# Patient Record
Sex: MALE | Race: WHITE
[De-identification: names, ages, dates, MRNs, and addresses within clinical notes are randomized per-mention and may not be internally consistent; named-entity substitution may affect disease eponyms.]

---

## 2018-04-07 ENCOUNTER — HOSPITAL ENCOUNTER (EMERGENCY)
Dept: HOSPITAL 46 - ED | Age: 68
Discharge: HOME | End: 2018-04-07
Payer: COMMERCIAL

## 2018-04-07 VITALS — WEIGHT: 200 LBS | BODY MASS INDEX: 24.87 KG/M2 | HEIGHT: 75 IN

## 2018-04-07 DIAGNOSIS — F41.8: Primary | ICD-10-CM

## 2018-04-07 DIAGNOSIS — E78.00: ICD-10-CM

## 2018-04-07 DIAGNOSIS — Z79.899: ICD-10-CM

## 2018-04-07 DIAGNOSIS — R45.89: ICD-10-CM

## 2018-04-07 NOTE — XMS
Encounter Summary
  Created on: 2018
 
 Memo Deni Siu
 External Reference #: [id number]
 : 52
 Sex: Male
 
 Demographics
 
 
+-----------------------+----------------------+
| Address               | [address]          |
|                       | [place]  [address] |
+-----------------------+----------------------+
| Home Phone            | [phone]      |
+-----------------------+----------------------+
| Preferred Language    | Unknown              |
+-----------------------+----------------------+
| Marital Status        |               |
+-----------------------+----------------------+
| Scientologist Affiliation | Unknown              |
+-----------------------+----------------------+
| Race                  | Unknown              |
+-----------------------+----------------------+
| Ethnic Group          | Unknown              |
+-----------------------+----------------------+
 
 
 Author
 
 
+--------------+--------------------------------------------+
| Author       | [facility] [place]  |
|              | and [place]                                |
+--------------+--------------------------------------------+
| Organization | [facility] [place]  |
|              | and [place]                                |
+--------------+--------------------------------------------+
| Address      | Unknown                                    |
+--------------+--------------------------------------------+
| Phone        | Unavailable                                |
+--------------+--------------------------------------------+
 
 
 
 Support
 
 
+---------------+--------------+---------+-----------------+
| Name          | Relationship | Address | Phone           |
+---------------+--------------+---------+-----------------+
| RIVER PARKINSON | [place]E         | Unknown | [phone] |
+---------------+--------------+---------+-----------------+
 
 
 
 Care Team Providers
 
 
 
+-----------------------+------+-----------------+
| Care Team Member Name | Role | Phone           |
+-----------------------+------+-----------------+
| Ck Michelle  | PCP  | [phone] |
+-----------------------+------+-----------------+
 
 
 
 Encounter Details
 
 
+--------+----------+----------------------+----------------------+-------------+
| Date   | Type     | Department           | Care Team            | Description |
+--------+----------+----------------------+----------------------+-------------+
| / | Imaging  |   [facility] |   Provider,          |             |
| 2018   | Exam     |  [facility]    | [address] |             |
|        |          | IMAGING              |  Robbie [address]        |             |
|        |          | [phone]         | [place] [address]     |             |
+--------+----------+----------------------+----------------------+-------------+
 
 
 
 Social History
 
 
+--------------+-------+-----------+--------+------+
| Tobacco Use  | Types | Packs/Day | Years  | Date |
|              |       |           | Used   |      |
+--------------+-------+-----------+--------+------+
| Never Smoker |       |           |        |      |
+--------------+-------+-----------+--------+------+
 
 
 
+---------------------+---+---+---+
| Smokeless Tobacco:  |   |   |   |
| Never Used          |   |   |   |
+---------------------+---+---+---+
 
 
 
+-------------+-----------+---------+--------------------------------------------+
| Alcohol Use | Drinks/We | oz/Week | Comments                                   |
|             | ek        |         |                                            |
+-------------+-----------+---------+--------------------------------------------+
| No          |   0       | 0.0     | No longer drinking 1-2 drinks 2-4 times a  |
|             | Standard  |         | month                                      |
|             | drinks or |         |                                            |
|             |           |         |                                            |
|             | equivalen |         |                                            |
|             | t         |         |                                            |
+-------------+-----------+---------+--------------------------------------------+
 
 
 
+------------------+---------------+
| Sex Assigned at  | Date Recorded |
| Birth            |               |
 
+------------------+---------------+
| Not on file      |               |
+------------------+---------------+
 as of this encounter
 
 Plan of Treatment
 Not on fileas of this encounter
 
 Results
 XR Lumbar Spine 2 or 3 Vw (2018 1045)
 
+----------+-----------------------+
| Specimen | Performing Laboratory |
+----------+-----------------------+
|          |   [facility]         |
+----------+-----------------------+
 
 
 
+--------------------------------------------------------------------+
| Narrative                                                          |
+--------------------------------------------------------------------+
|   External films for comparison only - no result from [place].  |
+--------------------------------------------------------------------+
 in this encounter
 
 Visit Diagnoses
 Not on filein this encounter

## 2018-04-07 NOTE — XMS
Encounter Summary
  Created on: 2018
 
 Memo Deni Siu
 External Reference #: [id number]
 : 52
 Sex: Male
 
 Demographics
 
 
+-----------------------+----------------------+
| Address               | [address]          |
|                       | [place]  [address] |
+-----------------------+----------------------+
| Home Phone            | [phone]      |
+-----------------------+----------------------+
| Preferred Language    | Unknown              |
+-----------------------+----------------------+
| Marital Status        |               |
+-----------------------+----------------------+
| Adventist Affiliation | Unknown              |
+-----------------------+----------------------+
| Race                  | Unknown              |
+-----------------------+----------------------+
| Ethnic Group          | Unknown              |
+-----------------------+----------------------+
 
 
 Author
 
 
+--------------+--------------------------------------------+
| Author       | [facility] [place]  |
|              | and [place]                                |
+--------------+--------------------------------------------+
| Organization | [facility] [place]  |
|              | and [place]                                |
+--------------+--------------------------------------------+
| Address      | Unknown                                    |
+--------------+--------------------------------------------+
| Phone        | Unavailable                                |
+--------------+--------------------------------------------+
 
 
 
 Support
 
 
+---------------+--------------+---------+-----------------+
| Name          | Relationship | Address | Phone           |
+---------------+--------------+---------+-----------------+
| RIVER PARKINSON | [place]E         | Unknown | [phone] |
+---------------+--------------+---------+-----------------+
 
 
 
 Care Team Providers
 
 
 
+-----------------------+------+-----------------+
| Care Team Member Name | Role | Phone           |
+-----------------------+------+-----------------+
| Ck Michelle  | PCP  | [phone] |
+-----------------------+------+-----------------+
 
 
 
 Encounter Details
 
 
+--------+----------+----------------------+----------------------+-------------+
| Date   | Type     | Department           | Care Team            | Description |
+--------+----------+----------------------+----------------------+-------------+
| / | Imaging  |   [facility] |   Provider,          |             |
| 2018   | Exam     |  [facility] EXTER[facility]    | [address] |             |
|        |          | IMAGING              |  [address]        |             |
|        |          | [phone]         | [place] [address]     |             |
+--------+----------+----------------------+----------------------+-------------+
 
 
 
 Social History
 
 
+--------------+-------+-----------+--------+------+
| Tobacco Use  | Types | Packs/Day | Years  | Date |
|              |       |           | Used   |      |
+--------------+-------+-----------+--------+------+
| Never Smoker |       |           |        |      |
+--------------+-------+-----------+--------+------+
 
 
 
+---------------------+---+---+---+
| Smokeless Tobacco:  |   |   |   |
| Never Used          |   |   |   |
+---------------------+---+---+---+
 
 
 
+-------------+-----------+---------+--------------------------------------------+
| Alcohol Use | Drinks/We | oz/Week | Comments                                   |
|             | ek        |         |                                            |
+-------------+-----------+---------+--------------------------------------------+
| No          |   0       | 0.0     | No longer drinking 1-2 drinks 2-4 times a  |
|             | Standard  |         | month                                      |
|             | drinks or |         |                                            |
|             |           |         |                                            |
|             | equivalen |         |                                            |
|             | t         |         |                                            |
+-------------+-----------+---------+--------------------------------------------+
 
 
 
+------------------+---------------+
| Sex Assigned at  | Date Recorded |
| Birth            |               |
 
+------------------+---------------+
| Not on file      |               |
+------------------+---------------+
 as of this encounter
 
 Plan of Treatment
 Not on fileas of this encounter
 
 Results
 XR Lumbar Spine 2 or 3 Vw (2018 1045)
 
+----------+-----------------------+
| Specimen | Performing Laboratory |
+----------+-----------------------+
|          |   [facility]         |
+----------+-----------------------+
 
 
 
+--------------------------------------------------------------------+
| Narrative                                                          |
+--------------------------------------------------------------------+
|   External films for comparison only - no result from [place].  |
+--------------------------------------------------------------------+
 in this encounter
 
 Visit Diagnoses
 Not on filein this encounter

## 2018-04-07 NOTE — XMS
Encounter Summary
  Created on: 2018
 
 Memo Deni Siu
 External Reference #: [id number]
 : 52
 Sex: Male
 
 Demographics
 
 
+-----------------------+----------------------+
| Address               | [address]          |
|                       | [place]  [address] |
+-----------------------+----------------------+
| Home Phone            | [phone]      |
+-----------------------+----------------------+
| Preferred Language    | Unknown              |
+-----------------------+----------------------+
| Marital Status        |               |
+-----------------------+----------------------+
| Lutheran Affiliation | Unknown              |
+-----------------------+----------------------+
| Race                  | Unknown              |
+-----------------------+----------------------+
| Ethnic Group          | Unknown              |
+-----------------------+----------------------+
 
 
 Author
 
 
+--------------+--------------------------------------------+
| Author       | [facility] [place]  |
|              | and [place]                                |
+--------------+--------------------------------------------+
| Organization | [facility] [place]  |
|              | and [place]                                |
+--------------+--------------------------------------------+
| Address      | Unknown                                    |
+--------------+--------------------------------------------+
| Phone        | Unavailable                                |
+--------------+--------------------------------------------+
 
 
 
 Support
 
 
+---------------+--------------+---------+-----------------+
| Name          | Relationship | Address | Phone           |
+---------------+--------------+---------+-----------------+
| RIVER PARKINSON | [place]E         | Unknown | [phone] |
+---------------+--------------+---------+-----------------+
 
 
 
 Care Team Providers
 
 
 
+-----------------------+------+-----------------+
| Care Team Member Name | Role | Phone           |
+-----------------------+------+-----------------+
| Ck Michelle  | PCP  | [phone] |
+-----------------------+------+-----------------+
 
 
 
 Reason for Visit
 
 
+--------------+-----------+
| Reason       | Comments  |
+--------------+-----------+
| Imaging Only | 6M PO XR  |
+--------------+-----------+
 
 
 
 Encounter Details
 
 
+--------+-----------+----------------------+----------------------+----------------------+
| Date   | Type      | Department           | Care Team            | Description          |
+--------+-----------+----------------------+----------------------+----------------------+
| / | Telephone |   [facility] [place]          |   Dreyer, Jason A,   | Imaging Only (6M PO  |
| 2018   |           | NEUROSURGERY  [address]  | DO  [address]  | XR )                 |
|        |           | [address]     | [address]  [place], |                      |
|        |           | [place], [place]      |  [place] [address]            |                      |
|        |           | [address]           | [phone]         |                      |
|        |           | [phone]         | [phone] (Fax)   |                      |
+--------+-----------+----------------------+----------------------+----------------------+
 
 
 
 Social History
 
 
+--------------+-------+-----------+--------+------+
| Tobacco Use  | Types | Packs/Day | Years  | Date |
|              |       |           | Used   |      |
+--------------+-------+-----------+--------+------+
| Never Smoker |       |           |        |      |
+--------------+-------+-----------+--------+------+
 
 
 
+---------------------+---+---+---+
| Smokeless Tobacco:  |   |   |   |
| Never Used          |   |   |   |
+---------------------+---+---+---+
 
 
 
+-------------+-----------+---------+--------------------------------------------+
| Alcohol Use | Drinks/We | oz/Week | Comments                                   |
|             | ek        |         |                                            |
+-------------+-----------+---------+--------------------------------------------+
 
| No          |   0       | 0.0     | No longer drinking 1-2 drinks 2-4 times a  |
|             | Standard  |         | month                                      |
|             | drinks or |         |                                            |
|             |           |         |                                            |
|             | equivalen |         |                                            |
|             | t         |         |                                            |
+-------------+-----------+---------+--------------------------------------------+
 
 
 
+------------------+---------------+
| Sex Assigned at  | Date Recorded |
| Birth            |               |
+------------------+---------------+
| Not on file      |               |
+------------------+---------------+
 as of this encounter
 
 Plan of Treatment
 Not on fileas of this encounter
 
 Visit Diagnoses
 Not on filein this encounter

## 2018-04-07 NOTE — XMS
Encounter Summary
  Created on: 2018
 
 Memo Deni Siu
 External Reference #: [id number]
 : 52
 Sex: Male
 
 Demographics
 
 
+-----------------------+----------------------+
| Address               | [address]          |
|                       | [place]  [address] |
+-----------------------+----------------------+
| Home Phone            | [phone]      |
+-----------------------+----------------------+
| Preferred Language    | Unknown              |
+-----------------------+----------------------+
| Marital Status        |               |
+-----------------------+----------------------+
| Quaker Affiliation | Unknown              |
+-----------------------+----------------------+
| Race                  | Unknown              |
+-----------------------+----------------------+
| Ethnic Group          | Unknown              |
+-----------------------+----------------------+
 
 
 Author
 
 
+--------------+--------------------------------------------+
| Author       | [facility] [place]  |
|              | and [place]                                |
+--------------+--------------------------------------------+
| Organization | [facility] [place]  |
|              | and [place]                                |
+--------------+--------------------------------------------+
| Address      | Unknown                                    |
+--------------+--------------------------------------------+
| Phone        | Unavailable                                |
+--------------+--------------------------------------------+
 
 
 
 Support
 
 
+---------------+--------------+---------+-----------------+
| Name          | Relationship | Address | Phone           |
+---------------+--------------+---------+-----------------+
| RIVER PARKINSON | [place]         | Unknown | [phone] |
+---------------+--------------+---------+-----------------+
 
 
 
 Care Team Providers
 
 
 
+-----------------------+------+-----------------+
| Care Team Member Name | Role | Phone           |
+-----------------------+------+-----------------+
| Ck Michelle  | PCP  | [phone] |
+-----------------------+------+-----------------+
 
 
 
 Reason for Visit
 
 
+--------------+-----------+
| Reason       | Comments  |
+--------------+-----------+
| Imaging Only | 6M PO XR  |
+--------------+-----------+
 
 
 
 Encounter Details
 
 
+--------+-----------+----------------------+----------------------+----------------------+
| Date   | Type      | Department           | Care Team            | Description          |
+--------+-----------+----------------------+----------------------+----------------------+
| / | Telephone |   [facility] [place]          |   Dreyer, Jason A,   | Imaging Only (6M PO  |
| 2018   |           | NEUROSURGERY  [address]  | DO  [address]  | XR )                 |
|        |           | [address]     | [address]  [place], |                      |
|        |           | [place], [place]      |  [place] [address]            |                      |
|        |           | [address]           | [phone]         |                      |
|        |           | [phone]         | [phone] (Fax)   |                      |
+--------+-----------+----------------------+----------------------+----------------------+
 
 
 
 Social History
 
 
+--------------+-------+-----------+--------+------+
| Tobacco Use  | Types | Packs/Day | Years  | Date |
|              |       |           | Used   |      |
+--------------+-------+-----------+--------+------+
| Never Smoker |       |           |        |      |
+--------------+-------+-----------+--------+------+
 
 
 
+---------------------+---+---+---+
| Smokeless Tobacco:  |   |   |   |
| Never Used          |   |   |   |
+---------------------+---+---+---+
 
 
 
+-------------+-----------+---------+--------------------------------------------+
| Alcohol Use | Drinks/We | oz/Week | Comments                                   |
|             | ek        |         |                                            |
+-------------+-----------+---------+--------------------------------------------+
 
| No          |   0       | 0.0     | No longer drinking 1-2 drinks 2-4 times a  |
|             | Standard  |         | month                                      |
|             | drinks or |         |                                            |
|             |           |         |                                            |
|             | equivalen |         |                                            |
|             | t         |         |                                            |
+-------------+-----------+---------+--------------------------------------------+
 
 
 
+------------------+---------------+
| Sex Assigned at  | Date Recorded |
| Birth            |               |
+------------------+---------------+
| Not on file      |               |
+------------------+---------------+
 as of this encounter
 
 Plan of Treatment
 Not on fileas of this encounter
 
 Visit Diagnoses
 Not on filein this encounter

## 2018-04-07 NOTE — XMS
Clinical Summary
  Created on: 2018
 
 Berna Hampton
 External Reference #: [id number]
 : 52
 Sex: Male
 
 Demographics
 
 
+-----------------------+----------------------+
| Address               | [address]          |
|                       | [place]  [address] |
+-----------------------+----------------------+
| Home Phone            | [phone]      |
+-----------------------+----------------------+
| Preferred Language    | Unknown              |
+-----------------------+----------------------+
| Marital Status        |               |
+-----------------------+----------------------+
| Moravian Affiliation | Unknown              |
+-----------------------+----------------------+
| Race                  | Unknown              |
+-----------------------+----------------------+
| Ethnic Group          | Unknown              |
+-----------------------+----------------------+
 
 
 Author
 
 
+--------------+--------------------------------------------+
| Author       | [facility]  |
|              | and [place]                                |
+--------------+--------------------------------------------+
| Organization | [facility]  |
|              | and [place]                                |
+--------------+--------------------------------------------+
| Address      | Unknown                                    |
+--------------+--------------------------------------------+
| Phone        | Unavailable                                |
+--------------+--------------------------------------------+
 
 
 
 Support
 
 
+---------------+--------------+---------+-----------------+
| Name          | Relationship | Address | Phone           |
+---------------+--------------+---------+-----------------+
| [place] | CARLENE         | Unknown | [phone] |
+---------------+--------------+---------+-----------------+
 
 
 
 Care Team Providers
 
 
 
+-----------------------+------+-----------------+
| Care Team Member Name | Role | Phone           |
+-----------------------+------+-----------------+
| Ck Michelle  | PP   | [phone] |
+-----------------------+------+-----------------+
 
 
 
 Allergies
 
 
+----------------+----------------------+----------+----------+---------------------+
| Active Allergy | Reactions            | Severity | Noted    | Comments            |
|                |                      |          | Date     |                     |
+----------------+----------------------+----------+----------+---------------------+
| Meperidine     | Nausea And Vomiting  | Medium   | 05/15/20 |                     |
|                |                      |          | 17       |                     |
+----------------+----------------------+----------+----------+---------------------+
| Oxycodone      | Other (See Comments) | Medium   | 20 |   Hallucinations &  |
|                |                      |          | 17       | sleepiness          |
+----------------+----------------------+----------+----------+---------------------+
 
 
 
 Current Medications
 
 
+----------------------+----------------------+--------+---------+------+------+-------+
| Prescription         | Sig.                 | Disp.  | Refills | Star | End  | Statu |
|                      |                      |        |         | t    | Date | s     |
|                      |                      |        |         | Date |      |       |
+----------------------+----------------------+--------+---------+------+------+-------+
|                      | Take 1 tablet by     |        |         |      |      | Activ |
| fexofenadine-pseudoe | mouth Twice  daily   |        |         |      |      | e     |
| PHEDrine (ALLEGRA-D  | as needed.           |        |         |      |      |       |
| ALLERGY &            |                      |        |         |      |      |       |
| CONGESTION)    |                      |        |         |      |      |       |
| MG per tablet        |                      |        |         |      |      |       |
+----------------------+----------------------+--------+---------+------+------+-------+
|   atorvaSTATin       | Take 40 mg by mouth  |        |         | 02/0 |      | Activ |
| (LIPITOR) 40 mg      | Daily.               |        |         | 4/20 |      | e     |
| tablet               |                      |        |         | 15   |      |       |
+----------------------+----------------------+--------+---------+------+------+-------+
|   FLUoxetine         | Take 20 mg by mouth  |        |         |      |      | Activ |
| (PROZAC) 20 mg       | Daily.               |        |         |      |      | e     |
| capsule              |                      |        |         |      |      |       |
+----------------------+----------------------+--------+---------+------+------+-------+
|   fluticasone        | 1 spray by Nasal     |        |         |      |      | Activ |
| (FLONASE) 50         | route Daily.         |        |         |      |      | e     |
| mcg/nasal spray      |                      |        |         |      |      |       |
+----------------------+----------------------+--------+---------+------+------+-------+
|   metoprolol         | Take 25 mg by mouth  |        |         | 02/0 |      | Activ |
| succinate            | Daily.               |        |         | 6/20 |      | e     |
| (TOPROL-XL) 25 mg 24 |                      |        |         | 17   |      |       |
|  hr tablet           |                      |        |         |      |      |       |
+----------------------+----------------------+--------+---------+------+------+-------+
|   cholecalciferol    | Take 1,000 Units by  |        |         |      |      | Activ |
| (VITAMIN D-3) 1,000  | mouth Daily.         |        |         |      |      | e     |
 
| units tablet         |                      |        |         |      |      |       |
+----------------------+----------------------+--------+---------+------+------+-------+
|   B Complex Vitamins | Take 1 tablet by     |        |         |      |      | Activ |
|  (VITAMIN B COMPLEX) | mouth Daily.         |        |         |      |      | e     |
|  tablet              |                      |        |         |      |      |       |
+----------------------+----------------------+--------+---------+------+------+-------+
|   ASCORBIC ACID PO   | Take  by mouth.      |        |         |      |      | Activ |
|                      |                      |        |         |      |      | e     |
+----------------------+----------------------+--------+---------+------+------+-------+
|   diazePAM (VALIUM)  | Take 1 tablet by     |   90   | 1       | 11/1 |      | Activ |
| 5 mg tablet          | mouth every 6 hours  | tablet |         | 5/20 |      | e     |
|                      | as needed for Muscle |        |         | 17   |      |       |
|                      |  spasms.             |        |         |      |      |       |
+----------------------+----------------------+--------+---------+------+------+-------+
|                      | Take 0.5-1 tablets   |   120  | 0       | 11/1 |      | Activ |
| HYDROcodone-acetamin | by mouth every 6     | tablet |         | 5/20 |      | e     |
| ophen (NORCO)  | hours as needed for  |        |         | 17   |      |       |
|  mg per tablet       | Pain.                |        |         |      |      |       |
+----------------------+----------------------+--------+---------+------+------+-------+
 
 
 
 Active Problems
 
 
+------------------------------------------------------------------+------------+
| Problem                                                          | Noted Date |
+------------------------------------------------------------------+------------+
| Anticipated difficulty with intubation                           | 2017 |
+------------------------------------------------------------------+------------+
| Osteoarthritis of spine with radiculopathy, lumbar region - Jul  | 2017 |
|                                                              |            |
+------------------------------------------------------------------+------------+
| Beta Blockers - Daily Use                                        | 2017 |
+------------------------------------------------------------------+------------+
| BPH (benign prostatic hyperplasia)                               | 2017 |
+------------------------------------------------------------------+------------+
| LAURA (obstructive sleep apnea) - H/O CPAP Use                     | 2017 |
+------------------------------------------------------------------+------------+
 
 
 
+-------------------------+
|   Overview:   uses CPAP |
+-------------------------+
 
 
 
+--------------------------------------------+------------+
| H/O TKA Total knee arthroplasty, BILATERAL | 2017 |
+--------------------------------------------+------------+
| H/O LEFT Ankle fusion                      | 2017 |
+--------------------------------------------+------------+
| H/O Lumbar discectomy L1-2, L2-3 - 2000    | 2017 |
+--------------------------------------------+------------+
| Lumbago with sciatica, right side          |            |
+--------------------------------------------+------------+
| Low back pain                              |            |
+--------------------------------------------+------------+
| Atherosclerosis                            |            |
 
+--------------------------------------------+------------+
 
 
 
 Encounters
 
 
+--------+------------+-----------+---------------------+----------------------+
| Date   | Type       | Specialty | Care Team           | Description          |
+--------+------------+-----------+---------------------+----------------------+
| / | Ancillary  |           |   Provider,         |                      |
|    | Orders     |           | MD [facility]      |                      |
+--------+------------+-----------+---------------------+----------------------+
| / | Imaging    |           |   Provider,         |                      |
|    | Exam       |           | MD [facility]      |                      |
+--------+------------+-----------+---------------------+----------------------+
| / | Telephone  |           |   Dreyer, Jason A,  | Imaging Only (6M PO  |
| 2018   |            |           | DO                  | XR )                 |
+--------+------------+-----------+---------------------+----------------------+
 from Last 3 Months
 
 Family History
 
 
+---------------------+-----------+------+-------------------+
| Medical History     | Relation  | Name | Comments          |
+---------------------+-----------+------+-------------------+
| No Known Problems   | Child     |      |                   |
+---------------------+-----------+------+-------------------+
| No Known Problems   | Child     |      |                   |
+---------------------+-----------+------+-------------------+
| Cancer              | Father    |      | Multiple Myeloma  |
+---------------------+-----------+------+-------------------+
| Coronary artery     | Father    |      |                   |
| disease             |           |      |                   |
+---------------------+-----------+------+-------------------+
| Multiple sclerosis  | Father    |      |                   |
+---------------------+-----------+------+-------------------+
| Cancer              | Maternal  |      |                   |
|                     | Grandfath |      |                   |
|                     | er        |      |                   |
+---------------------+-----------+------+-------------------+
| Cancer              | Maternal  |      |                   |
|                     | Grandmoth |      |                   |
|                     | er        |      |                   |
+---------------------+-----------+------+-------------------+
| Cancer              | Mother    |      | Pancreatic        |
+---------------------+-----------+------+-------------------+
| Lymphoma            | Mother    |      |                   |
+---------------------+-----------+------+-------------------+
| Rheum arthritis     | Mother    |      |                   |
+---------------------+-----------+------+-------------------+
| Heart disease       | Paternal  |      |                   |
|                     | Grandfath |      |                   |
|                     | er        |      |                   |
+---------------------+-----------+------+-------------------+
| No Known Problems   | Paternal  |      |                   |
|                     | Grandmoth |      |                   |
|                     | er        |      |                   |
+---------------------+-----------+------+-------------------+
 
| Lupus               | Sister    |      |                   |
+---------------------+-----------+------+-------------------+
| Other (see comment) | Sister    |      | Meniere's disease |
+---------------------+-----------+------+-------------------+
 
 
 
+----------------------+------+----------+-------------------+
| Relation             | Name | Status   | Comments          |
+----------------------+------+----------+-------------------+
| Child                |      | Other    | STATUS NOT LISTED |
+----------------------+------+----------+-------------------+
| Child                |      | Other    | STATUS NOT LISTED |
+----------------------+------+----------+-------------------+
| Child                |      |          |                   |
+----------------------+------+----------+-------------------+
| Child                |      |          |                   |
+----------------------+------+----------+-------------------+
| Father               |      |  |                   |
|                      |      |   (Age   |                   |
|                      |      | 73)      |                   |
+----------------------+------+----------+-------------------+
| Maternal Grandfather |      |  |                   |
|                      |      |   (Age   |                   |
|                      |      | 68)      |                   |
+----------------------+------+----------+-------------------+
| Maternal Grandmother |      |  |                   |
|                      |      |   (Age   |                   |
|                      |      | 73)      |                   |
+----------------------+------+----------+-------------------+
| Mother               |      |  | CANCER            |
|                      |      |   (Age   |                   |
|                      |      | 82)      |                   |
+----------------------+------+----------+-------------------+
| Paternal Grandfather |      |  | HEART             |
+----------------------+------+----------+-------------------+
| Paternal Grandmother |      |  |                   |
|                      |      |   (Age   |                   |
|                      |      | 76)      |                   |
+----------------------+------+----------+-------------------+
| Sister               |      | Alive    |                   |
+----------------------+------+----------+-------------------+
| Sister               |      |          |                   |
+----------------------+------+----------+-------------------+
| Sister               |      |          |                   |
+----------------------+------+----------+-------------------+
 
 
 
 Social History
 
 
+--------------+-------+-----------+--------+------+
| Tobacco Use  | Types | Packs/Day | Years  | Date |
|              |       |           | Used   |      |
+--------------+-------+-----------+--------+------+
| Never Smoker |       |           |        |      |
+--------------+-------+-----------+--------+------+
 
 
 
 
+---------------------+---+---+---+
| Smokeless Tobacco:  |   |   |   |
| Never Used          |   |   |   |
+---------------------+---+---+---+
 
 
 
+-------------+-----------+---------+--------------------------------------------+
| Alcohol Use | Drinks/We | oz/Week | Comments                                   |
|             | ek        |         |                                            |
+-------------+-----------+---------+--------------------------------------------+
| No          |   0       | 0.0     | No longer drinking 1-2 drinks 2-4 times a  |
|             | Standard  |         | month                                      |
|             | drinks or |         |                                            |
|             |           |         |                                            |
|             | equivalen |         |                                            |
|             | t         |         |                                            |
+-------------+-----------+---------+--------------------------------------------+
 
 
 
+------------------+---------------+
| Sex Assigned at  | Date Recorded |
| Birth            |               |
+------------------+---------------+
| Not on file      |               |
+------------------+---------------+
 
 
 
 Last Filed Vital Signs
 
 
+-------------------+---------------------+---------------------+
| Vital Sign        | Reading             | Time Taken          |
+-------------------+---------------------+---------------------+
| Blood Pressure    | 126/71              | 11/15/2017 0834 PST |
+-------------------+---------------------+---------------------+
| Pulse             | 73                  | 11/15/2017 0834 PST |
+-------------------+---------------------+---------------------+
| Temperature       | 37.5   C (99.5   F) | 2017 PDT |
+-------------------+---------------------+---------------------+
| Respiratory Rate  | 16                  | 2017 PDT |
+-------------------+---------------------+---------------------+
| Oxygen Saturation | 93%                 | 2017 PDT |
+-------------------+---------------------+---------------------+
| Inhaled Oxygen    | -                   | -                   |
| Concentration     |                     |                     |
+-------------------+---------------------+---------------------+
| Weight            | 101.2 kg (223 lb)   | 11/15/2017 0834 PST |
+-------------------+---------------------+---------------------+
| Height            | 190.5 cm (6' 3")    | 11/15/2017 0834 PST |
+-------------------+---------------------+---------------------+
| Body Mass Index   | 27.87               | 11/15/2017 0834 PST |
+-------------------+---------------------+---------------------+
 
 
 
 Plan of Treatment
 
 
 
+----------------------+-----------+-----------+----------+
| Health Maintenance   | Due Date  | Last Done | Comments |
+----------------------+-----------+-----------+----------+
| Hepatitis C          |  |           |          |
| Screening            | 1         |           |          |
+----------------------+-----------+-----------+----------+
| Vaccine:             |  |           |          |
| Dtap/Tdap/Td (1 -    | 0         |           |          |
| Tdap)                |           |           |          |
+----------------------+-----------+-----------+----------+
| COLON CANCER         |  |           |          |
| SCREENING            | 1         |           |          |
| (COLONOSCOPY EVERY   |           |           |          |
| 10 YEARS 50-75)      |           |           |          |
+----------------------+-----------+-----------+----------+
| Vaccine: Zoster (#1) |  |           |          |
|                      | 1         |           |          |
+----------------------+-----------+-----------+----------+
| Vaccine:             |  |           |          |
| Pneumococcal 65+     | 6         |           |          |
| Low/Medium Risk (1   |           |           |          |
| of 2 - PCV13)        |           |           |          |
+----------------------+-----------+-----------+----------+
| Vaccine: Influenza   |  |           |          |
| (Season Ended)       | 8         |           |          |
+----------------------+-----------+-----------+----------+
 
 
 
 Implants
 
 
+-------------------------------+--------+--------+-------------+--------+--------+--------+
| Implanted                     | Type   | Area   | Manufacture | Device | Expira | Model  |
|                               |        |        | r           |        | tion   | /      |
|                               |        |        |             | Identi | Date   | Serial |
|                               |        |        |             | fier   |        |  / Lot |
+-------------------------------+--------+--------+-------------+--------+--------+--------+
| Bone Chips 15cc -             | Bone   | Poster | RTI         |        | / | [id number] |
| [id number]: Qty: 1  |        | ior:   | BIOLOGICS   |        |    |        |
| on 2017 by Dreyer,      |        | Spine  | INC - [employer]  |        |        | /[id number] |
| Chacho AGUIRRE DO                   |        | Lumbar |             |        |        | 6-016  |
|                               |        |        |             |        |        | /60-31 |
|                               |        |        |             |        |        | 28     |
+-------------------------------+--------+--------+-------------+--------+--------+--------+
| Imp Spn Spcr 65h39pi -        | Generi | Poster | [employer] - |        | / | [id number] |
| [id number]: Qty: 1 on | c      | ior:   |  MEDT       |        | 4   | 1 /    |
|  2017 by Dreyer, Jason  |        | Spine  |             |        |        | / |
| DO TIMOTHY                         |        | Lumbar |             |        |        | 673    |
+-------------------------------+--------+--------+-------------+--------+--------+--------+
| Imp Spn Claudio Ti Sext Ti 5.5x45 | Generi | Poster | SO[employer]AMOR     |        |        | [id number] |
|  - [id number]: Qty: 2 | c      | ior:   | DANEK - DIV |        |        | 5045 / |
|  on 2017 by Dreyer,     |        | Spine  |  [employer]  |        |        |  /     |
| Chacho AGUIRRE DO                   |        | Lumbar | - SFDK      |        |        |        |
+-------------------------------+--------+--------+-------------+--------+--------+--------+
| Graft Infuse Bone Kit Xxs -   | Graft  | Poster | SOFAMOR     |        | / | [id number] |
| [id number]: Qty: 1 on |        | ior:   | DANEK - DIV |        | 2018   | 0 /    |
|  2017 by Dreyer, Jason  |        | Spine  |  [employer]  |        |        | / |
 
| DO TIMOTHY                         |        | Lumbar | - SFDK      |        |        | [id number]  |
+-------------------------------+--------+--------+-------------+--------+--------+--------+
| Set Scrw Ns G5 Brk Off Ti     | Screw  | Poster | SOFAMOR     |        |        | [id number] |
| 4.75 - [id number]:    |        | ior:   | DANEK - DIV |        |        | 0 / /  |
| Qty: 4 on 2017 by       |        | Spine  |  [employer]  |        |        |        |
| Dreyer, Jason A, DO           |        | Lumbar | - SFDK      |        |        |        |
+-------------------------------+--------+--------+-------------+--------+--------+--------+
| Screw Miriam Solera 6.5x55mm -  | Screw  | Poster | SOFAMOR     |        |        | [id number] |
| [id number]: Qty: 2 on |        | ior:   | DANEK - DIV |        |        | [id number]  |
|  2017 by Dreyer, Jason  |        | Spine  |  [employer]  |        |        | / /    |
| DO TIMOTHY                         |        | Lumbar | - SFDK      |        |        |        |
+-------------------------------+--------+--------+-------------+--------+--------+--------+
| Screw Miriam Solera 6.5x60mm -  | Screw  | Poster | SOFAMOR     |        |        | [id number] |
| [id number]: Qty: 2 on |        | ior:   | ABENA CARDENAS |        |        | [id number]  |
|  2017 by Dreyer, Jason  |        | Spine  |  [employer]  |        |        | / /    |
| DO [facility]                         |        | Lumbar | - [facility]      |        |        |        |
+-------------------------------+--------+--------+-------------+--------+--------+--------+
 
 
 
 Results
 XR Lumbar Spine 2 or 3 Vw (2018 1045)
 
+----------+-----------------------+
| Specimen | Performing Laboratory |
+----------+-----------------------+
|          |   [facility]         |
+----------+-----------------------+
 
 
 
+--------------------------------------------------------------------+
| Narrative                                                          |
+--------------------------------------------------------------------+
|   External films for comparison only - no result from [facility].  |
+--------------------------------------------------------------------+
 from Last 3 Months
 
 Insurance
 
 
+----------+--------+-------------+--------+-------------+--------------------+
| Payer    | Benefi | Subscriber  | Type   | Phone       | Address            |
|          | t Plan | ID          |        |             |                    |
|          |  /     |             |        |             |                    |
|          | Group  |             |        |             |                    |
+----------+--------+-------------+--------+-------------+--------------------+
| MEDICARE | MEDICA | xxxxxxxxxx  | [employer] | [phone] |                    |
|          | RE     |             | re     | [phone]        |                    |
|          | PART A |             |        |             |                    |
+----------+--------+-------------+--------+-------------+--------------------+
| MODA     | MODA   | xxxxxxxxx   | PPO    | [phone] |    [address] [address]     |
|          | [address]   |             |        | [phone]        | [place] [address] |
|          | [place] |             |        |             |                    |
|          | [place]     |             |        |             |                    |
+----------+--------+-------------+--------+-------------+--------------------+
 
 
 
+-------------------+--------+-------------+--------+-------------+---------------------+
 
| Guarantor Name    | Accoun | Relation to | Date   | Phone       | Billing Address     |
|                   | t Type |  Patient    | of     |             |                     |
|                   |        |             | Birth  |             |                     |
+-------------------+--------+-------------+--------+-------------+---------------------+
| [employer] | Person | Self        | / |   Work:     |   [address]       |
|                   | [address] |             | [address]   | [phone] | [place] [address] |
|                   | heath    |             |        | [address]  Home: |                     |
|                   |        |             |        |             |                     |
|                   |        |             |        | [phone] |                     |
|                   |        |             |        | [address]        |                     |
+-------------------+--------+-------------+--------+-------------+---------------------+

## 2018-04-07 NOTE — XMS
Encounter Summary
  Created on: 2018
 
 Memo Deni Siu
 External Reference #: [id number]
 : 52
 Sex: Male
 
 Demographics
 
 
+-----------------------+----------------------+
| Address               | [address]          |
|                       | [place]  [address] |
+-----------------------+----------------------+
| Home Phone            | [phone]      |
+-----------------------+----------------------+
| Preferred Language    | Unknown              |
+-----------------------+----------------------+
| Marital Status        |               |
+-----------------------+----------------------+
| Tenriism Affiliation | Unknown              |
+-----------------------+----------------------+
| Race                  | Unknown              |
+-----------------------+----------------------+
| Ethnic Group          | Unknown              |
+-----------------------+----------------------+
 
 
 Author
 
 
+--------------+--------------------------------------------+
| Author       | [facility]  |
|              | and [place]                                |
+--------------+--------------------------------------------+
| Organization | [facility]  |
|              | and [place]                                |
+--------------+--------------------------------------------+
| Address      | Unknown                                    |
+--------------+--------------------------------------------+
| Phone        | Unavailable                                |
+--------------+--------------------------------------------+
 
 
 
 Support
 
 
+---------------+--------------+---------+-----------------+
| Name          | Relationship | Address | Phone           |
+---------------+--------------+---------+-----------------+
| [place] | CARLENE         | Unknown | [phone] |
+---------------+--------------+---------+-----------------+
 
 
 
 Care Team Providers
 
 
 
+-----------------------+------+-----------------+
| Care Team Member Name | Role | Phone           |
+-----------------------+------+-----------------+
| Ck Michelle  | PCP  | [phone] |
+-----------------------+------+-----------------+
 
 
 
 Encounter Details
 
 
+--------+------------+----------------------+----------------------+-------------+
| Date   | Type       | Department           | Care Team            | Description |
+--------+------------+----------------------+----------------------+-------------+
| / | Ancillary  |   [facility] |   Provider,          |             |
| 2018   | Orders     |  [facility]    | [address] |             |
|        |            | IMAGING              |  [address]        |             |
|        |            | [phone]         | [place] [address]     |             |
+--------+------------+----------------------+----------------------+-------------+
 
 
 
 Social History
 
 
+--------------+-------+-----------+--------+------+
| Tobacco Use  | Types | Packs/Day | Years  | Date |
|              |       |           | Used   |      |
+--------------+-------+-----------+--------+------+
| Never Smoker |       |           |        |      |
+--------------+-------+-----------+--------+------+
 
 
 
+---------------------+---+---+---+
| Smokeless Tobacco:  |   |   |   |
| Never Used          |   |   |   |
+---------------------+---+---+---+
 
 
 
+-------------+-----------+---------+--------------------------------------------+
| Alcohol Use | Drinks/We | oz/Week | Comments                                   |
|             | ek        |         |                                            |
+-------------+-----------+---------+--------------------------------------------+
| No          |   0       | 0.0     | No longer drinking 1-2 drinks 2-4 times a  |
|             | Standard  |         | month                                      |
|             | drinks or |         |                                            |
|             |           |         |                                            |
|             | equivalen |         |                                            |
|             | t         |         |                                            |
+-------------+-----------+---------+--------------------------------------------+
 
 
 
+------------------+---------------+
| Sex Assigned at  | Date Recorded |
| Birth            |               |
 
+------------------+---------------+
| Not on file      |               |
+------------------+---------------+
 as of this encounter
 
 Plan of Treatment
 Not on fileas of this encounter
 
 Results
 XR Lumbar Spine 2 or 3 Vw (2018 1045)
 
+----------+-----------------------+
| Specimen | Performing Laboratory |
+----------+-----------------------+
|          |   [facility]         |
+----------+-----------------------+
 
 
 
+--------------------------------------------------------------------+
| Narrative                                                          |
+--------------------------------------------------------------------+
|   External films for comparison only - no result from [facility].  |
+--------------------------------------------------------------------+
 in this encounter
 
 Visit Diagnoses
 Not on filein this encounter

## 2018-04-07 NOTE — XMS
Clinical Summary
  Created on: 2018
 
 Berna Hampton
 External Reference #: [id number]
 : 52
 Sex: Male
 
 Demographics
 
 
+-----------------------+----------------------+
| Address               | [address]          |
|                       | [place]  [address] |
+-----------------------+----------------------+
| Home Phone            | [phone]      |
+-----------------------+----------------------+
| Preferred Language    | Unknown              |
+-----------------------+----------------------+
| Marital Status        |               |
+-----------------------+----------------------+
| Confucianist Affiliation | Unknown              |
+-----------------------+----------------------+
| Race                  | Unknown              |
+-----------------------+----------------------+
| Ethnic Group          | Unknown              |
+-----------------------+----------------------+
 
 
 Author
 
 
+--------------+--------------------------------------------+
| Author       | [facility]  |
|              | [facility] [place]                                |
+--------------+--------------------------------------------+
| Organization | [facility]  |
|              | [facility] [place]                                |
+--------------+--------------------------------------------+
| Address      | Unknown                                    |
+--------------+--------------------------------------------+
| Phone        | Unavailable                                |
+--------------+--------------------------------------------+
 
 
 
 Support
 
 
+---------------+--------------+---------+-----------------+
| Name          | Relationship | Address | Phone           |
+---------------+--------------+---------+-----------------+
| [place] | CARLENE         | Unknown | [phone] |
+---------------+--------------+---------+-----------------+
 
 
 
 Care Team Providers
 
 
 
+-----------------------+------+-----------------+
| Care Team Member Name | Role | Phone           |
+-----------------------+------+-----------------+
| Ck Michelle  | PP   | [phone] |
+-----------------------+------+-----------------+
 
 
 
 Allergies
 
 
+----------------+----------------------+----------+----------+---------------------+
| Active Allergy | Reactions            | Severity | Noted    | Comments            |
|                |                      |          | Date     |                     |
+----------------+----------------------+----------+----------+---------------------+
| Meperidine     | Nausea And Vomiting  | Medium   | 05/15/20 |                     |
|                |                      |          | 17       |                     |
+----------------+----------------------+----------+----------+---------------------+
| Oxycodone      | Other (See Comments) | Medium   | 20 |   Hallucinations &  |
|                |                      |          | 17       | sleepiness          |
+----------------+----------------------+----------+----------+---------------------+
 
 
 
 Current Medications
 
 
+----------------------+----------------------+--------+---------+------+------+-------+
| Prescription         | Sig.                 | Disp.  | Refills | Star | End  | Statu |
|                      |                      |        |         | t    | Date | s     |
|                      |                      |        |         | Date |      |       |
+----------------------+----------------------+--------+---------+------+------+-------+
|                      | Take 1 tablet by     |        |         |      |      | Activ |
| fexofenadine-pseudoe | mouth Twice  daily   |        |         |      |      | e     |
| PHEDrine (ALLEGRA-D  | as needed.           |        |         |      |      |       |
| ALLERGY &            |                      |        |         |      |      |       |
| CONGESTION)    |                      |        |         |      |      |       |
| MG per tablet        |                      |        |         |      |      |       |
+----------------------+----------------------+--------+---------+------+------+-------+
|   atorvaSTATin       | Take 40 mg by mouth  |        |         | 02/0 |      | Activ |
| (LIPITOR) 40 mg      | Daily.               |        |         | 4/20 |      | e     |
| tablet               |                      |        |         | 15   |      |       |
+----------------------+----------------------+--------+---------+------+------+-------+
|   FLUoxetine         | Take 20 mg by mouth  |        |         |      |      | Activ |
| (PROZAC) 20 mg       | Daily.               |        |         |      |      | e     |
| capsule              |                      |        |         |      |      |       |
+----------------------+----------------------+--------+---------+------+------+-------+
|   fluticasone        | 1 spray by Nasal     |        |         |      |      | Activ |
| (FLONASE) 50         | route Daily.         |        |         |      |      | e     |
| mcg/nasal spray      |                      |        |         |      |      |       |
+----------------------+----------------------+--------+---------+------+------+-------+
|   metoprolol         | Take 25 mg by mouth  |        |         | 02/0 |      | Activ |
| succinate            | Daily.               |        |         | 6/20 |      | e     |
| (TOPROL-XL) 25 mg 24 |                      |        |         | 17   |      |       |
|  hr tablet           |                      |        |         |      |      |       |
+----------------------+----------------------+--------+---------+------+------+-------+
|   cholecalciferol    | Take 1,000 Units by  |        |         |      |      | Activ |
| (VITAMIN D-3) 1,000  | mouth Daily.         |        |         |      |      | e     |
 
| units tablet         |                      |        |         |      |      |       |
+----------------------+----------------------+--------+---------+------+------+-------+
|   B Complex Vitamins | Take 1 tablet by     |        |         |      |      | Activ |
|  (VITAMIN B COMPLEX) | mouth Daily.         |        |         |      |      | e     |
|  tablet              |                      |        |         |      |      |       |
+----------------------+----------------------+--------+---------+------+------+-------+
|   ASCORBIC ACID PO   | Take  by mouth.      |        |         |      |      | Activ |
|                      |                      |        |         |      |      | e     |
+----------------------+----------------------+--------+---------+------+------+-------+
|   diazePAM (VALIUM)  | Take 1 tablet by     |   90   | 1       | 11/1 |      | Activ |
| 5 mg tablet          | mouth every 6 hours  | tablet |         | 5/20 |      | e     |
|                      | as needed for Muscle |        |         | 17   |      |       |
|                      |  spasms.             |        |         |      |      |       |
+----------------------+----------------------+--------+---------+------+------+-------+
|                      | Take 0.5-1 tablets   |   120  | 0       | 11/1 |      | Activ |
| HYDROcodone-acetamin | by mouth every 6     | tablet |         | 5/20 |      | e     |
| ophen (NORCO)  | hours as needed for  |        |         | 17   |      |       |
|  mg per tablet       | Pain.                |        |         |      |      |       |
+----------------------+----------------------+--------+---------+------+------+-------+
 
 
 
 Active Problems
 
 
+------------------------------------------------------------------+------------+
| Problem                                                          | Noted Date |
+------------------------------------------------------------------+------------+
| Anticipated difficulty with intubation                           | 2017 |
+------------------------------------------------------------------+------------+
| Osteoarthritis of spine with radiculopathy, lumbar region - Jul  | 2017 |
|                                                              |            |
+------------------------------------------------------------------+------------+
| Beta Blockers - Daily Use                                        | 2017 |
+------------------------------------------------------------------+------------+
| BPH (benign prostatic hyperplasia)                               | 2017 |
+------------------------------------------------------------------+------------+
| LAURA (obstructive sleep apnea) - H/O CPAP Use                     | 2017 |
+------------------------------------------------------------------+------------+
 
 
 
+-------------------------+
|   Overview:   uses CPAP |
+-------------------------+
 
 
 
+--------------------------------------------+------------+
| H/O TKA Total knee arthroplasty, BILATERAL | 2017 |
+--------------------------------------------+------------+
| H/O LEFT Ankle fusion                      | 2017 |
+--------------------------------------------+------------+
| H/O Lumbar discectomy L1-2, L2-3 - 2000    | 2017 |
+--------------------------------------------+------------+
| Lumbago with sciatica, right side          |            |
+--------------------------------------------+------------+
| Low back pain                              |            |
+--------------------------------------------+------------+
| Atherosclerosis                            |            |
 
+--------------------------------------------+------------+
 
 
 
 Encounters
 
 
+--------+------------+-----------+---------------------+----------------------+
| Date   | Type       | Specialty | Care Team           | Description          |
+--------+------------+-----------+---------------------+----------------------+
| / | Ancillary  |           |   Provider,         |                      |
|    | Orders     |           | MD [facility]      |                      |
+--------+------------+-----------+---------------------+----------------------+
| / | Imaging    |           |   Provider,         |                      |
|    | Exam       |           | MD [facility]      |                      |
+--------+------------+-----------+---------------------+----------------------+
| / | Telephone  |           |   Dreyer, Jason A,  | Imaging Only (6M PO  |
| 2018   |            |           | DO                  | XR )                 |
+--------+------------+-----------+---------------------+----------------------+
 from Last 3 Months
 
 Family History
 
 
+---------------------+-----------+------+-------------------+
| Medical History     | Relation  | Name | Comments          |
+---------------------+-----------+------+-------------------+
| No Known Problems   | Child     |      |                   |
+---------------------+-----------+------+-------------------+
| No Known Problems   | Child     |      |                   |
+---------------------+-----------+------+-------------------+
| Cancer              | Father    |      | Multiple Myeloma  |
+---------------------+-----------+------+-------------------+
| Coronary artery     | Father    |      |                   |
| disease             |           |      |                   |
+---------------------+-----------+------+-------------------+
| Multiple sclerosis  | Father    |      |                   |
+---------------------+-----------+------+-------------------+
| Cancer              | Maternal  |      |                   |
|                     | Grandfath |      |                   |
|                     | er        |      |                   |
+---------------------+-----------+------+-------------------+
| Cancer              | Maternal  |      |                   |
|                     | Grandmoth |      |                   |
|                     | er        |      |                   |
+---------------------+-----------+------+-------------------+
| Cancer              | Mother    |      | Pancreatic        |
+---------------------+-----------+------+-------------------+
| Lymphoma            | Mother    |      |                   |
+---------------------+-----------+------+-------------------+
| Rheum arthritis     | Mother    |      |                   |
+---------------------+-----------+------+-------------------+
| Heart disease       | Paternal  |      |                   |
|                     | Grandfath |      |                   |
|                     | er        |      |                   |
+---------------------+-----------+------+-------------------+
| No Known Problems   | Paternal  |      |                   |
|                     | Grandmoth |      |                   |
|                     | er        |      |                   |
+---------------------+-----------+------+-------------------+
 
| Lupus               | Sister    |      |                   |
+---------------------+-----------+------+-------------------+
| Other (see comment) | Sister    |      | Meniere's disease |
+---------------------+-----------+------+-------------------+
 
 
 
+----------------------+------+----------+-------------------+
| Relation             | Name | Status   | Comments          |
+----------------------+------+----------+-------------------+
| Child                |      | Other    | STATUS NOT LISTED |
+----------------------+------+----------+-------------------+
| Child                |      | Other    | STATUS NOT LISTED |
+----------------------+------+----------+-------------------+
| Child                |      |          |                   |
+----------------------+------+----------+-------------------+
| Child                |      |          |                   |
+----------------------+------+----------+-------------------+
| Father               |      |  |                   |
|                      |      |   (Age   |                   |
|                      |      | 73)      |                   |
+----------------------+------+----------+-------------------+
| Maternal Grandfather |      |  |                   |
|                      |      |   (Age   |                   |
|                      |      | 68)      |                   |
+----------------------+------+----------+-------------------+
| Maternal Grandmother |      |  |                   |
|                      |      |   (Age   |                   |
|                      |      | 73)      |                   |
+----------------------+------+----------+-------------------+
| Mother               |      |  | CANCER            |
|                      |      |   (Age   |                   |
|                      |      | 82)      |                   |
+----------------------+------+----------+-------------------+
| Paternal Grandfather |      |  | HEART             |
+----------------------+------+----------+-------------------+
| Paternal Grandmother |      |  |                   |
|                      |      |   (Age   |                   |
|                      |      | 76)      |                   |
+----------------------+------+----------+-------------------+
| Sister               |      | Alive    |                   |
+----------------------+------+----------+-------------------+
| Sister               |      |          |                   |
+----------------------+------+----------+-------------------+
| Sister               |      |          |                   |
+----------------------+------+----------+-------------------+
 
 
 
 Social History
 
 
+--------------+-------+-----------+--------+------+
| Tobacco Use  | Types | Packs/Day | Years  | Date |
|              |       |           | Used   |      |
+--------------+-------+-----------+--------+------+
| Never Smoker |       |           |        |      |
+--------------+-------+-----------+--------+------+
 
 
 
 
+---------------------+---+---+---+
| Smokeless Tobacco:  |   |   |   |
| Never Used          |   |   |   |
+---------------------+---+---+---+
 
 
 
+-------------+-----------+---------+--------------------------------------------+
| Alcohol Use | Drinks/We | oz/Week | Comments                                   |
|             | ek        |         |                                            |
+-------------+-----------+---------+--------------------------------------------+
| No          |   0       | 0.0     | No longer drinking 1-2 drinks 2-4 times a  |
|             | Standard  |         | month                                      |
|             | drinks or |         |                                            |
|             |           |         |                                            |
|             | equivalen |         |                                            |
|             | t         |         |                                            |
+-------------+-----------+---------+--------------------------------------------+
 
 
 
+------------------+---------------+
| Sex Assigned at  | Date Recorded |
| Birth            |               |
+------------------+---------------+
| Not on file      |               |
+------------------+---------------+
 
 
 
 Last Filed Vital Signs
 
 
+-------------------+---------------------+---------------------+
| Vital Sign        | Reading             | Time Taken          |
+-------------------+---------------------+---------------------+
| Blood Pressure    | 126/71              | 11/15/2017 0834 PST |
+-------------------+---------------------+---------------------+
| Pulse             | 73                  | 11/15/2017 0834 PST |
+-------------------+---------------------+---------------------+
| Temperature       | 37.5   C (99.5   F) | 2017 PDT |
+-------------------+---------------------+---------------------+
| Respiratory Rate  | 16                  | 2017 PDT |
+-------------------+---------------------+---------------------+
| Oxygen Saturation | 93%                 | 2017 PDT |
+-------------------+---------------------+---------------------+
| Inhaled Oxygen    | -                   | -                   |
| Concentration     |                     |                     |
+-------------------+---------------------+---------------------+
| Weight            | 101.2 kg (223 lb)   | 11/15/2017 0834 PST |
+-------------------+---------------------+---------------------+
| Height            | 190.5 cm (6' 3")    | 11/15/2017 0834 PST |
+-------------------+---------------------+---------------------+
| Body Mass Index   | 27.87               | 11/15/2017 0834 PST |
+-------------------+---------------------+---------------------+
 
 
 
 Plan of Treatment
 
 
 
+----------------------+-----------+-----------+----------+
| Health Maintenance   | Due Date  | Last Done | Comments |
+----------------------+-----------+-----------+----------+
| Hepatitis C          |  |           |          |
| Screening            | 1         |           |          |
+----------------------+-----------+-----------+----------+
| Vaccine:             |  |           |          |
| Dtap/Tdap/Td (1 -    | 0         |           |          |
| Tdap)                |           |           |          |
+----------------------+-----------+-----------+----------+
| COLON CANCER         |  |           |          |
| SCREENING            | 1         |           |          |
| (COLONOSCOPY EVERY   |           |           |          |
| 10 YEARS 50-75)      |           |           |          |
+----------------------+-----------+-----------+----------+
| Vaccine: Zoster (#1) |  |           |          |
|                      | 1         |           |          |
+----------------------+-----------+-----------+----------+
| Vaccine:             |  |           |          |
| Pneumococcal 65+     | 6         |           |          |
| Low/Medium Risk (1   |           |           |          |
| of 2 - PCV13)        |           |           |          |
+----------------------+-----------+-----------+----------+
| Vaccine: Influenza   |  |           |          |
| (Season Ended)       | 8         |           |          |
+----------------------+-----------+-----------+----------+
 
 
 
 Implants
 
 
+-------------------------------+--------+--------+-------------+--------+--------+--------+
| Implanted                     | Type   | Area   | Manufacture | Device | Expira | Model  |
|                               |        |        | r           |        | tion   | /      |
|                               |        |        |             | Identi | Date   | Serial |
|                               |        |        |             | fier   |        |  / Lot |
+-------------------------------+--------+--------+-------------+--------+--------+--------+
| Bone Chips 15cc -             | Bone   | Poster | RTI         |        | / | [id number] |
| [id number]: Qty: 1  |        | ior:   | BIOLOGICS   |        |    |        |
| on 2017 by Dreyer,      |        | Spine  | INC - [employer]  |        |        | /[id number] |
| Chacho AGUIRRE DO                   |        | Lumbar |             |        |        | 6-016  |
|                               |        |        |             |        |        | /60-31 |
|                               |        |        |             |        |        | 28     |
+-------------------------------+--------+--------+-------------+--------+--------+--------+
| Imp Spn Spcr 77t20vl -        | Generi | Poster | [employer] - |        | / | [id number] |
| [id number]: Qty: 1 on | c      | ior:   |  MEDT       |        | 4   | 1 /    |
|  2017 by Dreyer, Jason  |        | Spine  |             |        |        | / |
| DO TIMOTHY                         |        | Lumbar |             |        |        | 673    |
+-------------------------------+--------+--------+-------------+--------+--------+--------+
| Imp Spn Claudio Ti Sext Ti 5.5x45 | Generi | Poster | [employer]     |        |        | [id number] |
|  - [id number]: Qty: 2 | c      | ior:   | DANEK - DIV |        |        | 5045 / |
|  on 2017 by Dreyer,     |        | Spine  |  [employer]  |        |        |  /     |
| Chacho AGUIRRE DO                   |        | Lumbar | - SFDK      |        |        |        |
+-------------------------------+--------+--------+-------------+--------+--------+--------+
| Graft Infuse Bone Kit Xxs -   | Graft  | Poster | [employer]     |        | / | [id number] |
| [id number]: Qty: 1 on |        | ior:   | DANEK - DIV |        | 2018   | 0 /    |
|  2017 by Dreyer, Jason  |        | Spine  |  [employer]  |        |        | / |
 
| DO TIMOTHY                         |        | Lumbar | - SFDK      |        |        | [id number]  |
+-------------------------------+--------+--------+-------------+--------+--------+--------+
| Set Scrw Ns G5 Brk Off Ti     | Screw  | Poster | [employer]AMOR     |        |        | [id number] |
| 4.75 - [id number]:    |        | ior:   | DANEK - DIV |        |        | 0 / /  |
| Qty: 4 on 2017 by       |        | Spine  |  [employer]  |        |        |        |
| Dreyer, Jason A, DO           |        | Lumbar | - SFDK      |        |        |        |
+-------------------------------+--------+--------+-------------+--------+--------+--------+
| Screw Miriam Solera 6.5x55mm -  | Screw  | Poster | SO[employer]OR     |        |        | [id number] |
| [id number]: Qty: 2 on |        | ior:   | DANEK - DIV |        |        | [id number]  |
|  2017 by Dreyer, Jason  |        | Spine  |  [employer]  |        |        | / /    |
| DO TIMOTHY                         |        | Lumbar | - SFDK      |        |        |        |
+-------------------------------+--------+--------+-------------+--------+--------+--------+
| Screw Miriam Solera 6.5x60mm -  | Screw  | Poster | SOFAMOR     |        |        | [id number] |
| [id number]: Qty: 2 on |        | ior:   | ABENA CARDENAS |        |        | [id number]  |
|  2017 by Dreyer, Jason  |        | Spine  |  [employer]  |        |        | / /    |
| DO [facility]                         |        | Lumbar | - [facility]      |        |        |        |
+-------------------------------+--------+--------+-------------+--------+--------+--------+
 
 
 
 Results
 XR Lumbar Spine 2 or 3 Vw (2018 1045)
 
+----------+-----------------------+
| Specimen | Performing Laboratory |
+----------+-----------------------+
|          |   [facility]         |
+----------+-----------------------+
 
 
 
+--------------------------------------------------------------------+
| Narrative                                                          |
+--------------------------------------------------------------------+
|   External films for comparison only - no result from [facility].  |
+--------------------------------------------------------------------+
 from Last 3 Months
 
 Insurance
 
 
+----------+--------+-------------+--------+-------------+--------------------+
| Payer    | Benefi | Subscriber  | Type   | Phone       | Address            |
|          | t Plan | ID          |        |             |                    |
|          |  /     |             |        |             |                    |
|          | Group  |             |        |             |                    |
+----------+--------+-------------+--------+-------------+--------------------+
| MEDICARE | MEDICA | xxxxxxxxxx  | [employer] | [phone] |                    |
|          | RE     |             | re     | [phone]        |                    |
|          | PART A |             |        |             |                    |
+----------+--------+-------------+--------+-------------+--------------------+
| MODA     | MODA   | xxxxxxxxx   | PPO    | [phone] |    [address] [address]     |
|          | [address]   |             |        | [phone]        | [place] [address] |
|          | [place] |             |        |             |                    |
|          | [place]     |             |        |             |                    |
+----------+--------+-------------+--------+-------------+--------------------+
 
 
 
+-------------------+--------+-------------+--------+-------------+---------------------+
 
| Guarantor Name    | Accoun | Relation to | Date   | Phone       | Billing Address     |
|                   | t Type |  Patient    | of     |             |                     |
|                   |        |             | Birth  |             |                     |
+-------------------+--------+-------------+--------+-------------+---------------------+
| BERNA HAMPTON | Person | Self        | / |   Work:     |   [address]       |
|                   | [address] |             | [address]   | [phone] | [place] [address] |
|                   | heath    |             |        | [address]  Home: |                     |
|                   |        |             |        |             |                     |
|                   |        |             |        | [phone] |                     |
|                   |        |             |        | [address]262        |                     |
+-------------------+--------+-------------+--------+-------------+---------------------+

## 2018-04-07 NOTE — XMS
Encounter Summary
  Created on: 2018
 
 Memo Deni Siu
 External Reference #: [id number]
 : 52
 Sex: Male
 
 Demographics
 
 
+-----------------------+----------------------+
| Address               | [address]          |
|                       | [place]  [address] |
+-----------------------+----------------------+
| Home Phone            | [phone]      |
+-----------------------+----------------------+
| Preferred Language    | Unknown              |
+-----------------------+----------------------+
| Marital Status        |               |
+-----------------------+----------------------+
| Sikhism Affiliation | Unknown              |
+-----------------------+----------------------+
| Race                  | Unknown              |
+-----------------------+----------------------+
| Ethnic Group          | Unknown              |
+-----------------------+----------------------+
 
 
 Author
 
 
+--------------+--------------------------------------------+
| Author       | [facility]  |
|              | and [place]                                |
+--------------+--------------------------------------------+
| Organization | [facility]  |
|              | and [place]                                |
+--------------+--------------------------------------------+
| Address      | Unknown                                    |
+--------------+--------------------------------------------+
| Phone        | Unavailable                                |
+--------------+--------------------------------------------+
 
 
 
 Support
 
 
+---------------+--------------+---------+-----------------+
| Name          | Relationship | Address | Phone           |
+---------------+--------------+---------+-----------------+
| RIVER PARKINSON | [place]         | Unknown | [phone] |
+---------------+--------------+---------+-----------------+
 
 
 
 Care Team Providers
 
 
 
+-----------------------+------+-----------------+
| Care Team Member Name | Role | Phone           |
+-----------------------+------+-----------------+
| Ck Michelle  | PCP  | [phone] |
+-----------------------+------+-----------------+
 
 
 
 Encounter Details
 
 
+--------+------------+----------------------+----------------------+-------------+
| Date   | Type       | Department           | Care Team            | Description |
+--------+------------+----------------------+----------------------+-------------+
| / | Ancillary  |   [facility] |   Provider,          |             |
| 2018   | Orders     |  [facility]    | [address] |             |
|        |            | IMAGING              |  [address]        |             |
|        |            | [phone]         | [place] [address]     |             |
+--------+------------+----------------------+----------------------+-------------+
 
 
 
 Social History
 
 
+--------------+-------+-----------+--------+------+
| Tobacco Use  | Types | Packs/Day | Years  | Date |
|              |       |           | Used   |      |
+--------------+-------+-----------+--------+------+
| Never Smoker |       |           |        |      |
+--------------+-------+-----------+--------+------+
 
 
 
+---------------------+---+---+---+
| Smokeless Tobacco:  |   |   |   |
| Never Used          |   |   |   |
+---------------------+---+---+---+
 
 
 
+-------------+-----------+---------+--------------------------------------------+
| Alcohol Use | Drinks/We | oz/Week | Comments                                   |
|             | ek        |         |                                            |
+-------------+-----------+---------+--------------------------------------------+
| No          |   0       | 0.0     | No longer drinking 1-2 drinks 2-4 times a  |
|             | Standard  |         | month                                      |
|             | drinks or |         |                                            |
|             |           |         |                                            |
|             | equivalen |         |                                            |
|             | t         |         |                                            |
+-------------+-----------+---------+--------------------------------------------+
 
 
 
+------------------+---------------+
| Sex Assigned at  | Date Recorded |
| Birth            |               |
 
+------------------+---------------+
| Not on file      |               |
+------------------+---------------+
 as of this encounter
 
 Plan of Treatment
 Not on fileas of this encounter
 
 Results
 XR Lumbar Spine 2 or 3 Vw (2018 1045)
 
+----------+-----------------------+
| Specimen | Performing Laboratory |
+----------+-----------------------+
|          |   [facility]         |
+----------+-----------------------+
 
 
 
+--------------------------------------------------------------------+
| Narrative                                                          |
+--------------------------------------------------------------------+
|   External films for comparison only - no result from [facility].  |
+--------------------------------------------------------------------+
 in this encounter
 
 Visit Diagnoses
 Not on filein this encounter

## 2018-04-08 NOTE — EKG
[facility]
                                    [address]
                                  [place]  [address]
_________________________________________________________________________________________
                                                                 Signed   
 
 
Normal sinus rhythm
Right bundle branch block
Abnormal ECG
No previous ECGs available
Confirmed by NIGHAT HOANG MD ([id number]) on 4/8/2018 6:24:22 AM
 
 
 
 
 
 
 
 
 
 
 
 
 
 
 
 
 
 
 
 
 
 
 
 
 
 
 
 
 
 
 
 
 
 
 
 
 
 
 
 
    Electronically Signed By: NIGHAT HOANG MD  04/08/18 0624
_________________________________________________________________________________________
PATIENT NAME:     BERNA PARKINSON                     
MEDICAL RECORD #: [id number]                     Electrocardiogram             
          ACCT #: [id number]  
DATE OF BIRTH:   [birth year]                                       
PHYSICIAN:   NIGHAT HOANG MD                   REPORT #: [id number]
REPORT IS CONFIDENTIAL AND NOT TO BE RELEASED WITHOUT AUTHORIZATION

## 2018-08-11 ENCOUNTER — HOSPITAL ENCOUNTER (EMERGENCY)
Dept: HOSPITAL 46 - ED | Age: 68
Discharge: ANOTHER HOSPITAL | End: 2018-08-11
Payer: COMMERCIAL

## 2018-08-11 VITALS — HEIGHT: 75 IN | BODY MASS INDEX: 24.87 KG/M2 | WEIGHT: 200 LBS

## 2018-08-11 DIAGNOSIS — D50.0: ICD-10-CM

## 2018-08-11 DIAGNOSIS — Z88.8: ICD-10-CM

## 2018-08-11 DIAGNOSIS — Z79.899: ICD-10-CM

## 2018-08-11 DIAGNOSIS — K92.2: Primary | ICD-10-CM

## 2018-08-11 PROCEDURE — P9016 RBC LEUKOCYTES REDUCED: HCPCS

## 2018-08-12 NOTE — EKG
[facility]
                                    [address]
                                  [place]  [address]
_________________________________________________________________________________________
                                                                 Signed   
 
 
Sinus tachycardia with occasional premature ventricular complexes
Right bundle branch block
Abnormal ECG
When compared with ECG of 07-APR-2018 11:21,
premature ventricular complexes are now present
T wave inversion now evident in Anterior leads
Confirmed by NIGHAT HOANG MD ([id number]) on 8/12/2018 2:26:21 PM
 
 
 
 
 
 
 
 
 
 
 
 
 
 
 
 
 
 
 
 
 
 
 
 
 
 
 
 
 
 
 
 
 
 
 
 
 
 
    Electronically Signed By: NIGHAT HOANG MD  08/12/18 1426
_________________________________________________________________________________________
PATIENT NAME:     BERNA PARKINSON                     
MEDICAL RECORD #: [id number]                     Electrocardiogram             
          ACCT #: [id number]  
DATE OF BIRTH:   [birth year]                                       
PHYSICIAN:   NIGHAT HOANG MD                   REPORT #: [id number]
REPORT IS CONFIDENTIAL AND NOT TO BE RELEASED WITHOUT AUTHORIZATION

## 2020-06-08 NOTE — XMS
Encounter Summary
  Created on: 2020
 
 Memo Deni Siu
 External Reference #: [id number]
 : 52
 Sex: Male
 
 Demographics
 
 
+-----------------------+---------------------------+
| Address               | [address]           |
|                       | [place]  [address] |
+-----------------------+---------------------------+
| Home Phone            | [phone]           |
+-----------------------+---------------------------+
| Preferred Language    | Unknown                   |
+-----------------------+---------------------------+
| Marital Status        |                    |
+-----------------------+---------------------------+
| Mandaeism Affiliation | Unknown                   |
+-----------------------+---------------------------+
| Race                  | Unknown                   |
+-----------------------+---------------------------+
| Ethnic Group          | Unknown                   |
+-----------------------+---------------------------+
 
 
 Author
 
 
+--------------+--------------------------------------------+
| Author       | [facility]  |
|              | [facility] [place]                                |
+--------------+--------------------------------------------+
| Organization | [facility]  |
|              | [facility] [place]                                |
+--------------+--------------------------------------------+
| Address      | Unknown                                    |
+--------------+--------------------------------------------+
| Phone        | Unavailable                                |
+--------------+--------------------------------------------+
 
 
 
 Support
 
 
+---------------+--------------+---------+-----------------+
| Name          | Relationship | Address | Phone           |
+---------------+--------------+---------+-----------------+
| Sofiya A Brown | ECON         | Unknown | [phone] |
+---------------+--------------+---------+-----------------+
 
 
 
 Care Team Providers
 
 
 
+-------------------------+------+-----------------+
| Care Team Member Name   | Role | Phone           |
+-------------------------+------+-----------------+
| Ck Michelle MD | PCP  | [phone] |
+-------------------------+------+-----------------+
 
 
 
 Reason for Visit
 
 
+---------+----------+
| Reason  | Comments |
+---------+----------+
| Post Op | 3M PO    |
+---------+----------+
 
 
 
 Encounter Details
 
 
+--------+---------+----------------------+----------------------+---------------------+
| Date   | Type    | Department           | Care Team            | Description         |
+--------+---------+----------------------+----------------------+---------------------+
| 11/15/ | Office  |   [facility]          |   Dreyer, Jason A,   | Lumbar spondylosis  |
| 2017   | Visit   | NEUROSURGERY  [address]  | DO  [address]    | (Primary Dx); S/P   |
|        |         | [address]     | [address]  [place], [place] | lumbar fusion       |
|        |         | [place], [place]      |  [address]  [phone] |                     |
|        |         | [address]           |   [phone] (Fax) |                     |
|        |         | [phone]         |                      |                     |
+--------+---------+----------------------+----------------------+---------------------+
 
 
 
 Social History
 
 
+--------------+-------+-----------+--------+------+
| Tobacco Use  | Types | Packs/Day | Years  | Date |
|              |       |           | Used   |      |
+--------------+-------+-----------+--------+------+
| Never Smoker |       |           |        |      |
+--------------+-------+-----------+--------+------+
 
 
 
+---------------------+---+---+---+
| Smokeless Tobacco:  |   |   |   |
| Never Used          |   |   |   |
+---------------------+---+---+---+
 
 
 
+-------------+----------------------+---------+----------------------+
| Alcohol Use | Drinks/Week          | oz/Week | Comments             |
+-------------+----------------------+---------+----------------------+
| No          |   0 Standard drinks  | 0.0     | No longer drinking   |
 
|             | or equivalent        |         | 1-2 drinks 2-4 times |
|             |                      |         |  a month             |
+-------------+----------------------+---------+----------------------+
 
 
 
+------------------+---------------+
| Sex Assigned at  | Date Recorded |
| Birth            |               |
+------------------+---------------+
| Not on file      |               |
+------------------+---------------+
 
 
 
+----------------+-------------+-------------+
| Job Start Date | Occupation  | Industry    |
+----------------+-------------+-------------+
| Not on file    | Not on file | Not on file |
+----------------+-------------+-------------+
 
 
 
+----------------+--------------+------------+
| Travel History | Travel Start | Travel End |
+----------------+--------------+------------+
 
 
 
+-------------------------------------+
| No recent travel history available. |
+-------------------------------------+
 documented as of this encounter
 
 Last Filed Vital Signs
 
 
+-------------------+-------------------+----------------------+----------+
| Vital Sign        | Reading           | Time Taken           | Comments |
+-------------------+-------------------+----------------------+----------+
| Blood Pressure    | 126/71            | 11/15/2017  8:34 AM  |          |
|                   |                   | PST                  |          |
+-------------------+-------------------+----------------------+----------+
| Pulse             | 73                | 11/15/2017  8:34 AM  |          |
|                   |                   | PST                  |          |
+-------------------+-------------------+----------------------+----------+
| Temperature       | -                 | -                    |          |
+-------------------+-------------------+----------------------+----------+
| Respiratory Rate  | -                 | -                    |          |
+-------------------+-------------------+----------------------+----------+
| Oxygen Saturation | -                 | -                    |          |
+-------------------+-------------------+----------------------+----------+
| Inhaled Oxygen    | -                 | -                    |          |
| Concentration     |                   |                      |          |
+-------------------+-------------------+----------------------+----------+
| Weight            | 101.2 kg (223 lb) | 11/15/2017  8:34 AM  |          |
|                   |                   | PST                  |          |
+-------------------+-------------------+----------------------+----------+
| Height            | 190.5 cm (6' 3")  | 11/15/2017  8:34 AM  |          |
|                   |                   | PST                  |          |
 
+-------------------+-------------------+----------------------+----------+
| Body Mass Index   | 27.87             | 11/15/2017  8:34 AM  |          |
|                   |                   | PST                  |          |
+-------------------+-------------------+----------------------+----------+
 documented in this encounter
 
 Patient Instructions
 Patient Instructions Dreyer, Jason A, DO - 11/15/2017  8:30 AM PSTPlease undergo new x-rays
 of the lumbar spine in 3 and 9 months.
 
 Please follow-up with me as needed.
 Electronically signed by Jason A Dreyer, DO at 11/15/2017  9:22 AM PST
 documented in this encounter
 
 Progress Notes
 Dreyer, Jason A, DO - 11/15/2017  8:30 AM PSTFormatting of this note might be different fro
m the original.
 Jason A. Dreyer, DO
 [address], [address]
 [place] [address]
 [phone]
 FAX: [phone]
 
 NEUROSURGERY FOLLOW-UP
 
 CHIEF COMPLAINT: 
 Chief Complaint 
 Patient presents with 
   Post Op 
   3M PO 
 
 HISTORY OF PRESENT ILLNESS:  The patient is a 67 y.o. male that had a TLIF L4-5 by me for b
ack and leg pain around 3 months ago .  He returns and overall is doing well.  The patient c
omplains of nothing related to the surgery. He does have aright 11th rib injury from 2015
at continues to trouble him.  The patient is still taking narcotics for pain management, but
 this is for the rib injury.  The patient has been walking as directed and has tried to miguel
in active.  Overall, the patient is pleased with his improvement.  
 
 PAST MEDICAL HISTORY:
 Past Medical History: 
 Diagnosis Date 
   Allergic rhinitis  
   Anxiety disorder  
   Atherosclerosis  
   BPH (benign prostatic hyperplasia)  
   HLD (hyperlipidemia)  
   Internal derangement of knee  
   Low back pain  
   Lumbago with sciatica, right side  
   Osteoarthritis, hand  
   Osteoarthrosis, unspecified whether generalized or localized, unspecified site  
   Sleep apnea  
  uses CPAP 
   Unspecified disorder of nose and nasal sinuses  
 
 PAST SURGICAL HISTORY:
 Past Surgical History: 
 Procedure Laterality Date 
   ANKLE FUSION Left 2011 
   ANKLE SURGERY Left  
 
  SPUR 
   COLONOSCOPY  2013 
   KNEE ARTHROSCOPY Left 2010 
   KNEE SURGERY Right  
  [place] 
   LUMBAR DISCECTOMY   
  L1-3 
   LUMBAR LAMINECTOMY N/A 2017 
  Procedure: L4-5 Transforaminal Lumbar Interbody Fusion;  Surgeon: Jason A Dreyer, DO;  Loc
ation: [facility] MAIN OR 
   SINUS SURGERY  2010 
   SINUS SURGERY  2015 
   TONSILLECTOMY AND ADENOIDECTOMY   
   TOTAL KNEE ARTHROPLASTY Bilateral  
  [place]  
 
 CURRENT MEDICATIONS: 
 Current Outpatient Prescriptions 
 Medication Sig Dispense Refill 
   ASCORBIC ACID PO Take  by mouth.   
   atorvaSTATin (LIPITOR) 40 mg tablet Take 40 mg by mouth Daily.   
   B Complex Vitamins (VITAMIN B COMPLEX) tablet Take 1 tablet by mouth Daily.   
   cholecalciferol (VITAMIN D-3) 1,000 units tablet Take 1,000 Units by mouth Daily.   
   diazePAM (VALIUM) 5 mg tablet Take 1 tablet by mouth every 6 hours as needed for Muscle
 spasms. 90 tablet 1 
   fexofenadine-pseudoePHEDrine (ALLEGRA-D ALLERGY & CONGESTION)  MG per tablet Take
 1 tablet by mouth Twice  daily as needed.   
   FLUoxetine (PROZAC) 20 mg capsule Take 20 mg by mouth Daily.   
   fluticasone (FLONASE) 50 mcg/nasal spray 1 spray by Nasal route Daily.   
   HYDROcodone-acetaminophen (NORCO)  mg per tablet Take 0.5-1 tablets by mouth ever
y 6 hours as needed for Pain. 120 tablet 0 
   metoprolol succinate (TOPROL-XL) 25 mg 24 hr tablet Take 25 mg by mouth Daily.   
 
 No current facility-administered medications for this visit.  
 
 ALLERGIES: 
 Allergies 
 Allergen Reactions 
   Meperidine Nausea And Vomiting 
   Oxycodone Other (See Comments) 
   Hallucinations & sleepiness 
 
 SOCIAL HISTORY:
 The patient  reports that he has never smoked. He has never used smokeless tobacco. He repo
rts that he does not drink alcohol or use drugs.
 
 FAMILY HISTORY:
 Family History 
 Problem Relation Age of Onset 
   Multiple sclerosis Father  
   Coronary artery disease Father  
   Cancer Father  
   Multiple Myeloma 
   Lymphoma Mother  
   Rheum arthritis Mother  
   Cancer Mother  
   Pancreatic 
   Heart disease Paternal Grandfather  
   No Known Problems Paternal Grandmother  
   Cancer Maternal Grandfather  
 
   Cancer Maternal Grandmother  
   Lupus Sister  
   Other (see comment) Sister  
   Meniere's disease 
   No Known Problems Child  
   No Known Problems Child  
 
 REVIEW OF SYSTEMS
 GENERALLY:   No fever, no night sweats, no anemia, + fatigue, no recent profound weight giovanny
nges.  
 EYES:  No eye problems, no use of corrective lenses, no eye injury, no double vision, no bl
indness.
 EARS, NOSE, AND THROAT:  No changes in taste or smell, no hearing difficulty, no ringing in
 the ears, no ear drainage, no dizziness, no voice changes, no difficulty swallowing, no sig
nificant snoring, + sleep apnea, no sinus problems, no major dental work.
 NEUROLOGICALLY:  Please see the review of systems discussed above in the history of present
 illness.  In addition, the patient has back injury.
 PSYCHIATRIC:  No depression, no sleep disorders, no anxiety, no bipolar disorder, no psycho
tic episodes.
 CARDIOVASCULAR:  No heart attacks, no heart murmur, no heart fluttering, no chest pain, no 
ankle swelling.  
 LUNG DISEASE:  No shortness of breath, no cough, no tuberculosis, no bloody cough,  no asth
ma, no emphysema/COPD.
 GASTROINTESTINAL:  No bowel disease, no nausea or vomiting, no rectal bleeding, no constipa
tion, no stool incontinence, no liver disease, no gallbladder disease, no abdominal pain, no
 ulcers.
 KIDNEY DISEASE:  No urinary frequency, no painful or difficult urination, no incontinence.
 ENDOCRINE:  No diabetes, no thyroid disease, no osteopenia or osteoporosis, no breast drain
age.  
 SKIN:  No breast lumps, no skin changes, no rashes, no itches.
 HEMATOLOGIC/LYMPHATIC:  No enlarged lymph nodes, no easy or unusual bleeding, no personal h
istory of cancer. 
 RHEUMATOLOGIC:  No joint arthritis, no rheumatoid arthritis.
 
 INTERIM PHYSICAL EXAMINATION:
 Blood pressure 126/71, pulse 73, height 1.905 m (6' 3"), weight 101.2 kg (223 lb). Body mas
s index is 27.87 kg/m.
 
 GENERAL: Deni Hampton is in no acute distress with unlabored respirations.   
 SPINE: The patient  s incisions are healing well without drainage, significant erythema, o
r discharge 
 EXTREMITIES: No lower extremity edema.   
 NEUROLOGICAL EXAMINATION: 
 MENTAL STATUS: The patient is awake, alert, and oriented.  
 He follows simple and complex commands 
 MOTOR EXAM: Motor strength is 5/5.  This is improved from the preoperative exam. 
 SENSORY EXAM: The sensory examination improved from the preoperative exam.   
 REFLEXES: Reflexes are unchanged from his preoperative history and physical. 
 
 RADIOGRAPHIC REVIEW:
 The patient  s postoperative x-rays show stable instrumentation and alignment and were rev
iewed with the patient today.  There have been no interval changes since the immediate posto
perative films.  Complete fusion has yet occurred, but this is normal and would not be expec
michelle at this time.  
 
 ASSESSMENT:
 S/P TLIF L4-5:
 Encounter Diagnoses 
 Name Primary? 
   Lumbar spondylosis Yes 
 
   S/P lumbar fusion  
 
 Past Medical History: 
 Diagnosis Date 
   Allergic rhinitis  
   Anxiety disorder  
   Atherosclerosis  
   BPH (benign prostatic hyperplasia)  
   HLD (hyperlipidemia)  
   Internal derangement of knee  
   Low back pain  
   Lumbago with sciatica, right side  
   Osteoarthritis, hand  
   Osteoarthrosis, unspecified whether generalized or localized, unspecified site  
   Sleep apnea  
  uses CPAP 
   Unspecified disorder of nose and nasal sinuses  
 
 PLAN:
 Overall, the patient is doing well.  I was pleased to see at least some further improvement
 and expect more improvement with time.  This was discussed with the patient today.  I have 
increased the patient  s activities further, and I would like the patient to continue to ad
foley with activities as tolerated and as directed.  
 
 He will undergo repeat x-ray of the back in 3 and 9 months and follow-up with me as needed.
 
 ELECTRONICALLY SIGNED BY:  Jason A. Dreyer, DO,  11/15/2017  9:23Electronically signed by J
ason A Dreyer, DO at 11/15/2017  9:25 AM PSTdocumented in this encounter
 
 Plan of Treatment
 
 
+--------+---------+-----------+----------------------+-------------+
| Date   | Type    | Specialty | Care Team            | Description |
+--------+---------+-----------+----------------------+-------------+
| / | Office  | Neurology |   Ravindra Munoz MD  [address] |             |
| 2020   | Visit   |           |  [facility] [address]      |             |
|        |         |           | [address]  [place],  |             |
|        |         |           | [place] [address]             |             |
|        |         |           | [phone]         |             |
|        |         |           | [phone] (Fax)   |             |
+--------+---------+-----------+----------------------+-------------+
 
 
 
+----------------------+---------+--------+----------------------+----------------------+
| Name                 | Type    | Priori | Associated Diagnoses | Order Schedule       |
|                      |         | ty     |                      |                      |
+----------------------+---------+--------+----------------------+----------------------+
| XR Lumbar Spine 2 or | Imaging | Routin |   Lumbar spondylosis | Expected: 08/15/2018 |
|  3 Vw                |         | e      |   S/P lumbar fusion  |  (Approximate),      |
|                      |         |        |                      | Expires: 11/15/2018  |
+----------------------+---------+--------+----------------------+----------------------+
| XR Lumbar Spine 2 or | Imaging | Routin |   Lumbar spondylosis | Expected: 02/15/2018 |
|  3 Vw                |         | e      |   S/P lumbar fusion  |  (Approximate),      |
|                      |         |        |                      | Expires: 11/15/2018  |
+----------------------+---------+--------+----------------------+----------------------+
 documented as of this encounter
 
 Procedures
 
 
 
+-------------------+--------+-------------+----------------------+----------------------+
| Procedure Name    | Priori | Date/Time   | Associated Diagnosis | Comments             |
|                   | ty     |             |                      |                      |
+-------------------+--------+-------------+----------------------+----------------------+
| IMAGING REPORT -  |        | 2018  |                      |   Results for this   |
| EXTERNAL SCAN     |        | 12:00 AM    |                      | procedure are in the |
|                   |        | PDT         |                      |  results section.    |
+-------------------+--------+-------------+----------------------+----------------------+
 documented in this encounter
 
 Results
 IMAGING REPORT - EXTERNAL SCAN (2018 12:00 AM PDT)
 
+------------------------------------------------------------------------+--------------+
| Narrative                                                              | Performed At |
+------------------------------------------------------------------------+--------------+
|   This result has an attachment that is not available.  Ordered by an  |              |
| unspecified provider.                                                  |              |
+------------------------------------------------------------------------+--------------+
 documented in this encounter
 
 Visit Diagnoses
 
 
+----------------------------------------------------------------------------+
| Diagnosis                                                                  |
+----------------------------------------------------------------------------+
|   Lumbar spondylosis - Primary  Lumbosacral spondylosis without myelopathy |
+----------------------------------------------------------------------------+
|   S/P lumbar fusion  Arthrodesis status                                    |
+----------------------------------------------------------------------------+
 documented in this encounter

## 2020-06-08 NOTE — XMS
Encounter Summary
  Created on: 2020
 
 Memo Deni Siu
 External Reference #: [id number]
 : 52
 Sex: Male
 
 Demographics
 
 
+-----------------------+---------------------------+
| Address               | [address]           |
|                       | [place]  [address] |
+-----------------------+---------------------------+
| Home Phone            | [phone]           |
+-----------------------+---------------------------+
| Preferred Language    | Unknown                   |
+-----------------------+---------------------------+
| Marital Status        |                    |
+-----------------------+---------------------------+
| Adventist Affiliation | Unknown                   |
+-----------------------+---------------------------+
| Race                  | Unknown                   |
+-----------------------+---------------------------+
| Ethnic Group          | Unknown                   |
+-----------------------+---------------------------+
 
 
 Author
 
 
+--------------+--------------------------------------------+
| Author       | [facility]  |
|              | [facility] [place]                                |
+--------------+--------------------------------------------+
| Organization | [facility]  |
|              | [facility] [place]                                |
+--------------+--------------------------------------------+
| Address      | Unknown                                    |
+--------------+--------------------------------------------+
| Phone        | Unavailable                                |
+--------------+--------------------------------------------+
 
 
 
 Support
 
 
+---------------+--------------+---------+-----------------+
| Name          | Relationship | Address | Phone           |
+---------------+--------------+---------+-----------------+
| Sofiya A Brown | ECON         | Unknown | [phone] |
+---------------+--------------+---------+-----------------+
 
 
 
 Care Team Providers
 
 
 
+-------------------------+------+-----------------+
| Care Team Member Name   | Role | Phone           |
+-------------------------+------+-----------------+
| Ck Michelle MD | PCP  | [phone] |
+-------------------------+------+-----------------+
 
 
 
 Reason for Visit
 
 
+---------------------+----------+
| Reason              | Comments |
+---------------------+----------+
| Surgery Appointment |          |
+---------------------+----------+
 
 
 
 Encounter Details
 
 
+--------+-----------+----------------------+----------------------+---------------------+
| Date   | Type      | Department           | Care Team            | Description         |
+--------+-----------+----------------------+----------------------+---------------------+
| / | Telephone |   [facility]          |   Dreyer, Jason A,   | Surgery Appointment |
| 2017   |           | NEUROSURGERY  [address]  | DO  [address]    |                     |
|        |           | [address]     | [address]  [place], [place] |                     |
|        |           | [place], [place]      |  [id number] [phone] |                     |
|        |           | [address]           |   [phone] (Fax) |                     |
|        |           | [phone]         |                      |                     |
+--------+-----------+----------------------+----------------------+---------------------+
 
 
 
 Social History
 
 
+--------------+-------+-----------+--------+------+
| Tobacco Use  | Types | Packs/Day | Years  | Date |
|              |       |           | Used   |      |
+--------------+-------+-----------+--------+------+
| Never Smoker |       |           |        |      |
+--------------+-------+-----------+--------+------+
 
 
 
+---------------------+---+---+---+
| Smokeless Tobacco:  |   |   |   |
| Never Used          |   |   |   |
+---------------------+---+---+---+
 
 
 
+-------------+----------------------+---------+----------------------+
| Alcohol Use | Drinks/Week          | oz/Week | Comments             |
+-------------+----------------------+---------+----------------------+
| Yes         |   0 Standard drinks  | 0.0     | 1-2 drinks 2-4 times |
 
|             | or equivalent        |         |  per month           |
+-------------+----------------------+---------+----------------------+
 
 
 
+------------------+---------------+
| Sex Assigned at  | Date Recorded |
| Birth            |               |
+------------------+---------------+
| Not on file      |               |
+------------------+---------------+
 
 
 
+----------------+-------------+-------------+
| Job Start Date | Occupation  | Industry    |
+----------------+-------------+-------------+
| Not on file    | Not on file | Not on file |
+----------------+-------------+-------------+
 
 
 
+----------------+--------------+------------+
| Travel History | Travel Start | Travel End |
+----------------+--------------+------------+
 
 
 
+-------------------------------------+
| No recent travel history available. |
+-------------------------------------+
 documented as of this encounter
 
 Plan of Treatment
 
 
+--------+---------+-----------+----------------------+-------------+
| Date   | Type    | Specialty | Care Team            | Description |
+--------+---------+-----------+----------------------+-------------+
| / | Office  | Neurology |   Ravindra Munoz MD  [address] |             |
| 2020   | Visit   |           |  [address]      |             |
|        |         |           | [place]  |             |
|        |         |           | [place] [address]             |             |
|        |         |           | [phone]         |             |
|        |         |           | [phone] (Fax)   |             |
+--------+---------+-----------+----------------------+-------------+
 documented as of this encounter
 
 Visit Diagnoses
 Not on filedocumented in this encounter

## 2020-06-08 NOTE — XMS
Encounter Summary
  Created on: 2020
 
 Memo Deni Siu
 External Reference #: [id number]
 : 52
 Sex: Male
 
 Demographics
 
 
+-----------------------+---------------------------+
| Address               | [address]           |
|                       | [place]  [address] |
+-----------------------+---------------------------+
| Home Phone            | [phone]           |
+-----------------------+---------------------------+
| Preferred Language    | Unknown                   |
+-----------------------+---------------------------+
| Marital Status        |                    |
+-----------------------+---------------------------+
| Hoahaoism Affiliation | Unknown                   |
+-----------------------+---------------------------+
| Race                  | Unknown                   |
+-----------------------+---------------------------+
| Ethnic Group          | Unknown                   |
+-----------------------+---------------------------+
 
 
 Author
 
 
+--------------+--------------------------------------------+
| Author       | [facility]  |
|              | [facility] [place]                                |
+--------------+--------------------------------------------+
| Organization | [facility]  |
|              | [facility] [place]                                |
+--------------+--------------------------------------------+
| Address      | Unknown                                    |
+--------------+--------------------------------------------+
| Phone        | Unavailable                                |
+--------------+--------------------------------------------+
 
 
 
 Support
 
 
+---------------+--------------+---------+-----------------+
| Name          | Relationship | Address | Phone           |
+---------------+--------------+---------+-----------------+
| Sofiya A Brown | ECON         | Unknown | [phone] |
+---------------+--------------+---------+-----------------+
 
 
 
 Care Team Providers
 
 
 
+-------------------------+------+-----------------+
| Care Team Member Name   | Role | Phone           |
+-------------------------+------+-----------------+
| Ck Michelle MD | PCP  | [phone] |
+-------------------------+------+-----------------+
 
 
 
 Encounter Details
 
 
+--------+-------------+----------------------+----------------------+----------------------
+
| Date   | Type        | Department           | Care Team            | Description          
|
+--------+-------------+----------------------+----------------------+----------------------
+
| 06/15/ | Orders Only |   [facility]          |   Dreyer, Jason A,   | Lumbago with         
|
| 2017   |             | NEUROSURGERY  [address]  | DO  [address]    | sciatica, right      
|
|        |             | [address]     | [address]  [place], [place] | side; Low back pain, 
|
|        |             | [place], [place]      |  [address]  [phone] |  unspecified back    
|
|        |             | [address]           |   [phone] (Fax) | pain laterality,     
|
|        |             | [phone]         |                      | unspecified          
|
|        |             |                      |                      | chronicity, with     
|
|        |             |                      |                      | sciatica presence    
|
|        |             |                      |                      | unspecified;         
|
|        |             |                      |                      | Atherosclerosis      
|
+--------+-------------+----------------------+----------------------+----------------------
+
 
 
 
 Social History
 
 
+--------------+-------+-----------+--------+------+
| Tobacco Use  | Types | Packs/Day | Years  | Date |
|              |       |           | Used   |      |
+--------------+-------+-----------+--------+------+
| Never Smoker |       |           |        |      |
+--------------+-------+-----------+--------+------+
 
 
 
+---------------------+---+---+---+
| Smokeless Tobacco:  |   |   |   |
| Never Used          |   |   |   |
+---------------------+---+---+---+
 
 
 
 
+-------------+----------------------+---------+----------------------+
| Alcohol Use | Drinks/Week          | oz/Week | Comments             |
+-------------+----------------------+---------+----------------------+
| Yes         |   0 Standard drinks  | 0.0     | 1-2 drinks 2-4 times |
|             | or equivalent        |         |  per month           |
+-------------+----------------------+---------+----------------------+
 
 
 
+------------------+---------------+
| Sex Assigned at  | Date Recorded |
| Birth            |               |
+------------------+---------------+
| Not on file      |               |
+------------------+---------------+
 
 
 
+----------------+-------------+-------------+
| Job Start Date | Occupation  | Industry    |
+----------------+-------------+-------------+
| Not on file    | Not on file | Not on file |
+----------------+-------------+-------------+
 
 
 
+----------------+--------------+------------+
| Travel History | Travel Start | Travel End |
+----------------+--------------+------------+
 
 
 
+-------------------------------------+
| No recent travel history available. |
+-------------------------------------+
 documented as of this encounter
 
 Plan of Treatment
 
 
+--------+---------+-----------+----------------------+-------------+
| Date   | Type    | Specialty | Care Team            | Description |
+--------+---------+-----------+----------------------+-------------+
| / | Office  | Neurology |   Ravindra Munoz MD  [address] |             |
| 2020   | Visit   |           |  [address]      |             |
|        |         |           | [place]  |             |
|        |         |           | [place] [address]             |             |
|        |         |           | [phone]         |             |
|        |         |           | [phone] (Fax)   |             |
+--------+---------+-----------+----------------------+-------------+
 documented as of this encounter
 
 Results
 CBC with Differential (2017  1:34 PM PDT)
 
+-------------+----------+-----------------+-------------+--------------+
| Component   | Value    | Ref Range       | Performed   | Pathologist  |
 
|             |          |                 | At          | Signature    |
+-------------+----------+-----------------+-------------+--------------+
| WBC         | 6.5      | 4.0 - 11.0 K/uL | [facility]  |              |
|             |          |                 | [facility]    |              |
|             |          |                 | [facility]     |              |
|             |          |                 | [facility] -    |              |
|             |          |                 | LABORATORY  |              |
+-------------+----------+-----------------+-------------+--------------+
| RBC         | 4.75     | 4.30 - 5.70     | [facility]  |              |
|             |          | M/uL            | [facility]    |              |
|             |          |                 | [facility]     |              |
|             |          |                 | CENTER -    |              |
|             |          |                 | LABORATORY  |              |
+-------------+----------+-----------------+-------------+--------------+
| Hemoglobin  | 14.3     | 13.5 - 18.0     | [facility]  |              |
|             |          | g/dL            | [facility]    |              |
|             |          |                 | [facility]     |              |
|             |          |                 | CENTER -    |              |
|             |          |                 | LABORATORY  |              |
+-------------+----------+-----------------+-------------+--------------+
| Hematocrit  | 42.6     | 40.0 - 51.0 %   | [facility]  |              |
|             |          |                 | [facility]    |              |
|             |          |                 | [facility]     |              |
|             |          |                 | CENTER -    |              |
|             |          |                 | LABORATORY  |              |
+-------------+----------+-----------------+-------------+--------------+
| MCV         | 89.7     | 83.0 - 101.0 fL | [facility]  |              |
|             |          |                 | [facility]    |              |
|             |          |                 | [facility]     |              |
|             |          |                 | CENTER -    |              |
|             |          |                 | LABORATORY  |              |
+-------------+----------+-----------------+-------------+--------------+
| MCH         | 30.1     | 28.0 - 35.0 pg  | [facility]  |              |
|             |          |                 | [facility]    |              |
|             |          |                 | [facility]     |              |
|             |          |                 | CENTER -    |              |
|             |          |                 | LABORATORY  |              |
+-------------+----------+-----------------+-------------+--------------+
| MCHC        | 33.5     | 32.0 - 36.0     | [facility]  |              |
|             |          | g/dL            | [facility]    |              |
|             |          |                 | [facility]     |              |
|             |          |                 | CENTER -    |              |
|             |          |                 | LABORATORY  |              |
+-------------+----------+-----------------+-------------+--------------+
| RDW-CV      | 13.5     | <15.0 %         | [facility]  |              |
|             |          |                 | [facility]    |              |
|             |          |                 | [facility]     |              |
|             |          |                 | CENTER -    |              |
|             |          |                 | LABORATORY  |              |
+-------------+----------+-----------------+-------------+--------------+
| Platelet    | 244      | 140 - 440 K/uL  | [facility]  |              |
| Count       |          |                 | [facility]    |              |
|             |          |                 | [facility]     |              |
|             |          |                 | CENTER -    |              |
|             |          |                 | LABORATORY  |              |
+-------------+----------+-----------------+-------------+--------------+
| MPV         | 7.2      | fL              | [facility]  |              |
|             |          |                 | [facility]    |              |
|             |          |                 | [facility]     |              |
|             |          |                 | CENTER -    |              |
 
|             |          |                 | LABORATORY  |              |
+-------------+----------+-----------------+-------------+--------------+
| %           | 68.3     | 45.0 - 82.0 %   | [facility]  |              |
| Neutrophils |          |                 | [facility]    |              |
|             |          |                 | [facility]     |              |
|             |          |                 | CENTER -    |              |
|             |          |                 | LABORATORY  |              |
+-------------+----------+-----------------+-------------+--------------+
| %           | 19.3 (L) | 20.0 - 45.0 %   | [facility]  |              |
| Lymphocytes |          |                 | [facility]    |              |
|             |          |                 | [facility]     |              |
|             |          |                 | CENTER -    |              |
|             |          |                 | LABORATORY  |              |
+-------------+----------+-----------------+-------------+--------------+
| % Monocytes | 7.5      | 4.0 - 12.0 %    | [facility]  |              |
|             |          |                 | [facility]    |              |
|             |          |                 | [facility]     |              |
|             |          |                 | CENTER -    |              |
|             |          |                 | LABORATORY  |              |
+-------------+----------+-----------------+-------------+--------------+
| %           | 4.1      | 0.0 - 5.0 %     | [facility]  |              |
| Eosinophils |          |                 | [facility]    |              |
|             |          |                 | [facility]     |              |
|             |          |                 | CENTER -    |              |
|             |          |                 | LABORATORY  |              |
+-------------+----------+-----------------+-------------+--------------+
| % Basophils | 0.8      | 0.0 - 1.0 %     | [facility]  |              |
|             |          |                 | [facility]    |              |
|             |          |                 | [facility]     |              |
|             |          |                 | CENTER -    |              |
|             |          |                 | LABORATORY  |              |
+-------------+----------+-----------------+-------------+--------------+
| Absolute    | 4.40     | 1.80 - 8.50     | [facility]  |              |
| Neutrophils |          | K/uL            | [facility]    |              |
|             |          |                 | [facility]     |              |
|             |          |                 | CENTER -    |              |
|             |          |                 | LABORATORY  |              |
+-------------+----------+-----------------+-------------+--------------+
| Absolute    | 1.20     | 0.60 - 3.20     | [facility]  |              |
| Lymphocytes |          | K/uL            | [facility]    |              |
|             |          |                 | [facility]     |              |
|             |          |                 | CENTER -    |              |
|             |          |                 | LABORATORY  |              |
+-------------+----------+-----------------+-------------+--------------+
| Absolute    | 0.50     | 0.00 - 1.00     | [facility]  |              |
| Monocytes   |          | K/uL            | [facility]    |              |
|             |          |                 | [facility]     |              |
|             |          |                 | CENTER -    |              |
|             |          |                 | LABORATORY  |              |
+-------------+----------+-----------------+-------------+--------------+
| Absolute    | 0.30     | 0.00 - 0.40     | [facility]  |              |
| Eosinophils |          | K/uL            | [facility]    |              |
|             |          |                 | [facility]     |              |
|             |          |                 | CENTER -    |              |
|             |          |                 | LABORATORY  |              |
+-------------+----------+-----------------+-------------+--------------+
| Absolute    | 0.00     | 0.00 - 0.10     | [facility]  |              |
| Basophils   |          | K/uL            | [facility]    |              |
|             |          |                 | [facility]ICAL     |              |
|             |          |                 | CENTER -    |              |
 
|             |          |                 | LABORATORY  |              |
+-------------+----------+-----------------+-------------+--------------+
 
 
 
+----------+
| Specimen |
+----------+
| Blood    |
+----------+
 
 
 
+----------------------+--------------------+--------------------+----------------+
| Performing           | Address            | City/State/Zipcode | Phone Number   |
| Organization         |                    |                    |                |
+----------------------+--------------------+--------------------+----------------+
|   [facility]     |   [address] |   [place]  |   [phone] |
| [facility]  |                    | [address]              |                |
| - LABORATORY         |                    |                    |                |
+----------------------+--------------------+--------------------+----------------+
 ECG 12 lead (2017  1:32 PM PDT)
 
+-------------+--------------------------+-----------+------------+--------------+
| Component   | Value                    | Ref Range | Performed  | Pathologist  |
|             |                          |           | At         | Signature    |
+-------------+--------------------------+-----------+------------+--------------+
| VENTRICULAR | 55                       | BPM       | [facility]  |              |
|  RATE EKG   |                          |           |            |              |
+-------------+--------------------------+-----------+------------+--------------+
| ATRIAL RATE | 55                       | BPM       | [facility]  |              |
+-------------+--------------------------+-----------+------------+--------------+
| P-R         | 188                      | ms        | [facility]  |              |
| INTERVAL    |                          |           |            |              |
+-------------+--------------------------+-----------+------------+--------------+
| QRS         | 118                      | ms        | [facility]  |              |
| DURATION    |                          |           |            |              |
+-------------+--------------------------+-----------+------------+--------------+
| Q-T         | 422                      | ms        | [facility]  |              |
| INTERVAL    |                          |           |            |              |
+-------------+--------------------------+-----------+------------+--------------+
| Q-T         | 403                      | ms        | [facility]  |              |
| INTERVAL    |                          |           |            |              |
| (CORRECTED) |                          |           |            |              |
+-------------+--------------------------+-----------+------------+--------------+
| P WAVE AXIS | 43                       | degrees   | [facility]  |              |
+-------------+--------------------------+-----------+------------+--------------+
| QRS AXIS    | 33                       | degrees   | [facility]  |              |
+-------------+--------------------------+-----------+------------+--------------+
| T AXIS      | 23                       | degrees   | [facility]  |              |
+-------------+--------------------------+-----------+------------+--------------+
| INTERPRETAT | Sinus                    |           | [facility]  |              |
| ION TEXT    | bradycardiaIncomplete    |           |            |              |
|             | right bundle branch      |           |            |              |
|             | blockBorderline ECGNo    |           |            |              |
|             | previous ECGs            |           |            |              |
|             | availableConfirmed by    |           |            |              |
|             | YAHIR [address] MD ([id number])  |           |            |              |
|             | on 2017 6:03:41 AM  |           |            |              |
+-------------+--------------------------+-----------+------------+--------------+
 
 
 
 
+----------+
| Specimen |
+----------+
|          |
+----------+
 
 
 
+----------------------------------------------------------+--------------+
| Narrative                                                | Performed At |
+----------------------------------------------------------+--------------+
|   This result has an attachment that is not available.   |              |
+----------------------------------------------------------+--------------+
 
 
 
+--------------+---------+--------------------+--------------+
| Performing   | Address | City/State/Zipcode | Phone Number |
| Organization |         |                    |              |
+--------------+---------+--------------------+--------------+
|   [facility]  |         |                    |              |
+--------------+---------+--------------------+--------------+
 documented in this encounter
 
 Visit Diagnoses
 
 
+------------------------------------------------------------------------------------------+
| Diagnosis                                                                                |
+------------------------------------------------------------------------------------------+
|   Lumbago with sciatica, right side                                                      |
+------------------------------------------------------------------------------------------+
|   Low back pain, unspecified back pain laterality, unspecified chronicity, with sciatica |
|  presence unspecified                                                                    |
+------------------------------------------------------------------------------------------+
|   Atherosclerosis  Generalized and unspecified atherosclerosis                           |
+------------------------------------------------------------------------------------------+
 documented in this encounter

## 2020-06-08 NOTE — XMS
Encounter Summary
  Created on: 2020
 
 Memo Deni Siu
 External Reference #: [id number]
 : 52
 Sex: Male
 
 Demographics
 
 
+-----------------------+---------------------------+
| Address               | [address]           |
|                       | [place]  [address] |
+-----------------------+---------------------------+
| Home Phone            | [phone]           |
+-----------------------+---------------------------+
| Preferred Language    | Unknown                   |
+-----------------------+---------------------------+
| Marital Status        |                    |
+-----------------------+---------------------------+
| Synagogue Affiliation | Unknown                   |
+-----------------------+---------------------------+
| Race                  | Unknown                   |
+-----------------------+---------------------------+
| Ethnic Group          | Unknown                   |
+-----------------------+---------------------------+
 
 
 Author
 
 
+--------------+--------------------------------------------+
| Author       | [facility]  |
|              | [facility] [place]                                |
+--------------+--------------------------------------------+
| Organization | [facility]  |
|              | [facility] [place]                                |
+--------------+--------------------------------------------+
| Address      | Unknown                                    |
+--------------+--------------------------------------------+
| Phone        | Unavailable                                |
+--------------+--------------------------------------------+
 
 
 
 Support
 
 
+---------------+--------------+---------+-----------------+
| Name          | Relationship | Address | Phone           |
+---------------+--------------+---------+-----------------+
| Sofiya A Brown | ECON         | Unknown | [phone] |
+---------------+--------------+---------+-----------------+
 
 
 
 Care Team Providers
 
 
 
+-------------------------+------+-----------------+
| Care Team Member Name   | Role | Phone           |
+-------------------------+------+-----------------+
| Ck Michelle MD | PCP  | [phone] |
+-------------------------+------+-----------------+
 
 
 
 Reason for Visit
 
 
+---------+----------+
| Reason  | Comments |
+---------+----------+
| Post Op | PO CALL  |
+---------+----------+
 
 
 
 Encounter Details
 
 
+--------+-----------+----------------------+----------------------+--------------------+
| Date   | Type      | Department           | Care Team            | Description        |
+--------+-----------+----------------------+----------------------+--------------------+
| / | Telephone |   [facility]          |   Dreyer, Jason A,   | Post Op (PO CALL ) |
| 2017   |           | NEUROSURGERY  [address]  | DO  [address]    |                    |
|        |           | [address]     | [address]  [place], [place] |                    |
|        |           | [place]      |  [address]  [phone] |                    |
|        |           | [address]           |   [phone] (Fax) |                    |
|        |           | [phone]         |                      |                    |
+--------+-----------+----------------------+----------------------+--------------------+
 
 
 
 Social History
 
 
+--------------+-------+-----------+--------+------+
| Tobacco Use  | Types | Packs/Day | Years  | Date |
|              |       |           | Used   |      |
+--------------+-------+-----------+--------+------+
| Never Smoker |       |           |        |      |
+--------------+-------+-----------+--------+------+
 
 
 
+---------------------+---+---+---+
| Smokeless Tobacco:  |   |   |   |
| Never Used          |   |   |   |
+---------------------+---+---+---+
 
 
 
+-------------+----------------------+---------+----------------------+
| Alcohol Use | Drinks/Week          | oz/Week | Comments             |
+-------------+----------------------+---------+----------------------+
| Yes         |   0 Standard drinks  | 0.0     | 1-2 drinks 2-4 times |
 
|             | or equivalent        |         |  per month           |
+-------------+----------------------+---------+----------------------+
 
 
 
+------------------+---------------+
| Sex Assigned at  | Date Recorded |
| Birth            |               |
+------------------+---------------+
| Not on file      |               |
+------------------+---------------+
 
 
 
+----------------+-------------+-------------+
| Job Start Date | Occupation  | Industry    |
+----------------+-------------+-------------+
| Not on file    | Not on file | Not on file |
+----------------+-------------+-------------+
 
 
 
+----------------+--------------+------------+
| Travel History | Travel Start | Travel End |
+----------------+--------------+------------+
 
 
 
+-------------------------------------+
| No recent travel history available. |
+-------------------------------------+
 documented as of this encounter
 
 Plan of Treatment
 
 
+--------+---------+-----------+----------------------+-------------+
| Date   | Type    | Specialty | Care Team            | Description |
+--------+---------+-----------+----------------------+-------------+
| / | Office  | Neurology |   Ravindra Munoz MD  [address] |             |
| 2020   | Visit   |           |  [facility] [employer]      |             |
|        |         |           | [place]  |             |
|        |         |           | [place] [address]             |             |
|        |         |           | [phone]         |             |
|        |         |           | [phone] (Fax)   |             |
+--------+---------+-----------+----------------------+-------------+
 documented as of this encounter
 
 Visit Diagnoses
 Not on filedocumented in this encounter

## 2020-06-08 NOTE — XMS
Encounter Summary
  Created on: 2020
 
 Memo Deni Siu
 External Reference #: [id number]
 : 52
 Sex: Male
 
 Demographics
 
 
+-----------------------+---------------------------+
| Address               | [address]           |
|                       | [place]  [address] |
+-----------------------+---------------------------+
| Home Phone            | [phone]           |
+-----------------------+---------------------------+
| Preferred Language    | Unknown                   |
+-----------------------+---------------------------+
| Marital Status        |                    |
+-----------------------+---------------------------+
| Episcopalian Affiliation | Unknown                   |
+-----------------------+---------------------------+
| Race                  | Unknown                   |
+-----------------------+---------------------------+
| Ethnic Group          | Unknown                   |
+-----------------------+---------------------------+
 
 
 Author
 
 
+--------------+--------------------------------------------+
| Author       | [facility]  |
|              | and [place]                                |
+--------------+--------------------------------------------+
| Organization | [facility]  |
|              | and [place]                                |
+--------------+--------------------------------------------+
| Address      | Unknown                                    |
+--------------+--------------------------------------------+
| Phone        | Unavailable                                |
+--------------+--------------------------------------------+
 
 
 
 Support
 
 
+---------------+--------------+---------+-----------------+
| Name          | Relationship | Address | Phone           |
+---------------+--------------+---------+-----------------+
| Sofiya A Brown | ECON         | Unknown | [phone] |
+---------------+--------------+---------+-----------------+
 
 
 
 Care Team Providers
 
 
 
+-------------------------+------+-----------------+
| Care Team Member Name   | Role | Phone           |
+-------------------------+------+-----------------+
| Ck Michelle MD | PCP  | [phone] |
+-------------------------+------+-----------------+
 
 
 
 Reason for Visit
 Auth/Cert
 
+--------+--------+-----------+--------------+--------------+--------------+
| Status | Reason | Specialty | Diagnoses /  | Referred By  | Referred To  |
|        |        |           | Procedures   | Contact      | Contact      |
+--------+--------+-----------+--------------+--------------+--------------+
|        |        |           |   Diagnoses  |              |              |
|        |        |           |              |              |              |
|        |        |           | Osteoarthrit |              |              |
|        |        |           | is of spine  |              |              |
|        |        |           | with         |              |              |
|        |        |           | radiculopath |              |              |
|        |        |           | y, lumbar    |              |              |
|        |        |           | region       |              |              |
|        |        |           | (M47.26),    |              |              |
|        |        |           | Spinal       |              |              |
|        |        |           | stenosis,    |              |              |
|        |        |           | lumbar       |              |              |
|        |        |           | region,      |              |              |
|        |        |           | without      |              |              |
|        |        |           | neurogenic   |              |              |
|        |        |           | claudication |              |              |
|        |        |           |  (M48.06),   |              |              |
|        |        |           | Lumbar       |              |              |
|        |        |           | radiculopath |              |              |
|        |        |           | y (M54.16),  |              |              |
|        |        |           | Chronic      |              |              |
|        |        |           | left-sided   |              |              |
|        |        |           | low back     |              |              |
|        |        |           | pain with    |              |              |
|        |        |           | left-sided   |              |              |
|        |        |           | sciatica     |              |              |
|        |        |           | (M54.42,     |              |              |
|        |        |           | G89.29),     |              |              |
|        |        |           | Status post  |              |              |
|        |        |           | laminectomy  |              |              |
|        |        |           | (Z98.890)    |              |              |
|        |        |           | Procedures   |              |              |
|        |        |           | IN ARTHDSIS  |              |              |
|        |        |           | POST/POSTERO |              |              |
|        |        |           | LATRL/POSTIN |              |              |
|        |        |           | TERBODY      |              |              |
|        |        |           | LUMBAR       |              |              |
|        |        |           | POSTERIOR    |              |              |
|        |        |           | NON-SEGMENTA |              |              |
|        |        |           | L            |              |              |
|        |        |           | INSTRUMENTAT |              |              |
|        |        |           | ION  IN INSJ |              |              |
|        |        |           |  BIOMCHN DEV |              |              |
 
|        |        |           |              |              |              |
|        |        |           | INTERVERTEBR |              |              |
|        |        |           | AL DSC SPC   |              |              |
|        |        |           | W/ARTHRD     |              |              |
|        |        |           | LAMINEC/FACE |              |              |
|        |        |           | TECT/FORAMIN |              |              |
|        |        |           | ,LUMBAR 1    |              |              |
|        |        |           | SEG  IN      |              |              |
|        |        |           | LAMINEC/FACE |              |              |
|        |        |           | TECT/FORAMIN |              |              |
|        |        |           | ,EACH ADDNL  |              |              |
|        |        |           |  L4-5        |              |              |
|        |        |           | Transforamin |              |              |
|        |        |           | al Lumbar    |              |              |
|        |        |           | Interbody    |              |              |
|        |        |           | Fusion       |              |              |
+--------+--------+-----------+--------------+--------------+--------------+
 
 
 
 
 Encounter Details
 
 
+--------+-------------+----------------------+----------------------+-------------+
| Date   | Type        | Department           | Care Team            | Description |
+--------+-------------+----------------------+----------------------+-------------+
| / | Anesthesia  |   [facility] |   Shaista Hyman  |             |
|    | Event       |  MED [facility]TRA OP | MD MIGUEL ÁNGEL  [address]  |             |
|        |             |   [address]       | [address]  [place]  |             |
|        |             | [place]      | [address]           |             |
|        |             | [address]           | [phone]         |             |
|        |             | [phone]         | [phone] (Fax)   |             |
|        |             |                      | Mike Vela MD    |             |
|        |             |                      | [address]      |             |
|        |             |                      | [place]      |             |
|        |             |                      | [id number]    |             |
|        |             |                      |  [phone] (Fax)  |             |
+--------+-------------+----------------------+----------------------+-------------+
 
 
 
 Anesthesia Record
 
 
+----------------------+----------------------+-------------------+----------------------+
| Procedure Name       | Responsible          | Anesthesia Start  | Anesthesia Stop Time |
|                      | Anesthesiologist     | Time              |                      |
+----------------------+----------------------+-------------------+----------------------+
| L4-5 Transforaminal  | Shaista Hyman,  | 17 1151     | 17 1405        |
| Lumbar Interbody     | MD                   |                   |                      |
| Fusion (N/A Spine    |                      |                   |                      |
| Lumbar)              |                      |                   |                      |
+----------------------+----------------------+-------------------+----------------------+
 
 
 
+----+---+-------------+-------------------------------------------------------------------+
| Da | T | Event       | Comment                                                           |
| te | i |             |                                                                   |
 
|    | m |             |                                                                   |
|    | e |             |                                                                   |
+----+---+-------------+-------------------------------------------------------------------+
| 07 | 1 | An Checkout | Pre-use anesthesia machine/equipment checkout.                    |
| /3 | 1 |             |                                                                   |
| 1/ | 4 |             |                                                                   |
| 20 | 0 |             |                                                                   |
| 17 |   |             |                                                                   |
+----+---+-------------+-------------------------------------------------------------------+
|    | 1 |             |                                                                   |
|    | 1 |             |                                                                   |
|    | 4 |             |                                                                   |
|    | 6 |             |                                                                   |
+----+---+-------------+-------------------------------------------------------------------+
|    | 1 | An Start    |                                                                   |
|    | 1 | Data        |                                                                   |
|    | 4 |             |                                                                   |
|    | 7 |             |                                                                   |
+----+---+-------------+-------------------------------------------------------------------+
|    | 1 | An Start    | Room ready, anesthesia equipment checked, essential drugs &       |
|    | 1 |             | equipment available. Patient Identity checked, anesthesia plan    |
|    | 5 |             | explained and consent obtained. Patient transported to OR,        |
|    | 1 |             | Monitors applied. Reassessment prior to anesthesia                |
|    |   |             | induction/procedure.                                              |
+----+---+-------------+-------------------------------------------------------------------+
|    | 1 | an mike now |                                                                   |
|    | 1 |             |                                                                   |
|    | 5 |             |                                                                   |
|    | 4 |             |                                                                   |
+----+---+-------------+-------------------------------------------------------------------+
|    | 1 | Antibiotic  |                                                                   |
|    | 1 | Given       |                                                                   |
|    | 5 |             |                                                                   |
|    | 5 |             |                                                                   |
+----+---+-------------+-------------------------------------------------------------------+
|    | 1 | Preoxygenat | Oxygen administered, patient sedated, ventilating spontaneously.  |
|    | 1 | ed          |                                                                   |
|    | 5 |             |                                                                   |
|    | 9 |             |                                                                   |
+----+---+-------------+-------------------------------------------------------------------+
|    | 1 | An          |                                                                   |
|    | 2 | Induction   |                                                                   |
|    | 0 |             |                                                                   |
|    | 1 |             |                                                                   |
+----+---+-------------+-------------------------------------------------------------------+
|    | 1 | An          | Smooth IV induction, mask airway established. Direct Laryngoscopy |
|    | 2 | Intubation  |  , ETT placed. BSEB/ETCO2 (auscultation and capnography) to       |
|    | 0 |             | confirm placement.  Depth noted.  Ventilator on.                  |
|    | 2 |             |                                                                   |
+----+---+-------------+-------------------------------------------------------------------+
|    | 1 | an mike now | Prone - eyes checked.                                             |
|    | 2 |             |                                                                   |
|    | 0 |             |                                                                   |
|    | 6 |             |                                                                   |
+----+---+-------------+-------------------------------------------------------------------+
|    | 1 | Pre-Procedu |                                                                   |
|    | 2 | ral Timeout |                                                                   |
|    | 1 |  Completed  |                                                                   |
|    | 3 |             |                                                                   |
+----+---+-------------+-------------------------------------------------------------------+
 
|    | 1 | First       |                                                                   |
|    | 2 | Inc/Proc St |                                                                   |
|    | 1 |             |                                                                   |
|    | 6 |             |                                                                   |
+----+---+-------------+-------------------------------------------------------------------+
|    | 1 | an mike now |                                                                   |
|    | 2 |             |                                                                   |
|    | 1 |             |                                                                   |
|    | 7 |             |                                                                   |
+----+---+-------------+-------------------------------------------------------------------+
|    | 1 | Oropharynx  |                                                                   |
|    | 3 | Suctioned   |                                                                   |
|    | 5 |             |                                                                   |
|    | 6 |             |                                                                   |
+----+---+-------------+-------------------------------------------------------------------+
|    | 1 | Extubated   |                                                                   |
|    | 3 | Deep        |                                                                   |
|    | 5 |             |                                                                   |
|    | 7 |             |                                                                   |
+----+---+-------------+-------------------------------------------------------------------+
|    | 1 | an mike now |                                                                   |
|    | 3 |             |                                                                   |
|    | 5 |             |                                                                   |
|    | 9 |             |                                                                   |
+----+---+-------------+-------------------------------------------------------------------+
|    | 1 | An Stop     | Patient handed off to recovery nurse.  Electronically signed by:  |
|    | 4 |             | Shaista Hyman MD at 2017 14:19                        |
|    | 0 |             |                                                                   |
|    | 5 |             |                                                                   |
+----+---+-------------+-------------------------------------------------------------------+
 
 
 
+------+
| Meds |
+------+
 
 
 
+-----------------------------------+----------+
| Name                              | Total    |
+-----------------------------------+----------+
| midazolam                         | 2 mg     |
+-----------------------------------+----------+
| propofol                          | 180 mg   |
+-----------------------------------+----------+
| ketamine                          | 50 mg    |
+-----------------------------------+----------+
| fentaNYL injection (2 mL)         | 100 mcg  |
+-----------------------------------+----------+
| HYDROmorphone                     | 2 mg     |
+-----------------------------------+----------+
| cisatracurium                     | 8 mg     |
+-----------------------------------+----------+
| ondansetron                       | 4 mg     |
+-----------------------------------+----------+
| dexamethasone                     | 4 mg     |
+-----------------------------------+----------+
| magnesium sulfate injection 500   | 1 g      |
| mg/mL (vial)                      |          |
 
+-----------------------------------+----------+
| tranexamic acid (CYKLOKAPRON) 1 g | 1 g      |
|  in 50 mL NS IVPB (simple)        |          |
+-----------------------------------+----------+
| ceFAZolin in saline (ANCEF) IVPB  | 2 g      |
| 2 g                               |          |
+-----------------------------------+----------+
| sodium chloride 0.9% (NS)         | 1,000 mL |
| infusion                          |          |
+-----------------------------------+----------+
| LR (Infusion)                     | 600 mL   |
+-----------------------------------+----------+
 
 
 
+-----------------------+
| Name                  |
+-----------------------+
| N2O Flow Rate (L/Min) |
+-----------------------+
| O2 Flow Rate (L/Min)  |
+-----------------------+
| Insp O2               |
+-----------------------+
| Exp SEV               |
+-----------------------+
| Air Flow Rate (L/Min) |
+-----------------------+
 
 
 
+-----------------------------------+
| No blood administrations on file. |
+-----------------------------------+
 
 
 
+--------+-----------------------------------+----------------------+----------------------+
| Type   | Details                           | Placement            | Removal              |
+--------+-----------------------------------+----------------------+----------------------+
| Lumbar | 17;  (incorrect charted     | 17 0000 by     | 17 1423 by     |
|  Drain | drain); other (see comments)      | Pau Saleem RN   | Pau Saleem RN   |
+--------+-----------------------------------+----------------------+----------------------+
| Drain/ | 17;  (present upon arrival  | 17 0000 by     | 17 0930 by     |
| Device | to PAC); Left; lower; back;      | Pau Saleem RN   | Sara Hwang RN   |
|  Site  | removed by Emiliano SEVERINO; tip     |                      |                      |
|        | intact; healing within            |                      |                      |
|        | expectations; 17; 0930      |                      |                      |
+--------+-----------------------------------+----------------------+----------------------+
| Periph | 17; 1034; Left; Wrist;      | 17 1034 by     | 17 1010 by     |
| eral   | over-the-needle catheter system;  | Soraya Gamez RN    | Sara Hwang RN   |
| IV     | 18 gauge, 1 1/2 in length; Blood  |                      |                      |
|        | Bank; 3; Right forearm x 3;       |                      |                      |
|        | distraction, topical anesthetic   |                      |                      |
|        | spray applied, tolerated well,    |                      |                      |
|        | appears comfortable; no longer    |                      |                      |
|        | indicated, catheter/device        |                      |                      |
|        | intact; short term use; 17; |                      |                      |
|        |  1010                             |                      |                      |
+--------+-----------------------------------+----------------------+----------------------+
 
| Airway | Placement Date: 17;         | 17 1202 by     | 17 1359 by     |
|        | Placement Time: 1202 (created via | Shaistajensen Hyman,  | Shaistajensen Hyman,  |
|        |  procedure documentation); Mask   | MD                   | MD                   |
|        | Ventilation: EZ; Airway Grade:    |                      |                      |
|        | 2a; Laryngoscope Blade Size: 3;   |                      |                      |
|        | Attempts: 1; Airway Type:         |                      |                      |
|        | endotracheal; Size: 6.5; Airway   |                      |                      |
|        | Tube Secured At: 22; Trauma:      |                      |                      |
|        | none; Other Equipment: stylette;  |                      |                      |
|        | Placement Check: exhaled CO2      |                      |                      |
|        | detection device, video           |                      |                      |
|        | laryngoscope, bilateral chest     |                      |                      |
|        | rise, breath sounds equal         |                      |                      |
|        | bilaterally; Removal Date:        |                      |                      |
|        | 17; Removal Time: 1359;     |                      |                      |
|        | Additional Comments: Neutral Head |                      |                      |
|        |  Position, no neck flexion or     |                      |                      |
|        | extension attempted.   Smooth IV  |                      |                      |
|        | induction, mask airway            |                      |                      |
|        | established. Direct Laryngoscopy  |                      |                      |
|        | with Velez Laryngoscope, ETT    |                      |                      |
|        | placed under video guidance.      |                      |                      |
|        | BSEB/ETCO2 (auscultation and      |                      |                      |
|        | capnography) to confirm           |                      |                      |
|        | placement.  Depth noted.          |                      |                      |
|        | Ventilator on.                    |                      |                      |
+--------+-----------------------------------+----------------------+----------------------+
| Read   | 17; 1223; back; 19    | 17 1223 by     | 19 1342 by     |
| only - | (Completed/Removed by Utility);   | Rashid Calderon RN      | User Epic            |
|        | 1342 (Completed/Removed by        |                      |                      |
| [employer] | Utility)                          |                      |                      |
| on     |                                   |                      |                      |
+--------+-----------------------------------+----------------------+----------------------+
 documented in this encounter
 
 Social History
 
 
+--------------+-------+-----------+--------+------+
| Tobacco Use  | Types | Packs/Day | Years  | Date |
|              |       |           | Used   |      |
+--------------+-------+-----------+--------+------+
| Never Smoker |       |           |        |      |
+--------------+-------+-----------+--------+------+
 
 
 
+---------------------+---+---+---+
| Smokeless Tobacco:  |   |   |   |
| Never Used          |   |   |   |
+---------------------+---+---+---+
 
 
 
+-------------+----------------------+---------+----------------------+
| Alcohol Use | Drinks/Week          | oz/Week | Comments             |
+-------------+----------------------+---------+----------------------+
| Yes         |   0 Standard drinks  | 0.0     | 1-2 drinks 2-4 times |
|             | or equivalent        |         |  per month           |
+-------------+----------------------+---------+----------------------+
 
 
 
 
+------------------+---------------+
| Sex Assigned at  | Date Recorded |
| Birth            |               |
+------------------+---------------+
| Not on file      |               |
+------------------+---------------+
 
 
 
+----------------+-------------+-------------+
| Job Start Date | Occupation  | Industry    |
+----------------+-------------+-------------+
| Not on file    | Not on file | Not on file |
+----------------+-------------+-------------+
 
 
 
+----------------+--------------+------------+
| Travel History | Travel Start | Travel End |
+----------------+--------------+------------+
 
 
 
+-------------------------------------+
| No recent travel history available. |
+-------------------------------------+
 documented as of this encounter
 
 Plan of Treatment
 
 
+--------+---------+-----------+----------------------+-------------+
| Date   | Type    | Specialty | Care Team            | Description |
+--------+---------+-----------+----------------------+-------------+
| / | Office  | Neurology |   Ravindra Munoz MD  [address] |             |
| 2020   | Visit   |           |  [address]      |             |
|        |         |           | [address]  [address],  |             |
|        |         |           | [place] [address]             |             |
|        |         |           | [phone]         |             |
|        |         |           | [phone] (Fax)   |             |
+--------+---------+-----------+----------------------+-------------+
 documented as of this encounter
 
 Procedures
 
 
+-----------------+--------+-------------+----------------------+----------------------+
| Procedure Name  | Priori | Date/Time   | Associated Diagnosis | Comments             |
|                 | ty     |             |                      |                      |
+-----------------+--------+-------------+----------------------+----------------------+
| ANE AIRWAY NOTE | Routin | 2017  |                      |   Results for this   |
|                 | e      | 12:14 PM    |                      | procedure are in the |
|                 |        | PDT         |                      |  results section.    |
+-----------------+--------+-------------+----------------------+----------------------+
 documented in this encounter
 
 Results
 
 Anesthesia Airway Note (2017 12:14 PM PDT)
 
+------------------------------------------------------------------------+--------------+
| Narrative                                                              | Performed At |
+------------------------------------------------------------------------+--------------+
|   Shaista Hyman MD       2017 12:14  Anesthesia Airway      |              |
| Placement     2017 12:02  Preprocedure check: patient identified, |              |
|  oxygen, airway assessed, patient   reassessment prior to induction,   |              |
| airway equipment checked and suction  Mask ventilation:   easy         |              |
| Successful technique: Velez  Laryngoscope blade size:   3   Airway   |              |
| grade: 2a (Partial view of glottis)  Other equipment: stylette         |              |
| Attempts: 1  Airway type: endotracheal  Size: 6.5  Cuffed: cuffed      |              |
| Route, reference point: right side of mouth  Tube depth: 22 cm  Tube   |              |
| secured with: adhesive tape  Trauma: none  Tube placement              |              |
| verification: carbon dioxide detection, equal bilateral   breath       |              |
| sounds, bilateral chest rise and video laryngoscope  Performing        |              |
| provider: SHAISTA HYMAN     Comments: Neutral Head Position, no  |              |
| neck flexion or extension attempted.     Smooth IV induction, mask     |              |
| airway established.  Direct Laryngoscopy with Velez Laryngoscope,    |              |
| ETT placed under video   guidance.   BSEB/ETCO2 (auscultation and      |              |
| capnography) to confirm placement.    Depth noted.   Ventilator on.    |              |
|      Electronically Signed by: Shaista Hyman MD                 |              |
|                             ESig date/time: 2017 12:14            |              |
|                                                                        |              |
+------------------------------------------------------------------------+--------------+
 
 
 
+-------------------------------------------------------------------------------------------
------------------------------------------------------------------------------------+
| Procedure Note                                                                            
                                                                                    |
+-------------------------------------------------------------------------------------------
------------------------------------------------------------------------------------+
|   Shaista Hyman MD - 2017 12:14 PM PDT  Anesthesia Airway                    
                                                                                    |
| Placement2017 12:02Preprocedure check: patient identified, oxygen, airway assessed,  
                                                                                    |
|  patient reassessment prior to induction, airway equipment checked and suctionMask        
                                                                                    |
| ventilation:  easySuccessful technique: McGrathLaryngoscope blade size:  3 Airway grade:  
                                                                                    |
|  2a (Partial view of glottis)Other equipment: styletteAttempts: 1Airway type:             
                                                                                    |
| endotrachealSize: 6.5Cuffed: cuffedRoute, reference point: right side of mouthTube        
                                                                                    |
| depth: 22 cmTube secured with: adhesive tapeTrauma: noneTube placement verification:      
                                                                                    |
| carbon dioxide detection, equal bilateral breath sounds, bilateral chest rise and video   
                                                                                    |
| laryngoscopePerforming provider: SHAISTA HYMAN PComments: Neutral Head Position, no   
                                                                                    |
| neck flexion or extension attempted.  Smooth IV induction, mask airway                    
                                                                                    |
| established.Direct Laryngoscopy with Velez Laryngoscope, ETT placed under video         
                                                                                    |
| guidance.  BSEB/ETCO2 (auscultation and capnography) to confirm placement.  Depth noted.  
                                                                                    |
|   Ventilator on.Electronically Signed by: Shaista Hyman MD                         
                                                                                    |
 
|      ESig date/time: 2017 12:14                                                      
                                                                                    |
|Tube depth: 22 cm                                                                          
                                                                                    |
|Tube secured with: adhesive tape                                                           
                                                                                    |
|Trauma: none                                                                               
                                                                                    |
|Tube placement verification: carbon dioxide detection, equal bilateral breath sounds, bilat
eral chest rise and video laryngoscope                                              |
|Performing provider: SHAISTA HYMAN                                                   
                                                                                    |
|Comments: Neutral Head Position, no neck flexion or extension attempted.                   
                                                                                    |
|Smooth IV induction, mask airway established.                                              
                                                                                    |
|Direct Laryngoscopy with Velez Laryngoscope, ETT placed under video guidance.  BSEB/ETCO2
 (auscultation and capnography) to confirm placement.  Depth noted.  Ventilator on. |
|                                                                                           
|Electronically Signed by: Shaista P. Skaarup, MD Davisg date/time
: 2017 12:14                                                                   |
|                                                                                           
                                                                                    |
+-------------------------------------------------------------------------------------------
------------------------------------------------------------------------------------+
 documented in this encounter
 
 Visit Diagnoses
 Not on filedocumented in this encounter
 
 Administered Medications
 
 
+-----------------------------------+--------+----------+------+------+------+
| Medication Order                  | MAR    | Action   | Dose | Rate | Site |
|                                   | Action | Date     |      |      |      |
+-----------------------------------+--------+----------+------+------+------+
|   ceFAZolin in saline (ANCEF)     | Given  | 20 | 2 g  |      |      |
| IVPB 2 g  2 g, Intravenous,       |        | 17 11:55 |      |      |      |
| Administer over 30 Minutes, Prior |        |  AM PDT  |      |      |      |
|  to Incision, Starting Mon        |        |          |      |      |      |
| 17 at 0935, For 1 dose,      |        |          |      |      |      |
| Administer within 1 hour of       |        |          |      |      |      |
| surgical incision. Keep in        |        |          |      |      |      |
| refrigerator., Pre-op,            |        |          |      |      |      |
| Indications: Surgical Prophylaxis |        |          |      |      |      |
+-----------------------------------+--------+----------+------+------+------+
 
 
 
+---+---+
|   |   |
+---+---+
 
 
 
 
+-----------------------------------+-------+----------+------+---+---+
|   cisatracurium (NIMBEX)          | Given | 20 | 8 mg |   |   |
| injection  Intravenous, PRN,      |       | 17 12:00 |      |   |   |
| Ventilator Dyssynchrony, Starting |       |  PM PDT  |      |   |   |
|  17 at 1200, Anesthesia  |       |          |      |   |   |
| Intra-op                          |       |          |      |   |   |
+-----------------------------------+-------+----------+------+---+---+
 
 
 
+---+---+
|   |   |
+---+---+
 
 
 
+--------------------------------+-------+----------+------+---+---+
|   dexamethasone (DECADRON) 10  | Given | 20 | 4 mg |   |   |
| mg/mL injection  Intravenous,  |       | 17 11:55 |      |   |   |
| PRN, Starting 17 at   |       |  AM PDT  |      |   |   |
| 1155, Anesthesia Intra-op      |       |          |      |   |   |
+--------------------------------+-------+----------+------+---+---+
 
 
 
+---+---+
|   |   |
+---+---+
 
 
 
+-----------------------------------+-------+----------+--------+---+---+
|   fentaNYL (PF) injection         | Given | 20 | 50 mcg |   |   |
| Intravenous, PRN, Pain, Starting  |       | 17 12:01 |        |   |   |
| Mon 17 at 1155, Anesthesia   |       |  PM PDT  |        |   |   |
| Intra-op                          |       |          |        |   |   |
+-----------------------------------+-------+----------+--------+---+---+
 
 
 
+-------+----------+--------+---+---+
| Given | 20 | 50 mcg |   |   |
|       | 17 11:55 |        |   |   |
|       |  AM PDT  |        |   |   |
+-------+----------+--------+---+---+
 
 
 
+---+---+
|   |   |
+---+---+
 
 
 
+----------------------------------+-------+----------+------+---+---+
|   HYDROmorphone (DILAUDID) 2     | Given | 20 | 1 mg |   |   |
| mg/mL injection  Intravenous,    |       | 17 12:20 |      |   |   |
| PRN, Pain, Starting Mon 17  |       |  PM PDT  |      |   |   |
| at 1202, Anesthesia Intra-op     |       |          |      |   |   |
 
+----------------------------------+-------+----------+------+---+---+
 
 
 
+-------+----------+------+---+---+
| Given | 20 | 1 mg |   |   |
|       | 17 12:02 |      |   |   |
|       |  PM PDT  |      |   |   |
+-------+----------+------+---+---+
 
 
 
+---+---+
|   |   |
+---+---+
 
 
 
+---------------------------------+-------+----------+-------+---+---+
|   ketamine 50 mg/mL injection   | Given | 20 | 25 mg |   |   |
| Intravenous, PRN, Starting Mon  |       | 17 12:20 |       |   |   |
| 17 at 1202, Anesthesia     |       |  PM PDT  |       |   |   |
| Intra-op                        |       |          |       |   |   |
+---------------------------------+-------+----------+-------+---+---+
 
 
 
+-------+----------+-------+---+---+
| Given | 20 | 25 mg |   |   |
|       | 17 12:02 |       |   |   |
|       |  PM PDT  |       |   |   |
+-------+----------+-------+---+---+
 
 
 
+---+---+
|   |   |
+---+---+
 
 
 
+-----------------------------------+---------+----------+---+---+---+
|   lactated ringers (LR) infusion  | New Bag | 20 |   |   |   |
|  Intravenous, CONTINUOUS PRN,     |         | 17 12:45 |   |   |   |
| Starting Mon 17 at 1245,     |         |  PM PDT  |   |   |   |
| Anesthesia Intra-op               |         |          |   |   |   |
+-----------------------------------+---------+----------+---+---+---+
 
 
 
+---+---+
|   |   |
+---+---+
 
 
 
+--------------------------------+-------+----------+-----+---+---+
|   magnesium sulfate 500 mg/mL  | Given | 20 | 1 g |   |   |
| injection  PRN, Starting Mon   |       | 17 12:20 |     |   |   |
| 17 at 1220, Anesthesia    |       |  PM PDT  |     |   |   |
 
| Intra-op                       |       |          |     |   |   |
+--------------------------------+-------+----------+-----+---+---+
 
 
 
+---+---+
|   |   |
+---+---+
 
 
 
+-----------------------------------+-------+----------+------+---+---+
|   midazolam (VERSED) 1 mg/mL      | Given | 20 | 2 mg |   |   |
| injection  Intravenous, PRN,      |       | 17 11:55 |      |   |   |
| Anxiety, Starting 17 at  |       |  AM PDT  |      |   |   |
| 1155, Anesthesia Intra-op         |       |          |      |   |   |
+-----------------------------------+-------+----------+------+---+---+
 
 
 
+---+---+
|   |   |
+---+---+
 
 
 
+-----------------------------------+-------+----------+------+---+---+
|   ondansetron (ZOFRAN) injection  | Given | 20 | 4 mg |   |   |
|  Intravenous, PRN, Nausea,        |       | 17 11:55 |      |   |   |
| Vomiting, Starting 17 at |       |  AM PDT  |      |   |   |
|  1155, Anesthesia Intra-op        |       |          |      |   |   |
+-----------------------------------+-------+----------+------+---+---+
 
 
 
+---+---+
|   |   |
+---+---+
 
 
 
+-----------------------------------+-------+----------+--------+---+---+
|   propofol (DIPRIVAN) injection   | Given | 20 | 180 mg |   |   |
| Intravenous, PRN, Starting Mon    |       | 17 12:01 |        |   |   |
| 17 at 1201, Anesthesia       |       |  PM PDT  |        |   |   |
| Intra-op                          |       |          |        |   |   |
+-----------------------------------+-------+----------+--------+---+---+
 
 
 
+---+---+
|   |   |
+---+---+
 
 
 
+----------------------------------+-------+----------+-----+---+---+
|   tranexamic acid in 50 mL NS    | Given | 20 | 1 g |   |   |
| (CYKLOKAPRON) IVPB (simple)      |       | 17 12:20 |     |   |   |
| Intravenous, Administer over 15  |       |  PM PDT  |     |   |   |
 
| Minutes, PRN, Starting Mon       |       |          |     |   |   |
| 17 at 1220, Anesthesia      |       |          |     |   |   |
| Intra-op                         |       |          |     |   |   |
+----------------------------------+-------+----------+-----+---+---+
 
 
 
+---+---+
|   |   |
+---+---+
 documented in this encounter

## 2020-06-08 NOTE — XMS
Encounter Summary
  Created on: 2020
 
 Memo Deni Siu
 External Reference #: [id number]
 : 52
 Sex: Male
 
 Demographics
 
 
+-----------------------+---------------------------+
| Address               | [address]           |
|                       | [place]  [address] |
+-----------------------+---------------------------+
| Home Phone            | [phone]           |
+-----------------------+---------------------------+
| Preferred Language    | Unknown                   |
+-----------------------+---------------------------+
| Marital Status        |                    |
+-----------------------+---------------------------+
| Bahai Affiliation | Unknown                   |
+-----------------------+---------------------------+
| Race                  | Unknown                   |
+-----------------------+---------------------------+
| Ethnic Group          | Unknown                   |
+-----------------------+---------------------------+
 
 
 Author
 
 
+--------------+--------------------------------------------+
| Author       | [facility]  |
|              | and [place]                                |
+--------------+--------------------------------------------+
| Organization | [facility]  |
|              | and [place]                                |
+--------------+--------------------------------------------+
| Address      | Unknown                                    |
+--------------+--------------------------------------------+
| Phone        | Unavailable                                |
+--------------+--------------------------------------------+
 
 
 
 Support
 
 
+---------------+--------------+---------+-----------------+
| Name          | Relationship | Address | Phone           |
+---------------+--------------+---------+-----------------+
| Sofiya A Brown | ECON         | Unknown | [phone] |
+---------------+--------------+---------+-----------------+
 
 
 
 Care Team Providers
 
 
 
+-----------------------+------+-----------------+
| Care Team Member Name | Role | Phone           |
+-----------------------+------+-----------------+
| Emely Hinton    | PCP  | [phone] |
+-----------------------+------+-----------------+
 
 
 
 Encounter Details
 
 
+--------+----------+----------------------+----------------------+-------------+
| Date   | Type     | Department           | Care Team            | Description |
+--------+----------+----------------------+----------------------+-------------+
| / | Imaging  |   [facility] |   Provider,          |             |
| 2020   | Exam     |  [facility]    | [address] |             |
|        |          | IMAGING  [address]       |  [address]        |             |
|        |          | [address]  [place]     | [place] [address]     |             |
|        |          | [place] [address] |                      |             |
|        |          |   [phone]       |                      |             |
+--------+----------+----------------------+----------------------+-------------+
 
 
 
 Social History
 
 
+--------------+-------+-----------+--------+------+
| Tobacco Use  | Types | Packs/Day | Years  | Date |
|              |       |           | Used   |      |
+--------------+-------+-----------+--------+------+
| Never Smoker |       |           |        |      |
+--------------+-------+-----------+--------+------+
 
 
 
+---------------------+---+---+---+
| Smokeless Tobacco:  |   |   |   |
| Never Used          |   |   |   |
+---------------------+---+---+---+
 
 
 
+-------------+----------------------+---------+----------------------+
| Alcohol Use | Drinks/Week          | oz/Week | Comments             |
+-------------+----------------------+---------+----------------------+
| No          |   0 Standard drinks  | 0.0     | No longer drinking   |
|             | or equivalent        |         | 1-2 drinks 2-4 times |
|             |                      |         |  a month             |
+-------------+----------------------+---------+----------------------+
 
 
 
+------------------+---------------+
| Sex Assigned at  | Date Recorded |
| Birth            |               |
+------------------+---------------+
| Not on file      |               |
 
+------------------+---------------+
 
 
 
+----------------+-------------+-------------+
| Job Start Date | Occupation  | Industry    |
+----------------+-------------+-------------+
| Not on file    | Not on file | Not on file |
+----------------+-------------+-------------+
 
 
 
+----------------+--------------+------------+
| Travel History | Travel Start | Travel End |
+----------------+--------------+------------+
 
 
 
+-------------------------------------+
| No recent travel history available. |
+-------------------------------------+
 documented as of this encounter
 
 Plan of Treatment
 
 
+--------+---------+-----------+----------------------+-------------+
| Date   | Type    | Specialty | Care Team            | Description |
+--------+---------+-----------+----------------------+-------------+
| / | Office  | Neurology |   Ravindra Munoz MD  [address] |             |
| 2020   | Visit   |           |  [facility]      |             |
|        |         |           | [place]  |             |
|        |         |           | [place] [address]             |             |
|        |         |           | [phone]         |             |
|        |         |           | [phone] (Fax)   |             |
+--------+---------+-----------+----------------------+-------------+
 documented as of this encounter
 
 Procedures
 
 
+----------------------+--------+-------------+----------------------+----------------------
+
| Procedure Name       | Priori | Date/Time   | Associated Diagnosis | Comments             
|
|                      | ty     |             |                      |                      
|
+----------------------+--------+-------------+----------------------+----------------------
+
| XR LUMBAR SPINE 2 OR | Routin | 2018  |                      |   Results for this   
|
|  3 VW                | e      | 12:00 AM    |                      | procedure are in the 
|
|                      |        | PDT         |                      |  results section.    
|
+----------------------+--------+-------------+----------------------+----------------------
+
 documented in this encounter
 
 Results
 
 XR Lumbar Spine 2 or 3 Vw (2018 12:00 AM PDT)
 
+----------+
| Specimen |
+----------+
|          |
+----------+
 
 
 
+-----------------------------------------+---------------+
| Narrative                               | Performed At  |
+-----------------------------------------+---------------+
|   External films for comparison only    |   [facility] |
|                                         |               |
|  No results will be in the chart.       |               |
+-----------------------------------------+---------------+
 
 
 
+---------------+---------+--------------------+--------------+
| Performing    | Address | City/State/Zipcode | Phone Number |
| Organization  |         |                    |              |
+---------------+---------+--------------------+--------------+
|   [facility] |         |                    |              |
+---------------+---------+--------------------+--------------+
 documented in this encounter
 
 Visit Diagnoses
 Not on filedocumented in this encounter

## 2020-06-08 NOTE — XMS
Encounter Summary
  Created on: 2020
 
 Memo Deni Siu
 External Reference #: [id number]
 : 52
 Sex: Male
 
 Demographics
 
 
+-----------------------+---------------------------+
| Address               | [address]           |
|                       | [place]  [address] |
+-----------------------+---------------------------+
| Home Phone            | [phone]           |
+-----------------------+---------------------------+
| Preferred Language    | Unknown                   |
+-----------------------+---------------------------+
| Marital Status        |                    |
+-----------------------+---------------------------+
| Mu-ism Affiliation | Unknown                   |
+-----------------------+---------------------------+
| Race                  | Unknown                   |
+-----------------------+---------------------------+
| Ethnic Group          | Unknown                   |
+-----------------------+---------------------------+
 
 
 Author
 
 
+--------------+--------------------------------------------+
| Author       | [facility]  |
|              | and [place]                                |
+--------------+--------------------------------------------+
| Organization | [facility]  |
|              | and [place]                                |
+--------------+--------------------------------------------+
| Address      | Unknown                                    |
+--------------+--------------------------------------------+
| Phone        | Unavailable                                |
+--------------+--------------------------------------------+
 
 
 
 Support
 
 
+---------------+--------------+---------+-----------------+
| Name          | Relationship | Address | Phone           |
+---------------+--------------+---------+-----------------+
| Sofiya A Brown | ECON         | Unknown | [phone] |
+---------------+--------------+---------+-----------------+
 
 
 
 Care Team Providers
 
 
 
+-------------------------+------+-----------------+
| Care Team Member Name   | Role | Phone           |
+-------------------------+------+-----------------+
| Ck Mihcelle MD | PCP  | [phone] |
+-------------------------+------+-----------------+
 
 
 
 Encounter Details
 
 
+--------+----------+----------------------+----------------------+-------------+
| Date   | Type     | Department           | Care Team            | Description |
+--------+----------+----------------------+----------------------+-------------+
| / | Imaging  |   [facility] |   Provider,          |             |
| 2017   | Exam     |  [facility] EXTER[facility]    | [address] |             |
|        |          | [address]AGING  [address]       |  [address]        |             |
|        |          | [address]  [place]     | [place] [address]     |             |
|        |          | [place] [address] |                      |             |
|        |          |   [phone]       |                      |             |
+--------+----------+----------------------+----------------------+-------------+
 
 
 
 Social History
 
 
+--------------+-------+-----------+--------+------+
| Tobacco Use  | Types | Packs/Day | Years  | Date |
|              |       |           | Used   |      |
+--------------+-------+-----------+--------+------+
| Never Smoker |       |           |        |      |
+--------------+-------+-----------+--------+------+
 
 
 
+---------------------+---+---+---+
| Smokeless Tobacco:  |   |   |   |
| Never Used          |   |   |   |
+---------------------+---+---+---+
 
 
 
+-------------+----------------------+---------+----------------------+
| Alcohol Use | Drinks/Week          | oz/Week | Comments             |
+-------------+----------------------+---------+----------------------+
| Yes         |   0 Standard drinks  | 0.0     | 1-2 drinks 2-4 times |
|             | or equivalent        |         |  per month           |
+-------------+----------------------+---------+----------------------+
 
 
 
+------------------+---------------+
| Sex Assigned at  | Date Recorded |
| Birth            |               |
+------------------+---------------+
| Not on file      |               |
+------------------+---------------+
 
 
 
 
+----------------+-------------+-------------+
| Job Start Date | Occupation  | Industry    |
+----------------+-------------+-------------+
| Not on file    | Not on file | Not on file |
+----------------+-------------+-------------+
 
 
 
+----------------+--------------+------------+
| Travel History | Travel Start | Travel End |
+----------------+--------------+------------+
 
 
 
+-------------------------------------+
| No recent travel history available. |
+-------------------------------------+
 documented as of this encounter
 
 Plan of Treatment
 
 
+--------+---------+-----------+----------------------+-------------+
| Date   | Type    | Specialty | Care Team            | Description |
+--------+---------+-----------+----------------------+-------------+
| / | Office  | Neurology |   Ravindra Munoz MD  [address] |             |
| 2020   | Visit   |           |  [facility]      |             |
|        |         |           | [place] [address]  [place]  |             |
|        |         |           | [place] [address]             |             |
|        |         |           | [phone]         |             |
|        |         |           | [phone] (Fax)   |             |
+--------+---------+-----------+----------------------+-------------+
 documented as of this encounter
 
 Procedures
 
 
+----------------------+--------+-------------+----------------------+----------------------
+
| Procedure Name       | Priori | Date/Time   | Associated Diagnosis | Comments             
|
|                      | ty     |             |                      |                      
|
+----------------------+--------+-------------+----------------------+----------------------
+
| XR LUMBAR SPINE 2 OR | Routin | 2017  |                      |   Results for this   
|
|  3 VW                | e      | 10:20 AM    |                      | procedure are in the 
|
|                      |        | PDT         |                      |  results section.    
|
+----------------------+--------+-------------+----------------------+----------------------
+
 documented in this encounter
 
 Results
 XR Lumbar Spine 2 or 3 Vw (2017 10:20 AM PDT)
 
 
+----------+
| Specimen |
+----------+
|          |
+----------+
 
 
 
+--------------------------------------------------------------------+---------------+
| Narrative                                                          | Performed At  |
+--------------------------------------------------------------------+---------------+
|   External films for comparison only - no result from [facility].  |   [facility] |
+--------------------------------------------------------------------+---------------+
 
 
 
+---------------+---------+--------------------+--------------+
| Performing    | Address | City/State/Zipcode | Phone Number |
| Organization  |         |                    |              |
+---------------+---------+--------------------+--------------+
|   [facility] |         |                    |              |
+---------------+---------+--------------------+--------------+
 documented in this encounter
 
 Visit Diagnoses
 Not on filedocumented in this encounter

## 2020-06-08 NOTE — XMS
Encounter Summary
  Created on: 2020
 
 Memo Deni Siu
 External Reference #: [id number]
 : 52
 Sex: Male
 
 Demographics
 
 
+-----------------------+---------------------------+
| Address               | [address]           |
|                       | [place]  [address] |
+-----------------------+---------------------------+
| Home Phone            | [phone]           |
+-----------------------+---------------------------+
| Preferred Language    | Unknown                   |
+-----------------------+---------------------------+
| Marital Status        |                    |
+-----------------------+---------------------------+
| Zoroastrian Affiliation | Unknown                   |
+-----------------------+---------------------------+
| Race                  | Unknown                   |
+-----------------------+---------------------------+
| Ethnic Group          | Unknown                   |
+-----------------------+---------------------------+
 
 
 Author
 
 
+--------------+--------------------------------------------+
| Author       | [facility]  |
|              | [facility] [place]                                |
+--------------+--------------------------------------------+
| Organization | [facility]  |
|              | [facility] [place]                                |
+--------------+--------------------------------------------+
| Address      | Unknown                                    |
+--------------+--------------------------------------------+
| Phone        | Unavailable                                |
+--------------+--------------------------------------------+
 
 
 
 Support
 
 
+---------------+--------------+---------+-----------------+
| Name          | Relationship | Address | Phone           |
+---------------+--------------+---------+-----------------+
| Sofiya A Brown | ECON         | Unknown | [phone] |
+---------------+--------------+---------+-----------------+
 
 
 
 Care Team Providers
 
 
 
+-------------------------+------+-----------------+
| Care Team Member Name   | Role | Phone           |
+-------------------------+------+-----------------+
| Ck Michelle MD | PCP  | [phone] |
+-------------------------+------+-----------------+
 
 
 
 Encounter Details
 
 
+--------+-----------+----------------------+----------------------+-------------------+
| Date   | Type      | Department           | Care Team            | Description       |
+--------+-----------+----------------------+----------------------+-------------------+
| 11/15/ | Hospital  |   [facility] |   Madhav Mccloud, | S/P lumbar fusion |
| 2017   | Encounter |  [facility]  [address] |  PA-C  [address]  |                   |
|        |           |  [address]  [place]       | [address]  [place]     |                   |
|        |           | [place] [address] | [place] [address]      |                   |
|        |           |   [phone]       | [phone]         |                   |
|        |           |                      | [phone] (Fax)   |                   |
+--------+-----------+----------------------+----------------------+-------------------+
 
 
 
 Social History
 
 
+--------------+-------+-----------+--------+------+
| Tobacco Use  | Types | Packs/Day | Years  | Date |
|              |       |           | Used   |      |
+--------------+-------+-----------+--------+------+
| Never Smoker |       |           |        |      |
+--------------+-------+-----------+--------+------+
 
 
 
+---------------------+---+---+---+
| Smokeless Tobacco:  |   |   |   |
| Never Used          |   |   |   |
+---------------------+---+---+---+
 
 
 
+-------------+----------------------+---------+----------------------+
| Alcohol Use | Drinks/Week          | oz/Week | Comments             |
+-------------+----------------------+---------+----------------------+
| No          |   0 Standard drinks  | 0.0     | No longer drinking   |
|             | or equivalent        |         | 1-2 drinks 2-4 times |
|             |                      |         |  a month             |
+-------------+----------------------+---------+----------------------+
 
 
 
+------------------+---------------+
| Sex Assigned at  | Date Recorded |
| Birth            |               |
+------------------+---------------+
| Not on file      |               |
 
+------------------+---------------+
 
 
 
+----------------+-------------+-------------+
| Job Start Date | Occupation  | Industry    |
+----------------+-------------+-------------+
| Not on file    | Not on file | Not on file |
+----------------+-------------+-------------+
 
 
 
+----------------+--------------+------------+
| Travel History | Travel Start | Travel End |
+----------------+--------------+------------+
 
 
 
+-------------------------------------+
| No recent travel history available. |
+-------------------------------------+
 documented as of this encounter
 
 Medications at Time of Discharge
 
 
+----------------------+----------------------+-----------+---------+----------+-----------+
| Medication           | Sig                  | Dispensed | Refills | Start    | End Date  |
|                      |                      |           |         | Date     |           |
+----------------------+----------------------+-----------+---------+----------+-----------+
|   atorvaSTATin       | Take 40 mg by mouth  |           | 0       | 20 |           |
| (LIPITOR) 40 mg      | Daily.               |           |         | 15       |           |
| tablet               |                      |           |         |          |           |
+----------------------+----------------------+-----------+---------+----------+-----------+
|   B Complex Vitamins | Take 1 tablet by     |           | 0       |          |           |
|  (VITAMIN B COMPLEX) | mouth Daily.         |           |         |          |           |
|  tablet              |                      |           |         |          |           |
+----------------------+----------------------+-----------+---------+----------+-----------+
|   fluticasone        | 1 spray by Nasal     |           | 0       |          |           |
| (FLONASE) 50         | route Daily.         |           |         |          |           |
| mcg/nasal spray      |                      |           |         |          |           |
+----------------------+----------------------+-----------+---------+----------+-----------+
|   metoprolol         | Take 25 mg by mouth  |           | 0       | 20 |           |
| succinate            | Daily.               |           |         | 17       |           |
| (TOPROL-XL) 25 mg 24 |                      |           |         |          |           |
|  hr tablet           |                      |           |         |          |           |
+----------------------+----------------------+-----------+---------+----------+-----------+
|   ASCORBIC ACID PO   | Take  by mouth.      |           | 0       |          |  |
|                      |                      |           |         |          | 0         |
+----------------------+----------------------+-----------+---------+----------+-----------+
|   cholecalciferol    | Take 1,000 Units by  |           | 0       |          |  |
| (VITAMIN D-3) 1,000  | mouth Daily.         |           |         |          | 0         |
| units tablet         |                      |           |         |          |           |
+----------------------+----------------------+-----------+---------+----------+-----------+
|   diazePAM (VALIUM)  | Take 1 tablet by     |   90      | 1       | 11/15/20 |  |
| 5 mg tablet          | mouth every 6 hours  | tablet    |         | 17       | 0         |
|                      | as needed for Muscle |           |         |          |           |
|                      |  spasms.             |           |         |          |           |
+----------------------+----------------------+-----------+---------+----------+-----------+
|                      | Take 1 tablet by     |           | 0       |          |  |
 
| fexofenadine-pseudoe | mouth Twice  daily   |           |         |          | 0         |
| PHEDrine (ALLEGRA-D  | as needed.           |           |         |          |           |
| ALLERGY &            |                      |           |         |          |           |
| CONGESTION)    |                      |           |         |          |           |
| MG per tablet        |                      |           |         |          |           |
+----------------------+----------------------+-----------+---------+----------+-----------+
|   FLUoxetine         | Take 20 mg by mouth  |           | 0       |          |  |
| (PROZAC) 20 mg       | Daily.               |           |         |          | 0         |
| capsule              |                      |           |         |          |           |
+----------------------+----------------------+-----------+---------+----------+-----------+
|                      | Take 0.5-1 tablets   |   120     | 0       | 11/15/20 |  |
| HYDROcodone-acetamin | by mouth every 6     | tablet    |         | 17       | 0         |
| ophen (NORCO)  | hours as needed for  |           |         |          |           |
|  mg per tablet       | Pain.                |           |         |          |           |
+----------------------+----------------------+-----------+---------+----------+-----------+
 documented as of this encounter
 
 Plan of Treatment
 
 
+--------+---------+-----------+----------------------+-------------+
| Date   | Type    | Specialty | Care Team            | Description |
+--------+---------+-----------+----------------------+-------------+
| / | Office  | Neurology |   Ravindra Munoz MD  [address] |             |
| 2020   | Visit   |           |  [address]      |             |
|        |         |           | [address]  [address],  |             |
|        |         |           | [place] [address]             |             |
|        |         |           | [phone]         |             |
|        |         |           | [phone] (Fax)   |             |
+--------+---------+-----------+----------------------+-------------+
 documented as of this encounter
 
 Procedures
 
 
+----------------------+--------+-------------+----------------------+----------------------
+
| Procedure Name       | Priori | Date/Time   | Associated Diagnosis | Comments             
|
|                      | ty     |             |                      |                      
|
+----------------------+--------+-------------+----------------------+----------------------
+
| XR LUMBAR SPINE 2 OR | Routin | 11/15/2017  |   S/P lumbar fusion  |   Results for this   
|
|  3 VW                | e      |  8:02 AM    |                      | procedure are in the 
|
|                      |        | PST         |                      |  results section.    
|
+----------------------+--------+-------------+----------------------+----------------------
+
 documented in this encounter
 
 Results
 XR Lumbar Spine 2 or 3 Vw (11/15/2017  8:02 AM PST)
 
+----------+
| Specimen |
+----------+
|          |
 
+----------+
 
 
 
+------------------------------------------------------------------------+---------------+
| Narrative                                                              | Performed At  |
+------------------------------------------------------------------------+---------------+
|   EXAM:XR LUMBAR SPINE 2 OR 3 VW     CLINICAL HISTORY: Postop          |   [facility] |
| COMPARISON: Lumbar spine radiograph dating to 2017            |               |
| FINDINGS: Frontal and lateral views.     Posterior and interbody       |               |
| fusion changes at L4-L5.   Intact hardware.   No change in  position   |               |
| of the interbody graft markers.   No change in spinal alignment.   No  |               |
|  interval compression deformities.     IMPRESSION -     No             |               |
| radiographic evidence for an interval complication.     Dictated and   |               |
| Signed by: Ranjan Mcdonald MD    Electronically signed: 11/15/2017    |               |
| 9:14 AM                                                                |               |
+------------------------------------------------------------------------+---------------+
 
 
 
+----------------------------------------------------------------------------------+
| Procedure Note                                                                   |
+----------------------------------------------------------------------------------+
|   [facility] Results In - 11/15/2017  9:17 AM PST  EXAM:XR LUMBAR SPINE 2 OR 3 VW  |
|                                                                                  |
| CLINICAL HISTORY: Postop                                                         |
|                                                                                  |
| COMPARISON: Lumbar spine radiograph dating to 2017                      |
|                                                                                  |
| FINDINGS: Frontal and lateral views.                                             |
|                                                                                  |
| Posterior and interbody fusion changes at L4-L5.  Intact hardware.  No change in |
| position of the interbody graft markers.  No change in spinal alignment.  No     |
| interval compression deformities.                                                |
|                                                                                  |
| IMPRESSION -                                                                     |
|                                                                                  |
| No radiographic evidence for an interval complication.                           |
|                                                                                  |
| Dictated and Signed by: Ranjan Mcdonald MD                                      |
|  Electronically signed: 11/15/2017 9:14 AM                                       |
+----------------------------------------------------------------------------------+
 
 
 
+---------------+---------+--------------------+--------------+
| Performing    | Address | City/State/Zipcode | Phone Number |
| Organization  |         |                    |              |
+---------------+---------+--------------------+--------------+
|   [facility] |         |                    |              |
+---------------+---------+--------------------+--------------+
 documented in this encounter
 
 Visit Diagnoses
 
 
+-----------------------------------------+
| Diagnosis                               |
+-----------------------------------------+
|   S/P lumbar fusion  Arthrodesis status |
 
+-----------------------------------------+
 documented in this encounter

## 2020-06-08 NOTE — XMS
Encounter Summary
  Created on: 2020
 
 Memo Deni Siu
 External Reference #: [id number]
 : 52
 Sex: Male
 
 Demographics
 
 
+-----------------------+---------------------------+
| Address               | [address]           |
|                       | [place]  [address] |
+-----------------------+---------------------------+
| Home Phone            | [phone]           |
+-----------------------+---------------------------+
| Preferred Language    | Unknown                   |
+-----------------------+---------------------------+
| Marital Status        |                    |
+-----------------------+---------------------------+
| [employer] Affiliation | Unknown                   |
+-----------------------+---------------------------+
| Race                  | Unknown                   |
+-----------------------+---------------------------+
| Ethnic Group          | Unknown                   |
+-----------------------+---------------------------+
 
 
 Author
 
 
+--------------+--------------------------------------------+
| Author       | [facility]  |
|              | and [place]                                |
+--------------+--------------------------------------------+
| Organization | [facility]  |
|              | and [place]                                |
+--------------+--------------------------------------------+
| Address      | Unknown                                    |
+--------------+--------------------------------------------+
| Phone        | Unavailable                                |
+--------------+--------------------------------------------+
 
 
 
 Support
 
 
+---------------+--------------+---------+-----------------+
| Name          | Relationship | Address | Phone           |
+---------------+--------------+---------+-----------------+
| Sofiya A Brown | ECON         | Unknown | [phone] |
+---------------+--------------+---------+-----------------+
 
 
 
 Care Team Providers
 
 
 
+-------------------------+------+-----------------+
| Care Team Member Name   | Role | Phone           |
+-------------------------+------+-----------------+
| Ck Michelle MD | PCP  | [phone] |
+-------------------------+------+-----------------+
 
 
 
 Reason for Visit
 Auth/Cert
 
+--------+--------+-----------+--------------+--------------+--------------+
| Status | Reason | Specialty | Diagnoses /  | Referred By  | Referred To  |
|        |        |           | Procedures   | Contact      | Contact      |
+--------+--------+-----------+--------------+--------------+--------------+
|        |        |           |   Diagnoses  |              |              |
|        |        |           |              |              |              |
|        |        |           | Osteoarthrit |              |              |
|        |        |           | is of spine  |              |              |
|        |        |           | with         |              |              |
|        |        |           | radiculopath |              |              |
|        |        |           | y, lumbar    |              |              |
|        |        |           | region       |              |              |
|        |        |           | (M47.26),    |              |              |
|        |        |           | Spinal       |              |              |
|        |        |           | stenosis,    |              |              |
|        |        |           | lumbar       |              |              |
|        |        |           | region,      |              |              |
|        |        |           | without      |              |              |
|        |        |           | neurogenic   |              |              |
|        |        |           | claudication |              |              |
|        |        |           |  (M48.06),   |              |              |
|        |        |           | Lumbar       |              |              |
|        |        |           | radiculopath |              |              |
|        |        |           | y (M54.16),  |              |              |
|        |        |           | Chronic      |              |              |
|        |        |           | left-sided   |              |              |
|        |        |           | low back     |              |              |
|        |        |           | pain with    |              |              |
|        |        |           | left-sided   |              |              |
|        |        |           | sciatica     |              |              |
|        |        |           | (M54.42,     |              |              |
|        |        |           | G89.29),     |              |              |
|        |        |           | Status post  |              |              |
|        |        |           | laminectomy  |              |              |
|        |        |           | (Z98.890)    |              |              |
|        |        |           | Procedures   |              |              |
|        |        |           | DC ARTHDSIS  |              |              |
|        |        |           | POST/POSTERO |              |              |
|        |        |           | LATRL/POSTIN |              |              |
|        |        |           | TERBODY      |              |              |
|        |        |           | LUMBAR       |              |              |
|        |        |           | POSTERIOR    |              |              |
|        |        |           | NON-SEGMENTA |              |              |
|        |        |           | L            |              |              |
|        |        |           | INSTRUMENTAT |              |              |
|        |        |           | ION  DC INSJ |              |              |
|        |        |           |  BIOMCHN DEV |              |              |
 
|        |        |           |              |              |              |
|        |        |           | INTERVERTEBR |              |              |
|        |        |           | AL DSC SPC   |              |              |
|        |        |           | W/ARTHRD     |              |              |
|        |        |           | LAMINEC/FACE |              |              |
|        |        |           | TECT/FORAMIN |              |              |
|        |        |           | ,LUMBAR 1    |              |              |
|        |        |           | SEG  DC      |              |              |
|        |        |           | LAMINEC/FACE |              |              |
|        |        |           | TECT/FORAMIN |              |              |
|        |        |           | ,EACH ADDNL  |              |              |
|        |        |           |  L4-5        |              |              |
|        |        |           | Transforamin |              |              |
|        |        |           | al Lumbar    |              |              |
|        |        |           | Interbody    |              |              |
|        |        |           | Fusion       |              |              |
+--------+--------+-----------+--------------+--------------+--------------+
 
 
 
 
 Encounter Details
 
 
+--------+-----------+----------------------+----------------------+-------------------+
| Date   | Type      | Department           | Care Team            | Description       |
+--------+-----------+----------------------+----------------------+-------------------+
| / | Hospital  |   [facility] |   Dreyer, Jason A,   | Gait abnormality  |
| 2017 - | Encounter |  [facility]    | DO  [address]    | (Primary Dx)      |
|        |           | [address]  | [place] [address]  [place] |                   |
| / |           | [place] [address] |  [id number] [phone] |                   |
|    |           |   [phone]       |   [phone] (Fax) |                   |
+--------+-----------+----------------------+----------------------+-------------------+
 
 
 
 Social History
 
 
+--------------+-------+-----------+--------+------+
| Tobacco Use  | Types | Packs/Day | Years  | Date |
|              |       |           | Used   |      |
+--------------+-------+-----------+--------+------+
| Never Smoker |       |           |        |      |
+--------------+-------+-----------+--------+------+
 
 
 
+---------------------+---+---+---+
| Smokeless Tobacco:  |   |   |   |
| Never Used          |   |   |   |
+---------------------+---+---+---+
 
 
 
+-------------+----------------------+---------+----------------------+
| Alcohol Use | Drinks/Week          | oz/Week | Comments             |
+-------------+----------------------+---------+----------------------+
| Yes         |   0 Standard drinks  | 0.0     | 1-2 drinks 2-4 times |
|             | or equivalent        |         |  per month           |
 
+-------------+----------------------+---------+----------------------+
 
 
 
+------------------+---------------+
| Sex Assigned at  | Date Recorded |
| Birth            |               |
+------------------+---------------+
| Not on file      |               |
+------------------+---------------+
 
 
 
+----------------+-------------+-------------+
| Job Start Date | Occupation  | Industry    |
+----------------+-------------+-------------+
| Not on file    | Not on file | Not on file |
+----------------+-------------+-------------+
 
 
 
+----------------+--------------+------------+
| Travel History | Travel Start | Travel End |
+----------------+--------------+------------+
 
 
 
+-------------------------------------+
| No recent travel history available. |
+-------------------------------------+
 documented as of this encounter
 
 Last Filed Vital Signs
 
 
+-------------------+---------------------+----------------------+----------+
| Vital Sign        | Reading             | Time Taken           | Comments |
+-------------------+---------------------+----------------------+----------+
| Blood Pressure    | 125/68              | 2017  7:40 AM  |          |
|                   |                     | PDT                  |          |
+-------------------+---------------------+----------------------+----------+
| Pulse             | 65                  | 2017  7:45 AM  |          |
|                   |                     | PDT                  |          |
+-------------------+---------------------+----------------------+----------+
| Temperature       | 37.5   C (99.5   F) | 2017  7:40 AM  |          |
|                   |                     | PDT                  |          |
+-------------------+---------------------+----------------------+----------+
| Respiratory Rate  | 16                  | 2017  7:40 AM  |          |
|                   |                     | PDT                  |          |
+-------------------+---------------------+----------------------+----------+
| Oxygen Saturation | 93%                 | 2017  7:45 AM  |          |
|                   |                     | PDT                  |          |
+-------------------+---------------------+----------------------+----------+
| Inhaled Oxygen    | -                   | -                    |          |
| Concentration     |                     |                      |          |
+-------------------+---------------------+----------------------+----------+
| Weight            | 102.5 kg (226 lb)   | 2017  9:36 AM  |          |
|                   |                     | PDT                  |          |
+-------------------+---------------------+----------------------+----------+
| Height            | 190.5 cm (6' 3")    | 2017  9:36 AM  |          |
 
|                   |                     | PDT                  |          |
+-------------------+---------------------+----------------------+----------+
| Body Mass Index   | 28.25               | 2017  9:36 AM  |          |
|                   |                     | PDT                  |          |
+-------------------+---------------------+----------------------+----------+
 documented in this encounter
 
 Discharge Summaries
 Myke Jean PA-C - 2017  7:59 AM PDTFormatting of this note might be diffe
rent from the original.
 DISCHARGE SUMMARY
 
 Pt. Name/Age/:  Deni Hampton    67 y.o.   1950       
 Medical Record Number:  [id number]
 Date of Admission:  2017       
 Date of Discharge:  8/3/2017
 
 Admitting Physician:  Jason A Dreyer, DO         PCP:  Ck Michelle
 Discharging Physician: Myke Jean PA-C 
      
 
 Primary Discharge Dx: 
 1. Spondylosis L4-5. 
 2. Stenosis L4-5.
 3. Lumbar radiculopathy. 
 4. Lumbago. 
 5. History of laminectomy L4-5.
 6. Left foot drop.
 Secondary Discharge Dx:  
 Patient Active Problem List 
 Diagnosis 
   Lumbago with sciatica, right side 
   Low back pain 
   Atherosclerosis 
   Osteoarthritis of spine with radiculopathy, lumbar region - 2017 
   Beta Blockers - Daily Use 
   BPH (benign prostatic hyperplasia) 
   LAURA (obstructive sleep apnea) - H/O CPAP Use 
   H/O TKA Total knee arthroplasty, BILATERAL 
   H/O LEFT Ankle fusion 
   H/O Lumbar discectomy L1-2, L2-3 -  
   Anticipated difficulty with intubation 
  
 
 Reason for Admission (Brief):  Mr. Hampton is a 67-year-old man, who presents with signs and 
symptoms and radiographic evidence of back pain, leg pain, and leg weakness. He previously u
nderwent laminectomy by Dr. Paredes in . MRI and x-rays of the lumbar spine revealed spo
ndylosis and stenosis at L4-5. Given the progression of his symptoms, the patient decided to
 proceed with transforaminal lumbar interbody fusion, L4-5.  
  
 Hospital Course, including Complications: 
 On the day of admission the patient was admitted to [facility] and underwent a  L4-5 f
usion .  Patient was transferred to PACU and then to the neurosurgical floor.  In brief, the
 hospital stay was uncomplicated, the patient mobilized well with physical therapy and occup
ational therapy.  There were no cardiac issues, pulmonary issues, evidence of DVT or infecti
on. Appropriate discharge plans were made in line with his progress and mobility and he was 
ultimately discharged to home.
 
 Medications Reconciled upon Discharge are: 
  
 
 Discharge Medications 
  
 New Medications  
   Details 
 diazePAM 5 mg tablet
  Take 1 tablet by mouth every 6 hours as needed for Muscle spasms.
 aka:  VALIUM
  
 HYDROcodone-acetaminophen  mg per tablet
 Replaces:  HYDROcodone-acetaminophen 5-325 mg per tablet
  Take 1-2 tablets by mouth every 4 hours as needed for Pain.
 aka:  NORCO
  
 lactulose 10 g/15 mL solution
  Take 30 mLs by mouth Daily as needed for Constipation.
  
  
 Unchanged Medications  
   Details 
 ALLEGRA-D ALLERGY & CONGESTION  MG per tablet
 Generic drug:  fexofenadine-pseudoePHEDrine
  Take 1 tablet by mouth Twice  daily as needed.
  
 ASCORBIC ACID PO
  Take  by mouth.
  
 atorvaSTATin 40 mg tablet
  Take 40 mg by mouth Daily.
 aka:  LIPITOR
  
 cholecalciferol 1,000 units tablet
  Take 1,000 Units by mouth Daily.
 aka:  VITAMIN D-3
  
 FLUoxetine 20 mg capsule
  Take 20 mg by mouth Daily.
 aka:  PROzac
  
 fluticasone 50 mcg/nasal spray
  1 spray by Nasal route Daily.
 aka:  FLONASE
  
 metoprolol succinate 25 mg 24 hr tablet
  Take 25 mg by mouth Daily.
 aka:  TOPROL-XL
  
 vitamin B complex tablet
  Take 1 tablet by mouth Daily.
  
  
 Discontinued Medications  
 aspirin 81 mg EC tablet
  
 HYDROcodone-acetaminophen 5-325 mg per tablet
 aka:  NORCO
 Replaced by:  HYDROcodone-acetaminophen  mg per tablet
  
 naproxen 250 mg tablet
 aka:  NAPROSYN
  
 
  
 
  
 
 Condition on Discharge: Stable
 
 Disposition:  Patient was discharged to home
 
 Follow-Up Plans:   
     Follow-up with:  Dr. Dreyer's office in 4 weeks
     Follow-up with primary care physician as needed.
     Diet: Resume regular diet
     Activity:  Continue to follow guidelines and precautions as previously discussed.
     Brace: B brace
   
 
 Electronically signed by:    Myke Jean, 8/3/2017 7:59 
 [facility]
 Electronically signed by yMke Jean PA-C at 2017  8:55 AM PDTdocumented in
 this encounter
 
 Discharge Instructions
 Instructions Myke Jean PA-C - 2017Discharge Instructions for Lumbar Fusi
on
 You had a lumbar fusion. During this procedure, your doctor locked together (fused) some of
 the bones in your spine. This limits the movement of these bones to help relieve your pain.
Here  s what you need to know about home care following a spinal fusion.
 Activity
  Arrange your household to keep the items you need within reach.
  Remove electrical cords, throw rugs, and anything else that may cause you to fall.
  Use a walkeror handrails until your balance, flexibility, and strength improve. And re
member to ask for help from others when you need it.
  Free up your hands so that you can use them to keep balance. Use a woody pack, apron, or
 pockets to carry things. Be sure not to carry too much at once.
  Don  t bend or twist at the waist, or raise your hands over your head for the first two
 weeks after your surgery.
  Don  t lift anything heavier than 5 pounds for the first four weeks after surgery.
  Don  t sit for more than30 to 45 minutes at a time. Take frequent short walks. They a
re the key to your recovery. As your back feels better please gradually increase the distanc
e you walk as discussed with your provider. 
  Don  t drive until your doctor says it  s OK. And never drive while you are taking opi
oid pain medication.
  Nap if you are tired, but don  t stay in bed all day.
  Use chairs with arms. The arms make it easier for you to stand up and sit down.
  If you have not yet received instructions about physical therapy, ask your doctor about 
them.
 Incision care
  Check your incision daily for redness, tenderness, or drainage.
  Don  t soak your wound in water (no hot tubs, bathtubs, swimming pools) until your doct
or says it  s OK.
  As long as you keep your incision dry you can shower as desired. After 5 days you may le
t shower water run over the incision but do not submerse the incision under water until afte
r you see your provider. Gently pat the incision dry. Don  t rub it, or apply creams or lot
ions. And if you feel unsteady while standing to shower, use a shower stool or chair.
 Other home care
  Use nonslip bath mats, grab bars, an elevated toilet seat, and a shower chair in your ba
throom.
  Take your medication exactly as directed.
  Don  t take nonsteroidal anti-inflammatory medications (NSAIDs), such as ibuprofen. The
y may delay or prevent proper fusion of the spine.
 
  If you smoke, stop! This will be one of the most important things you can do to help you
 recover from surgery.
  Wear your back brace, if one was prescribed, as directed by your doctor.
 Follow-up
  Most patients will be seen approximately 4 weeks after surgery. Be sure to get your 1 mo
nth post op x-rays prior to your 1 month post op appointment before your appointment. 
 
  [id number] [employer]. [address] [place] [address]. All righ
ts reserved. This information is not intended as a substitute for professional medical care.
 Always follow your healthcare professional's instructions.
 
 
 documented in this encounter
 
 Medications at Time of Discharge
 
 
+----------------------+----------------------+-----------+---------+----------+-----------+
| Medication           | Sig                  | Dispensed | Refills | Start    | End Date  |
|                      |                      |           |         | Date     |           |
+----------------------+----------------------+-----------+---------+----------+-----------+
|   atorvaSTATin       | Take 40 mg by mouth  |           | 0       | 20 |           |
| (LIPITOR) 40 mg      | Daily.               |           |         | 15       |           |
| tablet               |                      |           |         |          |           |
+----------------------+----------------------+-----------+---------+----------+-----------+
|   B Complex Vitamins | Take 1 tablet by     |           | 0       |          |           |
|  (VITAMIN B COMPLEX) | mouth Daily.         |           |         |          |           |
|  tablet              |                      |           |         |          |           |
+----------------------+----------------------+-----------+---------+----------+-----------+
|   fluticasone        | 1 spray by Nasal     |           | 0       |          |           |
| (FLONASE) 50         | route Daily.         |           |         |          |           |
| mcg/nasal spray      |                      |           |         |          |           |
+----------------------+----------------------+-----------+---------+----------+-----------+
|   metoprolol         | Take 25 mg by mouth  |           | 0       | 20 |           |
| succinate            | Daily.               |           |         | 17       |           |
| (TOPROL-XL) 25 mg 24 |                      |           |         |          |           |
|  hr tablet           |                      |           |         |          |           |
+----------------------+----------------------+-----------+---------+----------+-----------+
|   ASCORBIC ACID PO   | Take  by mouth.      |           | 0       |          |  |
|                      |                      |           |         |          | 0         |
+----------------------+----------------------+-----------+---------+----------+-----------+
|   cholecalciferol    | Take 1,000 Units by  |           | 0       |          |  |
| (VITAMIN D-3) 1,000  | mouth Daily.         |           |         |          | 0         |
| units tablet         |                      |           |         |          |           |
+----------------------+----------------------+-----------+---------+----------+-----------+
|   diazePAM (VALIUM)  | Take 1 tablet by     |   90      | 0       | 20 | 11/15/201 |
| 5 mg tablet          | mouth every 6 hours  | tablet    |         | 17       | 7         |
|                      | as needed for Muscle |           |         |          |           |
|                      |  spasms.             |           |         |          |           |
+----------------------+----------------------+-----------+---------+----------+-----------+
|                      | Take 1 tablet by     |           | 0       |          |  |
| fexofenadine-pseudoe | mouth Twice  daily   |           |         |          | 0         |
| PHEDrine (ALLEGRA-D  | as needed.           |           |         |          |           |
| ALLERGY &            |                      |           |         |          |           |
| CONGESTION)    |                      |           |         |          |           |
| MG per tablet        |                      |           |         |          |           |
+----------------------+----------------------+-----------+---------+----------+-----------+
|   FLUoxetine         | Take 20 mg by mouth  |           | 0       |          |  |
| (PROZAC) 20 mg       | Daily.               |           |         |          | 0         |
| capsule              |                      |           |         |          |           |
 
+----------------------+----------------------+-----------+---------+----------+-----------+
|                      | Take 1-2 tablets by  |   120     | 0       | 20 | 11/15/201 |
| HYDROcodone-acetamin | mouth every 4 hours  | tablet    |         | 17       | 7         |
| ophen (NORCO)  | as needed for Pain.  |           |         |          |           |
|  mg per tablet       |                      |           |         |          |           |
+----------------------+----------------------+-----------+---------+----------+-----------+
|   lactulose 10 g/15  | Take 30 mLs by mouth |   240 mL  | 2       | 20 | 11/15/201 |
| mL solution          |  Daily as needed for |           |         | 17       | 7         |
|                      |  Constipation.       |           |         |          |           |
+----------------------+----------------------+-----------+---------+----------+-----------+
 documented as of this encounter
 
 Progress Notes
 Sara Hwang RN - 2017 11:52 AM PDTDischarge home with walker accompanied by kaz ruiz with discharge instructions and RX's denies any questions at this time.Electronically sig
ryan by: Sara Hwang RN 8/3/2017 11:53
 Electronically signed by Sara Hwang RN at 2017 11:53 AM Sara Oropeza RN -
 2017 10:50 AM PDTAVS and RX's given and explained to Ruben. Acknowledged understanding 
all questions answered. PIV is dc'd and MAXIME is out.Electronically signed by: Sara Hwang RN 8/3/2017 10:51
 Electronically signed by Sara Hwang RN at 2017 10:51 AM Myke Chow PA-C - 2017  7:33 AM PDTFormatting of this note might be different from the original
.
 [facility]
 
 NEUROSURGERY PROGRESS NOTE 
 
 PATIENT NAME: Deni Hampton
 AGE: 67 y.o.
 DATE OF SERVICE: 8/3/2017 7:33
 
 S: The patient is improving this AM. He has no new issues since yesterday. Complains of steffanie
n in the low back under control with medicine. Walking well. Voiding, passing flatus. Feels 
comfortable going home today. 
 
 O:
 
 CURRENT MEDICATIONS: 
 Current Facility-Administered Medications 
 Medication Dose Route Frequency Provider Last Rate Last Dose 
   acetaminophen (TYLENOL) tablet 650 mg  650 mg Oral Q4H PRN Myke Jean PA-C  
   
   atorvaSTATin (LIPITOR) tablet 40 mg  40 mg Oral Daily Myke Jean PA-C   40 m
g at 17 
   bisacodyl (DULCOLAX) suppository 10 mg  10 mg Rectal Daily PRN MAYCOL Hernandez     
   calcium carbonate (TUMS) chewable tablet 1,000 mg  1,000 mg Oral Q2H PRN Myke Jean PA-C     
   diazePAM (VALIUM) injection 2.5-5 mg  2.5-5 mg Intravenous Q6H PRN Myke Jean PA-C     
   diazePAM (VALIUM) tablet 5 mg  5 mg Oral Q6H PRN Myke Jean PA-C     
   diphenhydrAMINE (BENADRYL) injection 12.5 mg  12.5 mg Intravenous Q4H PRN Myke Jean PA-C     
  Or 
   diphenhydrAMINE (BENADRYL) tablet 25 mg  25 mg Oral Q4H PRN Myke Jean PA-C 
    
  Or 
   diphenhydrAMINE (BENADRYL) 12.5 mg/5 mL liquid 25 mg  25 mg Oral Q4H PRN Myke Jean PA-C     
   docusate sodium (COLACE) capsule 100 mg  100 mg Oral BID Myke Jean PA-C   1
 
00 mg at 172 
   enalaprilat (VASOTEC) injection 1.25 mg  1.25 mg Intravenous Q6H PRN Myke andrew PA-C     
   FLUoxetine (PROzac) capsule 20 mg  20 mg Oral Daily Myke Jean PA-C   20 mg 
at 17 0911 
   fluticasone (FLONASE) 50 mcg/nasal spray 1 spray  1 spray Nasal Daily Myke cunha PA-C   1 spray at 17 0911 
   HYDROcodone-acetaminophen (NORCO)  mg per tablet 1-2 tablet  1-2 tablet Oral Q4H 
PRN Myke Jean PA-C   1 tablet at 17 2132 
   labetalol (TRANDATE) 5 mg/mL injection 10 mg  10 mg Intravenous Q10 Min PRN Myke Molina PA-C     
   lactulose liquid 30 mL  30 mL Oral Daily PRN Myke Jean PA-C     
   magnesium hydroxide (MILK OF MAGNESIA) 400 mg/5 mL suspension 30 mL  30 mL Oral BID PRN
 Myke Jean PA-C   30 mL at 17 0700 
   menthol (HALLS COUGH DROP) lozenge 1 lozenge  1 lozenge Buccal Q2H PRN Myke kingsley PA-C     
   methocarbamol (ROBAXIN) tablet 750 mg  750 mg Oral Q8H PRN MAYCOL Hernandez-NAHED  
   
   metoclopramide (REGLAN) tablet 10 mg  10 mg Oral Q4H PRN Myke Jean PA-C    
 
   metoprolol succinate (TOPROL-XL) ER tablet 25 mg  25 mg Oral Daily Myke Jean PA-C   25 mg at 17 0911 
   morphine injection 1-2 mg  1-2 mg Intravenous Q1H PRN Myke Jean PA-C     
   ondansetron (ZOFRAN ODT) disintegrating tablet 4 mg  4 mg Oral Q6H PRN Myke Valdez
pferJUNI     
   ondansetron (ZOFRAN) injection 4 mg  4 mg Intravenous Q6H PRN RAJ Hernandez     
   phenol (CHLORASEPTIC) spray 1-2 spray  1-2 spray Mouth/Throat Q3H PRN Myke cunha PA-C     
   polyethylene glycol (MIRALAX) powder 17 g  17 g Oral Daily PRN MAYCOL Hernandez     
   prochlorperazine (COMPAZINE) tablet 5 mg  5 mg Oral Q6H PRN Myke Jean PA-C 
    
   senna (SENOKOT) tablet 8.6 mg  8.6 mg Oral BID Myke Jean PA-C   8.6 mg at 0
17 2132 
   sodium chloride 0.9% (NS) infusion   Intravenous Continuous Myke Jean PA-C 
50 mL/hr at 17 0522   
 
 ALLERGIES: 
 Allergies 
 Allergen Reactions 
   Meperidine Nausea And Vomiting 
   Oxycodone Other (See Comments) 
   Hallucinations & sleepiness 
 
 PHYSICAL EXAMINATION:
 Temp:  [37.2 C (99 F)-37.3 C (99.1 F)] 37.2 C (99 F)
 Pulse:  [66-73] 66
 Resp:  [16] 16
 BP: (115-130)/(64-68) 130/68
 
 Intake/Output Summary (Last 24 hours) at 17 0733
 Last data filed at 17 0335
  Gross per 24 hour 
 Intake             1810 ml 
 Output             1400 ml 
 Net              410 ml 
 
 GENERAL: Deni Hampton is in no acute distress with unlabored respirations.   
 HEENT:
 
 HEAD/FACE:
 EYES:
  
 Normocephalic and atraumatic.  There are no areas of recent trauma.  
 Normal sclerae without icterus.   
 CHEST: Clear.  
 HEART: Regular. 
 EXTREMITIES: No edema or swelling.  SCD's 
 BACK: The back incisions are dress and a drain is in place with expected output. 
 NEUROLOGICAL EXAM:
 
 MENTAL STATUS: 
 The patient is awake, alert, and oriented.  
 He follows simple and complex commands.
 He speech is fluent, his comprehends speech well, and his repeats well.  
 He has no apparent deficits with short or long term memory. 
 MOTOR EXAM: Motor strength is improved 
 SENSORY EXAM: Sensory exam is improved 
 
 24 HOUR LABS:
 All Component Based Labs  
    17
 1005   
  ABO O  
  Antibody Screen Negative  
  Rh Type Positive  
  
 
 ASSESSMENT:
 NEUROSURGICAL DIAGNOSES:
 S/p lumbar fusion
 
 HOSPITAL/GENERAL DIAGNOSES:
 Past Medical History: 
 Diagnosis Date 
   Allergic rhinitis  
   Anxiety disorder  
   Atherosclerosis  
   BPH (benign prostatic hyperplasia)  
   HLD (hyperlipidemia)  
   Internal derangement of knee  
   Low back pain  
   Lumbago with sciatica, right side  
   Osteoarthritis, hand  
   Osteoarthrosis, unspecified whether generalized or localized, unspecified site  
   Sleep apnea  
  uses CPAP 
   Unspecified disorder of nose and nasal sinuses  
 
 PLAN:
 
 S/p lumbar fusion, Hospital day 3
 - Neurologically stable and pain control is appropriate. 
 - I removed MAXIME drain today and tied stitch in place. 
 - Medically stable
 - Mobilize, PT/OT
 - SCD's
 - Working on BM/bowel function.  Encouraged activity and medications to assist
 - Disp: Home today
 
 
 ELECTRONICALLY SIGNED BY:  Myke Jean PA-C,  8/3/2017  7:33Electronically signed by
 Myke Jean PA-C at 2017  7:54 AM Myke Chow PA-C - 20  7:41 AM PDTFormatting of this note might be different from the original.
 [facility]
 
 NEUROSURGERY PROGRESS NOTE 
 
 PATIENT NAME: Deni Hampton
 AGE: 67 y.o.
 DATE OF SERVICE: 2017 7:45
 
 S: The patient is doing well this AM. He walked with therapy yesterday without issue. Pain 
under control with medicine. Voiding without issue. No flatus, No BM. 
 
 O:
 
 CURRENT MEDICATIONS: 
 Current Facility-Administered Medications 
 Medication Dose Route Frequency Provider Last Rate Last Dose 
   acetaminophen (TYLENOL) tablet 650 mg  650 mg Oral Q4H PRN Myke Jean PA-C  
   
   atorvaSTATin (LIPITOR) tablet 40 mg  40 mg Oral Daily Myke Jean PA-C   40 m
g at 17 
   bisacodyl (DULCOLAX) suppository 10 mg  10 mg Rectal Daily PRN MAYCOL Hernandez     
   calcium carbonate (TUMS) chewable tablet 1,000 mg  1,000 mg Oral Q2H PRN Myke Jean PA-C     
   diazePAM (VALIUM) injection 2.5-5 mg  2.5-5 mg Intravenous Q6H PRN Myke Jean PA-C     
   diazePAM (VALIUM) tablet 5 mg  5 mg Oral Q6H PRN Myke Jean PA-C     
   diphenhydrAMINE (BENADRYL) injection 12.5 mg  12.5 mg Intravenous Q4H PRN Myke Jean PA-C     
  Or 
   diphenhydrAMINE (BENADRYL) tablet 25 mg  25 mg Oral Q4H PRN Myke Jean PA-C 
    
  Or 
   diphenhydrAMINE (BENADRYL) 12.5 mg/5 mL liquid 25 mg  25 mg Oral Q4H PRN Myke Jean PA-C     
   docusate sodium (COLACE) capsule 100 mg  100 mg Oral BID Myke Jean PA-C   1
00 mg at 17 
   enalaprilat (VASOTEC) injection 1.25 mg  1.25 mg Intravenous Q6H PRN Myke andrew PA-C     
   FLUoxetine (PROzac) capsule 20 mg  20 mg Oral Daily Myke Jean PA-C   20 mg 
at 17 0858 
   fluticasone (FLONASE) 50 mcg/nasal spray 1 spray  1 spray Nasal Daily Myke cunha PA-C   1 spray at 17 0859 
   HYDROcodone-acetaminophen (NORCO)  mg per tablet 1-2 tablet  1-2 tablet Oral Q4H 
PRN Myke Jean PA-C   1 tablet at 17 
   labetalol (TRANDATE) 5 mg/mL injection 10 mg  10 mg Intravenous Q10 Min PRN Myke Molina PA-C     
   lactulose liquid 30 mL  30 mL Oral Daily PRN Myke Jean PA-C     
   magnesium hydroxide (MILK OF MAGNESIA) 400 mg/5 mL suspension 30 mL  30 mL Oral BID PRN
 Myke Jean PA-C     
   menthol (HALLS COUGH DROP) lozenge 1 lozenge  1 lozenge Buccal Q2H PRN Myke kingsley PA-C     
   methocarbamol (ROBAXIN) tablet 750 mg  750 mg Oral Q8H PRN Myke Jean PA-C  
   
   metoclopramide (REGLAN) tablet 10 mg  10 mg Oral Q4H PRN Myke Jean PA-C    
 
   metoprolol succinate (TOPROL-XL) ER tablet 25 mg  25 mg Oral Daily Myke Jean PA-C   25 mg at 17 0858 
   morphine injection 1-2 mg  1-2 mg Intravenous Q1H PRN Myke Jean PA-C     
   ondansetron (ZOFRAN ODT) disintegrating tablet 4 mg  4 mg Oral Q6H PRN Myke kingsley PA-C     
   ondansetron (ZOFRAN) injection 4 mg  4 mg Intravenous Q6H PRN RAJ Hernandez     
   phenol (CHLORASEPTIC) spray 1-2 spray  1-2 spray Mouth/Throat Q3H PRN Myke cunha PA-C     
   polyethylene glycol (MIRALAX) powder 17 g  17 g Oral Daily PRN MAYCOL Hernandez     
   prochlorperazine (COMPAZINE) tablet 5 mg  5 mg Oral Q6H PRN Myke Jean PA-C 
    
   senna (SENOKOT) tablet 8.6 mg  8.6 mg Oral BID Myke Jean PA-C   8.6 mg at 0
17 
   sodium chloride 0.9% (NS) infusion   Intravenous Continuous Myke Jean PA-C 
50 mL/hr at 17 0522   
 
 ALLERGIES: 
 Allergies 
 Allergen Reactions 
   Meperidine Nausea And Vomiting 
   Oxycodone Other (See Comments) 
   Hallucinations & sleepiness 
 
 PHYSICAL EXAMINATION:
 Temp:  [36.4 C (97.5 F)-37.8 C (100 F)] 37.7 C (99.8 F)
 Pulse:  [62-75] 69
 Resp:  [16-18] 16
 BP: ()/(53-59) 103/55
 
 Intake/Output Summary (Last 24 hours) at 17 0745
 Last data filed at 17 0700
  Gross per 24 hour 
 Intake             2442 ml 
 Output             3640 ml 
 Net            -1198 ml 
 
 GENERAL: Deni Hampton is in no acute distress with unlabored respirations.   
 HEENT:
 HEAD/FACE:
 EYES:
  
 Normocephalic and atraumatic.  There are no areas of recent trauma.  
 Normal sclerae without icterus.   
 CHEST: Clear.  
 HEART: Regular. 
 EXTREMITIES: No edema or swelling.  SCD's 
 BACK: The back incisions are dress and a drain is in place with expected output. 
 NEUROLOGICAL EXAM:
 
 MENTAL STATUS: 
 The patient is awake, alert, and oriented.  
 He follows simple and complex commands.
 He speech is fluent, his comprehends speech well, and his repeats well.  
 He has no apparent deficits with short or long term memory. 
 MOTOR EXAM: Motor strength is improved 
 SENSORY EXAM: Sensory exam is improved 
 
 24 HOUR LABS:
 All Component Based Labs  
 
    17
 1005   
  ABO O  
  Antibody Screen Negative  
  Rh Type Positive  
  
 
 ASSESSMENT:
 NEUROSURGICAL DIAGNOSES:
 S/p lumbar fusion
 
 HOSPITAL/GENERAL DIAGNOSES:
 Past Medical History: 
 Diagnosis Date 
   Allergic rhinitis  
   Anxiety disorder  
   Atherosclerosis  
   BPH (benign prostatic hyperplasia)  
   HLD (hyperlipidemia)  
   Internal derangement of knee  
   Low back pain  
   Lumbago with sciatica, right side  
   Osteoarthritis, hand  
   Osteoarthrosis, unspecified whether generalized or localized, unspecified site  
   Sleep apnea  
  uses CPAP 
   Unspecified disorder of nose and nasal sinuses  
 
 PLAN:
 
 S/p lumbar fusion, Hospital day 2
 - Neurologically stable and pain control is appropriate. 
 -Take suction off MAXIME drain this AM. Plan to remove tomorrow
 - Medically stable
 - Mobilize, PT/OT
 - SCD's
 - Working on BM/bowel function.  Encouraged activity and medications to assist
 - Disp: Likely home tomorrow. 
 
 ELECTRONICALLY SIGNED BY:  Myke Jean PA-C,  2017  7:45Electronically signed by
 Myke Jean PA-C at 2017  7:45 AM Myke Chow PA-C - 20  7:28 AM PDTFormatting of this note might be different from the original.
 [facility]
 
 NEUROSURGERY PROGRESS NOTE 
 
 PATIENT NAME: Deni Hampton
 AGE: 67 y.o.
 DATE OF SERVICE: 2017 7:28
 
 S: The patient is doing well this AM. He complains of manageable pain in his low back. His 
preoperative burning pain seems to be improved. He has not walked much yet.  Voiding without
 issue. No flatus, no BM. 
 
 O:
 
 CURRENT MEDICATIONS: 
 Current Facility-Administered Medications 
 Medication Dose Route Frequency Provider Last Rate Last Dose 
   acetaminophen (TYLENOL) tablet 650 mg  650 mg Oral Q4H PRN Myke Jean PA-C  
 
   
   atorvaSTATin (LIPITOR) tablet 40 mg  40 mg Oral Daily Myke Jean PA-C     
   bisacodyl (DULCOLAX) suppository 10 mg  10 mg Rectal Daily PRN MAYCOL Hernandez     
   calcium carbonate (TUMS) chewable tablet 1,000 mg  1,000 mg Oral Q2H PRN Myke Jean PA-C     
   diazePAM (VALIUM) injection 2.5-5 mg  2.5-5 mg Intravenous Q6H PRN Myke Jean PA-C     
   diazePAM (VALIUM) tablet 5 mg  5 mg Oral Q6H PREREN Jean PA-C     
   diphenhydrAMINE (BENADRYL) injection 12.5 mg  12.5 mg Intravenous Q4H PRN Myke Jean PA-C     
  Or 
   diphenhydrAMINE (BENADRYL) tablet 25 mg  25 mg Oral Q4H PRN Myke Jean PA-C 
    
  Or 
   diphenhydrAMINE (BENADRYL) 12.5 mg/5 mL liquid 25 mg  25 mg Oral Q4H PRN Myke Jean PA-C     
   docusate sodium (COLACE) capsule 100 mg  100 mg Oral BID Myke Jean PA-C   1
00 mg at 17 
   enalaprilat (VASOTEC) injection 1.25 mg  1.25 mg Intravenous Q6H PRN Myke Vinny Kupf
er, PA-C     
   FLUoxetine (PROzac) capsule 20 mg  20 mg Oral Daily Myke Jean, PA-NAHED     
   fluticasone (FLONASE) 50 mcg/nasal spray 1 spray  1 spray Nasal Daily Myke cunha, PA-NAHED     
   HYDROcodone-acetaminophen (NORCO)  mg per tablet 1-2 tablet  1-2 tablet Oral Q4H 
PRN Myke Jean, PA-C   1 tablet at 17 0519 
   labetalol (TRANDATE) 5 mg/mL injection 10 mg  10 mg Intravenous Q10 Min PRN Myke Molina, JUNI     
   lactulose liquid 30 mL  30 mL Oral Daily PRN Myke Jean, JUNI     
   magnesium hydroxide (MILK OF MAGNESIA) 400 mg/5 mL suspension 30 mL  30 mL Oral BID PRN
 Myke Jean, JUNI     
   menthol (HALLS COUGH DROP) lozenge 1 lozenge  1 lozenge Buccal Q2H PRN Myke kingsley, JUNI     
   methocarbamol (ROBAXIN) tablet 750 mg  750 mg Oral Q8H PRN Myke Jean, PA-NAHED  
   
   metoclopramide (REGLAN) tablet 10 mg  10 mg Oral Q4H PRN Myke Jean, JUNI    
 
   metoprolol succinate (TOPROL-XL) ER tablet 25 mg  25 mg Oral Daily Myke Jean
, JUNI     
   morphine injection 1-2 mg  1-2 mg Intravenous Q1H PRN Myke Jean, JUNI     
   ondansetron (ZOFRAN ODT) disintegrating tablet 4 mg  4 mg Oral Q6H PRN Myke kingsley, PA-NAHED     
   ondansetron (ZOFRAN) injection 4 mg  4 mg Intravenous Q6H PRN Myke Jean, RAJ DOCKERY     
   phenol (CHLORASEPTIC) spray 1-2 spray  1-2 spray Mouth/Throat Q3H PRN Myke cunha, JUNI     
   polyethylene glycol (MIRALAX) powder 17 g  17 g Oral Daily PRN Myke Jean, PA
-NAHED     
   prochlorperazine (COMPAZINE) tablet 5 mg  5 mg Oral Q6H PRN Myke Jean, PA-C 
    
   senna (SENOKOT) tablet 8.6 mg  8.6 mg Oral BID Myke Jean PA-C   8.6 mg at 0
17 
   sodium chloride 0.9% (NS) infusion   Intravenous Continuous Myke Jean PA-C 
50 mL/hr at 17 0522   
 
 ALLERGIES: 
 Allergies 
 Allergen Reactions 
   Meperidine Nausea And Vomiting 
   Oxycodone Other (See Comments) 
 
   Hallucinations & sleepiness 
 
 PHYSICAL EXAMINATION:
 Temp:  [36 C (96.8 F)-36.5 C (97.7 F)] 36.1 C (96.9 F)
 Pulse:  [60-83] 68
 Resp:  [10-18] 16
 BP: ()/(53-84) 111/54
 
 Intake/Output Summary (Last 24 hours) at 17 0728
 Last data filed at 17 0600
  Gross per 24 hour 
 Intake             3553 ml 
 Output             1508 ml 
 Net             2045 ml 
 
 GENERAL: Deni Hampton is in no acute distress with unlabored respirations.   
 HEENT:
 HEAD/FACE:
 EYES:
  
 Normocephalic and atraumatic.  There are no areas of recent trauma.  
 Normal sclerae without icterus.   
 CHEST: Clear.  
 HEART: Regular. 
 EXTREMITIES: No edema or swelling.  SCD's 
 BACK: The back incisions are dress and a drain is in place with expected output. 
 NEUROLOGICAL EXAM:
 
 MENTAL STATUS: 
 The patient is awake, alert, and oriented.  
 He follows simple and complex commands.
 He speech is fluent, his comprehends speech well, and his repeats well.  
 He has no apparent deficits with short or long term memory. 
 MOTOR EXAM: Motor strength is improved 
 SENSORY EXAM: Sensory exam is improved 
 
 24 HOUR LABS:
 All Component Based Labs  
    17
 1005   
  ABO O  
  Antibody Screen Negative  
  Rh Type Positive  
  
 
 ASSESSMENT:
 NEUROSURGICAL DIAGNOSES:
 S/p lumbar fusion
 
 HOSPITAL/GENERAL DIAGNOSES:
 Past Medical History: 
 Diagnosis Date 
   Allergic rhinitis  
   Anxiety disorder  
   Atherosclerosis  
   BPH (benign prostatic hyperplasia)  
   HLD (hyperlipidemia)  
   Internal derangement of knee  
   Low back pain  
   Lumbago with sciatica, right side  
 
   Osteoarthritis, hand  
   Osteoarthrosis, unspecified whether generalized or localized, unspecified site  
   Sleep apnea  
  uses CPAP 
   Unspecified disorder of nose and nasal sinuses  
 
 PLAN:
 
 S/p lumbar fusion, Hospital day 1
 - Neurologically stable and pain control is appropriate.
 - Medically stable
 - Mobilize, PT/OT
 - SCD's
 - Working on BM/bowel function.  Encouraged activity and medications to assist
 - Drain output is as expected.  Continue drain
 - Disp: Likely home in 1-2 days
 
 ELECTRONICALLY SIGNED BY:  Myke Jean PA-C,  2017  7:28
 Electronically signed by Myke Jean PA-C at 2017  7:33 AM PDTdocumented in
 this encounter
 
 Plan of Treatment
 
 
+--------+---------+-----------+----------------------+-------------+
| Date   | Type    | Specialty | Care Team            | Description |
+--------+---------+-----------+----------------------+-------------+
| / | Office  | Neurology |   Ravnidra Munoz MD  [address] |             |
|    | Visit   |           |  [address]      |             |
|        |         |           | [place]  |             |
|        |         |           | [place] [address]             |             |
|        |         |           | [phone]         |             |
|        |         |           | [phone] (Fax)   |             |
+--------+---------+-----------+----------------------+-------------+
 
 
 
+-------------+------+--------+----------------------+----------------------+
| Name        | Type | Priori | Associated Diagnoses | Order Schedule       |
|             |      | ty     |                      |                      |
+-------------+------+--------+----------------------+----------------------+
| DME: Walker | DME  | Routin |   Gait abnormality   | DME 1 Time for 1     |
|             |      | e      |                      | Occurrences starting |
|             |      |        |                      |  2017 until    |
|             |      |        |                      | 2017           |
+-------------+------+--------+----------------------+----------------------+
 documented as of this encounter
 
 Procedures
 
 
+----------------------+--------+-------------+----------------------+----------------------
+
| Procedure Name       | Priori | Date/Time   | Associated Diagnosis | Comments             
|
|                      | ty     |             |                      |                      
|
+----------------------+--------+-------------+----------------------+----------------------
+
| XR LUMBAR SPINE 2 OR | STAT   | 2017  |                      |   Results for this   
 
|
|  3 VW                |        |  3:45 PM    |                      | procedure are in the 
|
|                      |        | PDT         |                      |  results section.    
|
+----------------------+--------+-------------+----------------------+----------------------
+
| FL TORI STATS NO     | Routin | 2017  |                      |   Results for this   
|
| CHARGE               | e      |  1:46 PM    |                      | procedure are in the 
|
|                      |        | PDT         |                      |  results section.    
|
+----------------------+--------+-------------+----------------------+----------------------
+
| LAMINECTOMY          |        | 2017  |   Osteoarthritis of  |                      
|
| PLIF/TLIF            |        | 11:54 AM    | spine with           |                      
|
| INSTRUMENTATION      |        | PDT         | radiculopathy,       |                      
|
|                      |        |             | lumbar region        |                      
|
|                      |        |             | (M47.26), Spinal     |                      
|
|                      |        |             | stenosis, lumbar     |                      
|
|                      |        |             | region, without      |                      
|
|                      |        |             | neurogenic           |                      
|
|                      |        |             | claudication         |                      
|
|                      |        |             | (M48.06), Lumbar     |                      
|
|                      |        |             | radiculopathy        |                      
|
|                      |        |             | (M54.16), Chronic    |                      
|
|                      |        |             | left-sided low back  |                      
|
|                      |        |             | pain with left-sided |                      
|
|                      |        |             |  sciatica (M54.42,   |                      
|
|                      |        |             | G89.29),   Status    |                      
|
|                      |        |             | post laminectomy     |                      
|
|                      |        |             | (Z98.890)            |                      
|
+----------------------+--------+-------------+----------------------+----------------------
+
 
 
 
+---+--------+
|   |   Case |
|   |  Notes |
|   |        |
 
|   | Origin |
|   | al     |
|   | Reques |
|   | t      |
|   | Inform |
|   | ation  |
|   | Sent   |
|   | Over   |
|   | / |
|   | :I |
|   | nstrum |
|   | ents/S |
|   | pecial |
|   |        |
|   | Equipm |
|   | ent:   |
|   | Drill, |
|   |        |
|   | Micros |
|   | cope,  |
|   | Metrx, |
|   |        |
|   | O-Arm, |
|   |        |
|   | Stealt |
|   | h      |
|   | Biolog |
|   | ics:   |
|   | Infuse |
|   | , Bone |
|   |  Chips |
|   |        |
|   | Table: |
|   |        |
|   | Jackso |
|   | n      |
|   | Frame  |
|   | REP:   |
|   | Marlo  |
|   | Juan |
|   |        |
|   | Implan |
|   | ts:    |
|   | TLIF   |
|   | Cage,  |
|   | Screws |
|   |  Est   |
|   | time:  |
|   | 120min |
+---+--------+
|   |        |
|   | Specia |
|   | l      |
|   | Needs  |
|   |  Marlo |
|   |        |
|   | Juan |
|   |  -     |
|   | TLIF   |
|   | Cage,  |
 
|   | Screws |
|   | Table: |
|   |        |
|   | Jackso |
|   | n      |
|   | Frame  |
+---+--------+
 
 
 
+-----------------+--------+-------------+---+----------------------+
| TYPE AND SCREEN | Routin | 2017  |   |   Results for this   |
|                 | e      | 10:05 AM    |   | procedure are in the |
|                 |        | PDT         |   |  results section.    |
+-----------------+--------+-------------+---+----------------------+
 documented in this encounter
 
 Results
 XR Lumbar Spine 2 or 3 Vw (2017  3:45 PM PDT)
 
+----------+
| Specimen |
+----------+
|          |
+----------+
 
 
 
+------------------------------------------------------------------------+---------------+
| Narrative                                                              | Performed At  |
+------------------------------------------------------------------------+---------------+
|   XR LUMBAR SPINE 2 OR 3 VW 2017 3:45 PM     HISTORY: post op     |   [facility] |
| lumbar surgery.     COMPARISON: 4/3/2017     FINDINGS:  Interval PLIF  |               |
| and interbody fusion of L4-L5, without evidence of immediate           |               |
| postoperative complication.  Expected soft tissue postsurgical changes |               |
|  including a left paraspinal surgical  drainage catheter.  Vertebral   |               |
| body heights and alignment is maintained. Mild early spondylosis at    |               |
| L3-L4.      IMPRESSION -  Interval PLIF and interbody fusion of L4-L5, |               |
|  without evidence of immediate  postoperative complication.            |               |
| Dictated and Signed by: Joseph Liao MD    Electronically signed:   |               |
| 2017 5:37 PM                                                      |               |
+------------------------------------------------------------------------+---------------+
 
 
 
+------------------------------------------------------------------------------------------+
| Procedure Note                                                                           |
+------------------------------------------------------------------------------------------+
|   [facility], Rad Results In - 2017  5:40 PM PDT  XR LUMBAR SPINE 2 OR 3 VW 2017     |
| 3:45 PMHISTORY: post op lumbar surgery.COMPARISON: 4/3/2017FINDINGS:Interval PLIF and    |
| interbody fusion of L4-L5, without evidence of immediatepostoperative                    |
| complication.Expected soft tissue postsurgical changes including a left paraspinal       |
| surgicaldrainage catheter.Vertebral body heights and alignment is maintained. Mild early |
|  spondylosis atL3-L4. IMPRESSION -Interval PLIF and interbody fusion of L4-L5, without   |
| evidence of immediatepostoperative complication.Dictated and Signed by: Joseph Liao,   |
| MD  Electronically signed: 2017 5:37 PM                                             |
|postoperative complication.                                                               |
|Expected soft tissue postsurgical changes including a left paraspinal surgical            |
|drainage catheter.                                                                        |
|Vertebral body heights and alignment is maintained. Mild early spondylosis at             |
 
|L3-L4.                                                                                    |
|                                                                                          |
|IMPRESSION -                                                                              |
|Interval PLIF and interbody fusion of L4-L5, without evidence of immediate                |
|postoperative complication.                                                               |
|                                                                                          |
|Dictated and Signed by: Joseph Liao MD                                                 |
| Electronically signed: 2017 5:37 PM                                                 |
+------------------------------------------------------------------------------------------+
 
 
 
+---------------+---------+--------------------+--------------+
| Performing    | Address | City/State/Zipcode | Phone Number |
| Organization  |         |                    |              |
+---------------+---------+--------------------+--------------+
|   [facility] |         |                    |              |
+---------------+---------+--------------------+--------------+
 FL C-Arm Stats No Charge (2017  1:46 PM PDT)
 
+----------+
| Specimen |
+----------+
|          |
+----------+
 
 
 
+-----------------------------------------------------------+---------------+
| Narrative                                                 | Performed At  |
+-----------------------------------------------------------+---------------+
|   No Radiologist interpretation, please see Chart Review. |   [facility] |
+-----------------------------------------------------------+---------------+
 
 
 
+---------------+---------+--------------------+--------------+
| Performing    | Address | City/State/Zipcode | Phone Number |
| Organization  |         |                    |              |
+---------------+---------+--------------------+--------------+
|   [facility] |         |                    |              |
+---------------+---------+--------------------+--------------+
 Type and Screen (2017 10:05 AM PDT)
 
+-----------+----------+-----------+-------------+--------------+
| Component | Value    | Ref Range | Performed   | Pathologist  |
|           |          |           | At          | Signature    |
+-----------+----------+-----------+-------------+--------------+
| ABO       | O        |           | [facility]  |              |
|           |          |           | [facility]    |              |
|           |          |           | [facility]     |              |
|           |          |           | [facility] -    |              |
|           |          |           | BLOOD BANK  |              |
+-----------+----------+-----------+-------------+--------------+
| Rh Type   | Positive |           | [facility]  |              |
|           |          |           | [facility]    |              |
|           |          |           | [facility]     |              |
|           |          |           | [facility] -    |              |
|           |          |           | BLOOD BANK  |              |
+-----------+----------+-----------+-------------+--------------+
 
| Antibody  | Negative |           | [facility]  |              |
| Screen    |          |           | [facility]    |              |
|           |          |           | [facility]     |              |
|           |          |           | CENTER -    |              |
|           |          |           | BLOOD BANK  |              |
+-----------+----------+-----------+-------------+--------------+
 
 
 
+----------+
| Specimen |
+----------+
| Blood    |
+----------+
 
 
 
+----------------------+--------------------+--------------------+--------------+
| Performing           | Address            | City/State/Zipcode | Phone Number |
| Organization         |                    |                    |              |
+----------------------+--------------------+--------------------+--------------+
|   [facility]     |   [address] |   [place], [place]  |              |
| [facility]  |                    | [address]              |              |
| - BLOOD BANK         |                    |                    |              |
+----------------------+--------------------+--------------------+--------------+
 documented in this encounter
 
 Visit Diagnoses
 
 
+----------------------------------------------------------------------------------+
| Diagnosis                                                                        |
+----------------------------------------------------------------------------------+
|   Osteoarthritis of spine with radiculopathy, lumbar region - 2017 - Primary |
+----------------------------------------------------------------------------------+
|   Gait abnormality  Abnormality of gait                                          |
+----------------------------------------------------------------------------------+
 documented in this encounter
 
 Administered Medications
 
 
+-----------------------------------+--------+----------+--------+------+------+
| Medication Order                  | MAR    | Action   | Dose   | Rate | Site |
|                                   | Action | Date     |        |      |      |
+-----------------------------------+--------+----------+--------+------+------+
|   acetaminophen (TYLENOL) tablet  | Given  | 20 | 975 mg |      |      |
| 975 mg  975 mg, Oral, ONCE, Mon   |        | 17 10:16 |        |      |      |
| 17 at 1000, For 1 dose,      |        |  AM PDT  |        |      |      |
| Pre-op                            |        |          |        |      |      |
+-----------------------------------+--------+----------+--------+------+------+
 
 
 
+---+---+
|   |   |
+---+---+
 
 
 
 
+-----------------------------------+-------+----------+-------+---+---+
|   atorvaSTATin (LIPITOR) tablet   | Given | 20 | 40 mg |   |   |
| 40 mg  40 mg, Oral, DAILY, First  |       | 17  9:32 |       |   |   |
| dose on 17 at 1630,      |       |  PM PDT  |       |   |   |
| Post-op/Phase II                  |       |          |       |   |   |
+-----------------------------------+-------+----------+-------+---+---+
 
 
 
+-------+----------+-------+---+---+
| Given | 20 | 40 mg |   |   |
|       | 17  8:32 |       |   |   |
|       |  PM PDT  |       |   |   |
+-------+----------+-------+---+---+
 
 
 
+---+---+
|   |   |
+---+---+
 
 
 
+----------------------------------+-------+----------+-------+---+---+
|   atorvaSTATin (LIPITOR) tablet  | Given | 20 | 40 mg |   |   |
| 40 mg  40 mg, Oral, ONCE, Mon    |       | 17  8:08 |       |   |   |
| 17 at 2100, For 1 dose      |       |  PM PDT  |       |   |   |
+----------------------------------+-------+----------+-------+---+---+
 
 
 
+---+---+
|   |   |
+---+---+
 
 
 
+-----------------------------------+---------+----------+-----+-------+---+
|   ceFAZolin in saline (ANCEF)     | New Bag | 20 | 2 g | 100   |   |
| IVPB 2 g  2 g, Intravenous,       |         | 17  1:29 |     | mL/hr |   |
| Administer over 30 Minutes, EVERY |         |  AM PDT  |     |       |   |
|  8 HOURS INTERVAL, First dose on  |         |          |     |       |   |
| Mon 17 at 1800, For 2 doses, |         |          |     |       |   |
|  Start 8 hours after previous     |         |          |     |       |   |
| dose.  Last dose to be given      |         |          |     |       |   |
| within 24 hours of surgery end    |         |          |     |       |   |
| time. Keep in refrigerator.,      |         |          |     |       |   |
| Post-op/Phase II, Indications:    |         |          |     |       |   |
| Surgical Prophylaxis              |         |          |     |       |   |
+-----------------------------------+---------+----------+-----+-------+---+
 
 
 
+---------+----------+-----+-------+---+
| New Bag | 20 | 2 g | 100   |   |
|         | 17  6:29 |     | mL/hr |   |
|         |  PM PDT  |     |       |   |
+---------+----------+-----+-------+---+
 
 
 
 
+-----------------------------------+---+
|                                   |   |
+-----------------------------------+---+
|   diphenhydrAMINE (BENADRYL) 12.5 |   |
|  mg/5 mL liquid 25 mg  25 mg,     |   |
| Oral, EVERY 4 HOURS PRN, Itching, |   |
|  Starting 17 at 1603,    |   |
| Oral route is preferred.,         |   |
| Post-op/Phase II                  |   |
+-----------------------------------+---+
|                                   |   |
+-----------------------------------+---+
|   diphenhydrAMINE (BENADRYL)      |   |
| injection 12.5 mg  12.5 mg,       |   |
| Intravenous, EVERY 4 HOURS PRN,   |   |
| Itching, Starting 17 at  |   |
| 1603, Oral route is preferred.,   |   |
| Post-op/Phase II                  |   |
+-----------------------------------+---+
|                                   |   |
+-----------------------------------+---+
|   diphenhydrAMINE (BENADRYL)      |   |
| tablet 25 mg  25 mg, Oral, EVERY  |   |
| 4 HOURS PRN, Itching, Starting    |   |
| 17 at 1603, Oral route   |   |
| is preferred., Post-op/Phase II   |   |
+-----------------------------------+---+
|                                   |   |
+-----------------------------------+---+
 
 
 
+-----------------------------------+-------+----------+--------+---+---+
|   docusate sodium (COLACE)        | Given | 20 | 100 mg |   |   |
| capsule 100 mg  100 mg, Oral, 2   |       | 17  8:41 |        |   |   |
| TIMES DAILY, First dose on Mon    |       |  AM PDT  |        |   |   |
| 17 at 2100, First line agent |       |          |        |   |   |
|  for constipation, Post-op/Phase  |       |          |        |   |   |
| II                                |       |          |        |   |   |
+-----------------------------------+-------+----------+--------+---+---+
 
 
 
+-------+----------+--------+---+---+
| Given | 20 | 100 mg |   |   |
|       | 17  9:32 |        |   |   |
|       |  PM PDT  |        |   |   |
+-------+----------+--------+---+---+
| Given | 20 | 100 mg |   |   |
|       | 17  9:11 |        |   |   |
|       |  AM PDT  |        |   |   |
+-------+----------+--------+---+---+
 
 
 
+---+---+
|   |   |
+---+---+
 
 
 
 
+-----------------------------------+-------+----------+--------+---+---+
|   fentaNYL (PF) injection 25-50   | Given | 20 | 50 mcg |   |   |
| mcg  25-50 mcg, Intravenous,      |       | 17  2:29 |        |   |   |
| EVERY 5 MIN PRN, Pain, Starting   |       |  PM PDT  |        |   |   |
| Mon 17 at 1405, Maximum      |       |          |        |   |   |
| total dose 250 mcg. PACU IV       |       |          |        |   |   |
| Narcotic Priority: Only use       |       |          |        |   |   |
| fentanyl for immediate post-op    |       |          |        |   |   |
| pain (one dose) or breakthrough   |       |          |        |   |   |
| pain when any other IV narcotics  |       |          |        |   |   |
| ordered have been ineffective (if |       |          |        |   |   |
|  ordered).   If both morphine and |       |          |        |   |   |
|  hydromorphone are ordered, use   |       |          |        |   |   |
| morphine first, and use           |       |          |        |   |   |
| hydromorphone if morphine         |       |          |        |   |   |
| ineffective., Recovery/Phase I    |       |          |        |   |   |
+-----------------------------------+-------+----------+--------+---+---+
 
 
 
+---+---+
|   |   |
+---+---+
 
 
 
+-----------------------------------+-------+----------+-------+---+---+
|   FLUoxetine (PROzac) capsule 20  | Given | 20 | 20 mg |   |   |
| mg  20 mg, Oral, DAILY, First     |       | 17  8:41 |       |   |   |
| dose on 17 at 0900,       |       |  AM PDT  |       |   |   |
| Post-op/Phase II                  |       |          |       |   |   |
+-----------------------------------+-------+----------+-------+---+---+
 
 
 
+-------+----------+-------+---+---+
| Given | 20 | 20 mg |   |   |
|       | 17  9:11 |       |   |   |
|       |  AM PDT  |       |   |   |
+-------+----------+-------+---+---+
| Given | 20 | 20 mg |   |   |
|       | 17  8:58 |       |   |   |
|       |  AM PDT  |       |   |   |
+-------+----------+-------+---+---+
 
 
 
+---+---+
|   |   |
+---+---+
 
 
 
+-----------------------------------+-------+----------+---------+---+----------+
|   fluticasone (FLONASE) 50        | Given | 20 | 1 spray |   | [facility] |
| mcg/nasal spray 1 spray  1 spray, |       | 17  8:42 |         |   | h        |
|  Nasal, DAILY, First dose on Mon  |       |  AM PDT  |         |   |          |
| 17 at 1630, Kirby mena.,   |       |          |         |   |          |
 
| Post-op/Phase II                  |       |          |         |   |          |
+-----------------------------------+-------+----------+---------+---+----------+
 
 
 
+-------+----------+---------+---+----------+
| Given | 20 | 1 spray |   | [facility] |
|       | 17  9:11 |         |   | h        |
|       |  AM PDT  |         |   |          |
+-------+----------+---------+---+----------+
| Given | 20 | 1 spray |   | [facility] |
|       | 17  8:59 |         |   | h        |
|       |  AM PDT  |         |   |          |
+-------+----------+---------+---+----------+
 
 
 
+---+---+
|   |   |
+---+---+
 
 
 
+-----------------------------------+-------+----------+--------+---+---+
|   gabapentin (NEURONTIN) capsule  | Given | 20 | 300 mg |   |   |
| 300 mg  300 mg, Oral, ONCE, Mon   |       | 17 10:16 |        |   |   |
| 17 at 1000, For 1 dose,      |       |  AM PDT  |        |   |   |
| Pre-op                            |       |          |        |   |   |
+-----------------------------------+-------+----------+--------+---+---+
 
 
 
+---+---+
|   |   |
+---+---+
 
 
 
+-----------------------------------+-------+----------+----------+---+---+
|   HYDROcodone-acetaminophen       | Given | 20 | 1 tablet |   |   |
| (NORCO)  mg per tablet 1-2  |       | 17 10:37 |          |   |   |
| tablet  1-2 tablet, Oral, EVERY 4 |       |  AM PDT  |          |   |   |
|  HOURS PRN, Pain, Starting Mon    |       |          |          |   |   |
| 17 at 1603, Post-op/Phase II |       |          |          |   |   |
+-----------------------------------+-------+----------+----------+---+---+
 
 
 
+-------+----------+----------+---+---+
| Given | 20 | 1 tablet |   |   |
|       | 17  9:32 |          |   |   |
|       |  PM PDT  |          |   |   |
+-------+----------+----------+---+---+
| Given | 20 | 1 tablet |   |   |
|       | 17  9:11 |          |   |   |
|       |  AM PDT  |          |   |   |
+-------+----------+----------+---+---+
 
 
 
 
+---+---+
|   |   |
+---+---+
 
 
 
+-----------------------------------+-------+----------+---------+---+---+
|   HYDROmorphone (DILAUDID)        | Given | 07/31/20 | 0.25 mg |   |   |
| injection 0.2-0.5 mg  0.2-0.5 mg, |       | 17  3:11 |         |   |   |
|  Intravenous, EVERY 5 MIN PRN,    |       |  PM PDT  |         |   |   |
| Pain, Starting Mon 17 at     |       |          |         |   |   |
| 1405, Maximum total dose 4 mg.    |       |          |         |   |   |
| PACU IV Narcotic Priority: Only   |       |          |         |   |   |
| use fentanyl for immediate        |       |          |         |   |   |
| post-op pain (one dose) or        |       |          |         |   |   |
| breakthrough pain when any other  |       |          |         |   |   |
| IV narcotics ordered have been    |       |          |         |   |   |
| ineffective (if ordered).   If    |       |          |         |   |   |
| both morphine and hydromorphone   |       |          |         |   |   |
| are ordered, use morphine first,  |       |          |         |   |   |
| and use hydromorphone if morphine |       |          |         |   |   |
|  ineffective., Recovery/Phase I   |       |          |         |   |   |
+-----------------------------------+-------+----------+---------+---+---+
 
 
 
+-------+----------+---------+---+---+
| Given | 20 | 0.25 mg |   |   |
|       | 17  2:59 |         |   |   |
|       |  PM PDT  |         |   |   |
+-------+----------+---------+---+---+
| Given | 20 | 0.25 mg |   |   |
|       | 17  2:36 |         |   |   |
|       |  PM PDT  |         |   |   |
+-------+----------+---------+---+---+
 
 
 
+---+---+
|   |   |
+---+---+
 
 
 
+-----------------------------------+-------+----------+--------+---+---+
|   magnesium hydroxide (MILK OF    | Given | 20 | 30 mLs |   |   |
| MAGNESIA) 400 mg/5 mL suspension  |       | 17  7:00 |        |   |   |
| 30 mL  30 mL, Oral, 2 TIMES DAILY |       |  AM PDT  |        |   |   |
|  PRN, Constipation, Starting Mon  |       |          |        |   |   |
| 17 at 1603, If docusate,     |       |          |        |   |   |
| senna, and polyethylene glycol    |       |          |        |   |   |
| ineffective x 24 hours or not     |       |          |        |   |   |
| ordered, Post-op/Phase II         |       |          |        |   |   |
+-----------------------------------+-------+----------+--------+---+---+
 
 
 
+-------+----------+--------+---+---+
| Given | 20 | 30 mLs |   |   |
|       | 17  9:39 |        |   |   |
 
|       |  PM PDT  |        |   |   |
+-------+----------+--------+---+---+
 
 
 
+---+---+
|   |   |
+---+---+
 
 
 
+-----------------------------------+---------+----------+--------+--------+---+
|   methocarbamol (ROBAXIN) 750 mg  | New Bag | 20 | 750 mg | 143.3  |   |
| in sodium chloride 0.9% 100 mL    |         | 17  5:19 |        | mL/hr  |   |
| IVPB  750 mg, Intravenous,        |         |  AM PDT  |        |        |   |
| Administer over 45 Minutes, EVERY |         |          |        |        |   |
|  8 HOURS (3 times per day), First |         |          |        |        |   |
|  dose on 17 at 1430, For |         |          |        |        |   |
|  3 doses, Post-op/Phase II        |         |          |        |        |   |
+-----------------------------------+---------+----------+--------+--------+---+
 
 
 
+---------+----------+--------+--------+---+
| New Bag | 20 | 750 mg | 143.3  |   |
|         | 17  9:34 |        | mL/hr  |   |
|         |  PM PDT  |        |        |   |
+---------+----------+--------+--------+---+
| New Bag | 20 | 750 mg | 143.3  |   |
|         | 17  2:53 |        | mL/hr  |   |
|         |  PM PDT  |        |        |   |
+---------+----------+--------+--------+---+
 
 
 
+---+---+
|   |   |
+---+---+
 
 
 
+-----------------------------------+-------+----------+-------+---+---+
|   metoprolol succinate            | Given | 20 | 25 mg |   |   |
| (TOPROL-XL) ER tablet 25 mg  25   |       | 17  8:41 |       |   |   |
| mg, Oral, DAILY, First dose on    |       |  AM PDT  |       |   |   |
| Tue 17 at 0900, Tablet may be |       |          |       |   |   |
|  cut where scored but do not      |       |          |       |   |   |
| crush., Post-op/Phase II          |       |          |       |   |   |
+-----------------------------------+-------+----------+-------+---+---+
 
 
 
+-------+----------+-------+---+---+
| Given | 20 | 25 mg |   |   |
|       | 17  9:11 |       |   |   |
|       |  AM PDT  |       |   |   |
+-------+----------+-------+---+---+
| Given | 20 | 25 mg |   |   |
|       | 17  8:58 |       |   |   |
|       |  AM PDT  |       |   |   |
 
+-------+----------+-------+---+---+
 
 
 
+---+---+
|   |   |
+---+---+
 
 
 
+-----------------------------------+---------+----------+---------+---+----------+
|   scopolamine (TRANSDERM-SCOP) 1  | Patch   | 07/31/20 | 1 patch |   | [facility] |
| mg/3 days 1 patch  1 patch,       | Applied | 17 10:20 |         |   | nd Left  |
| Transdermal, ONCE PRN, PRN for    |         |  AM PDT  |         |   |          |
| adult patients with history of    |         |          |         |   |          |
| PONV.  Hold for patients with     |         |          |         |   |          |
| glaucoma, dementia, altered       |         |          |         |   |          |
| mental status, or history of      |         |          |         |   |          |
| allergy to Scopolamine.  Apply to |         |          |         |   |          |
|  mastoid process behind ear.,     |         |          |         |   |          |
| Starting 17 at 0935, For |         |          |         |   |          |
|  1 dose, PRN for adult patients   |         |          |         |   |          |
| with history of PONV.  Hold for   |         |          |         |   |          |
| patients with glaucoma, dementia, |         |          |         |   |          |
|  altered mental status, or        |         |          |         |   |          |
| history of allergy to             |         |          |         |   |          |
| Scopolamine.  Apply to mastoid    |         |          |         |   |          |
| process behind ear., Pre-op       |         |          |         |   |          |
+-----------------------------------+---------+----------+---------+---+----------+
 
 
 
+---+---+
|   |   |
+---+---+
 
 
 
+-----------------------------------+-------+----------+--------+---+---+
|   senna (SENOKOT) tablet 8.6 mg   | Given | 20 | 8.6 mg |   |   |
| 8.6 mg, Oral, 2 TIMES DAILY,      |       | 17  8:41 |        |   |   |
| First dose on 17 at      |       |  AM PDT  |        |   |   |
| 2100, If docusate ineffective or  |       |          |        |   |   |
| not ordered, Post-op/Phase II     |       |          |        |   |   |
+-----------------------------------+-------+----------+--------+---+---+
 
 
 
+-------+----------+--------+---+---+
| Given | 20 | 8.6 mg |   |   |
|       | 17  9:32 |        |   |   |
|       |  PM PDT  |        |   |   |
+-------+----------+--------+---+---+
| Given | 20 | 8.6 mg |   |   |
|       | 17  9:11 |        |   |   |
|       |  AM PDT  |        |   |   |
+-------+----------+--------+---+---+
 
 
 
 
+---+---+
|   |   |
+---+---+
 
 
 
+-----------------------------------+---------+----------+--------+-------+----------+
|   sodium chloride 0.9% (NS)       | New Bag | 20 | 1,000  | 100   | Left Arm |
| infusion  at 100 mL/hr,           |         | 17 10:20 | mLs    | mL/hr |          |
| Intravenous, CONTINUOUS, Starting |         |  AM PDT  |        |       |          |
|  Mon 17 at 1000, Pre-op      |         |          |        |       |          |
+-----------------------------------+---------+----------+--------+-------+----------+
 
 
 
+---+---+
|   |   |
+---+---+
 
 
 
+-----------------------------------+---------+----------+---+----------+---+
|   sodium chloride 0.9% (NS)       | New Bag | 20 |   | 50 mL/hr |   |
| infusion  at 100 mL/hr,           |         | 17  5:22 |   |          |   |
| Intravenous, CONTINUOUS, Starting |         |  AM PDT  |   |          |   |
|  Mon 17 at 1630,             |         |          |   |          |   |
| Post-op/Phase II                  |         |          |   |          |   |
+-----------------------------------+---------+----------+---+----------+---+
 
 
 
+---------+----------+---+-------+---+
| New Bag | 20 |   | 100   |   |
|         | 17  5:04 |   | mL/hr |   |
|         |  PM PDT  |   |       |   |
+---------+----------+---+-------+---+
 
 
 
+---+---+
|   |   |
+---+---+
 documented in this encounter

## 2020-06-08 NOTE — XMS
Encounter Summary
  Created on: 2020
 
 Memo Deni Siu
 External Reference #: [id number]
 : 52
 Sex: Male
 
 Demographics
 
 
+-----------------------+---------------------------+
| Address               | [address]           |
|                       | [place]  [address] |
+-----------------------+---------------------------+
| Home Phone            | [phone]           |
+-----------------------+---------------------------+
| Preferred Language    | Unknown                   |
+-----------------------+---------------------------+
| Marital Status        |                    |
+-----------------------+---------------------------+
| Zoroastrian Affiliation | Unknown                   |
+-----------------------+---------------------------+
| Race                  | Unknown                   |
+-----------------------+---------------------------+
| Ethnic Group          | Unknown                   |
+-----------------------+---------------------------+
 
 
 Author
 
 
+--------------+--------------------------------------------+
| Author       | [facility]  |
|              | and [place]                                |
+--------------+--------------------------------------------+
| Organization | [facility]  |
|              | and [place]                                |
+--------------+--------------------------------------------+
| Address      | Unknown                                    |
+--------------+--------------------------------------------+
| Phone        | Unavailable                                |
+--------------+--------------------------------------------+
 
 
 
 Support
 
 
+---------------+--------------+---------+-----------------+
| Name          | Relationship | Address | Phone           |
+---------------+--------------+---------+-----------------+
| Sfoiya A Brown | ECON         | Unknown | [phone] |
+---------------+--------------+---------+-----------------+
 
 
 
 Care Team Providers
 
 
 
+-----------------------+------+-----------------+
| Care Team Member Name | Role | Phone           |
+-----------------------+------+-----------------+
| Emely Hinton    | PCP  | [phone] |
+-----------------------+------+-----------------+
 
 
 
 Reason for Visit
 
 
+-----------+-------------+
| Reason    | Comments    |
+-----------+-------------+
| Follow-up | Memory loss |
+-----------+-------------+
 Evaluate & Treat (Routine)
 
+--------+--------+-----------+--------------+--------------+---------------+
| Status | Reason | Specialty | Diagnoses /  | Referred By  | Referred To   |
|        |        |           | Procedures   | Contact      | Contact       |
+--------+--------+-----------+--------------+--------------+---------------+
| Closed |        | Neurology |   Diagnoses  |   [place],    |   Ravindra Munoz,   |
|        |        |           |  [employer]estic    | MAYCOL Gates  | MD  [address]      |
|        |        |           | disorder due |  [address]     | [address]      |
|        |        |           |  to known    | [address]  | [address]  [address]  |
|        |        |           | physiologica |  [place],  | [address]  [place], |
|        |        |           | l condition  | [place]           |  [place] [address]     |
|        |        |           | ([facility]ly [facility])        | [address]   | Phone:        |
|        |        |           |              | Phone:       | [phone]  |
|        |        |           |              | [phone] |  Fax:         |
|        |        |           |              |   Fax:       | [phone]  |
|        |        |           |              | [phone] |               |
+--------+--------+-----------+--------------+--------------+---------------+
 
 
 
 
 Encounter Details
 
 
+--------+---------+---------------------+----------------------+----------------------+
| Date   | Type    | Department          | Care Team            | Description          |
+--------+---------+---------------------+----------------------+----------------------+
| / | Office  |   [facility]     |   Ravindra Munoz MD  [address] | Memory loss (Primary |
|    | Visit   | NEUROLOGY  [address]     |  [address]      |  Dx); Dementia       |
|        |         | [address]   | [address]  [place],  | without behavioral   |
|        |         | [place], [place]        | [place] [address]             | disturbance,         |
|        |         | [address]          | [phone]         | unspecified dementia |
|        |         | [phone]        | [phone] (Fax)   |  type (HCC)          |
+--------+---------+---------------------+----------------------+----------------------+
 
 
 
 Social History
 
 
+--------------+-------+-----------+--------+------+
 
| Tobacco Use  | Types | Packs/Day | Years  | Date |
|              |       |           | Used   |      |
+--------------+-------+-----------+--------+------+
| Never Smoker |       |           |        |      |
+--------------+-------+-----------+--------+------+
 
 
 
+---------------------+---+---+---+
| Smokeless Tobacco:  |   |   |   |
| Never Used          |   |   |   |
+---------------------+---+---+---+
 
 
 
+-------------+----------------------+---------+--------------+
| Alcohol Use | Drinks/Week          | oz/Week | Comments     |
+-------------+----------------------+---------+--------------+
| Yes         |   0 Standard drinks  | 0.0     | Alcoholic    |
|             | or equivalent        |         | Drinks/day:  |
|             |                      |         | Occasionally |
+-------------+----------------------+---------+--------------+
 
 
 
+------------------+---------------+
| Sex Assigned at  | Date Recorded |
| Birth            |               |
+------------------+---------------+
| Not on file      |               |
+------------------+---------------+
 
 
 
+----------------+-------------+-------------+
| Job Start Date | Occupation  | Industry    |
+----------------+-------------+-------------+
| Not on file    | Not on file | Not on file |
+----------------+-------------+-------------+
 
 
 
+----------------+--------------+------------+
| Travel History | Travel Start | Travel End |
+----------------+--------------+------------+
 
 
 
+-------------------------------------+
| No recent travel history available. |
+-------------------------------------+
 documented as of this encounter
 
 Last Filed Vital Signs
 
 
+-------------------+---------------------+----------------------+----------+
| Vital Sign        | Reading             | Time Taken           | Comments |
+-------------------+---------------------+----------------------+----------+
| Blood Pressure    | 153/74              | 2020  9:48 AM  |          |
 
|                   |                     | PDT                  |          |
+-------------------+---------------------+----------------------+----------+
| Pulse             | 63                  | 2020  9:48 AM  |          |
|                   |                     | PDT                  |          |
+-------------------+---------------------+----------------------+----------+
| Temperature       | 36.3   C (97.3   F) | 2020  9:48 AM  |          |
|                   |                     | PDT                  |          |
+-------------------+---------------------+----------------------+----------+
| Respiratory Rate  | -                   | -                    |          |
+-------------------+---------------------+----------------------+----------+
| Oxygen Saturation | 96%                 | 2020  9:48 AM  |          |
|                   |                     | PDT                  |          |
+-------------------+---------------------+----------------------+----------+
| Inhaled Oxygen    | -                   | -                    |          |
| Concentration     |                     |                      |          |
+-------------------+---------------------+----------------------+----------+
| Weight            | 101.2 kg (223 lb)   | 2020  9:48 AM  |          |
|                   |                     | PDT                  |          |
+-------------------+---------------------+----------------------+----------+
| Height            | 190.5 cm (6' 3")    | 2020  9:48 AM  |          |
|                   |                     | PDT                  |          |
+-------------------+---------------------+----------------------+----------+
| Body Mass Index   | 27.87               | 2020  9:48 AM  |          |
|                   |                     | PDT                  |          |
+-------------------+---------------------+----------------------+----------+
 documented in this encounter
 
 Patient Instructions
 Patient Instructions Ravindra Munoz MD - 2020 11:45 AM PDTStart donepezil 5 mg daily
 
 Follow up in 4 months. Electronically signed by Ravindra Munoz MD at 2020 10:37 AM PDT
 documented in this encounter
 
 Progress Notes
 Ravindra Munoz MD - 2020 11:45 AM PDTFormatting of this note might be different from the o
riginal.
 Referring Physician: MAYCOL Harmon 
 PCP: MAYCOL Harmon 
 Date of Encounter: 2020 
 
 SUBJECTIVE
 Deni Hampton is a pleasant 70 y.o. male with history of hypertension, hyperlipidemia, 
anxiety and depression, LAURA on CPAP, history of GI bleed due to diverticulitis who presents 
for follow up of memory loss.  
 Interval History
 Today patient returns to review results. 
 Today 2020 WACE 73/100, MMSE 25/30, VLOM ratio 3.3.
 Lab results, interpath, 20: B12 629. TSH 1.81 normal. CMP fine, except glucose 104, CB
C lymp little low
 Brain MRI [facility]: normal appearnce of the brain. Sinusites despite a Fess proced
ure. 
 Today we also reviewed his history of severe GI bleeds to the point that he passed out with
 a hemoglobin of only 5.  He wonders if that could be affecting his brain function.
 He is interested in medication that may help to improve memory.
 Recap History
 The patient reports short-term memory loss for at least 2 years and has been progressive.  
Symptoms were first noted by his children.  He has difficulty remembering names.  He forget 
things he plans to do.  He forgets the conversation that occurred earlier the same day.  He 
had difficulty finding driving directions in [place] which he has lived most of his life. 
 Last year during a camping trip, he forgot to turn off there burner on the trailer.  Wife s
 
loc feels that he is safe to drive.  Wife feels that he is okay taking his own medications.
  He denies difficulty with basic ADLs.
 
 He denies new medication change over the last 2 years.
 
 Associated symptoms:
 Hallucinations: no
 Bladder incontinence:  no
 Balance: mild, history of left ankle and bilateral knee surgery
 Abnormal movement:  no
 Mood and personality changes:  Occasional agitation
 
 Previous work up
 Has recent labs but we do not have results
 
 Highest education AA + one year
 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  
 Family history negative for dementia or Parkinson disease 
 
 
 ALLERGIES
 Allergies 
 Allergen Reactions 
   Meperidine Nausea And Vomiting 
   Morphine Hallucination 
   Oxycodone Other (See Comments) 
   Hallucinations & sleepiness 
  
 
 MEDICATIONS
 
 Current Outpatient Medications: 
    aspirin 81 mg chewable tablet, Take 81 mg by mouth daily., Disp: , Rfl: 
    atorvaSTATin (LIPITOR) 40 mg tablet, Take 40 mg by mouth Daily., Disp: , Rfl: 
    B Complex Vitamins (VITAMIN B COMPLEX) tablet, Take 1 tablet by mouth Daily., Disp: , 
Rfl: 
    FLUoxetine (PROZAC) 10 mg capsule, Take 10 mg by mouth daily., Disp: , Rfl: 
    fluticasone (FLONASE) 50 mcg/nasal spray, 1 spray by Nasal route Daily., Disp: , Rfl: 
    metoprolol succinate (TOPROL-XL) 25 mg 24 hr tablet, Take 25 mg by mouth Daily., Disp:
 , Rfl: 
    Probiotic Product (PROBIOTIC DAILY PO), Take  by mouth., Disp: , Rfl:  
 
 Family history, social history and past medical history were reviewed and updated as emily stephens.
 
 REVIEW OF SYSTEMS
 In addition to HPI, a comprehensive ROS also revealed:
 All other systems were reviewed and negative 
 
 OBJECTIVE
 General Exam
 Vitals: 
  20 0948 
 BP: 153/74 
 Pulse: 63 
 Temp: 36.3 C (97.3 F) 
 PainSc:   0 - No pain 
  
 WDWN, NAD
 Heart is regular
 
 Neuro Exam
 MS
 Awake, alert. Cooperative and appropriate during the encounter. Speech is clear, fluent and
 coherent. 
 CN
 EOMI. 
 
 DATA
 Results for orders placed or performed in visit on 19 
 Vitamin B-12 
 Result Value Ref Range 
  VITAMIN B-12 909 254 - 1,320 pg/mL 
 Folate 
 Result Value Ref Range 
  Folate >24.0 ng/mL 
  
 
 ASSESSMENT & PLAN
 
 70 y.o. male who presents with progressive worsening cognitive impairment for 2 years at [facility].  Today we we discussed that he has a history of severe GI bleed may contribute to cogni
tive impairment.  However progressive worsening over time would not be explained by the sing
 event. Review of the history did not show significant medical conditions, use of medicati
ons, depression or sleep issues to explain the memory loss.   Would consider dementia due to
 neurodegeneration.   Patient is interested in trying donepezil for symptom measures.
 
 Plan:
 -Start donepezil 5 mg daily.  Potential side effects were reviewed with the patient in [facility].
 - Discussed coping strategy and safety issues. Family to supervise medication and driving c
losely.  He should stop driving if there is any concern of driving safety.
 - Follow up in 3-4 months or sooner as needed
 
 Thank you for allowing me to take care of this patient. Please do not hesitate to contact m
donal if you have any questions.
 Cc:
 MAYCOL Harmon 
 
 This note was prepared using Dragon dictation voice recognition technology. There may be so
und-alike errors even though every effort has been made to ensure accuracy. 
 Electronically signed by Ravindra Munoz MD at 2020  3:00 PM PDTdocumented in this encounter
 
 Plan of Treatment
 
 
+--------+---------+-----------+----------------------+-------------+
| Date   | Type    | Specialty | Care Team            | Description |
+--------+---------+-----------+----------------------+-------------+
| / | Office  | Neurology |   Ravindra Munoz MD  [address] |             |
| 2020   | Visit   |           |  [address]      |             |
|        |         |           | [address]  [address],  |             |
|        |         |           | [place] [address]             |             |
|        |         |           | [phone]         |             |
|        |         |           | [phone] (Fax)   |             |
+--------+---------+-----------+----------------------+-------------+
 documented as of this encounter
 
 Visit Diagnoses
 
 
 
+----------------------------------------------------------------------------+
| Diagnosis                                                                  |
+----------------------------------------------------------------------------+
|   Memory loss - Primary                                                    |
+----------------------------------------------------------------------------+
|   Dementia without behavioral disturbance, unspecified dementia type (HCC) |
+----------------------------------------------------------------------------+
 documented in this encounter

## 2020-06-08 NOTE — XMS
PreManage Notification: BERNA PARKINSON MRN:[id number]
 
Security Information
 
Security Events
No recent Security Events currently on file
 
 
 
[facility]IA MET
------------
[facility]
 
 
CARE PROVIDERS
There are no care providers on record at this time.
 
Luis M has no Care Guidelines for this patient.
 
YANETH VISIT COUNT (12 MO.)
-------------------------------------------------------------------------------------
1 MOSHE Mckeon
-------------------------------------------------------------------------------------
TOTAL 1
-------------------------------------------------------------------------------------
NOTE: Visits indicate total known visits.
 
ED/UCC VISIT TRACKING (12 MO.)
-------------------------------------------------------------------------------------
06/08/2020 02:36
[facility] [place]
 
TYPE: Emergency
 
COMPLAINT:
- ALTERED MENTAL STATUS
-------------------------------------------------------------------------------------
 
 
INPATIENT VISIT TRACKING (12 MO.)
No inpatient visits to display in this time frame
 
[email or web address]

## 2020-06-08 NOTE — XMS
Encounter Summary
  Created on: 2020
 
 Memo Deni Siu
 External Reference #: [id number]
 : 52
 Sex: Male
 
 Demographics
 
 
+-----------------------+---------------------------+
| Address               | [address]           |
|                       | [place]  [address] |
+-----------------------+---------------------------+
| Home Phone            | [phone]           |
+-----------------------+---------------------------+
| Preferred Language    | Unknown                   |
+-----------------------+---------------------------+
| Marital Status        |                    |
+-----------------------+---------------------------+
| Mormon Affiliation | Unknown                   |
+-----------------------+---------------------------+
| Race                  | Unknown                   |
+-----------------------+---------------------------+
| Ethnic Group          | Unknown                   |
+-----------------------+---------------------------+
 
 
 Author
 
 
+--------------+--------------------------------------------+
| Author       | [facility]  |
|              | and [place]                                |
+--------------+--------------------------------------------+
| Organization | [facility]  |
|              | and [place]                                |
+--------------+--------------------------------------------+
| Address      | Unknown                                    |
+--------------+--------------------------------------------+
| Phone        | Unavailable                                |
+--------------+--------------------------------------------+
 
 
 
 Support
 
 
+---------------+--------------+---------+-----------------+
| Name          | Relationship | Address | Phone           |
+---------------+--------------+---------+-----------------+
| Sofiya A Brown | ECON         | Unknown | [phone] |
+---------------+--------------+---------+-----------------+
 
 
 
 Care Team Providers
 
 
 
+-------------------------+------+-----------------+
| Care Team Member Name   | Role | Phone           |
+-------------------------+------+-----------------+
| Ck Michelle MD | PCP  | [phone] |
+-------------------------+------+-----------------+
 
 
 
 Encounter Details
 
 
+--------+----------+----------------------+----------------------+-------------+
| Date   | Type     | Department           | Care Team            | Description |
+--------+----------+----------------------+----------------------+-------------+
| 05/15/ | Abstract |   [facility] [place]          |   Madhav Mccloud, |             |
| 2017   |          | NEUROSURGERY  [address]  |  PA-C  [address]  |             |
|        |          | [address]     | [address]  [place]     |             |
|        |          | [place], [place]      | [place] [address]      |             |
|        |          | [address]           | [phone]         |             |
|        |          | [phone]         | [phone] (Fax)   |             |
+--------+----------+----------------------+----------------------+-------------+
 
 
 
 Social History
 
 
+--------------+-------+-----------+--------+------+
| Tobacco Use  | Types | Packs/Day | Years  | Date |
|              |       |           | Used   |      |
+--------------+-------+-----------+--------+------+
| Never Smoker |       |           |        |      |
+--------------+-------+-----------+--------+------+
 
 
 
+---------------------+---+---+---+
| Smokeless Tobacco:  |   |   |   |
| Never Used          |   |   |   |
+---------------------+---+---+---+
 
 
 
+-------------+----------------------+---------+----------------------+
| Alcohol Use | Drinks/Week          | oz/Week | Comments             |
+-------------+----------------------+---------+----------------------+
| Yes         |   0 Standard drinks  | 0.0     | 1-2 drinks 2-4 times |
|             | or equivalent        |         |  per month           |
+-------------+----------------------+---------+----------------------+
 
 
 
+------------------+---------------+
| Sex Assigned at  | Date Recorded |
| Birth            |               |
+------------------+---------------+
| Not on file      |               |
+------------------+---------------+
 
 
 
 
+----------------+-------------+-------------+
| Job Start Date | Occupation  | Industry    |
+----------------+-------------+-------------+
| Not on file    | Not on file | Not on file |
+----------------+-------------+-------------+
 
 
 
+----------------+--------------+------------+
| Travel History | Travel Start | Travel End |
+----------------+--------------+------------+
 
 
 
+-------------------------------------+
| No recent travel history available. |
+-------------------------------------+
 documented as of this encounter
 
 Plan of Treatment
 
 
+--------+---------+-----------+----------------------+-------------+
| Date   | Type    | Specialty | Care Team            | Description |
+--------+---------+-----------+----------------------+-------------+
| / | Office  | Neurology |   Ravindra Munoz MD  [address] |             |
| 2020   | Visit   |           |  [address]      |             |
|        |         |           | [place]  |             |
|        |         |           | [place] [address]             |             |
|        |         |           | [phone]         |             |
|        |         |           | [phone] (Fax)   |             |
+--------+---------+-----------+----------------------+-------------+
 documented as of this encounter
 
 Visit Diagnoses
 Not on filedocumented in this encounter

## 2020-06-08 NOTE — XMS
Encounter Summary
  Created on: 2020
 
 Memo Deni Siu
 External Reference #: [id number]
 : 52
 Sex: Male
 
 Demographics
 
 
+-----------------------+---------------------------+
| Address               | [address]           |
|                       | [place]  [address] |
+-----------------------+---------------------------+
| Home Phone            | [phone]           |
+-----------------------+---------------------------+
| Preferred Language    | Unknown                   |
+-----------------------+---------------------------+
| Marital Status        |                    |
+-----------------------+---------------------------+
| Jehovah's witness Affiliation | Unknown                   |
+-----------------------+---------------------------+
| Race                  | Unknown                   |
+-----------------------+---------------------------+
| Ethnic Group          | Unknown                   |
+-----------------------+---------------------------+
 
 
 Author
 
 
+--------------+--------------------------------------------+
| Author       | [facility]  |
|              | and [place]                                |
+--------------+--------------------------------------------+
| Organization | [facility]  |
|              | and [place]                                |
+--------------+--------------------------------------------+
| Address      | Unknown                                    |
+--------------+--------------------------------------------+
| Phone        | Unavailable                                |
+--------------+--------------------------------------------+
 
 
 
 Support
 
 
+---------------+--------------+---------+-----------------+
| Name          | Relationship | Address | Phone           |
+---------------+--------------+---------+-----------------+
| Sofiya A Brown | ECON         | Unknown | [phone] |
+---------------+--------------+---------+-----------------+
 
 
 
 Care Team Providers
 
 
 
+-------------------------+------+-----------------+
| Care Team Member Name   | Role | Phone           |
+-------------------------+------+-----------------+
| Ck Michelle MD | PCP  | [phone] |
+-------------------------+------+-----------------+
 
 
 
 Encounter Details
 
 
+--------+----------+----------------------+----------------------+-------------+
| Date   | Type     | Department           | Care Team            | Description |
+--------+----------+----------------------+----------------------+-------------+
| / | Imaging  |   [facility] |   Provider,          |             |
| 2018   | Exam     |  [facility]    | [address] |             |
|        |          | IMAGING  [address]       |  [address]        |             |
|        |          | [address]  [place]     | [place] [address]     |             |
|        |          | [place] [address] |                      |             |
|        |          |   [phone]       |                      |             |
+--------+----------+----------------------+----------------------+-------------+
 
 
 
 Social History
 
 
+--------------+-------+-----------+--------+------+
| Tobacco Use  | Types | Packs/Day | Years  | Date |
|              |       |           | Used   |      |
+--------------+-------+-----------+--------+------+
| Never Smoker |       |           |        |      |
+--------------+-------+-----------+--------+------+
 
 
 
+---------------------+---+---+---+
| Smokeless Tobacco:  |   |   |   |
| Never Used          |   |   |   |
+---------------------+---+---+---+
 
 
 
+-------------+----------------------+---------+----------------------+
| Alcohol Use | Drinks/Week          | oz/Week | Comments             |
+-------------+----------------------+---------+----------------------+
| No          |   0 Standard drinks  | 0.0     | No longer drinking   |
|             | or equivalent        |         | 1-2 drinks 2-4 times |
|             |                      |         |  a month             |
+-------------+----------------------+---------+----------------------+
 
 
 
+------------------+---------------+
| Sex Assigned at  | Date Recorded |
| Birth            |               |
+------------------+---------------+
| Not on file      |               |
 
+------------------+---------------+
 
 
 
+----------------+-------------+-------------+
| Job Start Date | Occupation  | Industry    |
+----------------+-------------+-------------+
| Not on file    | Not on file | Not on file |
+----------------+-------------+-------------+
 
 
 
+----------------+--------------+------------+
| Travel History | Travel Start | Travel End |
+----------------+--------------+------------+
 
 
 
+-------------------------------------+
| No recent travel history available. |
+-------------------------------------+
 documented as of this encounter
 
 Plan of Treatment
 
 
+--------+---------+-----------+----------------------+-------------+
| Date   | Type    | Specialty | Care Team            | Description |
+--------+---------+-----------+----------------------+-------------+
| / | Office  | Neurology |   Ravindra Munoz MD  [address] |             |
| 2020   | Visit   |           |  [address]      |             |
|        |         |           | [address]  [address]  |             |
|        |         |           | [place] [address]             |             |
|        |         |           | [phone]         |             |
|        |         |           | [phone] (Fax)   |             |
+--------+---------+-----------+----------------------+-------------+
 documented as of this encounter
 
 Procedures
 
 
+----------------------+--------+-------------+----------------------+----------------------
+
| Procedure Name       | Priori | Date/Time   | Associated Diagnosis | Comments             
|
|                      | ty     |             |                      |                      
|
+----------------------+--------+-------------+----------------------+----------------------
+
| XR LUMBAR SPINE 2 OR | Routin | 2018  |                      |   Results for this   
|
|  3 VW                | e      | 10:45 AM    |                      | procedure are in the 
|
|                      |        | PST         |                      |  results section.    
|
+----------------------+--------+-------------+----------------------+----------------------
+
 documented in this encounter
 
 Results
 
 XR Lumbar Spine 2 or 3 Vw (2018 10:45 AM PST)
 
+----------+
| Specimen |
+----------+
|          |
+----------+
 
 
 
+--------------------------------------------------------------------+---------------+
| Narrative                                                          | Performed At  |
+--------------------------------------------------------------------+---------------+
|   External films for comparison only - no result from [facility].  |   [facility] |
+--------------------------------------------------------------------+---------------+
 
 
 
+---------------+---------+--------------------+--------------+
| Performing    | Address | City/State/Zipcode | Phone Number |
| Organization  |         |                    |              |
+---------------+---------+--------------------+--------------+
|   [facility] |         |                    |              |
+---------------+---------+--------------------+--------------+
 documented in this encounter
 
 Visit Diagnoses
 Not on filedocumented in this encounter

## 2020-06-08 NOTE — XMS
Encounter Summary
  Created on: 2020
 
 Memo Deni Siu
 External Reference #: [id number]
 : 52
 Sex: Male
 
 Demographics
 
 
+-----------------------+---------------------------+
| Address               | [address]           |
|                       | [place]  [address] |
+-----------------------+---------------------------+
| Home Phone            | [phone]           |
+-----------------------+---------------------------+
| Preferred Language    | Unknown                   |
+-----------------------+---------------------------+
| Marital Status        |                    |
+-----------------------+---------------------------+
| Synagogue Affiliation | Unknown                   |
+-----------------------+---------------------------+
| Race                  | Unknown                   |
+-----------------------+---------------------------+
| Ethnic Group          | Unknown                   |
+-----------------------+---------------------------+
 
 
 Author
 
 
+--------------+--------------------------------------------+
| Author       | [facility]  |
|              | and [place]                                |
+--------------+--------------------------------------------+
| Organization | [facility]  |
|              | and [place]                                |
+--------------+--------------------------------------------+
| Address      | Unknown                                    |
+--------------+--------------------------------------------+
| Phone        | Unavailable                                |
+--------------+--------------------------------------------+
 
 
 
 Support
 
 
+---------------+--------------+---------+-----------------+
| Name          | Relationship | Address | Phone           |
+---------------+--------------+---------+-----------------+
| Sofiya A Brown | ECON         | Unknown | [phone] |
+---------------+--------------+---------+-----------------+
 
 
 
 Care Team Providers
 
 
 
+-------------------------+------+-----------------+
| Care Team Member Name   | Role | Phone           |
+-------------------------+------+-----------------+
| Ck Michelle MD | PCP  | [phone] |
+-------------------------+------+-----------------+
 
 
 
 Encounter Details
 
 
+--------+----------+----------------------+----------------------+-------------+
| Date   | Type     | Department           | Care Team            | Description |
+--------+----------+----------------------+----------------------+-------------+
| / | Imaging  |   [facility] |   Provider,          |             |
| 2018   | Exam     |  [facility]    | [address] |             |
|        |          | IMAGING  [address]       |  [address]        |             |
|        |          | [address]  [place]     | [place] [address]     |             |
|        |          | [place] [address] |                      |             |
|        |          |   [phone]       |                      |             |
+--------+----------+----------------------+----------------------+-------------+
 
 
 
 Social History
 
 
+--------------+-------+-----------+--------+------+
| Tobacco Use  | Types | Packs/Day | Years  | Date |
|              |       |           | Used   |      |
+--------------+-------+-----------+--------+------+
| Never Smoker |       |           |        |      |
+--------------+-------+-----------+--------+------+
 
 
 
+---------------------+---+---+---+
| Smokeless Tobacco:  |   |   |   |
| Never Used          |   |   |   |
+---------------------+---+---+---+
 
 
 
+-------------+----------------------+---------+----------------------+
| Alcohol Use | Drinks/Week          | oz/Week | Comments             |
+-------------+----------------------+---------+----------------------+
| No          |   0 Standard drinks  | 0.0     | No longer drinking   |
|             | or equivalent        |         | 1-2 drinks 2-4 times |
|             |                      |         |  a month             |
+-------------+----------------------+---------+----------------------+
 
 
 
+------------------+---------------+
| Sex Assigned at  | Date Recorded |
| Birth            |               |
+------------------+---------------+
| Not on file      |               |
 
+------------------+---------------+
 
 
 
+----------------+-------------+-------------+
| Job Start Date | Occupation  | Industry    |
+----------------+-------------+-------------+
| Not on file    | Not on file | Not on file |
+----------------+-------------+-------------+
 
 
 
+----------------+--------------+------------+
| Travel History | Travel Start | Travel End |
+----------------+--------------+------------+
 
 
 
+-------------------------------------+
| No recent travel history available. |
+-------------------------------------+
 documented as of this encounter
 
 Plan of Treatment
 
 
+--------+---------+-----------+----------------------+-------------+
| Date   | Type    | Specialty | Care Team            | Description |
+--------+---------+-----------+----------------------+-------------+
| / | Office  | Neurology |   Ravindra Munoz MD  [address] |             |
| 2020   | Visit   |           |  [address]      |             |
|        |         |           | [address]  [address]  |             |
|        |         |           | [place] [address]             |             |
|        |         |           | [phone]         |             |
|        |         |           | [phone] (Fax)   |             |
+--------+---------+-----------+----------------------+-------------+
 documented as of this encounter
 
 Procedures
 
 
+----------------------+--------+-------------+----------------------+----------------------
+
| Procedure Name       | Priori | Date/Time   | Associated Diagnosis | Comments             
|
|                      | ty     |             |                      |                      
|
+----------------------+--------+-------------+----------------------+----------------------
+
| XR LUMBAR SPINE 2 OR | Routin | 2018  |                      |   Results for this   
|
|  3 VW                | e      | 10:45 AM    |                      | procedure are in the 
|
|                      |        | PST         |                      |  results section.    
|
+----------------------+--------+-------------+----------------------+----------------------
+
 documented in this encounter
 
 Results
 
 XR Lumbar Spine 2 or 3 Vw (2018 10:45 AM PST)
 
+----------+
| Specimen |
+----------+
|          |
+----------+
 
 
 
+--------------------------------------------------------------------+---------------+
| Narrative                                                          | Performed At  |
+--------------------------------------------------------------------+---------------+
|   External films for comparison only - no result from [facility].  |   [facility] |
+--------------------------------------------------------------------+---------------+
 
 
 
+---------------+---------+--------------------+--------------+
| Performing    | Address | City/State/Zipcode | Phone Number |
| Organization  |         |                    |              |
+---------------+---------+--------------------+--------------+
|   [facility] |         |                    |              |
+---------------+---------+--------------------+--------------+
 documented in this encounter
 
 Visit Diagnoses
 Not on filedocumented in this encounter

## 2020-06-08 NOTE — XMS
Encounter Summary
  Created on: 2020
 
 Iggy Berna Sarmiento
 External Reference #: [id number]
 : 52
 Sex: Male
 
 Demographics
 
 
+-----------------------+---------------------------+
| Address               | [address]           |
|                       | [place]  [address] |
+-----------------------+---------------------------+
| Home Phone            | [phone]           |
+-----------------------+---------------------------+
| Preferred Language    | Unknown                   |
+-----------------------+---------------------------+
| Marital Status        |                    |
+-----------------------+---------------------------+
| Am[employer] Affiliation | Unknown                   |
+-----------------------+---------------------------+
| Race                  | Unknown                   |
+-----------------------+---------------------------+
| Ethnic Group          | Unknown                   |
+-----------------------+---------------------------+
 
 
 Author
 
 
+--------------+--------------------------------------------+
| Author       | [facility]  |
|              | and [place]                                |
+--------------+--------------------------------------------+
| Organization | [facility]  |
|              | and [place]                                |
+--------------+--------------------------------------------+
| Address      | Unknown                                    |
+--------------+--------------------------------------------+
| Phone        | Unavailable                                |
+--------------+--------------------------------------------+
 
 
 
 Support
 
 
+---------------+--------------+---------+-----------------+
| Name          | Relationship | Address | Phone           |
+---------------+--------------+---------+-----------------+
| Sofiya A Brown | ECON         | Unknown | [phone] |
+---------------+--------------+---------+-----------------+
 
 
 
 Care Team Providers
 
 
 
+-------------------------+------+-----------------+
| Care Team Member Name   | Role | Phone           |
+-------------------------+------+-----------------+
| Ck Michelle MD | PCP  | [phone] |
+-------------------------+------+-----------------+
 
 
 
 Encounter Details
 
 
+--------+-----------+----------------------+----------------------+----------------------+
| Date   | Type      | Department           | Care Team            | Description          |
+--------+-----------+----------------------+----------------------+----------------------+
| / | Hospital  |   [facility]     |   Carroll Morris,   | Acute                |
| 2018 - | Encounter | CENTER SUR[facility]  [address] | MD  [address]   | posthemorrhagic      |
|        |           |  [address]          | [place] [address]   | anemia;              |
| / |           | [place]         | [phone]         | Gastrointestinal     |
| 2018   |           | [address]           | [phone] (Fax)   | hemorrhage,          |
|        |           | [phone]         |                      | unspecified          |
|        |           |                      |                      | gastrointestinal     |
|        |           |                      |                      | hemorrhage type;     |
|        |           |                      |                      | Acute blood loss     |
|        |           |                      |                      | anemia; Dyspnea on   |
|        |           |                      |                      | exertion;            |
|        |           |                      |                      | Palpitations; Rectal |
|        |           |                      |                      |  bleeding; Mixed     |
|        |           |                      |                      | hyperlipidemia       |
+--------+-----------+----------------------+----------------------+----------------------+
 
 
 
 Social History
 
 
+--------------+-------+-----------+--------+------+
| Tobacco Use  | Types | Packs/Day | Years  | Date |
|              |       |           | Used   |      |
+--------------+-------+-----------+--------+------+
| Never Smoker |       |           |        |      |
+--------------+-------+-----------+--------+------+
 
 
 
+---------------------+---+---+---+
| Smokeless Tobacco:  |   |   |   |
| Never Used          |   |   |   |
+---------------------+---+---+---+
 
 
 
+-------------+----------------------+---------+----------------------+
| Alcohol Use | Drinks/Week          | oz/Week | Comments             |
+-------------+----------------------+---------+----------------------+
| No          |   0 Standard drinks  | 0.0     | No longer drinking   |
|             | or equivalent        |         | 1-2 drinks 2-4 times |
|             |                      |         |  a month             |
+-------------+----------------------+---------+----------------------+
 
 
 
 
+------------------+---------------+
| Sex Assigned at  | Date Recorded |
| Birth            |               |
+------------------+---------------+
| Not on file      |               |
+------------------+---------------+
 
 
 
+----------------+-------------+-------------+
| Job Start Date | Occupation  | Industry    |
+----------------+-------------+-------------+
| Not on file    | Not on file | Not on file |
+----------------+-------------+-------------+
 
 
 
+----------------+--------------+------------+
| Travel History | Travel Start | Travel End |
+----------------+--------------+------------+
 
 
 
+-------------------------------------+
| No recent travel history available. |
+-------------------------------------+
 documented as of this encounter
 
 Last Filed Vital Signs
 
 
+-------------------+----------------------+----------------------+----------+
| Vital Sign        | Reading              | Time Taken           | Comments |
+-------------------+----------------------+----------------------+----------+
| Blood Pressure    | 111/73               | 2018 12:14 PM  |          |
|                   |                      | PDT                  |          |
+-------------------+----------------------+----------------------+----------+
| Pulse             | 96                   | 2018 12:14 PM  |          |
|                   |                      | PDT                  |          |
+-------------------+----------------------+----------------------+----------+
| Temperature       | 36.6   C (97.9   F)  | 2018 12:14 PM  |          |
|                   |                      | PDT                  |          |
+-------------------+----------------------+----------------------+----------+
| Respiratory Rate  | 16                   | 2018 12:14 PM  |          |
|                   |                      | PDT                  |          |
+-------------------+----------------------+----------------------+----------+
| Oxygen Saturation | -                    | -                    |          |
+-------------------+----------------------+----------------------+----------+
| Inhaled Oxygen    | -                    | -                    |          |
| Concentration     |                      |                      |          |
+-------------------+----------------------+----------------------+----------+
| Weight            | 108.9 kg (240 lb 2.7 | 2018 12:14 PM  |          |
|                   |  oz)                 | PDT                  |          |
+-------------------+----------------------+----------------------+----------+
| Height            | 190.5 cm (6' 3")     | 2018 12:14 PM  |          |
|                   |                      | PDT                  |          |
+-------------------+----------------------+----------------------+----------+
 
| Body Mass Index   | 30.02                | 2018 12:14 PM  |          |
|                   |                      | PDT                  |          |
+-------------------+----------------------+----------------------+----------+
 documented in this encounter
 
 Discharge Summaries
 Claritza Medina MD - 2018 10:16 AM PDTFormatting of this note might be different f
rom the original.
 Discharge Summaries by Claritza Medina MD at 18 1016  
  Author:  Claritza Medina MD Service:  Hospitalist Author Type:  Physician 
  Filed:  18 1543 Date of Service:  18 1016 Status:  Signed 
  :  Claritza Medina MD (Physician)   
   
 [facility]
 Service:  Hospitalist
 Discharge Summary
 
 Date of Admission:  2018
 Date of Discharge:  2018
 
 Discharge Provider:  Claritza Medina MD
 Treatment Team: 
 Consulting Physician: Romana Shehzadi, MD
 Consulting Physician: Acute Care General Surgery
 Admitting Provider: Carroll Morrsi MD
 Discharge Diagnoses:   
 
 Principal Problem:
   Rectal bleeding
 Active Problems:
   Syncope and collapse
   Acute posthemorrhagic anemia
   SÁNCHEZ (dyspnea on exertion)
   Palpitations
   Leukocytosis
   Hyperlipidemia
   LAURA on CPAP
 Resolved Problems:
   * No resolved hospital problems. *
 
 Procedures:  Procedure(s):
 LAPAROSCOPIC - BOWEL RESECTION - SMALL
 LAPAROTOMY
 
    
 Chief Complaint:
 Rectal Bleeding
 
 Hospital Course: 
 Patient is 68 year old gentleman with PMH of Hyperlipidemia, high coronary artery calcium s
core, on aspirin, statin, beta blocker, obstructive sleep apnea on C-PAP.  The patient trans
ferred from [facility] to [facility] for acute lower GI bleeding for 
the past 2 days. Rectal bleeding was maroon-colored to dark.  total of 11-12 episodes of rec
eduard bleeding. He also has syncopal episode,  H and H of 5 and 19, at time of admission.
 Syncope and collapse, Secondary to acute posthemorrhagic anemia, no recurrence.
 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
ostasis with endoscopy or IR, s/p  diagnostic laparoscopy with small bowel resection on 8/15
 performed by Dr. Stallworth.
 S/P total 8 units PRBC tranfusion since admission, 
 Patient improved with minimal bleeding episodes likely old blood, no drop in H/H for past 2
 
 days, diet was advanced and tolerated.
 
 
 
 Outstanding Issues:  
 
 Discharge Exam and Data:
 Vital Signs:
 /73 (BP Location: Right upper arm)  | Pulse 96  | Temp 97.9 F (36.6 C) (Oral)  | 
Resp 16  | Ht 1.905 m (6' 3")  | Wt 108.9 kg (240 lb 2.7 oz)  | SpO2 95%  | BMI 30.02 kg/m
 
 Physical Examination:
 Constitutional: Alert and oriented to person, place, and time. Appears well-developed and w
ell-nourished. 
 
 Cardiovascular: Normal rate, regular rhythm, normal heart sounds with S1 and S2 and intact 
distal pulses.  Exam reveals no gallop and no friction rub.  No murmur heard.
 
 Pulmonary/Chest: Effort normal and breath sounds normal. No stridor. No respiratory distres
s.  no wheezes. no rales. exhibits no tenderness. 
 
 Abdominal: Soft. Bowel sounds are normal. exhibits no distension and no mass. There is no t
enderness. There is no rebound and no guarding. 
 
 Musculoskeletal: Normal range of motion.exhibits no tenderness.  exhibits no edema. 
   
 Neurological:  Alert and oriented to person, place, and time. Has normal reflexes.  display
s normal reflexes. No cranial nerve deficit.  Exhibits normal muscle tone. Coordination norm
al.
 
 Skin: Skin is warm. No pallor. 
 
 Psychiatric: Has a normal mood and affect. Behavior is normal. Judgment normal.
  
 
 Labs:
 
 Recent Labs
 Lab 18
 0813 18
 1945 18
 04218
 0437  18
 0642  18
 0547 
 WBC  --   --  7.52  --  8.78  --  10.71  < > 10.24 
 HGB 8.1* 7.8* 7.7*  < > 8.1*  < > 8.3*  < > 7.4* 
 HCT 23.8* 23.2* 22.0*  < > 23.4*  < > 23.7*  < > 21.1* 
 PLT  --   --  242  --  232  --  215  < > 159 
 NEUTOPHILPCT  --   --  80.26  --   --   --  89.16  --  85.12 
 MONOPCT  --   --  6.14  --   --   --  5.23  --  5.64 
 < > = values in this interval not displayed.
 
 Recent Labs
 Lab 18
 04218
 0437 18
 0557 08/15/18
 0133 18
 0547 
 
  142 143 144 145 
 K 3.5 3.5 4.1 3.8 3.5 
  109 112* 114* 116* 
 CO2 23 23 21* 22* 22* 
 BUN 6* 10 10 10 10 
 CREATININE 0.7 0.6* 0.59* 0.6* 0.67* 
 PROT  --   --  4.6* 4.4* 4.5* 
 BILITOT  --   --  0.5 0.8 0.5 
 ALT  --   --  16 16 17 
 AST  --   --  16 20 20 
 
 Invalid input(s): YUSRA
 
 Recent Labs
 Lab 18
 0557 08/15/18
 0133 18
 0547 
 MG 2.1 1.8 1.9 
 
 No results for input(s): AMYLASE in the last 168 hours.
 No results for input(s): PHART, PO2ART, IUU4HSJ, Y2RECAQO, BEART in the last 168 hours.
 
 Recent Labs
 Lab 18
 2140 18
 2027 
 INR 1.0 1.0 
 
 No results for input(s): CKTOTAL, TROPONINI, TROPONINT, CKMBINDEX in the last 168 hours.
 
 Significant Diagnostic Studies:  
 X-ray Abdomen 1 View
 
 Result Date: 8/15/2018
 BERNA PARKINSON 1950 XR ABDOMEN 1 VIEW 8/15/2018 5:12 PM INDICATION: Intraoperative a
ssessment. COMPARISON: none TECHNIQUE: Abdominal series, frontal view. 
 
 Findings and impression:  Mild gaseous prominence of the bowel loops, favoring ileus. No un
expected radiopaque foreign body appreciated. Osseous structures appear unremarkable. Electr
onically signed by Juan Mckeon MD on 8/15/2018 5:16 PM
 
 Nm Gi Blood Loss
 
 Addendum Date: 2018  
 TECHNIQUE: An additional hour of scintigraphic imaging in the anterior projection over the 
abdomen and pelvis was performed portably. FINDINGS: There is activity seen extending throug
h multiple small bowel loops predominantly in the left upper quadrant, corresponding with th
e earlier suspicion of upper GI bleed, probably in the duodenal sweep. IMPRESSION: 1.  I jessica
pect an upper GI bleed, probably in the duodenal sweep. Electronically signed by Jerry turner MD on 2018 8:13 AM
 
 Result Date: 2018
 HISTORY: Hemorrhagic anemia. GI hemorrhage. COMPARISON: CTA abdomen and pelvis 18. TE
CHNIQUE: The patient's blood cells were labeled with 22.4 mCi technetium 99m UltraTag, and r
einjected into the patient. Dynamic flow and delayed scintigraphic imaging was performed in 
the anterior ejection over the abdomen and pelvis for 60 minutes. FINDINGS: There is accumul
ation of activity in the right upper quadrant just inferior to the hepatic hilum, correspond
ing to the proximal duodenum. Activity seems to dissipate from this region, and does not for
m a tubular collection of blood. Focal uptake near the end of the study in the right upper q
 
uadrant also dissipates rapidly. This would favor an upper GI hemorrhage, probably in the du
odenal sweep. Unfortunately, this is not specific. Additional imaging for another hour was r
equested. The patient refused. We will attempt to perform this secondary imaging on the floo
r using the portable camera. 
 
 1.  Indeterminate hemorrhage, probably involving the duodenal sweep. Further imaging is nec
essary. Addendum to follow if the patient allows further imaging. Electronically signed by NIKIA Osullivan MD on 2018 3:32 PM
 
 Ir Angiogram Visceral Selective
 
 Result Date: 2018
 IR ANGIOGRAM VISCERAL SELECTIVE dated 2018 5:35 PM CLINICAL DATA: GI bleeding with EGD
 push enteroscopy done immediately prior to this procedure demonstrating a jejunal diverticu
lar hemorrhage with GI clip placement proximal to the bleed. COMPARISON STUDIES: Endoscopic 
images same date and CTA abdomen and pelvis 2018 PRIMARY ANGIOGRAPHER: Tolu Hernandez MD,
 PhD, RPVI OPERATIONS: 1.  Ultrasound-guided right common femoral access 2.  Celiac arteriog
richard 3.  Gastroduodenal subselective arteriogram 4.  Superior mesenteric arteriogram 5.   Jej
unal subselective arteriograms x5 branches. PROCEDURE: Informed consent was obtained from 
e patient's wife as the patient was still sedated from EGD performed immediately prior to 
is procedure.  Continuous cardiac monitoring was performed throughout the procedure. General
 anesthesia was provided by anesthesiology. Contrast: 120 cc Isovue 250 Fluoroscopy time: 8.
6 minutes Radiation dose: 1960 mGy air kerma Patient was sterilely prepped and draped in the
 usual fashion. The right femoral head was fluoroscopically localized and the skin was local
ly anesthetized with 1% lidocaine. A skin nick was made with 11 blade. Real-time ultrasound 
guided micropuncture access obtained, images in PACS. The microsheath was exchanged to a 5 F
rench sheath. A Sos 2 catheter was advanced and formed in the thoracic aorta and brought yanelis
n to select the celiac artery for arteriogram. A Progreat microcatheter was advanced into th
e gastroduodenal artery and subselective arteriogram was performed. The microcatheter was re
moved. The Sos 2 catheter was then used to select the superior mesenteric artery and arterio
gram was performed. The microcatheter was reintroduced and subselective arteriography of the
 jejunal branches with a total of 5 branches selected with the catheter in areas positions w
ithin the vessels and imaging over the area of the gastrointestinal clip in the left upper q
uadrant were obtained. No apparent hemorrhage was noted. Therefore, the Progreat microcathet
er and Sos 2 catheter were removed. Right iliofemoral arteriogram demonstrated common femora
l arterial puncture. Mynx closure was performed with immediate hemostasis. FINDINGS: Celiac 
arteriogram demonstrates typical branching anatomy. The gastroduodenal artery subselective a
rteriogram demonstrates no contrast extravasation. Superior mesenteric arteriogram demonstra
marily expected clustered jejunal branches in the left upper quadrant and the presence of a GI 
clip in the left upper abdominal quadrant. Subselective jejunal branch angiography is obtain
ed with the microcatheter in multiple proximal to distal branch positions with no apparent c
ontrast extravasation. 
 
 No apparent source of GI bleeding on extensive mesenteric angiography. Electronically huyen
d by Tolu Hernandez MD on 2018 8:08 PM
 
 X-ray Chest 1 View
 
 Result Date: 2018
 BERNA SARMIENTO IGGY 1950 68 years XR CHEST 1 VIEW 2018 9:59 PM INDICATION: Fever. Bl
ood transfusions. COMPARISON: None TECHNIQUE: Chest 1 view, AP view of the chest FINDINGS: L
ungs are clear. No pleural effusion, no pneumothorax. Heart size is normal. Mediastinal cont
ours are normal. No acute osseous abnormality. 
 
 No acute cardiopulmonary abnormality Electronically signed by Bubba Cordoba MD on 2018 6
:45 AM
 
 Ir Guidance Vascular Access Us
 
 Result Date: 2018
 
 This Point of Care (POC) ultrasound image has been reviewed and interpreted by the physicia
n identified as the performing physician in the associated interpretation and report. 
 
 End Esophogogastroduodenoscopy
 
 Result Date: 2018
 This is a non-reportable procedure without a radiologist report and is used for image Ellia
depict only. Please review the GI encounter notes for details on the procedure.
 
 Or General Scope Imaging
 
 Result Date: 8/15/2018
 This is a non-reportable procedure without a radiologist report and is used for image Ellia
depict only. Please review the OR procedure report for details on the procedure.
 
 Discharge Information:
 Follow up:
 Jabier Nava, DAIANAP
 [address]
 [address] [place]
 [facility] [id number] [phone]
 
 On 2018
 Post op follow up
 
 MAYCOL Marvin
 [address]
 [place] [address] [phone]
 
 Schedule an appointment as soon as possible for a visit in 1 week
 
 Romana Shehzadi,  [address]
 [address] [place] [address]
 [phone]
 
 In 4 weeks
 
  
 Medication List 
  
 START taking these medications  
 pantoprazole 40 MG tablet
 QTY:  30 tablet
 Refills:  1
 Commonly known as:  PROTONIX
 Take 1 tablet by mouth every morning before breakfast for 60 days.
  
  
 CONTINUE taking these medications  
 atorvastatin 40 MG tablet
 Refills:  0
 Commonly known as:  LIPITOR
  
 FLUoxetine 10 MG capsule
 Refills:  0
 Commonly known as:  PROzac
 
  
 lovastatin 10 MG tablet
 Refills:  0
 Commonly known as:  MEVACOR
 Notes to patient:  Resume home regimen. 
  
 metoprolol 25 MG 24 hr tablet
 Refills:  0
 Commonly known as:  TOPROL-XL
 Notes to patient:  Resume home regimen. 
  
  
 You might also be taking other medications not listed above. If you have questions about an
y of your other medications, talk to the person who prescribed them or your Primary Care Pro
vider. 
  
  
  
  
 Where to Get Your Medications 
  
 These medications were sent to [facility] [place] [place] [facility] [address] [facility]
 [address]  [address] [facility], [place] [address] 
  Phone:  [phone] 
  pantoprazole 40 MG tablet
  
 
 Disposition:  Home
 Condition:  Stable
 Code Status:  Full Code
 
 Discharge took 35 minutes, to include final examination, discussion of admission, and prepa
ration of prescriptions, instructions for on-going care, follow-up and documentation of disc
harge summary.
 
 Claritza Medina MD
 3:38 PM
 2018
  
  Electronically signed by Claritza eMdina MD at 2018  3:43 PM PDTdocumented in this
 encounter
 
 Medications at Time of Discharge
 
 
+----------------------+----------------------+-----------+---------+----------+-----------+
| Medication           | Sig                  | Dispensed | Refills | Start    | End Date  |
|                      |                      |           |         | Date     |           |
+----------------------+----------------------+-----------+---------+----------+-----------+
|   atorvaSTATin       | Take 40 mg by mouth  |           | 0       | 20 |           |
| (LIPITOR) 40 mg      | Daily.               |           |         | 15       |           |
| tablet               |                      |           |         |          |           |
+----------------------+----------------------+-----------+---------+----------+-----------+
|   B Complex Vitamins | Take 1 tablet by     |           | 0       |          |           |
|  (VITAMIN B COMPLEX) | mouth Daily.         |           |         |          |           |
|  tablet              |                      |           |         |          |           |
+----------------------+----------------------+-----------+---------+----------+-----------+
|   fluticasone        | 1 spray by Nasal     |           | 0       |          |           |
| (FLONASE) 50         | route Daily.         |           |         |          |           |
| mcg/nasal spray      |                      |           |         |          |           |
 
+----------------------+----------------------+-----------+---------+----------+-----------+
|   metoprolol         | Take 25 mg by mouth  |           | 0       | 20 |           |
| succinate            | Daily.               |           |         | 17       |           |
| (TOPROL-XL) 25 mg 24 |                      |           |         |          |           |
|  hr tablet           |                      |           |         |          |           |
+----------------------+----------------------+-----------+---------+----------+-----------+
|   ASCORBIC ACID PO   | Take  by mouth.      |           | 0       |          |  |
|                      |                      |           |         |          | 0         |
+----------------------+----------------------+-----------+---------+----------+-----------+
|   cholecalciferol    | Take 1,000 Units by  |           | 0       |          |  |
| (VITAMIN D-3) 1,000  | mouth Daily.         |           |         |          | 0         |
| units tablet         |                      |           |         |          |           |
+----------------------+----------------------+-----------+---------+----------+-----------+
|   diazePAM (VALIUM)  | Take 1 tablet by     |   90      | 1       | 11/15/20 |  |
| 5 mg tablet          | mouth every 6 hours  | tablet    |         | 17       | 0         |
|                      | as needed for Muscle |           |         |          |           |
|                      |  spasms.             |           |         |          |           |
+----------------------+----------------------+-----------+---------+----------+-----------+
|                      | Take 1 tablet by     |           | 0       |          |  |
| fexofenadine-pseudoe | mouth Twice  daily   |           |         |          | 0         |
| PHEDrine (ALLEGRA-D  | as needed.           |           |         |          |           |
| ALLERGY &            |                      |           |         |          |           |
| CONGESTION)    |                      |           |         |          |           |
| MG per tablet        |                      |           |         |          |           |
+----------------------+----------------------+-----------+---------+----------+-----------+
|   FLUoxetine         | Take 20 mg by mouth  |           | 0       |          |  |
| (PROZAC) 20 mg       | Daily.               |           |         |          | 0         |
| capsule              |                      |           |         |          |           |
+----------------------+----------------------+-----------+---------+----------+-----------+
|                      | Take 0.5-1 tablets   |   120     | 0       | 11/15/20 |  |
| HYDROcodone-acetamin | by mouth every 6     | tablet    |         | 17       | 0         |
| ophen (NORCO)  | hours as needed for  |           |         |          |           |
|  mg per tablet       | Pain.                |           |         |          |           |
+----------------------+----------------------+-----------+---------+----------+-----------+
 documented as of this encounter
 
 Progress Notes
 Jabier Nava ARNP - 2018 10:36 AM PDTFormatting of this note might be diffe
rent from the original.
 Progress Notes by ORLANDO Ferreira at 18 1036  
  Author:  ORLANDO Ferreira Service:  General Surgery Author Type:  Advanced Darrick ca Nurse Practitioner 
  Filed:  18 1038 Date of Service:  18 1036 Status:  Signed 
  :  ORLANDO Ferreira (Advanced Registered Nurse Practitioner)  Cosigner:  REBEKAH Sepulveda MD at 18 1002 
   
 
 Progress Note
 Hospital Day:   LOS: 7 days 
 Post-Op Day:  2 Days Post-Op
 
 Subjective
 
 This is a 68 y.o. male patient who presented to [facility] with a diagn
osis of GI bleed.
 
       Events Overnight:  
 
   Berna is sitting up eating breakfast this morning. Denies much pain. H/H: 8.1/23.8. 
 
 
 Assessment and Plan
 69 YO male with no prior abdominal surgeries with bleeding jejunal diverticulum 
  -s/p colonoscopy and EGD with epinephrine injection and dye placement on bleeding divertic
ulum on 18
  -s/p mesenteric angiogram by IR with no active bleeding identified on 18
  -S/P laparoscopy enterectomy/ single with Dr. Stallworth on 8/15/18
 
 Continues to improve. 
 Slight increase in H/H
 Continue with IV fluids and oral fluids.  
 Able to d/c when medically appropriate.  
 
 Scheduled Medications
  
   atorvastatin  40 mg Oral Nightly 
   FLUoxetine  10 mg Oral Daily 
   fluticasone  1 spray Each Nare Daily 
   [START ON 2018] pantoprazole  40 mg Oral QAM AC 
 
 Continuous Infusions 
 
 PRN Medications
 acetaminophen **OR** acetaminophen, EPINEPHrine, HYDROmorphone, ondansetron **OR** ondanset
arielle, polyethylene glycol
 
 Objective
 Vital Signs:
 BP 98/56 (BP Location: Right upper arm)  | Pulse 87  | Temp 98.3 F (36.8 C) (Oral)  | R
sav 16  | Ht 1.905 m (6' 3")  | Wt 108.9 kg (240 lb 2.7 oz)  | SpO2 95%  | BMI 30.02 kg/m 
 Results for orders placed or performed during the hospital encounter of 18 (from the 
past 24 hour(s))    
 HGB and HCT    
  Collection Time: 18  7:45 PM   
 Result  Value Ref Range 
  HGB 7.8 (L) 13.2 - 17.0 g/dL 
  HCT 23.2 (L) 39.0 - 50.0 % 
 HGB and HCT    
  Collection Time: 18  8:13 AM   
 Result  Value Ref Range 
  HGB 8.1 (L) 13.2 - 17.0 g/dL 
  HCT 23.8 (L) 39.0 - 50.0 % 
 
 Intake/Output Summary (Last 24 hours) at 18 1036
 Last data filed at 18 0356
  Gross per 24 hour 
 Intake              668 ml 
 Output             1150 ml 
 Net             -482 ml 
 
 Physical Exam
  
 Constitutional:  No distress. Sitting up in bed eating breakfast. 
 Cardiovascular:  Normal rate. 
 Pulmonary:  Effort normal. No stridor. No respiratory distress. 
 Abdominal:   Soft. no distension. Dressings in place over incisions. No redness. 
 Neurological:   Alert and oriented to person, place, and time. 
 Psychiatric:  He has a normal mood and affect His behavior is normal. Thought content haydee
l. 
 Nursing note and vitals reviewed.
 
 
 Problem List
 
 Principal Problem:
   Rectal bleeding
 Active Problems:
   Syncope and collapse
   Acute posthemorrhagic anemia
   SÁNCHEZ (dyspnea on exertion)
   Palpitations
   Leukocytosis
   Hyperlipidemia
   LAURA on CPAP
 
 Signed:
 [facility]ava [facility] 
 [facility]
 
 Portions of this chart may have been created with voice recognition software. Occasional wr
[place]-word or "sound-alike" substitutions may have occurred, even after review, due to the inh
erent limitations of voice recognition software. Please read the chart carefully and recogni
ze, using context, where these substitutions have occurred. Personal communication is reques
michelle for any clarifications.
 
  
  Electronically signed by Luis Sepulveda MD at 2018 10:02 AM PDTConversion Transaction
, Provider Unknown - 2018  4:08 AM PDTFormatting of this note might be different from 
the original.
 Nurse Progress Note by Sofiya Murillo RN at 18 0408  
  Author:  Sofiya Murillo RN Service:  (none) Author Type:  Registered Nurse 
  Filed:  18 Date of Service:  18 Status:  Signed 
  :  Sofiya Murillo RN (Registered Nurse)   
   
 End of shift audit complete.
 Sofiya Murillo RN
 
  
  Electronically signed by Conversion Transaction, Provider at 08/15/2019  4:13 PM PDTConver
kameron Transaction, Provider Unknown - 2018  5:47 PM PDTFormatting of this note might be
 different from the original.
 Nurse Progress Note by Bren Valera RN at 18  
  Author:  Bren Valera RN Service:  (none) Author Type:  Registered Nurse 
  Filed:  18 Date of Service:  18 Status:  Signed 
  :  Bren Valera RN (Registered Nurse)   
   
 Chart audit complete. 
 
  
  Electronically signed by Conversion Transaction, Provider at 08/15/2019  4:15 PM PDTConver
kameron Transaction, Provider Unknown - 2018  1:56 PM PDTFormatting of this note might be
 different from the original.
 Nurse Progress Note by Dianelys Simpson RN at 18 1356  
  Author:  Dianelys Simpson RN Service:  (none) Author Type:  Registered Nurse 
  Filed:  18 1357 Date of Service:  18 1356 Status:  Signed 
  :  Dianelys Simpson RN (Registered Nurse)   
   
 Pt denies nausea post general diet meal. Dianelys Simpson RN
  
  Electronically signed by Conversion Transaction, Provider at 08/15/2019  4:14 PM PDTClaritza Loja MD - 2018 12:56 PM PDTFormatting of this note might be different from the
 original.
 
 Progress Notes by Claritza Medina MD at 18 1256  
  Author:  Claritza Medina MD Service:  Hospitalist Author Type:  Physician 
  Filed:  18 1539 Date of Service:  18 1256 Status:  Signed 
  :  Claritza Medina MD (Physician)   
   
 [facility]
 Service:  Hospitalist
 Progress Note
 
 Pt: Berna Parkinson  AGE/SEX: 68 y.o.   male           MRN: [id number]
 ROOM: 405/405-1   : 1950     PCP: Carlotta Motley
 ADMIT DATE:  2018 TODAY'S DATE:  2018
 Hospital Day/Hospital Course: 
  LOS: 6 days 
 
 SUBJECTIVE: 
 
 Patient seen and examine. He has one bowel movement today with associated small amount of r
ectal bleeding, no abdominal pain, no nausea or vomiting, he is on full diet.
 
 Scheduled Medications: 
 
   atorvastatin  40 mg Oral Nightly 
   FLUoxetine  10 mg Oral Daily 
   fluticasone  1 spray Each Nare Daily 
 
 Continuous Infusions 
 
  
   pantoprazole 8 mg/hr (18 0231) 
 
 PRN Medications 
 
 acetaminophen **OR** acetaminophen, EPINEPHrine, HYDROmorphone, ondansetron **OR** ondanset
arielle, polyethylene glycol
 
 Allergy: 
 
 Allergies   
 Allergen  Reactions 
   Demerol [Meperidine] Anxiety 
 
 OBJECTIVE: 
 
 Vitals:
 
 Temp:  [98.2 F (36.8 C)-98.8 F (37.1 C)] 98.8 F (37.1 C)
 Heart Rate:  [] 92
 Resp:  [18] 18
 BP: (124-146)/(58-75) 124/58
 I&O Detailed Table: 
 
 Intake/Output Summary (Last 24 hours) at 18 1256
 Last data filed at 18 0900
  Gross per 24 hour 
 Intake             2087 ml 
 Output             1650 ml 
 Net              437 ml 
 
 Patient Vitals for the past 96 hrs:
 
  Weight 
 08/15/18 0245 108.9 kg (240 lb 2.7 oz) 
 
 Physical Examination: 
 
 Constitutional: Alert and oriented to person, place, and time. 
 HEENT: Neck supple, no JVD, non icteric sclera.
 Cardiovascular: Normal rate, regular rhythm, normal heart sounds with S1 and S2, and intact
 distal pulses.  Exam reveals no gallop and no friction rub.  No murmur heard.
 
 Pulmonary/Chest: Effort normal and breath sounds normal. No stridor. No respiratory distres
s.  no wheezes. no rales. exhibits no tenderness. 
 Abdominal: Soft. Bowel sounds are normal. exhibits no distension and no mass. There is no t
enderness. Scars of laparoscopy clean, There is no rebound and no guarding. 
 
 Extremeties/Musculoskeletal: Normal range of motion.exhibits no tenderness.  exhibits no ed
ema. 
 Normal equal peripheral pulses.
   
 Neurological:  Alert and oriented to person, place, and time.  No cranial nerve deficit.  E
xhibits normal muscle tone. No gross motor deficits.
 
 Skin: Skin is warm. No pallor. 
 
 Patient has normal capillary refill, no mottling.
 Psychiatric: Has a normal mood and affect. Behavior is normal. Judgment normal. 
 
 LABS: 
 
 Recent Labs
 Lab 18
 04218
 1635 18
 0437  18
 0642  18
 0547 
 WBC 7.52  --  8.78  --  10.71  < > 10.24 
 HGB 7.7* 8.2* 8.1*  < > 8.3*  < > 7.4* 
 HCT 22.0* 24.2* 23.4*  < > 23.7*  < > 21.1* 
   --  232  --  215  < > 159 
 NEUTOPHILPCT 80.26  --   --   --  89.16  --  85.12 
 MONOPCT 6.14  --   --   --  5.23  --  5.64 
 < > = values in this interval not displayed.
 
 Recent Labs
 Lab 18
 04218
 0437 18
 0557 08/15/18
 0133 18
 0547 
  142 143 144 145 
 K 3.5 3.5 4.1 3.8 3.5 
  109 112* 114* 116* 
 CO2 23 23 21* 22* 22* 
 BUN 6* 10 10 10 10 
 CREATININE 0.7 0.6* 0.59* 0.6* 0.67* 
 PROT  --   --  4.6* 4.4* 4.5* 
 BILITOT  --   --  0.5 0.8 0.5 
 ALT  --   --  16 16 17 
 
 AST  --   --  16 20 20 
 
 Phosphorus:  
 Lab Results    
 Component  Value Date 
  PHOS 1.7 (L) 2018 
 
 Invalid input(s): LABALRICK
 
 Recent Labs
 Lab 18
 0557 08/15/18
 0133 18
 0547 
 MG 2.1 1.8 1.9 
 
 No results for input(s): AMYLASE in the last 168 hours.
 No results for input(s): PHART, PO2ART, RPK9VIL, N8LCYFWI, BEART in the last 168 hours.
 
 Recent Labs
 Lab 180 18 
 INR 1.0 1.0 
 
 No results for input(s): TSH, T3FREE, FREET4 in the last 168 hours.
 No results for input(s): CKTOTAL, TROPONINI, TROPONINT, CKMBINDEX in the last 168 hours.i
 
 PROBLEM LIST 
 
 Principal Problem:
   Rectal bleeding
 Active Problems:
   Syncope and collapse
   Acute posthemorrhagic anemia
   SÁNCHEZ (dyspnea on exertion)
   Palpitations
   Leukocytosis
   Hyperlipidemia
   LAURA on CPAP
 
 ASSESSMENT & PLAN 
 
 1.  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
 hemostasis with endoscopy or IR, s/p  diagnostic laparoscopy with small bowel resection on 
8/15 performed by Dr. Stallworth.
 He is still on Protonix drip will change to BID.
 Has small rectal bleeding today, will monitor. Mild drop in H/H
 2. Acute blood loss anemia, s/p total 8 units PRBC tranfusion since admission, HGB stable w
ith mild drop today, will monitor.
 Continue to monitor H/H
 3.Syncope and collapse, Secondary to acute posthemorrhagic anemia, no recurrence.
 
 4. DVT prophylaxis SCD.
 
 Claritza Medina MD
 2018
 12:56 PM
 
  
 
  Electronically signed by Claritza Medina MD at 2018  3:39 PM Jabier Fam ARNP - 2018  9:29 AM PDTFormatting of this note might be different from the origi
nal.
 Progress Notes by ORLANDO Ferreira at 1811  
  Author:  ORLANDO Ferreira Service:  General Surgery Author Type:  Advanced Darrick
tered Nurse Practitioner 
  Filed:  18 1113 Date of Service:  18 Status:  Signed 
  :  ORLANDO Ferreira (Advanced Registered Nurse Practitioner)  Cosigner:  REBEKAH Sepulevda MD at 18 0964 
   
 
 Progress Note
 Hospital Day:   LOS: 6 days 
 Post-Op Day:  2 Days Post-Op
 
 Subjective
 
 This is a 68 y.o. male patient who presented to [facility] with a diagn
osis of GI bleed.
 
       Events Overnight:  
 
   Berna is laying down comfortably with family at bedside.  Patient reports that he has ha
d multiple BM's. Slight drop in H/H. VSS. WBC: 7.52. Tmax: 98.7. 
 
 Assessment and Plan
 69 YO male with no prior abdominal surgeries with bleeding jejunal diverticulum 
  -s/p colonoscopy and EGD with epinephrine injection and dye placement on bleeding divertic
ulum on 18
  -s/p mesenteric angiogram by IR with no active bleeding identified on 18
  -S/P laparoscopy enterectomy/ single with Dr. Stallworth on 8/15/18
 
 Patient stable from a surgical standpoint. 
 Slight drop in H/H
 Continue with IV fluids and oral fluids. 
 Will advance diet as tolerated. 
 Able to d/c when medically appropriate.  
 
 Scheduled Medications
  
   atorvastatin  40 mg Oral Nightly 
   FLUoxetine  10 mg Oral Daily 
   fluticasone  1 spray Each Nare Daily 
 
 Continuous Infusions 
   pantoprazole 8 mg/hr (18 0231) 
 
 PRN Medications
 acetaminophen **OR** acetaminophen, EPINEPHrine, HYDROmorphone, ondansetron **OR** ondanset
arielle, polyethylene glycol
 
 Objective
 Vital Signs:
 /63 (BP Location: Left upper arm)  | Pulse 86  | Temp 98.2 F (36.8 C) (Oral)  | R
sav 18  | Ht 1.905 m (6' 3")  | Wt 108.9 kg (240 lb 2.7 oz)  | SpO2 92%  | BMI 30.02 kg/m 
 Results for orders placed or performed during the hospital encounter of 18 (from the 
past 24 hour(s))    
 HGB and HCT    
  Collection Time: 18  4:35 PM   
 Result  Value Ref Range 
 
  HGB 8.2 (L) 13.2 - 17.0 g/dL 
  HCT 24.2 (L) 39.0 - 50.0 % 
 CBC W/Auto Diff (Reflex to Manual)    
  Collection Time: 18  4:21 AM   
 Result  Value Ref Range 
  WBC 7.52 3.80 - 11.00 K/uL 
  RBC 2.51 (L) 4.20 - 5.70 M/uL 
  HGB 7.7 (L) 13.2 - 17.0 g/dL 
  HCT 22.0 (L) 39.0 - 50.0 % 
  MCV 87.8 80.0 - 100.0 fl 
  MCH 30.6 27.0 - 34.0 pg 
  MCHC 34.9 32.0 - 35.5 g/dL 
  RDW SD 50.8 37 - 53 fl 
   150 - 400 K/uL 
  MPV 7.2 fl 
  DIFF TYPE AUTOMATED  
  NEUTROPHILS 80.26 % 
  LYMPHOCYTES 10.02 % 
  MONOCYTES 6.14 % 
  EOSINOPHILS 3.33 % 
  BASOPHILS 0.25 % 
  NEUTROPHILS ABS 6.04 1.90 - 7.40 K/uL 
  LYMPHOCYTES ABS 0.75 (L) 1.00 - 3.90 K/uL 
  MONOCYTES ABS 0.46 0.00 - 0.80 K/uL 
  EOSINOPHILS ABS 0.25 0.00 - 0.50 K/uL 
  BASOPHILS ABS 0.02 0.00 - 0.10 K/uL 
 Basic metabolic panel    
  Collection Time: 18  4:21 AM   
 Result  Value Ref Range 
  SODIUM 144 135 - 145 mmol/L 
  POTASSIUM 3.5 3.5 - 4.9 mmol/L 
  CHLORIDE 109 99 - 109 mmol/L 
  CO2 23 23 - 32 mmol/L 
  ANION GAP AGAP 16 5 - 20 mmol/L 
  GLUCOSE 103 (H) 65 - 99 mg/dL 
  BUN 6 (L) 8 - 25 mg/dL 
  CREATININE 0.7 0.70 - 1.30 mg/dL 
  BUN/CREAT 9  
  CALCIUM 7.6 (L) 8.5 - 10.5 mg/dL 
  EGFR >60 >60 mL/min/1.73m2 
 
 Intake/Output Summary (Last 24 hours) at 18 0929
 Last data filed at 18 0900
  Gross per 24 hour 
 Intake             2087 ml 
 Output             1650 ml 
 Net              437 ml 
 
 Physical Exam
  
 Constitutional:  No distress. Laying down with family at bedside. 
 Cardiovascular:  Normal rate. 
 Pulmonary:  Effort normal. No stridor. No respiratory distress. 
 Abdominal:   Soft. no distension. Dressings in place over incisions. No redness. 
 Neurological:   Alert and oriented to person, place, and time. 
 Psychiatric:  He has a normal mood and affect His behavior is normal. Thought content haydee
l. 
 Nursing note and vitals reviewed.
 
 Problem List
 
 
 Principal Problem:
   Rectal bleeding
 Active Problems:
   Syncope and collapse
   Acute posthemorrhagic anemia
   SÁNCHEZ (dyspnea on exertion)
   Palpitations
   Leukocytosis
   Hyperlipidemia
   LAURA on CPAP
 
 Signed:
 Jabier PERRY 
 [facility]
 
 Portions of this chart may have been created with voice recognition software. Occasional wr
[place]-word or "sound-alike" substitutions may have occurred, even after review, due to the inh
erent limitations of voice recognition software. Please read the chart carefully and recogni
ze, using context, where these substitutions have occurred. Personal communication is reques
michelle for any clarifications.
 
  
  Electronically signed by Luis Sepulveda MD at 2018  9:58 AM PDTConversion Transaction
, Provider Unknown - 2018 12:41 AM PDTFormatting of this note might be different from 
the original.
 Progress Notes by Prachi Sandhu RN at 18  
  Author:  Prachi Sandhu RN Service:  (none) Author Type:  Registered Nurse 
  Filed:  18 Date of Service:  18 Status:  Signed 
  :  Prachi Sandhu RN (Registered Nurse)   
   
 One blood clot noted with BM tonight. No other s.s of active bleeding. Vitals stable. 
  
  Electronically signed by Conversion Transaction, Provider at 08/15/2019  4:17 PM PDTConver
kameron Transaction, Provider Unknown - 2018  6:26 PM PDTFormatting of this note might be
 different from the original.
 Nurse Progress Note by Betty Chan RN at 18  
  Author:  Betty Chan RN Service:  (none) Author Type:  Registered Nurse 
  Filed:  18 Date of Service:  18 Status:  Signed 
  :  Betty Chan RN (Registered Nurse)   
   
 Pt had BM today no signs of bleeding. Continues on protonix gtt, HGB/HCT drawn at 1630 and 
sent out, results still pending. Pt voiding QS after singletary discontinued. 
 
 End of shift chart check complete
 
  
  Electronically signed by Conversion Transaction, Provider at 08/15/2019  4:21 PM PDTCoTeresa guido - 2018 12:53 PM PDTFormatting of this note might be different from the 
original.
 Progress Notes by Rebekah Gillette MD at 18 1253  
  Author:  Rebekah Gillette MD Service:  Hospitalist Author Type:  Physician 
  Filed:  18 1048 Date of Service:  18 4288 Status:  Signed 
  :  Rebekah Gillette MD (Physician)   
   
 [facility]
 Adult Hospitalist Progress Note
 Hospital Day:  5
 SUBJECTIVE
      Events Overnight:  Had small BM a couple hours ago, it was dark.  Tolerating full liqu
 
id diet
 
 OBJECTIVE
 Vital Signs:
 Blood pressure 126/67, pulse 94, temperature 98.6 F (37 C), temperature source Oral, re
sp. rate 18, height 1.905 m (6' 3"), weight 108.9 kg (240 lb 2.7 oz), SpO2 95 %.
 Clear 
 RRR
 Soft mild soreness
 Alert and appropriate
 DATA
 
 Recent Labs
 Lab 18
 2140 18
 202 
 INR 1.0 1.0 
 
 Recent Labs
 Lab 18
 0437 18
 1741 18
 1149 18
 0642 18
 0557  08/15/18
 0133  18
 0547  18
 0910 18
 2238 
 WBC 8.78  --   --  10.71  --   --  8.94  --  10.24  < > 13.95*  --  
 HGB 8.1* 9.6* 8.9* 8.3*  --   < > 7.3*  < > 7.4*  < > 8.3* 6.4* 
 HCT 23.4* 29.4* 26.1* 23.7*  --   < > 21.2*  < > 21.1*  < > 24.0* 18.8* 
   --   --  215  --   --  161  --  159  < > 157  --  
   --   --   --  143  --  144  --  145  < > 146*  --  
 K 3.5  --   --   --  4.1  --  3.8  --  3.5  < > 3.6  --  
   --   --   --  112*  --  114*  --  116*  < > 118*  --  
 CO2 23  --   --   --  21*  --  22*  --  22*  < > 22*  --  
 BUN 10  --   --   --  10  --  10  --  10  < > 16  --  
 CREATININE 0.6*  --   --   --  0.59*  --  0.6*  --  0.67*  < > 0.71  --  
 GLUF 106*  --   --   --  99  --  106*  --  96  < > 96  --  
 EGFR >60  --   --   --  >60  --  >60  --  >60  < > >60  --  
 ANIONGAP 14  --   --   --  15  --  12  --  11  < > 9  --  
 PROT  --   --   --   --  4.6*  --  4.4*  --  4.5*  --   --   --  
 LACTATE  --   --   --   --   --   --   --   --   --   --   --  1.5 
 PHOS  --   --   --   --   --   --   --   --   --   --  1.7*  --  
 MG  --   --   --   --  2.1  --  1.8  --  1.9  --  1.9  --  
 ALB  --   --   --   --  1.9*  --  2.1*  --  2.1*  --   --   --  
 BILITOT  --   --   --   --  0.5  --  0.8  --  0.5  --   --   --  
 AST  --   --   --   --  16  --  20  --  20  --   --   --  
 ALT  --   --   --   --  16  --  16  --  17  --   --   --  
 ALP  --   --   --   --  37  --  36  --  32*  --   --   --  
 < > = values in this interval not displayed.
 
 No results for input(s): CKTOTAL, TROPONINI, TROPONINT, CKMBINDEX, CKMB in the last 168 marzena
rs.
 
 Invalid input(s): PCOTNI
 
 Recent Labs
 Lab 18
 
 2328 
 UCOL YELLOW 
 CLARITYU CLEAR 
 SPECGRAV 1.028 
 LEUKOCYTESUR NEGATIVE 
 NITRITE NEGATIVE 
 UROBILINOGEN NORMAL 
 UPRO NEGATIVE 
 PHUR 5.0 
 BLOODU NEGATIVE 
 KETONES NEGATIVE 
 BILIRUBINUR NEGATIVE 
 GLUCOSEU NEGATIVE 
 
 PROBLEM LIST
 Principal Problem:
   Rectal bleeding
 Active Problems:
   Syncope and collapse
   Acute posthemorrhagic anemia
   SÁNCHEZ (dyspnea on exertion)
   Palpitations
   Leukocytosis
   Hyperlipidemia
   LAURA on CPAP
 
 IMPRESSION/PLAN:
 1.  POD 2 enterectomy for jejunal diverticular bleeding despite attempted treatment by GI a
nd IR.  He is doing fairly well.  Tolerating full liquid diet and had small BM this morning.
  H/H down this morning from last night but no evidence otherwise of active bleeding.  Will 
check h/h this evening and in AM.  Discharge per surgery.  Remains on protonix infusion.
 
 Rebekah Gillette MD
 2018
  
  Electronically signed by Teresa Gillette at 2018 12:56 PM PDTMorgan, ORLANDO Csatillo - 2018  9:59 AM PDTFormatting of this note might be different from the origin
al.
 Progress Notes by ORLANDO Ferreira at 18 0959  
  Author:  ORLANDO Ferreira Service:  General Surgery Author Type:  Advanced Darrick
tered Nurse Practitioner 
  Filed:  18 1003 Date of Service:  18 0959 Status:  Addendum 
  :  ORLANDO Ferreira (Advanced Registered Nurse Practitioner)   
  Related Notes:  Original Note by ORLANDO Ferreira (Advanced Registered Nurse Pra
ctitioner) filed at 18 1002   
  Cosigner:  Luis Sepulveda MD at 18 1618   
   
 
 Progress Note
 Hospital Day:   LOS: 5 days 
 Post-Op Day:  2 Days Post-Op
 
 Subjective
 
 This is a 68 y.o. male patient who presented to [facility] with a diagn
osis of GI bleed.
 
       Events Overnight:  
 
   Berna is walking around the surgical floor today. He reports that his pain is minimal. H
 
e denies having a BM as of yet, but states that he feels like one will be coming soon. WBC: 
8.78. H/H: 8.1/23.4. Tmax: 98.8. 
 
 Assessment and Plan
 69 YO male with no prior abdominal surgeries with bleeding jejunal diverticulum 
  -s/p colonoscopy and EGD with epinephrine injection and dye placement on bleeding divertic
ulum on 18
  -s/p mesenteric angiogram by IR with no active bleeding identified on 18
  -S/P laparoscopy enterectomy/ single with Dr. Stallworth on 8/15/18
 
 Doing well. Patient is tolerating full liquids, will continue with full liquids at this betty
e. Ambulating with minimal pain. Has not had a BM yet. Will wait to d/c until tolerating gen
eral diet and having BM's. 
 
 Scheduled Medications
  
   atorvastatin  40 mg Oral Nightly 
   FLUoxetine  10 mg Oral Daily 
   fluticasone  1 spray Each Nare Daily 
 
 Continuous Infusions 
   pantoprazole 8 mg/hr (18 1854) 
   sodium chloride (IV) 30 mL/hr at 18 1156 
 
 PRN Medications
 acetaminophen **OR** acetaminophen, EPINEPHrine, HYDROmorphone, ondansetron **OR** ondanset
arielle, polyethylene glycol
 
 Objective
 Vital Signs:
 /67 (BP Location: Left upper arm)  | Pulse 104  | Temp 98.3 F (36.8 C) (Oral)  | 
Resp 18  | Ht 1.905 m (6' 3")  | Wt 108.9 kg (240 lb 2.7 oz)  | SpO2 96%  | BMI 30.02 kg/m
 
 Results for orders placed or performed during the hospital encounter of 18 (from the 
past 24 hour(s))    
 HGB and HCT    
  Collection Time: 18 11:49 AM   
 Result  Value Ref Range 
  HGB 8.9 (L) 13.2 - 17.0 g/dL 
  HCT 26.1 (L) 39.0 - 50.0 % 
 HGB and HCT    
  Collection Time: 18  5:41 PM   
 Result  Value Ref Range 
  HGB 9.6 (L) 13.2 - 17.0 g/dL 
  HCT 29.4 (L) 39.0 - 50.0 % 
 CBC W/Auto Diff (Reflex to Manual)    
  Collection Time: 18  4:37 AM   
 Result  Value Ref Range 
  WBC 8.78 3.80 - 11.00 K/uL 
  RBC 2.66 (L) 4.20 - 5.70 M/uL 
  HGB 8.1 (L) 13.2 - 17.0 g/dL 
  HCT 23.4 (L) 39.0 - 50.0 % 
  MCV 87.8 80.0 - 100.0 fl 
  MCH 30.5 27.0 - 34.0 pg 
  MCHC 34.7 32.0 - 35.5 g/dL 
  RDW SD 50.8 37 - 53 fl 
   150 - 400 K/uL 
  MPV 7.5 fl 
  DIFF TYPE MANUAL  
  Neutrophils Manual 74 % 
 
  Bands 2 % 
  METAMYELOCYTES 1 % 
  Lymphocytes Manual 17 % 
  Monocytes Manual 5 % 
  Eosinophils Manual 1 % 
  Neutrophils Absolute 6.49 1.90 - 7.40 K/uL 
  Bands Manual 0.18 0.00 - 0.20 K/uL 
  Metamyelocytes Absolute 0.09 (H) 0.00 K/uL 
  Lymphocytes Absolute 1.49 1.00 - 3.90 K/uL 
  Monocytes Absolute 0.44 0.00 - 0.80 K/uL 
  Eosinophils Absolute 0.09 0.00 - 0.50 K/uL 
  MORPHOLOGY RBC AND PLT MORPHOLOGY APPEAR NORMAL  
 Basic metabolic panel    
  Collection Time: 18  4:37 AM   
 Result  Value Ref Range 
  SODIUM 142 135 - 145 mmol/L 
  POTASSIUM 3.5 3.5 - 4.9 mmol/L 
  CHLORIDE 109 99 - 109 mmol/L 
  CO2 23 23 - 32 mmol/L 
  ANION GAP AGAP 14 5 - 20 mmol/L 
  GLUCOSE 106 (H) 65 - 99 mg/dL 
  BUN 10 8 - 25 mg/dL 
  CREATININE 0.6 (L) 0.70 - 1.30 mg/dL 
  BUN/CREAT 17  
  CALCIUM 7.6 (L) 8.5 - 10.5 mg/dL 
  EGFR >60 >60 mL/min/1.73m2 
 
 Intake/Output Summary (Last 24 hours) at 18 0959
 Last data filed at 18 0359
  Gross per 24 hour 
 Intake          3091.26 ml 
 Output              800 ml 
 Net          2291.26 ml 
 
 Physical Exam
  
 Constitutional:  No distress. Walking. 
 Cardiovascular:  Normal rate. 
 Pulmonary:  Effort normal. No stridor. No respiratory distress. 
 Abdominal:   Soft. no distension. Dressings in place over incisions. No redness. 
 Neurological:   Alert and oriented to person, place, and time. 
 Psychiatric:  He has a normal mood and affect His behavior is normal. Thought content haydee
l. 
 Nursing note and vitals reviewed.
 
 Problem List
 
 Principal Problem:
   Rectal bleeding
 Active Problems:
   Syncope and collapse
   Acute posthemorrhagic anemia
   SÁNCHEZ (dyspnea on exertion)
   Palpitations
   Leukocytosis
   Hyperlipidemia
   LAURA on CPAP
 
 Signed:
 Jabier PERRY 
 
 [facility]
 
 Portions of this chart may have been created with voice recognition software. Occasional wr
[place]-word or "sound-alike" substitutions may have occurred, even after review, due to the inh
erent limitations of voice recognition software. Please read the chart carefully and recogni
ze, using context, where these substitutions have occurred. Personal communication is reques
michelle for any clarifications.
 
  
  Electronically signed by Luis Sepulveda MD at 2018  4:18 PM PDTConversion Transaction
, Provider Unknown - 2018  6:37 AM PDTFormatting of this note might be different from 
the original.
 Progress Notes by Louisa Farrell RN at 18  
  Author:  Louisa Farrell RN Service:  (none) Author Type:  Registered Nurse 
  Filed:  18 Date of Service:  18 Status:  Signed 
  :  Louisa Farrell RN (Registered Nurse)   
   
 Chart review complete. 
  
  Electronically signed by Conversion Transaction, Provider at 08/15/2019  4:21 PM PDTConver
kameron Transaction, Provider Unknown - 2018  5:14 PM PDTFormatting of this note might be
 different from the original.
 Nurse Progress Note by Dianelys Simpson RN at 18  
  Author:  Dianelys Simpson RN Service:  (none) Author Type:  Registered Nurse 
  Filed:  18 Date of Service:  18 Status:  Signed 
  :  Dianelys Simpson RN (Registered Nurse)   
   
 Pt tolerating advance to full liquid diet and able to ambulate in the hallway with assistan
ce. 
 Chart review complete. Dianelys Simpson RN
  
  Electronically signed by Conversion Transaction, Provider at 08/15/2019  4:27 PM PDTConeftaly
Teresa ellis JOEL - 2018  1:53 PM PDTFormatting of this note might be different from the 
original.
 Progress Notes by Rebekah Gillette MD at 18 3422  
  Author:  Rebekah Gillette MD Service:  Hospitalist Author Type:  Physician 
  Filed:  18 2075 Date of Service:  18 1353 Status:  Signed 
  :  Rebekah Gillette MD (Physician)   
   
 [facility]
 Adult Hospitalist Progress Note
 Hospital Day:  4
 SUBJECTIVE
      Events Overnight:  Underwent laparoscopy/enterectomy yesterday afternoon by Dr. Stallworth
.  Tolerated procedure well.  Pain controlled today.  Tolerating clear liquids.  No flatus y
et.
 
 OBJECTIVE
 Vital Signs:
 Blood pressure 138/76, pulse 76, temperature 98.2 F (36.8 C), temperature source Oral, 
resp. rate 16, height 1.905 m (6' 3"), weight 108.9 kg (240 lb 2.7 oz), SpO2 98 %.
 Alert, pleasant.  UP in chair.  Wife at bedside
 Lungs clear
 COR RRR
 Abd- mildly tender, active BS
 Ext- generalized mild edema
 DATA
 
 Recent Labs
 Lab 18
 
 2140 18
 2027 
 INR 1.0 1.0 
 
 Recent Labs
 Lab 18
 1149 18
 0642 18
 0557 08/15/18
 2356  08/15/18
 0133  18
 0547  18
 0910 18
 2238 
 WBC  --  10.71  --   --   --  8.94  --  10.24  < > 13.95*  --  
 HGB 8.9* 8.3*  --  8.2*  < > 7.3*  < > 7.4*  < > 8.3* 6.4* 
 HCT 26.1* 23.7*  --  24.7*  < > 21.2*  < > 21.1*  < > 24.0* 18.8* 
 PLT  --  215  --   --   --  161  --  159  < > 157  --  
 NA  --   --  143  --   --  144  --  145  < > 146*  --  
 K  --   --  4.1  --   --  3.8  --  3.5  < > 3.6  --  
 CL  --   --  112*  --   --  114*  --  116*  < > 118*  --  
 CO2  --   --  21*  --   --  22*  --  22*  < > 22*  --  
 BUN  --   --  10  --   --  10  --  10  < > 16  --  
 CREATININE  --   --  0.59*  --   --  0.6*  --  0.67*  < > 0.71  --  
 GLUF  --   --  99  --   --  106*  --  96  < > 96  --  
 EGFR  --   --  >60  --   --  >60  --  >60  < > >60  --  
 ANIONGAP  --   --  15  --   --  12  --  11  < > 9  --  
 PROT  --   --  4.6*  --   --  4.4*  --  4.5*  --   --   --  
 LACTATE  --   --   --   --   --   --   --   --   --   --  1.5 
 PHOS  --   --   --   --   --   --   --   --   --  1.7*  --  
 MG  --   --  2.1  --   --  1.8  --  1.9  --  1.9  --  
 ALB  --   --  1.9*  --   --  2.1*  --  2.1*  --   --   --  
 BILITOT  --   --  0.5  --   --  0.8  --  0.5  --   --   --  
 AST  --   --  16  --   --  20  --  20  --   --   --  
 ALT  --   --  16  --   --  16  --  17  --   --   --  
 ALP  --   --  37  --   --  36  --  32*  --   --   --  
 < > = values in this interval not displayed.
 
 Recent Labs
 Lab 18
 2328 
 UCOL YELLOW 
 CLARITYU CLEAR 
 SPECGRAV 1.028 
 LEUKOCYTESUR NEGATIVE 
 NITRITE NEGATIVE 
 UROBILINOGEN NORMAL 
 UPRO NEGATIVE 
 PHUR 5.0 
 BLOODU NEGATIVE 
 KETONES NEGATIVE 
 BILIRUBINUR NEGATIVE 
 GLUCOSEU NEGATIVE 
 
 PROBLEM LIST
 Principal Problem:
   Rectal bleeding
 Active Problems:
   Syncope and collapse
   Acute posthemorrhagic anemia
 
   SÁNCHEZ (dyspnea on exertion)
   Palpitations
   Leukocytosis
   Hyperlipidemia
   LAURA on CPAP
 
 IMPRESSION/PLAN:
 1.  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  Has done well overn
ight.  Pain controlled and diet being advanced.  No flatus yet.
 2.  Acute blood loss anemia-  Has received total of 7 units PRBC at [facility].  H/H better thi
s morning than last night.  No evidence of continued bleeding.  No indication for transfusio
n at this time.  Will recheck H/H this afternoon and then in morning.
 3.  LAURA on CPAP- patient using his own CPAP without problems.
 
 Rebekah Gillette MD
 2018
  
  Electronically signed by Teresa Gillette at 2018  2:03 PM Frank Pleitez MD - 2018 12:05 PM PDTFormatting of this note might be different from the nata
l.
 Progress Notes by Frank Stallworth MD at 18 1205  
  Author:  Frank Stallworth MD Service:  General Surgery Author Type:  Physician 
  Filed:  18 1243 Date of Service:  18 120 Status:  Addendum 
  :  Frank Stallworth MD (Physician)   
  Related Notes:  Original Note by ORLANDO Ferreira (Advanced Registered Nurse Pra
ctitioner) filed at 18 1211   
   
 
 Progress Note
 Hospital Day:   LOS: 4 days 
 Post-Op Day:  2 Days Post-Op
 
 Subjective
 
 This is a 68 y.o. male patient who presented to [facility] with a diagn
osis of GI bleed.
 
       Events Overnight:  
 
   Berna underwent a laparoscopic enterectomy single for upper GI bleeding/jejunal divertic
ulum. He is sitting up in bed, states that his pain is under control. H&H stable. WBC: 10.71
. Tmax: 98.6. 
 
 Assessment and Plan
 69 YO male with no prior abdominal surgeries with bleeding jejunal diverticulum 
  -s/p colonoscopy and EGD with epinephrine injection and dye placement on bleeding divertic
ulum on 18
  -s/p mesenteric angiogram by IR with no active bleeding identified on 18
  -S/P laparoscopy enterectomy/ single with Dr. Stallworth on 8/15/18
 
 Patient is doing well from a surgical perspective. Can advance diet to full liquids. Can am
bulate as tolerated. Will follow up tomorrow. Continue to monitor H&H levels.  
 
 Scheduled Medications
  
   atorvastatin  40 mg Oral Nightly 
   FLUoxetine  10 mg Oral Daily 
   fluticasone  1 spray Each Nare Daily 
 
 Continuous Infusions 
 
   pantoprazole 8 mg/hr (18 0717) 
   sodium chloride (IV) 30 mL/hr at 18 1156 
 
 PRN Medications
 acetaminophen **OR** acetaminophen, EPINEPHrine, HYDROmorphone, ondansetron **OR** ondanset
arielle, ondansetron **OR** ondansetron, polyethylene glycol
 
 Objective
 Vital Signs:
 /68 (BP Location: Left upper arm)  | Pulse 73  | Temp 98.6 F (37 C) (Oral)  | Res
p 16  | Ht 1.905 m (6' 3")  | Wt 108.9 kg (240 lb 2.7 oz)  | SpO2 98%  | BMI 30.02 kg/m 
 Results for orders placed or performed during the hospital encounter of 18 (from the 
past 24 hour(s))     
 Type and screen     
  Collection Time: 08/15/18  2:32 PM    
 Result   Value Ref Range 
  ABO/RH(D)  O POSITIVE  
  ANTIBODY SCREEN  NEGATIVE  
  ARM BAND NUMBER  [id number]  
  UNIT NUMBER  [id number]  
  BLOOD COMPONENT TYPE  LEUKODEPLETED PC  
  UNIT DIVISION  00  
  STATUS OF UNIT  ISSUED,FINAL  
  TRANSFUSION STATUS  OK TO TRANSFUSE  
  CROSSMATCH RESULT    
   COMPATIBLE
 Testing performed at [address];[place] [address]
    
 HGB and HCT     
  Collection Time: 08/15/18  6:40 PM    
 Result   Value Ref Range 
  HGB  8.7 (L) 13.2 - 17.0 g/dL 
  HCT  26.1 (L) 39.0 - 50.0 % 
 HGB and HCT     
  Collection Time: 08/15/18 11:56 PM    
 Result   Value Ref Range 
  HGB  8.2 (L) 13.2 - 17.0 g/dL 
  HCT  24.7 (L) 39.0 - 50.0 % 
 Comprehensive metabolic panel     
  Collection Time: 18  5:57 AM    
 Result   Value Ref Range 
  SODIUM  143 135 - 145 mmol/L 
  POTASSIUM  4.1 3.5 - 4.9 mmol/L 
  CHLORIDE  112 (H) 99 - 109 mmol/L 
  CO2  21 (L) 23 - 32 mmol/L 
  ANION GAP AGAP  15 5 - 20 mmol/L 
  GLUCOSE  99 65 - 99 mg/dL 
  BUN  10 8 - 25 mg/dL 
  CREATININE  0.59 (L) 0.70 - 1.30 mg/dL 
  BUN/CREAT  16  
  CALCIUM  7.2 (L) 8.5 - 10.5 mg/dL 
  TOTAL PROTEIN  4.6 (L) 6.3 - 8.2 g/dL 
  Albumin  1.9 (L) 3.3 - 4.8 g/dL 
  GLOBULIN  2.7 1.3 - 4.9 g/dL 
  A/G  0.7 (L) 1.0 - 2.4 
  TBIL  0.5 0.1 - 1.5 mg/dL 
  ALK PHOS  37 35 - 115 U/L 
  AST  16 10 - 45 U/L 
  ALT  16 10 - 65 U/L 
  EGFR  >60 >60 mL/min/1.73m2 
 
 Magnesium     
  Collection Time: 18  5:57 AM    
 Result   Value Ref Range 
  MAGNESIUM  2.1 1.7 - 2.4 mg/dL 
 CBC W/Auto Diff (Reflex to Manual)     
  Collection Time: 18  6:42 AM    
 Result   Value Ref Range 
  WBC  10.71 3.80 - 11.00 K/uL 
  RBC  2.69 (L) 4.20 - 5.70 M/uL 
  HGB  8.3 (L) 13.2 - 17.0 g/dL 
  HCT  23.7 (L) 39.0 - 50.0 % 
  MCV  88.3 80.0 - 100.0 fl 
  MCH  30.9 27.0 - 34.0 pg 
  MCHC  35.0 32.0 - 35.5 g/dL 
  RDW SD  49.4 37 - 53 fl 
  PLT  215 150 - 400 K/uL 
  MPV  7.0 fl 
  DIFF TYPE  AUTOMATED  
  NEUTROPHILS  89.16 % 
  LYMPHOCYTES  5.11 % 
  MONOCYTES  5.23 % 
  EOSINOPHILS  0.37 % 
  BASOPHILS  0.13 % 
  NEUTROPHILS ABS  9.55 (H) 1.90 - 7.40 K/uL 
  LYMPHOCYTES ABS  0.55 (L) 1.00 - 3.90 K/uL 
  MONOCYTES ABS  0.56 0.00 - 0.80 K/uL 
  EOSINOPHILS ABS  0.04 0.00 - 0.50 K/uL 
  BASOPHILS ABS  0.01 0.00 - 0.10 K/uL 
  MORPHOLOGY  RBC AND PLT MORPHOLOGY APPEAR NORMAL  
  Platelet Estimate  ADEQUATE  
  Diff Comment  SLIDE SCANNED, AGREES WITH AUTOMATED RESULTS.  
 HGB and HCT     
  Collection Time: 18 11:49 AM    
 Result   Value Ref Range 
  HGB  8.9 (L) 13.2 - 17.0 g/dL 
  HCT  26.1 (L) 39.0 - 50.0 % 
 
 Intake/Output Summary (Last 24 hours) at 18 1205
 Last data filed at 18 0304
  Gross per 24 hour 
 Intake             1750 ml 
 Output             1075 ml 
 Net              675 ml 
 
 Physical Exam
  
 Constitutional:  No distress. Sitting up in chair. 
 Cardiovascular:  Normal rate. 
 Pulmonary:  Effort normal. No stridor. No respiratory distress. 
 Abdominal:   Soft. no distension. Dressings in place over incisions. No redness. 
 Neurological:   Alert and oriented to person, place, and time. 
 Psychiatric:  He has a normal mood and affect His behavior is normal. Thought content haydee
l. 
 Nursing note and vitals reviewed.
 
 Problem List
 
 Principal Problem:
   Rectal bleeding
 Active Problems:
 
   Syncope and collapse
   Acute posthemorrhagic anemia
   SÁNCHEZ (dyspnea on exertion)
   Palpitations
   Leukocytosis
   Hyperlipidemia
   LAURA on CPAP
 
 Signed:
 Jabier PERRY 
 [facility]
 
 Portions of this chart may have been created with voice recognition software. Occasional wr
[place]-word or "sound-alike" substitutions may have occurred, even after review, due to the inh
erent limitations of voice recognition software. Please read the chart carefully and recogni
ze, using context, where these substitutions have occurred. Personal communication is reques
michelle for any clarifications.
 
 Patient seen and examined independently, chart reviewed.
 I concur with the above findings and have discussed them with the patient .
 I have made changes to the above note, where appropriate, and discussed the content and giovanny
nges with the author. 
 
 Frank Stallworth MD [facility] General Surgery
 
  
  Electronically signed by Thomas Cushing Trotta, MD at 2018 12:43 PM PDTConversion Tr
ansaction, Provider Unknown - 2018  5:30 AM PDTFormatting of this note might be differ
ent from the original.
 Progress Notes by Louisa Farrell RN at 18 0530  
  Author:  Louisa Farrell RN Service:  (none) Author Type:  Registered Nurse 
  Filed:  18 Date of Service:  18 Status:  Signed 
  :  Louisa Farrell RN (Registered Nurse)   
   
 Noc chart review complete. 
  
  Electronically signed by Conversion Transaction, Provider at 08/15/2019  4:24 PM PDTConver
kameron Transaction, Provider Unknown - 08/15/2018 10:30 AM PDTFormatting of this note might be
 different from the original.
 Therapy Progress Note by Alize Keller PT at 08/15/18 1030  
  Author:  Alize Keller PT Service:  (none) Author Type:  Physical Therapist 
  Filed:  08/15/18 1136 Date of Service:  08/15/18 1030 Status:  Signed 
  :  Alize Keller PT (Physical Therapist)   
   
 
  08/15/18 1030 
 PT Last Visit  
 PT Received On 08/15/18 
 Requires PT Follow Up On hold 
 Other Comments  
 Comments Pt's H&H is critical. Plan is for small bowel resection today. Pt continues to hav
e strict bed rest orders in place.  
 
  
  Electronically signed by Conversion Transaction, Provider at 08/15/2019  4:30 PM PDTTrotta
, Thomas Cushing, MD - 08/15/2018  9:00 AM PDTFormatting of this note might be different fro
m the original.
 Progress Notes by Frank Stallworth MD at 08/15/18 0900  
  Author:  Frank Stallworth MD Service:  General Surgery Author Type:  Physician 
 
  Filed:  08/15/18 5747 Date of Service:  08/15/18 0900 Status:  Addendum 
  :  Frank Stallworth MD (Physician)   
  Related Notes:  Original Note by Marielle Rocha PA-C (Physician Assistant - Certified)
 filed at 08/15/18 0904   
   
 
 Progress Note
 Hospital Day:   LOS: 3 days 
 Post-Op Day:  2 Days Post-Op
 
 Subjective
 
 This is a 68 y.o. male patient who presented to [facility] with a diagn
osis of GI bleed.
 
       Events Overnight:  
 
   Berna states he had a medium bowel movement that was dark red this morning. H&H 6.5/18.6
 this morning. He is going to be transfused. 
 
 Assessment and Plan
 69 YO male with no prior abdominal surgeries with bleeding jejunal diverticulum 
  -s/p colonoscopy and EGD with epinephrine injection and dye placement on bleeding divertic
ulum on 18
  -s/p mesenteric angiogram by IR with no active bleeding identified on 18
 
 Plan for laparoscopic assisted small bowel resection today to resect the bleeding diverticu
lum. 
 NPO for surgery. 
 
 Scheduled Medications
  
   atorvastatin  40 mg Oral Nightly 
   FLUoxetine  10 mg Oral Daily 
   fluticasone  1 spray Each Nare Daily 
 
 Continuous Infusions 
   pantoprazole 8 mg/hr (08/15/18 0838) 
   sodium chloride (IV) 30 mL/hr at 08/15/18 0837 
 
 PRN Medications
 acetaminophen **OR** acetaminophen, EPINEPHrine, ondansetron **OR** ondansetron, ondansetro
n **OR** ondansetron, polyethylene glycol
 
 Objective
 Vital Signs:
 /65 (BP Location: Left upper arm)  | Pulse 84  | Temp 98.4 F (36.9 C) (Oral)  | R
sav 20  | Ht 1.905 m (6' 3")  | Wt 108.9 kg (240 lb 2.7 oz)  | SpO2 98%  | BMI 30.02 kg/m 
 Results for orders placed or performed during the hospital encounter of 18 (from the 
past 24 hour(s))    
 HGB and HCT    
  Collection Time: 18 12:12 PM   
 Result  Value Ref Range 
  HGB 7.1 (L) 13.2 - 17.0 g/dL 
  HCT 21.3 (L) 39.0 - 50.0 % 
 HGB and HCT    
  Collection Time: 18  9:17 PM   
 Result  Value Ref Range 
  HGB 6.5 (LL) 13.2 - 17.0 g/dL 
  HCT 19.2 (LL) 39.0 - 50.0 % 
 
 CBC W/Auto Diff (Reflex to Manual)    
  Collection Time: 08/15/18  1:33 AM   
 Result  Value Ref Range 
  WBC 8.94 3.80 - 11.00 K/uL 
  RBC 2.40 (L) 4.20 - 5.70 M/uL 
  HGB 7.3 (L) 13.2 - 17.0 g/dL 
  HCT 21.2 (L) 39.0 - 50.0 % 
  MCV 88.4 80.0 - 100.0 fl 
  MCH 30.2 27.0 - 34.0 pg 
  MCHC 34.2 32.0 - 35.5 g/dL 
  RDW SD 50.8 37 - 53 fl 
   150 - 400 K/uL 
  MPV 7.8 fl 
  DIFF TYPE MANUAL  
  Neutrophils Manual 84 % 
  Bands 2 % 
  Lymphocytes Manual 9 % 
  Monocytes Manual 3 % 
  Eosinophils Manual 2 % 
  Neutrophils Absolute 7.51 (H) 1.90 - 7.40 K/uL 
  Bands Manual 0.18 0.00 - 0.20 K/uL 
  Lymphocytes Absolute 0.80 (L) 1.00 - 3.90 K/uL 
  Monocytes Absolute 0.27 0.00 - 0.80 K/uL 
  Eosinophils Absolute 0.18 0.00 - 0.50 K/uL 
  Platelet Estimate ADEQUATE  
  MORPHOLOGY RBC AND PLT MORPHOLOGY APPEAR NORMAL  
 Comprehensive metabolic panel    
  Collection Time: 08/15/18  1:33 AM   
 Result  Value Ref Range 
  SODIUM 144 135 - 145 mmol/L 
  POTASSIUM 3.8 3.5 - 4.9 mmol/L 
  CHLORIDE 114 (H) 99 - 109 mmol/L 
  CO2 22 (L) 23 - 32 mmol/L 
  ANION GAP AGAP 12 5 - 20 mmol/L 
  GLUCOSE 106 (H) 65 - 99 mg/dL 
  BUN 10 8 - 25 mg/dL 
  CREATININE 0.6 (L) 0.70 - 1.30 mg/dL 
  BUN/CREAT 17  
  CALCIUM 7.0 (L) 8.5 - 10.5 mg/dL 
  TOTAL PROTEIN 4.4 (L) 6.3 - 8.2 g/dL 
  Albumin 2.1 (L) 3.3 - 4.8 g/dL 
  GLOBULIN 2.3 1.3 - 4.9 g/dL 
  A/G 0.9 (L) 1.0 - 2.4 
  TBIL 0.8 0.1 - 1.5 mg/dL 
  ALK PHOS 36 35 - 115 U/L 
  AST 20 10 - 45 U/L 
  ALT 16 10 - 65 U/L 
  EGFR >60 >60 mL/min/1.73m2 
 Magnesium    
  Collection Time: 08/15/18  1:33 AM   
 Result  Value Ref Range 
  MAGNESIUM 1.8 1.7 - 2.4 mg/dL 
 HGB and HCT    
  Collection Time: 08/15/18  5:56 AM   
 Result  Value Ref Range 
  HGB 6.5 (LL) 13.2 - 17.0 g/dL 
  HCT 18.6 (LL) 39.0 - 50.0 % 
 
 Intake/Output Summary (Last 24 hours) at 08/15/18 0900
 Last data filed at 08/15/18 0814
 
  Gross per 24 hour 
 Intake             4431 ml 
 Output             1950 ml 
 Net             2481 ml 
 
 Physical Exam
  
 Constitutional:  No distress. Pale color.
 Cardiovascular:  Normal rate. 
 Pulmonary:  Effort normal. No stridor. No respiratory distress. 
 Abdominal:   Soft. no distension. 
 Neurological:   Alert and oriented to person, place, and time. 
 Psychiatric:  He has a normal mood and affect His behavior is normal. Thought content haydee
l. 
 Nursing note and vitals reviewed.
 
 Problem List
 
 Principal Problem:
   Rectal bleeding
 Active Problems:
   Syncope and collapse
   Acute posthemorrhagic anemia
   SÁNCHEZ (dyspnea on exertion)
   Palpitations
   Leukocytosis
   Hyperlipidemia
   LAURA on CPAP
 
 Signed:
 Marielle Rocha PA-C
   [facility]
 
 Portions of this chart may have been created with voice recognition software. Occasional wr
[place]-word or "sound-alike" substitutions may have occurred, even after review, due to the inh
erent limitations of voice recognition software. Please read the chart carefully and recogni
ze, using context, where these substitutions have occurred. Personal communication is reques
michelle for any clarifications.
 Patient seen and examined independently, chart reviewed.
 I concur with the above findings and have discussed them with the patient .
 I have made changes to the above note, where appropriate, and discussed the content and giovanny
nges with the author. 
 
 Frank Stallworth MD FACS
   [facility] General Surgery
 
  
  Electronically signed by Thomas Cushing Trotta, MD at 08/15/2018  5:55 PM Luis Zambrano M
D - 08/15/2018  8:26 AM PDTFormatting of this note might be different from the original.
 Progress Notes by Luis Christensen DO at 08/15/18 0826  
  Author:  Luis Christensen DO Service:  Hospitalist Author Type:  Physician 
  Filed:  08/15/18 0832 Date of Service:  08/15/18 0826 Status:  Addendum 
  :  Luis Christensen DO (Physician)   
  Related Notes:  Original Note by Luis Christensen DO (Physician) filed at 08/15/18 0831   
   
 [facility]
 Service:  Hospitalist
 Progress Note
 
 Pt: Berna Parkinson  AGE/SEX: 68 y.o.   male           MRN: [id number]
 
 ROOM: [address] [place] [address]  PCP: Carlotta Motley  : 1950
 TODAY'S DATE: 8/15/2018
 
 Hospital Day:   LOS: 3 days 
 Post-Op Day:  2 Days Post-Op
 SUBJECTIVE
 
      Patient Summary:  Per H&P: Patient presents from [facility] following 2 days of h
ematochezia. The patient states that he has been experiencing several sticky red bowel movem
ents, diaphoresis, dizziness, chills and heart palpitations. The patient also states that wh
en he presented at [facility] he had a "really low blood pressure and a fever." Patient wa
s transferred to this department by life flight. 
 
 Labs accompanying the patient show that at the previous hospital the patient had a hemoglob
in of 5.9, GFR of 101 and creatinine of 0.7.
 
      Events Overnight:  Patient seen and examined. Reports having dark BM this AM. Afebrile
. No abdominal pain, chest pain, shortness of breath, lightheadedness, dizziness.
 
 Scheduled Medications   atorvastatin  40 mg Oral Nightly 
   FLUoxetine  10 mg Oral Daily 
   fluticasone  1 spray Each Nare Daily 
 
 Continuous Infusions 
   pantoprazole 8 mg/hr (18) 
   sodium chloride (IV) 75 mL/hr at 18 1312 
 
 PRN Medications
 acetaminophen **OR** acetaminophen, EPINEPHrine, ondansetron **OR** ondansetron, ondansetro
n **OR** ondansetron, polyethylene glycol
 
 OBJECTIVE
 Vital Signs:
 /65 (BP Location: Left upper arm)  | Pulse 84  | Temp 98.4 F (36.9 C) (Oral)  | R
sav 20  | Ht 1.905 m (6' 3")  | Wt 108.9 kg (240 lb 2.7 oz)  | SpO2 98%  | BMI 30.02 kg/m 
 Temp:  [97.3 F (36.3 C)-99.4 F (37.4 C)] 98.4 F (36.9 C) (08/15 0733)
 BP: (107-123)/(55-76) 119/65 (08/15 0733)
 Heart Rate:  [82-99] 84 (08/15 0733)
 Resp:  [14-24] 20 (08/15 0733)
 SpO2:  [95 %-98 %] 98 % (08/15 0733)
 Weight:  [108.9 kg (240 lb 2.7 oz)] 108.9 kg (240 lb 2.7 oz) (08/15 0245)
 
 GENERAL: Alert and oriented x3. No acute distress.
 HEENT: Moist mucous membranes. Pupils equally round and reactive to light. Extraocular musc
les intact.
 HEART: Regular rhythm. No murmurs.
 LUNGS: Clear to auscultation bilaterally.
 ABDOMEN: Bowel sounds present. Nontender, nondistended. No guarding, no rebound.
 EXTREMITIES: Negative for clubbing, cyanosis, or edema.
 NEUROLOGIC: Within normal limits. Cranial nerves II to XII grossly intact.
 SKIN: Wounds, ulcers, or skin lesions negative.
 
 DATA
 
 CBC:  
 Lab Results    
 Component  Value Date 
  WBC 8.94 08/15/2018 
  RBC 2.40 (L) 08/15/2018 
  HGB 6.5 (LL) 08/15/2018 
 
  HCT 18.6 (LL) 08/15/2018 
  MCV 88.4 08/15/2018 
  MCH 30.2 08/15/2018 
  MCHC 34.2 08/15/2018 
  RDW 50.8 08/15/2018 
   08/15/2018 
  MPV 7.8 08/15/2018 
  DIFFTYPE MANUAL 08/15/2018 
 
 CMP:  
 Lab Results    
 Component  Value Date 
   08/15/2018 
  K 3.8 08/15/2018 
   (H) 08/15/2018 
  CO2 22 (L) 08/15/2018 
  ANIONGAP 12 08/15/2018 
  GLUF 106 (H) 08/15/2018 
  BUN 10 08/15/2018 
  CREATININE 0.6 (L) 08/15/2018 
  BCR 17 08/15/2018 
  CA 7.0 (L) 08/15/2018 
  PROT 4.4 (L) 08/15/2018 
  ALB 2.1 (L) 08/15/2018 
  GLOB 2.3 08/15/2018 
  BILITOT 0.8 08/15/2018 
  ALP 36 08/15/2018 
  AST 20 08/15/2018 
  ALT 16 08/15/2018 
  EGFR >60 08/15/2018 
 
 IMAGING
 Cta Abdomen Pelvis With Iv Contrast
 
 Result Date: 2018
 1. No aortic aneurysm or dissection seen. 2. No active GI bleeding identified. 3. No acute 
inflammatory or obstructive process seen. RADIA  Electronically signed by Mike Wilson MD on
 Aug 12 2018  2:13AM Referring Provider Line: [phone] ID: [id number]
 
 Nm Gi Blood Loss
 
 Addendum Date: 2018  
 TECHNIQUE: An additional hour of scintigraphic imaging in the anterior projection over the 
abdomen and pelvis was performed portably. FINDINGS: There is activity seen extending throug
h multiple small bowel loops predominantly in the left upper quadrant, corresponding with th
e earlier suspicion of upper GI bleed, probably in the duodenal sweep. IMPRESSION: 1.  I jessica
pect an upper GI bleed, probably in the duodenal sweep. Electronically signed by Jerry turner MD on 2018 8:13 AM
 
 Result Date: 2018
 1.  Indeterminate hemorrhage, probably involving the duodenal sweep. Further imaging is nec
essary. Addendum to follow if the patient allows further imaging. Electronically signed by NIKIA Osullivan MD on 2018 3:32 PM
 
 Ir Angiogram Visceral Selective
 
 Result Date: 2018
 No apparent source of GI bleeding on extensive mesenteric angiography. Electronically huyen
d by Tolu Hernandez MD on 2018 8:08 PM
 
 
 X-ray Chest 1 View
 
 Result Date: 2018
 No acute cardiopulmonary abnormality Electronically signed by Bubba Cordoba MD on 2018 6
:45 AM
 
 PROBLEM LIST
 
 Principal Problem:
   Rectal bleeding
 Active Problems:
   Syncope and collapse
   Acute posthemorrhagic anemia
   SÁNCHEZ (dyspnea on exertion)
   Palpitations
   Leukocytosis
   Hyperlipidemia
   LAURA on CPAP
 
 ASSESSMENT & PLAN
 
 Acute posthemorrhagic anemia 
 - Presented with worsening symptoms of dyspnea, bloody BM
 - Initial H&H of , received 2 units PRBC () prior to transfer
 - Transfused 3 unit PRBC (), 2 units (), 1 unit (), will transfuse 1 unit this 
AM
 - H&H drop overnight
 - Continue serial H&H
 
 Acute rectal bleeding 
 - Associated with abdominal distention
 - GI consulted, unremarkable upper and lowerendoscopy ()
 - Bleeding scan reviewed by GI, push endoscopy (), unable to reach bleeding site
 - IR consulted, angiogram on  no apparent source of bleeding found
 - Surgery consulted, plan for diagnostic laparoscopy with small bowel resection today (8/15
)
 - On Protonix drip
 
 Syncope and collapse
 - Secondary to acute posthemorrhagic anemia
 - Fall precautions
 
 Leukocytosis
 - Possible leukemoid reaction
 - Resolved
 - No sign of infection
 - Procalcitonin 0.22
 - Received dose of Rocephin on 
 - CXR, UA negative
 - Continue to monitor
 
 Disposition:  Transfuse 1 unit PRBC this AM, surgery today, serial H&H
 Code Status:  Full Code
 
 Luis Christensen DO
 8/15/2018
 8:27 AM
  
  Electronically signed by Luis Christensen MD at 08/15/2018  8:32 AM PDTConversion Transaction, P
rovider Unknown - 08/15/2018  5:53 AM PDTFormatting of this note might be different from the
 
 original.
 Nurse Progress Note by Sofiya Hart RN at 08/15/18 0577  
  Author:  Sofiya Hart RN Service:  (none) Author Type:  Registered Nurse 
  Filed:  08/15/18 0602 Date of Service:  08/15/18 0553 Status:  Addendum 
  :  Sofiya Hart RN (Registered Nurse)   
  Related Notes:  Original Note by Sofiya Hart RN (Registered Nurse) filed at 08/15/18 055
7   
   
 Critical H&H reported to Dr. Flood, 1 unit of PRBCs given. Pt NPO since midnight. Pt had two
 bloody stools during the night. VSS. No acute changes from previous assessment. Chart audit
 completed. Will continue to monitor. Sofiya Hart RN
 
  
  Electronically signed by Conversion Transaction, Provider at 08/15/2019  4:31 PM PDTConver
kameron Transaction, Provider Unknown - 2018  8:01 PM PDTFormatting of this note might be
 different from the original.
 Nurse Progress Note by Yancy Young RN at 18  
  Author:  Yancy Young RN Service:  (none) Author Type:  Registered Nurse 
  Filed:  18 Date of Service:  18 Status:  Signed 
  :  Yancy Young RN (Registered Nurse)   
   
 Patient had a tmax of 99.6 , VSS. Patient has an anticipated laparoscopic bowel resection t
omorrow  (8/15). Initiated clear liquid diet per order. Patient is to be NPO at midnight per
 order. Noon H/H was( 7.1/21.3). 
 
 -chart check complete
 
 Yancy Young RN
   
 
  
  Electronically signed by Conversion Transaction, Provider at 08/15/2019  4:34 PM PDTShehza
di, Romana, MD - 2018  5:34 PM PDTFormatting of this note might be different from the 
original.
 Progress Notes by Romana Shehzadi, MD at 18 2167  
  Author:  Romana Shehzadi, MD Service:  Gastroenterology Author Type:  Physician 
  Filed:  18 1736 Date of Service:  18 833 Status:  Signed 
  :  Romana Shehzadi, MD (Physician)   
   
 GI FOLLOW UP NOTE
 
 REASON FOR CONSULT: 
 
 LAST 24 HS:
 Patient had 2 episodes of melena since yesterday one was last night and one was this mornin
g. He denies any abdominal pain.
 
 PHYSICAL EXAM:
 Vitals:     
  18 0357 18 0742 18 1146 18 1549 
 BP: 115/55 109/58 108/57 107/61 
 BP Location: Right upper arm Right upper arm Right upper arm Right upper arm 
 Pulse: 105 96 89 85 
 Resp: 20 20 20 20 
 Temp: 98.5 F (36.9 C) 99.6 F (37.6 C) 99.4 F (37.4 C) 98.5 F (36.9 C) 
 TempSrc: Oral Oral Oral Oral 
 SpO2: 95% 94% 96% 97% 
 Weight:     
 Height:     
 
 
 General: NAD , WD/WN
 Eyes: EOMI, No lid lag or proptosis
 ENT: NC/AT , Moist mucous membranes.
 Neck : No thyromegaly , Supple , No LN palpable,
 CVS: S1+S2,No MRG, No JVD or pedal edema.
 Resp: CTA B/L, W/R/R -None
 Abd: Soft ,NT , ND , No organomegaly , BS present 
 Neuro : AOX3, 
 Skin: No conjuctival and palmar palor, Icterus-negative.
 
 LABS
 Lab Results    
 Component  Value Date 
  WBC 10.24 2018 
  HGB 7.1 (L) 2018 
  HCT 21.3 (L) 2018 
   2018 
  ALT 17 2018 
  AST 20 2018 
   2018 
  K 3.5 2018 
   (H) 2018 
  BUN 10 2018 
  CO2 22 (L) 2018 
  INR 1.0 2018 
  GLUF 96 2018 
  
 
 MEDICATIONS
 Scheduled
   atorvastatin  40 mg Oral Nightly 
   FLUoxetine  10 mg Oral Daily 
   fluticasone  1 spray Each Nare Daily 
 
 Infusions:
   pantoprazole 8 mg/hr (18 0901) 
   sodium chloride (IV) 75 mL/hr at 18 1312 
 
 ASSESSMENT/PLAN:
 Gastrointestinal bleeding:
 Jejunal diverticulum
 -Patient presented with gastrointestinal bleeding.
 -Status post upper endoscopy colonoscopy and push enteroscopy.
 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 Unable to achieve hemostasis with end
oscopy or interventional radiology.
 -Surgery on board.
 -If patient rebleeds further management will be up to interventional radiology and surgery.
 -Continue to monitor H and H.
  
  Electronically signed by Romana Shehzadi, MD at 2018  5:36 PM Luis Zambrano MD -  12:26 PM PDTFormatting of this note might be different from the original.
 Progress Notes by Luis Christensen DO at 18 3716  
  Author:  Luis Christensen DO Service:  Hospitalist Author Type:  Physician 
  Filed:  18 1230 Date of Service:  18 1226 Status:  Signed 
  :  Luis Christensen DO (Physician)   
   
 [facility]
 Service:  Hospitalist
 Progress Note
 
 
 Pt: Berna Parkinson  AGE/SEX: 68 y.o.   male           MRN: [id number]
 ROOM: [address] [place] [address]  PCP: Carlotta Motley  : 1950
 TODAY'S DATE: 2018
 
 Hospital Day:   LOS: 1 day 
 Post-Op Day:  1 Day Post-Op
 SUBJECTIVE
 
      Patient Summary:  Per H&P: Patient presents from [facility] following 2 days of h
ematochezia. The patient states that he has been experiencing several sticky red bowel movem
ents, diaphoresis, dizziness, chills and heart palpitations. The patient also states that wh
en he presented at [facility] he had a "really low blood pressure and a fever." Patient wa
s transferred to this department by life flight. 
 
 Labs accompanying the patient show that at the previous hospital the patient had a hemoglob
in of 5.9, GFR of 101 and creatinine of 0.7.
 
      Events Overnight:  Patient seen and examined. Reports feeling better this AM. Non-bloo
dy BM this AM. Afebrile. No abdominal pain, chest pain. Resolved shortness of breath.
 
 Scheduled Medications   atorvastatin  40 mg Oral Nightly 
   FLUoxetine  10 mg Oral Daily 
   fluticasone  1 spray Each Nare Daily 
 
 Continuous Infusions 
   pantoprazole 8 mg/hr (18 0901) 
   sodium chloride (IV) 125 mL/hr at 18 1204 
 
 PRN Medications
 acetaminophen **OR** acetaminophen, EPINEPHrine, ondansetron **OR** ondansetron, ondansetro
n **OR** ondansetron, polyethylene glycol
 
 OBJECTIVE
 Vital Signs:
 /57 (BP Location: Right upper arm)  | Pulse 89  | Temp 99.4 F (37.4 C) (Oral)  | 
Resp 20  | Ht 1.905 m (6' 3")  | Wt 103.3 kg (227 lb 12.8 oz)  | SpO2 96%  | BMI 28.47 kg/m
 
 Temp:  [98.3 F (36.8 C)-99.6 F (37.6 C)] 99.4 F (37.4 C) ( 114)
 BP: (108-141)/(55-74) 108/57 ( 1146)
 Heart Rate:  [] 89 ( 1146)
 Resp:  [18-21] 20 ( 114)
 SpO2:  [91 %-100 %] 96 % ( 114)
 
 GENERAL: Alert and oriented x3. No acute distress.
 HEENT: Moist mucous membranes. Pupils equally round and reactive to light. Extraocular musc
les intact.
 HEART: Regular rhythm. No murmurs.
 LUNGS: Clear to auscultation bilaterally.
 ABDOMEN: Bowel sounds present. Nontender, nondistended. No guarding, no rebound.
 EXTREMITIES: Negative for clubbing, cyanosis, or edema.
 NEUROLOGIC: Within normal limits. Cranial nerves II to XII grossly intact.
 SKIN: Wounds, ulcers, or skin lesions negative.
 
 DATA
 
 CBC:  
 Lab Results    
 Component  Value Date 
  WBC 10.24 2018 
  RBC 2.36 (L) 2018 
 
  HGB 7.4 (L) 2018 
  HCT 21.1 (L) 2018 
  MCV 89.3 2018 
  MCH 31.3 2018 
  MCHC 35.1 2018 
  RDW 47.3 2018 
   2018 
  MPV 7.6 2018 
  DIFFTYPE AUTOMATED 2018 
 
 CMP:  
 Lab Results    
 Component  Value Date 
   2018 
  K 3.5 2018 
   (H) 2018 
  CO2 22 (L) 2018 
  ANIONGAP 11 2018 
  GLUF 96 2018 
  BUN 10 2018 
  CREATININE 0.67 (L) 2018 
  BCR 15 2018 
  CA 7.2 (L) 2018 
  PROT 4.5 (L) 2018 
  ALB 2.1 (L) 2018 
  GLOB 2.5 2018 
  BILITOT 0.5 2018 
  ALP 32 (L) 2018 
  AST 20 2018 
  ALT 17 2018 
  EGFR >60 2018 
 
 IMAGING
 Cta Abdomen Pelvis With Iv Contrast
 
 Result Date: 2018
 1. No aortic aneurysm or dissection seen. 2. No active GI bleeding identified. 3. No acute 
inflammatory or obstructive process seen. RADIA  Electronically signed by Mike Wilson MD on
 Aug 12 2018  2:13AM Referring Provider Line: [phone] ID: [id number]
 
 Nm Gi Blood Loss
 
 Addendum Date: 2018  
 TECHNIQUE: An additional hour of scintigraphic imaging in the anterior projection over the 
abdomen and pelvis was performed portably. FINDINGS: There is activity seen extending throug
h multiple small bowel loops predominantly in the left upper quadrant, corresponding with th
e earlier suspicion of upper GI bleed, probably in the duodenal sweep. IMPRESSION: 1.  I jessica
pect an upper GI bleed, probably in the duodenal sweep. Electronically signed by Jerry turner MD on 2018 8:13 AM
 
 Result Date: 2018
 1.  Indeterminate hemorrhage, probably involving the duodenal sweep. Further imaging is nec
essary. Addendum to follow if the patient allows further imaging. Electronically signed by NIKIA Osullivan MD on 2018 3:32 PM
 
 Ir Angiogram Visceral Selective
 
 Result Date: 2018
 No apparent source of GI bleeding on extensive mesenteric angiography. Electronically huyen
d by Tolu Hernandez MD on 2018 8:08 PM
 
 
 X-ray Chest 1 View
 
 Result Date: 2018
 No acute cardiopulmonary abnormality Electronically signed by Bubba Cordoba MD on 2018 6
:45 AM
 
 PROBLEM LIST
 
 Principal Problem:
   Rectal bleeding
 Active Problems:
   Syncope and collapse
   Acute posthemorrhagic anemia
   SÁNCHEZ (dyspnea on exertion)
   Palpitations
   Leukocytosis
   Hyperlipidemia
   LAURA on CPAP
 
 ASSESSMENT & PLAN
 
 Acute posthemorrhagic anemia 
 - Presented with worsening symptoms of dyspnea, bloody BM
 - Initial H&H of , received 2 units PRBC () prior to transfer
 - Transfused 3 unit PRBC (), 2 units ()
 - H&H Stable
 - Continue serial H&H
 
 Acute rectal bleeding 
 - Associated with abdominal distention
 - GI consulted, unremarkable upper and lower endoscopy ()
 - Bleeding scan reviewed by GI, push endoscopy today (), unable to reach bleeding site
 - IR consulted, angiogram on  no apparent source of bleeding found
 - Surgery consulted
 - On Protonix drip
 
 Syncope and collapse
 - Secondary to acute posthemorrhagic anemia
 - Fall precautions
 
 Leukocytosis
 - Possible leukemoid reaction
 - No sign of infection
 - Procalcitonin 0.22
 - Received dose of Rocephin on 
 - CXR, UA negative
 - Continue to monitor
 
 Disposition:  Monitor H&H, await surgery recommendations
 Code Status:  Full Code
 
 Luis Christensen DO
 2018
 12:26 PM
  
  Electronically signed by Luis Christensen MD at 2018 12:34 PM PDTConversion TransactionMIGUEL ÁNGEL
estelaabebe Unknown - 2018 10:59 AM PDTFormatting of this note might be different from the
 original.
 Therapy Progress Note by Rosie Elizabeth PT at 18 1059  
 
  Author:  Rosie Elizabeth PT Service:  (none) Author Type:  Physical Therapist 
  Filed:  18 1100 Date of Service:  18 1059 Status:  Signed 
  :  Rosie Elizabeth PT (Physical Therapist)   
   
 
  18 1059 
 PT Last Visit  
 PT Received On 18 
 Reason for Treatment Deconditioning 
 Requires PT Follow Up On hold 
 Other Comments  
 Comments Chart reviewed with RN consulted, pt continues on strict bedrest per orderset with
 nursing verifying. Plan to follow up for evaluation when medically appropriate. 
 
  
  Electronically signed by Conversion Transaction, Provider at 08/15/2019  4:36 PM PDTConver
kameron Transaction, Provider Unknown - 2018  6:50 AM PDTFormatting of this note might be
 different from the original.
 Nurse Progress Note by Sofiya Hart RN at 18 0650  
  Author:  Sofiya Hart RN Service:  (none) Author Type:  Registered Nurse 
  Filed:  18 0652 Date of Service:  18 0650 Status:  Signed 
  :  Sofiya Hart RN (Registered Nurse)   
   
 A&O x4. Right groin site checked frequently throughout shift with no changes. Pt remains af
ebrile with stable BP. One episode of bloody stool during the night. Pt currently on RA. Pt 
reports sore throat. No acute changes from previous assessment. Will continue to monitor. Ch
art audit completed. Sofiya Hart RN 
 
  
  Electronically signed by Conversion Transaction, Provider at 08/15/2019  4:35 PM PDTConver
kameron Transaction, Provider Unknown - 2018  6:08 PM PDTFormatting of this note might be
 different from the original.
 Nurse Progress Note by Katrin Taylor RN at 18  
  Author:  Katrin Taylor RN Service:  (none) Author Type:  Registered Nurse 
  Filed:  18 Date of Service:  18 Status:  Signed 
  :  Katrin Taylor RN (Registered Nurse)   
   
 Anesthesia to monitor pt status, medication administration and pt status during procedure. 
  
  Electronically signed by Conversion Transaction, Provider at 08/15/2019  4:43 PM PDTConver
kameron Transaction, Provider Unknown - 2018  6:05 PM PDTFormatting of this note might be
 different from the original.
 Nurse Progress Note by Valentina Lugo RN at 18  
  Author:  Valentina Lugo RN Service:  (none) Author Type:  Registered Nurse 
  Filed:  18 Date of Service:  18 Status:  Signed 
  :  Valentina Lugo RN (Registered Nurse)   
   
 Pt is currently in IRR, Per Dr. Amparo RN to order STAT H/H when pt arrives to floor. 
 
 Restraints documentation: NA
 Blood documentation: 1 unit finished on day shift, all documentation complete 
 Protocols: NA
 Medication parameters: NA
 PRN Parameter medications: NA 
 Signed/Held orders: Post Op orders to be released when pt is out of surgery 
 
 Valentina Lugo RN
 
  
  Electronically signed by Conversion Transaction, Provider at 08/15/2019  4:42 PM PDTConver
 
kameron Transaction, Provider Unknown - 2018  3:40 PM PDTFormatting of this note might be
 different from the original.
 Therapy Progress Note by Rosie Elizabeth PT at 18 1540  
  Author:  Rosie Elizabeth PT Service:  (none) Author Type:  Physical Therapist 
  Filed:  18 1633 Date of Service:  18 1540 Status:  Signed 
  :  Rosie Elizabeth PT (Physical Therapist)   
   
 
  18 1540 
 PT Last Visit  
 PT Received On 18 
 Reason for Treatment Deconditioning 
 Requires PT Follow Up Unavailable 
 Other Comments  
 Comments Pt off floor for procedure during PT evaluation attempt. Plan to follow up for ass
essment tomorrow's date as census allows. 
 
  
  Electronically signed by Conversion Transaction, Provider at 08/15/2019  4:41 PM PDTConver
kameron Transaction, Provider Unknown - 2018  3:19 PM PDTFormatting of this note might be
 different from the original.
 Nurse Progress Note by Yancy Young RN at 18  
  Author:  Yancy Young RN Service:  (none) Author Type:  Registered Nurse 
  Filed:  18 1524 Date of Service:  18 Status:  Signed 
  :  Yancy Young RN (Registered Nurse)   
   
 Report given to Zac from OPP early this morning. Preop check list complete. Patient has tw
o patent IV's. Patient was transferred via wheel chair by MISTI Valdes. 
 
 Yancy Young RN
  
  
  Electronically signed by Conversion Transaction, Provider at 08/15/2019  4:44 PM PDTConver
kameron Transaction, Provider Unknown - 2018  8:29 AM PDTFormatting of this note might be
 different from the original.
 Nurse Progress Note by Majo Murray RN at 18  
  Author:  Majo Murray RN Service:  (none) Author Type:  Registered Nurse 
  Filed:  18 Date of Service:  18 Status:  Signed 
  :  Majo Murray RN (Registered Nurse)   
   
 
  18 
 Discharge Planning Evaluation  
 Admitting Diagnosis GI bleed. 
 Readmission No 
 Living Arrangements Spouse/significant other 
 Support Systems Spouse/significant other;Family members 
 Type of Residence Private residence 
 Independent with ADL's Yes 
 Independent with Mobility Yes 
 Home Care Services No 
 Caregiver after Discharge No 
 Mental Status Oriented 
 Anticipated Discharge Plan  
 Post Acute Care Needs None at this time 
 Plan communicated to patient/family Yes 
 Resources  
 Financial concerns No 
 Transportation issues No 
 Patient/Family concerns No 
 
 Prescription Plan Yes 
 Name of Pharmacy [facility]. 
 Previous home health equipment No 
 Vascular access device No 
 Anticipated Disposition  
 Facility Type Home 
 
 Met with Berna and spouse Sofiya and discussed discharge planning, Pt is a 68 y.o., male wh
o has been admitted with a GI bleed.  He is independent with his ADL's at home and lives wit
h his wife.
 
 Patient's PCP is: [facility].
 
 Patient's insurance: [employer].
 Coverage concerns: none.
 Medication coverage/concerns: none.
 Rx Bedside Delivery: no.
 
 Community resources utilized / needed: none.
 
 Assistance in transportation: spouse Sofiya.
 
 Identification of any specific education / training: none.
 
 Barriers to Discharge / Alternative housing needed: none.
 
 Anticipated DCP: home.
 
 MAJO EDILBERTO
 
  
  Electronically signed by Leonie Transaction, Provider at 08/15/2019  4:45 PM Luis Zambrano MD - 2018  8:26 AM PDTFormatting of this note might be different from the origin
al.
 Progress Notes by Luis Christensen DO at 18  
  Author:  Luis Christensen DO Service:  Hospitalist Author Type:  Physician 
  Filed:  18 1520 Date of Service:  18 Status:  Signed 
  :  Luis Christensen DO (Physician)   
   
 [facility]
 Service:  Hospitalist
 Progress Note
 
 Pt: Berna Parkinson  AGE/SEX: 68 y.o.   male           MRN: [id number]
 ROOM: [facility]-  PCP: [facility]  : 1950
 TODAY'S DATE: 2018
 
 Hospital Day:   LOS: 1 day 
 Post-Op Day:  1 Day Post-Op
 SUBJECTIVE
 
      Patient Summary:  Per H&P: Patient presents from [facility] following 2 days of h
ematochezia. The patient states that he has been experiencing several sticky red bowel movem
ents, diaphoresis, dizziness, chills and heart palpitations. The patient also states that wh
en he presented at [facility] he had a "really low blood pressure and a fever." Patient wa
s transferred to this department by life flight. 
 
 Labs accompanying the patient show that at the previous hospital the patient had a hemoglob
in of 5.9, GFR of 101 and creatinine of 0.7.
 
 
      Events Overnight:  Patient seen and examined. Reports feeling better this AM, resting 
comfortably in bed. Back pain tolerable. No shortness of breath, chest pain. Episodic elevat
ed temperatures, likely from transfusions, no sign of infection. Did receive 1 dose of Rocep
hin empirically overnight. No chest pain, shortness of breath. Dark BM this AM.
 
 Scheduled Medications   atorvastatin  40 mg Oral Nightly 
   FLUoxetine  10 mg Oral Daily 
 
 Continuous Infusions 
   pantoprazole 8 mg/hr (18 0715) 
   sodium chloride (IV) 125 mL/hr at 18 0404 
 
 PRN Medications
 acetaminophen **OR** acetaminophen, heparin flush (PF), ondansetron **OR** ondansetron, jeremiah
yethylene glycol
 
 OBJECTIVE
 Vital Signs:
 /59  | Pulse 84  | Temp 99.1 F (37.3 C) (Oral)  | Resp 18  | Ht 1.905 m (6' 3")  
| Wt 103.3 kg (227 lb 12.8 oz)  | SpO2 99%  | BMI 28.47 kg/m 
 Temp:  [97.9 F (36.6 C)-101.3 F (38.5 C)] 99.1 F (37.3 C) (737)
 BP: ()/(46-71) 107/59 (737)
 Heart Rate:  [] 84 (737)
 Resp:  [12-24] 18 (737)
 SpO2:  [92 %-99 %] 99 % (737)
 
 GENERAL: Alert and oriented x3. No acute distress.
 HEENT: Moist mucous membranes. Pupils equally round and reactive to light. Extraocular musc
les intact.
 HEART: Regular rhythm. No murmurs.
 LUNGS: Clear to auscultation bilaterally.
 ABDOMEN: Bowel sounds present. Nontender, nondistended. No guarding, no rebound.
 EXTREMITIES: Negative for clubbing, cyanosis, or edema.
 NEUROLOGIC: Within normal limits. Cranial nerves II to XII grossly intact.
 SKIN: Wounds, ulcers, or skin lesions negative.
 
 DATA
 
 CBC:  
 Lab Results    
 Component  Value Date 
  WBC 17.20 (H) 2018 
  RBC 2.67 (L) 2018 
  HGB 6.4 (LL) 2018 
  HCT 18.8 (LL) 2018 
  MCV 88.9 2018 
  MCH 31.1 2018 
  MCHC 34.9 2018 
  RDW 45.1 2018 
   2018 
  MPV 7.7 2018 
  DIFFTYPE AUTOMATED 2018 
 
 CMP:  
 Lab Results    
 Component  Value Date 
   2018 
  K 4.1 2018 
   2018 
  CO2 22 (L) 2018 
 
  ANIONGAP 14 2018 
  GLUF 124 (H) 2018 
  BUN 21 2018 
  CREATININE 0.9 2018 
  BCR 23 2018 
  CA 7.7 (L) 2018 
  PROT 5.4 (L) 2018 
  ALB 2.8 (L) 2018 
  GLOB 2.6 2018 
  BILITOT 0.3 2018 
  ALP 44 2018 
  AST 21 2018 
  ALT 28 2018 
  EGFR >60 2018 
 
 IMAGING
 Cta Abdomen Pelvis With Iv Contrast
 
 Result Date: 2018
 1. No aortic aneurysm or dissection seen. 2. No active GI bleeding identified. 3. No acute 
inflammatory or obstructive process seen. RADIA  Electronically signed by Mike Wilson MD on
 Aug 12 2018  2:13AM Referring Provider Line: [phone] ID: [id number]
 
 Nm Gi Blood Loss
 
 Addendum Date: 2018  
 TECHNIQUE: An additional hour of scintigraphic imaging in the anterior projection over the 
abdomen and pelvis was performed portably. FINDINGS: There is activity seen extending throug
h multiple small bowel loops predominantly in the left upper quadrant, corresponding with th
e earlier suspicion of upper GI bleed, probably in the duodenal sweep. IMPRESSION: 1.  I jessica
pect an upper GI bleed, probably in the duodenal sweep. Electronically signed by Jerry turner MD on 2018 8:13 AM
 
 Result Date: 2018
 1.  Indeterminate hemorrhage, probably involving the duodenal sweep. Further imaging is nec
essary. Addendum to follow if the patient allows further imaging. Electronically signed by NIKIA Osullivan MD on 2018 3:32 PM
 
 X-ray Chest 1 View
 
 Result Date: 2018
 No acute cardiopulmonary abnormality Electronically signed by Bubba Cordoba MD on 2018 6
:45 AM
 
 PROBLEM LIST
 
 Principal Problem:
   Rectal bleeding
 Active Problems:
   Syncope and collapse
   Acute posthemorrhagic anemia
   SÁNCHEZ (dyspnea on exertion)
   Palpitations
   Leukocytosis
   Hyperlipidemia
   LAURA on CPAP
 
 ASSESSMENT & PLAN
 
 Acute posthemorrhagic anemia 
 
 - Presented with worsening symptoms of dyspnea, bloody BM
 - Initial H&H of , received 2 units PRBC () prior to transfer
 - Transfused 3 unit PRBC yesterday (), 2 units this AM ()
 
 Acute rectal bleeding 
 - Associated with abdominal distention
 - GI consulted, unremarkable upper and lower endoscopy ()
 - Bleeding scan reviewed by GI, plan for push endoscopy today ()
 - On Protonix drip
 - Dark BM this AM
 
 Syncope and collapse
 - Secondary to acute posthemorrhagic anemia
 - Fall precautions
 
 Leukocytosis
 - Possible leukemoid reaction
 - No sign of infection
 - Procalcitonin 0.22
 - Received dose of Rocephin on 
 - CXR, UA negative
 - Continue to monitor
 
 Disposition:  Serial H&H, push endoscopy today
 Code Status:  Full Code
 
 Luis Christensen DO
 2018
 8:26 AM
  
  Electronically signed by Luis Christensen MD at 2018  3:20 PM PDTConMIGUEL ÁNGEL Sandra Unknown - 2018  6:52 AM PDTFormatting of this note might be different from the
 original.
 Nurse Progress Note by Amelia Chávez RN at 18 0652  
  Author:  Amelia Chávez RN Service:  (none) Author Type:  Registered Nurse 
  Filed:  18 0701 Date of Service:  18 Status:  Signed 
  :  Amelia Chávez RN (Registered Nurse)   
   
 Patient continues to remain afebrile through second unit of blood under high-risk transfusi
on protocol.  BP's remain soft, but not lower than 94 systolic.  Patient maintaining adequat
e oxygenation on RA and remainder of VS are stable.  Patient's urine output has only totaled
 950 ml's in comparison to the 4338 ml's of fluid he's received overnight.  Patient remains 
NPO in preparation for procedure scheduled for today.  No further concerns identified.  Will
 continue to monitor.  18 6:57 AM Amelia Chávez RN
 
 Chart check completed
         
  
  Electronically signed by Conversion Mary Provider at 08/15/2019  4:45 PM PDTConver
kameron Transaction, Provider Unknown - 2018  3:56 AM PDTFormatting of this note might be
 different from the original.
 Nurse Progress Note by Amelia Chávez RN at 18  
  Author:  Amelia Chávez RN Service:  (none) Author Type:  Registered Nurse 
  Filed:  18 0400 Date of Service:  18 Status:  Signed 
  :  Amelia Chávez RN (Registered Nurse)   
   
 Pt tolerating current unit of blood under high-risk transfusion protocol.  Remains afebrile
 one hour after initiating transfusion with improving BP's.  Has been noted to rest comforta
hazel throughout majority of transfusion and when awoke to urinate states "I feel like my norm
al self again."  18 3:58 AM Amelia Chávez RN
 
 
  
  Electronically signed by Conversion Transaction, Provider at 08/15/2019  4:47 PM PDTConver
kameron Transaction, Provider Unknown - 2018  1:40 AM PDTFormatting of this note might be
 different from the original.
 Nurse Progress Note by Amelia Chávez RN at 18  
  Author:  Amelia Chávez RN Service:  (none) Author Type:  Registered Nurse 
  Filed:  18 0235 Date of Service:  18 Status:  Signed 
  :  Amelia Chávez RN (Registered Nurse)   
   
 Assumed care of patient from Dalia CALDERON.  Agree with prior assessment, only patient is now i
n NSR (80's) as opposed to ST.  18 Amelia Chávez RN
 
  
  Electronically signed by Conversion Transaction, Provider at 08/15/2019  4:46 PM PDTConver
kameron Transaction, Provider Unknown - 2018  1:20 AM PDTFormatting of this note might be
 different from the original.
 Nurse Progress Note by Dalia Mabry RN at 18  
  Author:  Dalia Mabry RN Service:  (none) Author Type:  Registered Nurse 
  Filed:  18 015 Date of Service:  18 Status:  Signed 
  :  Dalia Mabry RN (Registered Nurse)   
   
 Pt temp 101.3 at start of shift. MD paged due to recent blood transfusion. Tylenol given, l
abs and imaging ordered. 3rd transfusion began, pt became hypotensive 82/48, afebrile, denie
s pain, SOB, dizziness, or chest pain. Transfusion stopped and MD paged, arrived at bedside 
to evaluate. 1L bolus NS ordered. Transfusion reaction protocol initiated. Stat H/H 6.4/18.8
, lactate 1.5, procalcitonin 0.22. MD paged who consulted with blood bank. High risk transfu
kameron form completed, MD explained risks and benefits of transfusion to pt who was agreeable 
with transfusing.
 Report given to MISTI Cisneros who assumes care of pt.
 Dalia Mabry RN
  
  Electronically signed by Conversion Transaction, Provider at 08/15/2019  4:50 PM PDTShehza
di, Romana, MD - 2018 10:51 PM PDTFormatting of this note might be different from the 
original.
 Significant Event by Romana Shehzadi, MD at 18  
  Author:  Romana Shehzadi, MD Service:  Gastroenterology Author Type:  Physician 
  Filed:  18 Date of Service:  18 Status:  Signed 
  :  Romana Shehzadi, MD (Physician)   
   
 Bleeding scan reviewed . Will plan on doing push enteroscopy tomorrow. NPO after MN .
  
  Electronically signed by Romana Shehzadi, MD at 2018 10:51 PM PDTConversion Transact
ion, Provider Unknown - 2018  3:40 PM PDTFormatting of this note might be different fr
om the original.
 Nurse Progress Note by Valentina Lugo RN at 18 8130  
  Author:  Valentina Lugo RN Service:  (none) Author Type:  Registered Nurse 
  Filed:  18 7781 Date of Service:  18 1540 Status:  Signed 
  :  Valentina Lugo RN (Registered Nurse)   
   
 Pt returned from nuclear medicine; Pt appears diaphoretic, 's after ambulating to res
troom. HR decreased to 120's after a few minutes of rest. Pt states he can't catch his breat
h, O2 saturation 96% RA, pt requesting supplemental oxygen. Placed pt on 2L NC. Pt Lab notif
ied to draw H/H (7.4/22.2). Dr. Christensen called for patient evaluation.  New orders to transfuse
 2 units of PRBC's. Pt requesting pain medication. Dr. Christensen en route to evaluate pt. No pain
 medication at this time per Dr. Christensen. Pt wearing home Cpap, O2 saturation 98%. Will continu
e to monitor labs and pt. Valentina Lugo RN
 
  
  Electronically signed by Conversion Transaction, Provider at 08/15/2019  4:51 PM uLis Zambrano MD - 2018  9:18 AM PDTFormatting of this note might be different from the origin
al.
 Progress Notes by Luis Christensen DO at 18  
  Author:  Luis Christensen DO Service:  Hospitalist Author Type:  Physician 
  Filed:  18 Date of Service:  18 Status:  Signed 
  :  Luis Christensen DO (Physician)   
   
 [facility]
 Service:  Hospitalist
 Progress Note
 
 Pt: Berna Parkinson  AGE/SEX: 68 y.o.   male           MRN: [id number]
 ROOM: [address] [place] [address]  PCP: Vero Magdaleno Medicine  : 1950
 TODAY'S DATE: 2018
 
 Hospital Day:   LOS: 0 days 
 Post-Op Day:  * No surgery date entered *
 SUBJECTIVE
 
      Patient Summary:  Per H&P: Patient presents from [facility] following 2 days of h
ematochezia. The patient states that he has been experiencing several sticky red bowel movem
ents, diaphoresis, dizziness, chills and heart palpitations. The patient also states that wh
en he presented at [facility] he had a "really low blood pressure and a fever." Patient wa
s transferred to this department by life flight. 
 
 Labs accompanying the patient show that at the previous hospital the patient had a hemoglob
in of 5.9, GFR of 101 and creatinine of 0.7. 
 
      Events Overnight:  Patient seen and examined. Reports continued dyspnea with exertion.
 Afebrile. Had darker, blood BM this AM. Reports no abdominal pain, chest pain, shortness of
 breath at rest. Reports no recent NSAID usage; was on daily baby aspirin. Last reported col
onoscopy 6-7 years with removal of small benign polyp.
 
 Scheduled Medications   atorvastatin  40 mg Oral Nightly 
   FLUoxetine  10 mg Oral Daily 
 
 Continuous Infusions 
   pantoprazole 8 mg/hr (18 0453) 
   sodium chloride (IV) 110 mL/hr at 18 0405 
 
 PRN Medications
 acetaminophen **OR** acetaminophen, ondansetron **OR** ondansetron, polyethylene glycol
 
 OBJECTIVE
 Vital Signs:
 /59 (BP Location: Left upper arm)  | Pulse 111  | Temp 98.2 F (36.8 C) (Oral)  | 
Resp 16  | Ht 1.905 m (6' 3")  | Wt 103.3 kg (227 lb 12.8 oz)  | SpO2 96%  | BMI 28.47 kg/m
 
 Temp:  [98.2 F (36.8 C)-98.7 F (37.1 C)] 98.2 F (36.8 C) (744)
 BP: (101-123)/(51-65) 107/59 (744)
 Heart Rate:  [108-125] 111 (744)
 Resp:  [16-18] 16 (744)
 SpO2:  [96 %-100 %] 96 % (744)
 Height:  [190.5 cm (6' 3")] 190.5 cm (6' 3") (400)
 Weight:  [103.3 kg (227 lb 12.8 oz)] 103.3 kg (227 lb 12.8 oz) (400)
 BMI (Calculated):  [28.5] 28.5 (400)
 FiO2 :  [21 %] 21 % ( 05)
 
 GENERAL: Alert and oriented x3. No acute distress.
 HEENT: Moist mucous membranes. Pupils equally round and reactive to light. Extraocular musc
 
les intact.
 HEART: Regular rhythm. No murmurs.
 LUNGS: Clear to auscultation bilaterally.
 ABDOMEN: Bowel sounds present. Nontender, nondistended. No guarding, no rebound.
 EXTREMITIES: Negative for clubbing, cyanosis, or edema.
 NEUROLOGIC: Within normal limits. Cranial nerves II to XII grossly intact.
 SKIN: Wounds, ulcers, or skin lesions negative.
 
 DATA
 
 CBC:  
 Lab Results    
 Component  Value Date 
  WBC 17.20 (H) 2018 
  RBC 2.67 (L) 2018 
  HGB 6.9 (LL) 2018 
  HCT 19.5 (LL) 2018 
  MCV 88.9 2018 
  MCH 31.1 2018 
  MCHC 34.9 2018 
  RDW 45.1 2018 
   2018 
  MPV 7.7 2018 
  DIFFTYPE AUTOMATED 2018 
 
 CMP:  
 Lab Results    
 Component  Value Date 
   2018 
  K 4.1 2018 
   2018 
  CO2 22 (L) 2018 
  ANIONGAP 14 2018 
  GLUF 124 (H) 2018 
  BUN 21 2018 
  CREATININE 0.9 2018 
  BCR 23 2018 
  CA 7.7 (L) 2018 
  PROT 5.4 (L) 2018 
  ALB 2.8 (L) 2018 
  GLOB 2.6 2018 
  BILITOT 0.3 2018 
  ALP 44 2018 
  AST 21 2018 
  ALT 28 2018 
  EGFR >60 2018 
 
 IMAGING
 Cta Abdomen Pelvis With Iv Contrast
 
 Result Date: 2018
 1. No aortic aneurysm or dissection seen. 2. No active GI bleeding identified. 3. No acute 
inflammatory or obstructive process seen. RADIA  Electronically signed by Mike Wilson MD on
 Aug 12 2018  2:13AM Referring Provider Line: [phone] ID: [id number]
 
 PROBLEM LIST
 
 Principal Problem:
   Rectal bleeding
 Active Problems:
 
   Syncope and collapse
   Acute posthemorrhagic anemia
   SÁNCHEZ (dyspnea on exertion)
   Palpitations
   Leukocytosis
   Hyperlipidemia
   LAURA on CPAP
 
 ASSESSMENT & PLAN
 
 Acute posthemorrhagic anemia 
 - Presented with worsening symptoms of dyspnea, bloody BM
 - Initial H&H of , received 2 units PRBC () prior to transfer
 - Drop in H&H this AM, will transfuse 1 unit PRBC
 
 Acute rectal bleeding 
 - Associated with abdominal distention
 - GI consulted, awaiting endoscopy
 - On Protonix drip
 
 Syncope and collapse
 - Secondary to acute posthemorrhagic anemia
 - Fall precautions
 
 Leukocytosis
 - Possible leukemoid reaction
 - No sign of infection, monitor
 
 Disposition:  Transfuse 1 unit PRBC, await endoscopy
 Code Status:  Full Code
 
 Luis Christensen DO
 2018
 9:18 AM
  
  Electronically signed by Luis Christensen MD at 2018  9:26 AM PDTConversion Transaction, P
rovider Unknown - 2018  8:35 AM PDTFormatting of this note might be different from the
 original.
 Therapy Progress Note by Kate Mcnulty, PT at 18  
  Author:  Kate Mcnulty PT Service:  (none) Author Type:  Physical Therapist 
  Filed:  18 Date of Service:  18 Status:  Signed 
  :  Kate Mcnulty PT (Physical Therapist)   
   
 
  18 
 PT Last Visit  
 PT Received On 18 
 Reason for Treatment Deconditioning 
 Requires PT Follow Up On hold
 (colonoscopy scheduled today 10:00; PT to be held;[place] ) 
 
  
  Electronically signed by Conversion Transaction, Provider at 08/15/2019  4:54 PM PDTConver
kameron Transaction, Provider Unknown - 2018  8:28 AM PDTFormatting of this note might be
 different from the original.
 Nurse Progress Note by Valentina Lugo RN at 18  
  Author:  Valentina Lugo RN Service:  (none) Author Type:  Registered Nurse 
  Filed:  18 Date of Service:  18 Status:  Signed 
  :  Valentina Lugo RN (Registered Nurse)   
   
 
 Pt having a colonoscopy today. Golytely finished at 0730. Stool is dark. Pt scheduled for p
rocedure around 1000. Pt has 2 patent IV's, 18 G. Report given to Yanna in endoscopy. Pt awar
e and agrees with POC. Valentina Lugo RN
 
  
  Electronically signed by Conversion Transaction, Provider at 08/15/2019  4:50 PM PDTConver
kameron Transaction, Provider Unknown - 2018  5:51 AM PDTFormatting of this note might be
 different from the original.
 Nurse Progress Note by Amelia Chávez RN at 18  
  Author:  Amelia Chávez RN Service:  (none) Author Type:  Registered Nurse 
  Filed:  1854 Date of Service:  18 Status:  Addendum 
  :  Amelia Chávez RN (Registered Nurse)   
  Related Notes:  Original Note by Amelia Chávez RN (Registered Nurse) filed at 
8 0553   
   
 Patient admitted from ED this shift.  Remains NPO and continues Golytely in preparation for
 colonoscopy.  HR remains tachycardic, VS o/w stable.  Chart check completed.  No further co
ncerns identified.  Will continue to monitor.  18 5:53 AM Amelia Chávez, RN
  
  
  Electronically signed by Conversion Transaction, Provider at 08/15/2019  4:52 PM PDTConver
kameron Transaction, Provider Unknown - 2018  5:25 AM PDTFormatting of this note might be
 different from the original.
 Progress Notes by Emmanuel Pacheco RPH at 18  
  Author:  Emmanuel Pacheco RPH Service:  Pharmacy Author Type:  Pharmacist 
  Filed:  18 Date of Service:  18 Status:  Signed 
  :  Emmanuel Pacheco RPH (Pharmacist)   
   
 Note ccl 103.6ml/min  meds reviewed   pharmacy will follow  [facility]  0525
  
  Electronically signed by Conversion Transaction, Provider at 08/15/2019  4:49 PM PDTdocume
nted in this encounter
 
 Plan of Treatment
 
 
+--------+---------+-----------+----------------------+-------------+
| Date   | Type    | Specialty | Care Team            | Description |
+--------+---------+-----------+----------------------+-------------+
| / | Office  | Neurology |   Ravindra Munoz MD  [address] |             |
|    | Visit   |           |  [facility]      |             |
|        |         |           | [place],  |             |
|        |         |           | [place] [address]             |             |
|        |         |           | [phone]         |             |
|        |         |           | [phone] (Fax)   |             |
+--------+---------+-----------+----------------------+-------------+
 documented as of this encounter
 
 Procedures
 
 
+----------------------+--------+-------------+----------------------+----------------------
+
| Procedure Name       | Priori | Date/Time   | Associated Diagnosis | Comments             
|
|                      | ty     |             |                      |                      
|
+----------------------+--------+-------------+----------------------+----------------------
+
| HEMOGLOBIN AND       | Routin | 2018  |                      |   Results for this   
 
|
| HEMATOCRIT           | e      |  8:13 AM    |                      | procedure are in the 
|
|                      |        | PDT         |                      |  results section.    
|
+----------------------+--------+-------------+----------------------+----------------------
+
| HEMOGLOBIN AND       | Routin | 2018  |                      |   Results for this   
|
| HEMATOCRIT           | e      |  7:45 PM    |                      | procedure are in the 
|
|                      |        | PDT         |                      |  results section.    
|
+----------------------+--------+-------------+----------------------+----------------------
+
| EXTERNAL LAB: BEATA    | Routin | 2018  |                      |   Results for this   
|
|                      | e      |  4:21 AM    |                      | procedure are in the 
|
|                      |        | PDT         |                      |  results section.    
|
+----------------------+--------+-------------+----------------------+----------------------
+
| BASIC METABOLIC      | Routin | 2018  |                      |   Results for this   
|
| PANEL                | e      |  4:21 AM    |                      | procedure are in the 
|
|                      |        | PDT         |                      |  results section.    
|
+----------------------+--------+-------------+----------------------+----------------------
+
| HEMOGLOBIN AND       | Routin | 2018  |                      |   Results for this   
|
| HEMATOCRIT           | e      |  4:35 PM    |                      | procedure are in the 
|
|                      |        | PDT         |                      |  results section.    
|
+----------------------+--------+-------------+----------------------+----------------------
+
| EXTERNAL LAB: BEATA    | Routin | 2018  |                      |   Results for this   
|
|                      | e      |  4:37 AM    |                      | procedure are in the 
|
|                      |        | PDT         |                      |  results section.    
|
+----------------------+--------+-------------+----------------------+----------------------
+
| BASIC METABOLIC      | Routin | 2018  |                      |   Results for this   
|
| PANEL                | e      |  4:37 AM    |                      | procedure are in the 
|
|                      |        | PDT         |                      |  results section.    
|
+----------------------+--------+-------------+----------------------+----------------------
+
| HEMOGLOBIN AND       | Routin | 2018  |                      |   Results for this   
|
| HEMATOCRIT           | e      |  5:41 PM    |                      | procedure are in the 
|
|                      |        | PDT         |                      |  results section.    
 
|
+----------------------+--------+-------------+----------------------+----------------------
+
| HEMOGLOBIN AND       | Routin | 2018  |                      |   Results for this   
|
| HEMATOCRIT           | e      | 11:49 AM    |                      | procedure are in the 
|
|                      |        | PDT         |                      |  results section.    
|
+----------------------+--------+-------------+----------------------+----------------------
+
| TISSUE REQUEST FOR   | Routin | 2018  |                      |   Results for this   
|
| PATHOLOGY (NON-ORD)  | e      |  8:00 AM    |                      | procedure are in the 
|
|                      |        | PDT         |                      |  results section.    
|
+----------------------+--------+-------------+----------------------+----------------------
+
| EXTERNAL LAB: CBC    | Routin | 2018  |                      |   Results for this   
|
|                      | e      |  6:42 AM    |                      | procedure are in the 
|
|                      |        | PDT         |                      |  results section.    
|
+----------------------+--------+-------------+----------------------+----------------------
+
| MAGNESIUM            | Routin | 2018  |                      |   Results for this   
|
|                      | e      |  5:57 AM    |                      | procedure are in the 
|
|                      |        | PDT         |                      |  results section.    
|
+----------------------+--------+-------------+----------------------+----------------------
+
| COMPREHENSIVE        | Routin | 2018  |                      |   Results for this   
|
| METABOLIC PANEL      | e      |  5:57 AM    |                      | procedure are in the 
|
|                      |        | PDT         |                      |  results section.    
|
+----------------------+--------+-------------+----------------------+----------------------
+
| HEMOGLOBIN AND       | Routin | 08/15/2018  |                      |   Results for this   
|
| HEMATOCRIT           | e      | 11:56 PM    |                      | procedure are in the 
|
|                      |        | PDT         |                      |  results section.    
|
+----------------------+--------+-------------+----------------------+----------------------
+
| HEMOGLOBIN AND       | Routin | 08/15/2018  |                      |   Results for this   
|
| HEMATOCRIT           | e      |  6:40 PM    |                      | procedure are in the 
|
|                      |        | PDT         |                      |  results section.    
|
+----------------------+--------+-------------+----------------------+----------------------
+
| XR ABDOMEN AP        | Routin | 08/15/2018  |                      |   Results for this   
 
|
|                      | e      |  5:12 PM    |                      | procedure are in the 
|
|                      |        | PDT         |                      |  results section.    
|
+----------------------+--------+-------------+----------------------+----------------------
+
| TYPE AND SCREEN      | Routin | 08/15/2018  |                      |   Results for this   
|
|                      | e      |  2:32 PM    |                      | procedure are in the 
|
|                      |        | PDT         |                      |  results section.    
|
+----------------------+--------+-------------+----------------------+----------------------
+
| HEMOGLOBIN AND       | Routin | 08/15/2018  |                      |   Results for this   
|
| HEMATOCRIT           | e      | 11:55 AM    |                      | procedure are in the 
|
|                      |        | PDT         |                      |  results section.    
|
+----------------------+--------+-------------+----------------------+----------------------
+
| HEMOGLOBIN AND       | Routin | 08/15/2018  |                      |   Results for this   
|
| HEMATOCRIT           | e      |  5:56 AM    |                      | procedure are in the 
|
|                      |        | PDT         |                      |  results section.    
|
+----------------------+--------+-------------+----------------------+----------------------
+
| EXTERNAL LAB: CBC    | Routin | 08/15/2018  |                      |   Results for this   
|
|                      | e      |  1:33 AM    |                      | procedure are in the 
|
|                      |        | PDT         |                      |  results section.    
|
+----------------------+--------+-------------+----------------------+----------------------
+
| MAGNESIUM            | Routin | 08/15/2018  |                      |   Results for this   
|
|                      | e      |  1:33 AM    |                      | procedure are in the 
|
|                      |        | PDT         |                      |  results section.    
|
+----------------------+--------+-------------+----------------------+----------------------
+
| COMPREHENSIVE        | Routin | 08/15/2018  |                      |   Results for this   
|
| METABOLIC PANEL      | e      |  1:33 AM    |                      | procedure are in the 
|
|                      |        | PDT         |                      |  results section.    
|
+----------------------+--------+-------------+----------------------+----------------------
+
| HEMOGLOBIN AND       | Routin | 2018  |                      |   Results for this   
|
| HEMATOCRIT           | e      |  9:17 PM    |                      | procedure are in the 
|
|                      |        | PDT         |                      |  results section.    
 
|
+----------------------+--------+-------------+----------------------+----------------------
+
| HEMOGLOBIN AND       | Routin | 2018  |                      |   Results for this   
|
| HEMATOCRIT           | e      | 12:12 PM    |                      | procedure are in the 
|
|                      |        | PDT         |                      |  results section.    
|
+----------------------+--------+-------------+----------------------+----------------------
+
| EXTERNAL LAB: CBC    | Routin | 2018  |                      |   Results for this   
|
|                      | e      |  5:47 AM    |                      | procedure are in the 
|
|                      |        | PDT         |                      |  results section.    
|
+----------------------+--------+-------------+----------------------+----------------------
+
| MAGNESIUM            | Routin | 2018  |                      |   Results for this   
|
|                      | e      |  5:47 AM    |                      | procedure are in the 
|
|                      |        | PDT         |                      |  results section.    
|
+----------------------+--------+-------------+----------------------+----------------------
+
| COMPREHENSIVE        | Routin | 2018  |                      |   Results for this   
|
| METABOLIC PANEL      | e      |  5:47 AM    |                      | procedure are in the 
|
|                      |        | PDT         |                      |  results section.    
|
+----------------------+--------+-------------+----------------------+----------------------
+
| HEMOGLOBIN AND       | Routin | 2018  |                      |   Results for this   
|
| HEMATOCRIT           | e      | 11:59 PM    |                      | procedure are in the 
|
|                      |        | PDT         |                      |  results section.    
|
+----------------------+--------+-------------+----------------------+----------------------
+
| PROTIME INR          | Routin | 2018  |                      |   Results for this   
|
|                      | e      |  9:40 PM    |                      | procedure are in the 
|
|                      |        | PDT         |                      |  results section.    
|
+----------------------+--------+-------------+----------------------+----------------------
+
| CBC NO DIFFERENTIAL  | Routin | 2018  |                      |   Results for this   
|
|                      | e      |  9:40 PM    |                      | procedure are in the 
|
|                      |        | PDT         |                      |  results section.    
|
+----------------------+--------+-------------+----------------------+----------------------
+
| BASIC METABOLIC      | Routin | 2018  |                      |   Results for this   
 
|
| PANEL                | e      |  9:40 PM    |                      | procedure are in the 
|
|                      |        | PDT         |                      |  results section.    
|
+----------------------+--------+-------------+----------------------+----------------------
+
| HEMOGLOBIN AND       | Routin | 2018  |                      |   Results for this   
|
| HEMATOCRIT           | e      |  8:27 PM    |                      | procedure are in the 
|
|                      |        | PDT         |                      |  results section.    
|
+----------------------+--------+-------------+----------------------+----------------------
+
| PROTIME INR          | Routin | 2018  |                      |   Results for this   
|
|                      | e      |  8:27 PM    |                      | procedure are in the 
|
|                      |        | PDT         |                      |  results section.    
|
+----------------------+--------+-------------+----------------------+----------------------
+
| IR ANGIOGRAM         | Routin | 2018  |                      |   Results for this   
|
| MESENTERIC VISCERAL  | e      |  7:58 PM    |                      | procedure are in the 
|
|                      |        | PDT         |                      |  results section.    
|
+----------------------+--------+-------------+----------------------+----------------------
+
| US GUIDED VASCULAR   | Routin | 2018  |                      |   Results for this   
|
| ACCESS               | e      |  7:40 PM    |                      | procedure are in the 
|
|                      |        | PDT         |                      |  results section.    
|
+----------------------+--------+-------------+----------------------+----------------------
+
| HEMOGLOBIN AND       | Routin | 2018  |                      |   Results for this   
|
| HEMATOCRIT           | e      | 12:53 PM    |                      | procedure are in the 
|
|                      |        | PDT         |                      |  results section.    
|
+----------------------+--------+-------------+----------------------+----------------------
+
| EXTERNAL LAB: CBC    | Routin | 2018  |                      |   Results for this   
|
|                      | e      |  9:10 AM    |                      | procedure are in the 
|
|                      |        | PDT         |                      |  results section.    
|
+----------------------+--------+-------------+----------------------+----------------------
+
| PHOSPHORUS           | Routin | 2018  |                      |   Results for this   
|
|                      | e      |  9:10 AM    |                      | procedure are in the 
|
|                      |        | PDT         |                      |  results section.    
 
|
+----------------------+--------+-------------+----------------------+----------------------
+
| MAGNESIUM            | Routin | 2018  |                      |   Results for this   
|
|                      | e      |  9:10 AM    |                      | procedure are in the 
|
|                      |        | PDT         |                      |  results section.    
|
+----------------------+--------+-------------+----------------------+----------------------
+
| BASIC METABOLIC      | Routin | 2018  |                      |   Results for this   
|
| PANEL                | e      |  9:10 AM    |                      | procedure are in the 
|
|                      |        | PDT         |                      |  results section.    
|
+----------------------+--------+-------------+----------------------+----------------------
+
| TISSUE REQUEST FOR   | Routin | 2018  |                      |   Results for this   
|
| PATHOLOGY (NON-ORD)  | e      |  8:00 AM    |                      | procedure are in the 
|
|                      |        | PDT         |                      |  results section.    
|
+----------------------+--------+-------------+----------------------+----------------------
+
| URINALYSIS WITH      | Routin | 2018  |                      |   Results for this   
|
| MICROSCOPIC IF       | e      | 11:28 PM    |                      | procedure are in the 
|
| INDICATED            |        | PDT         |                      |  results section.    
|
+----------------------+--------+-------------+----------------------+----------------------
+
| PROCALCITONIN, SERUM | Routin | 2018  |                      |   Results for this   
|
|                      | e      | 10:38 PM    |                      | procedure are in the 
|
|                      |        | PDT         |                      |  results section.    
|
+----------------------+--------+-------------+----------------------+----------------------
+
| HEMOGLOBIN AND       | Routin | 2018  |                      |   Results for this   
|
| HEMATOCRIT           | e      | 10:38 PM    |                      | procedure are in the 
|
|                      |        | PDT         |                      |  results section.    
|
+----------------------+--------+-------------+----------------------+----------------------
+
| LACTIC ACID          | Routin | 2018  |                      |   Results for this   
|
|                      | e      | 10:38 PM    |                      | procedure are in the 
|
|                      |        | PDT         |                      |  results section.    
|
+----------------------+--------+-------------+----------------------+----------------------
+
| CULTURE, BLOOD       | Routin | 2018  |                      |   Results for this   
 
|
|                      | e      | 10:34 PM    |                      | procedure are in the 
|
|                      |        | PDT         |                      |  results section.    
|
+----------------------+--------+-------------+----------------------+----------------------
+
| XR CHEST 1 VIEW      | Routin | 2018  |                      |   Results for this   
|
|                      | e      |  9:59 PM    |                      | procedure are in the 
|
|                      |        | PDT         |                      |  results section.    
|
+----------------------+--------+-------------+----------------------+----------------------
+
| CULTURE, BLOOD, 2ND  | Timed  | 2018  |                      |   Results for this   
|
| SPECIMEN (NON-ORD)   |        |  8:24 PM    |                      | procedure are in the 
|
|                      |        | PDT         |                      |  results section.    
|
+----------------------+--------+-------------+----------------------+----------------------
+
| CULTURE, BLOOD       | Timed  | 2018  |                      |   Results for this   
|
|                      |        |  8:24 PM    |                      | procedure are in the 
|
|                      |        | PDT         |                      |  results section.    
|
+----------------------+--------+-------------+----------------------+----------------------
+
| NM GI BLOOD LOSS     | Routin | 2018  |                      |   Results for this   
|
|                      | e      |  5:38 PM    |                      | procedure are in the 
|
|                      |        | PDT         |                      |  results section.    
|
+----------------------+--------+-------------+----------------------+----------------------
+
| HEMOGLOBIN AND       | Routin | 2018  |                      |   Results for this   
|
| HEMATOCRIT           | e      |  3:32 PM    |                      | procedure are in the 
|
|                      |        | PDT         |                      |  results section.    
|
+----------------------+--------+-------------+----------------------+----------------------
+
| TYPE AND SCREEN      | Routin | 2018  |                      |   Results for this   
|
|                      | e      |  6:59 AM    |                      | procedure are in the 
|
|                      |        | PDT         |                      |  results section.    
|
+----------------------+--------+-------------+----------------------+----------------------
+
| HEMOGLOBIN AND       | Routin | 2018  |                      |   Results for this   
|
| HEMATOCRIT           | e      |  6:09 AM    |                      | procedure are in the 
|
|                      |        | PDT         |                      |  results section.    
 
|
+----------------------+--------+-------------+----------------------+----------------------
+
| EXTERNAL LAB: CBC    | Routin | 2018  |                      |   Results for this   
|
|                      | e      |  3:47 AM    |                      | procedure are in the 
|
|                      |        | PDT         |                      |  results section.    
|
+----------------------+--------+-------------+----------------------+----------------------
+
| MAGNESIUM            | Routin | 2018  |                      |   Results for this   
|
|                      | e      |  3:47 AM    |                      | procedure are in the 
|
|                      |        | PDT         |                      |  results section.    
|
+----------------------+--------+-------------+----------------------+----------------------
+
| COMPREHENSIVE        | Routin | 2018  |                      |   Results for this   
|
| METABOLIC PANEL      | e      |  3:47 AM    |                      | procedure are in the 
|
|                      |        | PDT         |                      |  results section.    
|
+----------------------+--------+-------------+----------------------+----------------------
+
| CT ANGIOGRAM ABDOMEN | Routin | 2018  |                      |   Results for this   
|
|  PELVIS W CONTRAST   | e      |  1:23 AM    |                      | procedure are in the 
|
|                      |        | PDT         |                      |  results section.    
|
+----------------------+--------+-------------+----------------------+----------------------
+
| HEMOGLOBIN AND       | Routin | 2018  |                      |   Results for this   
|
| HEMATOCRIT           | e      | 12:01 AM    |                      | procedure are in the 
|
|                      |        | PDT         |                      |  results section.    
|
+----------------------+--------+-------------+----------------------+----------------------
+
 documented in this encounter
 
 Results
 Hemoglobin and Hematocrit (2018  8:13 AM PDT)
 
+-------------+--------------------------+---------------+------------+--------------+
| Component   | Value                    | Ref Range     | Performed  | Pathologist  |
|             |                          |               | At         | Signature    |
+-------------+--------------------------+---------------+------------+--------------+
| Hemoglobin  | 8.1 (L)                  | 13.2 - 17.0   | EXTERNAL   |              |
|             |                          | g/dL          | LAB        |              |
+-------------+--------------------------+---------------+------------+--------------+
| Hematocrit, | 23.8 (L)Comment: Testing | 39.0 - 50.0 % | EXTERNAL   |              |
|  POC        |  performed at [facility] [place];[address]    |               | LAB        |              |
|             | [address];[place], [place]   |               |            |              |
|             | [id number]                    |               |            |              |
+-------------+--------------------------+---------------+------------+--------------+
 
 
 
 
+----------+
| Specimen |
+----------+
|          |
+----------+
 
 
 
+----------------+---------+--------------------+--------------+
| Performing     | Address | City/State/Zipcode | Phone Number |
| Organization   |         |                    |              |
+----------------+---------+--------------------+--------------+
|   EXTERNAL LAB |         |                    |              |
+----------------+---------+--------------------+--------------+
 Hemoglobin and Hematocrit (2018  7:45 PM PDT)
 
+-------------+--------------------------+---------------+------------+--------------+
| Component   | Value                    | Ref Range     | Performed  | Pathologist  |
|             |                          |               | At         | Signature    |
+-------------+--------------------------+---------------+------------+--------------+
| Hemoglobin  | 7.8 (L)                  | 13.2 - 17.0   | EXTERNAL   |              |
|             |                          | g/dL          | LAB        |              |
+-------------+--------------------------+---------------+------------+--------------+
| Hematocrit, | 23.2 (L)Comment: Testing | 39.0 - 50.0 % | EXTERNAL   |              |
|  POC        |  performed at [facility] [place]    |               | LAB        |              |
|             | [address];[place], [place]   |               |            |              |
|             | [id number]                    |               |            |              |
+-------------+--------------------------+---------------+------------+--------------+
 
 
 
+----------+
| Specimen |
+----------+
|          |
+----------+
 
 
 
+----------------+---------+--------------------+--------------+
| Performing     | Address | City/State/Zipcode | Phone Number |
| Organization   |         |                    |              |
+----------------+---------+--------------------+--------------+
|   EXTERNAL LAB |         |                    |              |
+----------------+---------+--------------------+--------------+
 External Lab: CBC (2018  4:21 AM PDT)
 
+-------------+--------------------------+-----------------+------------+--------------+
| Component   | Value                    | Ref Range       | Performed  | Pathologist  |
|             |                          |                 | At         | Signature    |
+-------------+--------------------------+-----------------+------------+--------------+
| WBC         | 7.52                     | 3.80 - 11.00    | EXTERNAL   |              |
|             |                          | K/uL            | LAB        |              |
+-------------+--------------------------+-----------------+------------+--------------+
| Red Blood   | 2.51 (L)                 | 4.20 - 5.70     | EXTERNAL   |              |
| Cells       |                          | M/uL            | LAB        |              |
| Counted     |                          |                 |            |              |
 
+-------------+--------------------------+-----------------+------------+--------------+
| Hemoglobin  | 7.7 (L)                  | 13.2 - 17.0     | EXTERNAL   |              |
|             |                          | g/dL            | LAB        |              |
+-------------+--------------------------+-----------------+------------+--------------+
| Hematocrit, | 22.0 (L)                 | 39.0 - 50.0 %   | EXTERNAL   |              |
|  POC        |                          |                 | LAB        |              |
+-------------+--------------------------+-----------------+------------+--------------+
| MCV         | 87.8                     | 80.0 - 100.0 fl | EXTERNAL   |              |
|             |                          |                 | LAB        |              |
+-------------+--------------------------+-----------------+------------+--------------+
| MCH         | 30.6                     | 27.0 - 34.0 pg  | EXTERNAL   |              |
|             |                          |                 | LAB        |              |
+-------------+--------------------------+-----------------+------------+--------------+
| MCHC        | 34.9                     | 32.0 - 35.5     | EXTERNAL   |              |
|             |                          | g/dL            | LAB        |              |
+-------------+--------------------------+-----------------+------------+--------------+
| RDW-CV      | 50.8                     | 37 - 53 fl      | EXTERNAL   |              |
|             |                          |                 | LAB        |              |
+-------------+--------------------------+-----------------+------------+--------------+
| Platelet    | 242                      | 150 - 400 K/uL  | EXTERNAL   |              |
| Count       |                          |                 | LAB        |              |
| Plasma      |                          |                 |            |              |
+-------------+--------------------------+-----------------+------------+--------------+
| MPV         | 7.2                      | fl              | EXTERNAL   |              |
|             |                          |                 | LAB        |              |
+-------------+--------------------------+-----------------+------------+--------------+
| Differentia | AUTOMATED                |                 | EXTERNAL   |              |
| l Type      |                          |                 | LAB        |              |
+-------------+--------------------------+-----------------+------------+--------------+
| % Segmented | 80.26                    | %               | EXTERNAL   |              |
|             |                          |                 | LAB        |              |
| Neutrophils |                          |                 |            |              |
+-------------+--------------------------+-----------------+------------+--------------+
| %           | 10.02                    | %               | EXTERNAL   |              |
| Lymphocytes |                          |                 | LAB        |              |
+-------------+--------------------------+-----------------+------------+--------------+
| % Monocytes | 6.14                     | %               | EXTERNAL   |              |
|             |                          |                 | LAB        |              |
+-------------+--------------------------+-----------------+------------+--------------+
| %           | 3.33                     | %               | EXTERNAL   |              |
| Eosinophils |                          |                 | LAB        |              |
+-------------+--------------------------+-----------------+------------+--------------+
| % Basophils | 0.25                     | %               | EXTERNAL   |              |
|             |                          |                 | LAB        |              |
+-------------+--------------------------+-----------------+------------+--------------+
| Absolute    | 6.04                     | 1.90 - 7.40     | EXTERNAL   |              |
| Segmented   |                          | K/uL            | LAB        |              |
| Neutrophils |                          |                 |            |              |
+-------------+--------------------------+-----------------+------------+--------------+
| Absolute    | 0.75 (L)                 | 1.00 - 3.90     | EXTERNAL   |              |
| Lymphocytes |                          | K/uL            | LAB        |              |
+-------------+--------------------------+-----------------+------------+--------------+
| Absolute    | 0.46                     | 0.00 - 0.80     | EXTERNAL   |              |
| Monocytes   |                          | K/uL            | LAB        |              |
+-------------+--------------------------+-----------------+------------+--------------+
| Absolute    | 0.25                     | 0.00 - 0.50     | EXTERNAL   |              |
| Eosinophils |                          | K/uL            | LAB        |              |
+-------------+--------------------------+-----------------+------------+--------------+
| Absolute    | 0.02Comment: Testing     | 0.00 - 0.10     | EXTERNAL   |              |
| Basophils   | performed at [facility], [address] | K/uL            | LAB        |              |
 
|             |  [address],        |                 |            |              |
|             | [place]  [address]     |                 |            |              |
+-------------+--------------------------+-----------------+------------+--------------+
 
 
 
+-----------------+
| Specimen        |
+-----------------+
| Blood specimen  |
| (specimen)      |
+-----------------+
 
 
 
+----------------+---------+--------------------+--------------+
| Performing     | Address | City/State/Zipcode | Phone Number |
| Organization   |         |                    |              |
+----------------+---------+--------------------+--------------+
|   EXTERNAL LAB |         |                    |              |
+----------------+---------+--------------------+--------------+
 Basic Metabolic Panel (2018  4:21 AM PDT)
 
+-------------+--------------------------+-----------------+------------+--------------+
| Component   | Value                    | Ref Range       | Performed  | Pathologist  |
|             |                          |                 | At         | Signature    |
+-------------+--------------------------+-----------------+------------+--------------+
| Na          | 144                      | 135 - 145       | EXTERNAL   |              |
|             |                          | mmol/L          | LAB        |              |
+-------------+--------------------------+-----------------+------------+--------------+
| K           | 3.5                      | 3.5 - 4.9       | EXTERNAL   |              |
|             |                          | mmol/L          | LAB        |              |
+-------------+--------------------------+-----------------+------------+--------------+
| Cl          | 109                      | 99 - 109 mmol/L | EXTERNAL   |              |
|             |                          |                 | LAB        |              |
+-------------+--------------------------+-----------------+------------+--------------+
| CO2         | 23                       | 23 - 32 mmol/L  | EXTERNAL   |              |
|             |                          |                 | LAB        |              |
+-------------+--------------------------+-----------------+------------+--------------+
| Anion Gap   | 16                       | 5 - 20 mmol/L   | EXTERNAL   |              |
|             |                          |                 | LAB        |              |
+-------------+--------------------------+-----------------+------------+--------------+
| Glucose,    | 103 (H)                  | 65 - 99 mg/dL   | EXTERNAL   |              |
| Fasting     |                          |                 | LAB        |              |
+-------------+--------------------------+-----------------+------------+--------------+
| BUN         | 6 (L)                    | 8 - 25 mg/dL    | EXTERNAL   |              |
|             |                          |                 | LAB        |              |
+-------------+--------------------------+-----------------+------------+--------------+
| Creatinine  | 0.7                      | 0.70 - 1.30     | EXTERNAL   |              |
|             |                          | mg/dL           | LAB        |              |
+-------------+--------------------------+-----------------+------------+--------------+
| BUN/Creatin | 9                        |                 | EXTERNAL   |              |
| ine Ratio   |                          |                 | LAB        |              |
+-------------+--------------------------+-----------------+------------+--------------+
| Calcium     | 7.6 (L)                  | 8.5 - 10.5      | EXTERNAL   |              |
|             |                          | mg/dL           | LAB        |              |
+-------------+--------------------------+-----------------+------------+--------------+
| Estimated   | >60Comment: GFR <60:     | mL/min/1.73m2   | EXTERNAL   |              |
| GFR         | CHRONIC KIDNEY DISEASE,  |                 | LAB        |              |
|             | IF FOUND OVER A 3 MONTH  |                 |            |              |
 
|             | PERIOD.GFR <15: KIDNEY   |                 |            |              |
|             | FAILURE.FOR       |                 |            |              |
|             | AMERIC[employer], MULTIPLY THE  |                 |            |              |
|             | CALCULATED GFR BY        |                 |            |              |
|             | 1.210.This eGFR is       |                 |            |              |
|             | calculated using the     |                 |            |              |
|             | MDRD IDMS traceable      |                 |            |              |
|             | equation.Testing         |                 |            |              |
|             | performed at [facility], [address] |                 |            |              |
|             |  [facility],        |                 |            |              |
|             | [place]   [address]    |                 |            |              |
+-------------+--------------------------+-----------------+------------+--------------+
 
 
 
+-----------------+
| Specimen        |
+-----------------+
| Blood specimen  |
| (specimen)      |
+-----------------+
 
 
 
+----------------+---------+--------------------+--------------+
| Performing     | Address | City/State/Zipcode | Phone Number |
| Organization   |         |                    |              |
+----------------+---------+--------------------+--------------+
|   EXTERNAL LAB |         |                    |              |
+----------------+---------+--------------------+--------------+
 Hemoglobin and Hematocrit (2018  4:35 PM PDT)
 
+-------------+--------------------------+---------------+------------+--------------+
| Component   | Value                    | Ref Range     | Performed  | Pathologist  |
|             |                          |               | At         | Signature    |
+-------------+--------------------------+---------------+------------+--------------+
| Hemoglobin  | 8.2 (L)                  | 13.2 - 17.0   | EXTERNAL   |              |
|             |                          | g/dL          | LAB        |              |
+-------------+--------------------------+---------------+------------+--------------+
| Hematocrit, | 24.2 (L)Comment: Testing | 39.0 - 50.0 % | EXTERNAL   |              |
|  POC        |  performed at [facility], [address]  |               | LAB        |              |
|             | [address],       |               |            |              |
|             | [place]  [address]     |               |            |              |
+-------------+--------------------------+---------------+------------+--------------+
 
 
 
+----------+
| Specimen |
+----------+
|          |
+----------+
 
 
 
+----------------+---------+--------------------+--------------+
| Performing     | Address | City/State/Zipcode | Phone Number |
| Organization   |         |                    |              |
+----------------+---------+--------------------+--------------+
|   EXTERNAL LAB |         |                    |              |
 
+----------------+---------+--------------------+--------------+
 External Lab: CBC (2018  4:37 AM PDT)
 
+-------------+-------------------------+-----------------+------------+--------------+
| Component   | Value                   | Ref Range       | Performed  | Pathologist  |
|             |                         |                 | At         | Signature    |
+-------------+-------------------------+-----------------+------------+--------------+
| WBC         | 8.78                    | 3.80 - 11.00    | EXTERNAL   |              |
|             |                         | K/uL            | LAB        |              |
+-------------+-------------------------+-----------------+------------+--------------+
| Red Blood   | 2.66 (L)                | 4.20 - 5.70     | EXTERNAL   |              |
| Cells       |                         | M/uL            | LAB        |              |
| Counted     |                         |                 |            |              |
+-------------+-------------------------+-----------------+------------+--------------+
| Hemoglobin  | 8.1 (L)                 | 13.2 - 17.0     | EXTERNAL   |              |
|             |                         | g/dL            | LAB        |              |
+-------------+-------------------------+-----------------+------------+--------------+
| Hematocrit, | 23.4 (L)                | 39.0 - 50.0 %   | EXTERNAL   |              |
|  POC        |                         |                 | LAB        |              |
+-------------+-------------------------+-----------------+------------+--------------+
| MCV         | 87.8                    | 80.0 - 100.0 fl | EXTERNAL   |              |
|             |                         |                 | LAB        |              |
+-------------+-------------------------+-----------------+------------+--------------+
| MCH         | 30.5                    | 27.0 - 34.0 pg  | EXTERNAL   |              |
|             |                         |                 | LAB        |              |
+-------------+-------------------------+-----------------+------------+--------------+
| MCHC        | 34.7                    | 32.0 - 35.5     | EXTERNAL   |              |
|             |                         | g/dL            | LAB        |              |
+-------------+-------------------------+-----------------+------------+--------------+
| RDW-CV      | 50.8                    | 37 - 53 fl      | EXTERNAL   |              |
|             |                         |                 | LAB        |              |
+-------------+-------------------------+-----------------+------------+--------------+
| Platelet    | 232                     | 150 - 400 K/uL  | EXTERNAL   |              |
| Count       |                         |                 | LAB        |              |
| Plasma      |                         |                 |            |              |
+-------------+-------------------------+-----------------+------------+--------------+
| MPV         | 7.5                     | fl              | EXTERNAL   |              |
|             |                         |                 | LAB        |              |
+-------------+-------------------------+-----------------+------------+--------------+
| Differentia | MANUAL                  |                 | EXTERNAL   |              |
| l Type      |                         |                 | LAB        |              |
+-------------+-------------------------+-----------------+------------+--------------+
| Segmented   | 74                      | %               | EXTERNAL   |              |
| Neutrophils |                         |                 | LAB        |              |
|  Manual     |                         |                 |            |              |
+-------------+-------------------------+-----------------+------------+--------------+
| % Bands     | 2                       | %               | EXTERNAL   |              |
|             |                         |                 | LAB        |              |
+-------------+-------------------------+-----------------+------------+--------------+
| %           | 1                       | %               | EXTERNAL   |              |
| Metamyelocy |                         |                 | LAB        |              |
| marily         |                         |                 |            |              |
+-------------+-------------------------+-----------------+------------+--------------+
| Lymphocytes | 17                      | %               | EXTERNAL   |              |
|  Manual     |                         |                 | LAB        |              |
+-------------+-------------------------+-----------------+------------+--------------+
| Monocytes   | 5                       | %               | EXTERNAL   |              |
| Manual      |                         |                 | LAB        |              |
+-------------+-------------------------+-----------------+------------+--------------+
| Eosinophils | 1                       | %               | EXTERNAL   |              |
 
|  Manual     |                         |                 | LAB        |              |
+-------------+-------------------------+-----------------+------------+--------------+
| Absolute    | 6.49                    | 1.90 - 7.40     | EXTERNAL   |              |
| Neutrophils |                         | K/uL            | LAB        |              |
+-------------+-------------------------+-----------------+------------+--------------+
| Bands       | 0.18                    | 0.00 - 0.20     | EXTERNAL   |              |
| Manual      |                         | K/uL            | LAB        |              |
+-------------+-------------------------+-----------------+------------+--------------+
| Absolute    | 0.09 (H)                | K/uL            | EXTERNAL   |              |
| Metamyelocy |                         |                 | LAB        |              |
| marily         |                         |                 |            |              |
+-------------+-------------------------+-----------------+------------+--------------+
| Absolute    | 1.49                    | 1.00 - 3.90     | EXTERNAL   |              |
| Lymphocytes |                         | K/uL            | LAB        |              |
+-------------+-------------------------+-----------------+------------+--------------+
| Absolute    | 0.44                    | 0.00 - 0.80     | EXTERNAL   |              |
| Monocytes   |                         | K/uL            | LAB        |              |
+-------------+-------------------------+-----------------+------------+--------------+
| Absolute    | 0.09                    | 0.00 - 0.50     | EXTERNAL   |              |
| Eosinophils |                         | K/uL            | LAB        |              |
+-------------+-------------------------+-----------------+------------+--------------+
| RBC         | RBC AND PLT MORPHOLOGY  |                 | EXTERNAL   |              |
| Morphology  | APPEAR NORMALComment:   |                 | LAB        |              |
|             | Testing performed at    |                 |            |              |
|             | [facility], [address]  |                 |            |              |
|             | [place]     |                 |            |              |
|             | [address]                   |                 |            |              |
+-------------+-------------------------+-----------------+------------+--------------+
 
 
 
+-----------------+
| Specimen        |
+-----------------+
| Blood specimen  |
| (specimen)      |
+-----------------+
 
 
 
+----------------+---------+--------------------+--------------+
| Performing     | Address | City/State/Zipcode | Phone Number |
| Organization   |         |                    |              |
+----------------+---------+--------------------+--------------+
|   EXTERNAL LAB |         |                    |              |
+----------------+---------+--------------------+--------------+
 Basic Metabolic Panel (2018  4:37 AM PDT)
 
+-------------+--------------------------+-----------------+------------+--------------+
| Component   | Value                    | Ref Range       | Performed  | Pathologist  |
|             |                          |                 | At         | Signature    |
+-------------+--------------------------+-----------------+------------+--------------+
| Na          | 142                      | 135 - 145       | EXTERNAL   |              |
|             |                          | mmol/L          | LAB        |              |
+-------------+--------------------------+-----------------+------------+--------------+
| K           | 3.5                      | 3.5 - 4.9       | EXTERNAL   |              |
|             |                          | mmol/L          | LAB        |              |
+-------------+--------------------------+-----------------+------------+--------------+
| Cl          | 109                      | 99 - 109 mmol/L | EXTERNAL   |              |
|             |                          |                 | LAB        |              |
 
+-------------+--------------------------+-----------------+------------+--------------+
| CO2         | 23                       | 23 - 32 mmol/L  | EXTERNAL   |              |
|             |                          |                 | LAB        |              |
+-------------+--------------------------+-----------------+------------+--------------+
| Anion Gap   | 14                       | 5 - 20 mmol/L   | EXTERNAL   |              |
|             |                          |                 | LAB        |              |
+-------------+--------------------------+-----------------+------------+--------------+
| Glucose,    | 106 (H)                  | 65 - 99 mg/dL   | EXTERNAL   |              |
| Fasting     |                          |                 | LAB        |              |
+-------------+--------------------------+-----------------+------------+--------------+
| BUN         | 10                       | 8 - 25 mg/dL    | EXTERNAL   |              |
|             |                          |                 | LAB        |              |
+-------------+--------------------------+-----------------+------------+--------------+
| Creatinine  | 0.6 (L)                  | 0.70 - 1.30     | EXTERNAL   |              |
|             |                          | mg/dL           | LAB        |              |
+-------------+--------------------------+-----------------+------------+--------------+
| BUN/Creatin | 17                       |                 | EXTERNAL   |              |
| ine Ratio   |                          |                 | LAB        |              |
+-------------+--------------------------+-----------------+------------+--------------+
| Calcium     | 7.6 (L)                  | 8.5 - 10.5      | EXTERNAL   |              |
|             |                          | mg/dL           | LAB        |              |
+-------------+--------------------------+-----------------+------------+--------------+
| Estimated   | >60Comment: GFR <60:     | mL/min/1.73m2   | EXTERNAL   |              |
| GFR         | CHRONIC KIDNEY DISEASE,  |                 | LAB        |              |
|             | IF FOUND OVER A 3 MONTH  |                 |            |              |
|             | PERIOD.GFR <15: KIDNEY   |                 |            |              |
|             | FAILURE.FOR       |                 |            |              |
|             | AMERIC[employer], MULTIPLY THE  |                 |            |              |
|             | CALCULATED GFR BY        |                 |            |              |
|             | 1.210.This eGFR is       |                 |            |              |
|             | calculated using the     |                 |            |              |
|             | MDRD IDMS traceable      |                 |            |              |
|             | equation.Testing         |                 |            |              |
|             | performed at [facility], [address] |                 |            |              |
|             |  [address],        |                 |            |              |
|             | [place]   [address]    |                 |            |              |
+-------------+--------------------------+-----------------+------------+--------------+
 
 
 
+-----------------+
| Specimen        |
+-----------------+
| Blood specimen  |
| (specimen)      |
+-----------------+
 
 
 
+----------------+---------+--------------------+--------------+
| Performing     | Address | City/State/Zipcode | Phone Number |
| Organization   |         |                    |              |
+----------------+---------+--------------------+--------------+
|   EXTERNAL LAB |         |                    |              |
+----------------+---------+--------------------+--------------+
 Hemoglobin and Hematocrit (2018  5:41 PM PDT)
 
+-------------+--------------------------+---------------+------------+--------------+
| Component   | Value                    | Ref Range     | Performed  | Pathologist  |
|             |                          |               | At         | Signature    |
 
+-------------+--------------------------+---------------+------------+--------------+
| Hemoglobin  | 9.6 (L)                  | 13.2 - 17.0   | EXTERNAL   |              |
|             |                          | g/dL          | LAB        |              |
+-------------+--------------------------+---------------+------------+--------------+
| Hematocrit, | 29.4 (L)Comment: Testing | 39.0 - 50.0 % | EXTERNAL   |              |
|  POC        |  performed at [facility];[address]    |               | LAB        |              |
|             | [address];[place]   |               |            |              |
|             | [id number]                    |               |            |              |
+-------------+--------------------------+---------------+------------+--------------+
 
 
 
+----------+
| Specimen |
+----------+
|          |
+----------+
 
 
 
+----------------+---------+--------------------+--------------+
| Performing     | Address | City/State/Zipcode | Phone Number |
| Organization   |         |                    |              |
+----------------+---------+--------------------+--------------+
|   EXTERNAL LAB |         |                    |              |
+----------------+---------+--------------------+--------------+
 Hemoglobin and Hematocrit (2018 11:49 AM PDT)
 
+-------------+--------------------------+---------------+------------+--------------+
| Component   | Value                    | Ref Range     | Performed  | Pathologist  |
|             |                          |               | At         | Signature    |
+-------------+--------------------------+---------------+------------+--------------+
| Hemoglobin  | 8.9 (L)                  | 13.2 - 17.0   | EXTERNAL   |              |
|             |                          | g/dL          | LAB        |              |
+-------------+--------------------------+---------------+------------+--------------+
| Hematocrit, | 26.1 (L)Comment: Testing | 39.0 - 50.0 % | EXTERNAL   |              |
|  POC        |  performed at [facility];[address]    |               | LAB        |              |
|             | [address];[place], [place]   |               |            |              |
|             | [address]                    |               |            |              |
+-------------+--------------------------+---------------+------------+--------------+
 
 
 
+----------+
| Specimen |
+----------+
|          |
+----------+
 
 
 
+----------------+---------+--------------------+--------------+
| Performing     | Address | City/State/Zipcode | Phone Number |
| Organization   |         |                    |              |
+----------------+---------+--------------------+--------------+
|   EXTERNAL LAB |         |                    |              |
+----------------+---------+--------------------+--------------+
 Tissue Request For Pathology (2018  8:00 AM PDT)
 
+---------------------+
 
| Specimen            |
+---------------------+
| Soft tissue sample  |
| (specimen)          |
+---------------------+
 
 
 
+------------------------------------------------------------------------+----------------+
| Narrative                                                              | Performed At   |
+------------------------------------------------------------------------+----------------+
|   SPECIMEN(S): A JEJUNUM  SPECIMEN SOURCE:  A. JEJUNUM  CLINICAL       |   EXTERNAL LAB |
| HISTORY:  GI bleed.  FINAL PATHOLOGIC DIAGNOSIS:  Jejunum, partial     |                |
| resection:  -   Diverticulosis.     DD:cml:C2NR  MICROSCOPIC           |                |
| EXAMINATION:  Histologic sections of all submitted blocks are examined |                |
|  by light microscopy.   These findings, together with the gross        |                |
| examination, support the pathologic diagnosis.  GROSS DESCRIPTION:     |                |
| The specimen, received in formalin, labeled "jejunum," consists of a   |                |
| 21.3 cm in length x 7.3 cm circumference segment of small bowel with a |                |
|  3.8 cm thick mesentery.   There are five large diverticula  that are  |                |
| visible from the serosal surface and range from 1.1 x 1.0 x 0.7 cm to  |                |
| 3.7 x 3.0 x 2.8 cm.   The mucosa throughout the specimen is tan with   |                |
| regular folds.   No perforation is grossly identified.  No lymph nodes |                |
|  are identified.   Representatively submitted as follows: (A1-A2)      |                |
| diverticula; (A3) uninvolved.  am:TRE:rashida  PERFORMING LABORATORY:  The |                |
|  technical component was performed by [facility] |                |
|  [facility]ly [facility] [address] (Medical Director:   Adela Correia MD; CLIA#      |                |
| [id number]).  Professional interpretation was performed by [facility]      |                |
| [facility], [address] [place] [address],       |                |
| [place] [address] (Medical Director:   Rachael Alexander MD; CLIA#   |                |
| [id number]).  Diagnostician:   Rachael Alexander MD  Pathologist        |                |
| Electronically Signed 2018                                       |                |
+------------------------------------------------------------------------+----------------+
 
 
 
+----------------+---------+--------------------+--------------+
| Performing     | Address | City/State/Zipcode | Phone Number |
| Organization   |         |                    |              |
+----------------+---------+--------------------+--------------+
|   EXTERNAL LAB |         |                    |              |
+----------------+---------+--------------------+--------------+
 External Lab: CBC (2018  6:42 AM PDT)
 
+-------------+--------------------------+-----------------+------------+--------------+
| Component   | Value                    | Ref Range       | Performed  | Pathologist  |
|             |                          |                 | At         | Signature    |
+-------------+--------------------------+-----------------+------------+--------------+
| WBC         | 10.71                    | 3.80 - 11.00    | EXTERNAL   |              |
|             |                          | K/uL            | LAB        |              |
+-------------+--------------------------+-----------------+------------+--------------+
| Red Blood   | 2.69 (L)                 | 4.20 - 5.70     | EXTERNAL   |              |
| Cells       |                          | M/uL            | LAB        |              |
| Counted     |                          |                 |            |              |
+-------------+--------------------------+-----------------+------------+--------------+
| Hemoglobin  | 8.3 (L)                  | 13.2 - 17.0     | EXTERNAL   |              |
|             |                          | g/dL            | LAB        |              |
+-------------+--------------------------+-----------------+------------+--------------+
| Hematocrit, | 23.7 (L)                 | 39.0 - 50.0 %   | EXTERNAL   |              |
|  POC        |                          |                 | LAB        |              |
 
+-------------+--------------------------+-----------------+------------+--------------+
| MCV         | 88.3                     | 80.0 - 100.0 fl | EXTERNAL   |              |
|             |                          |                 | LAB        |              |
+-------------+--------------------------+-----------------+------------+--------------+
| MCH         | 30.9                     | 27.0 - 34.0 pg  | EXTERNAL   |              |
|             |                          |                 | LAB        |              |
+-------------+--------------------------+-----------------+------------+--------------+
| MCHC        | 35.0                     | 32.0 - 35.5     | EXTERNAL   |              |
|             |                          | g/dL            | LAB        |              |
+-------------+--------------------------+-----------------+------------+--------------+
| RDW-CV      | 49.4                     | 37 - 53 fl      | EXTERNAL   |              |
|             |                          |                 | LAB        |              |
+-------------+--------------------------+-----------------+------------+--------------+
| Platelet    | 215                      | 150 - 400 K/uL  | EXTERNAL   |              |
| Count       |                          |                 | LAB        |              |
| Plasma      |                          |                 |            |              |
+-------------+--------------------------+-----------------+------------+--------------+
| MPV         | 7.0                      | fl              | EXTERNAL   |              |
|             |                          |                 | LAB        |              |
+-------------+--------------------------+-----------------+------------+--------------+
| Differentia | AUTOMATED                |                 | EXTERNAL   |              |
| l Type      |                          |                 | LAB        |              |
+-------------+--------------------------+-----------------+------------+--------------+
| % Segmented | 89.16                    | %               | EXTERNAL   |              |
|             |                          |                 | LAB        |              |
| Neutrophils |                          |                 |            |              |
+-------------+--------------------------+-----------------+------------+--------------+
| %           | 5.11                     | %               | EXTERNAL   |              |
| Lymphocytes |                          |                 | LAB        |              |
+-------------+--------------------------+-----------------+------------+--------------+
| % Monocytes | 5.23                     | %               | EXTERNAL   |              |
|             |                          |                 | LAB        |              |
+-------------+--------------------------+-----------------+------------+--------------+
| %           | 0.37                     | %               | EXTERNAL   |              |
| Eosinophils |                          |                 | LAB        |              |
+-------------+--------------------------+-----------------+------------+--------------+
| % Basophils | 0.13                     | %               | EXTERNAL   |              |
|             |                          |                 | LAB        |              |
+-------------+--------------------------+-----------------+------------+--------------+
| Absolute    | 9.55 (H)                 | 1.90 - 7.40     | EXTERNAL   |              |
| Segmented   |                          | K/uL            | LAB        |              |
| Neutrophils |                          |                 |            |              |
+-------------+--------------------------+-----------------+------------+--------------+
| Absolute    | 0.55 (L)                 | 1.00 - 3.90     | EXTERNAL   |              |
| Lymphocytes |                          | K/uL            | LAB        |              |
+-------------+--------------------------+-----------------+------------+--------------+
| Absolute    | 0.56                     | 0.00 - 0.80     | EXTERNAL   |              |
| Monocytes   |                          | K/uL            | LAB        |              |
+-------------+--------------------------+-----------------+------------+--------------+
| Absolute    | 0.04                     | 0.00 - 0.50     | EXTERNAL   |              |
| Eosinophils |                          | K/uL            | LAB        |              |
+-------------+--------------------------+-----------------+------------+--------------+
| Absolute    | 0.01                     | 0.00 - 0.10     | EXTERNAL   |              |
| Basophils   |                          | K/uL            | LAB        |              |
+-------------+--------------------------+-----------------+------------+--------------+
| RBC         | RBC AND PLT MORPHOLOGY   |                 | EXTERNAL   |              |
| Morphology  | APPEAR NORMAL            |                 | LAB        |              |
+-------------+--------------------------+-----------------+------------+--------------+
| Platelet    | ADEQUATE                 |                 | EXTERNAL   |              |
| Estimate    |                          |                 | LAB        |              |
 
+-------------+--------------------------+-----------------+------------+--------------+
| Differentia | SLIDE SCANNED, AGREES    |                 | EXTERNAL   |              |
| l Comments  | WITH AUTOMATED           |                 | LAB        |              |
|             | RESULTS.Comment: Testing |                 |            |              |
|             |  performed at [facility] [place];[address]    |                 |            |              |
|             | [address];[place]   |                 |            |              |
|             | [id number]                    |                 |            |              |
+-------------+--------------------------+-----------------+------------+--------------+
 
 
 
+----------+
| Specimen |
+----------+
|          |
+----------+
 
 
 
+----------------+---------+--------------------+--------------+
| Performing     | Address | City/State/Zipcode | Phone Number |
| Organization   |         |                    |              |
+----------------+---------+--------------------+--------------+
|   EXTERNAL LAB |         |                    |              |
+----------------+---------+--------------------+--------------+
 Magnesium (2018  5:57 AM PDT)
 
+-----------+-------------------------+-----------------+------------+--------------+
| Component | Value                   | Ref Range       | Performed  | Pathologist  |
|           |                         |                 | At         | Signature    |
+-----------+-------------------------+-----------------+------------+--------------+
| Magnesium | 2.1Comment: SLT         | 1.7 - 2.4 mg/dL | EXTERNAL   |              |
|           | HEMOLYSISTesting        |                 | LAB        |              |
|           | performed at [facility] [place];[address]    |                 |            |              |
|           | [address];[place]  |                 |            |              |
|           | [id number]                   |                 |            |              |
+-----------+-------------------------+-----------------+------------+--------------+
 
 
 
+-----------------+
| Specimen        |
+-----------------+
| Blood specimen  |
| (specimen)      |
+-----------------+
 
 
 
+----------------+---------+--------------------+--------------+
| Performing     | Address | City/State/Zipcode | Phone Number |
| Organization   |         |                    |              |
+----------------+---------+--------------------+--------------+
|   EXTERNAL LAB |         |                    |              |
+----------------+---------+--------------------+--------------+
 Comprehensive Metabolic Panel (2018  5:57 AM PDT)
 
+-------------+--------------------------+-----------------+------------+--------------+
| Component   | Value                    | Ref Range       | Performed  | Pathologist  |
|             |                          |                 | At         | Signature    |
 
+-------------+--------------------------+-----------------+------------+--------------+
| Na          | 143                      | 135 - 145       | EXTERNAL   |              |
|             |                          | mmol/L          | LAB        |              |
+-------------+--------------------------+-----------------+------------+--------------+
| K           | 4.1Comment: SLT          | 3.5 - 4.9       | EXTERNAL   |              |
|             | HEMOLYSIS                | mmol/L          | LAB        |              |
+-------------+--------------------------+-----------------+------------+--------------+
| Cl          | 112 (H)                  | 99 - 109 mmol/L | EXTERNAL   |              |
|             |                          |                 | LAB        |              |
+-------------+--------------------------+-----------------+------------+--------------+
| CO2         | 21 (L)                   | 23 - 32 mmol/L  | EXTERNAL   |              |
|             |                          |                 | LAB        |              |
+-------------+--------------------------+-----------------+------------+--------------+
| Anion Gap   | 15                       | 5 - 20 mmol/L   | EXTERNAL   |              |
|             |                          |                 | LAB        |              |
+-------------+--------------------------+-----------------+------------+--------------+
| Glucose,    | 99                       | 65 - 99 mg/dL   | EXTERNAL   |              |
| Fasting     |                          |                 | LAB        |              |
+-------------+--------------------------+-----------------+------------+--------------+
| BUN         | 10                       | 8 - 25 mg/dL    | EXTERNAL   |              |
|             |                          |                 | LAB        |              |
+-------------+--------------------------+-----------------+------------+--------------+
| Creatinine  | 0.59 (L)                 | 0.70 - 1.30     | EXTERNAL   |              |
|             |                          | mg/dL           | LAB        |              |
+-------------+--------------------------+-----------------+------------+--------------+
| BUN/Creatin | 16                       |                 | EXTERNAL   |              |
| ine Ratio   |                          |                 | LAB        |              |
+-------------+--------------------------+-----------------+------------+--------------+
| Calcium     | 7.2 (L)                  | 8.5 - 10.5      | EXTERNAL   |              |
|             |                          | mg/dL           | LAB        |              |
+-------------+--------------------------+-----------------+------------+--------------+
| Protein,    | 4.6 (L)                  | 6.3 - 8.2 g/dL  | EXTERNAL   |              |
| Total       |                          |                 | LAB        |              |
+-------------+--------------------------+-----------------+------------+--------------+
| Albumin     | 1.9 (L)                  | 3.3 - 4.8 g/dL  | EXTERNAL   |              |
|             |                          |                 | LAB        |              |
+-------------+--------------------------+-----------------+------------+--------------+
| Globulin    | 2.7                      | 1.3 - 4.9 g/dL  | EXTERNAL   |              |
|             |                          |                 | LAB        |              |
+-------------+--------------------------+-----------------+------------+--------------+
| A/G Ratio   | 0.7 (L)                  | 1.0 - 2.4       | EXTERNAL   |              |
|             |                          |                 | LAB        |              |
+-------------+--------------------------+-----------------+------------+--------------+
| Bilirubin   | 0.5                      | 0.1 - 1.5 mg/dL | EXTERNAL   |              |
| Total       |                          |                 | LAB        |              |
+-------------+--------------------------+-----------------+------------+--------------+
| ALP,        | 37                       | 35 - 115 U/L    | EXTERNAL   |              |
| External    |                          |                 | LAB        |              |
+-------------+--------------------------+-----------------+------------+--------------+
| AST         | 16Comment: SLT HEMOLYSIS | 10 - 45 U/L     | EXTERNAL   |              |
|             |                          |                 | LAB        |              |
+-------------+--------------------------+-----------------+------------+--------------+
| ALT         | 16                       | 10 - 65 U/L     | EXTERNAL   |              |
|             |                          |                 | LAB        |              |
+-------------+--------------------------+-----------------+------------+--------------+
| Estimated   | >60Comment: GFR <60:     | mL/min/1.73m2   | EXTERNAL   |              |
| GFR         | CHRONIC KIDNEY DISEASE,  |                 | LAB        |              |
|             | IF FOUND OVER A 3 MONTH  |                 |            |              |
|             | PERIOD.GFR <15: KIDNEY   |                 |            |              |
|             | FAILURE.FOR       |                 |            |              |
 
|             | [employer], MULTIPLY THE  |                 |            |              |
|             | CALCULATED GFR BY        |                 |            |              |
|             | 1.210.This eGFR is       |                 |            |              |
|             | calculated using the     |                 |            |              |
|             | MDRD IDMS traceable      |                 |            |              |
|             | equation.Testing         |                 |            |              |
|             | performed at [facility] [place];[address]     |                 |            |              |
|             | [facility];[place], [place]   |                 |            |              |
|             | [id number]                    |                 |            |              |
+-------------+--------------------------+-----------------+------------+--------------+
 
 
 
+-----------------+
| Specimen        |
+-----------------+
| Blood specimen  |
| (specimen)      |
+-----------------+
 
 
 
+----------------+---------+--------------------+--------------+
| Performing     | Address | City/State/Zipcode | Phone Number |
| Organization   |         |                    |              |
+----------------+---------+--------------------+--------------+
|   EXTERNAL LAB |         |                    |              |
+----------------+---------+--------------------+--------------+
 Hemoglobin and Hematocrit (08/15/2018 11:56 PM PDT)
 
+-------------+--------------------------+---------------+------------+--------------+
| Component   | Value                    | Ref Range     | Performed  | Pathologist  |
|             |                          |               | At         | Signature    |
+-------------+--------------------------+---------------+------------+--------------+
| Hemoglobin  | 8.2 (L)                  | 13.2 - 17.0   | EXTERNAL   |              |
|             |                          | g/dL          | LAB        |              |
+-------------+--------------------------+---------------+------------+--------------+
| Hematocrit, | 24.7 (L)Comment: Testing | 39.0 - 50.0 % | EXTERNAL   |              |
|  POC        |  performed at [facility] [place];[address]    |               | LAB        |              |
|             | [address];[place], [place]   |               |            |              |
|             | [id number]                    |               |            |              |
+-------------+--------------------------+---------------+------------+--------------+
 
 
 
+----------+
| Specimen |
+----------+
|          |
+----------+
 
 
 
+----------------+---------+--------------------+--------------+
| Performing     | Address | City/State/Zipcode | Phone Number |
| Organization   |         |                    |              |
+----------------+---------+--------------------+--------------+
|   EXTERNAL LAB |         |                    |              |
+----------------+---------+--------------------+--------------+
 Hemoglobin and Hematocrit (08/15/2018  6:40 PM PDT)
 
 
+-------------+--------------------------+---------------+------------+--------------+
| Component   | Value                    | Ref Range     | Performed  | Pathologist  |
|             |                          |               | At         | Signature    |
+-------------+--------------------------+---------------+------------+--------------+
| Hemoglobin  | 8.7 (L)                  | 13.2 - 17.0   | EXTERNAL   |              |
|             |                          | g/dL          | LAB        |              |
+-------------+--------------------------+---------------+------------+--------------+
| Hematocrit, | 26.1 (L)Comment: Testing | 39.0 - 50.0 % | EXTERNAL   |              |
|  POC        |  performed at [facility] [place];[facility]    |               | LAB        |              |
|             | [address];[place]   |               |            |              |
|             | [id number]                    |               |            |              |
+-------------+--------------------------+---------------+------------+--------------+
 
 
 
+----------+
| Specimen |
+----------+
|          |
+----------+
 
 
 
+----------------+---------+--------------------+--------------+
| Performing     | Address | City/State/Zipcode | Phone Number |
| Organization   |         |                    |              |
+----------------+---------+--------------------+--------------+
|   EXTERNAL LAB |         |                    |              |
+----------------+---------+--------------------+--------------+
 XR Abdomen AP (08/15/2018  5:12 PM PDT)
 
+----------+
| Specimen |
+----------+
|          |
+----------+
 
 
 
+--------------------------------------------------------------------+--------------+
| Impressions                                                        | Performed At |
+--------------------------------------------------------------------+--------------+
|   Findings and impression:   Mild gaseous prominence of the bowel  |              |
| loops, favoring ileus. No unexpected radiopaque foreign body       |              |
| appreciated. Osseous structures appear unremarkable.               |              |
| Electronically signed by Juan Mckeon MD on 8/15/2018 5:16 PM |              |
+--------------------------------------------------------------------+--------------+
 
 
 
+------------------------------------------------------------------------+--------------+
| Narrative                                                              | Performed At |
+------------------------------------------------------------------------+--------------+
|   BERNA PARKINSON  1950  XR ABDOMEN 1 VIEW  8/15/2018 5:12 PM    |              |
|      INDICATION: Intraoperative assessment.     COMPARISON: none       |              |
| TECHNIQUE: Abdominal series, frontal view.                             |              |
+------------------------------------------------------------------------+--------------+
 
 
 
 
+-------------------------------------------------------------------------------------------
--------------------------------------------------------------------------------------+
| Procedure Note                                                                            
                                                                                      |
+-------------------------------------------------------------------------------------------
--------------------------------------------------------------------------------------+
|   H[facility], Rad Conversion - 2019 11:04 AM PDT  BERNA PARKINSON1950XR ABDOMEN 1    
                                                                                      |
| VIEW8/15/2018 5:12 PM  INDICATION: Intraoperative assessment. COMPARISON: none            
                                                                                      |
| TECHNIQUE: Abdominal series, frontal view. IMPRESSION: Findings and impression:  Mild     
                                                                                      |
| gaseous prominence of the bowel loops, favoring ileus. No unexpected radiopaque foreign   
                                                                                      |
| body appreciated. Osseous structures appear unremarkable. Electronically signed by Juan   
                                                                                      |
| Chadd Mckeon MD on 8/15/2018 5:16 PM                                                      
                                                                                      |
|INDICATION: Intraoperative assessment.                                                     
                                                                                      |
|COMPARISON: none                                                                           
                                                                                      |
|TECHNIQUE: Abdominal series, frontal view.                                                 
                                                                                      |
|IMPRESSION:                                                                                
                                                                                      |
|Findings and impression:  Mild gaseous prominence of the bowel loops, favoring ileus. No un
expected radiopaque foreign body appreciated. Osseous structures appear unremarkable. |
|                                                                                           
                                                                                      |
|Electronically signed by Juan Mckeon MD on 8/15/2018 5:16 PM                         
                                                                                      |
+-------------------------------------------------------------------------------------------
--------------------------------------------------------------------------------------+
 Type and Screen (08/15/2018  2:32 PM PDT)
 
+-------------+-------------------------+-----------+------------+--------------+
| Component   | Value                   | Ref Range | Performed  | Pathologist  |
|             |                         |           | At         | Signature    |
+-------------+-------------------------+-----------+------------+--------------+
| ABO Rh      | O POSITIVE              |           | EXTERNAL   |              |
|             |                         |           | LAB        |              |
+-------------+-------------------------+-----------+------------+--------------+
| Antibody    | NEGATIVE                |           | EXTERNAL   |              |
| Screen      |                         |           | LAB        |              |
+-------------+-------------------------+-----------+------------+--------------+
| BB BAND     | [id number]                |           | EXTERNAL   |              |
|             |                         |           | LAB        |              |
+-------------+-------------------------+-----------+------------+--------------+
| UNIT NUMBER | [id number]           |           | EXTERNAL   |              |
|             |                         |           | LAB        |              |
+-------------+-------------------------+-----------+------------+--------------+
| Product     | LEUKODEPLETED PC        |           | EXTERNAL   |              |
 
| Code        |                         |           | LAB        |              |
+-------------+-------------------------+-----------+------------+--------------+
| Unit        | 00                      |           | EXTERNAL   |              |
| Division    |                         |           | LAB        |              |
+-------------+-------------------------+-----------+------------+--------------+
| Unit Status | ISSUED,FINAL            |           | EXTERNAL   |              |
|             |                         |           | LAB        |              |
+-------------+-------------------------+-----------+------------+--------------+
| Transfusion | OK TO TRANSFUSE         |           | EXTERNAL   |              |
|  Status     |                         |           | LAB        |              |
+-------------+-------------------------+-----------+------------+--------------+
| CROSSMATCH  | COMPATIBLETesting       |           | EXTERNAL   |              |
| RESULT      | performed at [facility];[address]    |           | LAB        |              |
|             | [address];[place], [place]  |           |            |              |
|             | [id number]                   |           |            |              |
+-------------+-------------------------+-----------+------------+--------------+
 
 
 
+-----------------+
| Specimen        |
+-----------------+
| Blood specimen  |
| (specimen)      |
+-----------------+
 
 
 
+----------------+---------+--------------------+--------------+
| Performing     | Address | City/State/Zipcode | Phone Number |
| Organization   |         |                    |              |
+----------------+---------+--------------------+--------------+
|   EXTERNAL LAB |         |                    |              |
+----------------+---------+--------------------+--------------+
 Hemoglobin and Hematocrit (08/15/2018 11:55 AM PDT)
 
+-------------+--------------------------+---------------+------------+--------------+
| Component   | Value                    | Ref Range     | Performed  | Pathologist  |
|             |                          |               | At         | Signature    |
+-------------+--------------------------+---------------+------------+--------------+
| Hemoglobin  | 7.6 (L)                  | 13.2 - 17.0   | EXTERNAL   |              |
|             |                          | g/dL          | LAB        |              |
+-------------+--------------------------+---------------+------------+--------------+
| Hematocrit, | 22.3 (L)Comment: Testing | 39.0 - 50.0 % | EXTERNAL   |              |
|  POC        |  performed at [facility];[address]    |               | LAB        |              |
|             | [address];[place]   |               |            |              |
|             | [id number]                    |               |            |              |
+-------------+--------------------------+---------------+------------+--------------+
 
 
 
+----------+
| Specimen |
+----------+
|          |
+----------+
 
 
 
+----------------+---------+--------------------+--------------+
 
| Performing     | Address | City/State/Zipcode | Phone Number |
| Organization   |         |                    |              |
+----------------+---------+--------------------+--------------+
|   EXTERNAL LAB |         |                    |              |
+----------------+---------+--------------------+--------------+
 Hemoglobin and Hematocrit (08/15/2018  5:56 AM PDT)
 
+-------------+--------------------------+---------------+------------+--------------+
| Component   | Value                    | Ref Range     | Performed  | Pathologist  |
|             |                          |               | At         | Signature    |
+-------------+--------------------------+---------------+------------+--------------+
| Hemoglobin  | 6.5 (LL)Comment: CALLED  | 13.2 - 17.0   | EXTERNAL   |              |
|             | TO BROOKLYNN BARBOSA RN/7RP AT    | g/dL          | LAB        |              |
|             | 0614 BY MWREAD BACK      |               |            |              |
|             | RESULTS VERIFIED         |               |            |              |
|             |                          |               |            |              |
+-------------+--------------------------+---------------+------------+--------------+
| Hematocrit, | 18.6 (LL)Comment: CALLED | 39.0 - 50.0 % | EXTERNAL   |              |
|  POC        |  TO BROOKLYNN MENGMinoo CHAPPELL/7RP AT   |               | LAB        |              |
|             | 0614 BY MWREAD BACK      |               |            |              |
|             | RESULTS VERIFIEDTesting  |               |            |              |
|             | performed at [facility] [place];[address]     |               |            |              |
|             | [address];[place]   |               |            |              |
|             | [id number]                    |               |            |              |
+-------------+--------------------------+---------------+------------+--------------+
 
 
 
+----------+
| Specimen |
+----------+
|          |
+----------+
 
 
 
+----------------+---------+--------------------+--------------+
| Performing     | Address | City/State/Zipcode | Phone Number |
| Organization   |         |                    |              |
+----------------+---------+--------------------+--------------+
|   EXTERNAL LAB |         |                    |              |
+----------------+---------+--------------------+--------------+
 External Lab: CBC (08/15/2018  1:33 AM PDT)
 
+-------------+-------------------------+-----------------+------------+--------------+
| Component   | Value                   | Ref Range       | Performed  | Pathologist  |
|             |                         |                 | At         | Signature    |
+-------------+-------------------------+-----------------+------------+--------------+
| WBC         | 8.94                    | 3.80 - 11.00    | EXTERNAL   |              |
|             |                         | K/uL            | LAB        |              |
+-------------+-------------------------+-----------------+------------+--------------+
| Red Blood   | 2.40 (L)                | 4.20 - 5.70     | EXTERNAL   |              |
| Cells       |                         | M/uL            | LAB        |              |
| Counted     |                         |                 |            |              |
+-------------+-------------------------+-----------------+------------+--------------+
| Hemoglobin  | 7.3 (L)                 | 13.2 - 17.0     | EXTERNAL   |              |
|             |                         | g/dL            | LAB        |              |
+-------------+-------------------------+-----------------+------------+--------------+
| Hematocrit, | 21.2 (L)                | 39.0 - 50.0 %   | EXTERNAL   |              |
|  POC        |                         |                 | LAB        |              |
 
+-------------+-------------------------+-----------------+------------+--------------+
| MCV         | 88.4                    | 80.0 - 100.0 fl | EXTERNAL   |              |
|             |                         |                 | LAB        |              |
+-------------+-------------------------+-----------------+------------+--------------+
| MCH         | 30.2                    | 27.0 - 34.0 pg  | EXTERNAL   |              |
|             |                         |                 | LAB        |              |
+-------------+-------------------------+-----------------+------------+--------------+
| MCHC        | 34.2                    | 32.0 - 35.5     | EXTERNAL   |              |
|             |                         | g/dL            | LAB        |              |
+-------------+-------------------------+-----------------+------------+--------------+
| RDW-CV      | 50.8                    | 37 - 53 fl      | EXTERNAL   |              |
|             |                         |                 | LAB        |              |
+-------------+-------------------------+-----------------+------------+--------------+
| Platelet    | 161                     | 150 - 400 K/uL  | EXTERNAL   |              |
| Count       |                         |                 | LAB        |              |
| Plasma      |                         |                 |            |              |
+-------------+-------------------------+-----------------+------------+--------------+
| MPV         | 7.8                     | fl              | EXTERNAL   |              |
|             |                         |                 | LAB        |              |
+-------------+-------------------------+-----------------+------------+--------------+
| Differentia | MANUAL                  |                 | EXTERNAL   |              |
| l Type      |                         |                 | LAB        |              |
+-------------+-------------------------+-----------------+------------+--------------+
| Segmented   | 84                      | %               | EXTERNAL   |              |
| Neutrophils |                         |                 | LAB        |              |
|  Manual     |                         |                 |            |              |
+-------------+-------------------------+-----------------+------------+--------------+
| % Bands     | 2                       | %               | EXTERNAL   |              |
|             |                         |                 | LAB        |              |
+-------------+-------------------------+-----------------+------------+--------------+
| Lymphocytes | 9                       | %               | EXTERNAL   |              |
|  Manual     |                         |                 | LAB        |              |
+-------------+-------------------------+-----------------+------------+--------------+
| Monocytes   | 3                       | %               | EXTERNAL   |              |
| Manual      |                         |                 | LAB        |              |
+-------------+-------------------------+-----------------+------------+--------------+
| Eosinophils | 2                       | %               | EXTERNAL   |              |
|  Manual     |                         |                 | LAB        |              |
+-------------+-------------------------+-----------------+------------+--------------+
| Absolute    | 7.51 (H)                | 1.90 - 7.40     | EXTERNAL   |              |
| Neutrophils |                         | K/uL            | LAB        |              |
+-------------+-------------------------+-----------------+------------+--------------+
| Bands       | 0.18                    | 0.00 - 0.20     | EXTERNAL   |              |
| Manual      |                         | K/uL            | LAB        |              |
+-------------+-------------------------+-----------------+------------+--------------+
| Absolute    | 0.80 (L)                | 1.00 - 3.90     | EXTERNAL   |              |
| Lymphocytes |                         | K/uL            | LAB        |              |
+-------------+-------------------------+-----------------+------------+--------------+
| Absolute    | 0.27                    | 0.00 - 0.80     | EXTERNAL   |              |
| Monocytes   |                         | K/uL            | LAB        |              |
+-------------+-------------------------+-----------------+------------+--------------+
| Absolute    | 0.18                    | 0.00 - 0.50     | EXTERNAL   |              |
| Eosinophils |                         | K/uL            | LAB        |              |
+-------------+-------------------------+-----------------+------------+--------------+
| Platelet    | ADEQUATE                |                 | EXTERNAL   |              |
| Estimate    |                         |                 | LAB        |              |
+-------------+-------------------------+-----------------+------------+--------------+
| RBC         | RBC AND PLT MORPHOLOGY  |                 | EXTERNAL   |              |
| Morphology  | APPEAR NORMALComment:   |                 | LAB        |              |
|             | Testing performed at    |                 |            |              |
 
|             | [facility], [address]  |                 |            |              |
|             | [place], [place]     |                 |            |              |
|             | [id number]                   |                 |            |              |
+-------------+-------------------------+-----------------+------------+--------------+
 
 
 
+-----------------+
| Specimen        |
+-----------------+
| Blood specimen  |
| (specimen)      |
+-----------------+
 
 
 
+----------------+---------+--------------------+--------------+
| Performing     | Address | City/State/Zipcode | Phone Number |
| Organization   |         |                    |              |
+----------------+---------+--------------------+--------------+
|   EXTERNAL LAB |         |                    |              |
+----------------+---------+--------------------+--------------+
 Magnesium (08/15/2018  1:33 AM PDT)
 
+-----------+--------------------------+-----------------+------------+--------------+
| Component | Value                    | Ref Range       | Performed  | Pathologist  |
|           |                          |                 | At         | Signature    |
+-----------+--------------------------+-----------------+------------+--------------+
| Magnesium | 1.8Comment: Testing      | 1.7 - 2.4 mg/dL | EXTERNAL   |              |
|           | performed at [facility], [address] |                 | LAB        |              |
|           |  [address],        |                 |            |              |
|           | [place]  [id number]     |                 |            |              |
+-----------+--------------------------+-----------------+------------+--------------+
 
 
 
+-----------------+
| Specimen        |
+-----------------+
| Blood specimen  |
| (specimen)      |
+-----------------+
 
 
 
+----------------+---------+--------------------+--------------+
| Performing     | Address | City/State/Zipcode | Phone Number |
| Organization   |         |                    |              |
+----------------+---------+--------------------+--------------+
|   EXTERNAL LAB |         |                    |              |
+----------------+---------+--------------------+--------------+
 Comprehensive Metabolic Panel (08/15/2018  1:33 AM PDT)
 
+-------------+--------------------------+-----------------+------------+--------------+
| Component   | Value                    | Ref Range       | Performed  | Pathologist  |
|             |                          |                 | At         | Signature    |
+-------------+--------------------------+-----------------+------------+--------------+
| Na          | 144                      | 135 - 145       | EXTERNAL   |              |
|             |                          | mmol/L          | LAB        |              |
+-------------+--------------------------+-----------------+------------+--------------+
 
| K           | 3.8                      | 3.5 - 4.9       | EXTERNAL   |              |
|             |                          | mmol/L          | LAB        |              |
+-------------+--------------------------+-----------------+------------+--------------+
| Cl          | 114 (H)                  | 99 - 109 mmol/L | EXTERNAL   |              |
|             |                          |                 | LAB        |              |
+-------------+--------------------------+-----------------+------------+--------------+
| CO2         | 22 (L)                   | 23 - 32 mmol/L  | EXTERNAL   |              |
|             |                          |                 | LAB        |              |
+-------------+--------------------------+-----------------+------------+--------------+
| Anion Gap   | 12                       | 5 - 20 mmol/L   | EXTERNAL   |              |
|             |                          |                 | LAB        |              |
+-------------+--------------------------+-----------------+------------+--------------+
| Glucose,    | 106 (H)                  | 65 - 99 mg/dL   | EXTERNAL   |              |
| Fasting     |                          |                 | LAB        |              |
+-------------+--------------------------+-----------------+------------+--------------+
| BUN         | 10                       | 8 - 25 mg/dL    | EXTERNAL   |              |
|             |                          |                 | LAB        |              |
+-------------+--------------------------+-----------------+------------+--------------+
| Creatinine  | 0.6 (L)                  | 0.70 - 1.30     | EXTERNAL   |              |
|             |                          | mg/dL           | LAB        |              |
+-------------+--------------------------+-----------------+------------+--------------+
| BUN/Creatin | 17                       |                 | EXTERNAL   |              |
| ine Ratio   |                          |                 | LAB        |              |
+-------------+--------------------------+-----------------+------------+--------------+
| Calcium     | 7.0 (L)                  | 8.5 - 10.5      | EXTERNAL   |              |
|             |                          | mg/dL           | LAB        |              |
+-------------+--------------------------+-----------------+------------+--------------+
| Protein,    | 4.4 (L)                  | 6.3 - 8.2 g/dL  | EXTERNAL   |              |
| Total       |                          |                 | LAB        |              |
+-------------+--------------------------+-----------------+------------+--------------+
| Albumin     | 2.1 (L)                  | 3.3 - 4.8 g/dL  | EXTERNAL   |              |
|             |                          |                 | LAB        |              |
+-------------+--------------------------+-----------------+------------+--------------+
| Globulin    | 2.3                      | 1.3 - 4.9 g/dL  | EXTERNAL   |              |
|             |                          |                 | LAB        |              |
+-------------+--------------------------+-----------------+------------+--------------+
| A/G Ratio   | 0.9 (L)                  | 1.0 - 2.4       | EXTERNAL   |              |
|             |                          |                 | LAB        |              |
+-------------+--------------------------+-----------------+------------+--------------+
| Bilirubin   | 0.8                      | 0.1 - 1.5 mg/dL | EXTERNAL   |              |
| Total       |                          |                 | LAB        |              |
+-------------+--------------------------+-----------------+------------+--------------+
| ALP,        | 36                       | 35 - 115 U/L    | EXTERNAL   |              |
| External    |                          |                 | LAB        |              |
+-------------+--------------------------+-----------------+------------+--------------+
| AST         | 20                       | 10 - 45 U/L     | EXTERNAL   |              |
|             |                          |                 | LAB        |              |
+-------------+--------------------------+-----------------+------------+--------------+
| ALT         | 16                       | 10 - 65 U/L     | EXTERNAL   |              |
|             |                          |                 | LAB        |              |
+-------------+--------------------------+-----------------+------------+--------------+
| Estimated   | >60Comment: GFR <60:     | mL/min/1.73m2   | EXTERNAL   |              |
| GFR         | CHRONIC KIDNEY DISEASE,  |                 | LAB        |              |
|             | IF FOUND OVER A 3 MONTH  |                 |            |              |
|             | PERIOD.GFR <15: KIDNEY   |                 |            |              |
|             | FAILURE.FOR       |                 |            |              |
|             | [employer], MULTIPLY THE  |                 |            |              |
|             | CALCULATED GFR BY        |                 |            |              |
|             | 1.210.This eGFR is       |                 |            |              |
|             | calculated using the     |                 |            |              |
 
|             | MDRD IDMS traceable      |                 |            |              |
|             | equation.Testing         |                 |            |              |
|             | performed at [facility], [address] |                 |            |              |
|             |  [facility],        |                 |            |              |
|             | [place]   [address]    |                 |            |              |
+-------------+--------------------------+-----------------+------------+--------------+
 
 
 
+-----------------+
| Specimen        |
+-----------------+
| Blood specimen  |
| (specimen)      |
+-----------------+
 
 
 
+----------------+---------+--------------------+--------------+
| Performing     | Address | City/State/Zipcode | Phone Number |
| Organization   |         |                    |              |
+----------------+---------+--------------------+--------------+
|   EXTERNAL LAB |         |                    |              |
+----------------+---------+--------------------+--------------+
 Hemoglobin and Hematocrit (2018  9:17 PM PDT)
 
+-------------+--------------------------+---------------+------------+--------------+
| Component   | Value                    | Ref Range     | Performed  | Pathologist  |
|             |                          |               | At         | Signature    |
+-------------+--------------------------+---------------+------------+--------------+
| Hemoglobin  | 6.5 (LL)Comment: CALLED  | 13.2 - 17.0   | EXTERNAL   |              |
|             | TO BROOKLYNN BARBOSA RN/7RP AT    | g/dL          | LAB        |              |
|             | 2137 BY MWREAD BACK      |               |            |              |
|             | RESULTS VERIFIED         |               |            |              |
|             |                          |               |            |              |
+-------------+--------------------------+---------------+------------+--------------+
| Hematocrit, | 19.2 (LL)Comment: CALLED | 39.0 - 50.0 % | EXTERNAL   |              |
|  POC        |  TO BROOKLYNN BARBOSA RN/7RP AT   |               | LAB        |              |
|             | 2137 BY MWREAD BACK      |               |            |              |
|             | RESULTS VERIFIEDTesting  |               |            |              |
|             | performed at [facility] [place];[address]     |               |            |              |
|             | [address];[place], [place]   |               |            |              |
|             | [id number]                    |               |            |              |
+-------------+--------------------------+---------------+------------+--------------+
 
 
 
+----------+
| Specimen |
+----------+
|          |
+----------+
 
 
 
+----------------+---------+--------------------+--------------+
| Performing     | Address | City/State/Zipcode | Phone Number |
| Organization   |         |                    |              |
+----------------+---------+--------------------+--------------+
|   EXTERNAL LAB |         |                    |              |
 
+----------------+---------+--------------------+--------------+
 Hemoglobin and Hematocrit (2018 12:12 PM PDT)
 
+-------------+--------------------------+---------------+------------+--------------+
| Component   | Value                    | Ref Range     | Performed  | Pathologist  |
|             |                          |               | At         | Signature    |
+-------------+--------------------------+---------------+------------+--------------+
| Hemoglobin  | 7.1 (L)                  | 13.2 - 17.0   | EXTERNAL   |              |
|             |                          | g/dL          | LAB        |              |
+-------------+--------------------------+---------------+------------+--------------+
| Hematocrit, | 21.3 (L)Comment: Testing | 39.0 - 50.0 % | EXTERNAL   |              |
|  POC        |  performed at [facility] [place];[address]    |               | LAB        |              |
|             | [address];[place], [place]   |               |            |              |
|             | [id number]                    |               |            |              |
+-------------+--------------------------+---------------+------------+--------------+
 
 
 
+----------+
| Specimen |
+----------+
|          |
+----------+
 
 
 
+----------------+---------+--------------------+--------------+
| Performing     | Address | City/State/Zipcode | Phone Number |
| Organization   |         |                    |              |
+----------------+---------+--------------------+--------------+
|   EXTERNAL LAB |         |                    |              |
+----------------+---------+--------------------+--------------+
 External Lab: CBC (2018  5:47 AM PDT)
 
+-------------+--------------------------+-----------------+------------+--------------+
| Component   | Value                    | Ref Range       | Performed  | Pathologist  |
|             |                          |                 | At         | Signature    |
+-------------+--------------------------+-----------------+------------+--------------+
| WBC         | 10.24                    | 3.80 - 11.00    | EXTERNAL   |              |
|             |                          | K/uL            | LAB        |              |
+-------------+--------------------------+-----------------+------------+--------------+
| Red Blood   | 2.36 (L)                 | 4.20 - 5.70     | EXTERNAL   |              |
| Cells       |                          | M/uL            | LAB        |              |
| Counted     |                          |                 |            |              |
+-------------+--------------------------+-----------------+------------+--------------+
| Hemoglobin  | 7.4 (L)                  | 13.2 - 17.0     | EXTERNAL   |              |
|             |                          | g/dL            | LAB        |              |
+-------------+--------------------------+-----------------+------------+--------------+
| Hematocrit, | 21.1 (L)                 | 39.0 - 50.0 %   | EXTERNAL   |              |
|  POC        |                          |                 | LAB        |              |
+-------------+--------------------------+-----------------+------------+--------------+
| MCV         | 89.3                     | 80.0 - 100.0 fl | EXTERNAL   |              |
|             |                          |                 | LAB        |              |
+-------------+--------------------------+-----------------+------------+--------------+
| MCH         | 31.3                     | 27.0 - 34.0 pg  | EXTERNAL   |              |
|             |                          |                 | LAB        |              |
+-------------+--------------------------+-----------------+------------+--------------+
| MCHC        | 35.1                     | 32.0 - 35.5     | EXTERNAL   |              |
|             |                          | g/dL            | LAB        |              |
+-------------+--------------------------+-----------------+------------+--------------+
 
| RDW-CV      | 47.3                     | 37 - 53 fl      | EXTERNAL   |              |
|             |                          |                 | LAB        |              |
+-------------+--------------------------+-----------------+------------+--------------+
| Platelet    | 159                      | 150 - 400 K/uL  | EXTERNAL   |              |
| Count       |                          |                 | LAB        |              |
| Plasma      |                          |                 |            |              |
+-------------+--------------------------+-----------------+------------+--------------+
| MPV         | 7.6                      | fl              | EXTERNAL   |              |
|             |                          |                 | LAB        |              |
+-------------+--------------------------+-----------------+------------+--------------+
| Differentia | AUTOMATED                |                 | EXTERNAL   |              |
| l Type      |                          |                 | LAB        |              |
+-------------+--------------------------+-----------------+------------+--------------+
| % Segmented | 85.12                    | %               | EXTERNAL   |              |
|             |                          |                 | LAB        |              |
| Neutrophils |                          |                 |            |              |
+-------------+--------------------------+-----------------+------------+--------------+
| %           | 7.43                     | %               | EXTERNAL   |              |
| Lymphocytes |                          |                 | LAB        |              |
+-------------+--------------------------+-----------------+------------+--------------+
| % Monocytes | 5.64                     | %               | EXTERNAL   |              |
|             |                          |                 | LAB        |              |
+-------------+--------------------------+-----------------+------------+--------------+
| %           | 1.41                     | %               | EXTERNAL   |              |
| Eosinophils |                          |                 | LAB        |              |
+-------------+--------------------------+-----------------+------------+--------------+
| % Basophils | 0.40                     | %               | EXTERNAL   |              |
|             |                          |                 | LAB        |              |
+-------------+--------------------------+-----------------+------------+--------------+
| Absolute    | 8.72 (H)                 | 1.90 - 7.40     | EXTERNAL   |              |
| Segmented   |                          | K/uL            | LAB        |              |
| Neutrophils |                          |                 |            |              |
+-------------+--------------------------+-----------------+------------+--------------+
| Absolute    | 0.76 (L)                 | 1.00 - 3.90     | EXTERNAL   |              |
| Lymphocytes |                          | K/uL            | LAB        |              |
+-------------+--------------------------+-----------------+------------+--------------+
| Absolute    | 0.58                     | 0.00 - 0.80     | EXTERNAL   |              |
| Monocytes   |                          | K/uL            | LAB        |              |
+-------------+--------------------------+-----------------+------------+--------------+
| Absolute    | 0.14                     | 0.00 - 0.50     | EXTERNAL   |              |
| Eosinophils |                          | K/uL            | LAB        |              |
+-------------+--------------------------+-----------------+------------+--------------+
| Absolute    | 0.04                     | 0.00 - 0.10     | EXTERNAL   |              |
| Basophils   |                          | K/uL            | LAB        |              |
+-------------+--------------------------+-----------------+------------+--------------+
| RBC         | RBC AND PLT MORPHOLOGY   |                 | EXTERNAL   |              |
| Morphology  | APPEAR NORMAL            |                 | LAB        |              |
+-------------+--------------------------+-----------------+------------+--------------+
| Platelet    | ADEQUATE                 |                 | EXTERNAL   |              |
| Estimate    |                          |                 | LAB        |              |
+-------------+--------------------------+-----------------+------------+--------------+
| Differentia | SLIDE SCANNED, AGREES    |                 | EXTERNAL   |              |
| l Comments  | WITH AUTOMATED           |                 | LAB        |              |
|             | RESULTS.Comment: Testing |                 |            |              |
|             |  performed at [facility];[address]    |                 |            |              |
|             | [address];[place], [place]   |                 |            |              |
|             | [id number]                    |                 |            |              |
+-------------+--------------------------+-----------------+------------+--------------+
 
 
 
 
+-----------------+
| Specimen        |
+-----------------+
| Blood specimen  |
| (specimen)      |
+-----------------+
 
 
 
+----------------+---------+--------------------+--------------+
| Performing     | Address | City/State/Zipcode | Phone Number |
| Organization   |         |                    |              |
+----------------+---------+--------------------+--------------+
|   EXTERNAL LAB |         |                    |              |
+----------------+---------+--------------------+--------------+
 Magnesium (2018  5:47 AM PDT)
 
+-----------+-------------------------+-----------------+------------+--------------+
| Component | Value                   | Ref Range       | Performed  | Pathologist  |
|           |                         |                 | At         | Signature    |
+-----------+-------------------------+-----------------+------------+--------------+
| Magnesium | 1.9Comment: SLT         | 1.7 - 2.4 mg/dL | EXTERNAL   |              |
|           | HEMOLYSISTesting        |                 | LAB        |              |
|           | performed at [facility]    |                 |            |              |
|           | [address];[place], [place]  |                 |            |              |
|           | [id number]                   |                 |            |              |
+-----------+-------------------------+-----------------+------------+--------------+
 
 
 
+-----------------+
| Specimen        |
+-----------------+
| Blood specimen  |
| (specimen)      |
+-----------------+
 
 
 
+----------------+---------+--------------------+--------------+
| Performing     | Address | City/State/Zipcode | Phone Number |
| Organization   |         |                    |              |
+----------------+---------+--------------------+--------------+
|   EXTERNAL LAB |         |                    |              |
+----------------+---------+--------------------+--------------+
 Comprehensive Metabolic Panel (2018  5:47 AM PDT)
 
+-------------+--------------------------+-----------------+------------+--------------+
| Component   | Value                    | Ref Range       | Performed  | Pathologist  |
|             |                          |                 | At         | Signature    |
+-------------+--------------------------+-----------------+------------+--------------+
| Na          | 145                      | 135 - 145       | EXTERNAL   |              |
|             |                          | mmol/L          | LAB        |              |
+-------------+--------------------------+-----------------+------------+--------------+
| K           | 3.5Comment: SLT          | 3.5 - 4.9       | EXTERNAL   |              |
|             | HEMOLYSIS                | mmol/L          | LAB        |              |
+-------------+--------------------------+-----------------+------------+--------------+
| Cl          | 116 (H)                  | 99 - 109 mmol/L | EXTERNAL   |              |
|             |                          |                 | LAB        |              |
 
+-------------+--------------------------+-----------------+------------+--------------+
| CO2         | 22 (L)                   | 23 - 32 mmol/L  | EXTERNAL   |              |
|             |                          |                 | LAB        |              |
+-------------+--------------------------+-----------------+------------+--------------+
| Anion Gap   | 11                       | 5 - 20 mmol/L   | EXTERNAL   |              |
|             |                          |                 | LAB        |              |
+-------------+--------------------------+-----------------+------------+--------------+
| Glucose,    | 96                       | 65 - 99 mg/dL   | EXTERNAL   |              |
| Fasting     |                          |                 | LAB        |              |
+-------------+--------------------------+-----------------+------------+--------------+
| BUN         | 10                       | 8 - 25 mg/dL    | EXTERNAL   |              |
|             |                          |                 | LAB        |              |
+-------------+--------------------------+-----------------+------------+--------------+
| Creatinine  | 0.67 (L)                 | 0.70 - 1.30     | EXTERNAL   |              |
|             |                          | mg/dL           | LAB        |              |
+-------------+--------------------------+-----------------+------------+--------------+
| BUN/Creatin | 15                       |                 | EXTERNAL   |              |
| ine Ratio   |                          |                 | LAB        |              |
+-------------+--------------------------+-----------------+------------+--------------+
| Calcium     | 7.2 (L)                  | 8.5 - 10.5      | EXTERNAL   |              |
|             |                          | mg/dL           | LAB        |              |
+-------------+--------------------------+-----------------+------------+--------------+
| Protein,    | 4.5 (L)                  | 6.3 - 8.2 g/dL  | EXTERNAL   |              |
| Total       |                          |                 | LAB        |              |
+-------------+--------------------------+-----------------+------------+--------------+
| Albumin     | 2.1 (L)                  | 3.3 - 4.8 g/dL  | EXTERNAL   |              |
|             |                          |                 | LAB        |              |
+-------------+--------------------------+-----------------+------------+--------------+
| Globulin    | 2.5                      | 1.3 - 4.9 g/dL  | EXTERNAL   |              |
|             |                          |                 | LAB        |              |
+-------------+--------------------------+-----------------+------------+--------------+
| A/G Ratio   | 0.8 (L)                  | 1.0 - 2.4       | EXTERNAL   |              |
|             |                          |                 | LAB        |              |
+-------------+--------------------------+-----------------+------------+--------------+
| Bilirubin   | 0.5                      | 0.1 - 1.5 mg/dL | EXTERNAL   |              |
| Total       |                          |                 | LAB        |              |
+-------------+--------------------------+-----------------+------------+--------------+
| ALP,        | 32 (L)                   | 35 - 115 U/L    | EXTERNAL   |              |
| External    |                          |                 | LAB        |              |
+-------------+--------------------------+-----------------+------------+--------------+
| AST         | 20Comment: SLT HEMOLYSIS | 10 - 45 U/L     | EXTERNAL   |              |
|             |                          |                 | LAB        |              |
+-------------+--------------------------+-----------------+------------+--------------+
| ALT         | 17                       | 10 - 65 U/L     | EXTERNAL   |              |
|             |                          |                 | LAB        |              |
+-------------+--------------------------+-----------------+------------+--------------+
| Estimated   | >60Comment: GFR <60:     | mL/min/1.73m2   | EXTERNAL   |              |
| GFR         | CHRONIC KIDNEY DISEASE,  |                 | LAB        |              |
|             | IF FOUND OVER A 3 MONTH  |                 |            |              |
|             | PERIOD.GFR <15: KIDNEY   |                 |            |              |
|             | FAILURE.FOR       |                 |            |              |
|             | [employer], MULTIPLY THE  |                 |            |              |
|             | CALCULATED GFR BY        |                 |            |              |
|             | 1.210.This eGFR is       |                 |            |              |
|             | calculated using the     |                 |            |              |
|             | MDRD IDMS traceable      |                 |            |              |
|             | equation.Testing         |                 |            |              |
|             | performed at [facility];[address]     |                 |            |              |
|             | [address];[place], [place]   |                 |            |              |
|             | [id number]                    |                 |            |              |
 
+-------------+--------------------------+-----------------+------------+--------------+
 
 
 
+-----------------+
| Specimen        |
+-----------------+
| Blood specimen  |
| (specimen)      |
+-----------------+
 
 
 
+----------------+---------+--------------------+--------------+
| Performing     | Address | City/State/Zipcode | Phone Number |
| Organization   |         |                    |              |
+----------------+---------+--------------------+--------------+
|   EXTERNAL LAB |         |                    |              |
+----------------+---------+--------------------+--------------+
 Hemoglobin and Hematocrit (2018 11:59 PM PDT)
 
+-------------+--------------------------+---------------+------------+--------------+
| Component   | Value                    | Ref Range     | Performed  | Pathologist  |
|             |                          |               | At         | Signature    |
+-------------+--------------------------+---------------+------------+--------------+
| Hemoglobin  | 7.5 (L)                  | 13.2 - 17.0   | EXTERNAL   |              |
|             |                          | g/dL          | LAB        |              |
+-------------+--------------------------+---------------+------------+--------------+
| Hematocrit, | 22.5 (L)Comment: Testing | 39.0 - 50.0 % | EXTERNAL   |              |
|  POC        |  performed at [facility];[address]    |               | LAB        |              |
|             | [address];[place], [place]   |               |            |              |
|             | [id number]                    |               |            |              |
+-------------+--------------------------+---------------+------------+--------------+
 
 
 
+----------+
| Specimen |
+----------+
|          |
+----------+
 
 
 
+----------------+---------+--------------------+--------------+
| Performing     | Address | City/State/Zipcode | Phone Number |
| Organization   |         |                    |              |
+----------------+---------+--------------------+--------------+
|   EXTERNAL LAB |         |                    |              |
+----------------+---------+--------------------+--------------+
 CBC no Differential (2018  9:40 PM PDT)
 
+-------------+-------------------------+-----------------+------------+--------------+
| Component   | Value                   | Ref Range       | Performed  | Pathologist  |
|             |                         |                 | At         | Signature    |
+-------------+-------------------------+-----------------+------------+--------------+
| WBC         | 10.63                   | 3.80 - 11.00    | EXTERNAL   |              |
|             |                         | K/uL            | LAB        |              |
+-------------+-------------------------+-----------------+------------+--------------+
| Red Blood   | 2.50 (L)                | 4.20 - 5.70     | EXTERNAL   |              |
 
| Cells       |                         | M/uL            | LAB        |              |
| Counted     |                         |                 |            |              |
+-------------+-------------------------+-----------------+------------+--------------+
| Hemoglobin  | 7.7 (L)                 | 13.2 - 17.0     | EXTERNAL   |              |
|             |                         | g/dL            | LAB        |              |
+-------------+-------------------------+-----------------+------------+--------------+
| Hematocrit, | 22.1 (L)                | 39.0 - 50.0 %   | EXTERNAL   |              |
|  POC        |                         |                 | LAB        |              |
+-------------+-------------------------+-----------------+------------+--------------+
| MCV         | 88.5                    | 80.0 - 100.0 fl | EXTERNAL   |              |
|             |                         |                 | LAB        |              |
+-------------+-------------------------+-----------------+------------+--------------+
| MCH         | 31.0                    | 27.0 - 34.0 pg  | EXTERNAL   |              |
|             |                         |                 | LAB        |              |
+-------------+-------------------------+-----------------+------------+--------------+
| MCHC        | 35.0                    | 32.0 - 35.5     | EXTERNAL   |              |
|             |                         | g/dL            | LAB        |              |
+-------------+-------------------------+-----------------+------------+--------------+
| RDW-CV      | 47.7                    | 37 - 53 fl      | EXTERNAL   |              |
|             |                         |                 | LAB        |              |
+-------------+-------------------------+-----------------+------------+--------------+
| Platelet    | 157                     | 150 - 400 K/uL  | EXTERNAL   |              |
| Count       |                         |                 | LAB        |              |
| Plasma      |                         |                 |            |              |
+-------------+-------------------------+-----------------+------------+--------------+
| MPV         | 7.2Comment: Testing     | fl              | EXTERNAL   |              |
|             | performed at [facility] [place];[address]    |                 | LAB        |              |
|             | [address];[place], [place]  |                 |            |              |
|             | [id number]                   |                 |            |              |
+-------------+-------------------------+-----------------+------------+--------------+
 
 
 
+----------+
| Specimen |
+----------+
|          |
+----------+
 
 
 
+----------------+---------+--------------------+--------------+
| Performing     | Address | City/State/Zipcode | Phone Number |
| Organization   |         |                    |              |
+----------------+---------+--------------------+--------------+
|   EXTERNAL LAB |         |                    |              |
+----------------+---------+--------------------+--------------+
 [facility] INR (2018  9:40 PM PDT)
 
+-----------+--------------------------+-----------+------------+--------------+
| Component | Value                    | Ref Range | Performed  | Pathologist  |
|           |                          |           | At         | Signature    |
+-----------+--------------------------+-----------+------------+--------------+
| INR       | 1.0Comment: REFERENCE    |           | EXTERNAL   |              |
|           | RANGE:0.9 - 1.2          |           | LAB        |              |
|           |   NON-ANTICOAGULATED2.0  |           |            |              |
|           | - 3.0   ALL OTHER        |           |            |              |
|           | THERAPEUTIC              |           |            |              |
|           | INDICATIONS2.5 - 3.5     |           |            |              |
|           | MECHANICAL HEART VALVES, |           |            |              |
 
|           |  RECURRENT OR SYSTEMIC   |           |            |              |
|           | EMBOLISMTesting          |           |            |              |
|           | performed at [facility] [place];[facility]     |           |            |              |
|           | [facility];[place], [place]   |           |            |              |
|           | [id number]                    |           |            |              |
+-----------+--------------------------+-----------+------------+--------------+
 
 
 
+-----------------+
| Specimen        |
+-----------------+
| Blood specimen  |
| (specimen)      |
+-----------------+
 
 
 
+----------------+---------+--------------------+--------------+
| Performing     | Address | City/State/Zipcode | Phone Number |
| Organization   |         |                    |              |
+----------------+---------+--------------------+--------------+
|   EXTERNAL LAB |         |                    |              |
+----------------+---------+--------------------+--------------+
 Basic Metabolic Panel (2018  9:40 PM PDT)
 
+-------------+--------------------------+-----------------+------------+--------------+
| Component   | Value                    | Ref Range       | Performed  | Pathologist  |
|             |                          |                 | At         | Signature    |
+-------------+--------------------------+-----------------+------------+--------------+
| Na          | 147 (H)                  | 135 - 145       | EXTERNAL   |              |
|             |                          | mmol/L          | LAB        |              |
+-------------+--------------------------+-----------------+------------+--------------+
| K           | 3.7                      | 3.5 - 4.9       | EXTERNAL   |              |
|             |                          | mmol/L          | LAB        |              |
+-------------+--------------------------+-----------------+------------+--------------+
| Cl          | 119 (H)                  | 99 - 109 mmol/L | EXTERNAL   |              |
|             |                          |                 | LAB        |              |
+-------------+--------------------------+-----------------+------------+--------------+
| CO2         | 22 (L)                   | 23 - 32 mmol/L  | EXTERNAL   |              |
|             |                          |                 | LAB        |              |
+-------------+--------------------------+-----------------+------------+--------------+
| Anion Gap   | 10                       | 5 - 20 mmol/L   | EXTERNAL   |              |
|             |                          |                 | LAB        |              |
+-------------+--------------------------+-----------------+------------+--------------+
| Glucose,    | 109 (H)                  | 65 - 99 mg/dL   | EXTERNAL   |              |
| Fasting     |                          |                 | LAB        |              |
+-------------+--------------------------+-----------------+------------+--------------+
| BUN         | 14                       | 8 - 25 mg/dL    | EXTERNAL   |              |
|             |                          |                 | LAB        |              |
+-------------+--------------------------+-----------------+------------+--------------+
| Creatinine  | 0.66 (L)                 | 0.70 - 1.30     | EXTERNAL   |              |
|             |                          | mg/dL           | LAB        |              |
+-------------+--------------------------+-----------------+------------+--------------+
| BUN/Creatin | 21                       |                 | EXTERNAL   |              |
| ine Ratio   |                          |                 | LAB        |              |
+-------------+--------------------------+-----------------+------------+--------------+
| Calcium     | 7.1 (L)                  | 8.5 - 10.5      | EXTERNAL   |              |
|             |                          | mg/dL           | LAB        |              |
+-------------+--------------------------+-----------------+------------+--------------+
 
| Estimated   | >60Comment: GFR <60:     | mL/min/1.73m2   | EXTERNAL   |              |
| GFR         | CHRONIC KIDNEY DISEASE,  |                 | LAB        |              |
|             | IF FOUND OVER A 3 MONTH  |                 |            |              |
|             | PERIOD.GFR <15: KIDNEY   |                 |            |              |
|             | FAILURE.FOR       |                 |            |              |
|             | [employer], MULTIPLY THE  |                 |            |              |
|             | CALCULATED GFR BY        |                 |            |              |
|             | 1.210.This eGFR is       |                 |            |              |
|             | calculated using the     |                 |            |              |
|             | MDRD IDMS traceable      |                 |            |              |
|             | equation.Testing         |                 |            |              |
|             | performed at [facility];[address]     |                 |            |              |
|             | [facility];[place], [place]   |                 |            |              |
|             | [id number]                    |                 |            |              |
+-------------+--------------------------+-----------------+------------+--------------+
 
 
 
+-----------------+
| Specimen        |
+-----------------+
| Blood specimen  |
| (specimen)      |
+-----------------+
 
 
 
+----------------+---------+--------------------+--------------+
| Performing     | Address | City/State/Zipcode | Phone Number |
| Organization   |         |                    |              |
+----------------+---------+--------------------+--------------+
|   EXTERNAL LAB |         |                    |              |
+----------------+---------+--------------------+--------------+
 Hemoglobin and Hematocrit (2018  8:27 PM PDT)
 
+-------------+--------------------------+---------------+------------+--------------+
| Component   | Value                    | Ref Range     | Performed  | Pathologist  |
|             |                          |               | At         | Signature    |
+-------------+--------------------------+---------------+------------+--------------+
| Hemoglobin  | 7.7 (L)                  | 13.2 - 17.0   | EXTERNAL   |              |
|             |                          | g/dL          | LAB        |              |
+-------------+--------------------------+---------------+------------+--------------+
| Hematocrit, | 22.5 (L)Comment: Testing | 39.0 - 50.0 % | EXTERNAL   |              |
|  POC        |  performed at [facility];[address]    |               | LAB        |              |
|             | [address];[place], [place]   |               |            |              |
|             | [id number]                    |               |            |              |
+-------------+--------------------------+---------------+------------+--------------+
 
 
 
+----------+
| Specimen |
+----------+
|          |
+----------+
 
 
 
+----------------+---------+--------------------+--------------+
| Performing     | Address | City/State/Zipcode | Phone Number |
 
| Organization   |         |                    |              |
+----------------+---------+--------------------+--------------+
|   EXTERNAL LAB |         |                    |              |
+----------------+---------+--------------------+--------------+
 [facility]time INR (2018  8:27 PM PDT)
 
+-----------+--------------------------+-----------+------------+--------------+
| Component | Value                    | Ref Range | Performed  | Pathologist  |
|           |                          |           | At         | Signature    |
+-----------+--------------------------+-----------+------------+--------------+
| INR       | 1.0Comment: REFERENCE    |           | EXTERNAL   |              |
|           | RANGE:0.9 - 1.2          |           | LAB        |              |
|           |   NON-ANTICOAGULATED2.0  |           |            |              |
|           | - 3.0   ALL OTHER        |           |            |              |
|           | THERAPEUTIC              |           |            |              |
|           | INDICATIONS2.5 - 3.5     |           |            |              |
|           | MECHANICAL HEART VALVES, |           |            |              |
|           |  RECURRENT OR SYSTEMIC   |           |            |              |
|           | EMBOLISMTesting          |           |            |              |
|           | performed at [facility] [place];[address]     |           |            |              |
|           | [address];[place], [place]   |           |            |              |
|           | [id number]                    |           |            |              |
+-----------+--------------------------+-----------+------------+--------------+
 
 
 
+-----------------+
| Specimen        |
+-----------------+
| Blood specimen  |
| (specimen)      |
+-----------------+
 
 
 
+----------------+---------+--------------------+--------------+
| Performing     | Address | City/State/Zipcode | Phone Number |
| Organization   |         |                    |              |
+----------------+---------+--------------------+--------------+
|   EXTERNAL LAB |         |                    |              |
+----------------+---------+--------------------+--------------+
 IR Angiogram Visceral (2018  7:58 PM PDT)
 
+----------+
| Specimen |
+----------+
|          |
+----------+
 
 
 
+------------------------------------------------------------------------+--------------+
| Impressions                                                            | Performed At |
+------------------------------------------------------------------------+--------------+
|      No apparent source of GI bleeding on extensive mesenteric         |              |
| angiography.     Electronically signed by Tolu Hernandez MD on 2018  |              |
| 8:08 PM                                                                |              |
+------------------------------------------------------------------------+--------------+
 
 
 
 
+------------------------------------------------------------------------+--------------+
| Narrative                                                              | Performed At |
+------------------------------------------------------------------------+--------------+
|   IR ANGIOGRAM VISCERAL SELECTIVE dated 2018 5:35 PM     CLINICAL |              |
|  DATA: GI bleeding with EGD push enteroscopy done immediately prior to |              |
|  this procedure demonstrating a jejunal diverticular hemorrhage with   |              |
| GI clip placement proximal to the bleed.     COMPARISON STUDIES:       |              |
| Endoscopic images same date and CTA abdomen and pelvis 2018       |              |
| PRIMARY ANGIOGRAPHER: Tolu Hernandez MD, PhD, [facility]     OPERATIONS:  1.   |              |
|   Ultrasound-guided right common femoral access  2.   Celiac           |              |
| arteriogram  3.   Gastroduodenal subselective arteriogram  4.          |              |
|   Superior mesenteric arteriogram  5.    Jejunal subselective          |              |
| arteriograms x5 branches.     PROCEDURE:     Informed consent was      |              |
| obtained from the patient's wife as the patient was still sedated from |              |
|  EGD performed immediately prior to this procedure.   Continuous       |              |
| cardiac monitoring was performed throughout the procedure. General     |              |
| anesthesia was provided by   anesthesiology.        Contrast: 120 cc   |              |
| Isovue 250  Fluoroscopy time: 8.6 minutes  Radiation dose: 1960 mGy    |              |
| air kerma     Patient was sterilely prepped and draped in the usual    |              |
| fashion. The right femoral head was fluoroscopically localized and the |              |
|  skin was locally anesthetized with 1% lidocaine. A skin nick was made |              |
|  with 11 blade. Real-time ultrasound guided micropuncture   access     |              |
| obtained, images in PACS. The microsheath was exchanged to a 5 French  |              |
| sheath. A Sos 2 catheter was advanced and formed in the thoracic aorta |              |
|  and brought down to select the celiac artery for arteriogram. A       |              |
| Progreat microcatheter was advanced into   the gastroduodenal artery   |              |
| and subselective arteriogram was performed. The microcatheter was      |              |
| removed. The Sos 2 catheter was then used to select the superior       |              |
| mesenteric artery and arteriogram was performed. The microcatheter was |              |
|  reintroduced and   subselective arteriography of the jejunal branches |              |
|  with a total of 5 branches selected with the catheter in areas        |              |
| positions within the vessels and imaging over the area of the          |              |
| gastrointestinal clip in the left upper quadrant were obtained. No     |              |
| apparent   hemorrhage was noted. Therefore, the Progreat microcatheter |              |
|  and Sos 2 catheter were removed. Right iliofemoral arteriogram        |              |
| demonstrated common femoral arterial puncture. Mynx closure was        |              |
| performed with immediate hemostasis.     FINDINGS: Celiac arteriogram  |              |
| demonstrates typical branching anatomy. The gastroduodenal artery      |              |
| subselective arteriogram demonstrates no contrast extravasation.       |              |
| Superior mesenteric arteriogram demonstrates expected clustered        |              |
| jejunal branches in the left upper quadrant and the presence of a GI   |              |
| clip in the left upper abdominal quadrant. Subselective jejunal branch |              |
|  angiography is obtained with the microcatheter in   multiple proximal |              |
|  to distal branch positions with no apparent contrast extravasation.   |              |
|                                                                        |              |
+------------------------------------------------------------------------+--------------+
 
 
 
+-------------------------------------------------------------------------------------------
--------------------------------------------------------------------------------------------
-------------------------------------+
| Procedure Note                                                                            
                                                                                            
                                     |
+-------------------------------------------------------------------------------------------
--------------------------------------------------------------------------------------------
-------------------------------------+
|   Hermes, Rad Conversion - 2019 11:04 AM PDT  IR ANGIOGRAM VISCERAL SELECTIVE dated    
 
                                                                                            
                                     |
| 2018 5:35 PM CLINICAL DATA: GI bleeding with EGD push enteroscopy done immediately   
                                                                                            
                                     |
| prior to this procedure demonstrating a jejunal diverticular hemorrhage with GI clip      
                                                                                            
                                     |
| placement proximal to the bleed. COMPARISON STUDIES: Endoscopic images same date and CTA  
                                                                                            
                                     |
|  abdomen and pelvis 2018 PRIMARY ANGIOGRAPHER: Tolu Hernandez MD, PhD, [facility]            
                                                                                            
                                     |
| OPERATIONS:1.  Ultrasound-guided right common femoral access2.  Celiac arteriogram3.      
                                                                                            
                                     |
| Gastroduodenal subselective arteriogram4.  Superior mesenteric arteriogram5.   Jejunal    
                                                                                            
                                     |
| subselective arteriograms x5 branches. PROCEDURE: Informed consent was obtained from the  
                                                                                            
                                     |
|  patient's wife as the patient was still sedated from EGD performed immediately prior to  
                                                                                            
                                     |
|  this procedure.  Continuous cardiac monitoring was performed throughout the procedure.   
                                                                                            
                                     |
| General anesthesia was provided by anesthesiology.  Contrast: 120 cc Isovue               
                                                                                            
                                     |
| 250Fluoroscopy time: 8.6 minutesRadiation dose: 1960 mGy air kerma Patient was sterilely  
                                                                                            
                                     |
|  prepped and draped in the usual fashion. The right femoral head was fluoroscopically     
                                                                                            
                                     |
| localized and the skin was locally anesthetized with 1% lidocaine. A skin nick was made   
                                                                                            
                                     |
| with 11 blade. Real-time ultrasound guided micropuncture access obtained, images in       
                                                                                            
                                     |
| PACS. The microsheath was exchanged to a 5 French sheath. A Sos 2 catheter was advanced   
                                                                                            
                                     |
| and formed in the thoracic aorta and brought down to select the celiac artery for         
                                                                                            
                                     |
| arteriogram. A Progreat microcatheter was advanced into the gastroduodenal artery and     
                                                                                            
                                     |
| subselective arteriogram was performed. The microcatheter was removed. The Sos 2          
                                                                                            
                                     |
| catheter was then used to select the superior mesenteric artery and arteriogram was       
                                                                                            
                                     |
| performed. The microcatheter was reintroduced and subselective arteriography of the       
 
                                                                                            
                                     |
| jejunal branches with a total of 5 branches selected with the catheter in areas           
                                                                                            
                                     |
| positions within the vessels and imaging over the area of the gastrointestinal clip in    
                                                                                            
                                     |
| the left upper quadrant were obtained. No apparent hemorrhage was noted. Therefore, the   
                                                                                            
                                     |
| Progreat microcatheter and Sos 2 catheter were removed. Right iliofemoral arteriogram     
                                                                                            
                                     |
| demonstrated common femoral arterial puncture. Mynx closure was performed with immediate  
                                                                                            
                                     |
|  hemostasis. FINDINGS: Celiac arteriogram demonstrates typical branching anatomy. The     
                                                                                            
                                     |
| gastroduodenal artery subselective arteriogram demonstrates no contrast extravasation.    
                                                                                            
                                     |
| Superior mesenteric arteriogram demonstrates expected clustered jejunal branches in the   
                                                                                            
                                     |
| left upper quadrant and the presence of a GI clip in the left upper abdominal quadrant.   
                                                                                            
                                     |
| Subselective jejunal branch angiography is obtained with the microcatheter in multiple    
                                                                                            
                                     |
| proximal to distal branch positions with no apparent contrast extravasation.              
                                                                                            
                                     |
| IMPRESSION:  No apparent source of GI bleeding on extensive mesenteric angiography.       
                                                                                            
                                     |
| Electronically signed by Tolu Hernandez MD on 2018 8:08 PM                              
                                                                                            
                                     |
|Superior mesenteric arteriogram demonstrates expected clustered jejunal branches in the lef
t upper quadrant and the presence of a GI clip in the left upper abdominal quadrant. Subsele
ctive jejunal branch angiography is  |
|obtained with the microcatheter in                                                         
                                                                                            
                                     |
|multiple proximal to distal branch positions with no apparent contrast extravasation.      
                                                                                            
                                     |
|                                                                                           
                                                                                            
                                     |
|                                                                                           
                                                                                            
                                     |
|IMPRESSION:                                                                                
                                                                                            
                                     |
|                                                                                           
 
                                                                                            
                                     |
|No apparent source of GI bleeding on extensive mesenteric angiography.                     
                                                                                            
                                     |
|                                                                                           
                                                                                            
                                     |
|Electronically signed by Tolu Hernandez MD on 2018 8:08 PM                               
                                                                                            
                                     |
+-------------------------------------------------------------------------------------------
--------------------------------------------------------------------------------------------
-------------------------------------+
 US Guided Vascular Access (2018  7:40 PM PDT)
 
+----------+
| Specimen |
+----------+
|          |
+----------+
 
 
 
+------------------------------------------------------------------------+--------------+
| Narrative                                                              | Performed At |
+------------------------------------------------------------------------+--------------+
|   This Point of Care (POC) ultrasound image has been reviewed and      |              |
| interpreted by the physician identified as the performing physician in |              |
|  the  associated interpretation and report.                            |              |
+------------------------------------------------------------------------+--------------+
 
 
 
+---------------------------------------------------------------------------------------+
| Procedure Note                                                                        |
+---------------------------------------------------------------------------------------+
|   Wang Mirza Conversion - 2019 11:04 AM PDT  This Point of Care (POC) ultrasound  |
| image has been reviewed andinterpreted by the physician identified as the performing  |
| physician in theassociated interpretation and report.                                 |
|                                                                                       |
+---------------------------------------------------------------------------------------+
 Hemoglobin and Hematocrit (2018 12:53 PM PDT)
 
+-------------+--------------------------+---------------+------------+--------------+
| Component   | Value                    | Ref Range     | Performed  | Pathologist  |
|             |                          |               | At         | Signature    |
+-------------+--------------------------+---------------+------------+--------------+
| Hemoglobin  | 7.8 (L)                  | 13.2 - 17.0   | EXTERNAL   |              |
|             |                          | g/dL          | LAB        |              |
+-------------+--------------------------+---------------+------------+--------------+
| Hematocrit, | 23.4 (L)Comment: Testing | 39.0 - 50.0 % | EXTERNAL   |              |
|  POC        |  performed at [facility];[address]    |               | LAB        |              |
|             | [address];[place], [place]   |               |            |              |
|             | [id number]                    |               |            |              |
+-------------+--------------------------+---------------+------------+--------------+
 
 
 
+----------+
 
| Specimen |
+----------+
|          |
+----------+
 
 
 
+----------------+---------+--------------------+--------------+
| Performing     | Address | City/State/Zipcode | Phone Number |
| Organization   |         |                    |              |
+----------------+---------+--------------------+--------------+
|   EXTERNAL LAB |         |                    |              |
+----------------+---------+--------------------+--------------+
 External Lab: CBC (2018  9:10 AM PDT)
 
+-------------+-------------------------+-----------------+------------+--------------+
| Component   | Value                   | Ref Range       | Performed  | Pathologist  |
|             |                         |                 | At         | Signature    |
+-------------+-------------------------+-----------------+------------+--------------+
| WBC         | 13.95 (H)               | 3.80 - 11.00    | EXTERNAL   |              |
|             |                         | K/uL            | LAB        |              |
+-------------+-------------------------+-----------------+------------+--------------+
| Red Blood   | 2.71 (L)                | 4.20 - 5.70     | EXTERNAL   |              |
| Cells       |                         | M/uL            | LAB        |              |
| Counted     |                         |                 |            |              |
+-------------+-------------------------+-----------------+------------+--------------+
| Hemoglobin  | 8.3 (L)                 | 13.2 - 17.0     | EXTERNAL   |              |
|             |                         | g/dL            | LAB        |              |
+-------------+-------------------------+-----------------+------------+--------------+
| Hematocrit, | 24.0 (L)                | 39.0 - 50.0 %   | EXTERNAL   |              |
|  POC        |                         |                 | LAB        |              |
+-------------+-------------------------+-----------------+------------+--------------+
| MCV         | 88.6                    | 80.0 - 100.0 fl | EXTERNAL   |              |
|             |                         |                 | LAB        |              |
+-------------+-------------------------+-----------------+------------+--------------+
| MCH         | 30.5                    | 27.0 - 34.0 pg  | EXTERNAL   |              |
|             |                         |                 | LAB        |              |
+-------------+-------------------------+-----------------+------------+--------------+
| MCHC        | 34.5                    | 32.0 - 35.5     | EXTERNAL   |              |
|             |                         | g/dL            | LAB        |              |
+-------------+-------------------------+-----------------+------------+--------------+
| RDW-CV      | 45.5                    | 37 - 53 fl      | EXTERNAL   |              |
|             |                         |                 | LAB        |              |
+-------------+-------------------------+-----------------+------------+--------------+
| Platelet    | 157                     | 150 - 400 K/uL  | EXTERNAL   |              |
| Count       |                         |                 | LAB        |              |
| Plasma      |                         |                 |            |              |
+-------------+-------------------------+-----------------+------------+--------------+
| MPV         | 7.0                     | fl              | EXTERNAL   |              |
|             |                         |                 | LAB        |              |
+-------------+-------------------------+-----------------+------------+--------------+
| Differentia | AUTOMATED               |                 | EXTERNAL   |              |
| l Type      |                         |                 | LAB        |              |
+-------------+-------------------------+-----------------+------------+--------------+
| % Segmented | 82.83                   | %               | EXTERNAL   |              |
|             |                         |                 | LAB        |              |
| Neutrophils |                         |                 |            |              |
+-------------+-------------------------+-----------------+------------+--------------+
| %           | 10.30                   | %               | EXTERNAL   |              |
| Lymphocytes |                         |                 | LAB        |              |
 
+-------------+-------------------------+-----------------+------------+--------------+
| % Monocytes | 5.53                    | %               | EXTERNAL   |              |
|             |                         |                 | LAB        |              |
+-------------+-------------------------+-----------------+------------+--------------+
| %           | 1.12                    | %               | EXTERNAL   |              |
| Eosinophils |                         |                 | LAB        |              |
+-------------+-------------------------+-----------------+------------+--------------+
| % Basophils | 0.22                    | %               | EXTERNAL   |              |
|             |                         |                 | LAB        |              |
+-------------+-------------------------+-----------------+------------+--------------+
| Absolute    | 11.56 (H)               | 1.90 - 7.40     | EXTERNAL   |              |
| Segmented   |                         | K/uL            | LAB        |              |
| Neutrophils |                         |                 |            |              |
+-------------+-------------------------+-----------------+------------+--------------+
| Absolute    | 1.44                    | 1.00 - 3.90     | EXTERNAL   |              |
| Lymphocytes |                         | K/uL            | LAB        |              |
+-------------+-------------------------+-----------------+------------+--------------+
| Absolute    | 0.77                    | 0.00 - 0.80     | EXTERNAL   |              |
| Monocytes   |                         | K/uL            | LAB        |              |
+-------------+-------------------------+-----------------+------------+--------------+
| Absolute    | 0.16                    | 0.00 - 0.50     | EXTERNAL   |              |
| Eosinophils |                         | K/uL            | LAB        |              |
+-------------+-------------------------+-----------------+------------+--------------+
| Absolute    | 0.03Comment: Testing    | 0.00 - 0.10     | EXTERNAL   |              |
| Basophils   | performed at [facility];[address]    | K/uL            | LAB        |              |
|             | [address];[place], [place]  |                 |            |              |
|             | [id number]                   |                 |            |              |
+-------------+-------------------------+-----------------+------------+--------------+
 
 
 
+-----------------+
| Specimen        |
+-----------------+
| Blood specimen  |
| (specimen)      |
+-----------------+
 
 
 
+----------------+---------+--------------------+--------------+
| Performing     | Address | City/State/Zipcode | Phone Number |
| Organization   |         |                    |              |
+----------------+---------+--------------------+--------------+
|   EXTERNAL LAB |         |                    |              |
+----------------+---------+--------------------+--------------+
 Phosphorus (2018  9:10 AM PDT)
 
+------------+--------------------------+-----------------+------------+--------------+
| Component  | Value                    | Ref Range       | Performed  | Pathologist  |
|            |                          |                 | At         | Signature    |
+------------+--------------------------+-----------------+------------+--------------+
| PHOSPHORUS | 1.7 (L)Comment: Testing  | 2.3 - 4.8 mg/dL | EXTERNAL   |              |
|            | performed at [facility];[address]     |                 | LAB        |              |
|            | [address];[place], [place]   |                 |            |              |
|            | [id number]                    |                 |            |              |
+------------+--------------------------+-----------------+------------+--------------+
 
 
 
 
+-----------------+
| Specimen        |
+-----------------+
| Blood specimen  |
| (specimen)      |
+-----------------+
 
 
 
+----------------+---------+--------------------+--------------+
| Performing     | Address | City/State/Zipcode | Phone Number |
| Organization   |         |                    |              |
+----------------+---------+--------------------+--------------+
|   EXTERNAL LAB |         |                    |              |
+----------------+---------+--------------------+--------------+
 Magnesium (2018  9:10 AM PDT)
 
+-----------+-------------------------+-----------------+------------+--------------+
| Component | Value                   | Ref Range       | Performed  | Pathologist  |
|           |                         |                 | At         | Signature    |
+-----------+-------------------------+-----------------+------------+--------------+
| Magnesium | 1.9Comment: Testing     | 1.7 - 2.4 mg/dL | EXTERNAL   |              |
|           | performed at [facility];[address]    |                 | LAB        |              |
|           | [address];[place], [place]  |                 |            |              |
|           | [id number]                   |                 |            |              |
+-----------+-------------------------+-----------------+------------+--------------+
 
 
 
+-----------------+
| Specimen        |
+-----------------+
| Blood specimen  |
| (specimen)      |
+-----------------+
 
 
 
+----------------+---------+--------------------+--------------+
| Performing     | Address | City/State/Zipcode | Phone Number |
| Organization   |         |                    |              |
+----------------+---------+--------------------+--------------+
|   EXTERNAL LAB |         |                    |              |
+----------------+---------+--------------------+--------------+
 Basic Metabolic Panel (2018  9:10 AM PDT)
 
+-------------+--------------------------+-----------------+------------+--------------+
| Component   | Value                    | Ref Range       | Performed  | Pathologist  |
|             |                          |                 | At         | Signature    |
+-------------+--------------------------+-----------------+------------+--------------+
| Na          | 146 (H)                  | 135 - 145       | EXTERNAL   |              |
|             |                          | mmol/L          | LAB        |              |
+-------------+--------------------------+-----------------+------------+--------------+
| K           | 3.6                      | 3.5 - 4.9       | EXTERNAL   |              |
|             |                          | mmol/L          | LAB        |              |
+-------------+--------------------------+-----------------+------------+--------------+
| Cl          | 118 (H)                  | 99 - 109 mmol/L | EXTERNAL   |              |
|             |                          |                 | LAB        |              |
+-------------+--------------------------+-----------------+------------+--------------+
| CO2         | 22 (L)                   | 23 - 32 mmol/L  | EXTERNAL   |              |
 
|             |                          |                 | LAB        |              |
+-------------+--------------------------+-----------------+------------+--------------+
| Anion Gap   | 9                        | 5 - 20 mmol/L   | EXTERNAL   |              |
|             |                          |                 | LAB        |              |
+-------------+--------------------------+-----------------+------------+--------------+
| Glucose,    | 96                       | 65 - 99 mg/dL   | EXTERNAL   |              |
| Fasting     |                          |                 | LAB        |              |
+-------------+--------------------------+-----------------+------------+--------------+
| BUN         | 16                       | 8 - 25 mg/dL    | EXTERNAL   |              |
|             |                          |                 | LAB        |              |
+-------------+--------------------------+-----------------+------------+--------------+
| Creatinine  | 0.71                     | 0.70 - 1.30     | EXTERNAL   |              |
|             |                          | mg/dL           | LAB        |              |
+-------------+--------------------------+-----------------+------------+--------------+
| BUN/Creatin | 22                       |                 | EXTERNAL   |              |
| ine Ratio   |                          |                 | LAB        |              |
+-------------+--------------------------+-----------------+------------+--------------+
| Calcium     | 7.2 (L)                  | 8.5 - 10.5      | EXTERNAL   |              |
|             |                          | mg/dL           | LAB        |              |
+-------------+--------------------------+-----------------+------------+--------------+
| Estimated   | >60Comment: GFR <60:     | mL/min/1.73m2   | EXTERNAL   |              |
| GFR         | CHRONIC KIDNEY DISEASE,  |                 | LAB        |              |
|             | IF FOUND OVER A 3 MONTH  |                 |            |              |
|             | PERIOD.GFR <15: KIDNEY   |                 |            |              |
|             | FAILURE.FOR       |                 |            |              |
|             | [employer], MULTIPLY THE  |                 |            |              |
|             | CALCULATED GFR BY        |                 |            |              |
|             | 1.210.This eGFR is       |                 |            |              |
|             | calculated using the     |                 |            |              |
|             | MDRD [facility] traceable      |                 |            |              |
|             | equation.Testing         |                 |            |              |
|             | performed at [facility]     |                 |            |              |
|             | [facility];[place], [place]   |                 |            |              |
|             | [id number]                    |                 |            |              |
+-------------+--------------------------+-----------------+------------+--------------+
 
 
 
+-----------------+
| Specimen        |
+-----------------+
| Blood specimen  |
| (specimen)      |
+-----------------+
 
 
 
+----------------+---------+--------------------+--------------+
| Performing     | Address | City/State/Zipcode | Phone Number |
| Organization   |         |                    |              |
+----------------+---------+--------------------+--------------+
|   EXTERNAL LAB |         |                    |              |
+----------------+---------+--------------------+--------------+
 Tissue Request For Pathology (2018  8:00 AM PDT)
 
+---------------------+
| Specimen            |
+---------------------+
| Soft tissue sample  |
| (specimen)          |
 
+---------------------+
 
 
 
+------------------------------------------------------------------------+----------------+
| Narrative                                                              | Performed At   |
+------------------------------------------------------------------------+----------------+
|   SPECIMEN(S): A DUODENAL BX  SPECIMEN(S): B GASTRIC BX  SPECIMEN      |   EXTERNAL LAB |
| SOURCE:  A. DUODENAL BX  B. GASTRIC BX  CLINICAL HISTORY:  2018   |                |
| at [address] H. No clinical history given.     MICROSCOPIC DESCRIPTION:     |                |
| A-B. Histologic sections of all submitted blocks are examined by light |                |
|  microscopy. These findings, together with the gross examination,      |                |
| support the pathologic diagnosis.  FINAL PATHOLOGIC DIAGNOSIS:  A.     |                |
|   Duodenum, biopsy:     -   No pathologic abnormality  COMMENT:        |                |
| Sections of the duodenal mucosa show normal architecture.   There are  |                |
| no findings suggestive of celiac disease.   Features of peptic         |                |
| duodenitis are not present. In addition, no acute inflammation,        |                |
| granulomas or pathologic microorganisms are seen.     B.   Gastric     |                |
| biopsies:         -   No pathologic abnormality  The gastric mucosa is |                |
|  negative for significant inflammation and gastropathy.   There is no  |                |
| metaplasia, dysplasia or neoplasia.  GROSS DESCRIPTION:  Two specimens |                |
|  are received in two containers labeled with the patient's name:       |                |
| A. The specimen is received in formalin designated "duodenal biopsy"   |                |
| and consists of three, 0.1 to 0.3 cm tan fragments. Entirely submitted |                |
|  in A1.     B. The specimen is received in formalin designated         |                |
| "gastric biopsy" and consists of three, 0.3 to 0.4 cm tan fragments.   |                |
| Entirely submitted in B1. AM:rds  PERFORMING LABORATORY:  The          |                |
| technical component was performed by [facility]  |                |
| [place] [address] (Medical Director:   Adela Correia MD; CLIA#       |                |
| [id number]).  Professional interpretation was performed by [employer]      |                |
| [employer]  |                |
| [place] [address] (Medical Director:   Andres Kowalski M.D.; CLIA#:          |                |
|   [id number]).  Diagnostician:   Andres Kowalski MD  Pathologist        |                |
| Electronically Signed 2018                                       |                |
+------------------------------------------------------------------------+----------------+
 
 
 
+----------------+---------+--------------------+--------------+
| Performing     | Address | City/State/Zipcode | Phone Number |
| Organization   |         |                    |              |
+----------------+---------+--------------------+--------------+
|   EXTERNAL LAB |         |                    |              |
+----------------+---------+--------------------+--------------+
 Urinalysis with Microscopic if Indicated (2018 11:28 PM PDT)
 
+-------------+--------------------------+---------------+------------+--------------+
| Component   | Value                    | Ref Range     | Performed  | Pathologist  |
|             |                          |               | At         | Signature    |
+-------------+--------------------------+---------------+------------+--------------+
| Color       | YELLOW                   |               | EXTERNAL   |              |
|             |                          |               | LAB        |              |
+-------------+--------------------------+---------------+------------+--------------+
| Clarity     | CLEAR                    |               | EXTERNAL   |              |
|             |                          |               | LAB        |              |
+-------------+--------------------------+---------------+------------+--------------+
| Specific    | 1.028                    | 1.002 - 1.030 | EXTERNAL   |              |
| Gravity,    |                          |               | LAB        |              |
| Urine       |                          |               |            |              |
+-------------+--------------------------+---------------+------------+--------------+
 
| Leukocyte   | NEGATIVE                 |               | EXTERNAL   |              |
| Esterase,   |                          |               | LAB        |              |
| Urine       |                          |               |            |              |
+-------------+--------------------------+---------------+------------+--------------+
| Nitrite,    | NEGATIVE                 |               | EXTERNAL   |              |
| Urine       |                          |               | LAB        |              |
+-------------+--------------------------+---------------+------------+--------------+
| Urobilinoge | NORMAL                   | mg/dL         | EXTERNAL   |              |
| n, Urine    |                          |               | LAB        |              |
+-------------+--------------------------+---------------+------------+--------------+
| Protein,    | NEGATIVE                 | mg/dL         | EXTERNAL   |              |
| Urine       |                          |               | LAB        |              |
+-------------+--------------------------+---------------+------------+--------------+
| pH, Urine   | 5.0                      | 5.0 - 8.0     | EXTERNAL   |              |
|             |                          |               | LAB        |              |
+-------------+--------------------------+---------------+------------+--------------+
| Blood,      | NEGATIVE                 |               | EXTERNAL   |              |
| Urine       |                          |               | LAB        |              |
+-------------+--------------------------+---------------+------------+--------------+
| Ketones     | NEGATIVE                 | mg/dL         | EXTERNAL   |              |
|             |                          |               | LAB        |              |
+-------------+--------------------------+---------------+------------+--------------+
| Bilirubin,  | NEGATIVE                 |               | EXTERNAL   |              |
| Urine       |                          |               | LAB        |              |
+-------------+--------------------------+---------------+------------+--------------+
| Glucose,    | NEGATIVEComment: Testing | mg/dL         | EXTERNAL   |              |
| Urine       |  performed at [facility];[address]    |               | LAB        |              |
|             | [address];[place], [place]   |               |            |              |
|             | [id number]                    |               |            |              |
+-------------+--------------------------+---------------+------------+--------------+
 
 
 
+-----------------+
| Specimen        |
+-----------------+
| Urine specimen  |
| (specimen)      |
+-----------------+
 
 
 
+----------------+---------+--------------------+--------------+
| Performing     | Address | City/State/Zipcode | Phone Number |
| Organization   |         |                    |              |
+----------------+---------+--------------------+--------------+
|   EXTERNAL LAB |         |                    |              |
+----------------+---------+--------------------+--------------+
 Lactic Acid (2018 10:38 PM PDT)
 
+-----------+-------------------------+------------+------------+--------------+
| Component | Value                   | Ref Range  | Performed  | Pathologist  |
|           |                         |            | At         | Signature    |
+-----------+-------------------------+------------+------------+--------------+
| Lactate   | 1.5Comment: Testing     | 0.4 - 2.0  | EXTERNAL   |              |
|           | performed at [facility];[address]    | mmol/L     | LAB        |              |
|           | [address];[place], [place]  |            |            |              |
|           | [id number]                   |            |            |              |
+-----------+-------------------------+------------+------------+--------------+
 
 
 
 
+----------+
| Specimen |
+----------+
|          |
+----------+
 
 
 
+----------------+---------+--------------------+--------------+
| Performing     | Address | City/State/Zipcode | Phone Number |
| Organization   |         |                    |              |
+----------------+---------+--------------------+--------------+
|   EXTERNAL LAB |         |                    |              |
+----------------+---------+--------------------+--------------+
 Procalcitonin (2018 10:38 PM PDT)
 
+-------------+--------------------------+-----------+------------+--------------+
| Component   | Value                    | Ref Range | Performed  | Pathologist  |
|             |                          |           | At         | Signature    |
+-------------+--------------------------+-----------+------------+--------------+
| PROCALCITON | 0.22Comment:             | ng/mL     | EXTERNAL   |              |
| IN          | INTERPRETIVE             |           | LAB        |              |
|             | INFORMATION:             |           |            |              |
|             |   PROCALCITONIN PCT <=   |           |            |              |
|             | 0.5 ng/mL:      Low risk |           |            |              |
|             |  for progression to      |           |            |              |
|             | severe systemic          |           |            |              |
|             |   bacterial infection    |           |            |              |
|             | (severe sepsis/septic    |           |            |              |
|             | shock).      Does not    |           |            |              |
|             | exclude an infection,    |           |            |              |
|             | because localized        |           |            |              |
|             |   infections may be      |           |            |              |
|             | associated with such low |           |            |              |
|             |  levels.      If PCT is  |           |            |              |
|             | measured very early      |           |            |              |
|             | after bacterial          |           |            |              |
|             |   challenge (usually <6  |           |            |              |
|             | hours), results may      |           |            |              |
|             | still be      low and    |           |            |              |
|             | should re-assess PCT     |           |            |              |
|             | 6-24 hours later. PCT    |           |            |              |
|             | >0.5 and <= 2 ng/mL:     |           |            |              |
|             |   Moderate risk for      |           |            |              |
|             | progression to severe    |           |            |              |
|             | systemic      infection  |           |            |              |
|             | (severe sepsis/septic    |           |            |              |
|             | shock).   Other          |           |            |              |
|             |   conditions are known   |           |            |              |
|             | to elevate PCT, patient  |           |            |              |
|             |       should be closely  |           |            |              |
|             | monitored both           |           |            |              |
|             | clinically and by        |           |            |              |
|             |   re-assessing PCT       |           |            |              |
|             | within 6-24 hours. PCT > |           |            |              |
|             |  2 ng/mL:      High      |           |            |              |
|             | likelihood for           |           |            |              |
|             | progression to severe    |           |            |              |
 
|             | systemic      bacterial  |           |            |              |
|             | infection (severe        |           |            |              |
|             | sepsis/septic shock).    |           |            |              |
|             | PCT >= 10 ng/mL:         |           |            |              |
|             |   High likelihood of     |           |            |              |
|             | severe sepsis or septic  |           |            |              |
|             | shock.Testing performed  |           |            |              |
|             | at [facility];[address] [place] [address]         |           |            |              |
|             | [facility];[place] [address]   |           |            |              |
+-------------+--------------------------+-----------+------------+--------------+
 
 
 
+----------+
| Specimen |
+----------+
|          |
+----------+
 
 
 
+----------------+---------+--------------------+--------------+
| Performing     | Address | City/State/Zipcode | Phone Number |
| Organization   |         |                    |              |
+----------------+---------+--------------------+--------------+
|   EXTERNAL LAB |         |                    |              |
+----------------+---------+--------------------+--------------+
 Hemoglobin and Hematocrit (2018 10:38 PM PDT)
 
+-------------+--------------------------+---------------+------------+--------------+
| Component   | Value                    | Ref Range     | Performed  | Pathologist  |
|             |                          |               | At         | Signature    |
+-------------+--------------------------+---------------+------------+--------------+
| Hemoglobin  | 6.4 (LL)Comment: CALLED  | 13.2 - 17.0   | EXTERNAL   |              |
|             | TO JOSE MANUEL GARLAND RN/7RP AT    | g/dL          | LAB        |              |
|             | 2256 BY MWREAD BACK      |               |            |              |
|             | RESULTS VERIFIED         |               |            |              |
|             |                          |               |            |              |
+-------------+--------------------------+---------------+------------+--------------+
| Hematocrit, | 18.8 (LL)Comment: CALLED | 39.0 - 50.0 % | EXTERNAL   |              |
|  POC        |  TO JOSE MANUEL GARLAND RN/DianaRP AT   |               | LAB        |              |
|             | 7316 BY MWREAD BACK      |               |            |              |
|             | RESULTS VERIFIEDTesting  |               |            |              |
|             | performed at [facility];[address]     |               |            |              |
|             | [facility];[place], [place]   |               |            |              |
|             | [id number]                    |               |            |              |
+-------------+--------------------------+---------------+------------+--------------+
 
 
 
+----------+
| Specimen |
+----------+
|          |
+----------+
 
 
 
+----------------+---------+--------------------+--------------+
| Performing     | Address | City/State/Zipcode | Phone Number |
 
| Organization   |         |                    |              |
+----------------+---------+--------------------+--------------+
|   EXTERNAL LAB |         |                    |              |
+----------------+---------+--------------------+--------------+
 Culture, Blood (2018 10:34 PM PDT)
 
+----------+
| Specimen |
+----------+
|          |
+----------+
 
 
 
+------------------------------------------------------------------------+----------------+
| Narrative                                                              | Performed At   |
+------------------------------------------------------------------------+----------------+
|   Specimen Description                      BLOOD         SPECIAL      |   EXTERNAL LAB |
| REQUESTS                            [facility] [id number]  CULTURE        |                |
|                                    NO GROWTH 6 DAYS                    |                |
+------------------------------------------------------------------------+----------------+
 
 
 
+----------------+---------+--------------------+--------------+
| Performing     | Address | City/State/Zipcode | Phone Number |
| Organization   |         |                    |              |
+----------------+---------+--------------------+--------------+
|   EXTERNAL LAB |         |                    |              |
+----------------+---------+--------------------+--------------+
 XR Chest 1 Ramiro (2018  9:59 PM PDT)
 
+----------+
| Specimen |
+----------+
|          |
+----------+
 
 
 
+------------------------------------------------------------------------+--------------+
| Impressions                                                            | Performed At |
+------------------------------------------------------------------------+--------------+
|      No acute cardiopulmonary abnormality     Electronically signed by |              |
|  Bubba Cordoba MD on 2018 6:45 AM                                   |              |
+------------------------------------------------------------------------+--------------+
 
 
 
+------------------------------------------------------------------------+--------------+
| Narrative                                                              | Performed At |
+------------------------------------------------------------------------+--------------+
|   BERNA PARKINSON  1950  68 years  XR CHEST 1 VIEW  2018    |              |
| 9:59 PM     INDICATION: Fever. Blood transfusions.     COMPARISON:     |              |
| None     TECHNIQUE: Chest 1 view, AP view of the chest     FINDINGS:   |              |
| Lungs are clear.     No pleural effusion, no pneumothorax.     Heart   |              |
| size is normal. Mediastinal contours are normal.     No acute osseous  |              |
| abnormality.                                                           |              |
+------------------------------------------------------------------------+--------------+
 
 
 
 
+--------------------------------------------------------------------+
| Procedure Note                                                     |
+--------------------------------------------------------------------+
|   [facility] Mirza Conversion - 2019 11:04 AM PDT  BERNA PARKINSON |
| 1950                                                           |
| 68 years                                                           |
| XR CHEST 1 VIEW                                                    |
| 2018 9:59 PM                                                  |
|                                                                    |
| INDICATION: Fever. Blood transfusions.                             |
|                                                                    |
| COMPARISON: None                                                   |
|                                                                    |
| TECHNIQUE: Chest 1 view, AP view of the chest                      |
|                                                                    |
| FINDINGS:                                                          |
| Lungs are clear.                                                   |
|                                                                    |
| No pleural effusion, no pneumothorax.                              |
|                                                                    |
| Heart size is normal. Mediastinal contours are normal.             |
|                                                                    |
| No acute osseous abnormality.                                      |
|                                                                    |
| IMPRESSION:                                                        |
|                                                                    |
| No acute cardiopulmonary abnormality                               |
|                                                                    |
| Electronically signed by Bubba Cordoba MD on 2018 6:45 AM       |
+--------------------------------------------------------------------+
 Culture, Blood, 2nd Specimen (2018  8:24 PM PDT)
 
+-----------------+
| Specimen        |
+-----------------+
| Blood specimen  |
| (specimen)      |
+-----------------+
 
 
 
+------------------------------------------------------------------------+----------------+
| Narrative                                                              | Performed At   |
+------------------------------------------------------------------------+----------------+
|   Specimen Description                      BLOOD         SPECIAL      |   EXTERNAL LAB |
| REQUESTS                            [facility]   CULTURE                |                |
|                            NO GROWTH 6 DAYS                            |                |
+------------------------------------------------------------------------+----------------+
 
 
 
+----------------+---------+--------------------+--------------+
| Performing     | Address | City/State/Zipcode | Phone Number |
| Organization   |         |                    |              |
+----------------+---------+--------------------+--------------+
|   EXTERNAL LAB |         |                    |              |
+----------------+---------+--------------------+--------------+
 Culture, Blood (2018  8:24 PM PDT)
 
 
+-----------------+
| Specimen        |
+-----------------+
| Blood specimen  |
| (specimen)      |
+-----------------+
 
 
 
+------------------------------------------------------------------------+----------------+
| Narrative                                                              | Performed At   |
+------------------------------------------------------------------------+----------------+
|   Specimen Description                      BLOOD         SPECIAL      |   EXTERNAL LAB |
| REQUESTS                            [facility]            CULTURE             |                |
|                               NO GROWTH 6 DAYS                         |                |
+------------------------------------------------------------------------+----------------+
 
 
 
+----------------+---------+--------------------+--------------+
| Performing     | Address | City/State/Zipcode | Phone Number |
| Organization   |         |                    |              |
+----------------+---------+--------------------+--------------+
|   EXTERNAL LAB |         |                    |              |
+----------------+---------+--------------------+--------------+
 NM GI Blood Loss (2018  5:38 PM PDT)
 
+----------+
| Specimen |
+----------+
|          |
+----------+
 
 
 
+------------------------------------------------------------------------------------------+
| Addenda                                                                                  |
+------------------------------------------------------------------------------------------+
|   Addendum by Jerry Osullivan MD on 2018  8:13 AM  TECHNIQUE:  An additional hour  |
| of scintigraphic imaging in the anterior projection   over the abdomen and pelvis was    |
| performed portably.     FINDINGS:  There is activity seen extending through multiple     |
| small bowel loops   predominantly in the left upper quadrant, corresponding with the     |
| earlier   suspicion of upper GI bleed, probably in the duodenal sweep.     IMPRESSION:   |
| 1.   I suspect an upper GI bleed, probably in the duodenal sweep.     Electronically     |
| signed by Jerry Osullivan MD on 2018 8:13 AM                                         |
+------------------------------------------------------------------------------------------+
 
 
 
+------------------------------------------------------------------------+--------------+
| Impressions                                                            | Performed At |
+------------------------------------------------------------------------+--------------+
|   1.   Indeterminate hemorrhage, probably involving the duodenal       |              |
| sweep. Further imaging is necessary. Addendum to follow if the patient |              |
|  allows further imaging.     Electronically signed by Adan Aguila MD on 2018 3:32 PM                                                |              |
+------------------------------------------------------------------------+--------------+
 
 
 
 
+------------------------------------------------------------------------+--------------+
| Narrative                                                              | Performed At |
+------------------------------------------------------------------------+--------------+
|   HISTORY:  Hemorrhagic anemia. GI hemorrhage.     COMPARISON:  CTA    |              |
| abdomen and pelvis 18.     TECHNIQUE:  The patient's blood cells |              |
|  were labeled with 22.4 mCi technetium 99m UltraTag, and reinjected    |              |
| into the patient. Dynamic flow and delayed scintigraphic imaging was   |              |
| performed in the anterior ejection over the abdomen and pelvis for 60  |              |
| minutes.     FINDINGS:  There is accumulation of activity in the right |              |
|  upper quadrant just inferior to the hepatic hilum, corresponding to   |              |
| the proximal duodenum. Activity seems to dissipate from this region,   |              |
| and does not form a tubular collection of blood. Focal uptake near the |              |
|    end of the study in the right upper quadrant also dissipates        |              |
| rapidly. This would favor an upper GI hemorrhage, probably in the      |              |
| duodenal sweep. Unfortunately, this is not specific. Additional        |              |
| imaging for another hour was requested. The patient refused.   We will |              |
|  attempt to perform this secondary imaging on the floor using the      |              |
| portable camera.                                                       |              |
+------------------------------------------------------------------------+--------------+
 
 
 
+-------------------------------------------------------------------------------------------
------------------------------------------------------------------+
| Procedure Note                                                                            
                                                                  |
+-------------------------------------------------------------------------------------------
------------------------------------------------------------------+
|   Hermes, Wang Conversion - 2019 11:04 AM PDT  HISTORY:Hemorrhagic anemia. GI           
                                                                  |
| hemorrhage. COMPARISON:CTA abdomen and pelvis 18. TECHNIQUE:The patient's blood     
                                                                  |
| cells were labeled with 22.4 mCi technetium 99m UltraTag, and reinjected into the         
                                                                  |
| patient. Dynamic flow and delayed scintigraphic imaging was performed in the anterior     
                                                                  |
| ejection over the abdomen and pelvis for 60 minutes. FINDINGS:There is accumulation of    
                                                                  |
| activity in the right upper quadrant just inferior to the hepatic hilum, corresponding    
                                                                  |
| to the proximal duodenum. Activity seems to dissipate from this region, and does not      
                                                                  |
| form a tubular collection of blood. Focal uptake near the end of the study in the right   
                                                                  |
| upper quadrant also dissipates rapidly. This would favor an upper GI hemorrhage,          
                                                                  |
| probably in the duodenal sweep. Unfortunately, this is not specific. Additional imaging   
                                                                  |
| for another hour was requested. The patient refused. We will attempt to perform this      
                                                                  |
| secondary imaging on the floor using the portable camera. IMPRESSION: 1.  Indeterminate   
                                                                  |
| hemorrhage, probably involving the duodenal sweep. Further imaging is necessary.          
                                                                  |
| Addendum to follow if the patient allows further imaging. Electronically signed by Jerry  
                                                                  |
|  NENA Osullivan MD on 2018 3:32 PM                                                         
                                                                  |
|                                                                                           
 
                                                                  |
|IMPRESSION:                                                                                
                                                                  |
|1.  Indeterminate hemorrhage, probably involving the duodenal sweep. Further imaging is nec
essary. Addendum to follow if the patient allows further imaging. |
|                                                                                           
                                                                  |
|Electronically signed by Jerry Osullivan MD on 2018 3:32 PM                            
                                                                  |
+-------------------------------------------------------------------------------------------
------------------------------------------------------------------+
 Hemoglobin and Hematocrit (2018  3:32 PM PDT)
 
+-------------+--------------------------+---------------+------------+--------------+
| Component   | Value                    | Ref Range     | Performed  | Pathologist  |
|             |                          |               | At         | Signature    |
+-------------+--------------------------+---------------+------------+--------------+
| Hemoglobin  | 7.4 (L)                  | 13.2 - 17.0   | EXTERNAL   |              |
|             |                          | g/dL          | LAB        |              |
+-------------+--------------------------+---------------+------------+--------------+
| Hematocrit, | 22.2 (L)Comment: Testing | 39.0 - 50.0 % | EXTERNAL   |              |
|  POC        |  performed at [facility] [place]    |               | LAB        |              |
|             | [facility];[place], [place]   |               |            |              |
|             | [id number]                    |               |            |              |
+-------------+--------------------------+---------------+------------+--------------+
 
 
 
+----------+
| Specimen |
+----------+
|          |
+----------+
 
 
 
+----------------+---------+--------------------+--------------+
| Performing     | Address | City/State/Zipcode | Phone Number |
| Organization   |         |                    |              |
+----------------+---------+--------------------+--------------+
|   EXTERNAL LAB |         |                    |              |
+----------------+---------+--------------------+--------------+
 Type and Screen (2018  6:59 AM PDT)
 
+-------------+-------------------------+-----------+------------+--------------+
| Component   | Value                   | Ref Range | Performed  | Pathologist  |
|             |                         |           | At         | Signature    |
+-------------+-------------------------+-----------+------------+--------------+
| ABO Rh      | O POSITIVE              |           | EXTERNAL   |              |
|             |                         |           | LAB        |              |
+-------------+-------------------------+-----------+------------+--------------+
| Antibody    | NEGATIVE                |           | EXTERNAL   |              |
| Screen      |                         |           | LAB        |              |
+-------------+-------------------------+-----------+------------+--------------+
| BB BAND     | [id number]                |           | EXTERNAL   |              |
|             |                         |           | LAB        |              |
+-------------+-------------------------+-----------+------------+--------------+
| UNIT NUMBER | [id number]           |           | EXTERNAL   |              |
|             |                         |           | LAB        |              |
+-------------+-------------------------+-----------+------------+--------------+
 
| Product     | LEUKODEPLETED PC        |           | EXTERNAL   |              |
| Code        |                         |           | LAB        |              |
+-------------+-------------------------+-----------+------------+--------------+
| Unit        | 00                      |           | EXTERNAL   |              |
| Division    |                         |           | LAB        |              |
+-------------+-------------------------+-----------+------------+--------------+
| Unit Status | ISSUED,FINAL            |           | EXTERNAL   |              |
|             |                         |           | LAB        |              |
+-------------+-------------------------+-----------+------------+--------------+
| Transfusion | OK TO TRANSFUSE         |           | EXTERNAL   |              |
|  Status     |                         |           | LAB        |              |
+-------------+-------------------------+-----------+------------+--------------+
| CROSSMATCH  | COMPATIBLE              |           | EXTERNAL   |              |
| RESULT      |                         |           | LAB        |              |
+-------------+-------------------------+-----------+------------+--------------+
| UNIT NUMBER | [id number]           |           | EXTERNAL   |              |
|             |                         |           | LAB        |              |
+-------------+-------------------------+-----------+------------+--------------+
| Product     | LEUKODEPLETED PC        |           | EXTERNAL   |              |
| Code        |                         |           | LAB        |              |
+-------------+-------------------------+-----------+------------+--------------+
| Unit        | 00                      |           | EXTERNAL   |              |
| Division    |                         |           | LAB        |              |
+-------------+-------------------------+-----------+------------+--------------+
| Unit Status | ISSUED,FINAL            |           | EXTERNAL   |              |
|             |                         |           | LAB        |              |
+-------------+-------------------------+-----------+------------+--------------+
| Transfusion | OK TO TRANSFUSE         |           | EXTERNAL   |              |
|  Status     |                         |           | LAB        |              |
+-------------+-------------------------+-----------+------------+--------------+
| CROSSMATCH  | COMPATIBLE              |           | EXTERNAL   |              |
| RESULT      |                         |           | LAB        |              |
+-------------+-------------------------+-----------+------------+--------------+
| UNIT NUMBER | [id number]           |           | EXTERNAL   |              |
|             |                         |           | LAB        |              |
+-------------+-------------------------+-----------+------------+--------------+
| Product     | LEUKODEPLETED PC        |           | EXTERNAL   |              |
| Code        |                         |           | LAB        |              |
+-------------+-------------------------+-----------+------------+--------------+
| Unit        | 00                      |           | EXTERNAL   |              |
| Division    |                         |           | LAB        |              |
+-------------+-------------------------+-----------+------------+--------------+
| Unit Status | DISCARDED               |           | EXTERNAL   |              |
|             |                         |           | LAB        |              |
+-------------+-------------------------+-----------+------------+--------------+
| Transfusion | OK TO TRANSFUSE         |           | EXTERNAL   |              |
|  Status     |                         |           | LAB        |              |
+-------------+-------------------------+-----------+------------+--------------+
| CROSSMATCH  | COMPATIBLETesting       |           | EXTERNAL   |              |
| RESULT      | performed at [facility] [place];[address]    |           | LAB        |              |
|             | [address];[place]  |           |            |              |
|             | [id number]                   |           |            |              |
+-------------+-------------------------+-----------+------------+--------------+
| UNIT NUMBER | [id number]           |           | EXTERNAL   |              |
|             |                         |           | LAB        |              |
+-------------+-------------------------+-----------+------------+--------------+
| Product     | LEUKODEPLETED PC        |           | EXTERNAL   |              |
| Code        |                         |           | LAB        |              |
+-------------+-------------------------+-----------+------------+--------------+
| Unit        | 00                      |           | EXTERNAL   |              |
 
| Division    |                         |           | LAB        |              |
+-------------+-------------------------+-----------+------------+--------------+
| Unit Status | ISSUED,FINAL            |           | EXTERNAL   |              |
|             |                         |           | LAB        |              |
+-------------+-------------------------+-----------+------------+--------------+
| Transfusion | OK TO TRANSFUSE         |           | EXTERNAL   |              |
|  Status     |                         |           | LAB        |              |
+-------------+-------------------------+-----------+------------+--------------+
| CROSSMATCH  | COMPATIBLE              |           | EXTERNAL   |              |
| RESULT      |                         |           | LAB        |              |
+-------------+-------------------------+-----------+------------+--------------+
| UNIT NUMBER | [id number]           |           | EXTERNAL   |              |
|             |                         |           | LAB        |              |
+-------------+-------------------------+-----------+------------+--------------+
| Product     | LEUKODEPLETED PC        |           | EXTERNAL   |              |
| Code        |                         |           | LAB        |              |
+-------------+-------------------------+-----------+------------+--------------+
| Unit        | 00                      |           | EXTERNAL   |              |
| Division    |                         |           | LAB        |              |
+-------------+-------------------------+-----------+------------+--------------+
| Unit Status | ISSUED,FINAL            |           | EXTERNAL   |              |
|             |                         |           | LAB        |              |
+-------------+-------------------------+-----------+------------+--------------+
| Transfusion | OK TO TRANSFUSE         |           | EXTERNAL   |              |
|  Status     |                         |           | LAB        |              |
+-------------+-------------------------+-----------+------------+--------------+
| CROSSMATCH  | COMPATIBLE              |           | EXTERNAL   |              |
| RESULT      |                         |           | LAB        |              |
+-------------+-------------------------+-----------+------------+--------------+
| UNIT NUMBER | [id number]           |           | EXTERNAL   |              |
|             |                         |           | LAB        |              |
+-------------+-------------------------+-----------+------------+--------------+
| Product     | LEUKODEPLETED PC        |           | EXTERNAL   |              |
| Code        |                         |           | LAB        |              |
+-------------+-------------------------+-----------+------------+--------------+
| Unit        | 00                      |           | EXTERNAL   |              |
| Division    |                         |           | LAB        |              |
+-------------+-------------------------+-----------+------------+--------------+
| Unit Status | ISSUED,FINAL            |           | EXTERNAL   |              |
|             |                         |           | LAB        |              |
+-------------+-------------------------+-----------+------------+--------------+
| Transfusion | OK TO TRANSFUSE         |           | EXTERNAL   |              |
|  Status     |                         |           | LAB        |              |
+-------------+-------------------------+-----------+------------+--------------+
| CROSSMATCH  | COMPATIBLE              |           | EXTERNAL   |              |
| RESULT      |                         |           | LAB        |              |
+-------------+-------------------------+-----------+------------+--------------+
| UNIT NUMBER | [id number]           |           | EXTERNAL   |              |
|             |                         |           | LAB        |              |
+-------------+-------------------------+-----------+------------+--------------+
| Product     | LEUKODEPLETED PC        |           | EXTERNAL   |              |
| Code        |                         |           | LAB        |              |
+-------------+-------------------------+-----------+------------+--------------+
| Unit        | 00                      |           | EXTERNAL   |              |
| Division    |                         |           | LAB        |              |
+-------------+-------------------------+-----------+------------+--------------+
| Unit Status | ISS'D ANOTH PT          |           | EXTERNAL   |              |
|             |                         |           | LAB        |              |
+-------------+-------------------------+-----------+------------+--------------+
| Transfusion | OK TO TRANSFUSE         |           | EXTERNAL   |              |
 
|  Status     |                         |           | LAB        |              |
+-------------+-------------------------+-----------+------------+--------------+
| CROSSMATCH  | COMPATIBLE              |           | EXTERNAL   |              |
| RESULT      |                         |           | LAB        |              |
+-------------+-------------------------+-----------+------------+--------------+
| UNIT NUMBER | [id number]           |           | EXTERNAL   |              |
|             |                         |           | LAB        |              |
+-------------+-------------------------+-----------+------------+--------------+
| Product     | LEUKODEPLETED PC        |           | EXTERNAL   |              |
| Code        |                         |           | LAB        |              |
+-------------+-------------------------+-----------+------------+--------------+
| Unit        | 00                      |           | EXTERNAL   |              |
| Division    |                         |           | LAB        |              |
+-------------+-------------------------+-----------+------------+--------------+
| Unit Status | ISS'D ANOTH PT          |           | EXTERNAL   |              |
|             |                         |           | LAB        |              |
+-------------+-------------------------+-----------+------------+--------------+
| Transfusion | OK TO TRANSFUSE         |           | EXTERNAL   |              |
|  Status     |                         |           | LAB        |              |
+-------------+-------------------------+-----------+------------+--------------+
| CROSSMATCH  | COMPATIBLE              |           | EXTERNAL   |              |
| RESULT      |                         |           | LAB        |              |
+-------------+-------------------------+-----------+------------+--------------+
| UNIT NUMBER | [id number]           |           | EXTERNAL   |              |
|             |                         |           | LAB        |              |
+-------------+-------------------------+-----------+------------+--------------+
| Product     | LEUKODEPLETED PC        |           | EXTERNAL   |              |
| Code        |                         |           | LAB        |              |
+-------------+-------------------------+-----------+------------+--------------+
| Unit        | 00                      |           | EXTERNAL   |              |
| Division    |                         |           | LAB        |              |
+-------------+-------------------------+-----------+------------+--------------+
| Unit Status | ISSUED,FINAL            |           | EXTERNAL   |              |
|             |                         |           | LAB        |              |
+-------------+-------------------------+-----------+------------+--------------+
| Transfusion | OK TO TRANSFUSE         |           | EXTERNAL   |              |
|  Status     |                         |           | LAB        |              |
+-------------+-------------------------+-----------+------------+--------------+
| CROSSMATCH  | COMPATIBLE              |           | EXTERNAL   |              |
| RESULT      |                         |           | LAB        |              |
+-------------+-------------------------+-----------+------------+--------------+
 
 
 
+-----------------+
| Specimen        |
+-----------------+
| Blood specimen  |
| (specimen)      |
+-----------------+
 
 
 
+----------------+---------+--------------------+--------------+
| Performing     | Address | City/State/Zipcode | Phone Number |
| Organization   |         |                    |              |
+----------------+---------+--------------------+--------------+
|   EXTERNAL LAB |         |                    |              |
+----------------+---------+--------------------+--------------+
 Hemoglobin and Hematocrit (2018  6:09 AM PDT)
 
 
+-------------+--------------------------+---------------+------------+--------------+
| Component   | Value                    | Ref Range     | Performed  | Pathologist  |
|             |                          |               | At         | Signature    |
+-------------+--------------------------+---------------+------------+--------------+
| Hemoglobin  | 6.9 (LL)Comment: CALLED  | 13.2 - 17.0   | EXTERNAL   |              |
|             | TO SRI R ON 7RP AT    | g/dL          | LAB        |              |
|             | 0637 BY TRREAD BACK      |               |            |              |
|             | RESULTS VERIFIED         |               |            |              |
|             |                          |               |            |              |
+-------------+--------------------------+---------------+------------+--------------+
| Hematocrit, | 19.5 (LL)Comment: CALLED | 39.0 - 50.0 % | EXTERNAL   |              |
|  POC        |  TO SRI R ON 7RP AT   |               | LAB        |              |
|             | 0637 BY TRREAD BACK      |               |            |              |
|             | RESULTS VERIFIEDTesting  |               |            |              |
|             | performed at [facility] [place];[address]     |               |            |              |
|             | [address];[place]   |               |            |              |
|             | [id number]                    |               |            |              |
+-------------+--------------------------+---------------+------------+--------------+
 
 
 
+----------+
| Specimen |
+----------+
|          |
+----------+
 
 
 
+----------------+---------+--------------------+--------------+
| Performing     | Address | City/State/Zipcode | Phone Number |
| Organization   |         |                    |              |
+----------------+---------+--------------------+--------------+
|   EXTERNAL LAB |         |                    |              |
+----------------+---------+--------------------+--------------+
 External Lab: CBC (2018  3:47 AM PDT)
 
+-------------+--------------------------+-----------------+------------+--------------+
| Component   | Value                    | Ref Range       | Performed  | Pathologist  |
|             |                          |                 | At         | Signature    |
+-------------+--------------------------+-----------------+------------+--------------+
| WBC         | 17.20 (H)                | 3.80 - 11.00    | EXTERNAL   |              |
|             |                          | K/uL            | LAB        |              |
+-------------+--------------------------+-----------------+------------+--------------+
| Red Blood   | 2.67 (L)                 | 4.20 - 5.70     | EXTERNAL   |              |
| Cells       |                          | M/uL            | LAB        |              |
| Counted     |                          |                 |            |              |
+-------------+--------------------------+-----------------+------------+--------------+
| Hemoglobin  | 8.3 (L)                  | 13.2 - 17.0     | EXTERNAL   |              |
|             |                          | g/dL            | LAB        |              |
+-------------+--------------------------+-----------------+------------+--------------+
| Hematocrit, | 23.7 (L)                 | 39.0 - 50.0 %   | EXTERNAL   |              |
|  POC        |                          |                 | LAB        |              |
+-------------+--------------------------+-----------------+------------+--------------+
| MCV         | 88.9                     | 80.0 - 100.0 fl | EXTERNAL   |              |
|             |                          |                 | LAB        |              |
+-------------+--------------------------+-----------------+------------+--------------+
| MCH         | 31.1                     | 27.0 - 34.0 pg  | EXTERNAL   |              |
|             |                          |                 | LAB        |              |
 
+-------------+--------------------------+-----------------+------------+--------------+
| MCHC        | 34.9                     | 32.0 - 35.5     | EXTERNAL   |              |
|             |                          | g/dL            | LAB        |              |
+-------------+--------------------------+-----------------+------------+--------------+
| RDW-CV      | 45.1                     | 37 - 53 fl      | EXTERNAL   |              |
|             |                          |                 | LAB        |              |
+-------------+--------------------------+-----------------+------------+--------------+
| Platelet    | 235                      | 150 - 400 K/uL  | EXTERNAL   |              |
| Count       |                          |                 | LAB        |              |
| Plasma      |                          |                 |            |              |
+-------------+--------------------------+-----------------+------------+--------------+
| MPV         | 7.7                      | fl              | EXTERNAL   |              |
|             |                          |                 | LAB        |              |
+-------------+--------------------------+-----------------+------------+--------------+
| Differentia | AUTOMATED                |                 | EXTERNAL   |              |
| l Type      |                          |                 | LAB        |              |
+-------------+--------------------------+-----------------+------------+--------------+
| % Segmented | 76.31                    | %               | EXTERNAL   |              |
|             |                          |                 | LAB        |              |
| Neutrophils |                          |                 |            |              |
+-------------+--------------------------+-----------------+------------+--------------+
| %           | 15.24                    | %               | EXTERNAL   |              |
| Lymphocytes |                          |                 | LAB        |              |
+-------------+--------------------------+-----------------+------------+--------------+
| % Monocytes | 7.60                     | %               | EXTERNAL   |              |
|             |                          |                 | LAB        |              |
+-------------+--------------------------+-----------------+------------+--------------+
| %           | 0.47                     | %               | EXTERNAL   |              |
| Eosinophils |                          |                 | LAB        |              |
+-------------+--------------------------+-----------------+------------+--------------+
| % Basophils | 0.38                     | %               | EXTERNAL   |              |
|             |                          |                 | LAB        |              |
+-------------+--------------------------+-----------------+------------+--------------+
| Absolute    | 13.13 (H)                | 1.90 - 7.40     | EXTERNAL   |              |
| Segmented   |                          | K/uL            | LAB        |              |
| Neutrophils |                          |                 |            |              |
+-------------+--------------------------+-----------------+------------+--------------+
| Absolute    | 2.62                     | 1.00 - 3.90     | EXTERNAL   |              |
| Lymphocytes |                          | K/uL            | LAB        |              |
+-------------+--------------------------+-----------------+------------+--------------+
| Absolute    | 1.31 (H)                 | 0.00 - 0.80     | EXTERNAL   |              |
| Monocytes   |                          | K/uL            | LAB        |              |
+-------------+--------------------------+-----------------+------------+--------------+
| Absolute    | 0.08                     | 0.00 - 0.50     | EXTERNAL   |              |
| Eosinophils |                          | K/uL            | LAB        |              |
+-------------+--------------------------+-----------------+------------+--------------+
| Absolute    | 0.07Comment: Testing     | 0.00 - 0.10     | EXTERNAL   |              |
| Basophils   | performed at [facility], [address] | K/uL            | LAB        |              |
|             |  [address],        |                 |            |              |
|             | [place]  [address]     |                 |            |              |
+-------------+--------------------------+-----------------+------------+--------------+
 
 
 
+-----------------+
| Specimen        |
+-----------------+
| Blood specimen  |
| (specimen)      |
+-----------------+
 
 
 
 
+----------------+---------+--------------------+--------------+
| Performing     | Address | City/State/Zipcode | Phone Number |
| Organization   |         |                    |              |
+----------------+---------+--------------------+--------------+
|   EXTERNAL LAB |         |                    |              |
+----------------+---------+--------------------+--------------+
 Magnesium (2018  3:47 AM PDT)
 
+-----------+--------------------------+-----------------+------------+--------------+
| Component | Value                    | Ref Range       | Performed  | Pathologist  |
|           |                          |                 | At         | Signature    |
+-----------+--------------------------+-----------------+------------+--------------+
| Magnesium | 1.8Comment: Testing      | 1.7 - 2.4 mg/dL | EXTERNAL   |              |
|           | performed at [facility], [address] |                 | LAB        |              |
|           |  [address],        |                 |            |              |
|           | [place]  [address]     |                 |            |              |
+-----------+--------------------------+-----------------+------------+--------------+
 
 
 
+-----------------+
| Specimen        |
+-----------------+
| Blood specimen  |
| (specimen)      |
+-----------------+
 
 
 
+----------------+---------+--------------------+--------------+
| Performing     | Address | City/State/Zipcode | Phone Number |
| Organization   |         |                    |              |
+----------------+---------+--------------------+--------------+
|   EXTERNAL LAB |         |                    |              |
+----------------+---------+--------------------+--------------+
 Comprehensive Metabolic Panel (2018  3:47 AM PDT)
 
+-------------+--------------------------+-----------------+------------+--------------+
| Component   | Value                    | Ref Range       | Performed  | Pathologist  |
|             |                          |                 | At         | Signature    |
+-------------+--------------------------+-----------------+------------+--------------+
| Na          | 140                      | 135 - 145       | EXTERNAL   |              |
|             |                          | mmol/L          | LAB        |              |
+-------------+--------------------------+-----------------+------------+--------------+
| K           | 4.1                      | 3.5 - 4.9       | EXTERNAL   |              |
|             |                          | mmol/L          | LAB        |              |
+-------------+--------------------------+-----------------+------------+--------------+
| Cl          | 108                      | 99 - 109 mmol/L | EXTERNAL   |              |
|             |                          |                 | LAB        |              |
+-------------+--------------------------+-----------------+------------+--------------+
| CO2         | 22 (L)                   | 23 - 32 mmol/L  | EXTERNAL   |              |
|             |                          |                 | LAB        |              |
+-------------+--------------------------+-----------------+------------+--------------+
| Anion Gap   | 14                       | 5 - 20 mmol/L   | EXTERNAL   |              |
|             |                          |                 | LAB        |              |
+-------------+--------------------------+-----------------+------------+--------------+
| Glucose,    | 124 (H)                  | 65 - 99 mg/dL   | EXTERNAL   |              |
 
| Fasting     |                          |                 | LAB        |              |
+-------------+--------------------------+-----------------+------------+--------------+
| BUN         | 21                       | 8 - 25 mg/dL    | EXTERNAL   |              |
|             |                          |                 | LAB        |              |
+-------------+--------------------------+-----------------+------------+--------------+
| Creatinine  | 0.9                      | 0.70 - 1.30     | EXTERNAL   |              |
|             |                          | mg/dL           | LAB        |              |
+-------------+--------------------------+-----------------+------------+--------------+
| BUN/Creatin | 23                       |                 | EXTERNAL   |              |
| ine Ratio   |                          |                 | LAB        |              |
+-------------+--------------------------+-----------------+------------+--------------+
| Calcium     | 7.7 (L)                  | 8.5 - 10.5      | EXTERNAL   |              |
|             |                          | mg/dL           | LAB        |              |
+-------------+--------------------------+-----------------+------------+--------------+
| Protein,    | 5.4 (L)                  | 6.3 - 8.2 g/dL  | EXTERNAL   |              |
| Total       |                          |                 | LAB        |              |
+-------------+--------------------------+-----------------+------------+--------------+
| Albumin     | 2.8 (L)                  | 3.3 - 4.8 g/dL  | EXTERNAL   |              |
|             |                          |                 | LAB        |              |
+-------------+--------------------------+-----------------+------------+--------------+
| Globulin    | 2.6                      | 1.3 - 4.9 g/dL  | EXTERNAL   |              |
|             |                          |                 | LAB        |              |
+-------------+--------------------------+-----------------+------------+--------------+
| A/G Ratio   | 1.1                      | 1.0 - 2.4       | EXTERNAL   |              |
|             |                          |                 | LAB        |              |
+-------------+--------------------------+-----------------+------------+--------------+
| Bilirubin   | 0.3                      | 0.1 - 1.5 mg/dL | EXTERNAL   |              |
| Total       |                          |                 | LAB        |              |
+-------------+--------------------------+-----------------+------------+--------------+
| ALP,        | 44                       | 35 - 115 U/L    | EXTERNAL   |              |
| External    |                          |                 | LAB        |              |
+-------------+--------------------------+-----------------+------------+--------------+
| AST         | 21                       | 10 - 45 U/L     | EXTERNAL   |              |
|             |                          |                 | LAB        |              |
+-------------+--------------------------+-----------------+------------+--------------+
| ALT         | 28                       | 10 - 65 U/L     | EXTERNAL   |              |
|             |                          |                 | LAB        |              |
+-------------+--------------------------+-----------------+------------+--------------+
| Estimated   | >60Comment: GFR <60:     | mL/min/1.73m2   | EXTERNAL   |              |
| GFR         | CHRONIC KIDNEY DISEASE,  |                 | LAB        |              |
|             | IF FOUND OVER A 3 MONTH  |                 |            |              |
|             | PERIOD.GFR <15: KIDNEY   |                 |            |              |
|             | FAILURE.FOR       |                 |            |              |
|             | [employer], MULTIPLY THE  |                 |            |              |
|             | CALCULATED GFR BY        |                 |            |              |
|             | 1.210.This eGFR is       |                 |            |              |
|             | calculated using the     |                 |            |              |
|             | MDRD IDMS traceable      |                 |            |              |
|             | equation.Testing         |                 |            |              |
|             | performed at [facility], [address] |                 |            |              |
|             |  [facility],        |                 |            |              |
|             | [place]   [address]    |                 |            |              |
+-------------+--------------------------+-----------------+------------+--------------+
 
 
 
+-----------------+
| Specimen        |
+-----------------+
| Blood specimen  |
 
| (specimen)      |
+-----------------+
 
 
 
+----------------+---------+--------------------+--------------+
| Performing     | Address | City/State/Zipcode | Phone Number |
| Organization   |         |                    |              |
+----------------+---------+--------------------+--------------+
|   EXTERNAL LAB |         |                    |              |
+----------------+---------+--------------------+--------------+
 CT Angiogram Abdomen Pelvis w Contrast (2018  1:23 AM PDT)
 
+----------+
| Specimen |
+----------+
|          |
+----------+
 
 
 
+------------------------------------------------------------------------+--------------+
| Impressions                                                            | Performed At |
+------------------------------------------------------------------------+--------------+
|      1. No aortic aneurysm or dissection seen.  2. No active GI        |              |
| bleeding identified.  3. No acute inflammatory or obstructive process  |              |
| seen.     [facility]      Electronically signed by Mike Wilson MD on Aug   |              |
| 2018   2:13AM Referring Provider Line: [phone] ID: [id number]     |              |
+------------------------------------------------------------------------+--------------+
 
 
 
+------------------------------------------------------------------------+--------------+
| Narrative                                                              | Performed At |
+------------------------------------------------------------------------+--------------+
|   EXAM:  CT ANGIOGRAM ABDOMEN AND PELVIS WITH CONTRAST     EXAM DATE:  |              |
| 2018 01:23 AM.     CLINICAL HISTORY: Bloody diarrhea. Syncope.    |              |
|   COMPARISONS: None.     TECHNIQUE: axial noncontrast images were      |              |
| obtained. Routine helical CT angiogram imaging was performed through   |              |
| the abdomen and pelvis in the arterial phase. Axial venous phase       |              |
| images were obtained. IV contrast: Nonionic. Enteric contrast: No.     |              |
| Reconstructions: Coronal, sagittal, and 3D MIP reconstructions.     In |              |
|  accordance with CT protocol optimization, one or more of the          |              |
| following dose reduction techniques were utilized for this exam:       |              |
| automated exposure control, adjustment of mA and/or KV based on        |              |
| patient size, or use of iterative reconstructive technique.            |              |
| FINDINGS:  Vasculature: Mild atherosclerosis. No aortic aneurysm or    |              |
| dissection. No signal stenosis in the celiac axis, superior mesenteric |              |
|  artery, bilateral renal arteries, inferior mesenteric artery, or      |              |
| bilateral iliac arteries.     Lung Bases: Emphysema. Mild right        |              |
| basilar atelectasis.     Abdominal Solid Organs: Left lobe liver cyst  |              |
| measuring 1.6 cm. Spleen, pancreas, adrenals, and kidneys show no      |              |
| focal abnormalities.     Peritoneal Cavity: No bowel obstruction seen. |              |
|  No diverticulitis. No free air or free fluid. No active GI bleeding   |              |
| identified. No lymphadenopathy. Appendix appears normal.     Pelvic    |              |
| Organs: Normal. The bladder and visualized pelvic organs are within    |              |
| normal limits.     Bones: Degenerative changes in the spine.           |              |
| Postoperative changes at L4-L5.     Other: Bilateral inguinal hernias  |              |
| containing fat.                                                        |              |
+------------------------------------------------------------------------+--------------+
 
 
 
 
+-------------------------------------------------------------------------------------------
------------------------+
| Procedure Note                                                                            
                        |
+-------------------------------------------------------------------------------------------
------------------------+
|   Hermes, Rad Conversion - 2019 11:04 AM PDT  EXAM:CT ANGIOGRAM ABDOMEN AND PELVIS     
                        |
| WITH CONTRAST EXAM DATE: 2018 01:23 AM. CLINICAL HISTORY: Bloody diarrhea. Syncope.  
                        |
|  COMPARISONS: None. TECHNIQUE: axial noncontrast images were obtained. Routine helical    
                        |
| CT angiogram imaging was performed through the abdomen and pelvis in the arterial phase.  
                        |
|  Axial venous phase images were obtained. IV contrast: Nonionic. Enteric contrast: No.    
                        |
| Reconstructions: Coronal, sagittal, and 3D MIP reconstructions. In accordance with CT     
                        |
| protocol optimization, one or more of the following dose reduction techniques were        
                        |
| utilized for this exam: automated exposure control, adjustment of mA and/or KV based on   
                        |
| patient size, or use of iterative reconstructive technique. FINDINGS:Vasculature: Mild    
                        |
| atherosclerosis. No aortic aneurysm or dissection. No signal stenosis in the celiac       
                        |
| axis, superior mesenteric artery, bilateral renal arteries, inferior mesenteric artery,   
                        |
| or bilateral iliac arteries. Lung Bases: Emphysema. Mild right basilar atelectasis.       
                        |
| Abdominal Solid Organs: Left lobe liver cyst measuring 1.6 cm. Spleen, pancreas,          
                        |
| adrenals, and kidneys show no focal abnormalities. Peritoneal Cavity: No bowel            
                        |
| obstruction seen. No diverticulitis. No free air or free fluid. No active GI bleeding     
                        |
| identified. No lymphadenopathy. Appendix appears normal. Pelvic Organs: Normal. The       
                        |
| bladder and visualized pelvic organs are within normal limits. Bones: Degenerative        
                        |
| changes in the spine. Postoperative changes at L4-L5. Other: Bilateral inguinal hernias   
                        |
| containing fat. IMPRESSION:  1. No aortic aneurysm or dissection seen.2. No active GI     
                        |
| bleeding identified.3. No acute inflammatory or obstructive process seen. [facility]           
                        |
| Electronically signed by Mike Wilson MD on Aug 12 2018  2:13AM Referring Provider Line:  
                        |
|  [phone] ID: [id number]                                                                 
                        |
|Pelvic Organs: Normal. The bladder and visualized pelvic organs are within normal limits.  
                        |
|Bones: Degenerative changes in the spine. Postoperative changes at L4-L5.                  
 
                        |
|Other: Bilateral inguinal hernias containing fat.                                          
                        |
|IMPRESSION:                                                                                
                        |
|1. No aortic aneurysm or dissection seen.                                                  
                        |
|2. No active GI bleeding identified.                                                       
                        |
|3. No acute inflammatory or obstructive process seen.                                      
                        |
|[facility]                                                                                      
                       |
| Electronically signed by Mike Wilson MD on Aug 12 2018  2:13AM Referring Provider Line: [phone]
[id number] ID: [id number] |
+-------------------------------------------------------------------------------------------
------------------------+
 Hemoglobin and Hematocrit (2018 12:01 AM PDT)
 
+-------------+--------------------------+---------------+------------+--------------+
| Component   | Value                    | Ref Range     | Performed  | Pathologist  |
|             |                          |               | At         | Signature    |
+-------------+--------------------------+---------------+------------+--------------+
| Hemoglobin  | 7.3 (L)                  | 13.2 - 17.0   | EXTERNAL   |              |
|             |                          | g/dL          | LAB        |              |
+-------------+--------------------------+---------------+------------+--------------+
| Hematocrit, | 22.0 (L)Comment: Testing | 39.0 - 50.0 % | EXTERNAL   |              |
|  POC        |  performed at [facility];[address]    |               | LAB        |              |
|             | [address];[place], [place]   |               |            |              |
|             | [address]                    |               |            |              |
+-------------+--------------------------+---------------+------------+--------------+
 
 
 
+----------+
| Specimen |
+----------+
|          |
+----------+
 
 
 
+----------------+---------+--------------------+--------------+
| Performing     | Address | City/State/Zipcode | Phone Number |
| Organization   |         |                    |              |
+----------------+---------+--------------------+--------------+
|   EXTERNAL LAB |         |                    |              |
+----------------+---------+--------------------+--------------+
 documented in this encounter
 
 
 Visit Diagnoses
 
 
+-----------------------------------------------------------------------------+
| Diagnosis                                                                   |
+-----------------------------------------------------------------------------+
|   Acute posthemorrhagic anemia                                              |
+-----------------------------------------------------------------------------+
|   Gastrointestinal hemorrhage, unspecified gastrointestinal hemorrhage type |
+-----------------------------------------------------------------------------+
|   Acute blood loss anemia  Acute posthemorrhagic anemia                     |
+-----------------------------------------------------------------------------+
|   Dyspnea on exertion  Other dyspnea and respiratory abnormality            |
+-----------------------------------------------------------------------------+
|   Palpitations                                                              |
+-----------------------------------------------------------------------------+
|   Rectal bleeding  Hemorrhage of rectum and anus                            |
+-----------------------------------------------------------------------------+
|   Mixed hyperlipidemia                                                      |
+-----------------------------------------------------------------------------+
 documented in this encounter

## 2020-06-08 NOTE — XMS
Encounter Summary
  Created on: 2020
 
 Memo Deni Siu
 External Reference #: [id number]
 : 52
 Sex: Male
 
 Demographics
 
 
+-----------------------+---------------------------+
| Address               | [address]           |
|                       | [place]  [address] |
+-----------------------+---------------------------+
| Home Phone            | [phone]           |
+-----------------------+---------------------------+
| Preferred Language    | Unknown                   |
+-----------------------+---------------------------+
| Marital Status        |                    |
+-----------------------+---------------------------+
| Temple Affiliation | Unknown                   |
+-----------------------+---------------------------+
| Race                  | Unknown                   |
+-----------------------+---------------------------+
| Ethnic Group          | Unknown                   |
+-----------------------+---------------------------+
 
 
 Author
 
 
+--------------+--------------------------------------------+
| Author       | [facility]  |
|              | [facility] [place]                                |
+--------------+--------------------------------------------+
| Organization | [facility]  |
|              | [facility] [place]                                |
+--------------+--------------------------------------------+
| Address      | Unknown                                    |
+--------------+--------------------------------------------+
| Phone        | Unavailable                                |
+--------------+--------------------------------------------+
 
 
 
 Support
 
 
+---------------+--------------+---------+-----------------+
| Name          | Relationship | Address | Phone           |
+---------------+--------------+---------+-----------------+
| Sofiya A Brown | ECON         | Unknown | [phone] |
+---------------+--------------+---------+-----------------+
 
 
 
 Care Team Providers
 
 
 
+-------------------------+------+-----------------+
| Care Team Member Name   | Role | Phone           |
+-------------------------+------+-----------------+
| Ck Michelle MD | PCP  | [phone] |
+-------------------------+------+-----------------+
 
 
 
 Reason for Visit
 
 
+-----------+----------+
| Reason    | Comments |
+-----------+----------+
| Back Pain |          |
+-----------+----------+
 Evaluate & Treat (Routine)
 
+--------+--------+--------------+--------------+--------------+---------------+
| Status | Reason | Specialty    | Diagnoses /  | Referred By  | Referred To   |
|        |        |              | Procedures   | Contact      | Contact       |
+--------+--------+--------------+--------------+--------------+---------------+
| Closed |        | Neurosurgery |   Diagnoses  |   Sitz,      |   Dreyer,     |
|        |        |              |  Lumbago     | Ck      | Chacho AGUIRRE DO   |
|        |        |              | with         | MD Mike   | [address] |
|        |        |              | sciatica,    | [address]         |  [address]      |
|        |        |              | right side   | [place]    | [place], [place]   |
|        |        |              | Procedures   | [address]        | [address]  Phone: |
|        |        |              | [place] OFFICE    | Valerie,   |  [phone] |
|        |        |              | CONSULTATION | OR           |   Fax:        |
|        |        |              |  NEW/ESTAB   | [address]   | [phone]  |
|        |        |              | PATIENT 61   | Phone:       |               |
|        |        |              | MIN          | [phone] |               |
|        |        |              |              |   Fax:       |               |
|        |        |              |              | [phone] |               |
+--------+--------+--------------+--------------+--------------+---------------+
 
 
 
 
 Encounter Details
 
 
+--------+---------+----------------------+----------------------+----------------------+
| Date   | Type    | Department           | Care Team            | Description          |
+--------+---------+----------------------+----------------------+----------------------+
| / | Office  |   [facility]          |   Madhav Mccloud, | Osteoarthritis of    |
| 2017   | Visit   | NEUROSURGERY  [address]  |  PA-C  [address]  | spine with           |
|        |         | [address]     | [address]  [place]     | radiculopathy,       |
|        |         | [place], [place]      | [place] [address]      | lumbar region        |
|        |         | [address]           | [phone]         | (Primary Dx); Lumbar |
|        |         | [phone]         | [phone] (Fax)   |  stenosis; Lumbar    |
|        |         |                      |                      | radicular pain;      |
|        |         |                      |                      | Chronic midline low  |
|        |         |                      |                      | back pain with       |
|        |         |                      |                      | left-sided sciatica; |
|        |         |                      |                      |  History of lumbar   |
|        |         |                      |                      | laminectomy          |
 
+--------+---------+----------------------+----------------------+----------------------+
 
 
 
 Social History
 
 
+--------------+-------+-----------+--------+------+
| Tobacco Use  | Types | Packs/Day | Years  | Date |
|              |       |           | Used   |      |
+--------------+-------+-----------+--------+------+
| Never Smoker |       |           |        |      |
+--------------+-------+-----------+--------+------+
 
 
 
+---------------------+---+---+---+
| Smokeless Tobacco:  |   |   |   |
| Never Used          |   |   |   |
+---------------------+---+---+---+
 
 
 
+-------------+----------------------+---------+----------------------+
| Alcohol Use | Drinks/Week          | oz/Week | Comments             |
+-------------+----------------------+---------+----------------------+
| Yes         |   0 Standard drinks  | 0.0     | 1-2 drinks 2-4 times |
|             | or equivalent        |         |  per month           |
+-------------+----------------------+---------+----------------------+
 
 
 
+------------------+---------------+
| Sex Assigned at  | Date Recorded |
| Birth            |               |
+------------------+---------------+
| Not on file      |               |
+------------------+---------------+
 
 
 
+----------------+-------------+-------------+
| Job Start Date | Occupation  | Industry    |
+----------------+-------------+-------------+
| Not on file    | Not on file | Not on file |
+----------------+-------------+-------------+
 
 
 
+----------------+--------------+------------+
| Travel History | Travel Start | Travel End |
+----------------+--------------+------------+
 
 
 
+-------------------------------------+
| No recent travel history available. |
+-------------------------------------+
 documented as of this encounter
 
 
 Last Filed Vital Signs
 
 
+-------------------+-------------------+----------------------+----------+
| Vital Sign        | Reading           | Time Taken           | Comments |
+-------------------+-------------------+----------------------+----------+
| Blood Pressure    | 131/73            | 2017 10:15 AM  |          |
|                   |                   | PDT                  |          |
+-------------------+-------------------+----------------------+----------+
| Pulse             | 60                | 2017 10:15 AM  |          |
|                   |                   | PDT                  |          |
+-------------------+-------------------+----------------------+----------+
| Temperature       | -                 | -                    |          |
+-------------------+-------------------+----------------------+----------+
| Respiratory Rate  | -                 | -                    |          |
+-------------------+-------------------+----------------------+----------+
| Oxygen Saturation | -                 | -                    |          |
+-------------------+-------------------+----------------------+----------+
| Inhaled Oxygen    | -                 | -                    |          |
| Concentration     |                   |                      |          |
+-------------------+-------------------+----------------------+----------+
| Weight            | 106.1 kg (234 lb) | 2017 10:15 AM  |          |
|                   |                   | PDT                  |          |
+-------------------+-------------------+----------------------+----------+
| Height            | 190.5 cm (6' 3")  | 2017 10:15 AM  |          |
|                   |                   | PDT                  |          |
+-------------------+-------------------+----------------------+----------+
| Body Mass Index   | 29.25             | 2017 10:15 AM  |          |
|                   |                   | PDT                  |          |
+-------------------+-------------------+----------------------+----------+
 documented in this encounter
 
 Patient Instructions
 Patient Instructions Dreyer, Jason A, DO - 2017 11:16 AM PDTPlease think about surger
y and call if/when you would like to proceed.Electronically signed by Jason A Dreyer, DO at 
2017 11:16 AM PDT
 documented in this encounter
 
 Progress Notes
 Madhav Mccloud PA - 2017 10:19 AM PDTFormatting of this note might be different f
rom the original.
 Jason A. Dreyer, DO
 [address]
 [place] [address]
 [phone]
 FAX: [phone]  
 
 NEUROSURGERY HISTORY AND PHYSICAL EXAMINATION
 
 CHIEF COMPLAINT: 
 Chief Complaint 
 Patient presents with 
   Back Pain 
 
 HISTORY OF PRESENT ILLNESS:  The patient is a 67 y.o. male with the complaint of back pain 
symptoms that began 2016. Patient states he had lower back surgery in  and did v
huey well after surgery with no symptoms until a back and rib injury in . The patient april
cribes an [place]. Patient broke 11th rib when he fell and had
 a lot of local pain as a result of this.  After a lot of therapy, this improved but he has 
been left with unresolved low back pain and left leg pain as result of this.
 
 
 The symptoms have been unchanged for the last several months.  He rates the pain as moderat
e.  The symptoms are daily.  He describes the pain as tingling, shooting and tight band.  Mor sweet states his back pain is daily but intermittent. Patients pain level is 3-4/10 with mod
erate exertion. 
 
 The patient describes leg symptoms that occur on primarily on the left.  The leg symptoms a
ccount for 50% of his symptoms.  The leg symptoms are intermittent, and the symptoms travel 
from the back to the posterior buttock region and occasionally into the left leg.  The patie
nt also describes burning of the left buttock, weakness of the leg and foot drop.  The foot 
drop that he has is on his left foot and has been present since his surgery with Dr. Paredes
 in . Patient states he is able to walk 25-30 minutes with no discomfort, but he develop
s severe left buttock pain when driving even short distances.  Patient does state that doing
 2-3 hours of yardwork or result in pain level of 4-5/10 the following day and he will need 
to take a day off and rest.
 
 The patient does not report any change in bowel or bladder function recently.  
 
 His symptoms improve with changing position.
 
 His symptoms worsen with sitting, kneeling, bending and twisting.
 
 He has tried PT, TENS, Opioids, NSAIDS and Accupuncture.  The patient is currently taking o
pioids.  These measures are still helping. Patient states he started physical therapy 2017 after a long period of physical therapy his symptoms were improved. Patient does stil
l take half a Vicodin at nighttime on an as-needed basis but is usually able to control his 
pain with anti-inflammatories or couple of beers.  He did take his Vicodin and alcohol about
 2-3 weeks ago and stated he had significant black out-like symptoms and learned his less no
t to mix alcohol and pain medication.
 
 PAST MEDICAL HISTORY:
 Past Medical History 
 Diagnosis Date 
   Lumbago with sciatica, right side  
   Allergic rhinitis  
   Anxiety disorder  
   Atherosclerosis  
   BPH (benign prostatic hyperplasia)  
   Unspecified disorder of nose and nasal sinuses  
   HLD (hyperlipidemia)  
   Internal derangement of knee  
   Low back pain  
   Sleep apnea  
   nonorganic 
   Osteoarthritis, hand  
   Osteoarthrosis, unspecified whether generalized or localized, unspecified site  
 
 PAST SURGICAL HISTORY:
 Past Surgical History 
 Procedure Laterality Date 
   Lumbar discectomy   
   L1-3 
   Tonsillectomy and adenoidectomy   
   Knee surgery Right  
   [place] 
   Total knee arthroplasty Bilateral 2015 
   [place]  
   Ankle surgery Left 2007 
   [facility] 
   Sinus surgery  2010 
 
   Knee arthroscopy Left 2010 
   Ankle fusion Left  
   Sinus surgery  2015 
   Colonoscopy  2013 
 
 CURRENT MEDICATIONS:
 Current Outpatient Prescriptions 
 Medication Sig Dispense Refill 
   ASCORBIC ACID PO Take  by mouth.   
   aspirin 81 mg EC tablet Take 81 mg by mouth Daily.   
   atorvaSTATin (LIPITOR) 40 mg tablet Take 40 mg by mouth Daily.   
   B Complex Vitamins (VITAMIN B COMPLEX) tablet Take 1 tablet by mouth Daily.   
   cholecalciferol (VITAMIN D-3) 1,000 units tablet Take 1,000 Units by mouth Daily.   
   fexofenadine-pseudoePHEDrine (ALLEGRA-D ALLERGY & CONGESTION)  MG per tablet Take
 1 tablet by mouth Twice  daily as needed.   
   FLUoxetine (PROZAC) 20 mg capsule Take 20 mg by mouth Daily.   
   fluticasone (FLONASE) 50 mcg/nasal spray 1 spray by Nasal route Daily.   
   HYDROcodone-acetaminophen (NORCO) 5-325 mg per tablet Take 1 tablet by mouth every 6 ho
urs as needed.   
   metoprolol succinate (TOPROL-XL) 25 mg 24 hr tablet Take 25 mg by mouth Daily.   
   naproxen (NAPROSYN) 250 mg tablet Take 250 mg by mouth 2 times daily (with breakfast & 
dinner).   
 
 No current facility-administered medications for this visit. 
 
 ALLERGIES:
 Allergies 
 Allergen Reactions 
   Meperidine Other (See Comments) 
   Reaction not specified in outside medical records
  
 
 SOCIAL HISTORY:
 The patient  reports that he has never smoked. He has never used smokeless tobacco. He repo
rts that he drinks alcohol. He reports that he does not use illicit drugs.
 
 FAMILY HISTORY:
 Family History 
 Problem Relation Age of Onset 
   Multiple sclerosis Father  
   Coronary artery disease Father  
   Cancer Father  
   Multiple Myeloma 
   Lymphoma Mother  
   Rheum arthritis Mother  
   Cancer Mother  
   Pancreatic 
   Lupus Sister  
   Other (see comment) Sister  
   Meniere's disease 
   Cancer Maternal Grandfather  
   Cancer Maternal Grandmother  
   Heart disease Paternal Grandfather  
   No Known Problems Paternal Grandmother  
   No Known Problems Child  
   No Known Problems Child  
 
 REVIEW OF SYSTEMS:
 GENERALLY:   No fever, no night sweats, no anemia, no fatigue, no recent profound weight ch
anges.  
 
 EYES:  No eye problems, + use of corrective lenses, no eye injury, no double vision, no bli
ndness.
 EARS, NOSE, AND THROAT:  No changes in taste or smell, no hearing difficulty, no ringing in
 the ears, no ear drainage, no dizziness, no voice changes, no difficulty swallowing, no sig
nificant snoring, + sleep apnea, + sinus problems, no major dental work.
 NEUROLOGICALLY:  Please see the review of systems discussed above in the history of present
 illness.  In addition, the patient has coordination difficulty, back injury, pain in back.
 PSYCHIATRIC:  No depression, no sleep disorders, no anxiety, no bipolar disorder, no psycho
tic episodes.
 CARDIOVASCULAR:  No heart attacks, no heart murmur, no heart fluttering, no chest pain, no 
ankle swelling.  
 LUNG DISEASE:  No shortness of breath, no cough, no tuberculosis, no bloody cough,  no asth
ma, no emphysema/COPD.
 GASTROINTESTINAL:  No bowel disease, no nausea or vomiting, no rectal bleeding, no constipa
tion, no stool incontinence, no liver disease, no gallbladder disease, no abdominal pain, no
 ulcers.
 KIDNEY DISEASE:  No urinary frequency, no painful or difficult urination, no incontinence.
 ENDOCRINE:  No diabetes, no thyroid disease, no osteopenia or osteoporosis, no breast drain
age.  
 SKIN:  No breast lumps, no skin changes, no rashes, no itches.
 HEMATOLOGIC/LYMPHATIC:  No enlarged lymph nodes, no easy or unusual bleeding, no personal h
istory of cancer.
 RHEUMATOLOGIC:  No joint arthritis, no rheumatoid arthritis.
 
 PHYSICAL EXAMINATION:
 Blood pressure 131/73, pulse 60, height 1.905 m (6' 3"), weight 106.142 kg (234 lb). Body m
ass index is 29.25 kg/(m^2).
 
 GENERAL: Deni Hampton is in no acute distress with unlabored respirations.  The patien
t does not appear uncomfortable throughout the exam today.   
 HEENT:   Head: Normocephalic/atraumatic with no areas of recent trauma.  
 Eyes: Normal sclerae without icterus.  
 Ears: No drainage or tenderness.  
 Nasopharnyx: Clear without drainage.
 Oropharnyx: Clear without erythema.   
 NECK (ANTERIOR): Supple and without palpable masses. 
 CHEST: Clear to ausculation without crackles or wheeze. 
 HEART: Regular rate and rhythm without murmurs.   
 ABDOMEN: Soft, non-tender, non-distended, and without palpable masses. The patient is not o
bese.   
 SPINE: There is no tenderness of there cervical or thoracic spine.
 The lumbar spine shows there is tenderness in the midline of the L4, L5, S1 levels.  To pal
pation, there is no significant myofascial tenderness.  
 There is no significant pain to provacative testing of the SI joint.  
 There is no major deformity noted. 
 EXTREMITIES: No cyanosis, clubbing, or edema.  Distal pulses are palpable.   
 NEUROLOGICAL EXAM:
 
 MENTAL STATUS: 
 The patient is awake, alert, and oriented.  
 He follows simple and complex commands.
 His speech is fluent, he comprehends speech well, and he repeats well.  
 He has no apparent deficits with short or long term memory. 
 CRANIAL NERVES: 
 II: Acuity is intact.  Fields are full to confrontation. 
 III, IV, VI: The pupils are reactive.  Extraocular movements are intact.  No ptosis is note
d. 
 V: Facial sensation is intact and symmetric. 
 VII: Facial movements are symmetric. 
 VIII: Hearing is intact bilaterally. 
 
 IX, X: The uvula and palate move appropriately.   
 XI: Shrug is equal bilaterally. 
 XII: Tongue protrusion is midline. 
  
 MOTOR EXAM:
 
 (5 IS NORMAL)
 
 * Indicates pain limited 
 MUSCLE/   MOVEMENT: 
 RIGHT  
 LEFT 
 Deltoids 5 5 
 Biceps 5 5 
 Triceps 5 5 
 Wrist Flexion 5 5 
 Wrist Extension 5 5 
 Median Intrinsics 5 5 
 Ulnar Intrinsics 5 5 
  Strength 5 5 
 Hip Flexion 5 5 
 Hip Extension 5 5 
 Knee Flexion 5 5 
 Knee Extension 5 5 
 Dorsiflexion 5 3 
 Extensor Hallicus Longus 5 5 
 Plantarflexion 5 5 
  
 SENSORY EXAM: 
 Sensory exam shows left L5-type dysesthesia. 
 REFLEXES:
 
 (2 OR 2+ IS NORMAL) 
 REFLEX: 
 RIGHT 
 LEFT 
 BICEPS 2+ 2+ 
 BRACHIORADIALIS 2+ 2+ 
 TRICEPS 2+ 2+ 
 PATELLAR 2+ 2+ 
 ACHILLES 2+ 2+ 
 ZEPEDA'S ABSENT ABSENT 
 PLANTAR DOWNGOING DOWNGOING 
  
 GAIT: 
 Gait is steady. 
 PERIPHERAL NERVE/MISC: 
 Tinel is negative at the wrists and elbows bilaterally.
 Phalen is negative.
 Straight leg raise is negative bilaterally.
 Sammy's test of the hips is negative bilaterally. 
 
 TEST AND RADIOGRAPHIC REVIEW:
 The patient's imaging was reviewed in detail with the patient today during the visit.  The 
MRI of the lumbar spine from 4/3/17 demonstrates diffuse spondylosis. There are postoperativ
e changes L4-S1. There is severe lateral recess stenosis at L4-5 on the left.
 
 Lumbar x-rays show no major instability.  
 
 ASSESSMENT:
 
 NEUROSURGICAL DIAGNOSES:
 Encounter Diagnoses 
 Name Primary? 
   Osteoarthritis of spine with radiculopathy, lumbar region Yes 
   Lumbar stenosis  
   Lumbar radicular pain  
   Chronic midline low back pain with left-sided sciatica  
   History of lumbar laminectomy  
 
 GENERAL DIAGNOSES:
 Past Medical History 
 Diagnosis Date 
   Lumbago with sciatica, right side  
   Allergic rhinitis  
   Anxiety disorder  
   Atherosclerosis  
   BPH (benign prostatic hyperplasia)  
   Unspecified disorder of nose and nasal sinuses  
   HLD (hyperlipidemia)  
   Internal derangement of knee  
   Low back pain  
   Sleep apnea  
   nonorganic 
   Osteoarthritis, hand  
   Osteoarthrosis, unspecified whether generalized or localized, unspecified site  
 
 PLAN:
 
 Deni Hampton presented today, and it was a pleasure seeing this patient and assessing 
his neurologic problems.  
 
 The patient has spondylosis and severe lateral recess stenosis L4-5. This is likely contrib
uting to his back pain, leg pain, and leg weakness.
 
 I had a lengthy discussion with the patient about his options for care including surgical a
nd non-surgical options.  
 
 In discussing the surgical options, we discussed in detail the patient's options for TLIF L
4-5. This would be necessary as proper L5 nerve decompression would require complete removal
 of the facet joint.
 
 We discussed the risks, alternatives, and benefits to surgical intervention with Mr. Hampton 
in clinic.  These risks included but were not limited to death, stroke, heart attack, numbne
ss, weakness, paralysis, failure of fusion, failure of hardware, subsidence, adjacent segmen
t degeneration, cerebrospinal fluid leak, bleeding, infection, injury to surrounding tissues
 and organs, injury from positioning, injury to the nerves, difficulty with breathing, diffi
culty with swallowing, difficulty with voice change, and need for additional surgery.
 
 Surgical options were discussed and the technique to be employed was described in detail to
 him.  All his questions were answered.
 
 We discussed that the goal of the surgery is to prevent progression of his disease, but it 
is not considered a cure.  We also discussed that although some patients may obtain 100% sym
ptom relief, it is realistic to anticipate that some symptoms will continue postoperatively 
despite a successful surgery.  
 
 We also discussed that there is no guarantee that surgery will provide improvement in his c
ondition, and indeed may even worsen the symptoms.  We also discussed that in the course of 
the procedure the operative plan may be altered to include more, less, or different levels d
epending upon findings in order to provide him with the best possible outcome.
 
 
 I am prescribing a brace before surgery to improve his stability now to support his weak mu
scles and to reduce pain by restricting mobility.  
 
 He will think about the procedure and call if/when he would like to proceed.
 
 I, Jason Dreyer, saw this patient today, obtained pertinent history, performed a physical e
xam, provided the radiographic interpretation, and arrived at a plan that was agreeable to t
he patient. 
 
 ELECTRONICALLY SIGNED BY:  Jason A. Dreyer, DO, 2017 11:17
 
 Bea Tong assisted me in the transcription of this note in my presence today.  
 Electronically signed by MOR Orta at 2017 12:45 PM PDTdocumented in this 
encounter
 
 Plan of Treatment
 
 
+--------+---------+-----------+----------------------+-------------+
| Date   | Type    | Specialty | Care Team            | Description |
+--------+---------+-----------+----------------------+-------------+
| / | Office  | Neurology |   Ravindra Munoz MD  [address] |             |
|    | Visit   |           |  [facility]      |             |
|        |         |           | [place],  |             |
|        |         |           | [place] [address]             |             |
|        |         |           | [phone]         |             |
|        |         |           | [phone] (Fax)   |             |
+--------+---------+-----------+----------------------+-------------+
 documented as of this encounter
 
 Visit Diagnoses
 
 
+---------------------------------------------------------------------------------------+
| Diagnosis                                                                             |
+---------------------------------------------------------------------------------------+
|   Osteoarthritis of spine with radiculopathy, lumbar region - Primary                 |
+---------------------------------------------------------------------------------------+
|   Lumbar stenosis  Spinal stenosis, lumbar region, without neurogenic claudication    |
+---------------------------------------------------------------------------------------+
|   Lumbar radicular pain  Thoracic or lumbosacral neuritis or radiculitis, unspecified |
+---------------------------------------------------------------------------------------+
|   Chronic midline low back pain with left-sided sciatica                              |
+---------------------------------------------------------------------------------------+
|   History of lumbar laminectomy                                                       |
+---------------------------------------------------------------------------------------+
 documented in this encounter

## 2020-06-08 NOTE — XMS
Encounter Summary
  Created on: 2020
 
 Memo Deni Siu
 External Reference #: [id number]
 : 52
 Sex: Male
 
 Demographics
 
 
+-----------------------+---------------------------+
| Address               | [address]           |
|                       | [place]  [address] |
+-----------------------+---------------------------+
| Home Phone            | [phone]           |
+-----------------------+---------------------------+
| Preferred Language    | Unknown                   |
+-----------------------+---------------------------+
| Marital Status        |                    |
+-----------------------+---------------------------+
| Baptism Affiliation | Unknown                   |
+-----------------------+---------------------------+
| Race                  | Unknown                   |
+-----------------------+---------------------------+
| Ethnic Group          | Unknown                   |
+-----------------------+---------------------------+
 
 
 Author
 
 
+--------------+--------------------------------------------+
| Author       | [facility]erged with [facility]  |
|              | and [place]                                |
+--------------+--------------------------------------------+
| Organization | [facility] with [facility]  |
|              | and [place]                                |
+--------------+--------------------------------------------+
| Address      | Unknown                                    |
+--------------+--------------------------------------------+
| Phone        | Unavailable                                |
+--------------+--------------------------------------------+
 
 
 
 Support
 
 
+---------------+--------------+---------+-----------------+
| Name          | Relationship | Address | Phone           |
+---------------+--------------+---------+-----------------+
| Sofiya A Brown | ECON         | Unknown | [phone] |
+---------------+--------------+---------+-----------------+
 
 
 
 Care Team Providers
 
 
 
+-------------------------+------+-----------------+
| Care Team Member Name   | Role | Phone           |
+-------------------------+------+-----------------+
| Ck Michelle MD | PCP  | [phone] |
+-------------------------+------+-----------------+
 
 
 
 Encounter Details
 
 
+--------+-------------+---------------------+----------------------+-------------+
| Date   | Type        | Department          | Care Team            | Description |
+--------+-------------+---------------------+----------------------+-------------+
| / | Orders Only |   [facility]   |   Shehzadi, Romana,  |             |
| 2019   |             | [address]      | MD  [address]    |             |
|        |             | [place]        | [place] [address]   |             |
|        |             | [address]          | [phone]         |             |
|        |             | [phone]        | [phone] (Fax)   |             |
+--------+-------------+---------------------+----------------------+-------------+
 
 
 
 Social History
 
 
+--------------+-------+-----------+--------+------+
| Tobacco Use  | Types | Packs/Day | Years  | Date |
|              |       |           | Used   |      |
+--------------+-------+-----------+--------+------+
| Never Smoker |       |           |        |      |
+--------------+-------+-----------+--------+------+
 
 
 
+---------------------+---+---+---+
| Smokeless Tobacco:  |   |   |   |
| Never Used          |   |   |   |
+---------------------+---+---+---+
 
 
 
+-------------+----------------------+---------+--------------+
| Alcohol Use | Drinks/Week          | oz/Week | Comments     |
+-------------+----------------------+---------+--------------+
| No          |   0 Standard drinks  | 0.0     | Alcoholic    |
|             | or equivalent        |         | Drinks/day:  |
|             |                      |         | Occasionally |
+-------------+----------------------+---------+--------------+
 
 
 
+------------------+---------------+
| Sex Assigned at  | Date Recorded |
| Birth            |               |
+------------------+---------------+
| Not on file      |               |
+------------------+---------------+
 
 
 
 
+----------------+-------------+-------------+
| Job Start Date | Occupation  | Industry    |
+----------------+-------------+-------------+
| Not on file    | Not on file | Not on file |
+----------------+-------------+-------------+
 
 
 
+----------------+--------------+------------+
| Travel History | Travel Start | Travel End |
+----------------+--------------+------------+
 
 
 
+-------------------------------------+
| No recent travel history available. |
+-------------------------------------+
 documented as of this encounter
 
 Plan of Treatment
 
 
+--------+---------+-----------+----------------------+-------------+
| Date   | Type    | Specialty | Care Team            | Description |
+--------+---------+-----------+----------------------+-------------+
| / | Office  | Neurology |   Ravindra Munoz MD  [address] |             |
| 2020   | Visit   |           |  [address]      |             |
|        |         |           | [address]  [address]  |             |
|        |         |           | [place] [address]             |             |
|        |         |           | [phone]         |             |
|        |         |           | [phone] (Fax)   |             |
+--------+---------+-----------+----------------------+-------------+
 documented as of this encounter
 
 Procedures
 
 
+----------------+--------+-------------+----------------------+----------------------+
| Procedure Name | Priori | Date/Time   | Associated Diagnosis | Comments             |
|                | ty     |             |                      |                      |
+----------------+--------+-------------+----------------------+----------------------+
| VITAMIN B-12   | Routin | 2019  |                      |   Results for this   |
|                | e      | 10:04 AM    |                      | procedure are in the |
|                |        | PDT         |                      |  results section.    |
+----------------+--------+-------------+----------------------+----------------------+
| FOLATE         | Routin | 2019  |                      |   Results for this   |
|                | e      | 10:04 AM    |                      | procedure are in the |
|                |        | PDT         |                      |  results section.    |
+----------------+--------+-------------+----------------------+----------------------+
 documented in this encounter
 
 Results
 Folate (2019 10:04 AM PDT)
 
+-----------+-------+-----------+------------+--------------+
| Component | Value | Ref Range | Performed  | Pathologist  |
|           |       |           | At         | Signature    |
 
+-----------+-------+-----------+------------+--------------+
| Folate    | >24.0 | ng/mL     | EXTERNAL   |              |
|           |       |           | LAB        |              |
+-----------+-------+-----------+------------+--------------+
 
 
 
+-----------------+
| Specimen        |
+-----------------+
| Blood specimen  |
| (specimen)      |
+-----------------+
 
 
 
+----------------+---------+--------------------+--------------+
| Performing     | Address | City/State/Zipcode | Phone Number |
| Organization   |         |                    |              |
+----------------+---------+--------------------+--------------+
|   EXTERNAL LAB |         |                    |              |
+----------------+---------+--------------------+--------------+
 Vitamin B-12  10:04 AM PDT)
 
+-----------+-------+--------------+------------+--------------+
| Component | Value | Ref Range    | Performed  | Pathologist  |
|           |       |              | At         | Signature    |
+-----------+-------+--------------+------------+--------------+
| VITAMIN   | 909   | 254 - 1,320  | EXTERNAL   |              |
| B-12      |       | pg/mL        | LAB        |              |
+-----------+-------+--------------+------------+--------------+
 
 
 
+-----------------+
| Specimen        |
+-----------------+
| Blood specimen  |
| (specimen)      |
+-----------------+
 
 
 
+----------------+---------+--------------------+--------------+
| Performing     | Address | City/State/Zipcode | Phone Number |
| Organization   |         |                    |              |
+----------------+---------+--------------------+--------------+
|   EXTERNAL LAB |         |                    |              |
+----------------+---------+--------------------+--------------+
 documented in this encounter
 
 Visit Diagnoses
 Not on filedocumented in this encounter

## 2020-06-08 NOTE — XMS
Encounter Summary
  Created on: 2020
 
 Memo Deni Siu
 External Reference #: [id number]
 : 52
 Sex: Male
 
 Demographics
 
 
+-----------------------+---------------------------+
| Address               | [address]           |
|                       | [place]  [address] |
+-----------------------+---------------------------+
| Home Phone            | [phone]           |
+-----------------------+---------------------------+
| Preferred Language    | Unknown                   |
+-----------------------+---------------------------+
| Marital Status        |                    |
+-----------------------+---------------------------+
| Pentecostal Affiliation | Unknown                   |
+-----------------------+---------------------------+
| Race                  | Unknown                   |
+-----------------------+---------------------------+
| Ethnic Group          | Unknown                   |
+-----------------------+---------------------------+
 
 
 Author
 
 
+--------------+--------------------------------------------+
| Author       | [facility]  |
|              | and [place]                                |
+--------------+--------------------------------------------+
| Organization | [facility]  |
|              | and [place]                                |
+--------------+--------------------------------------------+
| Address      | Unknown                                    |
+--------------+--------------------------------------------+
| Phone        | Unavailable                                |
+--------------+--------------------------------------------+
 
 
 
 Support
 
 
+---------------+--------------+---------+-----------------+
| Name          | Relationship | Address | Phone           |
+---------------+--------------+---------+-----------------+
| Sofiya A Brown | ECON         | Unknown | [phone] |
+---------------+--------------+---------+-----------------+
 
 
 
 Care Team Providers
 
 
 
+-----------------------+------+-----------------+
| Care Team Member Name | Role | Phone           |
+-----------------------+------+-----------------+
| Emely Hinton    | PCP  | [phone] |
+-----------------------+------+-----------------+
 
 
 
 Encounter Details
 
 
+--------+----------+----------------------+----------------------+-------------+
| Date   | Type     | Department           | Care Team            | Description |
+--------+----------+----------------------+----------------------+-------------+
| / | Imaging  |   [facility] |   Provider,          |             |
| 2020   | Exam     |  [facility]    | MD Izabella  [address] |             |
|        |          | IMAGING  [address]       |  [address]        |             |
|        |          | [address]  [place]     | [place] [address]     |             |
|        |          | [place] [address] |                      |             |
|        |          |   [phone]       |                      |             |
+--------+----------+----------------------+----------------------+-------------+
 
 
 
 Social History
 
 
+--------------+-------+-----------+--------+------+
| Tobacco Use  | Types | Packs/Day | Years  | Date |
|              |       |           | Used   |      |
+--------------+-------+-----------+--------+------+
| Never Smoker |       |           |        |      |
+--------------+-------+-----------+--------+------+
 
 
 
+---------------------+---+---+---+
| Smokeless Tobacco:  |   |   |   |
| Never Used          |   |   |   |
+---------------------+---+---+---+
 
 
 
+-------------+----------------------+---------+----------------------+
| Alcohol Use | Drinks/Week          | oz/Week | Comments             |
+-------------+----------------------+---------+----------------------+
| No          |   0 Standard drinks  | 0.0     | No longer drinking   |
|             | or equivalent        |         | 1-2 drinks 2-4 times |
|             |                      |         |  a month             |
+-------------+----------------------+---------+----------------------+
 
 
 
+------------------+---------------+
| Sex Assigned at  | Date Recorded |
| Birth            |               |
+------------------+---------------+
| Not on file      |               |
 
+------------------+---------------+
 
 
 
+----------------+-------------+-------------+
| Job Start Date | Occupation  | Industry    |
+----------------+-------------+-------------+
| Not on file    | Not on file | Not on file |
+----------------+-------------+-------------+
 
 
 
+----------------+--------------+------------+
| Travel History | Travel Start | Travel End |
+----------------+--------------+------------+
 
 
 
+-------------------------------------+
| No recent travel history available. |
+-------------------------------------+
 documented as of this encounter
 
 Plan of Treatment
 
 
+--------+---------+-----------+----------------------+-------------+
| Date   | Type    | Specialty | Care Team            | Description |
+--------+---------+-----------+----------------------+-------------+
| / | Office  | Neurology |   Ravindra Munoz MD  [address] |             |
| 2020   | Visit   |           |  [address]      |             |
|        |         |           | [place]  |             |
|        |         |           | [place] [address]             |             |
|        |         |           | [phone]         |             |
|        |         |           | [phone] (Fax)   |             |
+--------+---------+-----------+----------------------+-------------+
 documented as of this encounter
 
 Procedures
 
 
+----------------------+--------+-------------+----------------------+----------------------
+
| Procedure Name       | Priori | Date/Time   | Associated Diagnosis | Comments             
|
|                      | ty     |             |                      |                      
|
+----------------------+--------+-------------+----------------------+----------------------
+
| XR LUMBAR SPINE 2 OR | Routin | 2018  |                      |   Results for this   
|
|  3 VW                | e      | 12:00 AM    |                      | procedure are in the 
|
|                      |        | PDT         |                      |  results section.    
|
+----------------------+--------+-------------+----------------------+----------------------
+
 documented in this encounter
 
 Results
 
 XR Lumbar Spine 2 or 3 Vw (2018 12:00 AM PDT)
 
+----------+
| Specimen |
+----------+
|          |
+----------+
 
 
 
+-----------------------------------------+---------------+
| Narrative                               | Performed At  |
+-----------------------------------------+---------------+
|   External films for comparison only    |   [facility] |
|                                         |               |
|  No results will be in the chart.       |               |
+-----------------------------------------+---------------+
 
 
 
+---------------+---------+--------------------+--------------+
| Performing    | Address | City/State/Zipcode | Phone Number |
| Organization  |         |                    |              |
+---------------+---------+--------------------+--------------+
|   [facility] |         |                    |              |
+---------------+---------+--------------------+--------------+
 documented in this encounter
 
 Visit Diagnoses
 Not on filedocumented in this encounter

## 2020-06-08 NOTE — XMS
Encounter Summary
  Created on: 2020
 
 Memo Deni Sui
 External Reference #: [id number]
 : 52
 Sex: Male
 
 Demographics
 
 
+-----------------------+---------------------------+
| Address               | [address]           |
|                       | [place]  [address] |
+-----------------------+---------------------------+
| Home Phone            | [phone]           |
+-----------------------+---------------------------+
| Preferred Language    | Unknown                   |
+-----------------------+---------------------------+
| Marital Status        |                    |
+-----------------------+---------------------------+
| Scientology Affiliation | Unknown                   |
+-----------------------+---------------------------+
| Race                  | Unknown                   |
+-----------------------+---------------------------+
| Ethnic Group          | Unknown                   |
+-----------------------+---------------------------+
 
 
 Author
 
 
+--------------+--------------------------------------------+
| Author       | [facility]  |
|              | and [place]                                |
+--------------+--------------------------------------------+
| Organization | [facility]  |
|              | and [place]                                |
+--------------+--------------------------------------------+
| Address      | Unknown                                    |
+--------------+--------------------------------------------+
| Phone        | Unavailable                                |
+--------------+--------------------------------------------+
 
 
 
 Support
 
 
+---------------+--------------+---------+-----------------+
| Name          | Relationship | Address | Phone           |
+---------------+--------------+---------+-----------------+
| Sofiya A Brown | ECON         | Unknown | [phone] |
+---------------+--------------+---------+-----------------+
 
 
 
 Care Team Providers
 
 
 
+-------------------------+------+-----------------+
| Care Team Member Name   | Role | Phone           |
+-------------------------+------+-----------------+
| Ck Michelle MD | PCP  | [phone] |
+-------------------------+------+-----------------+
 
 
 
 Reason for Visit
 
 
+-------------+-----------------+
| Reason      | Comments        |
+-------------+-----------------+
| Pre-op Exam | TLIF on 17 |
+-------------+-----------------+
 
 
 
 Encounter Details
 
 
+--------+-----------+----------------------+----------------------+----------------------+
| Date   | Type      | Department           | Care Team            | Description          |
+--------+-----------+----------------------+----------------------+----------------------+
| / | Clinical  |   [facility]          |   Dreyer, Jason A,   | Lumbago with         |
| 2017   | Support   | NEUROSURGERY  [address]  | DO  [address]    | sciatica, right side |
|        |           | [address]     | [address]  [place] |  (Primary Dx); Low   |
|        |           | [place], [place]      |  [address]  [phone] | back pain,           |
|        |           | [address]           |   [phone] (Fax) | unspecified back     |
|        |           | [phone]         |                      | pain laterality,     |
|        |           |                      |                      | unspecified          |
|        |           |                      |                      | chronicity, with     |
|        |           |                      |                      | sciatica presence    |
|        |           |                      |                      | unspecified;         |
|        |           |                      |                      | Osteoarthritis of    |
|        |           |                      |                      | spine with           |
|        |           |                      |                      | radiculopathy,       |
|        |           |                      |                      | lumbar region;       |
|        |           |                      |                      | Spinal stenosis,     |
|        |           |                      |                      | lumbar region,       |
|        |           |                      |                      | without neurogenic   |
|        |           |                      |                      | claudication; Lumbar |
|        |           |                      |                      |  radiculopathy;      |
|        |           |                      |                      | Chronic left-sided   |
|        |           |                      |                      | low back pain with   |
|        |           |                      |                      | left-sided sciatica; |
|        |           |                      |                      |  Lumbar stenosis;    |
|        |           |                      |                      | Lumbar radicular     |
|        |           |                      |                      | pain; Chronic        |
|        |           |                      |                      | midline low back     |
|        |           |                      |                      | pain with left-sided |
|        |           |                      |                      |  sciatica; History   |
|        |           |                      |                      | of lumbar            |
|        |           |                      |                      | laminectomy; Back    |
|        |           |                      |                      | pain, unspecified    |
|        |           |                      |                      | back location,       |
|        |           |                      |                      | unspecified back     |
 
|        |           |                      |                      | pain laterality,     |
|        |           |                      |                      | unspecified          |
|        |           |                      |                      | chronicity           |
+--------+-----------+----------------------+----------------------+----------------------+
 
 
 
 Social History
 
 
+--------------+-------+-----------+--------+------+
| Tobacco Use  | Types | Packs/Day | Years  | Date |
|              |       |           | Used   |      |
+--------------+-------+-----------+--------+------+
| Never Smoker |       |           |        |      |
+--------------+-------+-----------+--------+------+
 
 
 
+---------------------+---+---+---+
| Smokeless Tobacco:  |   |   |   |
| Never Used          |   |   |   |
+---------------------+---+---+---+
 
 
 
+-------------+----------------------+---------+----------------------+
| Alcohol Use | Drinks/Week          | oz/Week | Comments             |
+-------------+----------------------+---------+----------------------+
| Yes         |   0 Standard drinks  | 0.0     | 1-2 drinks 2-4 times |
|             | or equivalent        |         |  per month           |
+-------------+----------------------+---------+----------------------+
 
 
 
+------------------+---------------+
| Sex Assigned at  | Date Recorded |
| Birth            |               |
+------------------+---------------+
| Not on file      |               |
+------------------+---------------+
 
 
 
+----------------+-------------+-------------+
| Job Start Date | Occupation  | Industry    |
+----------------+-------------+-------------+
| Not on file    | Not on file | Not on file |
+----------------+-------------+-------------+
 
 
 
+----------------+--------------+------------+
| Travel History | Travel Start | Travel End |
+----------------+--------------+------------+
 
 
 
+-------------------------------------+
| No recent travel history available. |
 
+-------------------------------------+
 documented as of this encounter
 
 Last Filed Vital Signs
 
 
+-------------------+-------------------+----------------------+----------+
| Vital Sign        | Reading           | Time Taken           | Comments |
+-------------------+-------------------+----------------------+----------+
| Blood Pressure    | 126/70            | 2017 10:29 AM  |          |
|                   |                   | PDT                  |          |
+-------------------+-------------------+----------------------+----------+
| Pulse             | 56                | 2017 10:29 AM  |          |
|                   |                   | PDT                  |          |
+-------------------+-------------------+----------------------+----------+
| Temperature       | -                 | -                    |          |
+-------------------+-------------------+----------------------+----------+
| Respiratory Rate  | 16                | 2017 10:29 AM  |          |
|                   |                   | PDT                  |          |
+-------------------+-------------------+----------------------+----------+
| Oxygen Saturation | -                 | -                    |          |
+-------------------+-------------------+----------------------+----------+
| Inhaled Oxygen    | -                 | -                    |          |
| Concentration     |                   |                      |          |
+-------------------+-------------------+----------------------+----------+
| Weight            | 103.4 kg (228 lb) | 2017 10:29 AM  |          |
|                   |                   | PDT                  |          |
+-------------------+-------------------+----------------------+----------+
| Height            | 190.5 cm (6' 3")  | 2017 10:29 AM  |          |
|                   |                   | PDT                  |          |
+-------------------+-------------------+----------------------+----------+
| Body Mass Index   | 28.5              | 2017 10:29 AM  |          |
|                   |                   | PDT                  |          |
+-------------------+-------------------+----------------------+----------+
 documented in this encounter
 
 Progress Notes
 Keisha Soto RN - 2017 10:00 AM PDTPatient in office for pre op appointment. Brigitte
ent advised at this time to stop: naproxen (7 days prior/90 days after); vit c, aspirin, b c
omplex (7 days prior). Patient verbalized understanding. All questions answered at this time
. 
 I Keisha Soto RN witnessed the patient leaving the office with a LSO which was provided
 by a representative of [facility].
 
 Keisha Soto RN
 
 Electronically signed by Keisha Soto RN at 2017 11:23 AM PDTdocumented in this en
counter
 
 Plan of Treatment
 
 
+--------+---------+-----------+----------------------+-------------+
| Date   | Type    | Specialty | Care Team            | Description |
+--------+---------+-----------+----------------------+-------------+
| / | Office  | Neurology |   Ravindra Munoz MD  [address] |             |
| 2020   | Visit   |           |  [facility]      |             |
|        |         |           | [place]  |             |
|        |         |           | [place] [address]             |             |
|        |         |           | [phone]         |             |
 
|        |         |           | [phone] (Fax)   |             |
+--------+---------+-----------+----------------------+-------------+
 documented as of this encounter
 
 Visit Diagnoses
 
 
+------------------------------------------------------------------------------------------+
| Diagnosis                                                                                |
+------------------------------------------------------------------------------------------+
|   Lumbago with sciatica, right side - Primary                                            |
+------------------------------------------------------------------------------------------+
|   Low back pain, unspecified back pain laterality, unspecified chronicity, with sciatica |
|  presence unspecified                                                                    |
+------------------------------------------------------------------------------------------+
|   Osteoarthritis of spine with radiculopathy, lumbar region                              |
+------------------------------------------------------------------------------------------+
|   Spinal stenosis, lumbar region, without neurogenic claudication                        |
+------------------------------------------------------------------------------------------+
|   Lumbar radiculopathy  Thoracic or lumbosacral neuritis or radiculitis, unspecified     |
+------------------------------------------------------------------------------------------+
|   Chronic left-sided low back pain with left-sided sciatica                              |
+------------------------------------------------------------------------------------------+
|   Lumbar radicular pain  Thoracic or lumbosacral neuritis or radiculitis, unspecified    |
+------------------------------------------------------------------------------------------+
|   Chronic midline low back pain with left-sided sciatica                                 |
+------------------------------------------------------------------------------------------+
|   History of lumbar laminectomy                                                          |
+------------------------------------------------------------------------------------------+
|   Back pain, unspecified back location, unspecified back pain laterality, unspecified    |
| chronicity                                                                               |
+------------------------------------------------------------------------------------------+
 documented in this encounter

## 2020-06-08 NOTE — XMS
Encounter Summary
  Created on: 2020
 
 Memo Deni Siu
 External Reference #: [id number]
 : 52
 Sex: Male
 
 Demographics
 
 
+-----------------------+---------------------------+
| Address               | [address]           |
|                       | [place]  [address] |
+-----------------------+---------------------------+
| Home Phone            | [phone]           |
+-----------------------+---------------------------+
| Preferred Language    | Unknown                   |
+-----------------------+---------------------------+
| Marital Status        |                    |
+-----------------------+---------------------------+
| Latter-day Affiliation | Unknown                   |
+-----------------------+---------------------------+
| Race                  | Unknown                   |
+-----------------------+---------------------------+
| Ethnic Group          | Unknown                   |
+-----------------------+---------------------------+
 
 
 Author
 
 
+--------------+--------------------------------------------+
| Author       | [facility]  |
|              | and [place]                                |
+--------------+--------------------------------------------+
| Organization | [facility]  |
|              | and [place]                                |
+--------------+--------------------------------------------+
| Address      | Unknown                                    |
+--------------+--------------------------------------------+
| Phone        | Unavailable                                |
+--------------+--------------------------------------------+
 
 
 
 Support
 
 
+---------------+--------------+---------+-----------------+
| Name          | Relationship | Address | Phone           |
+---------------+--------------+---------+-----------------+
| Sofiya A Brown | ECON         | Unknown | [phone] |
+---------------+--------------+---------+-----------------+
 
 
 
 Care Team Providers
 
 
 
+-------------------------+------+-----------------+
| Care Team Member Name   | Role | Phone           |
+-------------------------+------+-----------------+
| Ck Michelle MD | PCP  | [phone] |
+-------------------------+------+-----------------+
 
 
 
 Encounter Details
 
 
+--------+----------+----------------------+-------------------+-------------+
| Date   | Type     | Department           | Care Team         | Description |
+--------+----------+----------------------+-------------------+-------------+
| / | Episode  |   [facility] [place]          |   Cynthia Velasquez,  |             |
| 2017   | Changes  | NEUROSURGERY  [address]  | Cert [place]           |             |
|        |          | [address]     |                   |             |
|        |          | [place]      |                   |             |
|        |          | [address]           |                   |             |
|        |          | [phone]         |                   |             |
+--------+----------+----------------------+-------------------+-------------+
 
 
 
 Social History
 
 
+--------------+-------+-----------+--------+------+
| Tobacco Use  | Types | Packs/Day | Years  | Date |
|              |       |           | Used   |      |
+--------------+-------+-----------+--------+------+
| Never Smoker |       |           |        |      |
+--------------+-------+-----------+--------+------+
 
 
 
+---------------------+---+---+---+
| Smokeless Tobacco:  |   |   |   |
| Never Used          |   |   |   |
+---------------------+---+---+---+
 
 
 
+-------------+----------------------+---------+----------------------+
| Alcohol Use | Drinks/Week          | oz/Week | Comments             |
+-------------+----------------------+---------+----------------------+
| Yes         |   0 Standard drinks  | 0.0     | 1-2 drinks 2-4 times |
|             | or equivalent        |         |  per month           |
+-------------+----------------------+---------+----------------------+
 
 
 
+------------------+---------------+
| Sex Assigned at  | Date Recorded |
| Birth            |               |
+------------------+---------------+
| Not on file      |               |
+------------------+---------------+
 
 
 
 
+----------------+-------------+-------------+
| Job Start Date | Occupation  | Industry    |
+----------------+-------------+-------------+
| Not on file    | Not on file | Not on file |
+----------------+-------------+-------------+
 
 
 
+----------------+--------------+------------+
| Travel History | Travel Start | Travel End |
+----------------+--------------+------------+
 
 
 
+-------------------------------------+
| No recent travel history available. |
+-------------------------------------+
 documented as of this encounter
 
 Plan of Treatment
 
 
+--------+---------+-----------+----------------------+-------------+
| Date   | Type    | Specialty | Care Team            | Description |
+--------+---------+-----------+----------------------+-------------+
| / | Office  | Neurology |   Ravindra Munoz MD  [address] |             |
| 2020   | Visit   |           |  [address]      |             |
|        |         |           | [place]  |             |
|        |         |           | [place] [address]             |             |
|        |         |           | [phone]         |             |
|        |         |           | [phone] (Fax)   |             |
+--------+---------+-----------+----------------------+-------------+
 documented as of this encounter
 
 Visit Diagnoses
 Not on filedocumented in this encounter

## 2020-06-08 NOTE — XMS
Encounter Summary
  Created on: 2020
 
 Memo Deni Siu
 External Reference #: [id number]
 : 52
 Sex: Male
 
 Demographics
 
 
+-----------------------+---------------------------+
| Address               | [address]           |
|                       | [place]  [address] |
+-----------------------+---------------------------+
| Home Phone            | [phone]           |
+-----------------------+---------------------------+
| Preferred Language    | Unknown                   |
+-----------------------+---------------------------+
| Marital Status        |                    |
+-----------------------+---------------------------+
| Mandaeism Affiliation | Unknown                   |
+-----------------------+---------------------------+
| Race                  | Unknown                   |
+-----------------------+---------------------------+
| Ethnic Group          | Unknown                   |
+-----------------------+---------------------------+
 
 
 Author
 
 
+--------------+--------------------------------------------+
| Author       | [facility]  |
|              | [facility] [place]                                |
+--------------+--------------------------------------------+
| Organization | [facility]  |
|              | [facility] [place]                                |
+--------------+--------------------------------------------+
| Address      | Unknown                                    |
+--------------+--------------------------------------------+
| Phone        | Unavailable                                |
+--------------+--------------------------------------------+
 
 
 
 Support
 
 
+---------------+--------------+---------+-----------------+
| Name          | Relationship | Address | Phone           |
+---------------+--------------+---------+-----------------+
| Sofiya A Brown | ECON         | Unknown | [phone] |
+---------------+--------------+---------+-----------------+
 
 
 
 Care Team Providers
 
 
 
+-------------------------+------+-----------------+
| Care Team Member Name   | Role | Phone           |
+-------------------------+------+-----------------+
| Ck Michelle MD | PCP  | [phone] |
+-------------------------+------+-----------------+
 
 
 
 Reason for Visit
 
 
+---------+----------+
| Reason  | Comments |
+---------+----------+
| Post Op | PO CALL  |
+---------+----------+
 
 
 
 Encounter Details
 
 
+--------+-----------+----------------------+----------------------+--------------------+
| Date   | Type      | Department           | Care Team            | Description        |
+--------+-----------+----------------------+----------------------+--------------------+
| / | Telephone |   [facility]          |   Dreyer, Jason A,   | Post Op (PO CALL ) |
| 2017   |           | NEUROSURGERY  [address]  | DO  [address]    |                    |
|        |           | [address]     | [address]  [place], [place] |                    |
|        |           | [place]      |  [address]  [phone] |                    |
|        |           | [address]           |   [phone] (Fax) |                    |
|        |           | [phone]         |                      |                    |
+--------+-----------+----------------------+----------------------+--------------------+
 
 
 
 Social History
 
 
+--------------+-------+-----------+--------+------+
| Tobacco Use  | Types | Packs/Day | Years  | Date |
|              |       |           | Used   |      |
+--------------+-------+-----------+--------+------+
| Never Smoker |       |           |        |      |
+--------------+-------+-----------+--------+------+
 
 
 
+---------------------+---+---+---+
| Smokeless Tobacco:  |   |   |   |
| Never Used          |   |   |   |
+---------------------+---+---+---+
 
 
 
+-------------+----------------------+---------+----------------------+
| Alcohol Use | Drinks/Week          | oz/Week | Comments             |
+-------------+----------------------+---------+----------------------+
| Yes         |   0 Standard drinks  | 0.0     | 1-2 drinks 2-4 times |
 
|             | or equivalent        |         |  per month           |
+-------------+----------------------+---------+----------------------+
 
 
 
+------------------+---------------+
| Sex Assigned at  | Date Recorded |
| Birth            |               |
+------------------+---------------+
| Not on file      |               |
+------------------+---------------+
 
 
 
+----------------+-------------+-------------+
| Job Start Date | Occupation  | Industry    |
+----------------+-------------+-------------+
| Not on file    | Not on file | Not on file |
+----------------+-------------+-------------+
 
 
 
+----------------+--------------+------------+
| Travel History | Travel Start | Travel End |
+----------------+--------------+------------+
 
 
 
+-------------------------------------+
| No recent travel history available. |
+-------------------------------------+
 documented as of this encounter
 
 Plan of Treatment
 
 
+--------+---------+-----------+----------------------+-------------+
| Date   | Type    | Specialty | Care Team            | Description |
+--------+---------+-----------+----------------------+-------------+
| / | Office  | Neurology |   Ravindra Munoz MD  [address] |             |
| 2020   | Visit   |           |  [address]      |             |
|        |         |           | [place]  |             |
|        |         |           | [place] [address]             |             |
|        |         |           | [phone]         |             |
|        |         |           | [phone] (Fax)   |             |
+--------+---------+-----------+----------------------+-------------+
 documented as of this encounter
 
 Visit Diagnoses
 Not on filedocumented in this encounter

## 2020-06-08 NOTE — XMS
Encounter Summary
  Created on: 2020
 
 Memo Deni Siu
 External Reference #: [id number]
 : 52
 Sex: Male
 
 Demographics
 
 
+-----------------------+---------------------------+
| Address               | [address]           |
|                       | [place]  [address] |
+-----------------------+---------------------------+
| Home Phone            | [phone]           |
+-----------------------+---------------------------+
| Preferred Language    | Unknown                   |
+-----------------------+---------------------------+
| Marital Status        |                    |
+-----------------------+---------------------------+
| Rastafari Affiliation | Unknown                   |
+-----------------------+---------------------------+
| Race                  | Unknown                   |
+-----------------------+---------------------------+
| Ethnic Group          | Unknown                   |
+-----------------------+---------------------------+
 
 
 Author
 
 
+--------------+--------------------------------------------+
| Author       | [facility]  |
|              | and [place]                                |
+--------------+--------------------------------------------+
| Organization | [facility]  |
|              | and [place]                                |
+--------------+--------------------------------------------+
| Address      | Unknown                                    |
+--------------+--------------------------------------------+
| Phone        | Unavailable                                |
+--------------+--------------------------------------------+
 
 
 
 Support
 
 
+---------------+--------------+---------+-----------------+
| Name          | Relationship | Address | Phone           |
+---------------+--------------+---------+-----------------+
| Sofiya A Brown | ECON         | Unknown | [phone] |
+---------------+--------------+---------+-----------------+
 
 
 
 Care Team Providers
 
 
 
+-------------------------+------+-----------------+
| Care Team Member Name   | Role | Phone           |
+-------------------------+------+-----------------+
| Ck Michelle MD | PCP  | [phone] |
+-------------------------+------+-----------------+
 
 
 
 Encounter Details
 
 
+--------+-----------+----------------------+----------------------+----------------------+
| Date   | Type      | Department           | Care Team            | Description          |
+--------+-----------+----------------------+----------------------+----------------------+
| / | Preadmit  |   [facility] |   Dreyer, Jason A,   | Preoperative         |
| 2017   | Visit     |  [facility]    | DO  [address]    | clearance (Primary   |
|        |           | CLINIC  [address] | [address]  [place], [place] | Dx); Lumbago with    |
|        |           |   [place], [place]    |  [address]  [phone] | sciatica, right      |
|        |           | [address]           |   [phone] (Fax) | side; Low back pain, |
|        |           | [phone]         |                      |  unspecified back    |
|        |           |                      |                      | pain laterality,     |
|        |           |                      |                      | unspecified          |
|        |           |                      |                      | chronicity, with     |
|        |           |                      |                      | sciatica presence    |
|        |           |                      |                      | unspecified;         |
|        |           |                      |                      | Atherosclerosis      |
+--------+-----------+----------------------+----------------------+----------------------+
 
 
 
 Social History
 
 
+--------------+-------+-----------+--------+------+
| Tobacco Use  | Types | Packs/Day | Years  | Date |
|              |       |           | Used   |      |
+--------------+-------+-----------+--------+------+
| Never Smoker |       |           |        |      |
+--------------+-------+-----------+--------+------+
 
 
 
+---------------------+---+---+---+
| Smokeless Tobacco:  |   |   |   |
| Never Used          |   |   |   |
+---------------------+---+---+---+
 
 
 
+-------------+----------------------+---------+----------------------+
| Alcohol Use | Drinks/Week          | oz/Week | Comments             |
+-------------+----------------------+---------+----------------------+
| Yes         |   0 Standard drinks  | 0.0     | 1-2 drinks 2-4 times |
|             | or equivalent        |         |  per month           |
+-------------+----------------------+---------+----------------------+
 
 
 
 
+------------------+---------------+
| Sex Assigned at  | Date Recorded |
| Birth            |               |
+------------------+---------------+
| Not on file      |               |
+------------------+---------------+
 
 
 
+----------------+-------------+-------------+
| Job Start Date | Occupation  | Industry    |
+----------------+-------------+-------------+
| Not on file    | Not on file | Not on file |
+----------------+-------------+-------------+
 
 
 
+----------------+--------------+------------+
| Travel History | Travel Start | Travel End |
+----------------+--------------+------------+
 
 
 
+-------------------------------------+
| No recent travel history available. |
+-------------------------------------+
 documented as of this encounter
 
 Plan of Treatment
 
 
+--------+---------+-----------+----------------------+-------------+
| Date   | Type    | Specialty | Care Team            | Description |
+--------+---------+-----------+----------------------+-------------+
| / | Office  | Neurology |   Ravindra Munoz MD  [address] |             |
| 2020   | Visit   |           |  [address]      |             |
|        |         |           | [place]  |             |
|        |         |           | [place] [address]             |             |
|        |         |           | [phone]         |             |
|        |         |           | [phone] (Fax)   |             |
+--------+---------+-----------+----------------------+-------------+
 documented as of this encounter
 
 Procedures
 
 
+----------------+--------+-------------+----------------------+----------------------+
| Procedure Name | Priori | Date/Time   | Associated Diagnosis | Comments             |
|                | ty     |             |                      |                      |
+----------------+--------+-------------+----------------------+----------------------+
| CULTURE, MRSA  | Routin | 2017  |   Preoperative       |   Results for this   |
|                | e      |  1:40 PM    | clearance            | procedure are in the |
|                |        | PDT         |                      |  results section.    |
+----------------+--------+-------------+----------------------+----------------------+
| CBC WITH       | Routin | 2017  |   Lumbago with       |   Results for this   |
| DIFFERENTIAL   | e      |  1:34 PM    | sciatica, right side | procedure are in the |
|                |        | PDT         |   Low back pain,     |  results section.    |
|                |        |             | unspecified back     |                      |
|                |        |             | pain laterality,     |                      |
|                |        |             | unspecified          |                      |
 
|                |        |             | chronicity, with     |                      |
|                |        |             | sciatica presence    |                      |
|                |        |             | unspecified          |                      |
|                |        |             | Atherosclerosis      |                      |
+----------------+--------+-------------+----------------------+----------------------+
| ECG 12 LEAD    | Routin | 2017  |   Lumbago with       |   Results for this   |
|                | e      |  1:32 PM    | sciatica, right side | procedure are in the |
|                |        | PDT         |   Low back pain,     |  results section.    |
|                |        |             | unspecified back     |                      |
|                |        |             | pain laterality,     |                      |
|                |        |             | unspecified          |                      |
|                |        |             | chronicity, with     |                      |
|                |        |             | sciatica presence    |                      |
|                |        |             | unspecified          |                      |
|                |        |             | Atherosclerosis      |                      |
+----------------+--------+-------------+----------------------+----------------------+
 documented in this encounter
 
 Results
 Culture, MRSA (2017  1:40 PM PDT)
 
+-----------+-------------------------+-----------+-------------+--------------+
| Component | Value                   | Ref Range | Performed   | Pathologist  |
|           |                         |           | At          | Signature    |
+-----------+-------------------------+-----------+-------------+--------------+
| Culture   | Negative for MRSA by    |           | [facility]  |              |
|           | chromogenic agar method |           | [facility]    |              |
|           |                         |           | [facility]     |              |
|           |                         |           | C[facility] -    |              |
|           |                         |           | LABORATORY  |              |
+-----------+-------------------------+-----------+-------------+--------------+
 
 
 
+----------------------+
| Specimen             |
+----------------------+
| Respiratory - Both   |
| anterior nares (body |
|  structure)          |
+----------------------+
 
 
 
+----------------------+--------------------+--------------------+----------------+
| Performing           | Address            | City/State/Zipcode | Phone Number   |
| Organization         |                    |                    |                |
+----------------------+--------------------+--------------------+----------------+
|   [facility]     |   [address] |   [place]  |   [phone] |
| [facility]  |                    | [address]              |                |
| - LABORATORY         |                    |                    |                |
+----------------------+--------------------+--------------------+----------------+
 CBC with Differential (2017  1:34 PM PDT)
 
+-------------+----------+-----------------+-------------+--------------+
| Component   | Value    | Ref Range       | Performed   | Pathologist  |
|             |          |                 | At          | Signature    |
+-------------+----------+-----------------+-------------+--------------+
| WBC         | 6.5      | 4.0 - 11.0 K/uL | [facility]  |              |
|             |          |                 | [facility]    |              |
 
|             |          |                 | [facility]     |              |
|             |          |                 | CENTER -    |              |
|             |          |                 | LABORATORY  |              |
+-------------+----------+-----------------+-------------+--------------+
| RBC         | 4.75     | 4.30 - 5.70     | [facility]  |              |
|             |          | M/uL            | [facility]    |              |
|             |          |                 | MEDICAL     |              |
|             |          |                 | CENTER -    |              |
|             |          |                 | LABORATORY  |              |
+-------------+----------+-----------------+-------------+--------------+
| Hemoglobin  | 14.3     | 13.5 - 18.0     | [facility]  |              |
|             |          | g/dL            | [facility]    |              |
|             |          |                 | MEDICAL     |              |
|             |          |                 | CENTER -    |              |
|             |          |                 | LABORATORY  |              |
+-------------+----------+-----------------+-------------+--------------+
| Hematocrit  | 42.6     | 40.0 - 51.0 %   | [facility]  |              |
|             |          |                 | [facility]    |              |
|             |          |                 | MEDICAL     |              |
|             |          |                 | CENTER -    |              |
|             |          |                 | LABORATORY  |              |
+-------------+----------+-----------------+-------------+--------------+
| MCV         | 89.7     | 83.0 - 101.0 fL | [facility]  |              |
|             |          |                 | [facility]    |              |
|             |          |                 | [facility]     |              |
|             |          |                 | CENTER -    |              |
|             |          |                 | LABORATORY  |              |
+-------------+----------+-----------------+-------------+--------------+
| MCH         | 30.1     | 28.0 - 35.0 pg  | [facility]  |              |
|             |          |                 | [facility]    |              |
|             |          |                 | [facility]     |              |
|             |          |                 | CENTER -    |              |
|             |          |                 | LABORATORY  |              |
+-------------+----------+-----------------+-------------+--------------+
| MCHC        | 33.5     | 32.0 - 36.0     | [facility]  |              |
|             |          | g/dL            | [facility]    |              |
|             |          |                 | MED[facility]     |              |
|             |          |                 | CENTER -    |              |
|             |          |                 | LABORATORY  |              |
+-------------+----------+-----------------+-------------+--------------+
| RDW-CV      | 13.5     | <15.0 %         | [facility]  |              |
|             |          |                 | [facility]    |              |
|             |          |                 | MEDICAL     |              |
|             |          |                 | CENTER -    |              |
|             |          |                 | LABORATORY  |              |
+-------------+----------+-----------------+-------------+--------------+
| Platelet    | 244      | 140 - 440 K/uL  | [facility]  |              |
| Count       |          |                 | [facility]    |              |
|             |          |                 | MEDICAL     |              |
|             |          |                 | CENTER -    |              |
|             |          |                 | LABORATORY  |              |
+-------------+----------+-----------------+-------------+--------------+
| MPV         | 7.2      | fL              | [facility]  |              |
|             |          |                 | [facility]    |              |
|             |          |                 | MEDICAL     |              |
|             |          |                 | CENTER -    |              |
|             |          |                 | LABORATORY  |              |
+-------------+----------+-----------------+-------------+--------------+
| %           | 68.3     | 45.0 - 82.0 %   | [facility]  |              |
| Neutrophils |          |                 | [facility]    |              |
 
|             |          |                 | MEDICAL     |              |
|             |          |                 | CENTER -    |              |
|             |          |                 | LABORATORY  |              |
+-------------+----------+-----------------+-------------+--------------+
| %           | 19.3 (L) | 20.0 - 45.0 %   | [facility]  |              |
| Lymphocytes |          |                 | [facility]    |              |
|             |          |                 | MEDICAL     |              |
|             |          |                 | CENTER -    |              |
|             |          |                 | LABORATORY  |              |
+-------------+----------+-----------------+-------------+--------------+
| % Monocytes | 7.5      | 4.0 - 12.0 %    | [facility]  |              |
|             |          |                 | [facility]    |              |
|             |          |                 | MEDICAL     |              |
|             |          |                 | CENTER -    |              |
|             |          |                 | LABORATORY  |              |
+-------------+----------+-----------------+-------------+--------------+
| %           | 4.1      | 0.0 - 5.0 %     | [facility]  |              |
| Eosinophils |          |                 | [facility]    |              |
|             |          |                 | MEDICAL     |              |
|             |          |                 | CENTER -    |              |
|             |          |                 | LABORATORY  |              |
+-------------+----------+-----------------+-------------+--------------+
| % Basophils | 0.8      | 0.0 - 1.0 %     | [facility]  |              |
|             |          |                 | [facility]    |              |
|             |          |                 | MEDICAL     |              |
|             |          |                 | CENTER -    |              |
|             |          |                 | LABORATORY  |              |
+-------------+----------+-----------------+-------------+--------------+
| Absolute    | 4.40     | 1.80 - 8.50     | [facility]  |              |
| Neutrophils |          | K/uL            | [facility]    |              |
|             |          |                 | [facility]     |              |
|             |          |                 | CENTER -    |              |
|             |          |                 | LABORATORY  |              |
+-------------+----------+-----------------+-------------+--------------+
| Absolute    | 1.20     | 0.60 - 3.20     | [facility]  |              |
| Lymphocytes |          | K/uL            | [facility]    |              |
|             |          |                 | [facility]     |              |
|             |          |                 | CENTER -    |              |
|             |          |                 | LABORATORY  |              |
+-------------+----------+-----------------+-------------+--------------+
| Absolute    | 0.50     | 0.00 - 1.00     | [facility]  |              |
| Monocytes   |          | K/uL            | [facility]    |              |
|             |          |                 | MEDICAL     |              |
|             |          |                 | CENTER -    |              |
|             |          |                 | LABORATORY  |              |
+-------------+----------+-----------------+-------------+--------------+
| Absolute    | 0.30     | 0.00 - 0.40     | [facility]  |              |
| Eosinophils |          | K/uL            | [facility]    |              |
|             |          |                 | MED[facility]     |              |
|             |          |                 | CENTER -    |              |
|             |          |                 | LABORATORY  |              |
+-------------+----------+-----------------+-------------+--------------+
| Absolute    | 0.00     | 0.00 - 0.10     | [facility]  |              |
| Basophils   |          | K/uL            | [facility]    |              |
|             |          |                 | MED[facility]     |              |
|             |          |                 | CENTER -    |              |
|             |          |                 | LABORATORY  |              |
+-------------+----------+-----------------+-------------+--------------+
 
 
 
 
+----------+
| Specimen |
+----------+
| Blood    |
+----------+
 
 
 
+----------------------+--------------------+--------------------+----------------+
| Performing           | Address            | City/State/Zipcode | Phone Number   |
| Organization         |                    |                    |                |
+----------------------+--------------------+--------------------+----------------+
|   [facility]     |   [address] |   [place]  |   [phone] |
| [facility]  |                    | [address]              |                |
| - LABORATORY         |                    |                    |                |
+----------------------+--------------------+--------------------+----------------+
 ECG 12 lead (2017  1:32 PM PDT)
 
+-------------+--------------------------+-----------+------------+--------------+
| Component   | Value                    | Ref Range | Performed  | Pathologist  |
|             |                          |           | At         | Signature    |
+-------------+--------------------------+-----------+------------+--------------+
| VENTRICULAR | 55                       | BPM       | [facility]  |              |
|  RATE EKG   |                          |           |            |              |
+-------------+--------------------------+-----------+------------+--------------+
| ATRIAL RATE | 55                       | BPM       | [facility]  |              |
+-------------+--------------------------+-----------+------------+--------------+
| P-R         | 188                      | ms        | [facility]  |              |
| INTERVAL    |                          |           |            |              |
+-------------+--------------------------+-----------+------------+--------------+
| QRS         | 118                      | ms        | [facility]  |              |
| DURATION    |                          |           |            |              |
+-------------+--------------------------+-----------+------------+--------------+
| Q-T         | 422                      | ms        | [facility]  |              |
| INTERVAL    |                          |           |            |              |
+-------------+--------------------------+-----------+------------+--------------+
| Q-T         | 403                      | ms        | [facility]  |              |
| INTERVAL    |                          |           |            |              |
| (CORRECTED) |                          |           |            |              |
+-------------+--------------------------+-----------+------------+--------------+
| P WAVE AXIS | 43                       | degrees   | [facility]  |              |
+-------------+--------------------------+-----------+------------+--------------+
| QRS AXIS    | 33                       | degrees   | [facility]  |              |
+-------------+--------------------------+-----------+------------+--------------+
| T AXIS      | 23                       | degrees   | [facility]  |              |
+-------------+--------------------------+-----------+------------+--------------+
| INTERPRETAT | Sinus                    |           | [facility]  |              |
| ION TEXT    | bradycardiaIncomplete    |           |            |              |
|             | right bundle branch      |           |            |              |
|             | blockBorderline ECGNo    |           |            |              |
|             | previous ECGs            |           |            |              |
|             | availableConfirmed by    |           |            |              |
|             | YAHIR MURPHY MD ([id number])  |           |            |              |
|             | on 2017 6:03:41 AM  |           |            |              |
+-------------+--------------------------+-----------+------------+--------------+
 
 
 
+----------+
 
| Specimen |
+----------+
|          |
+----------+
 
 
 
+----------------------------------------------------------+--------------+
| Narrative                                                | Performed At |
+----------------------------------------------------------+--------------+
|   This result has an attachment that is not available.   |              |
+----------------------------------------------------------+--------------+
 
 
 
+--------------+---------+--------------------+--------------+
| Performing   | Address | City/State/Zipcode | Phone Number |
| Organization |         |                    |              |
+--------------+---------+--------------------+--------------+
|   [facility]  |         |                    |              |
+--------------+---------+--------------------+--------------+
 documented in this encounter
 
 Visit Diagnoses
 
 
+------------------------------------------------------------------------------------------+
| Diagnosis                                                                                |
+------------------------------------------------------------------------------------------+
|   Preoperative clearance - Primary  Preoperative examination, unspecified                |
+------------------------------------------------------------------------------------------+
|   Lumbago with sciatica, right side                                                      |
+------------------------------------------------------------------------------------------+
|   Low back pain, unspecified back pain laterality, unspecified chronicity, with sciatica |
|  presence unspecified                                                                    |
+------------------------------------------------------------------------------------------+
|   Atherosclerosis  Generalized and unspecified atherosclerosis                           |
+------------------------------------------------------------------------------------------+
 documented in this encounter

## 2020-06-08 NOTE — XMS
Encounter Summary
  Created on: 2020
 
 Memo Deni Siu
 External Reference #: [id number]
 : 52
 Sex: Male
 
 Demographics
 
 
+-----------------------+---------------------------+
| Address               | [address]           |
|                       | [place]  [address] |
+-----------------------+---------------------------+
| Home Phone            | [phone]           |
+-----------------------+---------------------------+
| Preferred Language    | Unknown                   |
+-----------------------+---------------------------+
| Marital Status        |                    |
+-----------------------+---------------------------+
| Hindu Affiliation | Unknown                   |
+-----------------------+---------------------------+
| Race                  | Unknown                   |
+-----------------------+---------------------------+
| Ethnic Group          | Unknown                   |
+-----------------------+---------------------------+
 
 
 Author
 
 
+--------------+--------------------------------------------+
| Author       | [facility]  |
|              | [facility] [place]                                |
+--------------+--------------------------------------------+
| Organization | [facility]  |
|              | and [place]                                |
+--------------+--------------------------------------------+
| Address      | Unknown                                    |
+--------------+--------------------------------------------+
| Phone        | Unavailable                                |
+--------------+--------------------------------------------+
 
 
 
 Support
 
 
+---------------+--------------+---------+-----------------+
| Name          | Relationship | Address | Phone           |
+---------------+--------------+---------+-----------------+
| Sofiya A Brown | ECON         | Unknown | [phone] |
+---------------+--------------+---------+-----------------+
 
 
 
 Care Team Providers
 
 
 
+-------------------------+------+-----------------+
| Care Team Member Name   | Role | Phone           |
+-------------------------+------+-----------------+
| Ck Michelle MD | PCP  | [phone] |
+-------------------------+------+-----------------+
 
 
 
 Reason for Visit
 
 
+-----------+----------+
| Reason    | Comments |
+-----------+----------+
| Procedure | Reminder |
+-----------+----------+
 
 
 
 Encounter Details
 
 
+--------+-----------+----------------------+----------------------+----------------------+
| Date   | Type      | Department           | Care Team            | Description          |
+--------+-----------+----------------------+----------------------+----------------------+
| / | Telephone |   [facility]          |   Dreyer, Jason A,   | Procedure (Reminder) |
| 2017   |           | NEUROSURGERY  [address]  | DO  [address]    |                      |
|        |           | [address]     | [address]  [place], [place] |                      |
|        |           | [place], [place]      |  [address]  [phone] |                      |
|        |           | [address]           |   [phone] (Fax) |                      |
|        |           | [phone]         |                      |                      |
+--------+-----------+----------------------+----------------------+----------------------+
 
 
 
 Social History
 
 
+--------------+-------+-----------+--------+------+
| Tobacco Use  | Types | Packs/Day | Years  | Date |
|              |       |           | Used   |      |
+--------------+-------+-----------+--------+------+
| Never Smoker |       |           |        |      |
+--------------+-------+-----------+--------+------+
 
 
 
+---------------------+---+---+---+
| Smokeless Tobacco:  |   |   |   |
| Never Used          |   |   |   |
+---------------------+---+---+---+
 
 
 
+-------------+----------------------+---------+----------------------+
| Alcohol Use | Drinks/Week          | oz/Week | Comments             |
+-------------+----------------------+---------+----------------------+
| Yes         |   0 Standard drinks  | 0.0     | 1-2 drinks 2-4 times |
 
|             | or equivalent        |         |  per month           |
+-------------+----------------------+---------+----------------------+
 
 
 
+------------------+---------------+
| Sex Assigned at  | Date Recorded |
| Birth            |               |
+------------------+---------------+
| Not on file      |               |
+------------------+---------------+
 
 
 
+----------------+-------------+-------------+
| Job Start Date | Occupation  | Industry    |
+----------------+-------------+-------------+
| Not on file    | Not on file | Not on file |
+----------------+-------------+-------------+
 
 
 
+----------------+--------------+------------+
| Travel History | Travel Start | Travel End |
+----------------+--------------+------------+
 
 
 
+-------------------------------------+
| No recent travel history available. |
+-------------------------------------+
 documented as of this encounter
 
 Plan of Treatment
 
 
+--------+---------+-----------+----------------------+-------------+
| Date   | Type    | Specialty | Care Team            | Description |
+--------+---------+-----------+----------------------+-------------+
| / | Office  | Neurology |   Ravindra Munoz MD  [address] |             |
| 2020   | Visit   |           |  [address]      |             |
|        |         |           | [place]  |             |
|        |         |           | [place] [address]             |             |
|        |         |           | [phone]         |             |
|        |         |           | [phone] (Fax)   |             |
+--------+---------+-----------+----------------------+-------------+
 documented as of this encounter
 
 Visit Diagnoses
 Not on filedocumented in this encounter

## 2020-06-08 NOTE — XMS
Encounter Summary
  Created on: 2020
 
 Memo Deni Siu
 External Reference #: [id number]
 : 52
 Sex: Male
 
 Demographics
 
 
+-----------------------+---------------------------+
| Address               | [address]           |
|                       | [place]  [address] |
+-----------------------+---------------------------+
| Home Phone            | [phone]           |
+-----------------------+---------------------------+
| Preferred Language    | Unknown                   |
+-----------------------+---------------------------+
| Marital Status        |                    |
+-----------------------+---------------------------+
| Jewish Affiliation | Unknown                   |
+-----------------------+---------------------------+
| Race                  | Unknown                   |
+-----------------------+---------------------------+
| Ethnic Group          | Unknown                   |
+-----------------------+---------------------------+
 
 
 Author
 
 
+--------------+--------------------------------------------+
| Author       | [facility]  |
|              | [facility] [place]                                |
+--------------+--------------------------------------------+
| Organization | [facility]  |
|              | [facility] [place]                                |
+--------------+--------------------------------------------+
| Address      | Unknown                                    |
+--------------+--------------------------------------------+
| Phone        | Unavailable                                |
+--------------+--------------------------------------------+
 
 
 
 Support
 
 
+---------------+--------------+---------+-----------------+
| Name          | Relationship | Address | Phone           |
+---------------+--------------+---------+-----------------+
| Sofiya A Brown | ECON         | Unknown | [phone] |
+---------------+--------------+---------+-----------------+
 
 
 
 Care Team Providers
 
 
 
+-------------------------+------+-----------------+
| Care Team Member Name   | Role | Phone           |
+-------------------------+------+-----------------+
| Ck Michelle MD | PCP  | [phone] |
+-------------------------+------+-----------------+
 
 
 
 Reason for Visit
 Auth/Cert
 
+--------+--------+-----------+--------------+--------------+--------------+
| Status | Reason | Specialty | Diagnoses /  | Referred By  | Referred To  |
|        |        |           | Procedures   | Contact      | Contact      |
+--------+--------+-----------+--------------+--------------+--------------+
|        |        |           |   Diagnoses  |              |              |
|        |        |           |              |              |              |
|        |        |           | Osteoarthrit |              |              |
|        |        |           | is of spine  |              |              |
|        |        |           | with         |              |              |
|        |        |           | radiculopath |              |              |
|        |        |           | y, lumbar    |              |              |
|        |        |           | region       |              |              |
|        |        |           | (M47.26),    |              |              |
|        |        |           | Spinal       |              |              |
|        |        |           | stenosis,    |              |              |
|        |        |           | lumbar       |              |              |
|        |        |           | region,      |              |              |
|        |        |           | without      |              |              |
|        |        |           | neurogenic   |              |              |
|        |        |           | claudication |              |              |
|        |        |           |  (M48.06),   |              |              |
|        |        |           | Lumbar       |              |              |
|        |        |           | radiculopath |              |              |
|        |        |           | y (M54.16),  |              |              |
|        |        |           | Chronic      |              |              |
|        |        |           | left-sided   |              |              |
|        |        |           | low back     |              |              |
|        |        |           | pain with    |              |              |
|        |        |           | left-sided   |              |              |
|        |        |           | sciatica     |              |              |
|        |        |           | (M54.42,     |              |              |
|        |        |           | G89.29),     |              |              |
|        |        |           | Status post  |              |              |
|        |        |           | laminectomy  |              |              |
|        |        |           | (Z98.890)    |              |              |
|        |        |           | Procedures   |              |              |
|        |        |           | GA ARTHDSIS  |              |              |
|        |        |           | POST/POSTERO |              |              |
|        |        |           | LATRL/POSTIN |              |              |
|        |        |           | TERBODY      |              |              |
|        |        |           | LUMBAR       |              |              |
|        |        |           | POSTERIOR    |              |              |
|        |        |           | NON-SEGMENTA |              |              |
|        |        |           | L            |              |              |
|        |        |           | INSTRUMENTAT |              |              |
|        |        |           | ION  GA INSJ |              |              |
|        |        |           |  BIOMCHN DEV |              |              |
 
|        |        |           |              |              |              |
|        |        |           | INTERVERTEBR |              |              |
|        |        |           | AL DSC SPC   |              |              |
|        |        |           | W/ARTHRD     |              |              |
|        |        |           | LAMINEC/FACE |              |              |
|        |        |           | TECT/FORAMIN |              |              |
|        |        |           | ,LUMBAR 1    |              |              |
|        |        |           | SEG  GA      |              |              |
|        |        |           | LAMINEC/FACE |              |              |
|        |        |           | TECT/FORAMIN |              |              |
|        |        |           | ,EACH ADDNL  |              |              |
|        |        |           |  L4-5        |              |              |
|        |        |           | Transforamin |              |              |
|        |        |           | al Lumbar    |              |              |
|        |        |           | Interbody    |              |              |
|        |        |           | Fusion       |              |              |
+--------+--------+-----------+--------------+--------------+--------------+
 
 
 
 
 Encounter Details
 
 
+--------+-----------+----------------------+----------------------+-------------+
| Date   | Type      | Department           | Care Team            | Description |
+--------+-----------+----------------------+----------------------+-------------+
| / | Hospital  |   [facility] |   Dreyer, Jason A,   |             |
|    | Encounter |  MED [facility] XRAY  [address] | DO  [address]    |             |
|        |           |  [place]       | [address] |             |
|        |           | [place] [address] |  [address]  [phone] |             |
|        |           |   [phone]       |   [phone] (Fax) |             |
+--------+-----------+----------------------+----------------------+-------------+
 
 
 
 Social History
 
 
+--------------+-------+-----------+--------+------+
| Tobacco Use  | Types | Packs/Day | Years  | Date |
|              |       |           | Used   |      |
+--------------+-------+-----------+--------+------+
| Never Smoker |       |           |        |      |
+--------------+-------+-----------+--------+------+
 
 
 
+---------------------+---+---+---+
| Smokeless Tobacco:  |   |   |   |
| Never Used          |   |   |   |
+---------------------+---+---+---+
 
 
 
+-------------+----------------------+---------+----------------------+
| Alcohol Use | Drinks/Week          | oz/Week | Comments             |
+-------------+----------------------+---------+----------------------+
| Yes         |   0 Standard drinks  | 0.0     | 1-2 drinks 2-4 times |
|             | or equivalent        |         |  per month           |
 
+-------------+----------------------+---------+----------------------+
 
 
 
+------------------+---------------+
| Sex Assigned at  | Date Recorded |
| Birth            |               |
+------------------+---------------+
| Not on file      |               |
+------------------+---------------+
 
 
 
+----------------+-------------+-------------+
| Job Start Date | Occupation  | Industry    |
+----------------+-------------+-------------+
| Not on file    | Not on file | Not on file |
+----------------+-------------+-------------+
 
 
 
+----------------+--------------+------------+
| Travel History | Travel Start | Travel End |
+----------------+--------------+------------+
 
 
 
+-------------------------------------+
| No recent travel history available. |
+-------------------------------------+
 documented as of this encounter
 
 Medications at Time of Discharge
 
 
+----------------------+----------------------+-----------+---------+----------+-----------+
| Medication           | Sig                  | Dispensed | Refills | Start    | End Date  |
|                      |                      |           |         | Date     |           |
+----------------------+----------------------+-----------+---------+----------+-----------+
|   atorvaSTATin       | Take 40 mg by mouth  |           | 0       | 20 |           |
| (LIPITOR) 40 mg      | Daily.               |           |         | 15       |           |
| tablet               |                      |           |         |          |           |
+----------------------+----------------------+-----------+---------+----------+-----------+
|   B Complex Vitamins | Take 1 tablet by     |           | 0       |          |           |
|  (VITAMIN B COMPLEX) | mouth Daily.         |           |         |          |           |
|  tablet              |                      |           |         |          |           |
+----------------------+----------------------+-----------+---------+----------+-----------+
|   fluticasone        | 1 spray by Nasal     |           | 0       |          |           |
| (FLONASE) 50         | route Daily.         |           |         |          |           |
| mcg/nasal spray      |                      |           |         |          |           |
+----------------------+----------------------+-----------+---------+----------+-----------+
|   metoprolol         | Take 25 mg by mouth  |           | 0       | 20 |           |
| succinate            | Daily.               |           |         | 17       |           |
| (TOPROL-XL) 25 mg 24 |                      |           |         |          |           |
|  hr tablet           |                      |           |         |          |           |
+----------------------+----------------------+-----------+---------+----------+-----------+
|   ASCORBIC ACID PO   | Take  by mouth.      |           | 0       |          |  |
|                      |                      |           |         |          | 0         |
+----------------------+----------------------+-----------+---------+----------+-----------+
|   aspirin 81 mg EC   | Take 81 mg by mouth  |           | 0       |          |  |
 
| tablet               | Daily.               |           |         |          | 7         |
+----------------------+----------------------+-----------+---------+----------+-----------+
|   cholecalciferol    | Take 1,000 Units by  |           | 0       |          |  |
| (VITAMIN D-3) 1,000  | mouth Daily.         |           |         |          | 0         |
| units tablet         |                      |           |         |          |           |
+----------------------+----------------------+-----------+---------+----------+-----------+
|   diazePAM (VALIUM)  | Take 1 tablet by     |   90      | 0       | 20 | 11/15/201 |
| 5 mg tablet          | mouth every 6 hours  | tablet    |         | 17       | 7         |
|                      | as needed for Muscle |           |         |          |           |
|                      |  spasms.             |           |         |          |           |
+----------------------+----------------------+-----------+---------+----------+-----------+
|                      | Take 1 tablet by     |           | 0       |          |  |
| fexofenadine-pseudoe | mouth Twice  daily   |           |         |          | 0         |
| PHEDrine (ALLEGRA-D  | as needed.           |           |         |          |           |
| ALLERGY &            |                      |           |         |          |           |
| CONGESTION)    |                      |           |         |          |           |
| MG per tablet        |                      |           |         |          |           |
+----------------------+----------------------+-----------+---------+----------+-----------+
|   FLUoxetine         | Take 20 mg by mouth  |           | 0       |          |  |
| (PROZAC) 20 mg       | Daily.               |           |         |          | 0         |
| capsule              |                      |           |         |          |           |
+----------------------+----------------------+-----------+---------+----------+-----------+
|                      | Take 1-2 tablets by  |   120     | 0       | 20 | 11/15/201 |
| HYDROcodone-acetamin | mouth every 4 hours  | tablet    |         | 17       | 7         |
| ophen (NORCO)  | as needed for Pain.  |           |         |          |           |
|  mg per tablet       |                      |           |         |          |           |
+----------------------+----------------------+-----------+---------+----------+-----------+
|                      | Take 1 tablet by     |           | 0       |          |  |
| HYDROcodone-acetamin | mouth every 6 hours  |           |         |          | 7         |
| ophen (NORCO) 5-325  | as needed.           |           |         |          |           |
| mg per tablet        |                      |           |         |          |           |
+----------------------+----------------------+-----------+---------+----------+-----------+
|   lactulose 10 g/15  | Take 30 mLs by mouth |   240 mL  | 2       | 20 | 11/15/201 |
| mL solution          |  Daily as needed for |           |         | 17       | 7         |
|                      |  Constipation.       |           |         |          |           |
+----------------------+----------------------+-----------+---------+----------+-----------+
|   naproxen           | Take 250 mg by mouth |           | 0       |          |  |
| (NAPROSYN) 250 mg    |  2 times daily (with |           |         |          | 7         |
| tablet               |  breakfast &         |           |         |          |           |
|                      | dinner).             |           |         |          |           |
+----------------------+----------------------+-----------+---------+----------+-----------+
 documented as of this encounter
 
 Plan of Treatment
 
 
+--------+---------+-----------+----------------------+-------------+
| Date   | Type    | Specialty | Care Team            | Description |
+--------+---------+-----------+----------------------+-------------+
| / | Office  | Neurology |   Ravindra Munoz MD  [address] |             |
| 2020   | Visit   |           |  [address]      |             |
|        |         |           | [place]  |             |
|        |         |           | [place] [address]             |             |
|        |         |           | [phone]         |             |
|        |         |           | [phone] (Fax)   |             |
+--------+---------+-----------+----------------------+-------------+
 documented as of this encounter
 
 Procedures
 
 
 
+-------------------+--------+-------------+----------------------+----------------------+
| Procedure Name    | Priori | Date/Time   | Associated Diagnosis | Comments             |
|                   | ty     |             |                      |                      |
+-------------------+--------+-------------+----------------------+----------------------+
| VIKKI VALLE STATS NO  | Routin | 2017  |                      |   Results for this   |
| CHARGE            | e      |  1:46 PM    |                      | procedure are in the |
|                   |        | PDT         |                      |  results section.    |
+-------------------+--------+-------------+----------------------+----------------------+
 documented in this encounter
 
 Results
 FL C-Arm Stats No Charge (2017  1:46 PM PDT)
 
+----------+
| Specimen |
+----------+
|          |
+----------+
 
 
 
+-----------------------------------------------------------+---------------+
| Narrative                                                 | Performed At  |
+-----------------------------------------------------------+---------------+
|   No Radiologist interpretation, please see Chart Review. |   [facility] |
+-----------------------------------------------------------+---------------+
 
 
 
+---------------+---------+--------------------+--------------+
| Performing    | Address | City/State/Zipcode | Phone Number |
| Organization  |         |                    |              |
+---------------+---------+--------------------+--------------+
|   [facility] |         |                    |              |
+---------------+---------+--------------------+--------------+
 documented in this encounter
 
 Visit Diagnoses
 Not on filedocumented in this encounter

## 2020-06-08 NOTE — XMS
Encounter Summary
  Created on: 2020
 
 Memo Deni Siu
 External Reference #: [id number]
 : 52
 Sex: Male
 
 Demographics
 
 
+-----------------------+---------------------------+
| Address               | [address]           |
|                       | [place]  [address] |
+-----------------------+---------------------------+
| Home Phone            | [phone]           |
+-----------------------+---------------------------+
| Preferred Language    | Unknown                   |
+-----------------------+---------------------------+
| Marital Status        |                    |
+-----------------------+---------------------------+
| Islam Affiliation | Unknown                   |
+-----------------------+---------------------------+
| Race                  | Unknown                   |
+-----------------------+---------------------------+
| Ethnic Group          | Unknown                   |
+-----------------------+---------------------------+
 
 
 Author
 
 
+--------------+--------------------------------------------+
| Author       | [facility] [place]  |
|              | [facility] [place]                                |
+--------------+--------------------------------------------+
| Organization | [facility] [place]  |
|              | [facility] [place]                                |
+--------------+--------------------------------------------+
| Address      | Unknown                                    |
+--------------+--------------------------------------------+
| Phone        | Unavailable                                |
+--------------+--------------------------------------------+
 
 
 
 Support
 
 
+---------------+--------------+---------+-----------------+
| Name          | Relationship | Address | Phone           |
+---------------+--------------+---------+-----------------+
| Sofiya A Brown | ECON         | Unknown | [phone] |
+---------------+--------------+---------+-----------------+
 
 
 
 Care Team Providers
 
 
 
+-------------------------+------+-----------------+
| Care Team Member Name   | Role | Phone           |
+-------------------------+------+-----------------+
| Ck Michelle MD | PCP  | [phone] |
+-------------------------+------+-----------------+
 
 
 
 Reason for Visit
 
 
+-----------+------------------------------+
| Reason    | Comments                     |
+-----------+------------------------------+
| Follow-up | 4 week Post Op S/P L4-5 TLIF |
+-----------+------------------------------+
 
 
 
 Encounter Details
 
 
+--------+---------+----------------------+----------------------+--------------------+
| Date   | Type    | Department           | Care Team            | Description        |
+--------+---------+----------------------+----------------------+--------------------+
| / | Office  |   [facility]          |   Madhav Mccloud, | S/P lumbar fusion  |
| 2017   | Visit   | NEUROSURGERY  [address]  |  PA-C  [address]  | (Primary Dx)       |
|        |         | [address]     | [address]  [place]     |                    |
|        |         | [place], [place]      | [place] [address]      |                    |
|        |         | [address]           | [phone]         |                    |
|        |         | [phone]         | [phone] (Fax)   |                    |
+--------+---------+----------------------+----------------------+--------------------+
 
 
 
 Social History
 
 
+--------------+-------+-----------+--------+------+
| Tobacco Use  | Types | Packs/Day | Years  | Date |
|              |       |           | Used   |      |
+--------------+-------+-----------+--------+------+
| Never Smoker |       |           |        |      |
+--------------+-------+-----------+--------+------+
 
 
 
+---------------------+---+---+---+
| Smokeless Tobacco:  |   |   |   |
| Never Used          |   |   |   |
+---------------------+---+---+---+
 
 
 
+-------------+----------------------+---------+----------------------+
| Alcohol Use | Drinks/Week          | oz/Week | Comments             |
+-------------+----------------------+---------+----------------------+
| No          |   0 Standard drinks  | 0.0     | No longer drinking   |
 
|             | or equivalent        |         | 1-2 drinks 2-4 times |
|             |                      |         |  a month             |
+-------------+----------------------+---------+----------------------+
 
 
 
+------------------+---------------+
| Sex Assigned at  | Date Recorded |
| Birth            |               |
+------------------+---------------+
| Not on file      |               |
+------------------+---------------+
 
 
 
+----------------+-------------+-------------+
| Job Start Date | Occupation  | Industry    |
+----------------+-------------+-------------+
| Not on file    | Not on file | Not on file |
+----------------+-------------+-------------+
 
 
 
+----------------+--------------+------------+
| Travel History | Travel Start | Travel End |
+----------------+--------------+------------+
 
 
 
+-------------------------------------+
| No recent travel history available. |
+-------------------------------------+
 documented as of this encounter
 
 Last Filed Vital Signs
 
 
+-------------------+---------------------+----------------------+----------+
| Vital Sign        | Reading             | Time Taken           | Comments |
+-------------------+---------------------+----------------------+----------+
| Blood Pressure    | 123/76              | 2017 10:30 AM  |          |
|                   |                     | PDT                  |          |
+-------------------+---------------------+----------------------+----------+
| Pulse             | 65                  | 2017 10:30 AM  |          |
|                   |                     | PDT                  |          |
+-------------------+---------------------+----------------------+----------+
| Temperature       | -                   | -                    |          |
+-------------------+---------------------+----------------------+----------+
| Respiratory Rate  | -                   | -                    |          |
+-------------------+---------------------+----------------------+----------+
| Oxygen Saturation | -                   | -                    |          |
+-------------------+---------------------+----------------------+----------+
| Inhaled Oxygen    | -                   | -                    |          |
| Concentration     |                     |                      |          |
+-------------------+---------------------+----------------------+----------+
| Weight            | 103.6 kg (228 lb 8  | 2017 10:30 AM  |          |
|                   | oz)                 | PDT                  |          |
+-------------------+---------------------+----------------------+----------+
| Height            | 190.5 cm (6' 3")    | 2017 10:30 AM  |          |
|                   |                     | PDT                  |          |
 
+-------------------+---------------------+----------------------+----------+
| Body Mass Index   | 28.56               | 2017 10:30 AM  |          |
|                   |                     | PDT                  |          |
+-------------------+---------------------+----------------------+----------+
 documented in this encounter
 
 Patient Instructions
 Patient Instructions Shravan Lopez, Medical Assistant - 2017 10:00 AM PDTYou may now 
slowly increase your lifting up to 15 pounds as tolerated. You may now reach overhead but it
 should only be 1-2 pounds. Please refrain from twisting for the next 8 weeks. Lastly, you w
ill see Dr. Dreyer in approximately 2 months where a new x-ray will be taken at that time. I
n the meantime, you can also begin to wean out of your brace as instructed below.
 
 SPINE BRACE WEANING PROTOCOL (5 WEEKS)
 
 Below are instructions for weaning your brace.  You can move through the weeks slower if yo
u feel the need to do so, but the overall goal is to get you out of the brace slowly over 
e next several weeks.  
 
 WEEK 1
 If you have been using your brace for activities like sleeping, showering, do not use the b
race for these activities any longer but continue using it for everything else.
 
 WEEK 2
 Stop wearing your brace for sitting and short distance walking.  You should use the brace f
or anything more involved.
 
 WEEK 3
 Stop using the brace for medium distance walking.  You can bend and twist your back but sti
ll proceed slowly with these activities.
 
 WEEK 4
 Stop using the brace for everything but the most difficult tasks. You should now be able to
 go on long walks and lift more weight as directed.  Add more bending and twisting as tolera
michelle.  
 
 WEEK 5
 Stop using the brace for daily use.  I would encourage you to use the brace in the future f
or activities that you know might aggravate your back or cause pain.  You should still work 
to strengthen your back and use good technique when  things and bending.  
 
 I recommend the patient to stop taking Norco/hydrocodone in the morning for 5 days. If this
 does not increase your pain in any significant way you may discontinue taking it in the idalia
oksana as well. 
 
 Also completely stop taking Valium, may take it at night if needed. 
 
 See an Orthopedic doctor if you would like to get a Bursitis injection. 
 I recommended the patient to sleep with a hard pillow between his legs Electronically huyen
d by MAYCOL Orta at 2017 11:15 AM PDT
 documented in this encounter
 
 Progress Notes
 Madhav Mccloud PA - 2017 10:00 AM PDTFormatting of this note might be different f
rom the original.
 MAYCOL Torres
 [address] [facility] [address], [address]
 [place] [address]
 [phone]
 FAX: [phone]  
 
 
 NEUROSURGERY FOLLOW-UP
 
 CHIEF COMPLAINT: 
 Chief Complaint 
 Patient presents with 
   Follow-up 
   4 week Post Op S/P L4-5 TLIF 
 
 HISTORY OF PRESENT ILLNESS:  The patient is a 67 y.o. male that had a L4-5 TLIF around 4 we
eks ago.  He returns and overall is doing great.  The patient complains of bursitis pain. Th
e patient states he has not had any pain since surgery. The patient has  been walking as muc
h as directed. He has been walking around a mile a day.  He is still taking pain medications
 at this point. The patient was unsure if he had to continue taking his pain medications idalia
n without having any pain. He would like some instructions on how to DC these. The patient h
as had no issues with his surgical site. Prior to surgery he had buttock pain, he is feeling
 much better.CURRENT MEDICATIONS: 
 Current Outpatient Prescriptions 
 Medication Sig Dispense Refill 
   ASCORBIC ACID PO Take  by mouth.   
   atorvaSTATin (LIPITOR) 40 mg tablet Take 40 mg by mouth Daily.   
   B Complex Vitamins (VITAMIN B COMPLEX) tablet Take 1 tablet by mouth Daily.   
   cholecalciferol (VITAMIN D-3) 1,000 units tablet Take 1,000 Units by mouth Daily.   
   diazePAM (VALIUM) 5 mg tablet Take 1 tablet by mouth every 6 hours as needed for Muscle
 spasms. 90 tablet 0 
   fexofenadine-pseudoePHEDrine (ALLEGRA-D ALLERGY & CONGESTION)  MG per tablet Take
 1 tablet by mouth Twice  daily as needed.   
   FLUoxetine (PROZAC) 20 mg capsule Take 20 mg by mouth Daily.   
   fluticasone (FLONASE) 50 mcg/nasal spray 1 spray by Nasal route Daily.   
   HYDROcodone-acetaminophen (NORCO)  mg per tablet Take 1-2 tablets by mouth every 
4 hours as needed for Pain. 120 tablet 0 
   lactulose 10 g/15 mL solution Take 30 mLs by mouth Daily as needed for Constipation. 24
0 mL 2 
   metoprolol succinate (TOPROL-XL) 25 mg 24 hr tablet Take 25 mg by mouth Daily.   
 
 No current facility-administered medications for this visit.  
 
 ALLERGIES: 
 Allergies 
 Allergen Reactions 
   Meperidine Nausea And Vomiting 
   Oxycodone Other (See Comments) 
   Hallucinations & sleepiness 
 
 SOCIAL HISTORY:
 The patient  reports that he has never smoked. He has never used smokeless tobacco. He repo
rts that he does not drink alcohol or use drugs.
 
 INTERIM PHYSICAL EXAMINATION:
 Blood pressure 123/76, pulse 65, height 1.905 m (6' 3"), weight 103.6 kg (228 lb 8 oz). Bod
y mass index is 28.56 kg/m. 
 
 REVIEW OF SYSTEMS
 GENERALLY:   No fever, no night sweats, no anemia, + fatigue, + recent profound weight hernandez
ges.  
 EYES:  No eye problems, no use of corrective lenses, no eye injury, no double vision, no bl
indness.
 EARS, NOSE, AND THROAT:  No changes in taste or smell, no hearing difficulty, no ringing in
 the ears, no ear drainage, no dizziness, no voice changes, no difficulty swallowing, no sig
nificant snoring, no sleep apnea, no sinus problems, no major dental work.
 
 NEUROLOGICALLY:  Please see the review of systems discussed above in the history of present
 illness.  In addition, the patient has pain of legs.
 PSYCHIATRIC:  No depression, no sleep disorders, no anxiety, no bipolar disorder, no psycho
tic episodes.
 CARDIOVASCULAR:  No heart attacks, no heart murmur, no heart fluttering, no chest pain, no 
ankle swelling.  
 LUNG DISEASE:  No shortness of breath, no cough, no tuberculosis, no bloody cough,  no asth
ma, no emphysema/COPD.
 GASTROINTESTINAL:  No bowel disease, no nausea or vomiting, no rectal bleeding, no constipa
tion, no stool incontinence, no liver disease, no gallbladder disease, no abdominal pain, no
 ulcers.
 KIDNEY DISEASE:  No urinary frequency, no painful or difficult urination, no incontinence.
 ENDOCRINE:  No diabetes, no thyroid disease, no osteopenia or osteoporosis, no breast drain
age.  
 SKIN:  No breast lumps, no skin changes, no rashes, no itches.
 HEMATOLOGIC/LYMPHATIC:  No enlarged lymph nodes, no easy or unusual bleeding, no personal h
istory of cancer. 
 RHEUMATOLOGIC:  No joint arthritis, no rheumatoid arthritis.
 
 GENERAL: Deni Hampton is in no acute distress with unlabored respirations.   
 SPINE: The patient  s incisions are healing well without drainage, significant erythema, o
r discharge. 
 EXTREMITIES: No lower extremity edema.   
 NEUROLOGICAL EXAMINATION: 
 MENTAL STATUS: The patient is awake, alert, and oriented.  
 He follows simple and complex commands 
 MOTOR EXAM: Motor strength is 4/5.  This is improved when compared to the preoperative exam
. Left dorsiflexion 4 or almost 4-.   
 SENSORY EXAM: The sensory examination is improved when compared to the preoperative exam.  
 
 
 RADIOGRAPHIC REVIEW:
 The patient  s x-rays show stable instrumentation and alignment and were reviewed with the
 patient today.  There have been no interval changes since the immediate postoperative films
.  Complete fusion has not yet occurred, but this is normal and would not be expected at thi
s time. 
 
 ASSESSMENT:
 Encounter Diagnosis 
 Name Primary? 
   S/P lumbar fusion Yes 
 
 Past Medical History: 
 Diagnosis Date 
   Allergic rhinitis  
   Anxiety disorder  
   Atherosclerosis  
   BPH (benign prostatic hyperplasia)  
   HLD (hyperlipidemia)  
   Internal derangement of knee  
   Low back pain  
   Lumbago with sciatica, right side  
   Osteoarthritis, hand  
   Osteoarthrosis, unspecified whether generalized or localized, unspecified site  
   Sleep apnea  
  uses CPAP 
   Unspecified disorder of nose and nasal sinuses  
 
 PLAN:
 Overall, the patient is doing well.  The patient can see some improvements but continues to
 
 recover from recent surgery.
 
 I increased the patient  s activities now allowing 15 pound lifting.  The patient and also
 will begin the process of brace weaning.  They should continue regular exercise and strengt
hening with the hope that they can avoid additional surgery.
 
 We discussed that we can provide pain medications for up to 90 days after their surgical da
te.  We discussed the need to continue tapering pain medication.  If they need longer term p
ain medication, they should begin working on either pain management or with the primary care
 provider.
 
 I am hoping to see improvement over the coming weeks to months and plan to continue to foll
ow this patient.  The patient will follow-up in clinic in around 8 weeks for re-evaluation.
 
 I, MAYCOL Torres, personally performed the services described in this documentation
, as scribed by Shravan Lopez CMA in my presence, and it is both accurate and complete.
 MAYCOL Torres 2017
 
 ELECTRONICALLY SIGNED BY:  MAYCOL Torres,  2017  11:16
 
 Electronically signed by MAYCOL Orta at 2017 11:16 AM PDTdocumented in this 
encounter
 
 Plan of Treatment
 
 
+--------+---------+-----------+----------------------+-------------+
| Date   | Type    | Specialty | Care Team            | Description |
+--------+---------+-----------+----------------------+-------------+
| /  Office  | Neurology |   Ravindra Munoz MD  [address] |             |
| 2020   | Visit   |           |  [employer]      |             |
|        |         |           | [address]  [place],  |             |
|        |         |           | [place] [address]             |             |
|        |         |           | [phone]         |             |
|        |         |           | [phone] (Fax)   |             |
+--------+---------+-----------+----------------------+-------------+
 documented as of this encounter
 
 Results
 XR Lumbar Spine 2 or 3 Vw (11/15/2017  8:02 AM PST)
 
+----------+
| Specimen |
+----------+
|          |
+----------+
 
 
 
+------------------------------------------------------------------------+---------------+
| Narrative                                                              | Performed At  |
+------------------------------------------------------------------------+---------------+
|   EXAM:XR LUMBAR SPINE 2 OR 3 VW     CLINICAL HISTORY: Postop          |   [facility] |
| COMPARISON: Lumbar spine radiograph dating to 2017            |               |
| FINDINGS: Frontal and lateral views.     Posterior and interbody       |               |
| fusion changes at L4-L5.   Intact hardware.   No change in  position   |               |
| of the interbody graft markers.   No change in spinal alignment.   No  |               |
|  interval compression deformities.     IMPRESSION -     No             |               |
| radiographic evidence for an interval complication.     Dictated and   |               |
| Signed by: Ranjan Mcdonald MD    Electronically signed: 11/15/2017    |               |
 
| 9:14 AM                                                                |               |
+------------------------------------------------------------------------+---------------+
 
 
 
+----------------------------------------------------------------------------------+
| Procedure Note                                                                   |
+----------------------------------------------------------------------------------+
|   Hermes, Rad Results In - 11/15/2017  9:17 AM PST  EXAM:XR LUMBAR SPINE 2 OR 3 VW  |
|                                                                                  |
| CLINICAL HISTORY: Postop                                                         |
|                                                                                  |
| COMPARISON: Lumbar spine radiograph dating to 2017                      |
|                                                                                  |
| FINDINGS: Frontal and lateral views.                                             |
|                                                                                  |
| Posterior and interbody fusion changes at L4-L5.  Intact hardware.  No change in |
| position of the interbody graft markers.  No change in spinal alignment.  No     |
| interval compression deformities.                                                |
|                                                                                  |
| IMPRESSION -                                                                     |
|                                                                                  |
| No radiographic evidence for an interval complication.                           |
|                                                                                  |
| Dictated and Signed by: Ranjan Mcdonald MD                                      |
|  Electronically signed: 11/15/2017 9:14 AM                                       |
+----------------------------------------------------------------------------------+
 
 
 
+---------------+---------+--------------------+--------------+
| Performing    | Address | City/State/Zipcode | Phone Number |
| Organization  |         |                    |              |
+---------------+---------+--------------------+--------------+
|   [facility] |         |                    |              |
+---------------+---------+--------------------+--------------+
 documented in this encounter
 
 Visit Diagnoses
 
 
+---------------------------------------------------+
| Diagnosis                                         |
+---------------------------------------------------+
|   S/P lumbar fusion - Primary  Arthrodesis status |
+---------------------------------------------------+
 documented in this encounter

## 2020-06-08 NOTE — XMS
Encounter Summary
  Created on: 2020
 
 Memo Deni Siu
 External Reference #: [id number]
 : 52
 Sex: Male
 
 Demographics
 
 
+-----------------------+---------------------------+
| Address               | [address]           |
|                       | [place]  [address] |
+-----------------------+---------------------------+
| Home Phone            | [phone]           |
+-----------------------+---------------------------+
| Preferred Language    | Unknown                   |
+-----------------------+---------------------------+
| Marital Status        |                    |
+-----------------------+---------------------------+
| Voodoo Affiliation | Unknown                   |
+-----------------------+---------------------------+
| Race                  | Unknown                   |
+-----------------------+---------------------------+
| Ethnic Group          | Unknown                   |
+-----------------------+---------------------------+
 
 
 Author
 
 
+--------------+--------------------------------------------+
| Author       | [facility]  |
|              | [facility] [place]                                |
+--------------+--------------------------------------------+
| Organization | [facility]  |
|              | [facility] [place]                                |
+--------------+--------------------------------------------+
| Address      | Unknown                                    |
+--------------+--------------------------------------------+
| Phone        | Unavailable                                |
+--------------+--------------------------------------------+
 
 
 
 Support
 
 
+---------------+--------------+---------+-----------------+
| Name          | Relationship | Address | Phone           |
+---------------+--------------+---------+-----------------+
| Sofiya A Brown | ECON         | Unknown | [phone] |
+---------------+--------------+---------+-----------------+
 
 
 
 Care Team Providers
 
 
 
+-----------------------+------+-----------------+
| Care Team Member Name | Role | Phone           |
+-----------------------+------+-----------------+
| Emely Hinton    | PCP  | [phone] |
+-----------------------+------+-----------------+
 
 
 
 Reason for Visit
 
 
+----------+----------+
| Reason   | Comments |
+----------+----------+
| Referral |          |
+----------+----------+
 
 
 
 Encounter Details
 
 
+--------+-----------+---------------------+----------------------+-------------+
| Date   | Type      | Department          | Care Team            | Description |
+--------+-----------+---------------------+----------------------+-------------+
| / | Telephone |   [facility]     |   Ravindra Munoz MD  [address] | Referral    |
| 2019   |           | NEUROLOGY  [address]     |  [facility]      |             |
|        |           | [address]   | [address]  [facility]  |             |
|        |           | [place], [place]        | [place] [address]             |             |
|        |           | [address]          | [phone]         |             |
|        |           | [phone]        | [phone] (Fax)   |             |
+--------+-----------+---------------------+----------------------+-------------+
 
 
 
 Social History
 
 
+--------------+-------+-----------+--------+------+
| Tobacco Use  | Types | Packs/Day | Years  | Date |
|              |       |           | Used   |      |
+--------------+-------+-----------+--------+------+
| Never Smoker |       |           |        |      |
+--------------+-------+-----------+--------+------+
 
 
 
+---------------------+---+---+---+
| Smokeless Tobacco:  |   |   |   |
| Never Used          |   |   |   |
+---------------------+---+---+---+
 
 
 
+-------------+----------------------+---------+--------------+
| Alcohol Use | Drinks/Week          | oz/Week | Comments     |
+-------------+----------------------+---------+--------------+
| No          |   0 Standard drinks  | 0.0     | Alcoholic    |
 
|             | or equivalent        |         | Drinks/day:  |
|             |                      |         | Occasionally |
+-------------+----------------------+---------+--------------+
 
 
 
+------------------+---------------+
| Sex Assigned at  | Date Recorded |
| Birth            |               |
+------------------+---------------+
| Not on file      |               |
+------------------+---------------+
 
 
 
+----------------+-------------+-------------+
| Job Start Date | Occupation  | Industry    |
+----------------+-------------+-------------+
| Not on file    | Not on file | Not on file |
+----------------+-------------+-------------+
 
 
 
+----------------+--------------+------------+
| Travel History | Travel Start | Travel End |
+----------------+--------------+------------+
 
 
 
+-------------------------------------+
| No recent travel history available. |
+-------------------------------------+
 documented as of this encounter
 
 Plan of Treatment
 
 
+--------+---------+-----------+----------------------+-------------+
| Date   | Type    | Specialty | Care Team            | Description |
+--------+---------+-----------+----------------------+-------------+
| / | Office  | Neurology |   Ravindra Munoz MD  [address] |             |
| 2020   | Visit   |           |  [address]      |             |
|        |         |           | [address]  |             |
|        |         |           | [place] [address]             |             |
|        |         |           | [phone]         |             |
|        |         |           | [phone] (Fax)   |             |
+--------+---------+-----------+----------------------+-------------+
 documented as of this encounter
 
 Visit Diagnoses
 Not on filedocumented in this encounter

## 2020-06-08 NOTE — XMS
Encounter Summary
  Created on: 2020
 
 Memo Deni Siu
 External Reference #: [id number]
 : 52
 Sex: Male
 
 Demographics
 
 
+-----------------------+---------------------------+
| Address               | [address]           |
|                       | [place]  [address] |
+-----------------------+---------------------------+
| Home Phone            | [phone]           |
+-----------------------+---------------------------+
| Preferred Language    | Unknown                   |
+-----------------------+---------------------------+
| Marital Status        |                    |
+-----------------------+---------------------------+
| Yarsani Affiliation | Unknown                   |
+-----------------------+---------------------------+
| Race                  | Unknown                   |
+-----------------------+---------------------------+
| Ethnic Group          | Unknown                   |
+-----------------------+---------------------------+
 
 
 Author
 
 
+--------------+--------------------------------------------+
| Author       | [facility]  |
|              | [facility] [place]                                |
+--------------+--------------------------------------------+
| Organization | [facility]  |
|              | [facility] [place]                                |
+--------------+--------------------------------------------+
| Address      | Unknown                                    |
+--------------+--------------------------------------------+
| Phone        | Unavailable                                |
+--------------+--------------------------------------------+
 
 
 
 Support
 
 
+---------------+--------------+---------+-----------------+
| Name          | Relationship | Address | Phone           |
+---------------+--------------+---------+-----------------+
| Sofiya A Brown | ECON         | Unknown | [phone] |
+---------------+--------------+---------+-----------------+
 
 
 
 Care Team Providers
 
 
 
+-------------------------+------+-----------------+
| Care Team Member Name   | Role | Phone           |
+-------------------------+------+-----------------+
| Ck Michelle MD | PCP  | [phone] |
+-------------------------+------+-----------------+
 
 
 
 Encounter Details
 
 
+--------+-------------+----------------------+----------------------+----------------------
+
| Date   | Type        | Department           | Care Team            | Description          
|
+--------+-------------+----------------------+----------------------+----------------------
+
| 06/15/ | Orders Only |   [facility]          |   Dreyer, Jason A,   | Lumbago with         
|
| 2017   |             | NEUROSURGERY  [address]  | DO  [address]    | sciatica, right      
|
|        |             | [address]     | [address]  [place], [place] | side; Low back pain, 
|
|        |             | [place], [place]      |  [address]  [phone] |  unspecified back    
|
|        |             | [address]           |   [phone] (Fax) | pain laterality,     
|
|        |             | [phone]         |                      | unspecified          
|
|        |             |                      |                      | chronicity, with     
|
|        |             |                      |                      | sciatica presence    
|
|        |             |                      |                      | unspecified;         
|
|        |             |                      |                      | Atherosclerosis      
|
+--------+-------------+----------------------+----------------------+----------------------
+
 
 
 
 Social History
 
 
+--------------+-------+-----------+--------+------+
| Tobacco Use  | Types | Packs/Day | Years  | Date |
|              |       |           | Used   |      |
+--------------+-------+-----------+--------+------+
| Never Smoker |       |           |        |      |
+--------------+-------+-----------+--------+------+
 
 
 
+---------------------+---+---+---+
| Smokeless Tobacco:  |   |   |   |
| Never Used          |   |   |   |
+---------------------+---+---+---+
 
 
 
 
+-------------+----------------------+---------+----------------------+
| Alcohol Use | Drinks/Week          | oz/Week | Comments             |
+-------------+----------------------+---------+----------------------+
| Yes         |   0 Standard drinks  | 0.0     | 1-2 drinks 2-4 times |
|             | or equivalent        |         |  per month           |
+-------------+----------------------+---------+----------------------+
 
 
 
+------------------+---------------+
| Sex Assigned at  | Date Recorded |
| Birth            |               |
+------------------+---------------+
| Not on file      |               |
+------------------+---------------+
 
 
 
+----------------+-------------+-------------+
| Job Start Date | Occupation  | Industry    |
+----------------+-------------+-------------+
| Not on file    | Not on file | Not on file |
+----------------+-------------+-------------+
 
 
 
+----------------+--------------+------------+
| Travel History | Travel Start | Travel End |
+----------------+--------------+------------+
 
 
 
+-------------------------------------+
| No recent travel history available. |
+-------------------------------------+
 documented as of this encounter
 
 Plan of Treatment
 
 
+--------+---------+-----------+----------------------+-------------+
| Date   | Type    | Specialty | Care Team            | Description |
+--------+---------+-----------+----------------------+-------------+
| / | Office  | Neurology |   Ravindra Munoz MD  [address] |             |
| 2020   | Visit   |           |  [address]      |             |
|        |         |           | [place]  |             |
|        |         |           | [place] [address]             |             |
|        |         |           | [phone]         |             |
|        |         |           | [phone] (Fax)   |             |
+--------+---------+-----------+----------------------+-------------+
 documented as of this encounter
 
 Results
 CBC with Differential (2017  1:34 PM PDT)
 
+-------------+----------+-----------------+-------------+--------------+
| Component   | Value    | Ref Range       | Performed   | Pathologist  |
 
|             |          |                 | At          | Signature    |
+-------------+----------+-----------------+-------------+--------------+
| WBC         | 6.5      | 4.0 - 11.0 K/uL | [facility]  |              |
|             |          |                 | [facility]    |              |
|             |          |                 | [facility]     |              |
|             |          |                 | [facility] -    |              |
|             |          |                 | LABORATORY  |              |
+-------------+----------+-----------------+-------------+--------------+
| RBC         | 4.75     | 4.30 - 5.70     | [facility]  |              |
|             |          | M/uL            | [facility]    |              |
|             |          |                 | [facility]     |              |
|             |          |                 | CENTER -    |              |
|             |          |                 | LABORATORY  |              |
+-------------+----------+-----------------+-------------+--------------+
| Hemoglobin  | 14.3     | 13.5 - 18.0     | [facility]  |              |
|             |          | g/dL            | [facility]    |              |
|             |          |                 | [facility]     |              |
|             |          |                 | CENTER -    |              |
|             |          |                 | LABORATORY  |              |
+-------------+----------+-----------------+-------------+--------------+
| Hematocrit  | 42.6     | 40.0 - 51.0 %   | [facility]  |              |
|             |          |                 | [facility]    |              |
|             |          |                 | [facility]     |              |
|             |          |                 | CENTER -    |              |
|             |          |                 | LABORATORY  |              |
+-------------+----------+-----------------+-------------+--------------+
| MCV         | 89.7     | 83.0 - 101.0 fL | [facility]  |              |
|             |          |                 | [facility]    |              |
|             |          |                 | [facility]     |              |
|             |          |                 | CENTER -    |              |
|             |          |                 | LABORATORY  |              |
+-------------+----------+-----------------+-------------+--------------+
| MCH         | 30.1     | 28.0 - 35.0 pg  | [facility]  |              |
|             |          |                 | [facility]    |              |
|             |          |                 | [facility]     |              |
|             |          |                 | CENTER -    |              |
|             |          |                 | LABORATORY  |              |
+-------------+----------+-----------------+-------------+--------------+
| MCHC        | 33.5     | 32.0 - 36.0     | [facility]  |              |
|             |          | g/dL            | [facility]    |              |
|             |          |                 | [facility]     |              |
|             |          |                 | CENTER -    |              |
|             |          |                 | LABORATORY  |              |
+-------------+----------+-----------------+-------------+--------------+
| RDW-CV      | 13.5     | <15.0 %         | [facility]  |              |
|             |          |                 | [facility]    |              |
|             |          |                 | [facility]     |              |
|             |          |                 | CENTER -    |              |
|             |          |                 | LABORATORY  |              |
+-------------+----------+-----------------+-------------+--------------+
| Platelet    | 244      | 140 - 440 K/uL  | [facility]  |              |
| Count       |          |                 | [facility]    |              |
|             |          |                 | [facility]     |              |
|             |          |                 | CENTER -    |              |
|             |          |                 | LABORATORY  |              |
+-------------+----------+-----------------+-------------+--------------+
| MPV         | 7.2      | fL              | [facility]  |              |
|             |          |                 | [facility]    |              |
|             |          |                 | [facility]     |              |
|             |          |                 | CENTER -    |              |
 
|             |          |                 | LABORATORY  |              |
+-------------+----------+-----------------+-------------+--------------+
| %           | 68.3     | 45.0 - 82.0 %   | [facility]  |              |
| Neutrophils |          |                 | [facility]    |              |
|             |          |                 | [facility]     |              |
|             |          |                 | CENTER -    |              |
|             |          |                 | LABORATORY  |              |
+-------------+----------+-----------------+-------------+--------------+
| %           | 19.3 (L) | 20.0 - 45.0 %   | [facility]  |              |
| Lymphocytes |          |                 | [facility]    |              |
|             |          |                 | [facility]     |              |
|             |          |                 | CENTER -    |              |
|             |          |                 | LABORATORY  |              |
+-------------+----------+-----------------+-------------+--------------+
| % Monocytes | 7.5      | 4.0 - 12.0 %    | [facility]  |              |
|             |          |                 | [facility]    |              |
|             |          |                 | [facility]     |              |
|             |          |                 | CENTER -    |              |
|             |          |                 | LABORATORY  |              |
+-------------+----------+-----------------+-------------+--------------+
| %           | 4.1      | 0.0 - 5.0 %     | [facility]  |              |
| Eosinophils |          |                 | [facility]    |              |
|             |          |                 | [facility]     |              |
|             |          |                 | CENTER -    |              |
|             |          |                 | LABORATORY  |              |
+-------------+----------+-----------------+-------------+--------------+
| % Basophils | 0.8      | 0.0 - 1.0 %     | [facility]  |              |
|             |          |                 | [facility]    |              |
|             |          |                 | [facility]     |              |
|             |          |                 | CENTER -    |              |
|             |          |                 | LABORATORY  |              |
+-------------+----------+-----------------+-------------+--------------+
| Absolute    | 4.40     | 1.80 - 8.50     | [facility]  |              |
| Neutrophils |          | K/uL            | [facility]    |              |
|             |          |                 | [facility]     |              |
|             |          |                 | CENTER -    |              |
|             |          |                 | LABORATORY  |              |
+-------------+----------+-----------------+-------------+--------------+
| Absolute    | 1.20     | 0.60 - 3.20     | [facility]  |              |
| Lymphocytes |          | K/uL            | [facility]    |              |
|             |          |                 | [facility]     |              |
|             |          |                 | CENTER -    |              |
|             |          |                 | LABORATORY  |              |
+-------------+----------+-----------------+-------------+--------------+
| Absolute    | 0.50     | 0.00 - 1.00     | [facility]  |              |
| Monocytes   |          | K/uL            | [facility]    |              |
|             |          |                 | [facility]     |              |
|             |          |                 | CENTER -    |              |
|             |          |                 | LABORATORY  |              |
+-------------+----------+-----------------+-------------+--------------+
| Absolute    | 0.30     | 0.00 - 0.40     | [facility]  |              |
| Eosinophils |          | K/uL            | [facility]    |              |
|             |          |                 | [facility]     |              |
|             |          |                 | CENTER -    |              |
|             |          |                 | LABORATORY  |              |
+-------------+----------+-----------------+-------------+--------------+
| Absolute    | 0.00     | 0.00 - 0.10     | [facility]  |              |
| Basophils   |          | K/uL            | [facility]    |              |
|             |          |                 | [facility]     |              |
|             |          |                 | CENTER -    |              |
 
|             |          |                 | LABORATORY  |              |
+-------------+----------+-----------------+-------------+--------------+
 
 
 
+----------+
| Specimen |
+----------+
| Blood    |
+----------+
 
 
 
+----------------------+--------------------+--------------------+----------------+
| Performing           | Address            | City/State/Zipcode | Phone Number   |
| Organization         |                    |                    |                |
+----------------------+--------------------+--------------------+----------------+
|   [facility]     |   [address] |   [place]  |   [phone] |
| [facility]  |                    | [address]              |                |
| - LABORATORY         |                    |                    |                |
+----------------------+--------------------+--------------------+----------------+
 ECG 12 lead (2017  1:32 PM PDT)
 
+-------------+--------------------------+-----------+------------+--------------+
| Component   | Value                    | Ref Range | Performed  | Pathologist  |
|             |                          |           | At         | Signature    |
+-------------+--------------------------+-----------+------------+--------------+
| VENTRICULAR | 55                       | BPM       | [facility]  |              |
|  RATE EKG   |                          |           |            |              |
+-------------+--------------------------+-----------+------------+--------------+
| ATRIAL RATE | 55                       | BPM       | [facility]  |              |
+-------------+--------------------------+-----------+------------+--------------+
| P-R         | 188                      | ms        | [facility]  |              |
| INTERVAL    |                          |           |            |              |
+-------------+--------------------------+-----------+------------+--------------+
| QRS         | 118                      | ms        | [facility]  |              |
| DURATION    |                          |           |            |              |
+-------------+--------------------------+-----------+------------+--------------+
| Q-T         | 422                      | ms        | [facility]  |              |
| INTERVAL    |                          |           |            |              |
+-------------+--------------------------+-----------+------------+--------------+
| Q-T         | 403                      | ms        | [facility]  |              |
| INTERVAL    |                          |           |            |              |
| (CORRECTED) |                          |           |            |              |
+-------------+--------------------------+-----------+------------+--------------+
| P WAVE AXIS | 43                       | degrees   | [facility]  |              |
+-------------+--------------------------+-----------+------------+--------------+
| QRS AXIS    | 33                       | degrees   | [facility]  |              |
+-------------+--------------------------+-----------+------------+--------------+
| T AXIS      | 23                       | degrees   | [facility]  |              |
+-------------+--------------------------+-----------+------------+--------------+
| INTERPRETAT | Sinus                    |           | [facility]  |              |
| ION TEXT    | bradycardiaIncomplete    |           |            |              |
|             | right bundle branch      |           |            |              |
|             | blockBorderline ECGNo    |           |            |              |
|             | previous ECGs            |           |            |              |
|             | availableConfirmed by    |           |            |              |
|             | YAHIR [address] MD ([id number])  |           |            |              |
|             | on 2017 6:03:41 AM  |           |            |              |
+-------------+--------------------------+-----------+------------+--------------+
 
 
 
 
+----------+
| Specimen |
+----------+
|          |
+----------+
 
 
 
+----------------------------------------------------------+--------------+
| Narrative                                                | Performed At |
+----------------------------------------------------------+--------------+
|   This result has an attachment that is not available.   |              |
+----------------------------------------------------------+--------------+
 
 
 
+--------------+---------+--------------------+--------------+
| Performing   | Address | City/State/Zipcode | Phone Number |
| Organization |         |                    |              |
+--------------+---------+--------------------+--------------+
|   [facility]  |         |                    |              |
+--------------+---------+--------------------+--------------+
 documented in this encounter
 
 Visit Diagnoses
 
 
+------------------------------------------------------------------------------------------+
| Diagnosis                                                                                |
+------------------------------------------------------------------------------------------+
|   Lumbago with sciatica, right side                                                      |
+------------------------------------------------------------------------------------------+
|   Low back pain, unspecified back pain laterality, unspecified chronicity, with sciatica |
|  presence unspecified                                                                    |
+------------------------------------------------------------------------------------------+
|   Atherosclerosis  Generalized and unspecified atherosclerosis                           |
+------------------------------------------------------------------------------------------+
 documented in this encounter

## 2020-06-08 NOTE — XMS
Encounter Summary
  Created on: 2020
 
 Memo Deni Siu
 External Reference #: [id number]
 : 52
 Sex: Male
 
 Demographics
 
 
+-----------------------+---------------------------+
| Address               | [address]           |
|                       | [place]  [address] |
+-----------------------+---------------------------+
| Home Phone            | [phone]           |
+-----------------------+---------------------------+
| Preferred Language    | Unknown                   |
+-----------------------+---------------------------+
| Marital Status        |                    |
+-----------------------+---------------------------+
| Druze Affiliation | Unknown                   |
+-----------------------+---------------------------+
| Race                  | Unknown                   |
+-----------------------+---------------------------+
| Ethnic Group          | Unknown                   |
+-----------------------+---------------------------+
 
 
 Author
 
 
+--------------+--------------------------------------------+
| Author       | [facility] [place]  |
|              | and [place]                                |
+--------------+--------------------------------------------+
| Organization | [facility] [place]  |
|              | and [place]                                |
+--------------+--------------------------------------------+
| Address      | Unknown                                    |
+--------------+--------------------------------------------+
| Phone        | Unavailable                                |
+--------------+--------------------------------------------+
 
 
 
 Support
 
 
+---------------+--------------+---------+-----------------+
| Name          | Relationship | Address | Phone           |
+---------------+--------------+---------+-----------------+
| Sofiya A Brown | ECON         | Unknown | [phone] |
+---------------+--------------+---------+-----------------+
 
 
 
 Care Team Providers
 
 
 
+-----------------------+------+-------------+
| Care Team Member Name | Role | Phone       |
+-----------------------+------+-------------+
 PCP  | Unavailable |
+-----------------------+------+-------------+
 
 
 
 Encounter Details
 
 
+--------+-----------+----------------------+-----------+-------------+
| Date   | Type      | Department           | Care Team | Description |
+--------+-----------+----------------------+-----------+-------------+
| / | Hospital  |   [facility] |           |             |
|    | Encounter |  [facility]  |           |             |
|        |           |  [address] |           |             |
|        |           |  [place]           |           |             |
|        |           | [address]           |           |             |
|        |           | [phone]         |           |             |
+--------+-----------+----------------------+-----------+-------------+
 
 
 
 Social History
 
 
+----------------+-------+-----------+--------+------+
| Tobacco Use    | Types | Packs/Day | Years  | Date |
|                |       |           | Used   |      |
+----------------+-------+-----------+--------+------+
| Never Assessed |       |           |        |      |
+----------------+-------+-----------+--------+------+
 
 
 
+------------------+---------------+
| Sex Assigned at  | Date Recorded |
| Birth            |               |
+------------------+---------------+
| Not on file      |               |
+------------------+---------------+
 
 
 
+----------------+-------------+-------------+
| Job Start Date | Occupation  | Industry    |
+----------------+-------------+-------------+
| Not on file    | Not on file | Not on file |
+----------------+-------------+-------------+
 
 
 
+----------------+--------------+------------+
| Travel History | Travel Start | Travel End |
+----------------+--------------+------------+
 
 
 
 
+-------------------------------------+
| No recent travel history available. |
+-------------------------------------+
 documented as of this encounter
 
 Plan of Treatment
 
 
+--------+---------+-----------+----------------------+-------------+
| Date   | Type    | Specialty | Care Team            | Description |
+--------+---------+-----------+----------------------+-------------+
| / | Office  | Neurology |   Ravindra Munoz MD  [address] |             |
|    | Visit   |           |  [address]      |             |
|        |         |           | [address]  [address]  |             |
|        |         |           | [place] [address]             |             |
|        |         |           | [phone]         |             |
|        |         |           | [phone] (Fax)   |             |
+--------+---------+-----------+----------------------+-------------+
 documented as of this encounter
 
 Visit Diagnoses
 Not on filedocumented in this encounter

## 2020-06-08 NOTE — XMS
Encounter Summary
  Created on: 2020
 
 Memo Deni Siu
 External Reference #: [id number]
 : 52
 Sex: Male
 
 Demographics
 
 
+-----------------------+---------------------------+
| Address               | [address]           |
|                       | [place]  [address] |
+-----------------------+---------------------------+
| Home Phone            | [phone]           |
+-----------------------+---------------------------+
| Preferred Language    | Unknown                   |
+-----------------------+---------------------------+
| Marital Status        |                    |
+-----------------------+---------------------------+
| Yarsani Affiliation | Unknown                   |
+-----------------------+---------------------------+
| Race                  | Unknown                   |
+-----------------------+---------------------------+
| Ethnic Group          | Unknown                   |
+-----------------------+---------------------------+
 
 
 Author
 
 
+--------------+--------------------------------------------+
| Author       | [facility] [place]  |
|              | and [place]                                |
+--------------+--------------------------------------------+
| Organization | [facility] [place]  |
|              | and [place]                                |
+--------------+--------------------------------------------+
| Address      | Unknown                                    |
+--------------+--------------------------------------------+
| Phone        | Unavailable                                |
+--------------+--------------------------------------------+
 
 
 
 Support
 
 
+---------------+--------------+---------+-----------------+
| Name          | Relationship | Address | Phone           |
+---------------+--------------+---------+-----------------+
| Sofiya A Brown | ECON         | Unknown | [phone] |
+---------------+--------------+---------+-----------------+
 
 
 
 Care Team Providers
 
 
 
+-------------------------+------+-----------------+
| Care Team Member Name   | Role | Phone           |
+-------------------------+------+-----------------+
| Ck Mihcelle MD | PCP  | [phone] |
+-------------------------+------+-----------------+
 
 
 
 Reason for Visit
 
 
+----------------+----------+
| Reason         | Comments |
+----------------+----------+
| Suture Removal | 2W       |
+----------------+----------+
 
 
 
 Encounter Details
 
 
+--------+---------+----------------------+----------------------+----------------------+
| Date   | Type    | Department           | Care Team            | Description          |
+--------+---------+----------------------+----------------------+----------------------+
| / | Office  |   [facility]          |   Dreyer, Jason A,   | Visit for suture     |
| 2017   | Visit   | NEUROSURGERY  [address]  | DO  [address]    | removal (Primary Dx) |
|        |         | [address]     | [address]  [place], [place] |                      |
|        |         | [place], [place]      |  [address]  [phone] |                      |
|        |         | [address]           |   [phone] (Fax) |                      |
|        |         | [phone]         |                      |                      |
+--------+---------+----------------------+----------------------+----------------------+
 
 
 
 Social History
 
 
+--------------+-------+-----------+--------+------+
| Tobacco Use  | Types | Packs/Day | Years  | Date |
|              |       |           | Used   |      |
+--------------+-------+-----------+--------+------+
| Never Smoker |       |           |        |      |
+--------------+-------+-----------+--------+------+
 
 
 
+---------------------+---+---+---+
| Smokeless Tobacco:  |   |   |   |
| Never Used          |   |   |   |
+---------------------+---+---+---+
 
 
 
+-------------+----------------------+---------+----------------------+
| Alcohol Use | Drinks/Week          | oz/Week | Comments             |
+-------------+----------------------+---------+----------------------+
| Yes         |   0 Standard drinks  | 0.0     | 1-2 drinks 2-4 times |
 
|             | or equivalent        |         |  per month           |
+-------------+----------------------+---------+----------------------+
 
 
 
+------------------+---------------+
| Sex Assigned at  | Date Recorded |
| Birth            |               |
+------------------+---------------+
| Not on file      |               |
+------------------+---------------+
 
 
 
+----------------+-------------+-------------+
| Job Start Date | Occupation  | Industry    |
+----------------+-------------+-------------+
| Not on file    | Not on file | Not on file |
+----------------+-------------+-------------+
 
 
 
+----------------+--------------+------------+
| Travel History | Travel Start | Travel End |
+----------------+--------------+------------+
 
 
 
+-------------------------------------+
| No recent travel history available. |
+-------------------------------------+
 documented as of this encounter
 
 Last Filed Vital Signs
 
 
+-------------------+-------------------+----------------------+----------+
| Vital Sign        | Reading           | Time Taken           | Comments |
+-------------------+-------------------+----------------------+----------+
| Blood Pressure    | 111/69            | 2017  2:22 PM  |          |
|                   |                   | PDT                  |          |
+-------------------+-------------------+----------------------+----------+
| Pulse             | 55                | 2017  2:22 PM  |          |
|                   |                   | PDT                  |          |
+-------------------+-------------------+----------------------+----------+
| Temperature       | -                 | -                    |          |
+-------------------+-------------------+----------------------+----------+
| Respiratory Rate  | -                 | -                    |          |
+-------------------+-------------------+----------------------+----------+
| Oxygen Saturation | -                 | -                    |          |
+-------------------+-------------------+----------------------+----------+
| Inhaled Oxygen    | -                 | -                    |          |
| Concentration     |                   |                      |          |
+-------------------+-------------------+----------------------+----------+
| Weight            | 102.1 kg (225 lb) | 2017  2:22 PM  |          |
|                   |                   | PDT                  |          |
+-------------------+-------------------+----------------------+----------+
| Height            | 190.5 cm (6' 3")  | 2017  2:22 PM  |          |
|                   |                   | PDT                  |          |
+-------------------+-------------------+----------------------+----------+
 
| Body Mass Index   | 28.12             | 2017  2:22 PM  |          |
|                   |                   | PDT                  |          |
+-------------------+-------------------+----------------------+----------+
 documented in this encounter
 
 Progress Notes
 Cynthia Velasquez Cert MA - 2017  2:15 PM PDTSutures removed per protocol.  Incision is 
clean, dry and intact.  
 Electronically signed by Alan Reese MA at 2017  3:18 PM PDTdocumented in this
 encounter
 
 Plan of Treatment
 
 
+--------+---------+-----------+----------------------+-------------+
| Date   | Type    | Specialty | Care Team            | Description |
+--------+---------+-----------+----------------------+-------------+
| / | Office  | Neurology |   Ravindra Munoz MD  [address] |             |
| 2020   | Visit   |           |  [address]      |             |
|        |         |           | [address],  |             |
|        |         |           | [place] [address]             |             |
|        |         |           | [phone]         |             |
|        |         |           | [phone] (Fax)   |             |
+--------+---------+-----------+----------------------+-------------+
 documented as of this encounter
 
 Procedures
 
 
+-------------------+--------+-------------+----------------------+----------------------+
| Procedure Name    | Priori | Date/Time   | Associated Diagnosis | Comments             |
|                   | ty     |             |                      |                      |
+-------------------+--------+-------------+----------------------+----------------------+
| IMAGING REPORT -  |        | 2017  |                      |   Results for this   |
| EXTERNAL SCAN     |        | 12:00 AM    |                      | procedure are in the |
|                   |        | PDT         |                      |  results section.    |
+-------------------+--------+-------------+----------------------+----------------------+
 documented in this encounter
 
 Results
 IMAGING REPORT - EXTERNAL SCAN (2017 12:00 AM PDT)
 
+------------------------------------------------------------------------+--------------+
| Narrative                                                              | Performed At |
+------------------------------------------------------------------------+--------------+
|   This result has an attachment that is not available.  Ordered by an  |              |
| unspecified provider.                                                  |              |
+------------------------------------------------------------------------+--------------+
 documented in this encounter
 
 Visit Diagnoses
 
 
+------------------------------------------------------------------------+
| Diagnosis                                                              |
+------------------------------------------------------------------------+
|   Visit for suture removal - Primary  Encounter for removal of sutures |
+------------------------------------------------------------------------+
 documented in this encounter

## 2020-06-08 NOTE — XMS
Encounter Summary
  Created on: 2020
 
 Memo Deni Siu
 External Reference #: [id number]
 : 52
 Sex: Male
 
 Demographics
 
 
+-----------------------+---------------------------+
| Address               | [address]           |
|                       | [place]  [address] |
+-----------------------+---------------------------+
| Home Phone            | [phone]           |
+-----------------------+---------------------------+
| Preferred Language    | Unknown                   |
+-----------------------+---------------------------+
| Marital Status        |                    |
+-----------------------+---------------------------+
| Rastafari Affiliation | Unknown                   |
+-----------------------+---------------------------+
| Race                  | Unknown                   |
+-----------------------+---------------------------+
| Ethnic Group          | Unknown                   |
+-----------------------+---------------------------+
 
 
 Author
 
 
+--------------+--------------------------------------------+
| Author       | [facility]  |
|              | [facility] [place]                                |
+--------------+--------------------------------------------+
| Organization | [facility]  |
|              | [facility] [place]                                |
+--------------+--------------------------------------------+
| Address      | Unknown                                    |
+--------------+--------------------------------------------+
| Phone        | Unavailable                                |
+--------------+--------------------------------------------+
 
 
 
 Support
 
 
+---------------+--------------+---------+-----------------+
| Name          | Relationship | Address | Phone           |
+---------------+--------------+---------+-----------------+
| Sofiya A Brown | ECON         | Unknown | [phone] |
+---------------+--------------+---------+-----------------+
 
 
 
 Care Team Providers
 
 
 
+-------------------------+------+-----------------+
| Care Team Member Name   | Role | Phone           |
+-------------------------+------+-----------------+
| Ck Michelle MD | PCP  | [phone] |
+-------------------------+------+-----------------+
 
 
 
 Reason for Visit
 
 
+-------------+-----------------+
| Reason      | Comments        |
+-------------+-----------------+
| Pre-op Exam | TLIF on 17 |
+-------------+-----------------+
 
 
 
 Encounter Details
 
 
+--------+-----------+----------------------+----------------------+----------------------+
| Date   | Type      | Department           | Care Team            | Description          |
+--------+-----------+----------------------+----------------------+----------------------+
| / | Clinical  |   [facility] [place]          |   Dreyer, Jason A,   | Lumbago with         |
| 2017   | Support   | NEUROSURGERY  [address]  | DO  [address]    | sciatica, right side |
|        |           | [address]     | [address]  [place] |  (Primary Dx); Low   |
|        |           | [place], [place]      |  [address]  [phone] | back pain,           |
|        |           | [address]           |   [phone] (Fax) | unspecified back     |
|        |           | [phone]         |                      | pain laterality,     |
|        |           |                      |                      | unspecified          |
|        |           |                      |                      | chronicity, with     |
|        |           |                      |                      | sciatica presence    |
|        |           |                      |                      | unspecified;         |
|        |           |                      |                      | Osteoarthritis of    |
|        |           |                      |                      | spine with           |
|        |           |                      |                      | radiculopathy,       |
|        |           |                      |                      | lumbar region;       |
|        |           |                      |                      | Spinal stenosis,     |
|        |           |                      |                      | lumbar region,       |
|        |           |                      |                      | without neurogenic   |
|        |           |                      |                      | claudication; Lumbar |
|        |           |                      |                      |  radiculopathy;      |
|        |           |                      |                      | Chronic left-sided   |
|        |           |                      |                      | low back pain with   |
|        |           |                      |                      | left-sided sciatica; |
|        |           |                      |                      |  Lumbar stenosis;    |
|        |           |                      |                      | Lumbar radicular     |
|        |           |                      |                      | pain; Chronic        |
|        |           |                      |                      | midline low back     |
|        |           |                      |                      | pain with left-sided |
|        |           |                      |                      |  sciatica; History   |
|        |           |                      |                      | of lumbar            |
|        |           |                      |                      | laminectomy; Back    |
|        |           |                      |                      | pain, unspecified    |
|        |           |                      |                      | back location,       |
|        |           |                      |                      | unspecified back     |
 
|        |           |                      |                      | pain laterality,     |
|        |           |                      |                      | unspecified          |
|        |           |                      |                      | chronicity           |
+--------+-----------+----------------------+----------------------+----------------------+
 
 
 
 Social History
 
 
+--------------+-------+-----------+--------+------+
| Tobacco Use  | Types | Packs/Day | Years  | Date |
|              |       |           | Used   |      |
+--------------+-------+-----------+--------+------+
| Never Smoker |       |           |        |      |
+--------------+-------+-----------+--------+------+
 
 
 
+---------------------+---+---+---+
| Smokeless Tobacco:  |   |   |   |
| Never Used          |   |   |   |
+---------------------+---+---+---+
 
 
 
+-------------+----------------------+---------+----------------------+
| Alcohol Use | Drinks/Week          | oz/Week | Comments             |
+-------------+----------------------+---------+----------------------+
| Yes         |   0 Standard drinks  | 0.0     | 1-2 drinks 2-4 times |
|             | or equivalent        |         |  per month           |
+-------------+----------------------+---------+----------------------+
 
 
 
+------------------+---------------+
| Sex Assigned at  | Date Recorded |
| Birth            |               |
+------------------+---------------+
| Not on file      |               |
+------------------+---------------+
 
 
 
+----------------+-------------+-------------+
| Job Start Date | Occupation  | Industry    |
+----------------+-------------+-------------+
| Not on file    | Not on file | Not on file |
+----------------+-------------+-------------+
 
 
 
+----------------+--------------+------------+
| Travel History | Travel Start | Travel End |
+----------------+--------------+------------+
 
 
 
+-------------------------------------+
| No recent travel history available. |
 
+-------------------------------------+
 documented as of this encounter
 
 Last Filed Vital Signs
 
 
+-------------------+-------------------+----------------------+----------+
| Vital Sign        | Reading           | Time Taken           | Comments |
+-------------------+-------------------+----------------------+----------+
| Blood Pressure    | 126/70            | 2017 10:29 AM  |          |
|                   |                   | PDT                  |          |
+-------------------+-------------------+----------------------+----------+
| Pulse             | 56                | 2017 10:29 AM  |          |
|                   |                   | PDT                  |          |
+-------------------+-------------------+----------------------+----------+
| Temperature       | -                 | -                    |          |
+-------------------+-------------------+----------------------+----------+
| Respiratory Rate  | 16                | 2017 10:29 AM  |          |
|                   |                   | PDT                  |          |
+-------------------+-------------------+----------------------+----------+
| Oxygen Saturation | -                 | -                    |          |
+-------------------+-------------------+----------------------+----------+
| Inhaled Oxygen    | -                 | -                    |          |
| Concentration     |                   |                      |          |
+-------------------+-------------------+----------------------+----------+
| Weight            | 103.4 kg (228 lb) | 2017 10:29 AM  |          |
|                   |                   | PDT                  |          |
+-------------------+-------------------+----------------------+----------+
| Height            | 190.5 cm (6' 3")  | 2017 10:29 AM  |          |
|                   |                   | PDT                  |          |
+-------------------+-------------------+----------------------+----------+
| Body Mass Index   | 28.5              | 2017 10:29 AM  |          |
|                   |                   | PDT                  |          |
+-------------------+-------------------+----------------------+----------+
 documented in this encounter
 
 Progress Notes
 Keisha Soto RN - 2017 10:00 AM PDTPatient in office for pre op appointment. Brigitte
ent advised at this time to stop: naproxen (7 days prior/90 days after); vit c, aspirin, b c
omplex (7 days prior). Patient verbalized understanding. All questions answered at this time
. 
 I Keisha Soto RN witnessed the patient leaving the office with a LSO which was provided
 by a representative of [facility].
 
 Keisha Soto RN
 
 Electronically signed by Keisha Soto RN at 2017 11:23 AM PDTdocumented in this en
counter
 
 Plan of Treatment
 
 
+--------+---------+-----------+----------------------+-------------+
| Date   | Type    | Specialty | Care Team            | Description |
+--------+---------+-----------+----------------------+-------------+
| / | Office  | Neurology |   Ravindra Munoz MD  [address] |             |
| 2020   | Visit   |           |  [address]      |             |
|        |         |           | [address]  |             |
|        |         |           | [place] [address]             |             |
|        |         |           | [phone]         |             |
 
|        |         |           | [phone] (Fax)   |             |
+--------+---------+-----------+----------------------+-------------+
 documented as of this encounter
 
 Visit Diagnoses
 
 
+------------------------------------------------------------------------------------------+
| Diagnosis                                                                                |
+------------------------------------------------------------------------------------------+
|   Lumbago with sciatica, right side - Primary                                            |
+------------------------------------------------------------------------------------------+
|   Low back pain, unspecified back pain laterality, unspecified chronicity, with sciatica |
|  presence unspecified                                                                    |
+------------------------------------------------------------------------------------------+
|   Osteoarthritis of spine with radiculopathy, lumbar region                              |
+------------------------------------------------------------------------------------------+
|   Spinal stenosis, lumbar region, without neurogenic claudication                        |
+------------------------------------------------------------------------------------------+
|   Lumbar radiculopathy  Thoracic or lumbosacral neuritis or radiculitis, unspecified     |
+------------------------------------------------------------------------------------------+
|   Chronic left-sided low back pain with left-sided sciatica                              |
+------------------------------------------------------------------------------------------+
|   Lumbar radicular pain  Thoracic or lumbosacral neuritis or radiculitis, unspecified    |
+------------------------------------------------------------------------------------------+
|   Chronic midline low back pain with left-sided sciatica                                 |
+------------------------------------------------------------------------------------------+
|   History of lumbar laminectomy                                                          |
+------------------------------------------------------------------------------------------+
|   Back pain, unspecified back location, unspecified back pain laterality, unspecified    |
| chronicity                                                                               |
+------------------------------------------------------------------------------------------+
 documented in this encounter

## 2020-06-08 NOTE — XMS
Encounter Summary
  Created on: 2020
 
 Memo Deni Siu
 External Reference #: [id number]
 : 52
 Sex: Male
 
 Demographics
 
 
+-----------------------+---------------------------+
| Address               | [address]           |
|                       | [place]  [address] |
+-----------------------+---------------------------+
| Home Phone            | [phone]           |
+-----------------------+---------------------------+
| Preferred Language    | Unknown                   |
+-----------------------+---------------------------+
| Marital Status        |                    |
+-----------------------+---------------------------+
| Bahai Affiliation | Unknown                   |
+-----------------------+---------------------------+
| Race                  | Unknown                   |
+-----------------------+---------------------------+
| Ethnic Group          | Unknown                   |
+-----------------------+---------------------------+
 
 
 Author
 
 
+--------------+--------------------------------------------+
| Author       | [facility]  |
|              | [facility] [place]                                |
+--------------+--------------------------------------------+
| Organization | [facility]  |
|              | [facility] [place]                                |
+--------------+--------------------------------------------+
| Address      | Unknown                                    |
+--------------+--------------------------------------------+
| Phone        | Unavailable                                |
+--------------+--------------------------------------------+
 
 
 
 Support
 
 
+---------------+--------------+---------+-----------------+
| Name          | Relationship | Address | Phone           |
+---------------+--------------+---------+-----------------+
| Sofiya A Brown | ECON         | Unknown | [phone] |
+---------------+--------------+---------+-----------------+
 
 
 
 Care Team Providers
 
 
 
+-----------------------+------+-----------------+
| Care Team Member Name | Role | Phone           |
+-----------------------+------+-----------------+
| Emely Hinton    | PCP  | [phone] |
+-----------------------+------+-----------------+
 
 
 
 Reason for Referral
 Diagnostic/Screening (Routine)
 
+--------------+--------+-----------+--------------+--------------+---------------+
| Status       | Reason | Specialty | Diagnoses /  | Referred By  | Referred To   |
|              |        |           | Procedures   | Contact      | Contact       |
+--------------+--------+-----------+--------------+--------------+---------------+
| Authorizatio |        |           |   Diagnoses  |   Ravindra uMnoz,  |   [facility]  |
| n not        |        |           |  Memory loss | MD  [address]     | HOSPITAL      |
| Required     |        |           |   Procedures | [address]     | [address]       |
|              |        |           |   MRI Brain  | [address]  [address] | [address]   |
|              |        |           | [address] [place]  |  [address]           | [place] |
|              |        |           |              | [place],   |  [address]   |
|              |        |           |              | [place] [address]     | Phone:        |
|              |        |           |              | Phone:       | [phone]  |
|              |        |           |              | [phone] |  Fax:         |
|              |        |           |              |   Fax:       | [phone]  |
|              |        |           |              | [phone] |               |
+--------------+--------+-----------+--------------+--------------+---------------+
 
 
 
 
 Reason for Visit
 
 
+--------------------+----------+
| Reason             | Comments |
+--------------------+----------+
| Neurologic Problem | Memory   |
+--------------------+----------+
 Evaluate & Treat (Routine)
 
+--------+--------+-----------+--------------+--------------+---------------+
| Status | Reason | Specialty | Diagnoses /  | Referred By  | Referred To   |
|        |        |           | Procedures   | Contact      | Contact       |
+--------+--------+-----------+--------------+--------------+---------------+
| Closed |        | Neurology |   Diagnoses  |   [place],    |   Ravindra Munoz,   |
|        |        |           |  [facility]    | MAYCOL Gates  | MD  [address]      |
|        |        |           | disorder due |  [address]     | [place]      |
|        |        |           |  to known    | [address]  | [address]IVE  [address]  |
|        |        |           | physiologica |  [place],  | [place]  [place], |
|        |        |           | l condition  | [place]           |  [place] [address]     |
|        |        |           | ([facility]ly [facility])        | [address]   | Phone:        |
|        |        |           |              | Phone:       | [phone]  |
|        |        |           |              | [phone] |  Fax:         |
|        |        |           |              |   Fax:       | [phone]  |
|        |        |           |              | [phone] |               |
+--------+--------+-----------+--------------+--------------+---------------+
 
 
 
 
 
 Encounter Details
 
 
+--------+---------+---------------------+----------------------+----------------------+
| Date   | Type    | Department          | Care Team            | Description          |
+--------+---------+---------------------+----------------------+----------------------+
| / | Office  |   [facility]     |   Ravindra Munoz MD  [address] | Memory loss (Primary |
| 2020   | Visit   | NEUROLOGY  [address]     |  [address]      |  Dx); Driving safety |
|        |         | [place]   | [address],  |  issue               |
|        |         | [place], [place]        | [place] [address]             |                      |
|        |         | [address]          | [phone]         |                      |
|        |         | [phone]        | [phone] (Fax)   |                      |
+--------+---------+---------------------+----------------------+----------------------+
 
 
 
 Social History
 
 
+--------------+-------+-----------+--------+------+
| Tobacco Use  | Types | Packs/Day | Years  | Date |
|              |       |           | Used   |      |
+--------------+-------+-----------+--------+------+
| Never Smoker |       |           |        |      |
+--------------+-------+-----------+--------+------+
 
 
 
+---------------------+---+---+---+
| Smokeless Tobacco:  |   |   |   |
| Never Used          |   |   |   |
+---------------------+---+---+---+
 
 
 
+-------------+----------------------+---------+--------------+
| Alcohol Use | Drinks/Week          | oz/Week | Comments     |
+-------------+----------------------+---------+--------------+
| Yes         |   0 Standard drinks  | 0.0     | Alcoholic    |
|             | or equivalent        |         | Drinks/day:  |
|             |                      |         | Occasionally |
+-------------+----------------------+---------+--------------+
 
 
 
+------------------+---------------+
| Sex Assigned at  | Date Recorded |
| Birth            |               |
+------------------+---------------+
| Not on file      |               |
+------------------+---------------+
 
 
 
+----------------+-------------+-------------+
| Job Start Date | Occupation  | Industry    |
+----------------+-------------+-------------+
 
| Not on file    | Not on file | Not on file |
+----------------+-------------+-------------+
 
 
 
+----------------+--------------+------------+
| Travel History | Travel Start | Travel End |
+----------------+--------------+------------+
 
 
 
+-------------------------------------+
| No recent travel history available. |
+-------------------------------------+
 documented as of this encounter
 
 Last Filed Vital Signs
 
 
+-------------------+-------------------+----------------------+----------+
| Vital Sign        | Reading           | Time Taken           | Comments |
+-------------------+-------------------+----------------------+----------+
| Blood Pressure    | 125/73            | 2020  8:01 AM  |          |
|                   |                   | PST                  |          |
+-------------------+-------------------+----------------------+----------+
| Pulse             | 71                | 2020  8:01 AM  |          |
|                   |                   | PST                  |          |
+-------------------+-------------------+----------------------+----------+
| Temperature       | -                 | -                    |          |
+-------------------+-------------------+----------------------+----------+
| Respiratory Rate  | -                 | -                    |          |
+-------------------+-------------------+----------------------+----------+
| Oxygen Saturation | 96%               | 2020  8:01 AM  |          |
|                   |                   | PST                  |          |
+-------------------+-------------------+----------------------+----------+
| Inhaled Oxygen    | -                 | -                    |          |
| Concentration     |                   |                      |          |
+-------------------+-------------------+----------------------+----------+
| Weight            | 101.2 kg (223 lb) | 2020  8:01 AM  |          |
|                   |                   | PST                  |          |
+-------------------+-------------------+----------------------+----------+
| Height            | 190.5 cm (6' 3")  | 2020  8:01 AM  |          |
|                   |                   | PST                  |          |
+-------------------+-------------------+----------------------+----------+
| Body Mass Index   | 27.87             | 2020  8:01 AM  |          |
|                   |                   | PST                  |          |
+-------------------+-------------------+----------------------+----------+
 documented in this encounter
 
 Patient Instructions
 Patient Instructions Ravindra Munoz MD - 2020  7:50 AM PSTWill plan for a brain MRI
 Plan for a memory test
 Please bring your blood work results with you at your next visit
 Family should closely monitor driving and medication safetyElectronically signed by Ravindra Munoz MD at 2020  8:43 AM PST
 documented in this encounter
 
 Progress Notes
 Jabier Major, Medical Assistant - 2020  7:50 AM PSTInformed patient of results n
o further questions Electronically signed by Jabier Major Medical Assistant at 20
 
20  3:57 PM Ravindra Leija MD - 2020  7:50 AM PSTReceived brain MRI wo contrast, [facility]: normal appearnce of the brain. Sinusites despite a Fess procedure. Electronicall
y signed by Ravindra Munoz MD at 2020  8:36 AM PSTRavindra Munoz MD - 2020  7:50 AM PSTForm
atting of this note might be different from the original.
 Referring Physician: MAYCOL Harmon  
 PCP: MAYCOL Harmon 
 Date of Encounter: 2020 
 
 CHIEF COMPLAINT: memory loss
 
 HISTORY OF PRESENT ILLNESS
 Deni Hampton is a pleasant 69 y.o. male with history of hypertension, hyperlipidemia, 
anxiety and depression, LAURA on CPAP, history of GI bleed due to diverticulitis who presents 
to the clinic today for evaluation of memory loss.  The patient was accompanied by his wife.
 The patient reports short-term memory loss for at least 2 years and has been progressive.  
Symptoms were first noted by his children.  He has difficulty remembering names.  He forget 
things he plans to do.  He forgets the conversation that occurred earlier the same day.  He 
had difficulty finding driving directions in [place] which he has lived most of his life. 
 Last year during a camping trip, he forgot to turn off there burner on the trailer.  Wife s
loc feels that he is safe to drive.  Wife feels that he is okay taking his own medications.
  He denies difficulty with basic ADLs.
 
 He denies new medication change over the last 2 years.
 
 Associated symptoms:
 Hallucinations: no
 Bladder incontinence:  no
 Balance: mild, history of left ankle and bilateral knee surgery
 Abnormal movement:  no
 Mood and personality changes:  Occasional agitation
 
 Previous work up
 Has recent labs but we do not have results
 
 Highest education AA + one year
 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  
 Family history negative for dementia or Parkinson disease 
 
 ALLERGIES
 Allergies 
 Allergen Reactions 
   Meperidine Nausea And Vomiting 
   Morphine Hallucination 
   Oxycodone Other (See Comments) 
   Hallucinations & sleepiness 
  
 MEDICATIONS
 
 Current Outpatient Medications: 
    aspirin 81 mg chewable tablet, Take 81 mg by mouth daily., Disp: , Rfl: 
    atorvaSTATin (LIPITOR) 40 mg tablet, Take 40 mg by mouth Daily., Disp: , Rfl: 
    B Complex Vitamins (VITAMIN B COMPLEX) tablet, Take 1 tablet by mouth Daily., Disp: , 
Rfl: 
    FLUoxetine (PROZAC) 10 mg capsule, Take 10 mg by mouth daily., Disp: , Rfl: 
    fluticasone (FLONASE) 50 mcg/nasal spray, 1 spray by Nasal route Daily., Disp: , Rfl: 
    metoprolol succinate (TOPROL-XL) 25 mg 24 hr tablet, Take 25 mg by mouth Daily., Disp:
 , Rfl: 
    Probiotic Product (PROBIOTIC DAILY PO), Take  by mouth., Disp: , Rfl: 
    Simethicone (GAS-X PO), Take  by mouth., Disp: , Rfl:  
 
 
 PAST MEDICAL HISTORY
 Past Medical History: 
 Diagnosis Date 
   Allergic rhinitis  
   Anemia  
   Anxiety disorder  
   Atherosclerosis  
   BPH (benign prostatic hyperplasia)  
   Depression  
   Diverticulosis of intestine  
   HLD (hyperlipidemia)  
   Hyperlipidemia  
   Internal derangement of knee  
   Low back pain  
   Lumbago with sciatica, right side  
   LAURA on CPAP  
   Osteoarthritis, hand  
   Osteoarthrosis, unspecified whether generalized or localized, unspecified site  
   Sleep apnea  
  uses CPAP 
   Sleep apnea  
   Unspecified disorder of nose and nasal sinuses  
  
 FAMILY HISTORY
 Family History 
 Problem Relation Age of Onset 
   Multiple sclerosis Father  
   Coronary artery disease Father  
   Cancer Father  
      Multiple Myeloma 
   Lymphoma Mother  
   Rheum arthritis Mother  
   Cancer Mother  
      Pancreatic 
   Heart disease Paternal Grandfather  
   No known problems Paternal Grandmother  
   Cancer Maternal Grandfather  
   Cancer Maternal Grandmother  
   Lupus Sister  
   Other (see comment) Sister  
      Meniere's disease 
   No known problems Child  
   No known problems Child  
   Cancer Mother  
      pancreatic cancer 
   Heart disease Father  
   Other (see comment) Father  
      Multiple myeloma 
   Multiple sclerosis Father  
   Colon polyps Neg Hx  
   Colon cancer Neg Hx  
  
 SOCIAL HISTORY
 Social History 
 
 Socioeconomic History 
   Marital status:  
   Spouse name: Not on file 
   Number of children: 2 
   Years of education: Not on file 
 
   Highest education level: Not on file 
 Occupational History 
   Comment: RETIRED 
 Social Needs 
   Financial resource strain: Not on file 
   Food insecurity: 
   Worry: Not on file 
   Inability: Not on file 
   Transportation needs: 
   Medical: Not on file 
   Non-medical: Not on file 
 Tobacco Use 
   Smoking status: Never Smoker 
   Smokeless tobacco: Never Used 
 Substance and Sexual Activity 
   Alcohol use: Yes 
   Alcohol/week: 0.0 standard drinks 
   Comment: Alcoholic Drinks/day: Occasionally 
   Drug use: Yes 
   Types: Marijuana 
   Comment: CBD cream for back pain  
   Sexual activity: Not Currently 
 Lifestyle 
   Physical activity: 
   Days per week: Not on file 
   Minutes per session: Not on file 
   Stress: Not on file 
 Relationships 
   Social connections: 
   Talks on phone: Not on file 
   Gets together: Not on file 
   Attends Oriental orthodox service: Not on file 
   Active member of club or organization: Not on file 
   Attends meetings of clubs or organizations: Not on file 
   Relationship status: Not on file 
   Intimate partner violence: 
   Fear of current or ex partner: Not on file 
   Emotionally abused: Not on file 
   Physically abused: Not on file 
   Forced sexual activity: Not on file 
 Other Topics Concern 
   Not on file 
 Social History Narrative 
  Lives in [facility], hospitals, full code 
  
 
 REVIEW OF SYSTEMS
 A comprehensive 12 system ROS was performed with intake form, in addition to HPI, ROS also 
revealed:
 HENT: Positive for hearing loss.  
 Cardiovascular: Positive for chest pain. 
 Psychiatric/Behavioral: Positive for depression. The patient is nervous/anxious.  
 All other systems reviewed and are negative.
 
 PHYSICAL EXAM
 General Exam
 Vitals: 
  20 0801 
 BP: 125/73 
 Pulse: 71 
 
 PainSc:   2 
 PainLoc: Flank 
  
 WDWN, NAD
 Lungs are clear. Heart is regular 
 Neuro Exam
 MS
 Awake, alert, oriented x3 to year, month, date and day of the week. Cooperative and appropr
iate during the encounter. Speech is clear, fluent and coherent. 
 Registers 3/3 and recalls 1/3 items.  Spell world backwards as DLORD
 CN
 Fundus difficult to view through natural nondilated pupils. VFF to confrontation. 
 EOMI. PERRLA. 
 Facial sensation is intact. 
 Face is symmetric. 
 Hearing is decreased. 
 Palate elevation is symmetric. 
 Shoulder shrug 5/5. 
 Tongue protrusion is midline. 
 Motor
 Bulk and Tone: normal
 Muscle strength: full in all extremities
 deep tendon reflexes trace in the upper and lower extremities, except trace bilateral Achil
les Reflexes. Mayfield sign absent.
 Sensory
 Intact to light touch. 
 Coordination
 FNF intact
 Gait
 Steady 
 
 DATA
 Results for orders placed or performed in visit on 19 
 Vitamin B-12 
 Result Value Ref Range 
  VITAMIN B-12 909 254 - 1,320 pg/mL 
 Folate 
 Result Value Ref Range 
  Folate >24.0 ng/mL 
  
 
 ASSESSMENT & PLAN
 
 69 y.o. male who presents with progressive worsening cognitive impairment for 2 years at [facility]. Review of the history did not show significant medical conditions, use of medications, 
depression or sleep issues to explain the memory loss. Some of the items in history are spec
ific or severe enough to be concerned for dementia due to neurodegeneration.  Other secondar
y cause of cognitive impairment not excluded.
 
 Plan:
 - Recommend [facility] for more detailed memory test and to evaluate for patterns of cognitive de
ficit to suggest certain type of dementia
 - Plan for brain MRI to evaluate intracranial structural lesions for memory and also evalua
te for dementia
 - Recommended to check B12 and TSH; the patient states that he just had blood work done.  DNEA mansfield is instructed to bring the lab results to his next visit for me to review
 - Discussed coping strategy and safety issues. Family to supervise medication and driving c
losely.  He should stop driving if there is any concern of driving safety.
 - Follow up after the tests
 
 
 Thank you for allowing me to take care of this patient. Please do not hesitate to contact m
e if you have any questions.
 Cc:
 MAYCOL Harmon 
 
 This note was prepared using Dragon dictation voice recognition technology. There may be so
und-alike errors even though every effort has been made to ensure accuracy. 
 
 Electronically signed by Ravindra Munoz MD at 2020 11:56 AM Jabier Monson Medical A
ssistant - 2020  7:50 AM PSTReview of Systems 
 HENT: Positive for hearing loss.  
 Cardiovascular: Positive for chest pain. 
 Psychiatric/Behavioral: Positive for depression. The patient is nervous/anxious.  
 All other systems reviewed and are negative.
 
 Electronically signed by Jabier Major Medical Assistant at 2020 11:56 AM Dia
mented in this encounter
 
 Plan of Treatment
 
 
+--------+---------+-----------+----------------------+-------------+
| Date   | Type    | Specialty | Care Team            | Description |
+--------+---------+-----------+----------------------+-------------+
| / | Office  | Neurology |   Ravindra Munoz MD  [address] |             |
2020   | Visit   |           |  [address]      |             |
|        |         |           | [place],  |             |
|        |         |           | [place] [address]             |             |
|        |         |           | [phone]         |             |
|        |         |           | [phone] (Fax)   |             |
+--------+---------+-----------+----------------------+-------------+
 
 
 
+---------------+---------+--------+----------------------+----------------------+
| Name          | Type    | Priori | Associated Diagnoses | Order Schedule       |
|               |         | ty     |                      |                      |
+---------------+---------+--------+----------------------+----------------------+
| MRI Brain wo  | Imaging | Routin |   Memory loss        | Expected:            |
| Contrast      |         | e      |                      | 2020, Expires: |
|               |         |        |                      |  2021          |
+---------------+---------+--------+----------------------+----------------------+
 documented as of this encounter
 
 Procedures
 
 
+-------------------+--------+-------------+----------------------+----------------------+
| Procedure Name    | Priori | Date/Time   | Associated Diagnosis | Comments             |
|                   | ty     |             |                      |                      |
+-------------------+--------+-------------+----------------------+----------------------+
| IMAGING REPORT -  |        | 2020  |                      |   Results for this   |
| EXTERNAL SCAN     |        | 12:00 AM    |                      | procedure are in the |
|                   |        | PST         |                      |  results section.    |
+-------------------+--------+-------------+----------------------+----------------------+
 documented in this encounter
 
 Results
 IMAGING REPORT - EXTERNAL SCAN (2020 12:00 AM PST)
 
 
+------------------------------------------------------------------------+--------------+
| Narrative                                                              | Performed At |
+------------------------------------------------------------------------+--------------+
|   This result has an attachment that is not available.  Ordered by an  |              |
| unspecified provider.                                                  |              |
+------------------------------------------------------------------------+--------------+
 documented in this encounter
 
 Visit Diagnoses
 
 
+---------------------------------------------------------------------------------------+
| Diagnosis                                                                             |
+---------------------------------------------------------------------------------------+
|   Memory loss - Primary                                                               |
+---------------------------------------------------------------------------------------+
|   Driving safety issue  Other specified personal history presenting hazards to health |
+---------------------------------------------------------------------------------------+
 documented in this encounter

## 2020-06-08 NOTE — XMS
Encounter Summary
  Created on: 2020
 
 Memo Deni Siu
 External Reference #: [id number]
 : 52
 Sex: Male
 
 Demographics
 
 
+-----------------------+---------------------------+
| Address               | [address]           |
|                       | [place]  [address] |
+-----------------------+---------------------------+
| Home Phone            | [phone]           |
+-----------------------+---------------------------+
| Preferred Language    | Unknown                   |
+-----------------------+---------------------------+
| Marital Status        |                    |
+-----------------------+---------------------------+
| Mosque Affiliation | Unknown                   |
+-----------------------+---------------------------+
| Race                  | Unknown                   |
+-----------------------+---------------------------+
| Ethnic Group          | Unknown                   |
+-----------------------+---------------------------+
 
 
 Author
 
 
+--------------+--------------------------------------------+
| Author       | [facility] [place]  |
|              | and [place]                                |
+--------------+--------------------------------------------+
| Organization | [facility]  |
|              | and [place]                                |
+--------------+--------------------------------------------+
| Address      | Unknown                                    |
+--------------+--------------------------------------------+
| Phone        | Unavailable                                |
+--------------+--------------------------------------------+
 
 
 
 Support
 
 
+---------------+--------------+---------+-----------------+
| Name          | Relationship | Address | Phone           |
+---------------+--------------+---------+-----------------+
| Sofiya A Brown | ECON         | Unknown | [phone] |
+---------------+--------------+---------+-----------------+
 
 
 
 Care Team Providers
 
 
 
+-------------------------+------+-----------------+
| Care Team Member Name   | Role | Phone           |
+-------------------------+------+-----------------+
| Ck Michelle MD | PCP  | [phone] |
+-------------------------+------+-----------------+
 
 
 
 Reason for Visit
 
 
+--------------+------------------------+
| Reason       | Comments               |
+--------------+------------------------+
| Imaging Only | 1 year PO lumbar xrays |
+--------------+------------------------+
 
 
 
 Encounter Details
 
 
+--------+-----------+----------------------+----------------------+----------------------+
| Date   | Type      | Department           | Care Team            | Description          |
+--------+-----------+----------------------+----------------------+----------------------+
| / | Telephone |   [facility]          |   Dreyer, Jason A,   | Imaging Only (1 year |
| 2018   |           | NEUROSURGERY  [address]  | DO  [address]    |  PO lumbar xrays)    |
|        |           | [address]     | [address]  [place], [place] |                      |
|        |           | [place], [place]      |  [address]  [phone] |                      |
|        |           | [address]           |   [phone] (Fax) |                      |
|        |           | [phone]         |                      |                      |
+--------+-----------+----------------------+----------------------+----------------------+
 
 
 
 Social History
 
 
+--------------+-------+-----------+--------+------+
| Tobacco Use  | Types | Packs/Day | Years  | Date |
|              |       |           | Used   |      |
+--------------+-------+-----------+--------+------+
| Never Smoker |       |           |        |      |
+--------------+-------+-----------+--------+------+
 
 
 
+---------------------+---+---+---+
| Smokeless Tobacco:  |   |   |   |
| Never Used          |   |   |   |
+---------------------+---+---+---+
 
 
 
+-------------+----------------------+---------+----------------------+
| Alcohol Use | Drinks/Week          | oz/Week | Comments             |
+-------------+----------------------+---------+----------------------+
| No          |   0 Standard drinks  | 0.0     | No longer drinking   |
 
|             | or equivalent        |         | 1-2 drinks 2-4 times |
|             |                      |         |  a month             |
+-------------+----------------------+---------+----------------------+
 
 
 
+------------------+---------------+
| Sex Assigned at  | Date Recorded |
| Birth            |               |
+------------------+---------------+
| Not on file      |               |
+------------------+---------------+
 
 
 
+----------------+-------------+-------------+
| Job Start Date | Occupation  | Industry    |
+----------------+-------------+-------------+
| Not on file    | Not on file | Not on file |
+----------------+-------------+-------------+
 
 
 
+----------------+--------------+------------+
| Travel History | Travel Start | Travel End |
+----------------+--------------+------------+
 
 
 
+-------------------------------------+
| No recent travel history available. |
+-------------------------------------+
 documented as of this encounter
 
 Plan of Treatment
 
 
+--------+---------+-----------+----------------------+-------------+
| Date   | Type    | Specialty | Care Team            | Description |
+--------+---------+-----------+----------------------+-------------+
| / | Office  | Neurology |   Ravindra Munoz MD  [address] |             |
| 2020   | Visit   |           |  [facility]      |             |
|        |         |           | [place]  |             |
|        |         |           | [place] [address]             |             |
|        |         |           | [phone]         |             |
|        |         |           | [phone] (Fax)   |             |
+--------+---------+-----------+----------------------+-------------+
 documented as of this encounter
 
 Visit Diagnoses
 Not on filedocumented in this encounter

## 2020-06-08 NOTE — XMS
Encounter Summary
  Created on: 2020
 
 Memo Deni Siu
 External Reference #: [id number]
 : 52
 Sex: Male
 
 Demographics
 
 
+-----------------------+---------------------------+
| Address               | [address]           |
|                       | [place]  [address] |
+-----------------------+---------------------------+
| Home Phone            | [phone]           |
+-----------------------+---------------------------+
| Preferred Language    | Unknown                   |
+-----------------------+---------------------------+
| Marital Status        |                    |
+-----------------------+---------------------------+
| [employer] Affiliation | Unknown                   |
+-----------------------+---------------------------+
| Race                  | Unknown                   |
+-----------------------+---------------------------+
| Ethnic Group          | Unknown                   |
+-----------------------+---------------------------+
 
 
 Author
 
 
+--------------+--------------------------------------------+
| Author       | [facility] [place]  |
|              | and [place]                                |
+--------------+--------------------------------------------+
| Organization | [facility]  |
|              | and [place]                                |
+--------------+--------------------------------------------+
| Address      | Unknown                                    |
+--------------+--------------------------------------------+
| Phone        | Unavailable                                |
+--------------+--------------------------------------------+
 
 
 
 Support
 
 
+---------------+--------------+---------+-----------------+
| Name          | Relationship | Address | Phone           |
+---------------+--------------+---------+-----------------+
| Sofiya A Brown | ECON         | Unknown | [phone] |
+---------------+--------------+---------+-----------------+
 
 
 
 Care Team Providers
 
 
 
+-------------------------+------+-----------------+
| Care Team Member Name   | Role | Phone           |
+-------------------------+------+-----------------+
| Ck Michelle MD | PCP  | [phone] |
+-------------------------+------+-----------------+
 
 
 
 Encounter Details
 
 
+--------+-------------+----------------------+----------------------+----------------------
+
| Date   | Type        | Department           | Care Team            | Description          
|
+--------+-------------+----------------------+----------------------+----------------------
+
| / | Orders Only |   [facility]          |   Dreyer, Jason A,   | Osteoarthritis of    
|
| 2017   |             | NEUROSURGERY  [address]  | DO  [address]    | spine with           
|
|        |             | [address]     | [address]  [place], [place] | radiculopathy,       
|
|        |             | [place], [place]      |  [address]  [phone] | lumbar region        
|
|        |             | [address]           |   [phone] (Fax) | (Primary Dx); Status 
|
|        |             | [phone]         |                      |  post lumbar spinal  
|
|        |             |                      |                      | fusion               
|
+--------+-------------+----------------------+----------------------+----------------------
+
 
 
 
 Social History
 
 
+--------------+-------+-----------+--------+------+
| Tobacco Use  | Types | Packs/Day | Years  | Date |
|              |       |           | Used   |      |
+--------------+-------+-----------+--------+------+
| Never Smoker |       |           |        |      |
+--------------+-------+-----------+--------+------+
 
 
 
+---------------------+---+---+---+
| Smokeless Tobacco:  |   |   |   |
| Never Used          |   |   |   |
+---------------------+---+---+---+
 
 
 
+-------------+----------------------+---------+----------------------+
| Alcohol Use | Drinks/Week          | oz/Week | Comments             |
+-------------+----------------------+---------+----------------------+
 
| Yes         |   0 Standard drinks  | 0.0     | 1-2 drinks 2-4 times |
|             | or equivalent        |         |  per month           |
+-------------+----------------------+---------+----------------------+
 
 
 
+------------------+---------------+
| Sex Assigned at  | Date Recorded |
| Birth            |               |
+------------------+---------------+
| Not on file      |               |
+------------------+---------------+
 
 
 
+----------------+-------------+-------------+
| Job Start Date | Occupation  | Industry    |
+----------------+-------------+-------------+
| Not on file    | Not on file | Not on file |
+----------------+-------------+-------------+
 
 
 
+----------------+--------------+------------+
| Travel History | Travel Start | Travel End |
+----------------+--------------+------------+
 
 
 
+-------------------------------------+
| No recent travel history available. |
+-------------------------------------+
 documented as of this encounter
 
 Plan of Treatment
 
 
+--------+---------+-----------+----------------------+-------------+
| Date   | Type    | Specialty | Care Team            | Description |
+--------+---------+-----------+----------------------+-------------+
| / | Office  | Neurology |   Ravindra Munoz MD  [address] |             |
|    | Visit   |           |  [address]      |             |
|        |         |           | [place]  |             |
|        |         |           | [place] [address]             |             |
|        |         |           | [phone]         |             |
|        |         |           | [phone] (Fax)   |             |
+--------+---------+-----------+----------------------+-------------+
 
 
 
+----------------------+---------+--------+----------------------+----------------------+
| Name                 | Type    | Priori | Associated Diagnoses | Order Schedule       |
|                      |         | ty     |                      |                      |
+----------------------+---------+--------+----------------------+----------------------+
| XR Lumbar Spine 2 or | Imaging | Routin |   Osteoarthritis of  | Expected: 2017 |
|  3 Vw                |         | e      | spine with           |  (Approximate),      |
|                      |         |        | radiculopathy,       | Expires: 2018  |
|                      |         |        | lumbar region        |                      |
|                      |         |        | Status post lumbar   |                      |
|                      |         |        | spinal fusion        |                      |
 
+----------------------+---------+--------+----------------------+----------------------+
 documented as of this encounter
 
 Visit Diagnoses
 
 
+-----------------------------------------------------------------------+
| Diagnosis                                                             |
+-----------------------------------------------------------------------+
|   Osteoarthritis of spine with radiculopathy, lumbar region - Primary |
+-----------------------------------------------------------------------+
|   Status post lumbar spinal fusion  Arthrodesis status                |
+-----------------------------------------------------------------------+
 documented in this encounter

## 2020-06-08 NOTE — XMS
Clinical Summary
  Created on: 2020
 
 Memo Deni Siu
 External Reference #: [id number]
 : 52
 Sex: Male
 
 Demographics
 
 
+-----------------------+---------------------------+
| Address               | [address]           |
|                       | [place]  [address] |
+-----------------------+---------------------------+
| Home Phone            | [phone]           |
+-----------------------+---------------------------+
| Preferred Language    | Unknown                   |
+-----------------------+---------------------------+
| Marital Status        |                    |
+-----------------------+---------------------------+
| Rastafari Affiliation | Unknown                   |
+-----------------------+---------------------------+
| Race                  | Unknown                   |
+-----------------------+---------------------------+
| Ethnic Group          | Unknown                   |
+-----------------------+---------------------------+
 
 
 Author
 
 
+--------------+--------------------------------------------+
| Author       | [facility]  |
|              | and [place]                                |
+--------------+--------------------------------------------+
| Organization | [facility]  |
|              | and [place]                                |
+--------------+--------------------------------------------+
| Address      | Unknown                                    |
+--------------+--------------------------------------------+
| Phone        | Unavailable                                |
+--------------+--------------------------------------------+
 
 
 
 Support
 
 
+---------------+--------------+---------+-----------------+
| Name          | Relationship | Address | Phone           |
+---------------+--------------+---------+-----------------+
| Sofiya A Brown | ECON         | Unknown | [phone] |
+---------------+--------------+---------+-----------------+
 
 
 
 Care Team Providers
 
 
 
+-----------------------+------+-----------------+
| Care Team Member Name | Role | Phone           |
+-----------------------+------+-----------------+
| Emely Hinton    | PCP  | [phone] |
+-----------------------+------+-----------------+
 
 
 
 Allergies
 
 
+----------------+----------------------+----------+----------+---------------------+
| Active Allergy | Reactions            | Severity | Noted    | Comments            |
|                |                      |          | Date     |                     |
+----------------+----------------------+----------+----------+---------------------+
| Meperidine     | Nausea And Vomiting  | Medium   | 05/15/20 |                     |
|                |                      |          | 17       |                     |
+----------------+----------------------+----------+----------+---------------------+
| Morphine       | Hallucination        | Medium   | 10/10/20 |                     |
|                |                      |          | 18       |                     |
+----------------+----------------------+----------+----------+---------------------+
| Oxycodone      | Other (See Comments) | Medium   | 20 |   Hallucinations &  |
|                |                      |          | 17       | sleepiness          |
+----------------+----------------------+----------+----------+---------------------+
 
 
 
 Medications
 
 
+----------------------+----------------------+-----------+---------+------+------+-------+
| Medication           | Sig                  | Dispensed | Refills | Star | End  | Statu |
|                      |                      |           |         | t    | Date | s     |
|                      |                      |           |         | Date |      |       |
+----------------------+----------------------+-----------+---------+------+------+-------+
|   atorvaSTATin       | Take 40 mg by mouth  |           | 0       | 02/0 |      | Activ |
| (LIPITOR) 40 mg      | Daily.               |           |         | 4/20 |      | e     |
| tablet               |                      |           |         | 15   |      |       |
+----------------------+----------------------+-----------+---------+------+------+-------+
|   fluticasone        | 1 spray by Nasal     |           | 0       |      |      | Activ |
| (FLONASE) 50         | route Daily.         |           |         |      |      | e     |
| mcg/nasal spray      |                      |           |         |      |      |       |
+----------------------+----------------------+-----------+---------+------+------+-------+
|   metoprolol         | Take 25 mg by mouth  |           | 0       | 02/0 |      | Activ |
| succinate            | Daily.               |           |         | 6/20 |      | e     |
| (TOPROL-XL) 25 mg 24 |                      |           |         | 17   |      |       |
|  hr tablet           |                      |           |         |      |      |       |
+----------------------+----------------------+-----------+---------+------+------+-------+
|   B Complex Vitamins | Take 1 tablet by     |           | 0       |      |      | Activ |
|  (VITAMIN B COMPLEX) | mouth Daily.         |           |         |      |      | e     |
|  tablet              |                      |           |         |      |      |       |
+----------------------+----------------------+-----------+---------+------+------+-------+
|   aspirin 81 mg      | Take 81 mg by mouth  |           | 0       |      |      | Activ |
| chewable tablet      | daily.               |           |         |      |      | e     |
+----------------------+----------------------+-----------+---------+------+------+-------+
|   FLUoxetine         | Take 10 mg by mouth  |           | 0       |      |      | Activ |
| (PROZAC) 10 mg       | daily.               |           |         |      |      | e     |
| capsule              |                      |           |         |      |      |       |
 
+----------------------+----------------------+-----------+---------+------+------+-------+
|   Probiotic Product  | Take  by mouth.      |           | 0       |      |      | Activ |
| (PROBIOTIC DAILY PO) |                      |           |         |      |      | e     |
+----------------------+----------------------+-----------+---------+------+------+-------+
|   donepezil          | Take 1 tablet by     |   30      | 5       | 05/1 |      | Activ |
| (ARICEPT) 5 mg       | mouth nightly.       | tablet    |         | 1/20 |      | e     |
| tablet               |                      |           |         | 20   |      |       |
+----------------------+----------------------+-----------+---------+------+------+-------+
|   Simethicone (GAS-X | Take  by mouth.      |           | 0       |      | 05/ | Disco |
|  PO)                 |                      |           |         |      | 1/20 | ntinu |
|                      |                      |           |         |      | 20   | ed    |
|                      |                      |           |         |      |      | (Ther |
|                      |                      |           |         |      |      | apy   |
|                      |                      |           |         |      |      | compl |
|                      |                      |           |         |      |      | eted) |
+----------------------+----------------------+-----------+---------+------+------+-------+
 
 
 
 Active Problems
 
 
+------------------------------------------------------------------+------------+
| Problem                                                          | Noted Date |
+------------------------------------------------------------------+------------+
| Acute posthemorrhagic anemia                                     | 2018 |
+------------------------------------------------------------------+------------+
| SÁNCHEZ (dyspnea on exertion)                                        | 2018 |
+------------------------------------------------------------------+------------+
| Leukocytosis                                                     | 2018 |
+------------------------------------------------------------------+------------+
| Palpitations                                                     | 2018 |
+------------------------------------------------------------------+------------+
| Rectal bleeding                                                  | 2018 |
+------------------------------------------------------------------+------------+
| Syncope and collapse                                             | 2018 |
+------------------------------------------------------------------+------------+
| Anticipated difficulty with intubation                           | 2017 |
+------------------------------------------------------------------+------------+
| Osteoarthritis of spine with radiculopathy, lumbar region - Jul  | 2017 |
|                                                              |            |
+------------------------------------------------------------------+------------+
| Beta Blockers - Daily Use                                        | 2017 |
+------------------------------------------------------------------+------------+
| BPH (benign prostatic hyperplasia)                               | 2017 |
+------------------------------------------------------------------+------------+
| LAURA (obstructive sleep apnea) - H/O CPAP Use                     | 2017 |
+------------------------------------------------------------------+------------+
 
 
 
+-------------------------+
|   Overview:   uses CPAP |
+-------------------------+
 
 
 
+--------------------------------------------+------------+
| H/O TKA Total knee arthroplasty, BILATERAL | 2017 |
+--------------------------------------------+------------+
 
| H/O LEFT Ankle fusion                      | 2017 |
+--------------------------------------------+------------+
| H/O Lumbar discectomy L1-2, L2-3 - 2000    | 2017 |
+--------------------------------------------+------------+
| Coronary atherosclerosis                   | 2015 |
+--------------------------------------------+------------+
 
 
 
+-------------------------------------------------------------------+
|   Overview:   Last Assessment & Plan: He has evidence of coronary |
|  artery disease given his markedly elevated CT coronary calcium   |
| score of 989 (75th-90th percentile for age). His nuclear stress   |
| test today is reassuring in that there is no evidence of          |
| reversible ischemia or infarct. This suggests that although he    |
| does have evidence of coronary atherosclerosis, it is not         |
| obstructive to the degree to cause ischemia or the coronary       |
| atherosclerosis is extraluminal or within the vessel wall. Hence, |
|  unless he develops symptoms concerning for angina, there is no   |
| current indication for a coronary angiogram. He should still be   |
| aggressively medically treated for his CAD to decrease his risk   |
| of cardiac events in the future. He may proceed with his knee     |
| surgery without further cardiac testing. He is a low to moderate  |
| risk surgical candidate for a moderate risk procedure.-Continue   |
| ASA 81 mg PO daily which I would prefer that he take through      |
| surgery if Dr. Preciado is agreeable. If not, he may still come   |
| off ASA for the procedure.-Start Metoprolol XL 25 mg PO daily for |
|  cardioprotection through surgery in the setting of CAD.-Change   |
| lovastatin to a moderate intensity statin, atorvastatin 40 mg PO  |
| daily to try to reach a goal LDL<70-We will send this note to  |
|  Ilana's office for surgical clearance                         |
+-------------------------------------------------------------------+
 
 
 
+----------------+------------+
| Hyperlipidemia | 2015 |
+----------------+------------+
 
 
 
+---------------------------------------------------------------+
|   Overview:   Last Assessment & Plan: Last LDL 90.-Change     |
| lovastatin to atorvastatin 40 mg PO daily as above to try to  |
| reach LDL goal <70 given CAD                                  |
+---------------------------------------------------------------+
 
 
 
+------------+------------+
| Overweight | 2015 |
+------------+------------+
 
 
 
+------------------------------------------------------------------+
|   Overview:   Last Assessment & Plan: BMI 29.-Extensive dietary  |
| counseling provided regarding adopting a low carbohydrate,       |
| Mediterranean type diet for heart health and weight loss.        |
+------------------------------------------------------------------+
 
 
 
 
+-----------------------------------+---+
| Lumbago with sciatica, right side |   |
+-----------------------------------+---+
| Low back pain                     |   |
+-----------------------------------+---+
| Atherosclerosis                   |   |
+-----------------------------------+---+
 
 
 
 Encounters
 
 
+--------+-----------+-----------+----------------+----------------------+
| Date   | Type      | Specialty | Care Team      | Description          |
+--------+-----------+-----------+----------------+----------------------+
| / | Office    | Neurology |   Ravindra Munoz MD | Memory loss (Primary |
|    | Visit     |           |                |  Dx); Dementia       |
|        |           |           |                | without behavioral   |
|        |           |           |                | disturbance,         |
|        |           |           |                | unspecified dementia |
|        |           |           |                |  type (HCC)          |
+--------+-----------+-----------+----------------+----------------------+
| / | Clinical  | Neurology |   Ravindra Munoz MD | Memory loss (Primary |
|    | Support   |           |                |  Dx)                 |
+--------+-----------+-----------+----------------+----------------------+
 from Last 3 Months
 
 Immunizations
 
 
+----------------------+----------------------+----------+
| Name                 | Administration Dates | Next Due |
+----------------------+----------------------+----------+
| INFLUENZA PF 66 Y OR | 10/05/2017           |          |
|  >,TRIVALENT (FLUAD) |                      |          |
+----------------------+----------------------+----------+
| INFLUENZA TRIV       | 2016           |          |
| W/PRES(PED/ADOL/ADUL |                      |          |
| T),MULTIDOSE         |                      |          |
+----------------------+----------------------+----------+
| PNEUMOCOCCAL         | 2016           |          |
| CONJUGATE 13-VALENT  |                      |          |
| (PCV13)              |                      |          |
+----------------------+----------------------+----------+
| PNEUMOCOCCAL         | 2017           |          |
| POLYSACCHARIDE       |                      |          |
| 23-VALENT (PPSV23)   |                      |          |
+----------------------+----------------------+----------+
| TDAP, (ADOL/ADULT)   | 2014           |          |
+----------------------+----------------------+----------+
| ZOSTER, 1 DOSE       | 2013           |          |
| (ZOSTAVAX)           |                      |          |
+----------------------+----------------------+----------+
 
 
 
 
 Family History
 
 
+---------------------+-----------+------+-------------------+
| Medical History     | Relation  | Name | Comments          |
+---------------------+-----------+------+-------------------+
| No known problems   | Child     |      |                   |
+---------------------+-----------+------+-------------------+
| No known problems   | Child     |      |                   |
+---------------------+-----------+------+-------------------+
| Cancer              | Father    |      | Multiple Myeloma  |
+---------------------+-----------+------+-------------------+
| Coronary artery     | Father    |      |                   |
| disease             |           |      |                   |
+---------------------+-----------+------+-------------------+
| Multiple sclerosis  | Father    |      |                   |
+---------------------+-----------+------+-------------------+
| Heart disease       | Father    |      |                   |
+---------------------+-----------+------+-------------------+
| Multiple sclerosis  | Father    |      |                   |
+---------------------+-----------+------+-------------------+
| Other (see comment) | Father    |      | Multiple myeloma  |
+---------------------+-----------+------+-------------------+
| Cancer              | Maternal  |      |                   |
|                     | Grandfath |      |                   |
|                     | er        |      |                   |
+---------------------+-----------+------+-------------------+
| Cancer              | Maternal  |      |                   |
|                     | Grandmoth |      |                   |
|                     | er        |      |                   |
+---------------------+-----------+------+-------------------+
| Cancer              | Mother    |      | Pancreatic        |
+---------------------+-----------+------+-------------------+
| Lymphoma            | Mother    |      |                   |
+---------------------+-----------+------+-------------------+
| Rheum arthritis     | Mother    |      |                   |
+---------------------+-----------+------+-------------------+
| Cancer              | Mother    |      | pancreatic cancer |
+---------------------+-----------+------+-------------------+
| Heart disease       | Paternal  |      |                   |
|                     | Grandfath |      |                   |
|                     | er        |      |                   |
+---------------------+-----------+------+-------------------+
| No known problems   | Paternal  |      |                   |
|                     | Grandmoth |      |                   |
|                     | er        |      |                   |
+---------------------+-----------+------+-------------------+
| Lupus               | Sister    |      |                   |
+---------------------+-----------+------+-------------------+
| Other (see comment) | Sister    |      | Meniere's disease |
+---------------------+-----------+------+-------------------+
| Colon cancer        | Neg Hx    |      |                   |
+---------------------+-----------+------+-------------------+
| Colon polyps        | Neg Hx    |      |                   |
+---------------------+-----------+------+-------------------+
 
 
 
+----------------------+------+----------+-------------------+
| Relation             | Name | Status   | Comments          |
 
+----------------------+------+----------+-------------------+
| Child                |      | Other    | STATUS NOT LISTED |
+----------------------+------+----------+-------------------+
| Child                |      | Other    | STATUS NOT LISTED |
+----------------------+------+----------+-------------------+
| Child                |      |          |                   |
+----------------------+------+----------+-------------------+
| Child                |      |          |                   |
+----------------------+------+----------+-------------------+
| Father               |      |          |                   |
+----------------------+------+----------+-------------------+
| Father               |      |          |                   |
+----------------------+------+----------+-------------------+
| Father               |      |          |                   |
+----------------------+------+----------+-------------------+
| Maternal Grandfather |      |  |                   |
|                      |      |   (Age   |                   |
|                      |      | 68)      |                   |
+----------------------+------+----------+-------------------+
| Maternal Grandmother |      |  |                   |
|                      |      |   (Age   |                   |
|                      |      | 73)      |                   |
+----------------------+------+----------+-------------------+
| Mother               |      |          |                   |
+----------------------+------+----------+-------------------+
| Mother               |      |          |                   |
+----------------------+------+----------+-------------------+
| Mother               |      |          |                   |
+----------------------+------+----------+-------------------+
| Paternal Grandfather |      |  | HEART             |
+----------------------+------+----------+-------------------+
| Paternal Grandmother |      |  |                   |
|                      |      |   (Age   |                   |
|                      |      | 76)      |                   |
+----------------------+------+----------+-------------------+
| Sister               |      | Alive    |                   |
+----------------------+------+----------+-------------------+
| Sister               |      |          |                   |
+----------------------+------+----------+-------------------+
| Sister               |      |          |                   |
+----------------------+------+----------+-------------------+
 
 
 
 Social History
 
 
+--------------+-------+-----------+--------+------+
| Tobacco Use  | Types | Packs/Day | Years  | Date |
|              |       |           | Used   |      |
+--------------+-------+-----------+--------+------+
| Never Smoker |       |           |        |      |
+--------------+-------+-----------+--------+------+
 
 
 
+---------------------+---+---+---+
| Smokeless Tobacco:  |   |   |   |
| Never Used          |   |   |   |
+---------------------+---+---+---+
 
 
 
 
+-------------+----------------------+---------+--------------+
| Alcohol Use | Drinks/Week          | oz/Week | Comments     |
+-------------+----------------------+---------+--------------+
| Yes         |   0 Standard drinks  | 0.0     | Alcoholic    |
|             | or equivalent        |         | Drinks/day:  |
|             |                      |         | Occasionally |
+-------------+----------------------+---------+--------------+
 
 
 
+------------------+---------------+
| Sex Assigned at  | Date Recorded |
| Birth            |               |
+------------------+---------------+
| Not on file      |               |
+------------------+---------------+
 
 
 
+----------------+-------------+-------------+
| Job Start Date | Occupation  | Industry    |
+----------------+-------------+-------------+
| Not on file    | Not on file | Not on file |
+----------------+-------------+-------------+
 
 
 
+----------------+--------------+------------+
| Travel History | Travel Start | Travel End |
+----------------+--------------+------------+
 
 
 
+-------------------------------------+
| No recent travel history available. |
+-------------------------------------+
 
 
 
 Last Filed Vital Signs
 
 
+-------------------+---------------------+----------------------+----------+
| Vital Sign        | Reading             | Time Taken           | Comments |
+-------------------+---------------------+----------------------+----------+
| Blood Pressure    | 153/74              | 2020  9:48 AM  |          |
|                   |                     | PDT                  |          |
+-------------------+---------------------+----------------------+----------+
| Pulse             | 63                  | 2020  9:48 AM  |          |
|                   |                     | PDT                  |          |
+-------------------+---------------------+----------------------+----------+
| Temperature       | 36.3   C (97.3   F) | 2020  9:48 AM  |          |
|                   |                     | PDT                  |          |
+-------------------+---------------------+----------------------+----------+
| Respiratory Rate  | 16                  | 2018 12:14 PM  |          |
|                   |                     | PDT                  |          |
+-------------------+---------------------+----------------------+----------+
 
| Oxygen Saturation | 96%                 | 2020  9:48 AM  |          |
|                   |                     | PDT                  |          |
+-------------------+---------------------+----------------------+----------+
| Inhaled Oxygen    | -                   | -                    |          |
| Concentration     |                     |                      |          |
+-------------------+---------------------+----------------------+----------+
| Weight            | 101.2 kg (223 lb)   | 2020  9:48 AM  |          |
|                   |                     | PDT                  |          |
+-------------------+---------------------+----------------------+----------+
| Height            | 190.5 cm (6' 3")    | 2020  9:48 AM  |          |
|                   |                     | PDT                  |          |
+-------------------+---------------------+----------------------+----------+
| Body Mass Index   | 27.87               | 2020  9:48 AM  |          |
|                   |                     | PDT                  |          |
+-------------------+---------------------+----------------------+----------+
 
 
 
 Plan of Treatment
 
 
+--------+---------+-----------+----------------------+-------------+
| Date   | Type    | Specialty | Care Team            | Description |
+--------+---------+-----------+----------------------+-------------+
| / | Office  | Neurology |   Ravindra Munoz MD  [address] |             |
|    | Visit   |           |  [address]      |             |
|        |         |           | [place]  |             |
|        |         |           | [place] [address]             |             |
|        |         |           | [phone]         |             |
|        |         |           | [phone] (Fax)   |             |
+--------+---------+-----------+----------------------+-------------+
 
 
 
+---------------------+-----------+------------------------+----------+
| Health Maintenance  | Due Date  | Last Done              | Comments |
+---------------------+-----------+------------------------+----------+
| Hepatitis C         |  |                        |          |
| Screening           | 1         |                        |          |
+---------------------+-----------+------------------------+----------+
| Vaccine: Zoster (2  |  | 2013             |          |
| of 3)               | 3         |                        |          |
+---------------------+-----------+------------------------+----------+
| Adult Annual        |  |                        |          |
| Wellness Visit      | 5         |                        |          |
+---------------------+-----------+------------------------+----------+
| Vaccine: Influenza  |  | 10/05/2017, 2016 |          |
| (Season Ended)      | 0         |                        |          |
+---------------------+-----------+------------------------+----------+
| Colorectal Cancer   |  | 2013             |          |
| Screening           | 3         |                        |          |
| (Colonoscopy)       |           |                        |          |
+---------------------+-----------+------------------------+----------+
| Vaccine:            |  | 2014             |          |
| Dtap/Tdap/Td (2 -   | 4         |                        |          |
| Td)                 |           |                        |          |
+---------------------+-----------+------------------------+----------+
| Vaccine:            | Completed | 2017, 2016 |          |
| Pneumococcal 65+    |           |                        |          |
+---------------------+-----------+------------------------+----------+
 
 
 
 
 Implants
 
 
+-------------------------------+--------+--------+-------------+--------+--------+--------+
| Implanted                     | Type   | Area   | Manufacture | Device | Shelf  | Model  |
|                               |        |        | r           |        | Expira | /      |
|                               |        |        |             | Identi | tion   | Serial |
|                               |        |        |             | fier   | Date   |  / Lot |
+-------------------------------+--------+--------+-------------+--------+--------+--------+
| Bone Chips 15cc -             | Bone   | Poster | RTI         |        | / | [id number] |
| [id number]: Qty: 1  |        | ior:   | BIOLOGICS   |        |    |        |
| on 2017 by Dreyer,      |        | Spine  | INC - [employer]BIO  |        |        | /[id number] |
| Chacho AGUIRRE DO at [facility] |        | Lumbar |             |        |        | 6-016  |
|  [facility]    |        |        |             |        |        | /60-31 |
|                               |        |        |             |        |        | 28     |
+-------------------------------+--------+--------+-------------+--------+--------+--------+
| Imp Spn Spcr [id number] -        | Generi | Poster | [employer] - |        | / | [id number] |
| [id number]: Qty: 1 on | c      | ior:   |  MEDT       |        |    | 1 /    |
|  2017 by Dreyer, Jason  |        | Spine  |             |        |        | / |
| DO TIMOTHY at [facility] |        | Lumbar |             |        |        | 673    |
|  [facility]          |        |        |             |        |        |        |
+-------------------------------+--------+--------+-------------+--------+--------+--------+
| Imp Spn Claudio Ti Sext Ti 5.5x45 | Generi | Poster | SOFAMOR     |        |        | [id number] |
|  - [id number]: Qty: 2 | c      | ior:   | DANEK - DIV |        |        | 5045 / |
|  on 2017 by Dreyer,     |        | Spine  |  [employer]  |        |        |  /     |
| Chacho AGUIRRE DO at [facility] |        | Lumbar | - SFDK      |        |        |        |
|  [facility]    |        |        |             |        |        |        |
+-------------------------------+--------+--------+-------------+--------+--------+--------+
| Graft Infuse Bone Kit Xxs -   | Graft  | Poster | SOFAMOR     |        | / | [id number] |
| [id number]: Qty: 1 on |        | ior:   | DANEK - DIV |        |    | 0 /    |
|  2017 by Dreyer, Jason  |        | Spine  |  [employer]  |        |        | / |
| DO TIMOTHY at [facility] |        | Lumbar | - SFDK      |        |        | [id number]  |
|  [facility]          |        |        |             |        |        |        |
+-------------------------------+--------+--------+-------------+--------+--------+--------+
| Set Scrw Ns G5 Brk Off Ti     | Screw  | Poster | SOFAMOR     |        |        | [id number] |
| 4.75 - [id number]:    |        | ior:   | DANEK - DIV |        |        | 0 / /  |
| Qty: 4 on 2017 by       |        | Spine  |  [employer]  |        |        |        |
| Dreyer, Jason A, DO at [facility]    |        | Lumbar | - SFDK      |        |        |        |
| [facility] |        |        |             |        |        |        |
|  C[facility]                       |        |        |             |        |        |        |
+-------------------------------+--------+--------+-------------+--------+--------+--------+
| Screw Miriam Solera 6.5x55mm -  | Screw  | Poster | SOFAMOR     |        |        | [id number] |
| [id number]: Qty: 2 on |        | ior:   | DANEK - DIV |        |        | 22573  |
|  2017 by Dreyer, Jason  |        | Spine  |  [employer]  |        |        | / /    |
| A DO at [facility] |        | Lumbar | - SFDK      |        |        |        |
|  [facility]          |        |        |             |        |        |        |
+-------------------------------+--------+--------+-------------+--------+--------+--------+
| Screw Miriam Solera 6.5x60mm -  | Screw  | Poster | SOFAMOR     |        |        | [id number] |
| [id number]: Qty: 2 on |        | ior:   | DANEK - DIV |        |        | 24481  |
|  2017 by Dreyer, Jason  |        | Spine  |  [employer]  |        |        | / /    |
| A DO at [facility] |        | Lumbar | - SFDK      |        |        |        |
|  [facility]          |        |        |             |        |        |        |
+-------------------------------+--------+--------+-------------+--------+--------+--------+
 
 
 
 Results
 
 Not on filefrom Last 3 Months
 
 Insurance
 
 
+-----------------+--------+-------------+--------+-------------+------------+--------+
| Payer           | Benefi | Subscriber  | Effect | Phone       | Address    | Type   |
|                 | t Plan | ID          | sindy    |             |            |        |
|                 |  /     |             | Dates  |             |            |        |
|                 | Group  |             |        |             |            |        |
+-----------------+--------+-------------+--------+-------------+------------+--------+
| MEDICARE        | MEDICA | [id number]  | 20 | [phone] |            | [employer] |
|                 | RE     |             | 16-Pre | 5           |            | re     |
|                 | PART A |             | sent   |             |            |        |
+-----------------+--------+-------------+--------+-------------+------------+--------+
| MUTUAL OF [employer] | MUTUAL | [id number]    | 20 | [phone] |            | Indemn |
|                 |  AND   |             | 19-Pre | 0           |            | ity    |
|                 | [employer] |             | sent   |             |            |        |
|                 |  [employer] |             |        |             |            |        |
|                 |  MDCR  |             |        |             |            |        |
|                 | SUPPL  |             |        |             |            |        |
+-----------------+--------+-------------+--------+-------------+------------+--------+
| MEDICARE        | MEDICA | [id number] |  | [phone] |            | [employer] |
|                 | RE     |             | 019-Pr | 5           |            | re     |
|                 | PART A |             | esent  |             |            |        |
|                 |  AND B |             |        |             |            |        |
+-----------------+--------+-------------+--------+-------------+------------+--------+
| MODA            | MODA   | [id number]   | 10/1/2 | [phone] |   [address]   | PPO    |
|                 | [employer]EBB   |             | 016-Pr | 9           | [phone]      |        |
|                 | CONNEX |             | esent  |             | [place],  |        |
|                 | [place]     |             |        |             | [place] [address]   |        |
+-----------------+--------+-------------+--------+-------------+------------+--------+
 
 
 
+-------------------+--------+-------------+--------+-------------+---------------------+
| Guarantor Name    | Accoun | Relation to | Date   | Phone       | Billing Address     |
|                   | t Type |  Patient    | of     |             |                     |
|                   |        |             | Birth  |             |                     |
+-------------------+--------+-------------+--------+-------------+---------------------+
| Deni Hampton | Person | Self        | / |             |   [address]   |
|                   | al/Fam |             | 1950   | [phone] | [place]       |
|                   | heath    |             |        | 0 (Home)    | [address]          |
|                   |        |             |        | [phone] |                     |
|                   |        |             |        | 2 (Work)    |                     |
+-------------------+--------+-------------+--------+-------------+---------------------+
| Deni Hampton | Person | Self        | / |             |   [address]   |
|                   | al/Fam |             | 1950   | [phone] | [place]       |
|                   | heath    |             |        | 0 (Home)    | [address]          |
+-------------------+--------+-------------+--------+-------------+---------------------+
 
 
 
 Advance Directives
 
 
+-----------+-----------------+----------------+-------------+
| Type      | Date Recorded   | Patient        | Explanation |
|           |                 | Representative |             |
+-----------+-----------------+----------------+-------------+
 
| Power of  |                 |                |             |
|   |                 |                |             |
+-----------+-----------------+----------------+-------------+
| Advance   | 2017  1:06 |                | HAS INFO    |
| Directive |  PM             |                |             |
+-----------+-----------------+----------------+-------------+
 
 
 
+-------------+-------------+-------------+----------+
| Code Status | Date        | Date        | Comments |
|             | Activated   | Inactivated |          |
+-------------+-------------+-------------+----------+
| Full Code   | 2017   | 8/3/2017    |          |
|             | 4:03 PM     | 2:04 PM     |          |
+-------------+-------------+-------------+----------+

## 2020-06-08 NOTE — XMS
Encounter Summary
  Created on: 2020
 
 Iggy Berna Sarmiento
 External Reference #: [id number]
 : 52
 Sex: Male
 
 Demographics
 
 
+-----------------------+---------------------------+
| Address               | [address]           |
|                       | [place]  [address] |
+-----------------------+---------------------------+
| Home Phone            | [phone]           |
+-----------------------+---------------------------+
| Preferred Language    | Unknown                   |
+-----------------------+---------------------------+
| Marital Status        |                    |
+-----------------------+---------------------------+
| Pentecostal Affiliation | Unknown                   |
+-----------------------+---------------------------+
| Race                  | Unknown                   |
+-----------------------+---------------------------+
| Ethnic Group          | Unknown                   |
+-----------------------+---------------------------+
 
 
 Author
 
 
+--------------+--------------------------------------------+
| Author       | [facility]  |
|              | and [place]                                |
+--------------+--------------------------------------------+
| Organization | [facility]  |
|              | and [place]                                |
+--------------+--------------------------------------------+
| Address      | Unknown                                    |
+--------------+--------------------------------------------+
| Phone        | Unavailable                                |
+--------------+--------------------------------------------+
 
 
 
 Support
 
 
+---------------+--------------+---------+-----------------+
| Name          | Relationship | Address | Phone           |
+---------------+--------------+---------+-----------------+
| Sofiya A Brown | ECON         | Unknown | [phone] |
+---------------+--------------+---------+-----------------+
 
 
 
 Care Team Providers
 
 
 
+-------------------------+------+-----------------+
| Care Team Member Name   | Role | Phone           |
+-------------------------+------+-----------------+
| Ck Michelle MD | PCP  | [phone] |
+-------------------------+------+-----------------+
 
 
 
 Encounter Details
 
 
+--------+-----------+----------------------+----------------------+----------------------+
| Date   | Type      | Department           | Care Team            | Description          |
+--------+-----------+----------------------+----------------------+----------------------+
| / | Hospital  |   [facility]     |   Carroll Morris,   | Acute                |
| 2018 - | Encounter | CENTER SURGICAL  [address] | MD  [address]   | posthemorrhagic      |
|        |           |  [address]          | [place] [address]   | anemia;              |
| / |           | [place], [place]         | [phone]         | Gastrointestinal     |
| 2018   |           | [address]           | [phone] (Fax)   | hemorrhage,          |
|        |           | [phone]         |                      | unspecified          |
|        |           |                      |                      | gastrointestinal     |
|        |           |                      |                      | hemorrhage type;     |
|        |           |                      |                      | Acute blood loss     |
|        |           |                      |                      | anemia; Dyspnea on   |
|        |           |                      |                      | exertion;            |
|        |           |                      |                      | Palpitations; Rectal |
|        |           |                      |                      |  bleeding; Mixed     |
|        |           |                      |                      | hyperlipidemia       |
+--------+-----------+----------------------+----------------------+----------------------+
 
 
 
 Social History
 
 
+--------------+-------+-----------+--------+------+
| Tobacco Use  | Types | Packs/Day | Years  | Date |
|              |       |           | Used   |      |
+--------------+-------+-----------+--------+------+
| Never Smoker |       |           |        |      |
+--------------+-------+-----------+--------+------+
 
 
 
+---------------------+---+---+---+
| Smokeless Tobacco:  |   |   |   |
| Never Used          |   |   |   |
+---------------------+---+---+---+
 
 
 
+-------------+----------------------+---------+----------------------+
| Alcohol Use | Drinks/Week          | oz/Week | Comments             |
+-------------+----------------------+---------+----------------------+
| No          |   0 Standard drinks  | 0.0     | No longer drinking   |
|             | or equivalent        |         | 1-2 drinks 2-4 times |
|             |                      |         |  a month             |
+-------------+----------------------+---------+----------------------+
 
 
 
 
+------------------+---------------+
| Sex Assigned at  | Date Recorded |
| Birth            |               |
+------------------+---------------+
| Not on file      |               |
+------------------+---------------+
 
 
 
+----------------+-------------+-------------+
| Job Start Date | Occupation  | Industry    |
+----------------+-------------+-------------+
| Not on file    | Not on file | Not on file |
+----------------+-------------+-------------+
 
 
 
+----------------+--------------+------------+
| Travel History | Travel Start | Travel End |
+----------------+--------------+------------+
 
 
 
+-------------------------------------+
| No recent travel history available. |
+-------------------------------------+
 documented as of this encounter
 
 Last Filed Vital Signs
 
 
+-------------------+----------------------+----------------------+----------+
| Vital Sign        | Reading              | Time Taken           | Comments |
+-------------------+----------------------+----------------------+----------+
| Blood Pressure    | 111/73               | 2018 12:14 PM  |          |
|                   |                      | PDT                  |          |
+-------------------+----------------------+----------------------+----------+
| Pulse             | 96                   | 2018 12:14 PM  |          |
|                   |                      | PDT                  |          |
+-------------------+----------------------+----------------------+----------+
| Temperature       | 36.6   C (97.9   F)  | 2018 12:14 PM  |          |
|                   |                      | PDT                  |          |
+-------------------+----------------------+----------------------+----------+
| Respiratory Rate  | 16                   | 2018 12:14 PM  |          |
|                   |                      | PDT                  |          |
+-------------------+----------------------+----------------------+----------+
| Oxygen Saturation | -                    | -                    |          |
+-------------------+----------------------+----------------------+----------+
| Inhaled Oxygen    | -                    | -                    |          |
| Concentration     |                      |                      |          |
+-------------------+----------------------+----------------------+----------+
| Weight            | 108.9 kg (240 lb 2.7 | 2018 12:14 PM  |          |
|                   |  oz)                 | PDT                  |          |
+-------------------+----------------------+----------------------+----------+
| Height            | 190.5 cm (6' 3")     | 2018 12:14 PM  |          |
|                   |                      | PDT                  |          |
+-------------------+----------------------+----------------------+----------+
 
| Body Mass Index   | 30.02                | 2018 12:14 PM  |          |
|                   |                      | PDT                  |          |
+-------------------+----------------------+----------------------+----------+
 documented in this encounter
 
 Discharge Summaries
 Claritza Medina MD - 2018 10:16 AM PDTFormatting of this note might be different f
rom the original.
 Discharge Summaries by Claritza Medina MD at 18 1016  
  Author:  Claritza Medina MD Service:  Hospitalist Author Type:  Physician 
  Filed:  18 1543 Date of Service:  18 1016 Status:  Signed 
  :  Claritza Medina MD (Physician)   
   
 [facility]
 Service:  Hospitalist
 Discharge Summary
 
 Date of Admission:  2018
 Date of Discharge:  2018
 
 Discharge Provider:  Claritza Medina MD
 Treatment Team: 
 Consulting Physician: Romana Shehzadi, MD
 Consulting Physician: Acute Care General Surgery
 Admitting Provider: Carroll Morris MD
 Discharge Diagnoses:   
 
 Principal Problem:
   Rectal bleeding
 Active Problems:
   Syncope and collapse
   Acute posthemorrhagic anemia
   SÁNCHEZ (dyspnea on exertion)
   Palpitations
   Leukocytosis
   Hyperlipidemia
   LAURA on CPAP
 Resolved Problems:
   * No resolved hospital problems. *
 
 Procedures:  Procedure(s):
 LAPAROSCOPIC - BOWEL RESECTION - SMALL
 LAPAROTOMY
 
    
 Chief Complaint:
 Rectal Bleeding
 
 Hospital Course: 
 Patient is 68 year old gentleman with PMH of Hyperlipidemia, high coronary artery calcium s
core, on aspirin, statin, beta blocker, obstructive sleep apnea on C-PAP.  The patient trans
ferred from [facility] to [facility] for acute lower GI bleeding for 
the past 2 days. Rectal bleeding was maroon-colored to dark.  total of 11-12 episodes of rec
eduard bleeding. He also has syncopal episode,  H and H of 5 and 19, at time of admission.
 Syncope and collapse, Secondary to acute posthemorrhagic anemia, no recurrence.
 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
ostasis with endoscopy or IR, s/p  diagnostic laparoscopy with small bowel resection on 8/15
 performed by Dr. Stallworth.
 S/P total 8 units PRBC tranfusion since admission, 
 Patient improved with minimal bleeding episodes likely old blood, no drop in H/H for past 2
 
 days, diet was advanced and tolerated.
 
 
 
 Outstanding Issues:  
 
 Discharge Exam and Data:
 Vital Signs:
 /73 (BP Location: Right upper arm)  | Pulse 96  | Temp 97.9 F (36.6 C) (Oral)  | 
Resp 16  | Ht 1.905 m (6' 3")  | Wt 108.9 kg (240 lb 2.7 oz)  | SpO2 95%  | BMI 30.02 kg/m
 
 Physical Examination:
 Constitutional: Alert and oriented to person, place, and time. Appears well-developed and w
ell-nourished. 
 
 Cardiovascular: Normal rate, regular rhythm, normal heart sounds with S1 and S2 and intact 
distal pulses.  Exam reveals no gallop and no friction rub.  No murmur heard.
 
 Pulmonary/Chest: Effort normal and breath sounds normal. No stridor. No respiratory distres
s.  no wheezes. no rales. exhibits no tenderness. 
 
 Abdominal: Soft. Bowel sounds are normal. exhibits no distension and no mass. There is no t
enderness. There is no rebound and no guarding. 
 
 Musculoskeletal: Normal range of motion.exhibits no tenderness.  exhibits no edema. 
   
 Neurological:  Alert and oriented to person, place, and time. Has normal reflexes.  display
s normal reflexes. No cranial nerve deficit.  Exhibits normal muscle tone. Coordination norm
al.
 
 Skin: Skin is warm. No pallor. 
 
 Psychiatric: Has a normal mood and affect. Behavior is normal. Judgment normal.
  
 
 Labs:
 
 Recent Labs
 Lab 18
 0813 18
 1945 18
 04218
 0437  18
 0642  18
 0547 
 WBC  --   --  7.52  --  8.78  --  10.71  < > 10.24 
 HGB 8.1* 7.8* 7.7*  < > 8.1*  < > 8.3*  < > 7.4* 
 HCT 23.8* 23.2* 22.0*  < > 23.4*  < > 23.7*  < > 21.1* 
 PLT  --   --  242  --  232  --  215  < > 159 
 NEUTOPHILPCT  --   --  80.26  --   --   --  89.16  --  85.12 
 MONOPCT  --   --  6.14  --   --   --  5.23  --  5.64 
 < > = values in this interval not displayed.
 
 Recent Labs
 Lab 18
 04218
 0437 18
 0557 08/15/18
 0133 18
 0547 
 
  142 143 144 145 
 K 3.5 3.5 4.1 3.8 3.5 
  109 112* 114* 116* 
 CO2 23 23 21* 22* 22* 
 BUN 6* 10 10 10 10 
 CREATININE 0.7 0.6* 0.59* 0.6* 0.67* 
 PROT  --   --  4.6* 4.4* 4.5* 
 BILITOT  --   --  0.5 0.8 0.5 
 ALT  --   --  16 16 17 
 AST  --   --  16 20 20 
 
 Invalid input(s): YUSRA
 
 Recent Labs
 Lab 18
 0557 08/15/18
 0133 18
 0547 
 MG 2.1 1.8 1.9 
 
 No results for input(s): AMYLASE in the last 168 hours.
 No results for input(s): PHART, PO2ART, TXA5JJD, C0RDVIUM, BEART in the last 168 hours.
 
 Recent Labs
 Lab 18
 2140 18
 2027 
 INR 1.0 1.0 
 
 No results for input(s): CKTOTAL, TROPONINI, TROPONINT, CKMBINDEX in the last 168 hours.
 
 Significant Diagnostic Studies:  
 X-ray Abdomen 1 View
 
 Result Date: 8/15/2018
 BERNA PARKINSON 1950 XR ABDOMEN 1 VIEW 8/15/2018 5:12 PM INDICATION: Intraoperative a
ssessment. COMPARISON: none TECHNIQUE: Abdominal series, frontal view. 
 
 Findings and impression:  Mild gaseous prominence of the bowel loops, favoring ileus. No un
expected radiopaque foreign body appreciated. Osseous structures appear unremarkable. Electr
onically signed by Juan Mckeon MD on 8/15/2018 5:16 PM
 
 Nm Gi Blood Loss
 
 Addendum Date: 2018  
 TECHNIQUE: An additional hour of scintigraphic imaging in the anterior projection over the 
abdomen and pelvis was performed portably. FINDINGS: There is activity seen extending throug
h multiple small bowel loops predominantly in the left upper quadrant, corresponding with th
e earlier suspicion of upper GI bleed, probably in the duodenal sweep. IMPRESSION: 1.  I jessica
pect an upper GI bleed, probably in the duodenal sweep. Electronically signed by Jerry turner MD on 2018 8:13 AM
 
 Result Date: 2018
 HISTORY: Hemorrhagic anemia. GI hemorrhage. COMPARISON: CTA abdomen and pelvis 18. TE
CHNIQUE: The patient's blood cells were labeled with 22.4 mCi technetium 99m UltraTag, and r
einjected into the patient. Dynamic flow and delayed scintigraphic imaging was performed in 
the anterior ejection over the abdomen and pelvis for 60 minutes. FINDINGS: There is accumul
ation of activity in the right upper quadrant just inferior to the hepatic hilum, correspond
ing to the proximal duodenum. Activity seems to dissipate from this region, and does not for
m a tubular collection of blood. Focal uptake near the end of the study in the right upper q
 
uadrant also dissipates rapidly. This would favor an upper GI hemorrhage, probably in the du
odenal sweep. Unfortunately, this is not specific. Additional imaging for another hour was r
equested. The patient refused. We will attempt to perform this secondary imaging on the floo
r using the portable camera. 
 
 1.  Indeterminate hemorrhage, probably involving the duodenal sweep. Further imaging is nec
essary. Addendum to follow if the patient allows further imaging. Electronically signed by NIKIA Osullivan MD on 2018 3:32 PM
 
 Ir Angiogram Visceral Selective
 
 Result Date: 2018
 IR ANGIOGRAM VISCERAL SELECTIVE dated 2018 5:35 PM CLINICAL DATA: GI bleeding with EGD
 push enteroscopy done immediately prior to this procedure demonstrating a jejunal diverticu
lar hemorrhage with GI clip placement proximal to the bleed. COMPARISON STUDIES: Endoscopic 
images same date and CTA abdomen and pelvis 2018 PRIMARY ANGIOGRAPHER: Tolu Hernandez MD,
 PhD, RPVI OPERATIONS: 1.  Ultrasound-guided right common femoral access 2.  Celiac arteriog
richard 3.  Gastroduodenal subselective arteriogram 4.  Superior mesenteric arteriogram 5.   Jej
unal subselective arteriograms x5 branches. PROCEDURE: Informed consent was obtained from 
e patient's wife as the patient was still sedated from EGD performed immediately prior to 
is procedure.  Continuous cardiac monitoring was performed throughout the procedure. General
 anesthesia was provided by anesthesiology. Contrast: 120 cc Isovue 250 Fluoroscopy time: 8.
6 minutes Radiation dose: 1960 mGy air kerma Patient was sterilely prepped and draped in the
 usual fashion. The right femoral head was fluoroscopically localized and the skin was local
ly anesthetized with 1% lidocaine. A skin nick was made with 11 blade. Real-time ultrasound 
guided micropuncture access obtained, images in PACS. The microsheath was exchanged to a 5 F
rench sheath. A Sos 2 catheter was advanced and formed in the thoracic aorta and brought yanelis
n to select the celiac artery for arteriogram. A Progreat microcatheter was advanced into th
e gastroduodenal artery and subselective arteriogram was performed. The microcatheter was re
moved. The Sos 2 catheter was then used to select the superior mesenteric artery and arterio
gram was performed. The microcatheter was reintroduced and subselective arteriography of the
 jejunal branches with a total of 5 branches selected with the catheter in areas positions w
ithin the vessels and imaging over the area of the gastrointestinal clip in the left upper q
uadrant were obtained. No apparent hemorrhage was noted. Therefore, the Progreat microcathet
er and Sos 2 catheter were removed. Right iliofemoral arteriogram demonstrated common femora
l arterial puncture. Mynx closure was performed with immediate hemostasis. FINDINGS: Celiac 
arteriogram demonstrates typical branching anatomy. The gastroduodenal artery subselective a
rteriogram demonstrates no contrast extravasation. Superior mesenteric arteriogram demonstra
marily expected clustered jejunal branches in the left upper quadrant and the presence of a GI 
clip in the left upper abdominal quadrant. Subselective jejunal branch angiography is obtain
ed with the microcatheter in multiple proximal to distal branch positions with no apparent c
ontrast extravasation. 
 
 No apparent source of GI bleeding on extensive mesenteric angiography. Electronically huyen
d by Tolu Hernandez MD on 2018 8:08 PM
 
 X-ray Chest 1 View
 
 Result Date: 2018
 BERNA SARMIENTO IGGY 1950 68 years XR CHEST 1 VIEW 2018 9:59 PM INDICATION: Fever. Bl
ood transfusions. COMPARISON: None TECHNIQUE: Chest 1 view, AP view of the chest FINDINGS: L
ungs are clear. No pleural effusion, no pneumothorax. Heart size is normal. Mediastinal cont
ours are normal. No acute osseous abnormality. 
 
 No acute cardiopulmonary abnormality Electronically signed by Bubba Cordoba MD on 2018 6
:45 AM
 
 Ir Guidance Vascular Access Us
 
 Result Date: 2018
 
 This Point of Care (POC) ultrasound image has been reviewed and interpreted by the physicia
n identified as the performing physician in the associated interpretation and report. 
 
 End Esophogogastroduodenoscopy
 
 Result Date: 2018
 This is a non-reportable procedure without a radiologist report and is used for image [facility] only. Please review the GI encounter notes for details on the procedure.
 
 Or General Scope Imaging
 
 Result Date: 8/15/2018
 This is a non-reportable procedure without a radiologist report and is used for image [facility] only. Please review the OR procedure report for details on the procedure.
 
 Discharge Information:
 Follow up:
 Jabier Nava, DAIANAP
 [address]
 [address] [place]
 [facility] [id number] [phone]
 
 On 2018
 Post op follow up
 
 MAYCOL Marvin
 [address] [facility] [address]
 [place] [address] [phone]
 
 Schedule an appointment as soon as possible for a visit in 1 week
 
 Romana Shehzadi,  [address]
 [address] [place] [id number]
 [phone]
 
 In 4 weeks
 
  
 Medication List 
  
 START taking these medications  
 pantoprazole 40 MG tablet
 QTY:  30 tablet
 Refills:  1
 Commonly known as:  PROTONIX
 Take 1 tablet by mouth every morning before breakfast for 60 days.
  
  
 CONTINUE taking these medications  
 atorvastatin 40 MG tablet
 Refills:  0
 Commonly known as:  LIPITOR
  
 FLUoxetine 10 MG capsule
 Refills:  0
 Commonly known as:  PROzac
 
  
 lovastatin 10 MG tablet
 Refills:  0
 Commonly known as:  MEVACOR
 Notes to patient:  Resume home regimen. 
  
 metoprolol 25 MG 24 hr tablet
 Refills:  0
 Commonly known as:  TOPROL-XL
 Notes to patient:  Resume home regimen. 
  
  
 You might also be taking other medications not listed above. If you have questions about an
y of your other medications, talk to the person who prescribed them or your Primary Care Pro
vider. 
  
  
  
  
 Where to Get Your Medications 
  
 These medications were sent to [facility] [facility] [place] [place] - [address] [facility]
 [address]  [address] [facility], [place] [address] 
  Phone:  [phone] 
  pantoprazole 40 MG tablet
  
 
 Disposition:  Home
 Condition:  Stable
 Code Status:  Full Code
 
 Discharge took 35 minutes, to include final examination, discussion of admission, and prepa
ration of prescriptions, instructions for on-going care, follow-up and documentation of disc
harge summary.
 
 Claritza Medina MD
 3:38 PM
 2018
  
  Electronically signed by Claritza Medina MD at 2018  3:43 PM PDTdocumented in this
 encounter
 
 Medications at Time of Discharge
 
 
+----------------------+----------------------+-----------+---------+----------+-----------+
| Medication           | Sig                  | Dispensed | Refills | Start    | End Date  |
|                      |                      |           |         | Date     |           |
+----------------------+----------------------+-----------+---------+----------+-----------+
|   atorvaSTATin       | Take 40 mg by mouth  |           | 0       | 20 |           |
| (LIPITOR) 40 mg      | Daily.               |           |         | 15       |           |
| tablet               |                      |           |         |          |           |
+----------------------+----------------------+-----------+---------+----------+-----------+
|   B Complex Vitamins | Take 1 tablet by     |           | 0       |          |           |
|  (VITAMIN B COMPLEX) | mouth Daily.         |           |         |          |           |
|  tablet              |                      |           |         |          |           |
+----------------------+----------------------+-----------+---------+----------+-----------+
|   fluticasone        | 1 spray by Nasal     |           | 0       |          |           |
| (FLONASE) 50         | route Daily.         |           |         |          |           |
| mcg/nasal spray      |                      |           |         |          |           |
 
+----------------------+----------------------+-----------+---------+----------+-----------+
|   metoprolol         | Take 25 mg by mouth  |           | 0       | 20 |           |
| succinate            | Daily.               |           |         | 17       |           |
| (TOPROL-XL) 25 mg 24 |                      |           |         |          |           |
|  hr tablet           |                      |           |         |          |           |
+----------------------+----------------------+-----------+---------+----------+-----------+
|   ASCORBIC ACID PO   | Take  by mouth.      |           | 0       |          |  |
|                      |                      |           |         |          | 0         |
+----------------------+----------------------+-----------+---------+----------+-----------+
|   cholecalciferol    | Take 1,000 Units by  |           | 0       |          |  |
| (VITAMIN D-3) 1,000  | mouth Daily.         |           |         |          | 0         |
| units tablet         |                      |           |         |          |           |
+----------------------+----------------------+-----------+---------+----------+-----------+
|   diazePAM (VALIUM)  | Take 1 tablet by     |   90      | 1       | 11/15/20 |  |
| 5 mg tablet          | mouth every 6 hours  | tablet    |         | 17       | 0         |
|                      | as needed for Muscle |           |         |          |           |
|                      |  spasms.             |           |         |          |           |
+----------------------+----------------------+-----------+---------+----------+-----------+
|                      | Take 1 tablet by     |           | 0       |          |  |
| fexofenadine-pseudoe | mouth Twice  daily   |           |         |          | 0         |
| PHEDrine (ALLEGRA-D  | as needed.           |           |         |          |           |
| ALLERGY &            |                      |           |         |          |           |
| CONGESTION)    |                      |           |         |          |           |
| MG per tablet        |                      |           |         |          |           |
+----------------------+----------------------+-----------+---------+----------+-----------+
|   FLUoxetine         | Take 20 mg by mouth  |           | 0       |          |  |
| (PROZAC) 20 mg       | Daily.               |           |         |          | 0         |
| capsule              |                      |           |         |          |           |
+----------------------+----------------------+-----------+---------+----------+-----------+
|                      | Take 0.5-1 tablets   |   120     | 0       | 11/15/20 |  |
| HYDROcodone-acetamin | by mouth every 6     | tablet    |         | 17       | 0         |
| ophen (NORCO)  | hours as needed for  |           |         |          |           |
|  mg per tablet       | Pain.                |           |         |          |           |
+----------------------+----------------------+-----------+---------+----------+-----------+
 documented as of this encounter
 
 Progress Notes
 Jabier Nava ARNP - 2018 10:36 AM PDTFormatting of this note might be diffe
rent from the original.
 Progress Notes by ORLANDO Ferreira at 18 1036  
  Author:  ORLANDO Ferreira Service:  General Surgery Author Type:  Advanced Darrick ca Nurse Practitioner 
  Filed:  18 1038 Date of Service:  18 1036 Status:  Signed 
  :  ORLANDO Ferreira (Advanced Registered Nurse Practitioner)  Cosigner:  REBEKAH Sepulveda MD at 18 1002 
   
 
 Progress Note
 Hospital Day:   LOS: 7 days 
 Post-Op Day:  2 Days Post-Op
 
 Subjective
 
 This is a 68 y.o. male patient who presented to [facility] with a diagn
osis of GI bleed.
 
       Events Overnight:  
 
   Berna is sitting up eating breakfast this morning. Denies much pain. H/H: 8.1/23.8. 
 
 
 Assessment and Plan
 67 YO male with no prior abdominal surgeries with bleeding jejunal diverticulum 
  -s/p colonoscopy and EGD with epinephrine injection and dye placement on bleeding divertic
ulum on 18
  -s/p mesenteric angiogram by IR with no active bleeding identified on 18
  -S/P laparoscopy enterectomy/ single with Dr. Stallworth on 8/15/18
 
 Continues to improve. 
 Slight increase in H/H
 Continue with IV fluids and oral fluids.  
 Able to d/c when medically appropriate.  
 
 Scheduled Medications
  
   atorvastatin  40 mg Oral Nightly 
   FLUoxetine  10 mg Oral Daily 
   fluticasone  1 spray Each Nare Daily 
   [START ON 2018] pantoprazole  40 mg Oral QAM AC 
 
 Continuous Infusions 
 
 PRN Medications
 acetaminophen **OR** acetaminophen, EPINEPHrine, HYDROmorphone, ondansetron **OR** ondanset
arielle, polyethylene glycol
 
 Objective
 Vital Signs:
 BP 98/56 (BP Location: Right upper arm)  | Pulse 87  | Temp 98.3 F (36.8 C) (Oral)  | R
sav 16  | Ht 1.905 m (6' 3")  | Wt 108.9 kg (240 lb 2.7 oz)  | SpO2 95%  | BMI 30.02 kg/m 
 Results for orders placed or performed during the hospital encounter of 18 (from the 
past 24 hour(s))    
 HGB and HCT    
  Collection Time: 18  7:45 PM   
 Result  Value Ref Range 
  HGB 7.8 (L) 13.2 - 17.0 g/dL 
  HCT 23.2 (L) 39.0 - 50.0 % 
 HGB and HCT    
  Collection Time: 18  8:13 AM   
 Result  Value Ref Range 
  HGB 8.1 (L) 13.2 - 17.0 g/dL 
  HCT 23.8 (L) 39.0 - 50.0 % 
 
 Intake/Output Summary (Last 24 hours) at 18 1036
 Last data filed at 18 0356
  Gross per 24 hour 
 Intake              668 ml 
 Output             1150 ml 
 Net             -482 ml 
 
 Physical Exam
  
 Constitutional:  No distress. Sitting up in bed eating breakfast. 
 Cardiovascular:  Normal rate. 
 Pulmonary:  Effort normal. No stridor. No respiratory distress. 
 Abdominal:   Soft. no distension. Dressings in place over incisions. No redness. 
 Neurological:   Alert and oriented to person, place, and time. 
 Psychiatric:  He has a normal mood and affect His behavior is normal. Thought content haydee
l. 
 Nursing note and vitals reviewed.
 
 
 Problem List
 
 Principal Problem:
   Rectal bleeding
 Active Problems:
   Syncope and collapse
   Acute posthemorrhagic anemia
   SÁNCHEZ (dyspnea on exertion)
   Palpitations
   Leukocytosis
   Hyperlipidemia
   LAURA on CPAP
 
 Signed:
 [facility]ier Nava [facility] CenterMIGUEL ÁNGEL 
 [facility]
 
 Portions of this chart may have been created with voice recognition software. Occasional wr
[place]-word or "sound-alike" substitutions may have occurred, even after review, due to the inh
erent limitations of voice recognition software. Please read the chart carefully and recogni
ze, using context, where these substitutions have occurred. Personal communication is reques
michelle for any clarifications.
 
  
  Electronically signed by Luis Sepulveda MD at 2018 10:02 AM PDTConversion Transaction
, Provider Unknown - 2018  4:08 AM PDTFormatting of this note might be different from 
the original.
 Nurse Progress Note by Sofiya Murillo RN at 18 0408  
  Author:  Sofiya Murillo RN Service:  (none) Author Type:  Registered Nurse 
  Filed:  18 Date of Service:  18 Status:  Signed 
  :  Sofiya Murillo RN (Registered Nurse)   
   
 End of shift audit complete.
 Sofiya Murillo RN
 
  
  Electronically signed by Conversion Transaction, Provider at 08/15/2019  4:13 PM PDTConver
kameron Transaction, Provider Unknown - 2018  5:47 PM PDTFormatting of this note might be
 different from the original.
 Nurse Progress Note by Bren Valera RN at 18  
  Author:  Bren Valera RN Service:  (none) Author Type:  Registered Nurse 
  Filed:  18 Date of Service:  18 Status:  Signed 
  :  Bren Valera RN (Registered Nurse)   
   
 Chart audit complete. 
 
  
  Electronically signed by Conversion Transaction, Provider at 08/15/2019  4:15 PM PDTConver
kameron Transaction, Provider Unknown - 2018  1:56 PM PDTFormatting of this note might be
 different from the original.
 Nurse Progress Note by Dianelys Simpson RN at 18 1356  
  Author:  Dianelys Simpson RN Service:  (none) Author Type:  Registered Nurse 
  Filed:  18 1357 Date of Service:  18 1356 Status:  Signed 
  :  Dianelys Simpson RN (Registered Nurse)   
   
 Pt denies nausea post general diet meal. Dianelys Simpson RN
  
  Electronically signed by Conversion Transaction, Provider at 08/15/2019  4:14 PM PDTClaritza Loja MD - 2018 12:56 PM PDTFormatting of this note might be different from the
 original.
 
 Progress Notes by Claritza Medina MD at 18 1256  
  Author:  Claritza Medina MD Service:  Hospitalist Author Type:  Physician 
  Filed:  18 1539 Date of Service:  18 1256 Status:  Signed 
  :  Claritza Medina MD (Physician)   
   
 [facility]
 Service:  Hospitalist
 Progress Note
 
 Pt: Berna Parkinson  AGE/SEX: 68 y.o.   male           MRN: [id number]
 ROOM: 405/405-1   : 1950     PCP: Carlotta Motley
 ADMIT DATE:  2018 TODAY'S DATE:  2018
 Hospital Day/Hospital Course: 
  LOS: 6 days 
 
 SUBJECTIVE: 
 
 Patient seen and examine. He has one bowel movement today with associated small amount of r
ectal bleeding, no abdominal pain, no nausea or vomiting, he is on full diet.
 
 Scheduled Medications: 
 
   atorvastatin  40 mg Oral Nightly 
   FLUoxetine  10 mg Oral Daily 
   fluticasone  1 spray Each Nare Daily 
 
 Continuous Infusions 
 
  
   pantoprazole 8 mg/hr (18 0231) 
 
 PRN Medications 
 
 acetaminophen **OR** acetaminophen, EPINEPHrine, HYDROmorphone, ondansetron **OR** ondanset
arielle, polyethylene glycol
 
 Allergy: 
 
 Allergies   
 Allergen  Reactions 
   Demerol [Meperidine] Anxiety 
 
 OBJECTIVE: 
 
 Vitals:
 
 Temp:  [98.2 F (36.8 C)-98.8 F (37.1 C)] 98.8 F (37.1 C)
 Heart Rate:  [] 92
 Resp:  [18] 18
 BP: (124-146)/(58-75) 124/58
 I&O Detailed Table: 
 
 Intake/Output Summary (Last 24 hours) at 18 1256
 Last data filed at 18 0900
  Gross per 24 hour 
 Intake             2087 ml 
 Output             1650 ml 
 Net              437 ml 
 
 Patient Vitals for the past 96 hrs:
 
  Weight 
 08/15/18 0245 108.9 kg (240 lb 2.7 oz) 
 
 Physical Examination: 
 
 Constitutional: Alert and oriented to person, place, and time. 
 HEENT: Neck supple, no JVD, non icteric sclera.
 Cardiovascular: Normal rate, regular rhythm, normal heart sounds with S1 and S2, and intact
 distal pulses.  Exam reveals no gallop and no friction rub.  No murmur heard.
 
 Pulmonary/Chest: Effort normal and breath sounds normal. No stridor. No respiratory distres
s.  no wheezes. no rales. exhibits no tenderness. 
 Abdominal: Soft. Bowel sounds are normal. exhibits no distension and no mass. There is no t
enderness. Scars of laparoscopy clean, There is no rebound and no guarding. 
 
 Extremeties/Musculoskeletal: Normal range of motion.exhibits no tenderness.  exhibits no ed
ema. 
 Normal equal peripheral pulses.
   
 Neurological:  Alert and oriented to person, place, and time.  No cranial nerve deficit.  E
xhibits normal muscle tone. No gross motor deficits.
 
 Skin: Skin is warm. No pallor. 
 
 Patient has normal capillary refill, no mottling.
 Psychiatric: Has a normal mood and affect. Behavior is normal. Judgment normal. 
 
 LABS: 
 
 Recent Labs
 Lab 18
 04218
 1635 18
 0437  18
 0642  18
 0547 
 WBC 7.52  --  8.78  --  10.71  < > 10.24 
 HGB 7.7* 8.2* 8.1*  < > 8.3*  < > 7.4* 
 HCT 22.0* 24.2* 23.4*  < > 23.7*  < > 21.1* 
   --  232  --  215  < > 159 
 NEUTOPHILPCT 80.26  --   --   --  89.16  --  85.12 
 MONOPCT 6.14  --   --   --  5.23  --  5.64 
 < > = values in this interval not displayed.
 
 Recent Labs
 Lab 18
 04218
 0437 18
 0557 08/15/18
 0133 18
 0547 
  142 143 144 145 
 K 3.5 3.5 4.1 3.8 3.5 
  109 112* 114* 116* 
 CO2 23 23 21* 22* 22* 
 BUN 6* 10 10 10 10 
 CREATININE 0.7 0.6* 0.59* 0.6* 0.67* 
 PROT  --   --  4.6* 4.4* 4.5* 
 BILITOT  --   --  0.5 0.8 0.5 
 ALT  --   --  16 16 17 
 
 AST  --   --  16 20 20 
 
 Phosphorus:  
 Lab Results    
 Component  Value Date 
  PHOS 1.7 (L) 2018 
 
 Invalid input(s): LABALRICK
 
 Recent Labs
 Lab 18
 0557 08/15/18
 0133 18
 0547 
 MG 2.1 1.8 1.9 
 
 No results for input(s): AMYLASE in the last 168 hours.
 No results for input(s): PHART, PO2ART, FVJ0RWI, E9PNAQOE, BEART in the last 168 hours.
 
 Recent Labs
 Lab 180 18 
 INR 1.0 1.0 
 
 No results for input(s): TSH, T3FREE, FREET4 in the last 168 hours.
 No results for input(s): CKTOTAL, TROPONINI, TROPONINT, CKMBINDEX in the last 168 hours.i
 
 PROBLEM LIST 
 
 Principal Problem:
   Rectal bleeding
 Active Problems:
   Syncope and collapse
   Acute posthemorrhagic anemia
   SÁNCHEZ (dyspnea on exertion)
   Palpitations
   Leukocytosis
   Hyperlipidemia
   LAURA on CPAP
 
 ASSESSMENT & PLAN 
 
 1.  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
 hemostasis with endoscopy or IR, s/p  diagnostic laparoscopy with small bowel resection on 
8/15 performed by Dr. Stallworth.
 He is still on Protonix drip will change to BID.
 Has small rectal bleeding today, will monitor. Mild drop in H/H
 2. Acute blood loss anemia, s/p total 8 units PRBC tranfusion since admission, HGB stable w
ith mild drop today, will monitor.
 Continue to monitor H/H
 3.Syncope and collapse, Secondary to acute posthemorrhagic anemia, no recurrence.
 
 4. DVT prophylaxis SCD.
 
 Claritza Medina MD
 2018
 12:56 PM
 
  
 
  Electronically signed by Claritza Medina MD at 2018  3:39 PM Jabier Fam ARNP - 2018  9:29 AM PDTFormatting of this note might be different from the origi
nal.
 Progress Notes by ORLANDO Ferreira at 1821  
  Author:  ORLANDO Ferreira Service:  General Surgery Author Type:  Advanced Darrick
tered Nurse Practitioner 
  Filed:  18 1113 Date of Service:  18 Status:  Signed 
  :  ORLANDO Ferreira (Advanced Registered Nurse Practitioner)  Cosigner:  REBEKAH Sepulveda MD at 18 0955 
   
 
 Progress Note
 Hospital Day:   LOS: 6 days 
 Post-Op Day:  2 Days Post-Op
 
 Subjective
 
 This is a 68 y.o. male patient who presented to [facility] with a diagn
osis of GI bleed.
 
       Events Overnight:  
 
   Berna is laying down comfortably with family at bedside.  Patient reports that he has ha
d multiple BM's. Slight drop in H/H. VSS. WBC: 7.52. Tmax: 98.7. 
 
 Assessment and Plan
 67 YO male with no prior abdominal surgeries with bleeding jejunal diverticulum 
  -s/p colonoscopy and EGD with epinephrine injection and dye placement on bleeding divertic
ulum on 18
  -s/p mesenteric angiogram by IR with no active bleeding identified on 18
  -S/P laparoscopy enterectomy/ single with Dr. Stallworth on 8/15/18
 
 Patient stable from a surgical standpoint. 
 Slight drop in H/H
 Continue with IV fluids and oral fluids. 
 Will advance diet as tolerated. 
 Able to d/c when medically appropriate.  
 
 Scheduled Medications
  
   atorvastatin  40 mg Oral Nightly 
   FLUoxetine  10 mg Oral Daily 
   fluticasone  1 spray Each Nare Daily 
 
 Continuous Infusions 
   pantoprazole 8 mg/hr (18 0231) 
 
 PRN Medications
 acetaminophen **OR** acetaminophen, EPINEPHrine, HYDROmorphone, ondansetron **OR** ondanset
arielle, polyethylene glycol
 
 Objective
 Vital Signs:
 /63 (BP Location: Left upper arm)  | Pulse 86  | Temp 98.2 F (36.8 C) (Oral)  | R
sav 18  | Ht 1.905 m (6' 3")  | Wt 108.9 kg (240 lb 2.7 oz)  | SpO2 92%  | BMI 30.02 kg/m 
 Results for orders placed or performed during the hospital encounter of 18 (from the 
past 24 hour(s))    
 HGB and HCT    
  Collection Time: 18  4:35 PM   
 Result  Value Ref Range 
 
  HGB 8.2 (L) 13.2 - 17.0 g/dL 
  HCT 24.2 (L) 39.0 - 50.0 % 
 CBC W/Auto Diff (Reflex to Manual)    
  Collection Time: 18  4:21 AM   
 Result  Value Ref Range 
  WBC 7.52 3.80 - 11.00 K/uL 
  RBC 2.51 (L) 4.20 - 5.70 M/uL 
  HGB 7.7 (L) 13.2 - 17.0 g/dL 
  HCT 22.0 (L) 39.0 - 50.0 % 
  MCV 87.8 80.0 - 100.0 fl 
  MCH 30.6 27.0 - 34.0 pg 
  MCHC 34.9 32.0 - 35.5 g/dL 
  RDW SD 50.8 37 - 53 fl 
   150 - 400 K/uL 
  MPV 7.2 fl 
  DIFF TYPE AUTOMATED  
  NEUTROPHILS 80.26 % 
  LYMPHOCYTES 10.02 % 
  MONOCYTES 6.14 % 
  EOSINOPHILS 3.33 % 
  BASOPHILS 0.25 % 
  NEUTROPHILS ABS 6.04 1.90 - 7.40 K/uL 
  LYMPHOCYTES ABS 0.75 (L) 1.00 - 3.90 K/uL 
  MONOCYTES ABS 0.46 0.00 - 0.80 K/uL 
  EOSINOPHILS ABS 0.25 0.00 - 0.50 K/uL 
  BASOPHILS ABS 0.02 0.00 - 0.10 K/uL 
 Basic metabolic panel    
  Collection Time: 18  4:21 AM   
 Result  Value Ref Range 
  SODIUM 144 135 - 145 mmol/L 
  POTASSIUM 3.5 3.5 - 4.9 mmol/L 
  CHLORIDE 109 99 - 109 mmol/L 
  CO2 23 23 - 32 mmol/L 
  ANION GAP AGAP 16 5 - 20 mmol/L 
  GLUCOSE 103 (H) 65 - 99 mg/dL 
  BUN 6 (L) 8 - 25 mg/dL 
  CREATININE 0.7 0.70 - 1.30 mg/dL 
  BUN/CREAT 9  
  CALCIUM 7.6 (L) 8.5 - 10.5 mg/dL 
  EGFR >60 >60 mL/min/1.73m2 
 
 Intake/Output Summary (Last 24 hours) at 18 0929
 Last data filed at 18 0900
  Gross per 24 hour 
 Intake             2087 ml 
 Output             1650 ml 
 Net              437 ml 
 
 Physical Exam
  
 Constitutional:  No distress. Laying down with family at bedside. 
 Cardiovascular:  Normal rate. 
 Pulmonary:  Effort normal. No stridor. No respiratory distress. 
 Abdominal:   Soft. no distension. Dressings in place over incisions. No redness. 
 Neurological:   Alert and oriented to person, place, and time. 
 Psychiatric:  He has a normal mood and affect His behavior is normal. Thought content haydee
l. 
 Nursing note and vitals reviewed.
 
 Problem List
 
 
 Principal Problem:
   Rectal bleeding
 Active Problems:
   Syncope and collapse
   Acute posthemorrhagic anemia
   SÁNCHEZ (dyspnea on exertion)
   Palpitations
   Leukocytosis
   Hyperlipidemia
   LAURA on CPAP
 
 Signed:
 Jabier PERRY 
 [facility]
 
 Portions of this chart may have been created with voice recognition software. Occasional wr
[place]-word or "sound-alike" substitutions may have occurred, even after review, due to the inh
erent limitations of voice recognition software. Please read the chart carefully and recogni
ze, using context, where these substitutions have occurred. Personal communication is reques
michelle for any clarifications.
 
  
  Electronically signed by Luis Sepulveda MD at 2018  9:58 AM PDTConversion Transaction
, Provider Unknown - 2018 12:41 AM PDTFormatting of this note might be different from 
the original.
 Progress Notes by Prachi Sandhu RN at 18  
  Author:  Prachi Sandhu RN Service:  (none) Author Type:  Registered Nurse 
  Filed:  18 Date of Service:  18 Status:  Signed 
  :  Prachi Sandhu RN (Registered Nurse)   
   
 One blood clot noted with BM tonight. No other s.s of active bleeding. Vitals stable. 
  
  Electronically signed by Conversion Transaction, Provider at 08/15/2019  4:17 PM PDTConver
kameron Transaction, Provider Unknown - 2018  6:26 PM PDTFormatting of this note might be
 different from the original.
 Nurse Progress Note by Betty Chan RN at 18  
  Author:  Betty Chan RN Service:  (none) Author Type:  Registered Nurse 
  Filed:  18 Date of Service:  18 Status:  Signed 
  :  Betty Chan RN (Registered Nurse)   
   
 Pt had BM today no signs of bleeding. Continues on protonix gtt, HGB/HCT drawn at 1630 and 
sent out, results still pending. Pt voiding QS after singletary discontinued. 
 
 End of shift chart check complete
 
  
  Electronically signed by Conversion Transaction, Provider at 08/15/2019  4:21 PM PDTCoTeresa guido - 2018 12:53 PM PDTFormatting of this note might be different from the 
original.
 Progress Notes by Rebekah Gillette MD at 18 1253  
  Author:  Rebekah Gillette MD Service:  Hospitalist Author Type:  Physician 
  Filed:  18 8426 Date of Service:  18 0720 Status:  Signed 
  :  Rebekah Gillette MD (Physician)   
   
 [facility]
 Adult Hospitalist Progress Note
 Hospital Day:  5
 SUBJECTIVE
      Events Overnight:  Had small BM a couple hours ago, it was dark.  Tolerating full liqu
 
id diet
 
 OBJECTIVE
 Vital Signs:
 Blood pressure 126/67, pulse 94, temperature 98.6 F (37 C), temperature source Oral, re
sp. rate 18, height 1.905 m (6' 3"), weight 108.9 kg (240 lb 2.7 oz), SpO2 95 %.
 Clear 
 RRR
 Soft mild soreness
 Alert and appropriate
 DATA
 
 Recent Labs
 Lab 18
 2140 18
 202 
 INR 1.0 1.0 
 
 Recent Labs
 Lab 18
 0437 18
 1741 18
 1149 18
 0642 18
 0557  08/15/18
 0133  18
 0547  18
 0910 18
 2238 
 WBC 8.78  --   --  10.71  --   --  8.94  --  10.24  < > 13.95*  --  
 HGB 8.1* 9.6* 8.9* 8.3*  --   < > 7.3*  < > 7.4*  < > 8.3* 6.4* 
 HCT 23.4* 29.4* 26.1* 23.7*  --   < > 21.2*  < > 21.1*  < > 24.0* 18.8* 
   --   --  215  --   --  161  --  159  < > 157  --  
   --   --   --  143  --  144  --  145  < > 146*  --  
 K 3.5  --   --   --  4.1  --  3.8  --  3.5  < > 3.6  --  
   --   --   --  112*  --  114*  --  116*  < > 118*  --  
 CO2 23  --   --   --  21*  --  22*  --  22*  < > 22*  --  
 BUN 10  --   --   --  10  --  10  --  10  < > 16  --  
 CREATININE 0.6*  --   --   --  0.59*  --  0.6*  --  0.67*  < > 0.71  --  
 GLUF 106*  --   --   --  99  --  106*  --  96  < > 96  --  
 EGFR >60  --   --   --  >60  --  >60  --  >60  < > >60  --  
 ANIONGAP 14  --   --   --  15  --  12  --  11  < > 9  --  
 PROT  --   --   --   --  4.6*  --  4.4*  --  4.5*  --   --   --  
 LACTATE  --   --   --   --   --   --   --   --   --   --   --  1.5 
 PHOS  --   --   --   --   --   --   --   --   --   --  1.7*  --  
 MG  --   --   --   --  2.1  --  1.8  --  1.9  --  1.9  --  
 ALB  --   --   --   --  1.9*  --  2.1*  --  2.1*  --   --   --  
 BILITOT  --   --   --   --  0.5  --  0.8  --  0.5  --   --   --  
 AST  --   --   --   --  16  --  20  --  20  --   --   --  
 ALT  --   --   --   --  16  --  16  --  17  --   --   --  
 ALP  --   --   --   --  37  --  36  --  32*  --   --   --  
 < > = values in this interval not displayed.
 
 No results for input(s): CKTOTAL, TROPONINI, TROPONINT, CKMBINDEX, CKMB in the last 168 marzena
rs.
 
 Invalid input(s): PCOTNI
 
 Recent Labs
 Lab 18
 
 2328 
 UCOL YELLOW 
 CLARITYU CLEAR 
 SPECGRAV 1.028 
 LEUKOCYTESUR NEGATIVE 
 NITRITE NEGATIVE 
 UROBILINOGEN NORMAL 
 UPRO NEGATIVE 
 PHUR 5.0 
 BLOODU NEGATIVE 
 KETONES NEGATIVE 
 BILIRUBINUR NEGATIVE 
 GLUCOSEU NEGATIVE 
 
 PROBLEM LIST
 Principal Problem:
   Rectal bleeding
 Active Problems:
   Syncope and collapse
   Acute posthemorrhagic anemia
   SÁNCHEZ (dyspnea on exertion)
   Palpitations
   Leukocytosis
   Hyperlipidemia
   LAURA on CPAP
 
 IMPRESSION/PLAN:
 1.  POD 2 enterectomy for jejunal diverticular bleeding despite attempted treatment by GI a
nd IR.  He is doing fairly well.  Tolerating full liquid diet and had small BM this morning.
  H/H down this morning from last night but no evidence otherwise of active bleeding.  Will 
check h/h this evening and in AM.  Discharge per surgery.  Remains on protonix infusion.
 
 Rebekah Gillette MD
 2018
  
  Electronically signed by Teresa Gillette at 2018 12:56 PM PDTMorgan, ORLANDO Castillo - 2018  9:59 AM PDTFormatting of this note might be different from the origin
al.
 Progress Notes by ORLANDO Ferreira at 18 0959  
  Author:  ORLANDO Ferreira Service:  General Surgery Author Type:  Advanced Darrick
tered Nurse Practitioner 
  Filed:  18 1003 Date of Service:  18 0959 Status:  Addendum 
  :  ORLANDO Ferreira (Advanced Registered Nurse Practitioner)   
  Related Notes:  Original Note by ORLANDO Ferreira (Advanced Registered Nurse Pra
ctitioner) filed at 18 1002   
  Cosigner:  Luis Sepulveda MD at 18 1618   
   
 
 Progress Note
 Hospital Day:   LOS: 5 days 
 Post-Op Day:  2 Days Post-Op
 
 Subjective
 
 This is a 68 y.o. male patient who presented to [facility] with a diagn
osis of GI bleed.
 
       Events Overnight:  
 
   Berna is walking around the surgical floor today. He reports that his pain is minimal. H
 
e denies having a BM as of yet, but states that he feels like one will be coming soon. WBC: 
8.78. H/H: 8.1/23.4. Tmax: 98.8. 
 
 Assessment and Plan
 67 YO male with no prior abdominal surgeries with bleeding jejunal diverticulum 
  -s/p colonoscopy and EGD with epinephrine injection and dye placement on bleeding divertic
ulum on 18
  -s/p mesenteric angiogram by IR with no active bleeding identified on 18
  -S/P laparoscopy enterectomy/ single with Dr. Stallworth on 8/15/18
 
 Doing well. Patient is tolerating full liquids, will continue with full liquids at this betty
e. Ambulating with minimal pain. Has not had a BM yet. Will wait to d/c until tolerating gen
eral diet and having BM's. 
 
 Scheduled Medications
  
   atorvastatin  40 mg Oral Nightly 
   FLUoxetine  10 mg Oral Daily 
   fluticasone  1 spray Each Nare Daily 
 
 Continuous Infusions 
   pantoprazole 8 mg/hr (18 1854) 
   sodium chloride (IV) 30 mL/hr at 18 1156 
 
 PRN Medications
 acetaminophen **OR** acetaminophen, EPINEPHrine, HYDROmorphone, ondansetron **OR** ondanset
arielle, polyethylene glycol
 
 Objective
 Vital Signs:
 /67 (BP Location: Left upper arm)  | Pulse 104  | Temp 98.3 F (36.8 C) (Oral)  | 
Resp 18  | Ht 1.905 m (6' 3")  | Wt 108.9 kg (240 lb 2.7 oz)  | SpO2 96%  | BMI 30.02 kg/m
 
 Results for orders placed or performed during the hospital encounter of 18 (from the 
past 24 hour(s))    
 HGB and HCT    
  Collection Time: 18 11:49 AM   
 Result  Value Ref Range 
  HGB 8.9 (L) 13.2 - 17.0 g/dL 
  HCT 26.1 (L) 39.0 - 50.0 % 
 HGB and HCT    
  Collection Time: 18  5:41 PM   
 Result  Value Ref Range 
  HGB 9.6 (L) 13.2 - 17.0 g/dL 
  HCT 29.4 (L) 39.0 - 50.0 % 
 CBC W/Auto Diff (Reflex to Manual)    
  Collection Time: 18  4:37 AM   
 Result  Value Ref Range 
  WBC 8.78 3.80 - 11.00 K/uL 
  RBC 2.66 (L) 4.20 - 5.70 M/uL 
  HGB 8.1 (L) 13.2 - 17.0 g/dL 
  HCT 23.4 (L) 39.0 - 50.0 % 
  MCV 87.8 80.0 - 100.0 fl 
  MCH 30.5 27.0 - 34.0 pg 
  MCHC 34.7 32.0 - 35.5 g/dL 
  RDW SD 50.8 37 - 53 fl 
   150 - 400 K/uL 
  MPV 7.5 fl 
  DIFF TYPE MANUAL  
  Neutrophils Manual 74 % 
 
  Bands 2 % 
  METAMYELOCYTES 1 % 
  Lymphocytes Manual 17 % 
  Monocytes Manual 5 % 
  Eosinophils Manual 1 % 
  Neutrophils Absolute 6.49 1.90 - 7.40 K/uL 
  Bands Manual 0.18 0.00 - 0.20 K/uL 
  Metamyelocytes Absolute 0.09 (H) 0.00 K/uL 
  Lymphocytes Absolute 1.49 1.00 - 3.90 K/uL 
  Monocytes Absolute 0.44 0.00 - 0.80 K/uL 
  Eosinophils Absolute 0.09 0.00 - 0.50 K/uL 
  MORPHOLOGY RBC AND PLT MORPHOLOGY APPEAR NORMAL  
 Basic metabolic panel    
  Collection Time: 18  4:37 AM   
 Result  Value Ref Range 
  SODIUM 142 135 - 145 mmol/L 
  POTASSIUM 3.5 3.5 - 4.9 mmol/L 
  CHLORIDE 109 99 - 109 mmol/L 
  CO2 23 23 - 32 mmol/L 
  ANION GAP AGAP 14 5 - 20 mmol/L 
  GLUCOSE 106 (H) 65 - 99 mg/dL 
  BUN 10 8 - 25 mg/dL 
  CREATININE 0.6 (L) 0.70 - 1.30 mg/dL 
  BUN/CREAT 17  
  CALCIUM 7.6 (L) 8.5 - 10.5 mg/dL 
  EGFR >60 >60 mL/min/1.73m2 
 
 Intake/Output Summary (Last 24 hours) at 18 0959
 Last data filed at 18 0359
  Gross per 24 hour 
 Intake          3091.26 ml 
 Output              800 ml 
 Net          2291.26 ml 
 
 Physical Exam
  
 Constitutional:  No distress. Walking. 
 Cardiovascular:  Normal rate. 
 Pulmonary:  Effort normal. No stridor. No respiratory distress. 
 Abdominal:   Soft. no distension. Dressings in place over incisions. No redness. 
 Neurological:   Alert and oriented to person, place, and time. 
 Psychiatric:  He has a normal mood and affect His behavior is normal. Thought content haydee
l. 
 Nursing note and vitals reviewed.
 
 Problem List
 
 Principal Problem:
   Rectal bleeding
 Active Problems:
   Syncope and collapse
   Acute posthemorrhagic anemia
   SÁNCHEZ (dyspnea on exertion)
   Palpitations
   Leukocytosis
   Hyperlipidemia
   LAURA on CPAP
 
 Signed:
 Jabier PERRY 
 
 [facility]
 
 Portions of this chart may have been created with voice recognition software. Occasional wr
[place]-word or "sound-alike" substitutions may have occurred, even after review, due to the inh
erent limitations of voice recognition software. Please read the chart carefully and recogni
ze, using context, where these substitutions have occurred. Personal communication is reques
michelle for any clarifications.
 
  
  Electronically signed by Luis Sepulveda MD at 2018  4:18 PM PDTConversion Transaction
, Provider Unknown - 2018  6:37 AM PDTFormatting of this note might be different from 
the original.
 Progress Notes by Louisa Farrell RN at 18  
  Author:  Louisa Farrell RN Service:  (none) Author Type:  Registered Nurse 
  Filed:  18 Date of Service:  18 Status:  Signed 
  :  Louisa Farrell RN (Registered Nurse)   
   
 Chart review complete. 
  
  Electronically signed by Conversion Transaction, Provider at 08/15/2019  4:21 PM PDTConver
kameron Transaction, Provider Unknown - 2018  5:14 PM PDTFormatting of this note might be
 different from the original.
 Nurse Progress Note by Dianelys Simpson RN at 18  
  Author:  Dianelys Simpson RN Service:  (none) Author Type:  Registered Nurse 
  Filed:  18 Date of Service:  18 Status:  Signed 
  :  Dianelys Simpson RN (Registered Nurse)   
   
 Pt tolerating advance to full liquid diet and able to ambulate in the hallway with assistan
ce. 
 Chart review complete. Dianelys Simpson RN
  
  Electronically signed by Conversion Transaction, Provider at 08/15/2019  4:27 PM PDTConeftaly
Teresa ellis JOEL - 2018  1:53 PM PDTFormatting of this note might be different from the 
original.
 Progress Notes by Rebekah Gillette MD at 18 1047  
  Author:  Rebekah Gillette MD Service:  Hospitalist Author Type:  Physician 
  Filed:  18 1379 Date of Service:  18 1353 Status:  Signed 
  :  Rebekah Gillette MD (Physician)   
   
 [facility]
 Adult Hospitalist Progress Note
 Hospital Day:  4
 SUBJECTIVE
      Events Overnight:  Underwent laparoscopy/enterectomy yesterday afternoon by Dr. Stallworth
.  Tolerated procedure well.  Pain controlled today.  Tolerating clear liquids.  No flatus y
et.
 
 OBJECTIVE
 Vital Signs:
 Blood pressure 138/76, pulse 76, temperature 98.2 F (36.8 C), temperature source Oral, 
resp. rate 16, height 1.905 m (6' 3"), weight 108.9 kg (240 lb 2.7 oz), SpO2 98 %.
 Alert, pleasant.  UP in chair.  Wife at bedside
 Lungs clear
 COR RRR
 Abd- mildly tender, active BS
 Ext- generalized mild edema
 DATA
 
 Recent Labs
 Lab 18
 
 2140 18
 2027 
 INR 1.0 1.0 
 
 Recent Labs
 Lab 18
 1149 18
 0642 18
 0557 08/15/18
 2356  08/15/18
 0133  18
 0547  18
 0910 18
 2238 
 WBC  --  10.71  --   --   --  8.94  --  10.24  < > 13.95*  --  
 HGB 8.9* 8.3*  --  8.2*  < > 7.3*  < > 7.4*  < > 8.3* 6.4* 
 HCT 26.1* 23.7*  --  24.7*  < > 21.2*  < > 21.1*  < > 24.0* 18.8* 
 PLT  --  215  --   --   --  161  --  159  < > 157  --  
 NA  --   --  143  --   --  144  --  145  < > 146*  --  
 K  --   --  4.1  --   --  3.8  --  3.5  < > 3.6  --  
 CL  --   --  112*  --   --  114*  --  116*  < > 118*  --  
 CO2  --   --  21*  --   --  22*  --  22*  < > 22*  --  
 BUN  --   --  10  --   --  10  --  10  < > 16  --  
 CREATININE  --   --  0.59*  --   --  0.6*  --  0.67*  < > 0.71  --  
 GLUF  --   --  99  --   --  106*  --  96  < > 96  --  
 EGFR  --   --  >60  --   --  >60  --  >60  < > >60  --  
 ANIONGAP  --   --  15  --   --  12  --  11  < > 9  --  
 PROT  --   --  4.6*  --   --  4.4*  --  4.5*  --   --   --  
 LACTATE  --   --   --   --   --   --   --   --   --   --  1.5 
 PHOS  --   --   --   --   --   --   --   --   --  1.7*  --  
 MG  --   --  2.1  --   --  1.8  --  1.9  --  1.9  --  
 ALB  --   --  1.9*  --   --  2.1*  --  2.1*  --   --   --  
 BILITOT  --   --  0.5  --   --  0.8  --  0.5  --   --   --  
 AST  --   --  16  --   --  20  --  20  --   --   --  
 ALT  --   --  16  --   --  16  --  17  --   --   --  
 ALP  --   --  37  --   --  36  --  32*  --   --   --  
 < > = values in this interval not displayed.
 
 Recent Labs
 Lab 18
 2328 
 UCOL YELLOW 
 CLARITYU CLEAR 
 SPECGRAV 1.028 
 LEUKOCYTESUR NEGATIVE 
 NITRITE NEGATIVE 
 UROBILINOGEN NORMAL 
 UPRO NEGATIVE 
 PHUR 5.0 
 BLOODU NEGATIVE 
 KETONES NEGATIVE 
 BILIRUBINUR NEGATIVE 
 GLUCOSEU NEGATIVE 
 
 PROBLEM LIST
 Principal Problem:
   Rectal bleeding
 Active Problems:
   Syncope and collapse
   Acute posthemorrhagic anemia
 
   SÁNCHEZ (dyspnea on exertion)
   Palpitations
   Leukocytosis
   Hyperlipidemia
   LAURA on CPAP
 
 IMPRESSION/PLAN:
 1.  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  Has done well overn
ight.  Pain controlled and diet being advanced.  No flatus yet.
 2.  Acute blood loss anemia-  Has received total of 7 units PRBC at [facility].  H/H better thi
s morning than last night.  No evidence of continued bleeding.  No indication for transfusio
n at this time.  Will recheck H/H this afternoon and then in morning.
 3.  LAURA on CPAP- patient using his own CPAP without problems.
 
 Rebekah Gillette MD
 2018
  
  Electronically signed by Teresa Gillette at 2018  2:03 PM Frank Pleitez MD - 2018 12:05 PM PDTFormatting of this note might be different from the nata
l.
 Progress Notes by Frank Stallworth MD at 18 1205  
  Author:  Frank Stallworth MD Service:  General Surgery Author Type:  Physician 
  Filed:  18 1243 Date of Service:  18 120 Status:  Addendum 
  :  Frank Stallworth MD (Physician)   
  Related Notes:  Original Note by ORLANDO Ferreira (Advanced Registered Nurse Pra
ctitioner) filed at 18 1211   
   
 
 Progress Note
 Hospital Day:   LOS: 4 days 
 Post-Op Day:  2 Days Post-Op
 
 Subjective
 
 This is a 68 y.o. male patient who presented to [facility] with a diagn
osis of GI bleed.
 
       Events Overnight:  
 
   Berna underwent a laparoscopic enterectomy single for upper GI bleeding/jejunal divertic
ulum. He is sitting up in bed, states that his pain is under control. H&H stable. WBC: 10.71
. Tmax: 98.6. 
 
 Assessment and Plan
 67 YO male with no prior abdominal surgeries with bleeding jejunal diverticulum 
  -s/p colonoscopy and EGD with epinephrine injection and dye placement on bleeding divertic
ulum on 18
  -s/p mesenteric angiogram by IR with no active bleeding identified on 18
  -S/P laparoscopy enterectomy/ single with Dr. Stallworth on 8/15/18
 
 Patient is doing well from a surgical perspective. Can advance diet to full liquids. Can am
bulate as tolerated. Will follow up tomorrow. Continue to monitor H&H levels.  
 
 Scheduled Medications
  
   atorvastatin  40 mg Oral Nightly 
   FLUoxetine  10 mg Oral Daily 
   fluticasone  1 spray Each Nare Daily 
 
 Continuous Infusions 
 
   pantoprazole 8 mg/hr (18 0717) 
   sodium chloride (IV) 30 mL/hr at 18 1156 
 
 PRN Medications
 acetaminophen **OR** acetaminophen, EPINEPHrine, HYDROmorphone, ondansetron **OR** ondanset
arielle, ondansetron **OR** ondansetron, polyethylene glycol
 
 Objective
 Vital Signs:
 /68 (BP Location: Left upper arm)  | Pulse 73  | Temp 98.6 F (37 C) (Oral)  | Res
p 16  | Ht 1.905 m (6' 3")  | Wt 108.9 kg (240 lb 2.7 oz)  | SpO2 98%  | BMI 30.02 kg/m 
 Results for orders placed or performed during the hospital encounter of 18 (from the 
past 24 hour(s))     
 Type and screen     
  Collection Time: 08/15/18  2:32 PM    
 Result   Value Ref Range 
  ABO/RH(D)  O POSITIVE  
  ANTIBODY SCREEN  NEGATIVE  
  ARM BAND NUMBER  [id number]  
  UNIT NUMBER  [id number]  
  BLOOD COMPONENT TYPE  LEUKODEPLETED PC  
  UNIT DIVISION  00  
  STATUS OF UNIT  ISSUED,FINAL  
  TRANSFUSION STATUS  OK TO TRANSFUSE  
  CROSSMATCH RESULT    
   COMPATIBLE
 Testing performed at [address];[place] [address]
    
 HGB and HCT     
  Collection Time: 08/15/18  6:40 PM    
 Result   Value Ref Range 
  HGB  8.7 (L) 13.2 - 17.0 g/dL 
  HCT  26.1 (L) 39.0 - 50.0 % 
 HGB and HCT     
  Collection Time: 08/15/18 11:56 PM    
 Result   Value Ref Range 
  HGB  8.2 (L) 13.2 - 17.0 g/dL 
  HCT  24.7 (L) 39.0 - 50.0 % 
 Comprehensive metabolic panel     
  Collection Time: 18  5:57 AM    
 Result   Value Ref Range 
  SODIUM  143 135 - 145 mmol/L 
  POTASSIUM  4.1 3.5 - 4.9 mmol/L 
  CHLORIDE  112 (H) 99 - 109 mmol/L 
  CO2  21 (L) 23 - 32 mmol/L 
  ANION GAP AGAP  15 5 - 20 mmol/L 
  GLUCOSE  99 65 - 99 mg/dL 
  BUN  10 8 - 25 mg/dL 
  CREATININE  0.59 (L) 0.70 - 1.30 mg/dL 
  BUN/CREAT  16  
  CALCIUM  7.2 (L) 8.5 - 10.5 mg/dL 
  TOTAL PROTEIN  4.6 (L) 6.3 - 8.2 g/dL 
  Albumin  1.9 (L) 3.3 - 4.8 g/dL 
  GLOBULIN  2.7 1.3 - 4.9 g/dL 
  A/G  0.7 (L) 1.0 - 2.4 
  TBIL  0.5 0.1 - 1.5 mg/dL 
  ALK PHOS  37 35 - 115 U/L 
  AST  16 10 - 45 U/L 
  ALT  16 10 - 65 U/L 
  EGFR  >60 >60 mL/min/1.73m2 
 
 Magnesium     
  Collection Time: 18  5:57 AM    
 Result   Value Ref Range 
  MAGNESIUM  2.1 1.7 - 2.4 mg/dL 
 CBC W/Auto Diff (Reflex to Manual)     
  Collection Time: 18  6:42 AM    
 Result   Value Ref Range 
  WBC  10.71 3.80 - 11.00 K/uL 
  RBC  2.69 (L) 4.20 - 5.70 M/uL 
  HGB  8.3 (L) 13.2 - 17.0 g/dL 
  HCT  23.7 (L) 39.0 - 50.0 % 
  MCV  88.3 80.0 - 100.0 fl 
  MCH  30.9 27.0 - 34.0 pg 
  MCHC  35.0 32.0 - 35.5 g/dL 
  RDW SD  49.4 37 - 53 fl 
  PLT  215 150 - 400 K/uL 
  MPV  7.0 fl 
  DIFF TYPE  AUTOMATED  
  NEUTROPHILS  89.16 % 
  LYMPHOCYTES  5.11 % 
  MONOCYTES  5.23 % 
  EOSINOPHILS  0.37 % 
  BASOPHILS  0.13 % 
  NEUTROPHILS ABS  9.55 (H) 1.90 - 7.40 K/uL 
  LYMPHOCYTES ABS  0.55 (L) 1.00 - 3.90 K/uL 
  MONOCYTES ABS  0.56 0.00 - 0.80 K/uL 
  EOSINOPHILS ABS  0.04 0.00 - 0.50 K/uL 
  BASOPHILS ABS  0.01 0.00 - 0.10 K/uL 
  MORPHOLOGY  RBC AND PLT MORPHOLOGY APPEAR NORMAL  
  Platelet Estimate  ADEQUATE  
  Diff Comment  SLIDE SCANNED, AGREES WITH AUTOMATED RESULTS.  
 HGB and HCT     
  Collection Time: 18 11:49 AM    
 Result   Value Ref Range 
  HGB  8.9 (L) 13.2 - 17.0 g/dL 
  HCT  26.1 (L) 39.0 - 50.0 % 
 
 Intake/Output Summary (Last 24 hours) at 18 1205
 Last data filed at 18 0304
  Gross per 24 hour 
 Intake             1750 ml 
 Output             1075 ml 
 Net              675 ml 
 
 Physical Exam
  
 Constitutional:  No distress. Sitting up in chair. 
 Cardiovascular:  Normal rate. 
 Pulmonary:  Effort normal. No stridor. No respiratory distress. 
 Abdominal:   Soft. no distension. Dressings in place over incisions. No redness. 
 Neurological:   Alert and oriented to person, place, and time. 
 Psychiatric:  He has a normal mood and affect His behavior is normal. Thought content haydee
l. 
 Nursing note and vitals reviewed.
 
 Problem List
 
 Principal Problem:
   Rectal bleeding
 Active Problems:
 
   Syncope and collapse
   Acute posthemorrhagic anemia
   SÁNCHEZ (dyspnea on exertion)
   Palpitations
   Leukocytosis
   Hyperlipidemia
   LAURA on CPAP
 
 Signed:
 Jabier PERRY 
 [facility]
 
 Portions of this chart may have been created with voice recognition software. Occasional wr
[place]-word or "sound-alike" substitutions may have occurred, even after review, due to the inh
erent limitations of voice recognition software. Please read the chart carefully and recogni
ze, using context, where these substitutions have occurred. Personal communication is reques
michelle for any clarifications.
 
 Patient seen and examined independently, chart reviewed.
 I concur with the above findings and have discussed them with the patient .
 I have made changes to the above note, where appropriate, and discussed the content and giovanny
nges with the author. 
 
 Frank Stallworth MD [facility] General Surgery
 
  
  Electronically signed by Thomas Cushing Trotta, MD at 2018 12:43 PM PDTConversion Tr
ansaction, Provider Unknown - 2018  5:30 AM PDTFormatting of this note might be differ
ent from the original.
 Progress Notes by Louisa Farrell RN at 18 0530  
  Author:  Louisa Farrell RN Service:  (none) Author Type:  Registered Nurse 
  Filed:  18 Date of Service:  18 Status:  Signed 
  :  Louisa Farrell RN (Registered Nurse)   
   
 Noc chart review complete. 
  
  Electronically signed by Conversion Transaction, Provider at 08/15/2019  4:24 PM PDTConver
kameron Transaction, Provider Unknown - 08/15/2018 10:30 AM PDTFormatting of this note might be
 different from the original.
 Therapy Progress Note by Alize Keller PT at 08/15/18 1030  
  Author:  Alize Keller PT Service:  (none) Author Type:  Physical Therapist 
  Filed:  08/15/18 1136 Date of Service:  08/15/18 1030 Status:  Signed 
  :  Alize Keller PT (Physical Therapist)   
   
 
  08/15/18 1030 
 PT Last Visit  
 PT Received On 08/15/18 
 Requires PT Follow Up On hold 
 Other Comments  
 Comments Pt's H&H is critical. Plan is for small bowel resection today. Pt continues to hav
e strict bed rest orders in place.  
 
  
  Electronically signed by Conversion Transaction, Provider at 08/15/2019  4:30 PM PDTTrotta
, Thomas Cushing, MD - 08/15/2018  9:00 AM PDTFormatting of this note might be different fro
m the original.
 Progress Notes by Frank Stallworth MD at 08/15/18 0900  
  Author:  Frank Stallworth MD Service:  General Surgery Author Type:  Physician 
 
  Filed:  08/15/18 6488 Date of Service:  08/15/18 0900 Status:  Addendum 
  :  Frank Stallworth MD (Physician)   
  Related Notes:  Original Note by Marielle Rocha PA-C (Physician Assistant - Certified)
 filed at 08/15/18 0904   
   
 
 Progress Note
 Hospital Day:   LOS: 3 days 
 Post-Op Day:  2 Days Post-Op
 
 Subjective
 
 This is a 68 y.o. male patient who presented to [facility] with a diagn
osis of GI bleed.
 
       Events Overnight:  
 
   Berna states he had a medium bowel movement that was dark red this morning. H&H 6.5/18.6
 this morning. He is going to be transfused. 
 
 Assessment and Plan
 67 YO male with no prior abdominal surgeries with bleeding jejunal diverticulum 
  -s/p colonoscopy and EGD with epinephrine injection and dye placement on bleeding divertic
ulum on 18
  -s/p mesenteric angiogram by IR with no active bleeding identified on 18
 
 Plan for laparoscopic assisted small bowel resection today to resect the bleeding diverticu
lum. 
 NPO for surgery. 
 
 Scheduled Medications
  
   atorvastatin  40 mg Oral Nightly 
   FLUoxetine  10 mg Oral Daily 
   fluticasone  1 spray Each Nare Daily 
 
 Continuous Infusions 
   pantoprazole 8 mg/hr (08/15/18 0838) 
   sodium chloride (IV) 30 mL/hr at 08/15/18 0837 
 
 PRN Medications
 acetaminophen **OR** acetaminophen, EPINEPHrine, ondansetron **OR** ondansetron, ondansetro
n **OR** ondansetron, polyethylene glycol
 
 Objective
 Vital Signs:
 /65 (BP Location: Left upper arm)  | Pulse 84  | Temp 98.4 F (36.9 C) (Oral)  | R
sav 20  | Ht 1.905 m (6' 3")  | Wt 108.9 kg (240 lb 2.7 oz)  | SpO2 98%  | BMI 30.02 kg/m 
 Results for orders placed or performed during the hospital encounter of 18 (from the 
past 24 hour(s))    
 HGB and HCT    
  Collection Time: 18 12:12 PM   
 Result  Value Ref Range 
  HGB 7.1 (L) 13.2 - 17.0 g/dL 
  HCT 21.3 (L) 39.0 - 50.0 % 
 HGB and HCT    
  Collection Time: 18  9:17 PM   
 Result  Value Ref Range 
  HGB 6.5 (LL) 13.2 - 17.0 g/dL 
  HCT 19.2 (LL) 39.0 - 50.0 % 
 
 CBC W/Auto Diff (Reflex to Manual)    
  Collection Time: 08/15/18  1:33 AM   
 Result  Value Ref Range 
  WBC 8.94 3.80 - 11.00 K/uL 
  RBC 2.40 (L) 4.20 - 5.70 M/uL 
  HGB 7.3 (L) 13.2 - 17.0 g/dL 
  HCT 21.2 (L) 39.0 - 50.0 % 
  MCV 88.4 80.0 - 100.0 fl 
  MCH 30.2 27.0 - 34.0 pg 
  MCHC 34.2 32.0 - 35.5 g/dL 
  RDW SD 50.8 37 - 53 fl 
   150 - 400 K/uL 
  MPV 7.8 fl 
  DIFF TYPE MANUAL  
  Neutrophils Manual 84 % 
  Bands 2 % 
  Lymphocytes Manual 9 % 
  Monocytes Manual 3 % 
  Eosinophils Manual 2 % 
  Neutrophils Absolute 7.51 (H) 1.90 - 7.40 K/uL 
  Bands Manual 0.18 0.00 - 0.20 K/uL 
  Lymphocytes Absolute 0.80 (L) 1.00 - 3.90 K/uL 
  Monocytes Absolute 0.27 0.00 - 0.80 K/uL 
  Eosinophils Absolute 0.18 0.00 - 0.50 K/uL 
  Platelet Estimate ADEQUATE  
  MORPHOLOGY RBC AND PLT MORPHOLOGY APPEAR NORMAL  
 Comprehensive metabolic panel    
  Collection Time: 08/15/18  1:33 AM   
 Result  Value Ref Range 
  SODIUM 144 135 - 145 mmol/L 
  POTASSIUM 3.8 3.5 - 4.9 mmol/L 
  CHLORIDE 114 (H) 99 - 109 mmol/L 
  CO2 22 (L) 23 - 32 mmol/L 
  ANION GAP AGAP 12 5 - 20 mmol/L 
  GLUCOSE 106 (H) 65 - 99 mg/dL 
  BUN 10 8 - 25 mg/dL 
  CREATININE 0.6 (L) 0.70 - 1.30 mg/dL 
  BUN/CREAT 17  
  CALCIUM 7.0 (L) 8.5 - 10.5 mg/dL 
  TOTAL PROTEIN 4.4 (L) 6.3 - 8.2 g/dL 
  Albumin 2.1 (L) 3.3 - 4.8 g/dL 
  GLOBULIN 2.3 1.3 - 4.9 g/dL 
  A/G 0.9 (L) 1.0 - 2.4 
  TBIL 0.8 0.1 - 1.5 mg/dL 
  ALK PHOS 36 35 - 115 U/L 
  AST 20 10 - 45 U/L 
  ALT 16 10 - 65 U/L 
  EGFR >60 >60 mL/min/1.73m2 
 Magnesium    
  Collection Time: 08/15/18  1:33 AM   
 Result  Value Ref Range 
  MAGNESIUM 1.8 1.7 - 2.4 mg/dL 
 HGB and HCT    
  Collection Time: 08/15/18  5:56 AM   
 Result  Value Ref Range 
  HGB 6.5 (LL) 13.2 - 17.0 g/dL 
  HCT 18.6 (LL) 39.0 - 50.0 % 
 
 Intake/Output Summary (Last 24 hours) at 08/15/18 0900
 Last data filed at 08/15/18 0814
 
  Gross per 24 hour 
 Intake             4431 ml 
 Output             1950 ml 
 Net             2481 ml 
 
 Physical Exam
  
 Constitutional:  No distress. Pale color.
 Cardiovascular:  Normal rate. 
 Pulmonary:  Effort normal. No stridor. No respiratory distress. 
 Abdominal:   Soft. no distension. 
 Neurological:   Alert and oriented to person, place, and time. 
 Psychiatric:  He has a normal mood and affect His behavior is normal. Thought content haydee
l. 
 Nursing note and vitals reviewed.
 
 Problem List
 
 Principal Problem:
   Rectal bleeding
 Active Problems:
   Syncope and collapse
   Acute posthemorrhagic anemia
   SÁNCHEZ (dyspnea on exertion)
   Palpitations
   Leukocytosis
   Hyperlipidemia
   LAURA on CPAP
 
 Signed:
 Marielle Rocha PA-C
   [facility] Acute Care Surgery
 
 Portions of this chart may have been created with voice recognition software. Occasional wr
[place]-word or "sound-alike" substitutions may have occurred, even after review, due to the inh
erent limitations of voice recognition software. Please read the chart carefully and recogni
ze, using context, where these substitutions have occurred. Personal communication is reques
michelle for any clarifications.
 Patient seen and examined independently, chart reviewed.
 I concur with the above findings and have discussed them with the patient .
 I have made changes to the above note, where appropriate, and discussed the content and giovanny
nges with the author. 
 
 Frank Stallworth MD FACS
   [facility] General Surgery
 
  
  Electronically signed by Thomas Cushing Trotta, MD at 08/15/2018  5:55 PM Luis Zambrano M
D - 08/15/2018  8:26 AM PDTFormatting of this note might be different from the original.
 Progress Notes by Luis Christensen DO at 08/15/18 0826  
  Author:  Luis Christensen DO Service:  Hospitalist Author Type:  Physician 
  Filed:  08/15/18 0832 Date of Service:  08/15/18 0826 Status:  Addendum 
  :  Luis Christensen DO (Physician)   
  Related Notes:  Original Note by Luis Christensen DO (Physician) filed at 08/15/18 0831   
   
 [facility]
 Service:  Hospitalist
 Progress Note
 
 Pt: Berna Parkinson  AGE/SEX: 68 y.o.   male           MRN: [id number]
 
 ROOM: [address] [place] [address]  PCP: Carlotta Motley  : 1950
 TODAY'S DATE: 8/15/2018
 
 Hospital Day:   LOS: 3 days 
 Post-Op Day:  2 Days Post-Op
 SUBJECTIVE
 
      Patient Summary:  Per H&P: Patient presents from [facility] following 2 days of h
ematochezia. The patient states that he has been experiencing several sticky red bowel movem
ents, diaphoresis, dizziness, chills and heart palpitations. The patient also states that wh
en he presented at [facility] he had a "really low blood pressure and a fever." Patient wa
s transferred to this department by life flight. 
 
 Labs accompanying the patient show that at the previous hospital the patient had a hemoglob
in of 5.9, GFR of 101 and creatinine of 0.7.
 
      Events Overnight:  Patient seen and examined. Reports having dark BM this AM. Afebrile
. No abdominal pain, chest pain, shortness of breath, lightheadedness, dizziness.
 
 Scheduled Medications   atorvastatin  40 mg Oral Nightly 
   FLUoxetine  10 mg Oral Daily 
   fluticasone  1 spray Each Nare Daily 
 
 Continuous Infusions 
   pantoprazole 8 mg/hr (18) 
   sodium chloride (IV) 75 mL/hr at 18 1312 
 
 PRN Medications
 acetaminophen **OR** acetaminophen, EPINEPHrine, ondansetron **OR** ondansetron, ondansetro
n **OR** ondansetron, polyethylene glycol
 
 OBJECTIVE
 Vital Signs:
 /65 (BP Location: Left upper arm)  | Pulse 84  | Temp 98.4 F (36.9 C) (Oral)  | R
sav 20  | Ht 1.905 m (6' 3")  | Wt 108.9 kg (240 lb 2.7 oz)  | SpO2 98%  | BMI 30.02 kg/m 
 Temp:  [97.3 F (36.3 C)-99.4 F (37.4 C)] 98.4 F (36.9 C) (08/15 0733)
 BP: (107-123)/(55-76) 119/65 (08/15 0733)
 Heart Rate:  [82-99] 84 (08/15 0733)
 Resp:  [14-24] 20 (08/15 0733)
 SpO2:  [95 %-98 %] 98 % (08/15 0733)
 Weight:  [108.9 kg (240 lb 2.7 oz)] 108.9 kg (240 lb 2.7 oz) (08/15 0245)
 
 GENERAL: Alert and oriented x3. No acute distress.
 HEENT: Moist mucous membranes. Pupils equally round and reactive to light. Extraocular musc
les intact.
 HEART: Regular rhythm. No murmurs.
 LUNGS: Clear to auscultation bilaterally.
 ABDOMEN: Bowel sounds present. Nontender, nondistended. No guarding, no rebound.
 EXTREMITIES: Negative for clubbing, cyanosis, or edema.
 NEUROLOGIC: Within normal limits. Cranial nerves II to XII grossly intact.
 SKIN: Wounds, ulcers, or skin lesions negative.
 
 DATA
 
 CBC:  
 Lab Results    
 Component  Value Date 
  WBC 8.94 08/15/2018 
  RBC 2.40 (L) 08/15/2018 
  HGB 6.5 (LL) 08/15/2018 
 
  HCT 18.6 (LL) 08/15/2018 
  MCV 88.4 08/15/2018 
  MCH 30.2 08/15/2018 
  MCHC 34.2 08/15/2018 
  RDW 50.8 08/15/2018 
   08/15/2018 
  MPV 7.8 08/15/2018 
  DIFFTYPE MANUAL 08/15/2018 
 
 CMP:  
 Lab Results    
 Component  Value Date 
   08/15/2018 
  K 3.8 08/15/2018 
   (H) 08/15/2018 
  CO2 22 (L) 08/15/2018 
  ANIONGAP 12 08/15/2018 
  GLUF 106 (H) 08/15/2018 
  BUN 10 08/15/2018 
  CREATININE 0.6 (L) 08/15/2018 
  BCR 17 08/15/2018 
  CA 7.0 (L) 08/15/2018 
  PROT 4.4 (L) 08/15/2018 
  ALB 2.1 (L) 08/15/2018 
  GLOB 2.3 08/15/2018 
  BILITOT 0.8 08/15/2018 
  ALP 36 08/15/2018 
  AST 20 08/15/2018 
  ALT 16 08/15/2018 
  EGFR >60 08/15/2018 
 
 IMAGING
 Cta Abdomen Pelvis With Iv Contrast
 
 Result Date: 2018
 1. No aortic aneurysm or dissection seen. 2. No active GI bleeding identified. 3. No acute 
inflammatory or obstructive process seen. RADIA  Electronically signed by Mike Wilson MD on
 Aug 12 2018  2:13AM Referring Provider Line: [phone] ID: [id number]
 
 Nm Gi Blood Loss
 
 Addendum Date: 2018  
 TECHNIQUE: An additional hour of scintigraphic imaging in the anterior projection over the 
abdomen and pelvis was performed portably. FINDINGS: There is activity seen extending throug
h multiple small bowel loops predominantly in the left upper quadrant, corresponding with th
e earlier suspicion of upper GI bleed, probably in the duodenal sweep. IMPRESSION: 1.  I jessica
pect an upper GI bleed, probably in the duodenal sweep. Electronically signed by Jerry turner MD on 2018 8:13 AM
 
 Result Date: 2018
 1.  Indeterminate hemorrhage, probably involving the duodenal sweep. Further imaging is nec
essary. Addendum to follow if the patient allows further imaging. Electronically signed by NIKIA Osullivan MD on 2018 3:32 PM
 
 Ir Angiogram Visceral Selective
 
 Result Date: 2018
 No apparent source of GI bleeding on extensive mesenteric angiography. Electronically huyen
d by Tolu Hernandez MD on 2018 8:08 PM
 
 
 X-ray Chest 1 View
 
 Result Date: 2018
 No acute cardiopulmonary abnormality Electronically signed by Bubba Cordoba MD on 2018 6
:45 AM
 
 PROBLEM LIST
 
 Principal Problem:
   Rectal bleeding
 Active Problems:
   Syncope and collapse
   Acute posthemorrhagic anemia
   SÁNCHEZ (dyspnea on exertion)
   Palpitations
   Leukocytosis
   Hyperlipidemia
   LAURA on CPAP
 
 ASSESSMENT & PLAN
 
 Acute posthemorrhagic anemia 
 - Presented with worsening symptoms of dyspnea, bloody BM
 - Initial H&H of , received 2 units PRBC () prior to transfer
 - Transfused 3 unit PRBC (), 2 units (), 1 unit (), will transfuse 1 unit this 
AM
 - H&H drop overnight
 - Continue serial H&H
 
 Acute rectal bleeding 
 - Associated with abdominal distention
 - GI consulted, unremarkable upper and lowerendoscopy ()
 - Bleeding scan reviewed by GI, push endoscopy (), unable to reach bleeding site
 - IR consulted, angiogram on  no apparent source of bleeding found
 - Surgery consulted, plan for diagnostic laparoscopy with small bowel resection today (8/15
)
 - On Protonix drip
 
 Syncope and collapse
 - Secondary to acute posthemorrhagic anemia
 - Fall precautions
 
 Leukocytosis
 - Possible leukemoid reaction
 - Resolved
 - No sign of infection
 - Procalcitonin 0.22
 - Received dose of Rocephin on 
 - CXR, UA negative
 - Continue to monitor
 
 Disposition:  Transfuse 1 unit PRBC this AM, surgery today, serial H&H
 Code Status:  Full Code
 
 Luis Christensen DO
 8/15/2018
 8:27 AM
  
  Electronically signed by Luis Christensen MD at 08/15/2018  8:32 AM PDTConversion Transaction, P
rovider Unknown - 08/15/2018  5:53 AM PDTFormatting of this note might be different from the
 
 original.
 Nurse Progress Note by Sofiya Hart RN at 08/15/18 0560  
  Author:  Sofiya Hart RN Service:  (none) Author Type:  Registered Nurse 
  Filed:  08/15/18 0602 Date of Service:  08/15/18 0553 Status:  Addendum 
  :  Sofiya Hart RN (Registered Nurse)   
  Related Notes:  Original Note by Sofiya Hart RN (Registered Nurse) filed at 08/15/18 055
7   
   
 Critical H&H reported to Dr. Flood, 1 unit of PRBCs given. Pt NPO since midnight. Pt had two
 bloody stools during the night. VSS. No acute changes from previous assessment. Chart audit
 completed. Will continue to monitor. Sofiya Hart RN
 
  
  Electronically signed by Conversion Transaction, Provider at 08/15/2019  4:31 PM PDTConver
kameron Transaction, Provider Unknown - 2018  8:01 PM PDTFormatting of this note might be
 different from the original.
 Nurse Progress Note by Yancy Young RN at 18  
  Author:  Yancy Young RN Service:  (none) Author Type:  Registered Nurse 
  Filed:  18 Date of Service:  18 Status:  Signed 
  :  Yancy Young RN (Registered Nurse)   
   
 Patient had a tmax of 99.6 , VSS. Patient has an anticipated laparoscopic bowel resection t
omorrow  (8/15). Initiated clear liquid diet per order. Patient is to be NPO at midnight per
 order. Noon H/H was( 7.1/21.3). 
 
 -chart check complete
 
 Yancy Young RN
   
 
  
  Electronically signed by Conversion Transaction, Provider at 08/15/2019  4:34 PM PDTShehza
di, Romana, MD - 2018  5:34 PM PDTFormatting of this note might be different from the 
original.
 Progress Notes by Romana Shehzadi, MD at 18 6227  
  Author:  Romana Shehzadi, MD Service:  Gastroenterology Author Type:  Physician 
  Filed:  18 1736 Date of Service:  18 260 Status:  Signed 
  :  Romana Shehzadi, MD (Physician)   
   
 GI FOLLOW UP NOTE
 
 REASON FOR CONSULT: 
 
 LAST 24 HS:
 Patient had 2 episodes of melena since yesterday one was last night and one was this mornin
g. He denies any abdominal pain.
 
 PHYSICAL EXAM:
 Vitals:     
  18 0357 18 0742 18 1146 18 1549 
 BP: 115/55 109/58 108/57 107/61 
 BP Location: Right upper arm Right upper arm Right upper arm Right upper arm 
 Pulse: 105 96 89 85 
 Resp: 20 20 20 20 
 Temp: 98.5 F (36.9 C) 99.6 F (37.6 C) 99.4 F (37.4 C) 98.5 F (36.9 C) 
 TempSrc: Oral Oral Oral Oral 
 SpO2: 95% 94% 96% 97% 
 Weight:     
 Height:     
 
 
 General: NAD , WD/WN
 Eyes: EOMI, No lid lag or proptosis
 ENT: NC/AT , Moist mucous membranes.
 Neck : No thyromegaly , Supple , No LN palpable,
 CVS: S1+S2,No MRG, No JVD or pedal edema.
 Resp: CTA B/L, W/R/R -None
 Abd: Soft ,NT , ND , No organomegaly , BS present 
 Neuro : AOX3, 
 Skin: No conjuctival and palmar palor, Icterus-negative.
 
 LABS
 Lab Results    
 Component  Value Date 
  WBC 10.24 2018 
  HGB 7.1 (L) 2018 
  HCT 21.3 (L) 2018 
   2018 
  ALT 17 2018 
  AST 20 2018 
   2018 
  K 3.5 2018 
   (H) 2018 
  BUN 10 2018 
  CO2 22 (L) 2018 
  INR 1.0 2018 
  GLUF 96 2018 
  
 
 MEDICATIONS
 Scheduled
   atorvastatin  40 mg Oral Nightly 
   FLUoxetine  10 mg Oral Daily 
   fluticasone  1 spray Each Nare Daily 
 
 Infusions:
   pantoprazole 8 mg/hr (18 0901) 
   sodium chloride (IV) 75 mL/hr at 18 1312 
 
 ASSESSMENT/PLAN:
 Gastrointestinal bleeding:
 Jejunal diverticulum
 -Patient presented with gastrointestinal bleeding.
 -Status post upper endoscopy colonoscopy and push enteroscopy.
 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 Unable to achieve hemostasis with end
oscopy or interventional radiology.
 -Surgery on board.
 -If patient rebleeds further management will be up to interventional radiology and surgery.
 -Continue to monitor H and H.
  
  Electronically signed by Romana Shehzadi, MD at 2018  5:36 PM Luis Zambrano MD -  12:26 PM PDTFormatting of this note might be different from the original.
 Progress Notes by Luis Christensen DO at 18 9006  
  Author:  Luis Christensen DO Service:  Hospitalist Author Type:  Physician 
  Filed:  18 1230 Date of Service:  18 1226 Status:  Signed 
  :  Luis Christensen DO (Physician)   
   
 [facility]
 Service:  Hospitalist
 Progress Note
 
 
 Pt: Berna Parkinson  AGE/SEX: 68 y.o.   male           MRN: [id number]
 ROOM: [address] [place] [address]  PCP: Carlotta Motley  : 1950
 TODAY'S DATE: 2018
 
 Hospital Day:   LOS: 1 day 
 Post-Op Day:  1 Day Post-Op
 SUBJECTIVE
 
      Patient Summary:  Per H&P: Patient presents from [facility] following 2 days of h
ematochezia. The patient states that he has been experiencing several sticky red bowel movem
ents, diaphoresis, dizziness, chills and heart palpitations. The patient also states that wh
en he presented at [facility] he had a "really low blood pressure and a fever." Patient wa
s transferred to this department by life flight. 
 
 Labs accompanying the patient show that at the previous hospital the patient had a hemoglob
in of 5.9, GFR of 101 and creatinine of 0.7.
 
      Events Overnight:  Patient seen and examined. Reports feeling better this AM. Non-bloo
dy BM this AM. Afebrile. No abdominal pain, chest pain. Resolved shortness of breath.
 
 Scheduled Medications   atorvastatin  40 mg Oral Nightly 
   FLUoxetine  10 mg Oral Daily 
   fluticasone  1 spray Each Nare Daily 
 
 Continuous Infusions 
   pantoprazole 8 mg/hr (18 0901) 
   sodium chloride (IV) 125 mL/hr at 18 1204 
 
 PRN Medications
 acetaminophen **OR** acetaminophen, EPINEPHrine, ondansetron **OR** ondansetron, ondansetro
n **OR** ondansetron, polyethylene glycol
 
 OBJECTIVE
 Vital Signs:
 /57 (BP Location: Right upper arm)  | Pulse 89  | Temp 99.4 F (37.4 C) (Oral)  | 
Resp 20  | Ht 1.905 m (6' 3")  | Wt 103.3 kg (227 lb 12.8 oz)  | SpO2 96%  | BMI 28.47 kg/m
 
 Temp:  [98.3 F (36.8 C)-99.6 F (37.6 C)] 99.4 F (37.4 C) ( 114)
 BP: (108-141)/(55-74) 108/57 ( 1146)
 Heart Rate:  [] 89 ( 1146)
 Resp:  [18-21] 20 ( 114)
 SpO2:  [91 %-100 %] 96 % ( 114)
 
 GENERAL: Alert and oriented x3. No acute distress.
 HEENT: Moist mucous membranes. Pupils equally round and reactive to light. Extraocular musc
les intact.
 HEART: Regular rhythm. No murmurs.
 LUNGS: Clear to auscultation bilaterally.
 ABDOMEN: Bowel sounds present. Nontender, nondistended. No guarding, no rebound.
 EXTREMITIES: Negative for clubbing, cyanosis, or edema.
 NEUROLOGIC: Within normal limits. Cranial nerves II to XII grossly intact.
 SKIN: Wounds, ulcers, or skin lesions negative.
 
 DATA
 
 CBC:  
 Lab Results    
 Component  Value Date 
  WBC 10.24 2018 
  RBC 2.36 (L) 2018 
 
  HGB 7.4 (L) 2018 
  HCT 21.1 (L) 2018 
  MCV 89.3 2018 
  MCH 31.3 2018 
  MCHC 35.1 2018 
  RDW 47.3 2018 
   2018 
  MPV 7.6 2018 
  DIFFTYPE AUTOMATED 2018 
 
 CMP:  
 Lab Results    
 Component  Value Date 
   2018 
  K 3.5 2018 
   (H) 2018 
  CO2 22 (L) 2018 
  ANIONGAP 11 2018 
  GLUF 96 2018 
  BUN 10 2018 
  CREATININE 0.67 (L) 2018 
  BCR 15 2018 
  CA 7.2 (L) 2018 
  PROT 4.5 (L) 2018 
  ALB 2.1 (L) 2018 
  GLOB 2.5 2018 
  BILITOT 0.5 2018 
  ALP 32 (L) 2018 
  AST 20 2018 
  ALT 17 2018 
  EGFR >60 2018 
 
 IMAGING
 Cta Abdomen Pelvis With Iv Contrast
 
 Result Date: 2018
 1. No aortic aneurysm or dissection seen. 2. No active GI bleeding identified. 3. No acute 
inflammatory or obstructive process seen. RADIA  Electronically signed by Mike Wilson MD on
 Aug 12 2018  2:13AM Referring Provider Line: [phone] ID: [id number]
 
 Nm Gi Blood Loss
 
 Addendum Date: 2018  
 TECHNIQUE: An additional hour of scintigraphic imaging in the anterior projection over the 
abdomen and pelvis was performed portably. FINDINGS: There is activity seen extending throug
h multiple small bowel loops predominantly in the left upper quadrant, corresponding with th
e earlier suspicion of upper GI bleed, probably in the duodenal sweep. IMPRESSION: 1.  I jessica
pect an upper GI bleed, probably in the duodenal sweep. Electronically signed by Jerry turner MD on 2018 8:13 AM
 
 Result Date: 2018
 1.  Indeterminate hemorrhage, probably involving the duodenal sweep. Further imaging is nec
essary. Addendum to follow if the patient allows further imaging. Electronically signed by NIKIA Osullivan MD on 2018 3:32 PM
 
 Ir Angiogram Visceral Selective
 
 Result Date: 2018
 No apparent source of GI bleeding on extensive mesenteric angiography. Electronically huyen
d by Tolu Hernandez MD on 2018 8:08 PM
 
 
 X-ray Chest 1 View
 
 Result Date: 2018
 No acute cardiopulmonary abnormality Electronically signed by Bubba Cordoba MD on 2018 6
:45 AM
 
 PROBLEM LIST
 
 Principal Problem:
   Rectal bleeding
 Active Problems:
   Syncope and collapse
   Acute posthemorrhagic anemia
   SÁNCHEZ (dyspnea on exertion)
   Palpitations
   Leukocytosis
   Hyperlipidemia
   LAURA on CPAP
 
 ASSESSMENT & PLAN
 
 Acute posthemorrhagic anemia 
 - Presented with worsening symptoms of dyspnea, bloody BM
 - Initial H&H of , received 2 units PRBC () prior to transfer
 - Transfused 3 unit PRBC (), 2 units ()
 - H&H Stable
 - Continue serial H&H
 
 Acute rectal bleeding 
 - Associated with abdominal distention
 - GI consulted, unremarkable upper and lower endoscopy ()
 - Bleeding scan reviewed by GI, push endoscopy today (), unable to reach bleeding site
 - IR consulted, angiogram on  no apparent source of bleeding found
 - Surgery consulted
 - On Protonix drip
 
 Syncope and collapse
 - Secondary to acute posthemorrhagic anemia
 - Fall precautions
 
 Leukocytosis
 - Possible leukemoid reaction
 - No sign of infection
 - Procalcitonin 0.22
 - Received dose of Rocephin on 
 - CXR, UA negative
 - Continue to monitor
 
 Disposition:  Monitor H&H, await surgery recommendations
 Code Status:  Full Code
 
 Luis Christensen DO
 2018
 12:26 PM
  
  Electronically signed by Luis Christensen MD at 2018 12:34 PM PDTConversion TransactionMIGUEL ÁNGEL
estelaabebe Unknown - 2018 10:59 AM PDTFormatting of this note might be different from the
 original.
 Therapy Progress Note by Rosie Elizabeth PT at 18 1059  
 
  Author:  Rosie Elizabeth PT Service:  (none) Author Type:  Physical Therapist 
  Filed:  18 1100 Date of Service:  18 1059 Status:  Signed 
  :  Rosie Elizabeth PT (Physical Therapist)   
   
 
  18 1059 
 PT Last Visit  
 PT Received On 18 
 Reason for Treatment Deconditioning 
 Requires PT Follow Up On hold 
 Other Comments  
 Comments Chart reviewed with RN consulted, pt continues on strict bedrest per orderset with
 nursing verifying. Plan to follow up for evaluation when medically appropriate. 
 
  
  Electronically signed by Conversion Transaction, Provider at 08/15/2019  4:36 PM PDTConver
kameron Transaction, Provider Unknown - 2018  6:50 AM PDTFormatting of this note might be
 different from the original.
 Nurse Progress Note by Sofiya Hart RN at 18 0650  
  Author:  Sofiya Hart RN Service:  (none) Author Type:  Registered Nurse 
  Filed:  18 0652 Date of Service:  18 0650 Status:  Signed 
  :  Sofiya Hart RN (Registered Nurse)   
   
 A&O x4. Right groin site checked frequently throughout shift with no changes. Pt remains af
ebrile with stable BP. One episode of bloody stool during the night. Pt currently on RA. Pt 
reports sore throat. No acute changes from previous assessment. Will continue to monitor. Ch
art audit completed. Sofiya Hart RN 
 
  
  Electronically signed by Conversion Transaction, Provider at 08/15/2019  4:35 PM PDTConver
kameron Transaction, Provider Unknown - 2018  6:08 PM PDTFormatting of this note might be
 different from the original.
 Nurse Progress Note by Katrin Taylor RN at 18  
  Author:  Katrin Taylor RN Service:  (none) Author Type:  Registered Nurse 
  Filed:  18 Date of Service:  18 Status:  Signed 
  :  Katrin Taylor RN (Registered Nurse)   
   
 Anesthesia to monitor pt status, medication administration and pt status during procedure. 
  
  Electronically signed by Conversion Transaction, Provider at 08/15/2019  4:43 PM PDTConver
kameron Transaction, Provider Unknown - 2018  6:05 PM PDTFormatting of this note might be
 different from the original.
 Nurse Progress Note by Valentina Lugo RN at 18  
  Author:  Valentina Lugo RN Service:  (none) Author Type:  Registered Nurse 
  Filed:  18 Date of Service:  18 Status:  Signed 
  :  Valentina Lugo RN (Registered Nurse)   
   
 Pt is currently in IRR, Per Dr. Amparo RN to order STAT H/H when pt arrives to floor. 
 
 Restraints documentation: NA
 Blood documentation: 1 unit finished on day shift, all documentation complete 
 Protocols: NA
 Medication parameters: NA
 PRN Parameter medications: NA 
 Signed/Held orders: Post Op orders to be released when pt is out of surgery 
 
 Valentina Lugo RN
 
  
  Electronically signed by Conversion Transaction, Provider at 08/15/2019  4:42 PM PDTConver
 
kameron Transaction, Provider Unknown - 2018  3:40 PM PDTFormatting of this note might be
 different from the original.
 Therapy Progress Note by Rosie Elizabeth PT at 18 1540  
  Author:  Rosie Elizabeth PT Service:  (none) Author Type:  Physical Therapist 
  Filed:  18 1633 Date of Service:  18 1540 Status:  Signed 
  :  Rosie Elizabeth PT (Physical Therapist)   
   
 
  18 1540 
 PT Last Visit  
 PT Received On 18 
 Reason for Treatment Deconditioning 
 Requires PT Follow Up Unavailable 
 Other Comments  
 Comments Pt off floor for procedure during PT evaluation attempt. Plan to follow up for ass
essment tomorrow's date as census allows. 
 
  
  Electronically signed by Conversion Transaction, Provider at 08/15/2019  4:41 PM PDTConver
kameron Transaction, Provider Unknown - 2018  3:19 PM PDTFormatting of this note might be
 different from the original.
 Nurse Progress Note by Yancy Young RN at 18  
  Author:  Yancy Young RN Service:  (none) Author Type:  Registered Nurse 
  Filed:  18 1524 Date of Service:  18 Status:  Signed 
  :  Yancy Young RN (Registered Nurse)   
   
 Report given to Zac from OPP early this morning. Preop check list complete. Patient has tw
o patent IV's. Patient was transferred via wheel chair by MISTI Valdes. 
 
 Yancy Young RN
  
  
  Electronically signed by Conversion Transaction, Provider at 08/15/2019  4:44 PM PDTConver
kameron Transaction, Provider Unknown - 2018  8:29 AM PDTFormatting of this note might be
 different from the original.
 Nurse Progress Note by Majo Murray RN at 18  
  Author:  Majo Murray RN Service:  (none) Author Type:  Registered Nurse 
  Filed:  18 Date of Service:  18 Status:  Signed 
  :  Majo Murray RN (Registered Nurse)   
   
 
  18 
 Discharge Planning Evaluation  
 Admitting Diagnosis GI bleed. 
 Readmission No 
 Living Arrangements Spouse/significant other 
 Support Systems Spouse/significant other;Family members 
 Type of Residence Private residence 
 Independent with ADL's Yes 
 Independent with Mobility Yes 
 Home Care Services No 
 Caregiver after Discharge No 
 Mental Status Oriented 
 Anticipated Discharge Plan  
 Post Acute Care Needs None at this time 
 Plan communicated to patient/family Yes 
 Resources  
 Financial concerns No 
 Transportation issues No 
 Patient/Family concerns No 
 
 Prescription Plan Yes 
 Name of Pharmacy [facility]. 
 Previous home health equipment No 
 Vascular access device No 
 Anticipated Disposition  
 Facility Type Home 
 
 Met with Berna and spouse Sofiya and discussed discharge planning, Pt is a 68 y.o., male wh
o has been admitted with a GI bleed.  He is independent with his ADL's at home and lives wit
h his wife.
 
 Patient's PCP is: [facility].
 
 Patient's insurance: [employer].
 Coverage concerns: none.
 Medication coverage/concerns: none.
 Rx Bedside Delivery: no.
 
 Community resources utilized / needed: none.
 
 Assistance in transportation: spouse Sofiya.
 
 Identification of any specific education / training: none.
 
 Barriers to Discharge / Alternative housing needed: none.
 
 Anticipated DCP: home.
 
 MAJO EDILBERTO
 
  
  Electronically signed by Leonie Transaction, Provider at 08/15/2019  4:45 PM Luis Zambrano MD - 2018  8:26 AM PDTFormatting of this note might be different from the origin
al.
 Progress Notes by Luis Christensen DO at 18  
  Author:  Luis Christensen DO Service:  Hospitalist Author Type:  Physician 
  Filed:  18 1520 Date of Service:  18 Status:  Signed 
  :  Luis Christensen DO (Physician)   
   
 [facility]
 Service:  Hospitalist
 Progress Note
 
 Pt: Berna Parkinson  AGE/SEX: 68 y.o.   male           MRN: [id number]
 ROOM: [facility]-  PCP: [facility]  : 1950
 TODAY'S DATE: 2018
 
 Hospital Day:   LOS: 1 day 
 Post-Op Day:  1 Day Post-Op
 SUBJECTIVE
 
      Patient Summary:  Per H&P: Patient presents from [facility] following 2 days of h
ematochezia. The patient states that he has been experiencing several sticky red bowel movem
ents, diaphoresis, dizziness, chills and heart palpitations. The patient also states that wh
en he presented at [facility] he had a "really low blood pressure and a fever." Patient wa
s transferred to this department by life flight. 
 
 Labs accompanying the patient show that at the previous hospital the patient had a hemoglob
in of 5.9, GFR of 101 and creatinine of 0.7.
 
 
      Events Overnight:  Patient seen and examined. Reports feeling better this AM, resting 
comfortably in bed. Back pain tolerable. No shortness of breath, chest pain. Episodic elevat
ed temperatures, likely from transfusions, no sign of infection. Did receive 1 dose of Rocep
hin empirically overnight. No chest pain, shortness of breath. Dark BM this AM.
 
 Scheduled Medications   atorvastatin  40 mg Oral Nightly 
   FLUoxetine  10 mg Oral Daily 
 
 Continuous Infusions 
   pantoprazole 8 mg/hr (18 0715) 
   sodium chloride (IV) 125 mL/hr at 18 0404 
 
 PRN Medications
 acetaminophen **OR** acetaminophen, heparin flush (PF), ondansetron **OR** ondansetron, jeremiah
yethylene glycol
 
 OBJECTIVE
 Vital Signs:
 /59  | Pulse 84  | Temp 99.1 F (37.3 C) (Oral)  | Resp 18  | Ht 1.905 m (6' 3")  
| Wt 103.3 kg (227 lb 12.8 oz)  | SpO2 99%  | BMI 28.47 kg/m 
 Temp:  [97.9 F (36.6 C)-101.3 F (38.5 C)] 99.1 F (37.3 C) (737)
 BP: ()/(46-71) 107/59 (737)
 Heart Rate:  [] 84 (737)
 Resp:  [12-24] 18 (737)
 SpO2:  [92 %-99 %] 99 % (737)
 
 GENERAL: Alert and oriented x3. No acute distress.
 HEENT: Moist mucous membranes. Pupils equally round and reactive to light. Extraocular musc
les intact.
 HEART: Regular rhythm. No murmurs.
 LUNGS: Clear to auscultation bilaterally.
 ABDOMEN: Bowel sounds present. Nontender, nondistended. No guarding, no rebound.
 EXTREMITIES: Negative for clubbing, cyanosis, or edema.
 NEUROLOGIC: Within normal limits. Cranial nerves II to XII grossly intact.
 SKIN: Wounds, ulcers, or skin lesions negative.
 
 DATA
 
 CBC:  
 Lab Results    
 Component  Value Date 
  WBC 17.20 (H) 2018 
  RBC 2.67 (L) 2018 
  HGB 6.4 (LL) 2018 
  HCT 18.8 (LL) 2018 
  MCV 88.9 2018 
  MCH 31.1 2018 
  MCHC 34.9 2018 
  RDW 45.1 2018 
   2018 
  MPV 7.7 2018 
  DIFFTYPE AUTOMATED 2018 
 
 CMP:  
 Lab Results    
 Component  Value Date 
   2018 
  K 4.1 2018 
   2018 
  CO2 22 (L) 2018 
 
  ANIONGAP 14 2018 
  GLUF 124 (H) 2018 
  BUN 21 2018 
  CREATININE 0.9 2018 
  BCR 23 2018 
  CA 7.7 (L) 2018 
  PROT 5.4 (L) 2018 
  ALB 2.8 (L) 2018 
  GLOB 2.6 2018 
  BILITOT 0.3 2018 
  ALP 44 2018 
  AST 21 2018 
  ALT 28 2018 
  EGFR >60 2018 
 
 IMAGING
 Cta Abdomen Pelvis With Iv Contrast
 
 Result Date: 2018
 1. No aortic aneurysm or dissection seen. 2. No active GI bleeding identified. 3. No acute 
inflammatory or obstructive process seen. RADIA  Electronically signed by Mike Wilson MD on
 Aug 12 2018  2:13AM Referring Provider Line: [phone] ID: [id number]
 
 Nm Gi Blood Loss
 
 Addendum Date: 2018  
 TECHNIQUE: An additional hour of scintigraphic imaging in the anterior projection over the 
abdomen and pelvis was performed portably. FINDINGS: There is activity seen extending throug
h multiple small bowel loops predominantly in the left upper quadrant, corresponding with th
e earlier suspicion of upper GI bleed, probably in the duodenal sweep. IMPRESSION: 1.  I jessica
pect an upper GI bleed, probably in the duodenal sweep. Electronically signed by Jerry turner MD on 2018 8:13 AM
 
 Result Date: 2018
 1.  Indeterminate hemorrhage, probably involving the duodenal sweep. Further imaging is nec
essary. Addendum to follow if the patient allows further imaging. Electronically signed by NIKIA Osullivan MD on 2018 3:32 PM
 
 X-ray Chest 1 View
 
 Result Date: 2018
 No acute cardiopulmonary abnormality Electronically signed by Bubba Cordoba MD on 2018 6
:45 AM
 
 PROBLEM LIST
 
 Principal Problem:
   Rectal bleeding
 Active Problems:
   Syncope and collapse
   Acute posthemorrhagic anemia
   SÁNCHEZ (dyspnea on exertion)
   Palpitations
   Leukocytosis
   Hyperlipidemia
   LAURA on CPAP
 
 ASSESSMENT & PLAN
 
 Acute posthemorrhagic anemia 
 
 - Presented with worsening symptoms of dyspnea, bloody BM
 - Initial H&H of , received 2 units PRBC () prior to transfer
 - Transfused 3 unit PRBC yesterday (), 2 units this AM ()
 
 Acute rectal bleeding 
 - Associated with abdominal distention
 - GI consulted, unremarkable upper and lower endoscopy ()
 - Bleeding scan reviewed by GI, plan for push endoscopy today ()
 - On Protonix drip
 - Dark BM this AM
 
 Syncope and collapse
 - Secondary to acute posthemorrhagic anemia
 - Fall precautions
 
 Leukocytosis
 - Possible leukemoid reaction
 - No sign of infection
 - Procalcitonin 0.22
 - Received dose of Rocephin on 
 - CXR, UA negative
 - Continue to monitor
 
 Disposition:  Serial H&H, push endoscopy today
 Code Status:  Full Code
 
 Luis Christensen DO
 2018
 8:26 AM
  
  Electronically signed by Luis Christensen MD at 2018  3:20 PM PDTConMIGUEL ÁNGEL Sandra Unknown - 2018  6:52 AM PDTFormatting of this note might be different from the
 original.
 Nurse Progress Note by Amelia Chávez RN at 18 0652  
  Author:  Amelia Chávez RN Service:  (none) Author Type:  Registered Nurse 
  Filed:  18 0701 Date of Service:  18 Status:  Signed 
  :  Amelia Chávez RN (Registered Nurse)   
   
 Patient continues to remain afebrile through second unit of blood under high-risk transfusi
on protocol.  BP's remain soft, but not lower than 94 systolic.  Patient maintaining adequat
e oxygenation on RA and remainder of VS are stable.  Patient's urine output has only totaled
 950 ml's in comparison to the 4338 ml's of fluid he's received overnight.  Patient remains 
NPO in preparation for procedure scheduled for today.  No further concerns identified.  Will
 continue to monitor.  18 6:57 AM Amelia Chávez RN
 
 Chart check completed
         
  
  Electronically signed by Conversion Mary Provider at 08/15/2019  4:45 PM PDTConver
kameron Transaction, Provider Unknown - 2018  3:56 AM PDTFormatting of this note might be
 different from the original.
 Nurse Progress Note by Amelia Chávez RN at 18  
  Author:  Amelia Chávez RN Service:  (none) Author Type:  Registered Nurse 
  Filed:  18 0400 Date of Service:  18 Status:  Signed 
  :  Amelia Chávez RN (Registered Nurse)   
   
 Pt tolerating current unit of blood under high-risk transfusion protocol.  Remains afebrile
 one hour after initiating transfusion with improving BP's.  Has been noted to rest comforta
hazel throughout majority of transfusion and when awoke to urinate states "I feel like my norm
al self again."  18 3:58 AM Amelia Chávez RN
 
 
  
  Electronically signed by Conversion Transaction, Provider at 08/15/2019  4:47 PM PDTConver
kameron Transaction, Provider Unknown - 2018  1:40 AM PDTFormatting of this note might be
 different from the original.
 Nurse Progress Note by Amelia Chávez RN at 18  
  Author:  Amelia Chávez RN Service:  (none) Author Type:  Registered Nurse 
  Filed:  18 0235 Date of Service:  18 Status:  Signed 
  :  Amelia Chávez RN (Registered Nurse)   
   
 Assumed care of patient from Dalia CALDERON.  Agree with prior assessment, only patient is now i
n NSR (80's) as opposed to ST.  18 Amelia Chávez RN
 
  
  Electronically signed by Conversion Transaction, Provider at 08/15/2019  4:46 PM PDTConver
kameron Transaction, Provider Unknown - 2018  1:20 AM PDTFormatting of this note might be
 different from the original.
 Nurse Progress Note by Dalia Mabry RN at 18  
  Author:  Dalia Mabry RN Service:  (none) Author Type:  Registered Nurse 
  Filed:  18 015 Date of Service:  18 Status:  Signed 
  :  Dalia Mabry RN (Registered Nurse)   
   
 Pt temp 101.3 at start of shift. MD paged due to recent blood transfusion. Tylenol given, l
abs and imaging ordered. 3rd transfusion began, pt became hypotensive 82/48, afebrile, denie
s pain, SOB, dizziness, or chest pain. Transfusion stopped and MD paged, arrived at bedside 
to evaluate. 1L bolus NS ordered. Transfusion reaction protocol initiated. Stat H/H 6.4/18.8
, lactate 1.5, procalcitonin 0.22. MD paged who consulted with blood bank. High risk transfu
kameron form completed, MD explained risks and benefits of transfusion to pt who was agreeable 
with transfusing.
 Report given to MISTI Cisneros who assumes care of pt.
 Dalia Mabry RN
  
  Electronically signed by Conversion Transaction, Provider at 08/15/2019  4:50 PM PDTShehza
di, Romana, MD - 2018 10:51 PM PDTFormatting of this note might be different from the 
original.
 Significant Event by Romana Shehzadi, MD at 18  
  Author:  Romana Shehzadi, MD Service:  Gastroenterology Author Type:  Physician 
  Filed:  18 Date of Service:  18 Status:  Signed 
  :  Romana Shehzadi, MD (Physician)   
   
 Bleeding scan reviewed . Will plan on doing push enteroscopy tomorrow. NPO after MN .
  
  Electronically signed by Romana Shehzadi, MD at 2018 10:51 PM PDTConversion Transact
ion, Provider Unknown - 2018  3:40 PM PDTFormatting of this note might be different fr
om the original.
 Nurse Progress Note by Valentina Lugo RN at 18 2140  
  Author:  Valentina Lugo RN Service:  (none) Author Type:  Registered Nurse 
  Filed:  18 8165 Date of Service:  18 1540 Status:  Signed 
  :  Valentina Lugo RN (Registered Nurse)   
   
 Pt returned from nuclear medicine; Pt appears diaphoretic, 's after ambulating to res
troom. HR decreased to 120's after a few minutes of rest. Pt states he can't catch his breat
h, O2 saturation 96% RA, pt requesting supplemental oxygen. Placed pt on 2L NC. Pt Lab notif
ied to draw H/H (7.4/22.2). Dr. Christensen called for patient evaluation.  New orders to transfuse
 2 units of PRBC's. Pt requesting pain medication. Dr. Christensen en route to evaluate pt. No pain
 medication at this time per Dr. Christensen. Pt wearing home Cpap, O2 saturation 98%. Will continu
e to monitor labs and pt. Valentina Lugo RN
 
  
  Electronically signed by Conversion Transaction, Provider at 08/15/2019  4:51 PM Luis Zambrano MD - 2018  9:18 AM PDTFormatting of this note might be different from the origin
al.
 Progress Notes by Luis Christensen DO at 18  
  Author:  Luis Christensen DO Service:  Hospitalist Author Type:  Physician 
  Filed:  18 Date of Service:  18 Status:  Signed 
  :  Luis Christensen DO (Physician)   
   
 [facility]
 Service:  Hospitalist
 Progress Note
 
 Pt: Berna Parkinson  AGE/SEX: 68 y.o.   male           MRN: [id number]
 ROOM: [address] [place] [address]  PCP: Vero Magdaleno Medicine  : 1950
 TODAY'S DATE: 2018
 
 Hospital Day:   LOS: 0 days 
 Post-Op Day:  * No surgery date entered *
 SUBJECTIVE
 
      Patient Summary:  Per H&P: Patient presents from [facility] following 2 days of h
ematochezia. The patient states that he has been experiencing several sticky red bowel movem
ents, diaphoresis, dizziness, chills and heart palpitations. The patient also states that wh
en he presented at [facility] he had a "really low blood pressure and a fever." Patient wa
s transferred to this department by life flight. 
 
 Labs accompanying the patient show that at the previous hospital the patient had a hemoglob
in of 5.9, GFR of 101 and creatinine of 0.7. 
 
      Events Overnight:  Patient seen and examined. Reports continued dyspnea with exertion.
 Afebrile. Had darker, blood BM this AM. Reports no abdominal pain, chest pain, shortness of
 breath at rest. Reports no recent NSAID usage; was on daily baby aspirin. Last reported col
onoscopy 6-7 years with removal of small benign polyp.
 
 Scheduled Medications   atorvastatin  40 mg Oral Nightly 
   FLUoxetine  10 mg Oral Daily 
 
 Continuous Infusions 
   pantoprazole 8 mg/hr (18 0453) 
   sodium chloride (IV) 110 mL/hr at 18 0405 
 
 PRN Medications
 acetaminophen **OR** acetaminophen, ondansetron **OR** ondansetron, polyethylene glycol
 
 OBJECTIVE
 Vital Signs:
 /59 (BP Location: Left upper arm)  | Pulse 111  | Temp 98.2 F (36.8 C) (Oral)  | 
Resp 16  | Ht 1.905 m (6' 3")  | Wt 103.3 kg (227 lb 12.8 oz)  | SpO2 96%  | BMI 28.47 kg/m
 
 Temp:  [98.2 F (36.8 C)-98.7 F (37.1 C)] 98.2 F (36.8 C) (744)
 BP: (101-123)/(51-65) 107/59 (744)
 Heart Rate:  [108-125] 111 (744)
 Resp:  [16-18] 16 (744)
 SpO2:  [96 %-100 %] 96 % (744)
 Height:  [190.5 cm (6' 3")] 190.5 cm (6' 3") (400)
 Weight:  [103.3 kg (227 lb 12.8 oz)] 103.3 kg (227 lb 12.8 oz) (400)
 BMI (Calculated):  [28.5] 28.5 (400)
 FiO2 :  [21 %] 21 % ( 05)
 
 GENERAL: Alert and oriented x3. No acute distress.
 HEENT: Moist mucous membranes. Pupils equally round and reactive to light. Extraocular musc
 
les intact.
 HEART: Regular rhythm. No murmurs.
 LUNGS: Clear to auscultation bilaterally.
 ABDOMEN: Bowel sounds present. Nontender, nondistended. No guarding, no rebound.
 EXTREMITIES: Negative for clubbing, cyanosis, or edema.
 NEUROLOGIC: Within normal limits. Cranial nerves II to XII grossly intact.
 SKIN: Wounds, ulcers, or skin lesions negative.
 
 DATA
 
 CBC:  
 Lab Results    
 Component  Value Date 
  WBC 17.20 (H) 2018 
  RBC 2.67 (L) 2018 
  HGB 6.9 (LL) 2018 
  HCT 19.5 (LL) 2018 
  MCV 88.9 2018 
  MCH 31.1 2018 
  MCHC 34.9 2018 
  RDW 45.1 2018 
   2018 
  MPV 7.7 2018 
  DIFFTYPE AUTOMATED 2018 
 
 CMP:  
 Lab Results    
 Component  Value Date 
   2018 
  K 4.1 2018 
   2018 
  CO2 22 (L) 2018 
  ANIONGAP 14 2018 
  GLUF 124 (H) 2018 
  BUN 21 2018 
  CREATININE 0.9 2018 
  BCR 23 2018 
  CA 7.7 (L) 2018 
  PROT 5.4 (L) 2018 
  ALB 2.8 (L) 2018 
  GLOB 2.6 2018 
  BILITOT 0.3 2018 
  ALP 44 2018 
  AST 21 2018 
  ALT 28 2018 
  EGFR >60 2018 
 
 IMAGING
 Cta Abdomen Pelvis With Iv Contrast
 
 Result Date: 2018
 1. No aortic aneurysm or dissection seen. 2. No active GI bleeding identified. 3. No acute 
inflammatory or obstructive process seen. RADIA  Electronically signed by Mike Wilson MD on
 Aug 12 2018  2:13AM Referring Provider Line: [phone] ID: [id number]
 
 PROBLEM LIST
 
 Principal Problem:
   Rectal bleeding
 Active Problems:
 
   Syncope and collapse
   Acute posthemorrhagic anemia
   SÁNCHEZ (dyspnea on exertion)
   Palpitations
   Leukocytosis
   Hyperlipidemia
   LAURA on CPAP
 
 ASSESSMENT & PLAN
 
 Acute posthemorrhagic anemia 
 - Presented with worsening symptoms of dyspnea, bloody BM
 - Initial H&H of , received 2 units PRBC () prior to transfer
 - Drop in H&H this AM, will transfuse 1 unit PRBC
 
 Acute rectal bleeding 
 - Associated with abdominal distention
 - GI consulted, awaiting endoscopy
 - On Protonix drip
 
 Syncope and collapse
 - Secondary to acute posthemorrhagic anemia
 - Fall precautions
 
 Leukocytosis
 - Possible leukemoid reaction
 - No sign of infection, monitor
 
 Disposition:  Transfuse 1 unit PRBC, await endoscopy
 Code Status:  Full Code
 
 Luis Christensen DO
 2018
 9:18 AM
  
  Electronically signed by Luis Christensen MD at 2018  9:26 AM PDTConversion Transaction, P
rovider Unknown - 2018  8:35 AM PDTFormatting of this note might be different from the
 original.
 Therapy Progress Note by Kate Mcnulty, PT at 18  
  Author:  Kate Mcnulty PT Service:  (none) Author Type:  Physical Therapist 
  Filed:  18 Date of Service:  18 Status:  Signed 
  :  Kate Mcnulty PT (Physical Therapist)   
   
 
  18 
 PT Last Visit  
 PT Received On 18 
 Reason for Treatment Deconditioning 
 Requires PT Follow Up On hold
 (colonoscopy scheduled today 10:00; PT to be held;[place] ) 
 
  
  Electronically signed by Conversion Transaction, Provider at 08/15/2019  4:54 PM PDTConver
kameron Transaction, Provider Unknown - 2018  8:28 AM PDTFormatting of this note might be
 different from the original.
 Nurse Progress Note by Valentina Lugo RN at 18  
  Author:  Valentina Lugo RN Service:  (none) Author Type:  Registered Nurse 
  Filed:  18 Date of Service:  18 Status:  Signed 
  :  Valentina Lugo RN (Registered Nurse)   
   
 
 Pt having a colonoscopy today. Golytely finished at 0730. Stool is dark. Pt scheduled for p
rocedure around 1000. Pt has 2 patent IV's, 18 G. Report given to Yanna in endoscopy. Pt awar
e and agrees with POC. Valentina Lugo RN
 
  
  Electronically signed by Conversion Transaction, Provider at 08/15/2019  4:50 PM PDTConver
kameron Transaction, Provider Unknown - 2018  5:51 AM PDTFormatting of this note might be
 different from the original.
 Nurse Progress Note by Amelia Chávez RN at 18  
  Author:  Amelia Chávez RN Service:  (none) Author Type:  Registered Nurse 
  Filed:  1854 Date of Service:  18 Status:  Addendum 
  :  Amelia Chávez RN (Registered Nurse)   
  Related Notes:  Original Note by Amelia Chávez RN (Registered Nurse) filed at 
8 0553   
   
 Patient admitted from ED this shift.  Remains NPO and continues Golytely in preparation for
 colonoscopy.  HR remains tachycardic, VS o/w stable.  Chart check completed.  No further co
ncerns identified.  Will continue to monitor.  18 5:53 AM Amelia Chávez, RN
  
  
  Electronically signed by Conversion Transaction, Provider at 08/15/2019  4:52 PM PDTConver
kameron Transaction, Provider Unknown - 2018  5:25 AM PDTFormatting of this note might be
 different from the original.
 Progress Notes by Emmanuel Pacheco RPH at 18  
  Author:  mEmanuel Pacheco RPH Service:  Pharmacy Author Type:  Pharmacist 
  Filed:  18 Date of Service:  18 Status:  Signed 
  :  Emmanuel Pacheco RPH (Pharmacist)   
   
 Note ccl 103.6ml/min  meds reviewed   pharmacy will follow  [facility]  0525
  
  Electronically signed by Conversion Transaction, Provider at 08/15/2019  4:49 PM PDTdocume
nted in this encounter
 
 Plan of Treatment
 
 
+--------+---------+-----------+----------------------+-------------+
| Date   | Type    | Specialty | Care Team            | Description |
+--------+---------+-----------+----------------------+-------------+
| / | Office  | Neurology |   Ravindra Munoz MD  [address] |             |
|    | Visit   |           |  [facility]      |             |
|        |         |           | [place],  |             |
|        |         |           | [place] [address]             |             |
|        |         |           | [phone]         |             |
|        |         |           | [phone] (Fax)   |             |
+--------+---------+-----------+----------------------+-------------+
 documented as of this encounter
 
 Procedures
 
 
+----------------------+--------+-------------+----------------------+----------------------
+
| Procedure Name       | Priori | Date/Time   | Associated Diagnosis | Comments             
|
|                      | ty     |             |                      |                      
|
+----------------------+--------+-------------+----------------------+----------------------
+
| HEMOGLOBIN AND       | Routin | 2018  |                      |   Results for this   
 
|
| HEMATOCRIT           | e      |  8:13 AM    |                      | procedure are in the 
|
|                      |        | PDT         |                      |  results section.    
|
+----------------------+--------+-------------+----------------------+----------------------
+
| HEMOGLOBIN AND       | Routin | 2018  |                      |   Results for this   
|
| HEMATOCRIT           | e      |  7:45 PM    |                      | procedure are in the 
|
|                      |        | PDT         |                      |  results section.    
|
+----------------------+--------+-------------+----------------------+----------------------
+
| EXTERNAL LAB: BEATA    | Routin | 2018  |                      |   Results for this   
|
|                      | e      |  4:21 AM    |                      | procedure are in the 
|
|                      |        | PDT         |                      |  results section.    
|
+----------------------+--------+-------------+----------------------+----------------------
+
| BASIC METABOLIC      | Routin | 2018  |                      |   Results for this   
|
| PANEL                | e      |  4:21 AM    |                      | procedure are in the 
|
|                      |        | PDT         |                      |  results section.    
|
+----------------------+--------+-------------+----------------------+----------------------
+
| HEMOGLOBIN AND       | Routin | 2018  |                      |   Results for this   
|
| HEMATOCRIT           | e      |  4:35 PM    |                      | procedure are in the 
|
|                      |        | PDT         |                      |  results section.    
|
+----------------------+--------+-------------+----------------------+----------------------
+
| EXTERNAL LAB: BEATA    | Routin | 2018  |                      |   Results for this   
|
|                      | e      |  4:37 AM    |                      | procedure are in the 
|
|                      |        | PDT         |                      |  results section.    
|
+----------------------+--------+-------------+----------------------+----------------------
+
| BASIC METABOLIC      | Routin | 2018  |                      |   Results for this   
|
| PANEL                | e      |  4:37 AM    |                      | procedure are in the 
|
|                      |        | PDT         |                      |  results section.    
|
+----------------------+--------+-------------+----------------------+----------------------
+
| HEMOGLOBIN AND       | Routin | 2018  |                      |   Results for this   
|
| HEMATOCRIT           | e      |  5:41 PM    |                      | procedure are in the 
|
|                      |        | PDT         |                      |  results section.    
 
|
+----------------------+--------+-------------+----------------------+----------------------
+
| HEMOGLOBIN AND       | Routin | 2018  |                      |   Results for this   
|
| HEMATOCRIT           | e      | 11:49 AM    |                      | procedure are in the 
|
|                      |        | PDT         |                      |  results section.    
|
+----------------------+--------+-------------+----------------------+----------------------
+
| TISSUE REQUEST FOR   | Routin | 2018  |                      |   Results for this   
|
| PATHOLOGY (NON-ORD)  | e      |  8:00 AM    |                      | procedure are in the 
|
|                      |        | PDT         |                      |  results section.    
|
+----------------------+--------+-------------+----------------------+----------------------
+
| EXTERNAL LAB: CBC    | Routin | 2018  |                      |   Results for this   
|
|                      | e      |  6:42 AM    |                      | procedure are in the 
|
|                      |        | PDT         |                      |  results section.    
|
+----------------------+--------+-------------+----------------------+----------------------
+
| MAGNESIUM            | Routin | 2018  |                      |   Results for this   
|
|                      | e      |  5:57 AM    |                      | procedure are in the 
|
|                      |        | PDT         |                      |  results section.    
|
+----------------------+--------+-------------+----------------------+----------------------
+
| COMPREHENSIVE        | Routin | 2018  |                      |   Results for this   
|
| METABOLIC PANEL      | e      |  5:57 AM    |                      | procedure are in the 
|
|                      |        | PDT         |                      |  results section.    
|
+----------------------+--------+-------------+----------------------+----------------------
+
| HEMOGLOBIN AND       | Routin | 08/15/2018  |                      |   Results for this   
|
| HEMATOCRIT           | e      | 11:56 PM    |                      | procedure are in the 
|
|                      |        | PDT         |                      |  results section.    
|
+----------------------+--------+-------------+----------------------+----------------------
+
| HEMOGLOBIN AND       | Routin | 08/15/2018  |                      |   Results for this   
|
| HEMATOCRIT           | e      |  6:40 PM    |                      | procedure are in the 
|
|                      |        | PDT         |                      |  results section.    
|
+----------------------+--------+-------------+----------------------+----------------------
+
| XR ABDOMEN AP        | Routin | 08/15/2018  |                      |   Results for this   
 
|
|                      | e      |  5:12 PM    |                      | procedure are in the 
|
|                      |        | PDT         |                      |  results section.    
|
+----------------------+--------+-------------+----------------------+----------------------
+
| TYPE AND SCREEN      | Routin | 08/15/2018  |                      |   Results for this   
|
|                      | e      |  2:32 PM    |                      | procedure are in the 
|
|                      |        | PDT         |                      |  results section.    
|
+----------------------+--------+-------------+----------------------+----------------------
+
| HEMOGLOBIN AND       | Routin | 08/15/2018  |                      |   Results for this   
|
| HEMATOCRIT           | e      | 11:55 AM    |                      | procedure are in the 
|
|                      |        | PDT         |                      |  results section.    
|
+----------------------+--------+-------------+----------------------+----------------------
+
| HEMOGLOBIN AND       | Routin | 08/15/2018  |                      |   Results for this   
|
| HEMATOCRIT           | e      |  5:56 AM    |                      | procedure are in the 
|
|                      |        | PDT         |                      |  results section.    
|
+----------------------+--------+-------------+----------------------+----------------------
+
| EXTERNAL LAB: CBC    | Routin | 08/15/2018  |                      |   Results for this   
|
|                      | e      |  1:33 AM    |                      | procedure are in the 
|
|                      |        | PDT         |                      |  results section.    
|
+----------------------+--------+-------------+----------------------+----------------------
+
| MAGNESIUM            | Routin | 08/15/2018  |                      |   Results for this   
|
|                      | e      |  1:33 AM    |                      | procedure are in the 
|
|                      |        | PDT         |                      |  results section.    
|
+----------------------+--------+-------------+----------------------+----------------------
+
| COMPREHENSIVE        | Routin | 08/15/2018  |                      |   Results for this   
|
| METABOLIC PANEL      | e      |  1:33 AM    |                      | procedure are in the 
|
|                      |        | PDT         |                      |  results section.    
|
+----------------------+--------+-------------+----------------------+----------------------
+
| HEMOGLOBIN AND       | Routin | 2018  |                      |   Results for this   
|
| HEMATOCRIT           | e      |  9:17 PM    |                      | procedure are in the 
|
|                      |        | PDT         |                      |  results section.    
 
|
+----------------------+--------+-------------+----------------------+----------------------
+
| HEMOGLOBIN AND       | Routin | 2018  |                      |   Results for this   
|
| HEMATOCRIT           | e      | 12:12 PM    |                      | procedure are in the 
|
|                      |        | PDT         |                      |  results section.    
|
+----------------------+--------+-------------+----------------------+----------------------
+
| EXTERNAL LAB: CBC    | Routin | 2018  |                      |   Results for this   
|
|                      | e      |  5:47 AM    |                      | procedure are in the 
|
|                      |        | PDT         |                      |  results section.    
|
+----------------------+--------+-------------+----------------------+----------------------
+
| MAGNESIUM            | Routin | 2018  |                      |   Results for this   
|
|                      | e      |  5:47 AM    |                      | procedure are in the 
|
|                      |        | PDT         |                      |  results section.    
|
+----------------------+--------+-------------+----------------------+----------------------
+
| COMPREHENSIVE        | Routin | 2018  |                      |   Results for this   
|
| METABOLIC PANEL      | e      |  5:47 AM    |                      | procedure are in the 
|
|                      |        | PDT         |                      |  results section.    
|
+----------------------+--------+-------------+----------------------+----------------------
+
| HEMOGLOBIN AND       | Routin | 2018  |                      |   Results for this   
|
| HEMATOCRIT           | e      | 11:59 PM    |                      | procedure are in the 
|
|                      |        | PDT         |                      |  results section.    
|
+----------------------+--------+-------------+----------------------+----------------------
+
| PROTIME INR          | Routin | 2018  |                      |   Results for this   
|
|                      | e      |  9:40 PM    |                      | procedure are in the 
|
|                      |        | PDT         |                      |  results section.    
|
+----------------------+--------+-------------+----------------------+----------------------
+
| CBC NO DIFFERENTIAL  | Routin | 2018  |                      |   Results for this   
|
|                      | e      |  9:40 PM    |                      | procedure are in the 
|
|                      |        | PDT         |                      |  results section.    
|
+----------------------+--------+-------------+----------------------+----------------------
+
| BASIC METABOLIC      | Routin | 2018  |                      |   Results for this   
 
|
| PANEL                | e      |  9:40 PM    |                      | procedure are in the 
|
|                      |        | PDT         |                      |  results section.    
|
+----------------------+--------+-------------+----------------------+----------------------
+
| HEMOGLOBIN AND       | Routin | 2018  |                      |   Results for this   
|
| HEMATOCRIT           | e      |  8:27 PM    |                      | procedure are in the 
|
|                      |        | PDT         |                      |  results section.    
|
+----------------------+--------+-------------+----------------------+----------------------
+
| PROTIME INR          | Routin | 2018  |                      |   Results for this   
|
|                      | e      |  8:27 PM    |                      | procedure are in the 
|
|                      |        | PDT         |                      |  results section.    
|
+----------------------+--------+-------------+----------------------+----------------------
+
| IR ANGIOGRAM         | Routin | 2018  |                      |   Results for this   
|
| MESENTERIC VISCERAL  | e      |  7:58 PM    |                      | procedure are in the 
|
|                      |        | PDT         |                      |  results section.    
|
+----------------------+--------+-------------+----------------------+----------------------
+
| US GUIDED VASCULAR   | Routin | 2018  |                      |   Results for this   
|
| ACCESS               | e      |  7:40 PM    |                      | procedure are in the 
|
|                      |        | PDT         |                      |  results section.    
|
+----------------------+--------+-------------+----------------------+----------------------
+
| HEMOGLOBIN AND       | Routin | 2018  |                      |   Results for this   
|
| HEMATOCRIT           | e      | 12:53 PM    |                      | procedure are in the 
|
|                      |        | PDT         |                      |  results section.    
|
+----------------------+--------+-------------+----------------------+----------------------
+
| EXTERNAL LAB: CBC    | Routin | 2018  |                      |   Results for this   
|
|                      | e      |  9:10 AM    |                      | procedure are in the 
|
|                      |        | PDT         |                      |  results section.    
|
+----------------------+--------+-------------+----------------------+----------------------
+
| PHOSPHORUS           | Routin | 2018  |                      |   Results for this   
|
|                      | e      |  9:10 AM    |                      | procedure are in the 
|
|                      |        | PDT         |                      |  results section.    
 
|
+----------------------+--------+-------------+----------------------+----------------------
+
| MAGNESIUM            | Routin | 2018  |                      |   Results for this   
|
|                      | e      |  9:10 AM    |                      | procedure are in the 
|
|                      |        | PDT         |                      |  results section.    
|
+----------------------+--------+-------------+----------------------+----------------------
+
| BASIC METABOLIC      | Routin | 2018  |                      |   Results for this   
|
| PANEL                | e      |  9:10 AM    |                      | procedure are in the 
|
|                      |        | PDT         |                      |  results section.    
|
+----------------------+--------+-------------+----------------------+----------------------
+
| TISSUE REQUEST FOR   | Routin | 2018  |                      |   Results for this   
|
| PATHOLOGY (NON-ORD)  | e      |  8:00 AM    |                      | procedure are in the 
|
|                      |        | PDT         |                      |  results section.    
|
+----------------------+--------+-------------+----------------------+----------------------
+
| URINALYSIS WITH      | Routin | 2018  |                      |   Results for this   
|
| MICROSCOPIC IF       | e      | 11:28 PM    |                      | procedure are in the 
|
| INDICATED            |        | PDT         |                      |  results section.    
|
+----------------------+--------+-------------+----------------------+----------------------
+
| PROCALCITONIN, SERUM | Routin | 2018  |                      |   Results for this   
|
|                      | e      | 10:38 PM    |                      | procedure are in the 
|
|                      |        | PDT         |                      |  results section.    
|
+----------------------+--------+-------------+----------------------+----------------------
+
| HEMOGLOBIN AND       | Routin | 2018  |                      |   Results for this   
|
| HEMATOCRIT           | e      | 10:38 PM    |                      | procedure are in the 
|
|                      |        | PDT         |                      |  results section.    
|
+----------------------+--------+-------------+----------------------+----------------------
+
| LACTIC ACID          | Routin | 2018  |                      |   Results for this   
|
|                      | e      | 10:38 PM    |                      | procedure are in the 
|
|                      |        | PDT         |                      |  results section.    
|
+----------------------+--------+-------------+----------------------+----------------------
+
| CULTURE, BLOOD       | Routin | 2018  |                      |   Results for this   
 
|
|                      | e      | 10:34 PM    |                      | procedure are in the 
|
|                      |        | PDT         |                      |  results section.    
|
+----------------------+--------+-------------+----------------------+----------------------
+
| XR CHEST 1 VIEW      | Routin | 2018  |                      |   Results for this   
|
|                      | e      |  9:59 PM    |                      | procedure are in the 
|
|                      |        | PDT         |                      |  results section.    
|
+----------------------+--------+-------------+----------------------+----------------------
+
| CULTURE, BLOOD, 2ND  | Timed  | 2018  |                      |   Results for this   
|
| SPECIMEN (NON-ORD)   |        |  8:24 PM    |                      | procedure are in the 
|
|                      |        | PDT         |                      |  results section.    
|
+----------------------+--------+-------------+----------------------+----------------------
+
| CULTURE, BLOOD       | Timed  | 2018  |                      |   Results for this   
|
|                      |        |  8:24 PM    |                      | procedure are in the 
|
|                      |        | PDT         |                      |  results section.    
|
+----------------------+--------+-------------+----------------------+----------------------
+
| NM GI BLOOD LOSS     | Routin | 2018  |                      |   Results for this   
|
|                      | e      |  5:38 PM    |                      | procedure are in the 
|
|                      |        | PDT         |                      |  results section.    
|
+----------------------+--------+-------------+----------------------+----------------------
+
| HEMOGLOBIN AND       | Routin | 2018  |                      |   Results for this   
|
| HEMATOCRIT           | e      |  3:32 PM    |                      | procedure are in the 
|
|                      |        | PDT         |                      |  results section.    
|
+----------------------+--------+-------------+----------------------+----------------------
+
| TYPE AND SCREEN      | Routin | 2018  |                      |   Results for this   
|
|                      | e      |  6:59 AM    |                      | procedure are in the 
|
|                      |        | PDT         |                      |  results section.    
|
+----------------------+--------+-------------+----------------------+----------------------
+
| HEMOGLOBIN AND       | Routin | 2018  |                      |   Results for this   
|
| HEMATOCRIT           | e      |  6:09 AM    |                      | procedure are in the 
|
|                      |        | PDT         |                      |  results section.    
 
|
+----------------------+--------+-------------+----------------------+----------------------
+
| EXTERNAL LAB: CBC    | Routin | 2018  |                      |   Results for this   
|
|                      | e      |  3:47 AM    |                      | procedure are in the 
|
|                      |        | PDT         |                      |  results section.    
|
+----------------------+--------+-------------+----------------------+----------------------
+
| MAGNESIUM            | Routin | 2018  |                      |   Results for this   
|
|                      | e      |  3:47 AM    |                      | procedure are in the 
|
|                      |        | PDT         |                      |  results section.    
|
+----------------------+--------+-------------+----------------------+----------------------
+
| COMPREHENSIVE        | Routin | 2018  |                      |   Results for this   
|
| METABOLIC PANEL      | e      |  3:47 AM    |                      | procedure are in the 
|
|                      |        | PDT         |                      |  results section.    
|
+----------------------+--------+-------------+----------------------+----------------------
+
| CT ANGIOGRAM ABDOMEN | Routin | 2018  |                      |   Results for this   
|
|  PELVIS W CONTRAST   | e      |  1:23 AM    |                      | procedure are in the 
|
|                      |        | PDT         |                      |  results section.    
|
+----------------------+--------+-------------+----------------------+----------------------
+
| HEMOGLOBIN AND       | Routin | 2018  |                      |   Results for this   
|
| HEMATOCRIT           | e      | 12:01 AM    |                      | procedure are in the 
|
|                      |        | PDT         |                      |  results section.    
|
+----------------------+--------+-------------+----------------------+----------------------
+
 documented in this encounter
 
 Results
 Hemoglobin and Hematocrit (2018  8:13 AM PDT)
 
+-------------+--------------------------+---------------+------------+--------------+
| Component   | Value                    | Ref Range     | Performed  | Pathologist  |
|             |                          |               | At         | Signature    |
+-------------+--------------------------+---------------+------------+--------------+
| Hemoglobin  | 8.1 (L)                  | 13.2 - 17.0   | EXTERNAL   |              |
|             |                          | g/dL          | LAB        |              |
+-------------+--------------------------+---------------+------------+--------------+
| Hematocrit, | 23.8 (L)Comment: Testing | 39.0 - 50.0 % | EXTERNAL   |              |
|  POC        |  performed at [facility];[address]    |               | LAB        |              |
|             | [address];[place], [place]   |               |            |              |
|             | [id number]                    |               |            |              |
+-------------+--------------------------+---------------+------------+--------------+
 
 
 
 
+----------+
| Specimen |
+----------+
|          |
+----------+
 
 
 
+----------------+---------+--------------------+--------------+
| Performing     | Address | City/State/Zipcode | Phone Number |
| Organization   |         |                    |              |
+----------------+---------+--------------------+--------------+
|   EXTERNAL LAB |         |                    |              |
+----------------+---------+--------------------+--------------+
 Hemoglobin and Hematocrit (2018  7:45 PM PDT)
 
+-------------+--------------------------+---------------+------------+--------------+
| Component   | Value                    | Ref Range     | Performed  | Pathologist  |
|             |                          |               | At         | Signature    |
+-------------+--------------------------+---------------+------------+--------------+
| Hemoglobin  | 7.8 (L)                  | 13.2 - 17.0   | EXTERNAL   |              |
|             |                          | g/dL          | LAB        |              |
+-------------+--------------------------+---------------+------------+--------------+
| Hematocrit, | 23.2 (L)Comment: Testing | 39.0 - 50.0 % | EXTERNAL   |              |
|  POC        |  performed at [facility];[address]    |               | LAB        |              |
|             | [address];[place], [place]   |               |            |              |
|             | [id number]                    |               |            |              |
+-------------+--------------------------+---------------+------------+--------------+
 
 
 
+----------+
| Specimen |
+----------+
|          |
+----------+
 
 
 
+----------------+---------+--------------------+--------------+
| Performing     | Address | City/State/Zipcode | Phone Number |
| Organization   |         |                    |              |
+----------------+---------+--------------------+--------------+
|   EXTERNAL LAB |         |                    |              |
+----------------+---------+--------------------+--------------+
 External Lab: CBC (2018  4:21 AM PDT)
 
+-------------+--------------------------+-----------------+------------+--------------+
| Component   | Value                    | Ref Range       | Performed  | Pathologist  |
|             |                          |                 | At         | Signature    |
+-------------+--------------------------+-----------------+------------+--------------+
| WBC         | 7.52                     | 3.80 - 11.00    | EXTERNAL   |              |
|             |                          | K/uL            | LAB        |              |
+-------------+--------------------------+-----------------+------------+--------------+
| Red Blood   | 2.51 (L)                 | 4.20 - 5.70     | EXTERNAL   |              |
| Cells       |                          | M/uL            | LAB        |              |
| Counted     |                          |                 |            |              |
 
+-------------+--------------------------+-----------------+------------+--------------+
| Hemoglobin  | 7.7 (L)                  | 13.2 - 17.0     | EXTERNAL   |              |
|             |                          | g/dL            | LAB        |              |
+-------------+--------------------------+-----------------+------------+--------------+
| Hematocrit, | 22.0 (L)                 | 39.0 - 50.0 %   | EXTERNAL   |              |
|  POC        |                          |                 | LAB        |              |
+-------------+--------------------------+-----------------+------------+--------------+
| MCV         | 87.8                     | 80.0 - 100.0 fl | EXTERNAL   |              |
|             |                          |                 | LAB        |              |
+-------------+--------------------------+-----------------+------------+--------------+
| MCH         | 30.6                     | 27.0 - 34.0 pg  | EXTERNAL   |              |
|             |                          |                 | LAB        |              |
+-------------+--------------------------+-----------------+------------+--------------+
| MCHC        | 34.9                     | 32.0 - 35.5     | EXTERNAL   |              |
|             |                          | g/dL            | LAB        |              |
+-------------+--------------------------+-----------------+------------+--------------+
| RDW-CV      | 50.8                     | 37 - 53 fl      | EXTERNAL   |              |
|             |                          |                 | LAB        |              |
+-------------+--------------------------+-----------------+------------+--------------+
| Platelet    | 242                      | 150 - 400 K/uL  | EXTERNAL   |              |
| Count       |                          |                 | LAB        |              |
| Plasma      |                          |                 |            |              |
+-------------+--------------------------+-----------------+------------+--------------+
| MPV         | 7.2                      | fl              | EXTERNAL   |              |
|             |                          |                 | LAB        |              |
+-------------+--------------------------+-----------------+------------+--------------+
| Differentia | AUTOMATED                |                 | EXTERNAL   |              |
| l Type      |                          |                 | LAB        |              |
+-------------+--------------------------+-----------------+------------+--------------+
| % Segmented | 80.26                    | %               | EXTERNAL   |              |
|             |                          |                 | LAB        |              |
| Neutrophils |                          |                 |            |              |
+-------------+--------------------------+-----------------+------------+--------------+
| %           | 10.02                    | %               | EXTERNAL   |              |
| Lymphocytes |                          |                 | LAB        |              |
+-------------+--------------------------+-----------------+------------+--------------+
| % Monocytes | 6.14                     | %               | EXTERNAL   |              |
|             |                          |                 | LAB        |              |
+-------------+--------------------------+-----------------+------------+--------------+
| %           | 3.33                     | %               | EXTERNAL   |              |
| Eosinophils |                          |                 | LAB        |              |
+-------------+--------------------------+-----------------+------------+--------------+
| % Basophils | 0.25                     | %               | EXTERNAL   |              |
|             |                          |                 | LAB        |              |
+-------------+--------------------------+-----------------+------------+--------------+
| Absolute    | 6.04                     | 1.90 - 7.40     | EXTERNAL   |              |
| Segmented   |                          | K/uL            | LAB        |              |
| Neutrophils |                          |                 |            |              |
+-------------+--------------------------+-----------------+------------+--------------+
| Absolute    | 0.75 (L)                 | 1.00 - 3.90     | EXTERNAL   |              |
| Lymphocytes |                          | K/uL            | LAB        |              |
+-------------+--------------------------+-----------------+------------+--------------+
| Absolute    | 0.46                     | 0.00 - 0.80     | EXTERNAL   |              |
| Monocytes   |                          | K/uL            | LAB        |              |
+-------------+--------------------------+-----------------+------------+--------------+
| Absolute    | 0.25                     | 0.00 - 0.50     | EXTERNAL   |              |
| Eosinophils |                          | K/uL            | LAB        |              |
+-------------+--------------------------+-----------------+------------+--------------+
| Absolute    | 0.02Comment: Testing     | 0.00 - 0.10     | EXTERNAL   |              |
| Basophils   | performed at [facility], [address] | K/uL            | LAB        |              |
 
|             |  [address],        |                 |            |              |
|             | [place]  [address]     |                 |            |              |
+-------------+--------------------------+-----------------+------------+--------------+
 
 
 
+-----------------+
| Specimen        |
+-----------------+
| Blood specimen  |
| (specimen)      |
+-----------------+
 
 
 
+----------------+---------+--------------------+--------------+
| Performing     | Address | City/State/Zipcode | Phone Number |
| Organization   |         |                    |              |
+----------------+---------+--------------------+--------------+
|   EXTERNAL LAB |         |                    |              |
+----------------+---------+--------------------+--------------+
 Basic Metabolic Panel (2018  4:21 AM PDT)
 
+-------------+--------------------------+-----------------+------------+--------------+
| Component   | Value                    | Ref Range       | Performed  | Pathologist  |
|             |                          |                 | At         | Signature    |
+-------------+--------------------------+-----------------+------------+--------------+
| Na          | 144                      | 135 - 145       | EXTERNAL   |              |
|             |                          | mmol/L          | LAB        |              |
+-------------+--------------------------+-----------------+------------+--------------+
| K           | 3.5                      | 3.5 - 4.9       | EXTERNAL   |              |
|             |                          | mmol/L          | LAB        |              |
+-------------+--------------------------+-----------------+------------+--------------+
| Cl          | 109                      | 99 - 109 mmol/L | EXTERNAL   |              |
|             |                          |                 | LAB        |              |
+-------------+--------------------------+-----------------+------------+--------------+
| CO2         | 23                       | 23 - 32 mmol/L  | EXTERNAL   |              |
|             |                          |                 | LAB        |              |
+-------------+--------------------------+-----------------+------------+--------------+
| Anion Gap   | 16                       | 5 - 20 mmol/L   | EXTERNAL   |              |
|             |                          |                 | LAB        |              |
+-------------+--------------------------+-----------------+------------+--------------+
| Glucose,    | 103 (H)                  | 65 - 99 mg/dL   | EXTERNAL   |              |
| Fasting     |                          |                 | LAB        |              |
+-------------+--------------------------+-----------------+------------+--------------+
| BUN         | 6 (L)                    | 8 - 25 mg/dL    | EXTERNAL   |              |
|             |                          |                 | LAB        |              |
+-------------+--------------------------+-----------------+------------+--------------+
| Creatinine  | 0.7                      | 0.70 - 1.30     | EXTERNAL   |              |
|             |                          | mg/dL           | LAB        |              |
+-------------+--------------------------+-----------------+------------+--------------+
| BUN/Creatin | 9                        |                 | EXTERNAL   |              |
| ine Ratio   |                          |                 | LAB        |              |
+-------------+--------------------------+-----------------+------------+--------------+
| Calcium     | 7.6 (L)                  | 8.5 - 10.5      | EXTERNAL   |              |
|             |                          | mg/dL           | LAB        |              |
+-------------+--------------------------+-----------------+------------+--------------+
| Estimated   | >60Comment: GFR <60:     | mL/min/1.73m2   | EXTERNAL   |              |
| GFR         | CHRONIC KIDNEY DISEASE,  |                 | LAB        |              |
|             | IF FOUND OVER A 3 MONTH  |                 |            |              |
 
|             | PERIOD.GFR <15: KIDNEY   |                 |            |              |
|             | FAILURE.FOR       |                 |            |              |
|             | [employer]IC[employer], MULTIPLY THE  |                 |            |              |
|             | CALCULATED GFR BY        |                 |            |              |
|             | 1.210.This eGFR is       |                 |            |              |
|             | calculated using the     |                 |            |              |
|             | MDRD IDMS traceable      |                 |            |              |
|             | equation.Testing         |                 |            |              |
|             | performed at [facility], [address] |                 |            |              |
|             |  [facility],        |                 |            |              |
|             | [place]   [address]    |                 |            |              |
+-------------+--------------------------+-----------------+------------+--------------+
 
 
 
+-----------------+
| Specimen        |
+-----------------+
| Blood specimen  |
| (specimen)      |
+-----------------+
 
 
 
+----------------+---------+--------------------+--------------+
| Performing     | Address | City/State/Zipcode | Phone Number |
| Organization   |         |                    |              |
+----------------+---------+--------------------+--------------+
|   EXTERNAL LAB |         |                    |              |
+----------------+---------+--------------------+--------------+
 Hemoglobin and Hematocrit (2018  4:35 PM PDT)
 
+-------------+--------------------------+---------------+------------+--------------+
| Component   | Value                    | Ref Range     | Performed  | Pathologist  |
|             |                          |               | At         | Signature    |
+-------------+--------------------------+---------------+------------+--------------+
| Hemoglobin  | 8.2 (L)                  | 13.2 - 17.0   | EXTERNAL   |              |
|             |                          | g/dL          | LAB        |              |
+-------------+--------------------------+---------------+------------+--------------+
| Hematocrit, | 24.2 (L)Comment: Testing | 39.0 - 50.0 % | EXTERNAL   |              |
|  POC        |  performed at [facility], [address]  |               | LAB        |              |
|             | [address],       |               |            |              |
|             | [place]  [address]     |               |            |              |
+-------------+--------------------------+---------------+------------+--------------+
 
 
 
+----------+
| Specimen |
+----------+
|          |
+----------+
 
 
 
+----------------+---------+--------------------+--------------+
| Performing     | Address | City/State/Zipcode | Phone Number |
| Organization   |         |                    |              |
+----------------+---------+--------------------+--------------+
|   EXTERNAL LAB |         |                    |              |
 
+----------------+---------+--------------------+--------------+
 External Lab: CBC (2018  4:37 AM PDT)
 
+-------------+-------------------------+-----------------+------------+--------------+
| Component   | Value                   | Ref Range       | Performed  | Pathologist  |
|             |                         |                 | At         | Signature    |
+-------------+-------------------------+-----------------+------------+--------------+
| WBC         | 8.78                    | 3.80 - 11.00    | EXTERNAL   |              |
|             |                         | K/uL            | LAB        |              |
+-------------+-------------------------+-----------------+------------+--------------+
| Red Blood   | 2.66 (L)                | 4.20 - 5.70     | EXTERNAL   |              |
| Cells       |                         | M/uL            | LAB        |              |
| Counted     |                         |                 |            |              |
+-------------+-------------------------+-----------------+------------+--------------+
| Hemoglobin  | 8.1 (L)                 | 13.2 - 17.0     | EXTERNAL   |              |
|             |                         | g/dL            | LAB        |              |
+-------------+-------------------------+-----------------+------------+--------------+
| Hematocrit, | 23.4 (L)                | 39.0 - 50.0 %   | EXTERNAL   |              |
|  POC        |                         |                 | LAB        |              |
+-------------+-------------------------+-----------------+------------+--------------+
| MCV         | 87.8                    | 80.0 - 100.0 fl | EXTERNAL   |              |
|             |                         |                 | LAB        |              |
+-------------+-------------------------+-----------------+------------+--------------+
| MCH         | 30.5                    | 27.0 - 34.0 pg  | EXTERNAL   |              |
|             |                         |                 | LAB        |              |
+-------------+-------------------------+-----------------+------------+--------------+
| MCHC        | 34.7                    | 32.0 - 35.5     | EXTERNAL   |              |
|             |                         | g/dL            | LAB        |              |
+-------------+-------------------------+-----------------+------------+--------------+
| RDW-CV      | 50.8                    | 37 - 53 fl      | EXTERNAL   |              |
|             |                         |                 | LAB        |              |
+-------------+-------------------------+-----------------+------------+--------------+
| Platelet    | 232                     | 150 - 400 K/uL  | EXTERNAL   |              |
| Count       |                         |                 | LAB        |              |
| Plasma      |                         |                 |            |              |
+-------------+-------------------------+-----------------+------------+--------------+
| MPV         | 7.5                     | fl              | EXTERNAL   |              |
|             |                         |                 | LAB        |              |
+-------------+-------------------------+-----------------+------------+--------------+
| Differentia | MANUAL                  |                 | EXTERNAL   |              |
| l Type      |                         |                 | LAB        |              |
+-------------+-------------------------+-----------------+------------+--------------+
| Segmented   | 74                      | %               | EXTERNAL   |              |
| Neutrophils |                         |                 | LAB        |              |
|  Manual     |                         |                 |            |              |
+-------------+-------------------------+-----------------+------------+--------------+
| % Bands     | 2                       | %               | EXTERNAL   |              |
|             |                         |                 | LAB        |              |
+-------------+-------------------------+-----------------+------------+--------------+
| %           | 1                       | %               | EXTERNAL   |              |
| Metamyelocy |                         |                 | LAB        |              |
| marily         |                         |                 |            |              |
+-------------+-------------------------+-----------------+------------+--------------+
| Lymphocytes | 17                      | %               | EXTERNAL   |              |
|  Manual     |                         |                 | LAB        |              |
+-------------+-------------------------+-----------------+------------+--------------+
| Monocytes   | 5                       | %               | EXTERNAL   |              |
| Manual      |                         |                 | LAB        |              |
+-------------+-------------------------+-----------------+------------+--------------+
| Eosinophils | 1                       | %               | EXTERNAL   |              |
 
|  Manual     |                         |                 | LAB        |              |
+-------------+-------------------------+-----------------+------------+--------------+
| Absolute    | 6.49                    | 1.90 - 7.40     | EXTERNAL   |              |
| Neutrophils |                         | K/uL            | LAB        |              |
+-------------+-------------------------+-----------------+------------+--------------+
| Bands       | 0.18                    | 0.00 - 0.20     | EXTERNAL   |              |
| Manual      |                         | K/uL            | LAB        |              |
+-------------+-------------------------+-----------------+------------+--------------+
| Absolute    | 0.09 (H)                | K/uL            | EXTERNAL   |              |
| Metamyelocy |                         |                 | LAB        |              |
| marily         |                         |                 |            |              |
+-------------+-------------------------+-----------------+------------+--------------+
| Absolute    | 1.49                    | 1.00 - 3.90     | EXTERNAL   |              |
| Lymphocytes |                         | K/uL            | LAB        |              |
+-------------+-------------------------+-----------------+------------+--------------+
| Absolute    | 0.44                    | 0.00 - 0.80     | EXTERNAL   |              |
| Monocytes   |                         | K/uL            | LAB        |              |
+-------------+-------------------------+-----------------+------------+--------------+
| Absolute    | 0.09                    | 0.00 - 0.50     | EXTERNAL   |              |
| Eosinophils |                         | K/uL            | LAB        |              |
+-------------+-------------------------+-----------------+------------+--------------+
| RBC         | RBC AND PLT MORPHOLOGY  |                 | EXTERNAL   |              |
| Morphology  | APPEAR NORMALComment:   |                 | LAB        |              |
|             | Testing performed at    |                 |            |              |
|             | [facility], [address]  |                 |            |              |
|             | [place]     |                 |            |              |
|             | [address]                   |                 |            |              |
+-------------+-------------------------+-----------------+------------+--------------+
 
 
 
+-----------------+
| Specimen        |
+-----------------+
| Blood specimen  |
| (specimen)      |
+-----------------+
 
 
 
+----------------+---------+--------------------+--------------+
| Performing     | Address | City/State/Zipcode | Phone Number |
| Organization   |         |                    |              |
+----------------+---------+--------------------+--------------+
|   EXTERNAL LAB |         |                    |              |
+----------------+---------+--------------------+--------------+
 Basic Metabolic Panel (2018  4:37 AM PDT)
 
+-------------+--------------------------+-----------------+------------+--------------+
| Component   | Value                    | Ref Range       | Performed  | Pathologist  |
|             |                          |                 | At         | Signature    |
+-------------+--------------------------+-----------------+------------+--------------+
| Na          | 142                      | 135 - 145       | EXTERNAL   |              |
|             |                          | mmol/L          | LAB        |              |
+-------------+--------------------------+-----------------+------------+--------------+
| K           | 3.5                      | 3.5 - 4.9       | EXTERNAL   |              |
|             |                          | mmol/L          | LAB        |              |
+-------------+--------------------------+-----------------+------------+--------------+
| Cl          | 109                      | 99 - 109 mmol/L | EXTERNAL   |              |
|             |                          |                 | LAB        |              |
 
+-------------+--------------------------+-----------------+------------+--------------+
| CO2         | 23                       | 23 - 32 mmol/L  | EXTERNAL   |              |
|             |                          |                 | LAB        |              |
+-------------+--------------------------+-----------------+------------+--------------+
| Anion Gap   | 14                       | 5 - 20 mmol/L   | EXTERNAL   |              |
|             |                          |                 | LAB        |              |
+-------------+--------------------------+-----------------+------------+--------------+
| Glucose,    | 106 (H)                  | 65 - 99 mg/dL   | EXTERNAL   |              |
| Fasting     |                          |                 | LAB        |              |
+-------------+--------------------------+-----------------+------------+--------------+
| BUN         | 10                       | 8 - 25 mg/dL    | EXTERNAL   |              |
|             |                          |                 | LAB        |              |
+-------------+--------------------------+-----------------+------------+--------------+
| Creatinine  | 0.6 (L)                  | 0.70 - 1.30     | EXTERNAL   |              |
|             |                          | mg/dL           | LAB        |              |
+-------------+--------------------------+-----------------+------------+--------------+
| BUN/Creatin | 17                       |                 | EXTERNAL   |              |
| ine Ratio   |                          |                 | LAB        |              |
+-------------+--------------------------+-----------------+------------+--------------+
| Calcium     | 7.6 (L)                  | 8.5 - 10.5      | EXTERNAL   |              |
|             |                          | mg/dL           | LAB        |              |
+-------------+--------------------------+-----------------+------------+--------------+
| Estimated   | >60Comment: GFR <60:     | mL/min/1.73m2   | EXTERNAL   |              |
| GFR         | CHRONIC KIDNEY DISEASE,  |                 | LAB        |              |
|             | IF FOUND OVER A 3 MONTH  |                 |            |              |
|             | PERIOD.GFR <15: KIDNEY   |                 |            |              |
|             | FAILURE.FOR       |                 |            |              |
|             | [employer]IC[employer], MULTIPLY THE  |                 |            |              |
|             | CALCULATED GFR BY        |                 |            |              |
|             | 1.210.This eGFR is       |                 |            |              |
|             | calculated using the     |                 |            |              |
|             | MDRD IDMS traceable      |                 |            |              |
|             | equation.Testing         |                 |            |              |
|             | performed at [facility], [address] |                 |            |              |
|             |  [address],        |                 |            |              |
|             | [place]   [address]    |                 |            |              |
+-------------+--------------------------+-----------------+------------+--------------+
 
 
 
+-----------------+
| Specimen        |
+-----------------+
| Blood specimen  |
| (specimen)      |
+-----------------+
 
 
 
+----------------+---------+--------------------+--------------+
| Performing     | Address | City/State/Zipcode | Phone Number |
| Organization   |         |                    |              |
+----------------+---------+--------------------+--------------+
|   EXTERNAL LAB |         |                    |              |
+----------------+---------+--------------------+--------------+
 Hemoglobin and Hematocrit (2018  5:41 PM PDT)
 
+-------------+--------------------------+---------------+------------+--------------+
| Component   | Value                    | Ref Range     | Performed  | Pathologist  |
|             |                          |               | At         | Signature    |
 
+-------------+--------------------------+---------------+------------+--------------+
| Hemoglobin  | 9.6 (L)                  | 13.2 - 17.0   | EXTERNAL   |              |
|             |                          | g/dL          | LAB        |              |
+-------------+--------------------------+---------------+------------+--------------+
| Hematocrit, | 29.4 (L)Comment: Testing | 39.0 - 50.0 % | EXTERNAL   |              |
|  POC        |  performed at [facility];[address]    |               | LAB        |              |
|             | [address];[place]   |               |            |              |
|             | [id number]                    |               |            |              |
+-------------+--------------------------+---------------+------------+--------------+
 
 
 
+----------+
| Specimen |
+----------+
|          |
+----------+
 
 
 
+----------------+---------+--------------------+--------------+
| Performing     | Address | City/State/Zipcode | Phone Number |
| Organization   |         |                    |              |
+----------------+---------+--------------------+--------------+
|   EXTERNAL LAB |         |                    |              |
+----------------+---------+--------------------+--------------+
 Hemoglobin and Hematocrit (2018 11:49 AM PDT)
 
+-------------+--------------------------+---------------+------------+--------------+
| Component   | Value                    | Ref Range     | Performed  | Pathologist  |
|             |                          |               | At         | Signature    |
+-------------+--------------------------+---------------+------------+--------------+
| Hemoglobin  | 8.9 (L)                  | 13.2 - 17.0   | EXTERNAL   |              |
|             |                          | g/dL          | LAB        |              |
+-------------+--------------------------+---------------+------------+--------------+
| Hematocrit, | 26.1 (L)Comment: Testing | 39.0 - 50.0 % | EXTERNAL   |              |
|  POC        |  performed at [facility];[address]    |               | LAB        |              |
|             | [address];[place], [place]   |               |            |              |
|             | [address]                    |               |            |              |
+-------------+--------------------------+---------------+------------+--------------+
 
 
 
+----------+
| Specimen |
+----------+
|          |
+----------+
 
 
 
+----------------+---------+--------------------+--------------+
| Performing     | Address | City/State/Zipcode | Phone Number |
| Organization   |         |                    |              |
+----------------+---------+--------------------+--------------+
|   EXTERNAL LAB |         |                    |              |
+----------------+---------+--------------------+--------------+
 Tissue Request For Pathology (2018  8:00 AM PDT)
 
+---------------------+
 
| Specimen            |
+---------------------+
| Soft tissue sample  |
| (specimen)          |
+---------------------+
 
 
 
+------------------------------------------------------------------------+----------------+
| Narrative                                                              | Performed At   |
+------------------------------------------------------------------------+----------------+
|   SPECIMEN(S): A JEJUNUM  SPECIMEN SOURCE:  A. JEJUNUM  CLINICAL       |   EXTERNAL LAB |
| HISTORY:  GI bleed.  FINAL PATHOLOGIC DIAGNOSIS:  Jejunum, partial     |                |
| resection:  -   Diverticulosis.     DD:cml:C2NR  MICROSCOPIC           |                |
| EXAMINATION:  Histologic sections of all submitted blocks are examined |                |
|  by light microscopy.   These findings, together with the gross        |                |
| examination, support the pathologic diagnosis.  GROSS DESCRIPTION:     |                |
| The specimen, received in formalin, labeled "jejunum," consists of a   |                |
| 21.3 cm in length x 7.3 cm circumference segment of small bowel with a |                |
|  3.8 cm thick mesentery.   There are five large diverticula  that are  |                |
| visible from the serosal surface and range from 1.1 x 1.0 x 0.7 cm to  |                |
| 3.7 x 3.0 x 2.8 cm.   The mucosa throughout the specimen is tan with   |                |
| regular folds.   No perforation is grossly identified.  No lymph nodes |                |
|  are identified.   Representatively submitted as follows: (A1-A2)      |                |
| diverticula; (A3) uninvolved.  am:TRE:rashida  PERFORMING LABORATORY:  The |                |
|  technical component was performed by [facility] |                |
|  [facility] [address] (Medical Director:   Adela Correia MD; CLIA#      |                |
| [id number]).  Professional interpretation was performed by [facility]      |                |
| [facility], [address] [place] [address],       |                |
| [place] [address] (Medical Director:   Rachael Alexander MD; CLIA#   |                |
| [id number]).  Diagnostician:   Rachael Alexander MD  Pathologist        |                |
| Electronically Signed 2018                                       |                |
+------------------------------------------------------------------------+----------------+
 
 
 
+----------------+---------+--------------------+--------------+
| Performing     | Address | City/State/Zipcode | Phone Number |
| Organization   |         |                    |              |
+----------------+---------+--------------------+--------------+
|   EXTERNAL LAB |         |                    |              |
+----------------+---------+--------------------+--------------+
 External Lab: CBC (2018  6:42 AM PDT)
 
+-------------+--------------------------+-----------------+------------+--------------+
| Component   | Value                    | Ref Range       | Performed  | Pathologist  |
|             |                          |                 | At         | Signature    |
+-------------+--------------------------+-----------------+------------+--------------+
| WBC         | 10.71                    | 3.80 - 11.00    | EXTERNAL   |              |
|             |                          | K/uL            | LAB        |              |
+-------------+--------------------------+-----------------+------------+--------------+
| Red Blood   | 2.69 (L)                 | 4.20 - 5.70     | EXTERNAL   |              |
| Cells       |                          | M/uL            | LAB        |              |
| Counted     |                          |                 |            |              |
+-------------+--------------------------+-----------------+------------+--------------+
| Hemoglobin  | 8.3 (L)                  | 13.2 - 17.0     | EXTERNAL   |              |
|             |                          | g/dL            | LAB        |              |
+-------------+--------------------------+-----------------+------------+--------------+
| Hematocrit, | 23.7 (L)                 | 39.0 - 50.0 %   | EXTERNAL   |              |
|  POC        |                          |                 | LAB        |              |
 
+-------------+--------------------------+-----------------+------------+--------------+
| MCV         | 88.3                     | 80.0 - 100.0 fl | EXTERNAL   |              |
|             |                          |                 | LAB        |              |
+-------------+--------------------------+-----------------+------------+--------------+
| MCH         | 30.9                     | 27.0 - 34.0 pg  | EXTERNAL   |              |
|             |                          |                 | LAB        |              |
+-------------+--------------------------+-----------------+------------+--------------+
| MCHC        | 35.0                     | 32.0 - 35.5     | EXTERNAL   |              |
|             |                          | g/dL            | LAB        |              |
+-------------+--------------------------+-----------------+------------+--------------+
| RDW-CV      | 49.4                     | 37 - 53 fl      | EXTERNAL   |              |
|             |                          |                 | LAB        |              |
+-------------+--------------------------+-----------------+------------+--------------+
| Platelet    | 215                      | 150 - 400 K/uL  | EXTERNAL   |              |
| Count       |                          |                 | LAB        |              |
| Plasma      |                          |                 |            |              |
+-------------+--------------------------+-----------------+------------+--------------+
| MPV         | 7.0                      | fl              | EXTERNAL   |              |
|             |                          |                 | LAB        |              |
+-------------+--------------------------+-----------------+------------+--------------+
| Differentia | AUTOMATED                |                 | EXTERNAL   |              |
| l Type      |                          |                 | LAB        |              |
+-------------+--------------------------+-----------------+------------+--------------+
| % Segmented | 89.16                    | %               | EXTERNAL   |              |
|             |                          |                 | LAB        |              |
| Neutrophils |                          |                 |            |              |
+-------------+--------------------------+-----------------+------------+--------------+
| %           | 5.11                     | %               | EXTERNAL   |              |
| Lymphocytes |                          |                 | LAB        |              |
+-------------+--------------------------+-----------------+------------+--------------+
| % Monocytes | 5.23                     | %               | EXTERNAL   |              |
|             |                          |                 | LAB        |              |
+-------------+--------------------------+-----------------+------------+--------------+
| %           | 0.37                     | %               | EXTERNAL   |              |
| Eosinophils |                          |                 | LAB        |              |
+-------------+--------------------------+-----------------+------------+--------------+
| % Basophils | 0.13                     | %               | EXTERNAL   |              |
|             |                          |                 | LAB        |              |
+-------------+--------------------------+-----------------+------------+--------------+
| Absolute    | 9.55 (H)                 | 1.90 - 7.40     | EXTERNAL   |              |
| Segmented   |                          | K/uL            | LAB        |              |
| Neutrophils |                          |                 |            |              |
+-------------+--------------------------+-----------------+------------+--------------+
| Absolute    | 0.55 (L)                 | 1.00 - 3.90     | EXTERNAL   |              |
| Lymphocytes |                          | K/uL            | LAB        |              |
+-------------+--------------------------+-----------------+------------+--------------+
| Absolute    | 0.56                     | 0.00 - 0.80     | EXTERNAL   |              |
| Monocytes   |                          | K/uL            | LAB        |              |
+-------------+--------------------------+-----------------+------------+--------------+
| Absolute    | 0.04                     | 0.00 - 0.50     | EXTERNAL   |              |
| Eosinophils |                          | K/uL            | LAB        |              |
+-------------+--------------------------+-----------------+------------+--------------+
| Absolute    | 0.01                     | 0.00 - 0.10     | EXTERNAL   |              |
| Basophils   |                          | K/uL            | LAB        |              |
+-------------+--------------------------+-----------------+------------+--------------+
| RBC         | RBC AND PLT MORPHOLOGY   |                 | EXTERNAL   |              |
| Morphology  | APPEAR NORMAL            |                 | LAB        |              |
+-------------+--------------------------+-----------------+------------+--------------+
| Platelet    | ADEQUATE                 |                 | EXTERNAL   |              |
| Estimate    |                          |                 | LAB        |              |
 
+-------------+--------------------------+-----------------+------------+--------------+
| Differentia | SLIDE SCANNED, AGREES    |                 | EXTERNAL   |              |
| l Comments  | WITH AUTOMATED           |                 | LAB        |              |
|             | RESULTS.Comment: Testing |                 |            |              |
|             |  performed at [facility];[address]    |                 |            |              |
|             | [address];[place]   |                 |            |              |
|             | [id number]                    |                 |            |              |
+-------------+--------------------------+-----------------+------------+--------------+
 
 
 
+----------+
| Specimen |
+----------+
|          |
+----------+
 
 
 
+----------------+---------+--------------------+--------------+
| Performing     | Address | City/State/Zipcode | Phone Number |
| Organization   |         |                    |              |
+----------------+---------+--------------------+--------------+
|   EXTERNAL LAB |         |                    |              |
+----------------+---------+--------------------+--------------+
 Magnesium (2018  5:57 AM PDT)
 
+-----------+-------------------------+-----------------+------------+--------------+
| Component | Value                   | Ref Range       | Performed  | Pathologist  |
|           |                         |                 | At         | Signature    |
+-----------+-------------------------+-----------------+------------+--------------+
| Magnesium | 2.1Comment: SLT         | 1.7 - 2.4 mg/dL | EXTERNAL   |              |
|           | HEMOLYSISTesting        |                 | LAB        |              |
|           | performed at [facility];[address]    |                 |            |              |
|           | [address];[place]  |                 |            |              |
|           | [id number]                   |                 |            |              |
+-----------+-------------------------+-----------------+------------+--------------+
 
 
 
+-----------------+
| Specimen        |
+-----------------+
| Blood specimen  |
| (specimen)      |
+-----------------+
 
 
 
+----------------+---------+--------------------+--------------+
| Performing     | Address | City/State/Zipcode | Phone Number |
| Organization   |         |                    |              |
+----------------+---------+--------------------+--------------+
|   EXTERNAL LAB |         |                    |              |
+----------------+---------+--------------------+--------------+
 Comprehensive Metabolic Panel (2018  5:57 AM PDT)
 
+-------------+--------------------------+-----------------+------------+--------------+
| Component   | Value                    | Ref Range       | Performed  | Pathologist  |
|             |                          |                 | At         | Signature    |
 
+-------------+--------------------------+-----------------+------------+--------------+
| Na          | 143                      | 135 - 145       | EXTERNAL   |              |
|             |                          | mmol/L          | LAB        |              |
+-------------+--------------------------+-----------------+------------+--------------+
| K           | 4.1Comment: SLT          | 3.5 - 4.9       | EXTERNAL   |              |
|             | HEMOLYSIS                | mmol/L          | LAB        |              |
+-------------+--------------------------+-----------------+------------+--------------+
| Cl          | 112 (H)                  | 99 - 109 mmol/L | EXTERNAL   |              |
|             |                          |                 | LAB        |              |
+-------------+--------------------------+-----------------+------------+--------------+
| CO2         | 21 (L)                   | 23 - 32 mmol/L  | EXTERNAL   |              |
|             |                          |                 | LAB        |              |
+-------------+--------------------------+-----------------+------------+--------------+
| Anion Gap   | 15                       | 5 - 20 mmol/L   | EXTERNAL   |              |
|             |                          |                 | LAB        |              |
+-------------+--------------------------+-----------------+------------+--------------+
| Glucose,    | 99                       | 65 - 99 mg/dL   | EXTERNAL   |              |
| Fasting     |                          |                 | LAB        |              |
+-------------+--------------------------+-----------------+------------+--------------+
| BUN         | 10                       | 8 - 25 mg/dL    | EXTERNAL   |              |
|             |                          |                 | LAB        |              |
+-------------+--------------------------+-----------------+------------+--------------+
| Creatinine  | 0.59 (L)                 | 0.70 - 1.30     | EXTERNAL   |              |
|             |                          | mg/dL           | LAB        |              |
+-------------+--------------------------+-----------------+------------+--------------+
| BUN/Creatin | 16                       |                 | EXTERNAL   |              |
| ine Ratio   |                          |                 | LAB        |              |
+-------------+--------------------------+-----------------+------------+--------------+
| Calcium     | 7.2 (L)                  | 8.5 - 10.5      | EXTERNAL   |              |
|             |                          | mg/dL           | LAB        |              |
+-------------+--------------------------+-----------------+------------+--------------+
| Protein,    | 4.6 (L)                  | 6.3 - 8.2 g/dL  | EXTERNAL   |              |
| Total       |                          |                 | LAB        |              |
+-------------+--------------------------+-----------------+------------+--------------+
| Albumin     | 1.9 (L)                  | 3.3 - 4.8 g/dL  | EXTERNAL   |              |
|             |                          |                 | LAB        |              |
+-------------+--------------------------+-----------------+------------+--------------+
| Globulin    | 2.7                      | 1.3 - 4.9 g/dL  | EXTERNAL   |              |
|             |                          |                 | LAB        |              |
+-------------+--------------------------+-----------------+------------+--------------+
| A/G Ratio   | 0.7 (L)                  | 1.0 - 2.4       | EXTERNAL   |              |
|             |                          |                 | LAB        |              |
+-------------+--------------------------+-----------------+------------+--------------+
| Bilirubin   | 0.5                      | 0.1 - 1.5 mg/dL | EXTERNAL   |              |
| Total       |                          |                 | LAB        |              |
+-------------+--------------------------+-----------------+------------+--------------+
| ALP,        | 37                       | 35 - 115 U/L    | EXTERNAL   |              |
| External    |                          |                 | LAB        |              |
+-------------+--------------------------+-----------------+------------+--------------+
| AST         | 16Comment: SLT HEMOLYSIS | 10 - 45 U/L     | EXTERNAL   |              |
|             |                          |                 | LAB        |              |
+-------------+--------------------------+-----------------+------------+--------------+
| ALT         | 16                       | 10 - 65 U/L     | EXTERNAL   |              |
|             |                          |                 | LAB        |              |
+-------------+--------------------------+-----------------+------------+--------------+
| Estimated   | >60Comment: GFR <60:     | mL/min/1.73m2   | EXTERNAL   |              |
| GFR         | CHRONIC KIDNEY DISEASE,  |                 | LAB        |              |
|             | IF FOUND OVER A 3 MONTH  |                 |            |              |
|             | PERIOD.GFR <15: KIDNEY   |                 |            |              |
|             | FAILURE.FOR       |                 |            |              |
 
|             | [employer], MULTIPLY THE  |                 |            |              |
|             | CALCULATED GFR BY        |                 |            |              |
|             | 1.210.This eGFR is       |                 |            |              |
|             | calculated using the     |                 |            |              |
|             | MDRD IDMS traceable      |                 |            |              |
|             | equation.Testing         |                 |            |              |
|             | performed at [facility];[address]     |                 |            |              |
|             | [facility];[place], [place]   |                 |            |              |
|             | [id number]                    |                 |            |              |
+-------------+--------------------------+-----------------+------------+--------------+
 
 
 
+-----------------+
| Specimen        |
+-----------------+
| Blood specimen  |
| (specimen)      |
+-----------------+
 
 
 
+----------------+---------+--------------------+--------------+
| Performing     | Address | City/State/Zipcode | Phone Number |
| Organization   |         |                    |              |
+----------------+---------+--------------------+--------------+
|   EXTERNAL LAB |         |                    |              |
+----------------+---------+--------------------+--------------+
 Hemoglobin and Hematocrit (08/15/2018 11:56 PM PDT)
 
+-------------+--------------------------+---------------+------------+--------------+
| Component   | Value                    | Ref Range     | Performed  | Pathologist  |
|             |                          |               | At         | Signature    |
+-------------+--------------------------+---------------+------------+--------------+
| Hemoglobin  | 8.2 (L)                  | 13.2 - 17.0   | EXTERNAL   |              |
|             |                          | g/dL          | LAB        |              |
+-------------+--------------------------+---------------+------------+--------------+
| Hematocrit, | 24.7 (L)Comment: Testing | 39.0 - 50.0 % | EXTERNAL   |              |
|  POC        |  performed at [facility];[address]    |               | LAB        |              |
|             | [address];[place], [place]   |               |            |              |
|             | [id number]                    |               |            |              |
+-------------+--------------------------+---------------+------------+--------------+
 
 
 
+----------+
| Specimen |
+----------+
|          |
+----------+
 
 
 
+----------------+---------+--------------------+--------------+
| Performing     | Address | City/State/Zipcode | Phone Number |
| Organization   |         |                    |              |
+----------------+---------+--------------------+--------------+
|   EXTERNAL LAB |         |                    |              |
+----------------+---------+--------------------+--------------+
 Hemoglobin and Hematocrit (08/15/2018  6:40 PM PDT)
 
 
+-------------+--------------------------+---------------+------------+--------------+
| Component   | Value                    | Ref Range     | Performed  | Pathologist  |
|             |                          |               | At         | Signature    |
+-------------+--------------------------+---------------+------------+--------------+
| Hemoglobin  | 8.7 (L)                  | 13.2 - 17.0   | EXTERNAL   |              |
|             |                          | g/dL          | LAB        |              |
+-------------+--------------------------+---------------+------------+--------------+
| Hematocrit, | 26.1 (L)Comment: Testing | 39.0 - 50.0 % | EXTERNAL   |              |
|  POC        |  performed at [facility]    |               | LAB        |              |
|             | [address];[place]   |               |            |              |
|             | [id number]                    |               |            |              |
+-------------+--------------------------+---------------+------------+--------------+
 
 
 
+----------+
| Specimen |
+----------+
|          |
+----------+
 
 
 
+----------------+---------+--------------------+--------------+
| Performing     | Address | City/State/Zipcode | Phone Number |
| Organization   |         |                    |              |
+----------------+---------+--------------------+--------------+
|   EXTERNAL LAB |         |                    |              |
+----------------+---------+--------------------+--------------+
 XR Abdomen AP (08/15/2018  5:12 PM PDT)
 
+----------+
| Specimen |
+----------+
|          |
+----------+
 
 
 
+--------------------------------------------------------------------+--------------+
| Impressions                                                        | Performed At |
+--------------------------------------------------------------------+--------------+
|   Findings and impression:   Mild gaseous prominence of the bowel  |              |
| loops, favoring ileus. No unexpected radiopaque foreign body       |              |
| appreciated. Osseous structures appear unremarkable.               |              |
| Electronically signed by Juan Mckeon MD on 8/15/2018 5:16 PM |              |
+--------------------------------------------------------------------+--------------+
 
 
 
+------------------------------------------------------------------------+--------------+
| Narrative                                                              | Performed At |
+------------------------------------------------------------------------+--------------+
|   BERNA PARKINSON  1950  XR ABDOMEN 1 VIEW  8/15/2018 5:12 PM    |              |
|      INDICATION: Intraoperative assessment.     COMPARISON: none       |              |
| TECHNIQUE: Abdominal series, frontal view.                             |              |
+------------------------------------------------------------------------+--------------+
 
 
 
 
+-------------------------------------------------------------------------------------------
--------------------------------------------------------------------------------------+
| Procedure Note                                                                            
                                                                                      |
+-------------------------------------------------------------------------------------------
--------------------------------------------------------------------------------------+
|   Hermes, Rad Conversion - 2019 11:04 AM PDT  BERNA PARKINSON1950XR ABDOMEN 1    
                                                                                      |
| VIEW8/15/2018 5:12 PM  INDICATION: Intraoperative assessment. COMPARISON: none            
                                                                                      |
| TECHNIQUE: Abdominal series, frontal view. IMPRESSION: Findings and impression:  Mild     
                                                                                      |
| gaseous prominence of the bowel loops, favoring ileus. No unexpected radiopaque foreign   
                                                                                      |
| body appreciated. Osseous structures appear unremarkable. Electronically signed by Juan   
                                                                                      |
| Chadd Mckeon MD on 8/15/2018 5:16 PM                                                      
                                                                                      |
|INDICATION: Intraoperative assessment.                                                     
                                                                                      |
|COMPARISON: none                                                                           
                                                                                      |
|TECHNIQUE: Abdominal series, frontal view.                                                 
                                                                                      |
|IMPRESSION:                                                                                
                                                                                      |
|Findings and impression:  Mild gaseous prominence of the bowel loops, favoring ileus. No un
expected radiopaque foreign body appreciated. Osseous structures appear unremarkable. |
|                                                                                           
                                                                                      |
|Electronically signed by Juan Mckeon MD on 8/15/2018 5:16 PM                         
                                                                                      |
+-------------------------------------------------------------------------------------------
--------------------------------------------------------------------------------------+
 Type and Screen (08/15/2018  2:32 PM PDT)
 
+-------------+-------------------------+-----------+------------+--------------+
| Component   | Value                   | Ref Range | Performed  | Pathologist  |
|             |                         |           | At         | Signature    |
+-------------+-------------------------+-----------+------------+--------------+
| ABO Rh      | O POSITIVE              |           | EXTERNAL   |              |
|             |                         |           | LAB        |              |
+-------------+-------------------------+-----------+------------+--------------+
| Antibody    | NEGATIVE                |           | EXTERNAL   |              |
| Screen      |                         |           | LAB        |              |
+-------------+-------------------------+-----------+------------+--------------+
| BB BAND     | [id number]                |           | EXTERNAL   |              |
|             |                         |           | LAB        |              |
+-------------+-------------------------+-----------+------------+--------------+
| UNIT NUMBER | [id number]           |           | EXTERNAL   |              |
|             |                         |           | LAB        |              |
+-------------+-------------------------+-----------+------------+--------------+
| Product     | LEUKODEPLETED PC        |           | EXTERNAL   |              |
 
| Code        |                         |           | LAB        |              |
+-------------+-------------------------+-----------+------------+--------------+
| Unit        | 00                      |           | EXTERNAL   |              |
| Division    |                         |           | LAB        |              |
+-------------+-------------------------+-----------+------------+--------------+
| Unit Status | ISSUED,FINAL            |           | EXTERNAL   |              |
|             |                         |           | LAB        |              |
+-------------+-------------------------+-----------+------------+--------------+
| Transfusion | OK TO TRANSFUSE         |           | EXTERNAL   |              |
|  Status     |                         |           | LAB        |              |
+-------------+-------------------------+-----------+------------+--------------+
| CROSSMATCH  | COMPATIBLETesting       |           | EXTERNAL   |              |
| RESULT      | performed at [facility];[address]    |           | LAB        |              |
|             | [address];[place], [place]  |           |            |              |
|             | [id number]                   |           |            |              |
+-------------+-------------------------+-----------+------------+--------------+
 
 
 
+-----------------+
| Specimen        |
+-----------------+
| Blood specimen  |
| (specimen)      |
+-----------------+
 
 
 
+----------------+---------+--------------------+--------------+
| Performing     | Address | City/State/Zipcode | Phone Number |
| Organization   |         |                    |              |
+----------------+---------+--------------------+--------------+
|   EXTERNAL LAB |         |                    |              |
+----------------+---------+--------------------+--------------+
 Hemoglobin and Hematocrit (08/15/2018 11:55 AM PDT)
 
+-------------+--------------------------+---------------+------------+--------------+
| Component   | Value                    | Ref Range     | Performed  | Pathologist  |
|             |                          |               | At         | Signature    |
+-------------+--------------------------+---------------+------------+--------------+
| Hemoglobin  | 7.6 (L)                  | 13.2 - 17.0   | EXTERNAL   |              |
|             |                          | g/dL          | LAB        |              |
+-------------+--------------------------+---------------+------------+--------------+
| Hematocrit, | 22.3 (L)Comment: Testing | 39.0 - 50.0 % | EXTERNAL   |              |
|  POC        |  performed at [facility];[address]    |               | LAB        |              |
|             | [address];[place]   |               |            |              |
|             | [id number]                    |               |            |              |
+-------------+--------------------------+---------------+------------+--------------+
 
 
 
+----------+
| Specimen |
+----------+
|          |
+----------+
 
 
 
+----------------+---------+--------------------+--------------+
 
| Performing     | Address | City/State/Zipcode | Phone Number |
| Organization   |         |                    |              |
+----------------+---------+--------------------+--------------+
|   EXTERNAL LAB |         |                    |              |
+----------------+---------+--------------------+--------------+
 Hemoglobin and Hematocrit (08/15/2018  5:56 AM PDT)
 
+-------------+--------------------------+---------------+------------+--------------+
| Component   | Value                    | Ref Range     | Performed  | Pathologist  |
|             |                          |               | At         | Signature    |
+-------------+--------------------------+---------------+------------+--------------+
| Hemoglobin  | 6.5 (LL)Comment: CALLED  | 13.2 - 17.0   | EXTERNAL   |              |
|             | TO BROOKLYNN BARBOSA RN/7RP AT    | g/dL          | LAB        |              |
|             | 0614 BY MWREAD BACK      |               |            |              |
|             | RESULTS VERIFIED         |               |            |              |
|             |                          |               |            |              |
+-------------+--------------------------+---------------+------------+--------------+
| Hematocrit, | 18.6 (LL)Comment: CALLED | 39.0 - 50.0 % | EXTERNAL   |              |
|  POC        |  TO BROOKLYNN MENGMinoo CHAPPELL/7RP AT   |               | LAB        |              |
|             | 0614 BY MWREAD BACK      |               |            |              |
|             | RESULTS VERIFIEDTesting  |               |            |              |
|             | performed at [facility];[address]     |               |            |              |
|             | [address];[place]   |               |            |              |
|             | [id number]                    |               |            |              |
+-------------+--------------------------+---------------+------------+--------------+
 
 
 
+----------+
| Specimen |
+----------+
|          |
+----------+
 
 
 
+----------------+---------+--------------------+--------------+
| Performing     | Address | City/State/Zipcode | Phone Number |
| Organization   |         |                    |              |
+----------------+---------+--------------------+--------------+
|   EXTERNAL LAB |         |                    |              |
+----------------+---------+--------------------+--------------+
 External Lab: CBC (08/15/2018  1:33 AM PDT)
 
+-------------+-------------------------+-----------------+------------+--------------+
| Component   | Value                   | Ref Range       | Performed  | Pathologist  |
|             |                         |                 | At         | Signature    |
+-------------+-------------------------+-----------------+------------+--------------+
| WBC         | 8.94                    | 3.80 - 11.00    | EXTERNAL   |              |
|             |                         | K/uL            | LAB        |              |
+-------------+-------------------------+-----------------+------------+--------------+
| Red Blood   | 2.40 (L)                | 4.20 - 5.70     | EXTERNAL   |              |
| Cells       |                         | M/uL            | LAB        |              |
| Counted     |                         |                 |            |              |
+-------------+-------------------------+-----------------+------------+--------------+
| Hemoglobin  | 7.3 (L)                 | 13.2 - 17.0     | EXTERNAL   |              |
|             |                         | g/dL            | LAB        |              |
+-------------+-------------------------+-----------------+------------+--------------+
| Hematocrit, | 21.2 (L)                | 39.0 - 50.0 %   | EXTERNAL   |              |
|  POC        |                         |                 | LAB        |              |
 
+-------------+-------------------------+-----------------+------------+--------------+
| MCV         | 88.4                    | 80.0 - 100.0 fl | EXTERNAL   |              |
|             |                         |                 | LAB        |              |
+-------------+-------------------------+-----------------+------------+--------------+
| MCH         | 30.2                    | 27.0 - 34.0 pg  | EXTERNAL   |              |
|             |                         |                 | LAB        |              |
+-------------+-------------------------+-----------------+------------+--------------+
| MCHC        | 34.2                    | 32.0 - 35.5     | EXTERNAL   |              |
|             |                         | g/dL            | LAB        |              |
+-------------+-------------------------+-----------------+------------+--------------+
| RDW-CV      | 50.8                    | 37 - 53 fl      | EXTERNAL   |              |
|             |                         |                 | LAB        |              |
+-------------+-------------------------+-----------------+------------+--------------+
| Platelet    | 161                     | 150 - 400 K/uL  | EXTERNAL   |              |
| Count       |                         |                 | LAB        |              |
| Plasma      |                         |                 |            |              |
+-------------+-------------------------+-----------------+------------+--------------+
| MPV         | 7.8                     | fl              | EXTERNAL   |              |
|             |                         |                 | LAB        |              |
+-------------+-------------------------+-----------------+------------+--------------+
| Differentia | MANUAL                  |                 | EXTERNAL   |              |
| l Type      |                         |                 | LAB        |              |
+-------------+-------------------------+-----------------+------------+--------------+
| Segmented   | 84                      | %               | EXTERNAL   |              |
| Neutrophils |                         |                 | LAB        |              |
|  Manual     |                         |                 |            |              |
+-------------+-------------------------+-----------------+------------+--------------+
| % Bands     | 2                       | %               | EXTERNAL   |              |
|             |                         |                 | LAB        |              |
+-------------+-------------------------+-----------------+------------+--------------+
| Lymphocytes | 9                       | %               | EXTERNAL   |              |
|  Manual     |                         |                 | LAB        |              |
+-------------+-------------------------+-----------------+------------+--------------+
| Monocytes   | 3                       | %               | EXTERNAL   |              |
| Manual      |                         |                 | LAB        |              |
+-------------+-------------------------+-----------------+------------+--------------+
| Eosinophils | 2                       | %               | EXTERNAL   |              |
|  Manual     |                         |                 | LAB        |              |
+-------------+-------------------------+-----------------+------------+--------------+
| Absolute    | 7.51 (H)                | 1.90 - 7.40     | EXTERNAL   |              |
| Neutrophils |                         | K/uL            | LAB        |              |
+-------------+-------------------------+-----------------+------------+--------------+
| Bands       | 0.18                    | 0.00 - 0.20     | EXTERNAL   |              |
| Manual      |                         | K/uL            | LAB        |              |
+-------------+-------------------------+-----------------+------------+--------------+
| Absolute    | 0.80 (L)                | 1.00 - 3.90     | EXTERNAL   |              |
| Lymphocytes |                         | K/uL            | LAB        |              |
+-------------+-------------------------+-----------------+------------+--------------+
| Absolute    | 0.27                    | 0.00 - 0.80     | EXTERNAL   |              |
| Monocytes   |                         | K/uL            | LAB        |              |
+-------------+-------------------------+-----------------+------------+--------------+
| Absolute    | 0.18                    | 0.00 - 0.50     | EXTERNAL   |              |
| Eosinophils |                         | K/uL            | LAB        |              |
+-------------+-------------------------+-----------------+------------+--------------+
| Platelet    | ADEQUATE                |                 | EXTERNAL   |              |
| Estimate    |                         |                 | LAB        |              |
+-------------+-------------------------+-----------------+------------+--------------+
| RBC         | RBC AND PLT MORPHOLOGY  |                 | EXTERNAL   |              |
| Morphology  | APPEAR NORMALComment:   |                 | LAB        |              |
|             | Testing performed at    |                 |            |              |
 
|             | [facility], [address]  |                 |            |              |
|             | [place], [place]     |                 |            |              |
|             | [address]                   |                 |            |              |
+-------------+-------------------------+-----------------+------------+--------------+
 
 
 
+-----------------+
| Specimen        |
+-----------------+
| Blood specimen  |
| (specimen)      |
+-----------------+
 
 
 
+----------------+---------+--------------------+--------------+
| Performing     | Address | City/State/Zipcode | Phone Number |
| Organization   |         |                    |              |
+----------------+---------+--------------------+--------------+
|   EXTERNAL LAB |         |                    |              |
+----------------+---------+--------------------+--------------+
 Magnesium (08/15/2018  1:33 AM PDT)
 
+-----------+--------------------------+-----------------+------------+--------------+
| Component | Value                    | Ref Range       | Performed  | Pathologist  |
|           |                          |                 | At         | Signature    |
+-----------+--------------------------+-----------------+------------+--------------+
| Magnesium | 1.8Comment: Testing      | 1.7 - 2.4 mg/dL | EXTERNAL   |              |
|           | performed at [facility], [address] |                 | LAB        |              |
|           |  [address],        |                 |            |              |
|           | [place]  [address]     |                 |            |              |
+-----------+--------------------------+-----------------+------------+--------------+
 
 
 
+-----------------+
| Specimen        |
+-----------------+
| Blood specimen  |
| (specimen)      |
+-----------------+
 
 
 
+----------------+---------+--------------------+--------------+
| Performing     | Address | City/State/Zipcode | Phone Number |
| Organization   |         |                    |              |
+----------------+---------+--------------------+--------------+
|   EXTERNAL LAB |         |                    |              |
+----------------+---------+--------------------+--------------+
 Comprehensive Metabolic Panel (08/15/2018  1:33 AM PDT)
 
+-------------+--------------------------+-----------------+------------+--------------+
| Component   | Value                    | Ref Range       | Performed  | Pathologist  |
|             |                          |                 | At         | Signature    |
+-------------+--------------------------+-----------------+------------+--------------+
| Na          | 144                      | 135 - 145       | EXTERNAL   |              |
|             |                          | mmol/L          | LAB        |              |
+-------------+--------------------------+-----------------+------------+--------------+
 
| K           | 3.8                      | 3.5 - 4.9       | EXTERNAL   |              |
|             |                          | mmol/L          | LAB        |              |
+-------------+--------------------------+-----------------+------------+--------------+
| Cl          | 114 (H)                  | 99 - 109 mmol/L | EXTERNAL   |              |
|             |                          |                 | LAB        |              |
+-------------+--------------------------+-----------------+------------+--------------+
| CO2         | 22 (L)                   | 23 - 32 mmol/L  | EXTERNAL   |              |
|             |                          |                 | LAB        |              |
+-------------+--------------------------+-----------------+------------+--------------+
| Anion Gap   | 12                       | 5 - 20 mmol/L   | EXTERNAL   |              |
|             |                          |                 | LAB        |              |
+-------------+--------------------------+-----------------+------------+--------------+
| Glucose,    | 106 (H)                  | 65 - 99 mg/dL   | EXTERNAL   |              |
| Fasting     |                          |                 | LAB        |              |
+-------------+--------------------------+-----------------+------------+--------------+
| BUN         | 10                       | 8 - 25 mg/dL    | EXTERNAL   |              |
|             |                          |                 | LAB        |              |
+-------------+--------------------------+-----------------+------------+--------------+
| Creatinine  | 0.6 (L)                  | 0.70 - 1.30     | EXTERNAL   |              |
|             |                          | mg/dL           | LAB        |              |
+-------------+--------------------------+-----------------+------------+--------------+
| BUN/Creatin | 17                       |                 | EXTERNAL   |              |
| ine Ratio   |                          |                 | LAB        |              |
+-------------+--------------------------+-----------------+------------+--------------+
| Calcium     | 7.0 (L)                  | 8.5 - 10.5      | EXTERNAL   |              |
|             |                          | mg/dL           | LAB        |              |
+-------------+--------------------------+-----------------+------------+--------------+
| Protein,    | 4.4 (L)                  | 6.3 - 8.2 g/dL  | EXTERNAL   |              |
| Total       |                          |                 | LAB        |              |
+-------------+--------------------------+-----------------+------------+--------------+
| Albumin     | 2.1 (L)                  | 3.3 - 4.8 g/dL  | EXTERNAL   |              |
|             |                          |                 | LAB        |              |
+-------------+--------------------------+-----------------+------------+--------------+
| Globulin    | 2.3                      | 1.3 - 4.9 g/dL  | EXTERNAL   |              |
|             |                          |                 | LAB        |              |
+-------------+--------------------------+-----------------+------------+--------------+
| A/G Ratio   | 0.9 (L)                  | 1.0 - 2.4       | EXTERNAL   |              |
|             |                          |                 | LAB        |              |
+-------------+--------------------------+-----------------+------------+--------------+
| Bilirubin   | 0.8                      | 0.1 - 1.5 mg/dL | EXTERNAL   |              |
| Total       |                          |                 | LAB        |              |
+-------------+--------------------------+-----------------+------------+--------------+
| ALP,        | 36                       | 35 - 115 U/L    | EXTERNAL   |              |
| External    |                          |                 | LAB        |              |
+-------------+--------------------------+-----------------+------------+--------------+
| AST         | 20                       | 10 - 45 U/L     | EXTERNAL   |              |
|             |                          |                 | LAB        |              |
+-------------+--------------------------+-----------------+------------+--------------+
| ALT         | 16                       | 10 - 65 U/L     | EXTERNAL   |              |
|             |                          |                 | LAB        |              |
+-------------+--------------------------+-----------------+------------+--------------+
| Estimated   | >60Comment: GFR <60:     | mL/min/1.73m2   | EXTERNAL   |              |
| GFR         | CHRONIC KIDNEY DISEASE,  |                 | LAB        |              |
|             | IF FOUND OVER A 3 MONTH  |                 |            |              |
|             | PERIOD.GFR <15: KIDNEY   |                 |            |              |
|             | FAILURE.FOR       |                 |            |              |
|             | [employer], MULTIPLY THE  |                 |            |              |
|             | CALCULATED GFR BY        |                 |            |              |
|             | 1.210.This eGFR is       |                 |            |              |
|             | calculated using the     |                 |            |              |
 
|             | MDRD IDMS traceable      |                 |            |              |
|             | equation.Testing         |                 |            |              |
|             | performed at [facility], [address] |                 |            |              |
|             |  [facility],        |                 |            |              |
|             | [place]   [address]    |                 |            |              |
+-------------+--------------------------+-----------------+------------+--------------+
 
 
 
+-----------------+
| Specimen        |
+-----------------+
| Blood specimen  |
| (specimen)      |
+-----------------+
 
 
 
+----------------+---------+--------------------+--------------+
| Performing     | Address | City/State/Zipcode | Phone Number |
| Organization   |         |                    |              |
+----------------+---------+--------------------+--------------+
|   EXTERNAL LAB |         |                    |              |
+----------------+---------+--------------------+--------------+
 Hemoglobin and Hematocrit (2018  9:17 PM PDT)
 
+-------------+--------------------------+---------------+------------+--------------+
| Component   | Value                    | Ref Range     | Performed  | Pathologist  |
|             |                          |               | At         | Signature    |
+-------------+--------------------------+---------------+------------+--------------+
| Hemoglobin  | 6.5 (LL)Comment: CALLED  | 13.2 - 17.0   | EXTERNAL   |              |
|             | TO RBOOKLYNN BARBOSA RN/7RP AT    | g/dL          | LAB        |              |
|             | 2137 BY MWREAD BACK      |               |            |              |
|             | RESULTS VERIFIED         |               |            |              |
|             |                          |               |            |              |
+-------------+--------------------------+---------------+------------+--------------+
| Hematocrit, | 19.2 (LL)Comment: CALLED | 39.0 - 50.0 % | EXTERNAL   |              |
|  POC        |  TO BROOKLYNN BARBOSA RN/7RP AT   |               | LAB        |              |
|             | [id number] BY MWREAD BACK      |               |            |              |
|             | RESULTS VERIFIEDTesting  |               |            |              |
|             | performed at [facility];[address]     |               |            |              |
|             | [address];[place], [place]   |               |            |              |
|             | [id number]                    |               |            |              |
+-------------+--------------------------+---------------+------------+--------------+
 
 
 
+----------+
| Specimen |
+----------+
|          |
+----------+
 
 
 
+----------------+---------+--------------------+--------------+
| Performing     | Address | City/State/Zipcode | Phone Number |
| Organization   |         |                    |              |
+----------------+---------+--------------------+--------------+
|   EXTERNAL LAB |         |                    |              |
 
+----------------+---------+--------------------+--------------+
 Hemoglobin and Hematocrit (2018 12:12 PM PDT)
 
+-------------+--------------------------+---------------+------------+--------------+
| Component   | Value                    | Ref Range     | Performed  | Pathologist  |
|             |                          |               | At         | Signature    |
+-------------+--------------------------+---------------+------------+--------------+
| Hemoglobin  | 7.1 (L)                  | 13.2 - 17.0   | EXTERNAL   |              |
|             |                          | g/dL          | LAB        |              |
+-------------+--------------------------+---------------+------------+--------------+
| Hematocrit, | 21.3 (L)Comment: Testing | 39.0 - 50.0 % | EXTERNAL   |              |
|  POC        |  performed at [facility];[address]    |               | LAB        |              |
|             | [address];[place], [place]   |               |            |              |
|             | [id number]                    |               |            |              |
+-------------+--------------------------+---------------+------------+--------------+
 
 
 
+----------+
| Specimen |
+----------+
|          |
+----------+
 
 
 
+----------------+---------+--------------------+--------------+
| Performing     | Address | City/State/Zipcode | Phone Number |
| Organization   |         |                    |              |
+----------------+---------+--------------------+--------------+
|   EXTERNAL LAB |         |                    |              |
+----------------+---------+--------------------+--------------+
 External Lab: CBC (2018  5:47 AM PDT)
 
+-------------+--------------------------+-----------------+------------+--------------+
| Component   | Value                    | Ref Range       | Performed  | Pathologist  |
|             |                          |                 | At         | Signature    |
+-------------+--------------------------+-----------------+------------+--------------+
| WBC         | 10.24                    | 3.80 - 11.00    | EXTERNAL   |              |
|             |                          | K/uL            | LAB        |              |
+-------------+--------------------------+-----------------+------------+--------------+
| Red Blood   | 2.36 (L)                 | 4.20 - 5.70     | EXTERNAL   |              |
| Cells       |                          | M/uL            | LAB        |              |
| Counted     |                          |                 |            |              |
+-------------+--------------------------+-----------------+------------+--------------+
| Hemoglobin  | 7.4 (L)                  | 13.2 - 17.0     | EXTERNAL   |              |
|             |                          | g/dL            | LAB        |              |
+-------------+--------------------------+-----------------+------------+--------------+
| Hematocrit, | 21.1 (L)                 | 39.0 - 50.0 %   | EXTERNAL   |              |
|  POC        |                          |                 | LAB        |              |
+-------------+--------------------------+-----------------+------------+--------------+
| MCV         | 89.3                     | 80.0 - 100.0 fl | EXTERNAL   |              |
|             |                          |                 | LAB        |              |
+-------------+--------------------------+-----------------+------------+--------------+
| MCH         | 31.3                     | 27.0 - 34.0 pg  | EXTERNAL   |              |
|             |                          |                 | LAB        |              |
+-------------+--------------------------+-----------------+------------+--------------+
| MCHC        | 35.1                     | 32.0 - 35.5     | EXTERNAL   |              |
|             |                          | g/dL            | LAB        |              |
+-------------+--------------------------+-----------------+------------+--------------+
 
| RDW-CV      | 47.3                     | 37 - 53 fl      | EXTERNAL   |              |
|             |                          |                 | LAB        |              |
+-------------+--------------------------+-----------------+------------+--------------+
| Platelet    | 159                      | 150 - 400 K/uL  | EXTERNAL   |              |
| Count       |                          |                 | LAB        |              |
| Plasma      |                          |                 |            |              |
+-------------+--------------------------+-----------------+------------+--------------+
| MPV         | 7.6                      | fl              | EXTERNAL   |              |
|             |                          |                 | LAB        |              |
+-------------+--------------------------+-----------------+------------+--------------+
| Differentia | AUTOMATED                |                 | EXTERNAL   |              |
| l Type      |                          |                 | LAB        |              |
+-------------+--------------------------+-----------------+------------+--------------+
| % Segmented | 85.12                    | %               | EXTERNAL   |              |
|             |                          |                 | LAB        |              |
| Neutrophils |                          |                 |            |              |
+-------------+--------------------------+-----------------+------------+--------------+
| %           | 7.43                     | %               | EXTERNAL   |              |
| Lymphocytes |                          |                 | LAB        |              |
+-------------+--------------------------+-----------------+------------+--------------+
| % Monocytes | 5.64                     | %               | EXTERNAL   |              |
|             |                          |                 | LAB        |              |
+-------------+--------------------------+-----------------+------------+--------------+
| %           | 1.41                     | %               | EXTERNAL   |              |
| Eosinophils |                          |                 | LAB        |              |
+-------------+--------------------------+-----------------+------------+--------------+
| % Basophils | 0.40                     | %               | EXTERNAL   |              |
|             |                          |                 | LAB        |              |
+-------------+--------------------------+-----------------+------------+--------------+
| Absolute    | 8.72 (H)                 | 1.90 - 7.40     | EXTERNAL   |              |
| Segmented   |                          | K/uL            | LAB        |              |
| Neutrophils |                          |                 |            |              |
+-------------+--------------------------+-----------------+------------+--------------+
| Absolute    | 0.76 (L)                 | 1.00 - 3.90     | EXTERNAL   |              |
| Lymphocytes |                          | K/uL            | LAB        |              |
+-------------+--------------------------+-----------------+------------+--------------+
| Absolute    | 0.58                     | 0.00 - 0.80     | EXTERNAL   |              |
| Monocytes   |                          | K/uL            | LAB        |              |
+-------------+--------------------------+-----------------+------------+--------------+
| Absolute    | 0.14                     | 0.00 - 0.50     | EXTERNAL   |              |
| Eosinophils |                          | K/uL            | LAB        |              |
+-------------+--------------------------+-----------------+------------+--------------+
| Absolute    | 0.04                     | 0.00 - 0.10     | EXTERNAL   |              |
| Basophils   |                          | K/uL            | LAB        |              |
+-------------+--------------------------+-----------------+------------+--------------+
| RBC         | RBC AND PLT MORPHOLOGY   |                 | EXTERNAL   |              |
| Morphology  | APPEAR NORMAL            |                 | LAB        |              |
+-------------+--------------------------+-----------------+------------+--------------+
| Platelet    | ADEQUATE                 |                 | EXTERNAL   |              |
| Estimate    |                          |                 | LAB        |              |
+-------------+--------------------------+-----------------+------------+--------------+
| Differentia | SLIDE SCANNED, AGREES    |                 | EXTERNAL   |              |
| l Comments  | WITH AUTOMATED           |                 | LAB        |              |
|             | RESULTS.Comment: Testing |                 |            |              |
|             |  performed at [facility];[address]    |                 |            |              |
|             | [address];[place], [place]   |                 |            |              |
|             | [id number]                    |                 |            |              |
+-------------+--------------------------+-----------------+------------+--------------+
 
 
 
 
+-----------------+
| Specimen        |
+-----------------+
| Blood specimen  |
| (specimen)      |
+-----------------+
 
 
 
+----------------+---------+--------------------+--------------+
| Performing     | Address | City/State/Zipcode | Phone Number |
| Organization   |         |                    |              |
+----------------+---------+--------------------+--------------+
|   EXTERNAL LAB |         |                    |              |
+----------------+---------+--------------------+--------------+
 Magnesium (2018  5:47 AM PDT)
 
+-----------+-------------------------+-----------------+------------+--------------+
| Component | Value                   | Ref Range       | Performed  | Pathologist  |
|           |                         |                 | At         | Signature    |
+-----------+-------------------------+-----------------+------------+--------------+
| Magnesium | 1.9Comment: SLT         | 1.7 - 2.4 mg/dL | EXTERNAL   |              |
|           | HEMOLYSISTesting        |                 | LAB        |              |
|           | performed at [facility]    |                 |            |              |
|           | [address];[place], [place]  |                 |            |              |
|           | [id number]                   |                 |            |              |
+-----------+-------------------------+-----------------+------------+--------------+
 
 
 
+-----------------+
| Specimen        |
+-----------------+
| Blood specimen  |
| (specimen)      |
+-----------------+
 
 
 
+----------------+---------+--------------------+--------------+
| Performing     | Address | City/State/Zipcode | Phone Number |
| Organization   |         |                    |              |
+----------------+---------+--------------------+--------------+
|   EXTERNAL LAB |         |                    |              |
+----------------+---------+--------------------+--------------+
 Comprehensive Metabolic Panel (2018  5:47 AM PDT)
 
+-------------+--------------------------+-----------------+------------+--------------+
| Component   | Value                    | Ref Range       | Performed  | Pathologist  |
|             |                          |                 | At         | Signature    |
+-------------+--------------------------+-----------------+------------+--------------+
| Na          | 145                      | 135 - 145       | EXTERNAL   |              |
|             |                          | mmol/L          | LAB        |              |
+-------------+--------------------------+-----------------+------------+--------------+
| K           | 3.5Comment: SLT          | 3.5 - 4.9       | EXTERNAL   |              |
|             | HEMOLYSIS                | mmol/L          | LAB        |              |
+-------------+--------------------------+-----------------+------------+--------------+
| Cl          | 116 (H)                  | 99 - 109 mmol/L | EXTERNAL   |              |
|             |                          |                 | LAB        |              |
 
+-------------+--------------------------+-----------------+------------+--------------+
| CO2         | 22 (L)                   | 23 - 32 mmol/L  | EXTERNAL   |              |
|             |                          |                 | LAB        |              |
+-------------+--------------------------+-----------------+------------+--------------+
| Anion Gap   | 11                       | 5 - 20 mmol/L   | EXTERNAL   |              |
|             |                          |                 | LAB        |              |
+-------------+--------------------------+-----------------+------------+--------------+
| Glucose,    | 96                       | 65 - 99 mg/dL   | EXTERNAL   |              |
| Fasting     |                          |                 | LAB        |              |
+-------------+--------------------------+-----------------+------------+--------------+
| BUN         | 10                       | 8 - 25 mg/dL    | EXTERNAL   |              |
|             |                          |                 | LAB        |              |
+-------------+--------------------------+-----------------+------------+--------------+
| Creatinine  | 0.67 (L)                 | 0.70 - 1.30     | EXTERNAL   |              |
|             |                          | mg/dL           | LAB        |              |
+-------------+--------------------------+-----------------+------------+--------------+
| BUN/Creatin | 15                       |                 | EXTERNAL   |              |
| ine Ratio   |                          |                 | LAB        |              |
+-------------+--------------------------+-----------------+------------+--------------+
| Calcium     | 7.2 (L)                  | 8.5 - 10.5      | EXTERNAL   |              |
|             |                          | mg/dL           | LAB        |              |
+-------------+--------------------------+-----------------+------------+--------------+
| Protein,    | 4.5 (L)                  | 6.3 - 8.2 g/dL  | EXTERNAL   |              |
| Total       |                          |                 | LAB        |              |
+-------------+--------------------------+-----------------+------------+--------------+
| Albumin     | 2.1 (L)                  | 3.3 - 4.8 g/dL  | EXTERNAL   |              |
|             |                          |                 | LAB        |              |
+-------------+--------------------------+-----------------+------------+--------------+
| Globulin    | 2.5                      | 1.3 - 4.9 g/dL  | EXTERNAL   |              |
|             |                          |                 | LAB        |              |
+-------------+--------------------------+-----------------+------------+--------------+
| A/G Ratio   | 0.8 (L)                  | 1.0 - 2.4       | EXTERNAL   |              |
|             |                          |                 | LAB        |              |
+-------------+--------------------------+-----------------+------------+--------------+
| Bilirubin   | 0.5                      | 0.1 - 1.5 mg/dL | EXTERNAL   |              |
| Total       |                          |                 | LAB        |              |
+-------------+--------------------------+-----------------+------------+--------------+
| ALP,        | 32 (L)                   | 35 - 115 U/L    | EXTERNAL   |              |
| External    |                          |                 | LAB        |              |
+-------------+--------------------------+-----------------+------------+--------------+
| AST         | 20Comment: SLT HEMOLYSIS | 10 - 45 U/L     | EXTERNAL   |              |
|             |                          |                 | LAB        |              |
+-------------+--------------------------+-----------------+------------+--------------+
| ALT         | 17                       | 10 - 65 U/L     | EXTERNAL   |              |
|             |                          |                 | LAB        |              |
+-------------+--------------------------+-----------------+------------+--------------+
| Estimated   | >60Comment: GFR <60:     | mL/min/1.73m2   | EXTERNAL   |              |
| GFR         | CHRONIC KIDNEY DISEASE,  |                 | LAB        |              |
|             | IF FOUND OVER A 3 MONTH  |                 |            |              |
|             | PERIOD.GFR <15: KIDNEY   |                 |            |              |
|             | FAILURE.FOR       |                 |            |              |
|             | [employer], MULTIPLY THE  |                 |            |              |
|             | CALCULATED GFR BY        |                 |            |              |
|             | 1.210.This eGFR is       |                 |            |              |
|             | calculated using the     |                 |            |              |
|             | MDRD IDMS traceable      |                 |            |              |
|             | equation.Testing         |                 |            |              |
|             | performed at [facility];[address]     |                 |            |              |
|             | [address];[place], [place]   |                 |            |              |
|             | [id number]                    |                 |            |              |
 
+-------------+--------------------------+-----------------+------------+--------------+
 
 
 
+-----------------+
| Specimen        |
+-----------------+
| Blood specimen  |
| (specimen)      |
+-----------------+
 
 
 
+----------------+---------+--------------------+--------------+
| Performing     | Address | City/State/Zipcode | Phone Number |
| Organization   |         |                    |              |
+----------------+---------+--------------------+--------------+
|   EXTERNAL LAB |         |                    |              |
+----------------+---------+--------------------+--------------+
 Hemoglobin and Hematocrit (2018 11:59 PM PDT)
 
+-------------+--------------------------+---------------+------------+--------------+
| Component   | Value                    | Ref Range     | Performed  | Pathologist  |
|             |                          |               | At         | Signature    |
+-------------+--------------------------+---------------+------------+--------------+
| Hemoglobin  | 7.5 (L)                  | 13.2 - 17.0   | EXTERNAL   |              |
|             |                          | g/dL          | LAB        |              |
+-------------+--------------------------+---------------+------------+--------------+
| Hematocrit, | 22.5 (L)Comment: Testing | 39.0 - 50.0 % | EXTERNAL   |              |
|  POC        |  performed at [facility];[address]    |               | LAB        |              |
|             | [address];[place], [place]   |               |            |              |
|             | [id number]                    |               |            |              |
+-------------+--------------------------+---------------+------------+--------------+
 
 
 
+----------+
| Specimen |
+----------+
|          |
+----------+
 
 
 
+----------------+---------+--------------------+--------------+
| Performing     | Address | City/State/Zipcode | Phone Number |
| Organization   |         |                    |              |
+----------------+---------+--------------------+--------------+
|   EXTERNAL LAB |         |                    |              |
+----------------+---------+--------------------+--------------+
 CBC no Differential (2018  9:40 PM PDT)
 
+-------------+-------------------------+-----------------+------------+--------------+
| Component   | Value                   | Ref Range       | Performed  | Pathologist  |
|             |                         |                 | At         | Signature    |
+-------------+-------------------------+-----------------+------------+--------------+
| WBC         | 10.63                   | 3.80 - 11.00    | EXTERNAL   |              |
|             |                         | K/uL            | LAB        |              |
+-------------+-------------------------+-----------------+------------+--------------+
| Red Blood   | 2.50 (L)                | 4.20 - 5.70     | EXTERNAL   |              |
 
| Cells       |                         | M/uL            | LAB        |              |
| Counted     |                         |                 |            |              |
+-------------+-------------------------+-----------------+------------+--------------+
| Hemoglobin  | 7.7 (L)                 | 13.2 - 17.0     | EXTERNAL   |              |
|             |                         | g/dL            | LAB        |              |
+-------------+-------------------------+-----------------+------------+--------------+
| Hematocrit, | 22.1 (L)                | 39.0 - 50.0 %   | EXTERNAL   |              |
|  POC        |                         |                 | LAB        |              |
+-------------+-------------------------+-----------------+------------+--------------+
| MCV         | 88.5                    | 80.0 - 100.0 fl | EXTERNAL   |              |
|             |                         |                 | LAB        |              |
+-------------+-------------------------+-----------------+------------+--------------+
| MCH         | 31.0                    | 27.0 - 34.0 pg  | EXTERNAL   |              |
|             |                         |                 | LAB        |              |
+-------------+-------------------------+-----------------+------------+--------------+
| MCHC        | 35.0                    | 32.0 - 35.5     | EXTERNAL   |              |
|             |                         | g/dL            | LAB        |              |
+-------------+-------------------------+-----------------+------------+--------------+
| RDW-CV      | 47.7                    | 37 - 53 fl      | EXTERNAL   |              |
|             |                         |                 | LAB        |              |
+-------------+-------------------------+-----------------+------------+--------------+
| Platelet    | 157                     | 150 - 400 K/uL  | EXTERNAL   |              |
| Count       |                         |                 | LAB        |              |
| Plasma      |                         |                 |            |              |
+-------------+-------------------------+-----------------+------------+--------------+
| MPV         | 7.2Comment: Testing     | fl              | EXTERNAL   |              |
|             | performed at [facility];[address]    |                 | LAB        |              |
|             | [address];[place], [place]  |                 |            |              |
|             | [id number]                   |                 |            |              |
+-------------+-------------------------+-----------------+------------+--------------+
 
 
 
+----------+
| Specimen |
+----------+
|          |
+----------+
 
 
 
+----------------+---------+--------------------+--------------+
| Performing     | Address | City/State/Zipcode | Phone Number |
| Organization   |         |                    |              |
+----------------+---------+--------------------+--------------+
|   EXTERNAL LAB |         |                    |              |
+----------------+---------+--------------------+--------------+
 [facility] INR (2018  9:40 PM PDT)
 
+-----------+--------------------------+-----------+------------+--------------+
| Component | Value                    | Ref Range | Performed  | Pathologist  |
|           |                          |           | At         | Signature    |
+-----------+--------------------------+-----------+------------+--------------+
| INR       | 1.0Comment: REFERENCE    |           | EXTERNAL   |              |
|           | RANGE:0.9 - 1.2          |           | LAB        |              |
|           |   NON-ANTICOAGULATED2.0  |           |            |              |
|           | - 3.0   ALL OTHER        |           |            |              |
|           | THERAPEUTIC              |           |            |              |
|           | INDICATIONS2.5 - 3.5     |           |            |              |
|           | MECHANICAL HEART VALVES, |           |            |              |
 
|           |  RECURRENT OR SYSTEMIC   |           |            |              |
|           | EMBOLISMTesting          |           |            |              |
|           | performed at [facility]     |           |            |              |
|           | [facility];[place], [place]   |           |            |              |
|           | [id number]                    |           |            |              |
+-----------+--------------------------+-----------+------------+--------------+
 
 
 
+-----------------+
| Specimen        |
+-----------------+
| Blood specimen  |
| (specimen)      |
+-----------------+
 
 
 
+----------------+---------+--------------------+--------------+
| Performing     | Address | City/State/Zipcode | Phone Number |
| Organization   |         |                    |              |
+----------------+---------+--------------------+--------------+
|   EXTERNAL LAB |         |                    |              |
+----------------+---------+--------------------+--------------+
 Basic Metabolic Panel (2018  9:40 PM PDT)
 
+-------------+--------------------------+-----------------+------------+--------------+
| Component   | Value                    | Ref Range       | Performed  | Pathologist  |
|             |                          |                 | At         | Signature    |
+-------------+--------------------------+-----------------+------------+--------------+
| Na          | 147 (H)                  | 135 - 145       | EXTERNAL   |              |
|             |                          | mmol/L          | LAB        |              |
+-------------+--------------------------+-----------------+------------+--------------+
| K           | 3.7                      | 3.5 - 4.9       | EXTERNAL   |              |
|             |                          | mmol/L          | LAB        |              |
+-------------+--------------------------+-----------------+------------+--------------+
| Cl          | 119 (H)                  | 99 - 109 mmol/L | EXTERNAL   |              |
|             |                          |                 | LAB        |              |
+-------------+--------------------------+-----------------+------------+--------------+
| CO2         | 22 (L)                   | 23 - 32 mmol/L  | EXTERNAL   |              |
|             |                          |                 | LAB        |              |
+-------------+--------------------------+-----------------+------------+--------------+
| Anion Gap   | 10                       | 5 - 20 mmol/L   | EXTERNAL   |              |
|             |                          |                 | LAB        |              |
+-------------+--------------------------+-----------------+------------+--------------+
| Glucose,    | 109 (H)                  | 65 - 99 mg/dL   | EXTERNAL   |              |
| Fasting     |                          |                 | LAB        |              |
+-------------+--------------------------+-----------------+------------+--------------+
| BUN         | 14                       | 8 - 25 mg/dL    | EXTERNAL   |              |
|             |                          |                 | LAB        |              |
+-------------+--------------------------+-----------------+------------+--------------+
| Creatinine  | 0.66 (L)                 | 0.70 - 1.30     | EXTERNAL   |              |
|             |                          | mg/dL           | LAB        |              |
+-------------+--------------------------+-----------------+------------+--------------+
| BUN/Creatin | 21                       |                 | EXTERNAL   |              |
| ine Ratio   |                          |                 | LAB        |              |
+-------------+--------------------------+-----------------+------------+--------------+
| Calcium     | 7.1 (L)                  | 8.5 - 10.5      | EXTERNAL   |              |
|             |                          | mg/dL           | LAB        |              |
+-------------+--------------------------+-----------------+------------+--------------+
 
| Estimated   | >60Comment: GFR <60:     | mL/min/1.73m2   | EXTERNAL   |              |
| GFR         | CHRONIC KIDNEY DISEASE,  |                 | LAB        |              |
|             | IF FOUND OVER A 3 MONTH  |                 |            |              |
|             | PERIOD.GFR <15: KIDNEY   |                 |            |              |
|             | FAILURE.FOR       |                 |            |              |
|             | [employer], MULTIPLY THE  |                 |            |              |
|             | CALCULATED GFR BY        |                 |            |              |
|             | 1.210.This eGFR is       |                 |            |              |
|             | calculated using the     |                 |            |              |
|             | MDRD IDMS traceable      |                 |            |              |
|             | equation.Testing         |                 |            |              |
|             | performed at [facility];[address]     |                 |            |              |
|             | [facility];[place], [place]   |                 |            |              |
|             | [id number]                    |                 |            |              |
+-------------+--------------------------+-----------------+------------+--------------+
 
 
 
+-----------------+
| Specimen        |
+-----------------+
| Blood specimen  |
| (specimen)      |
+-----------------+
 
 
 
+----------------+---------+--------------------+--------------+
| Performing     | Address | City/State/Zipcode | Phone Number |
| Organization   |         |                    |              |
+----------------+---------+--------------------+--------------+
|   EXTERNAL LAB |         |                    |              |
+----------------+---------+--------------------+--------------+
 Hemoglobin and Hematocrit (2018  8:27 PM PDT)
 
+-------------+--------------------------+---------------+------------+--------------+
| Component   | Value                    | Ref Range     | Performed  | Pathologist  |
|             |                          |               | At         | Signature    |
+-------------+--------------------------+---------------+------------+--------------+
| Hemoglobin  | 7.7 (L)                  | 13.2 - 17.0   | EXTERNAL   |              |
|             |                          | g/dL          | LAB        |              |
+-------------+--------------------------+---------------+------------+--------------+
| Hematocrit, | 22.5 (L)Comment: Testing | 39.0 - 50.0 % | EXTERNAL   |              |
|  POC        |  performed at [facility];[address]    |               | LAB        |              |
|             | [address];[place], [place]   |               |            |              |
|             | [address]                    |               |            |              |
+-------------+--------------------------+---------------+------------+--------------+
 
 
 
+----------+
| Specimen |
+----------+
|          |
+----------+
 
 
 
+----------------+---------+--------------------+--------------+
| Performing     | Address | City/State/Zipcode | Phone Number |
 
| Organization   |         |                    |              |
+----------------+---------+--------------------+--------------+
|   EXTERNAL LAB |         |                    |              |
+----------------+---------+--------------------+--------------+
 [facility]time INR (2018  8:27 PM PDT)
 
+-----------+--------------------------+-----------+------------+--------------+
| Component | Value                    | Ref Range | Performed  | Pathologist  |
|           |                          |           | At         | Signature    |
+-----------+--------------------------+-----------+------------+--------------+
| INR       | 1.0Comment: REFERENCE    |           | EXTERNAL   |              |
|           | RANGE:0.9 - 1.2          |           | LAB        |              |
|           |   NON-ANTICOAGULATED2.0  |           |            |              |
|           | - 3.0   ALL OTHER        |           |            |              |
|           | THERAPEUTIC              |           |            |              |
|           | INDICATIONS2.5 - 3.5     |           |            |              |
|           | MECHANICAL HEART VALVES, |           |            |              |
|           |  RECURRENT OR SYSTEMIC   |           |            |              |
|           | EMBOLISMTesting          |           |            |              |
|           | performed at [facility];[address]     |           |            |              |
|           | [address];[place], [place]   |           |            |              |
|           | [address]                    |           |            |              |
+-----------+--------------------------+-----------+------------+--------------+
 
 
 
+-----------------+
| Specimen        |
+-----------------+
| Blood specimen  |
| (specimen)      |
+-----------------+
 
 
 
+----------------+---------+--------------------+--------------+
| Performing     | Address | City/State/Zipcode | Phone Number |
| Organization   |         |                    |              |
+----------------+---------+--------------------+--------------+
|   EXTERNAL LAB |         |                    |              |
+----------------+---------+--------------------+--------------+
 IR Angiogram Visceral (2018  7:58 PM PDT)
 
+----------+
| Specimen |
+----------+
|          |
+----------+
 
 
 
+------------------------------------------------------------------------+--------------+
| Impressions                                                            | Performed At |
+------------------------------------------------------------------------+--------------+
|      No apparent source of GI bleeding on extensive mesenteric         |              |
| angiography.     Electronically signed by Tolu Hernandez MD on 2018  |              |
| 8:08 PM                                                                |              |
+------------------------------------------------------------------------+--------------+
 
 
 
 
+------------------------------------------------------------------------+--------------+
| Narrative                                                              | Performed At |
+------------------------------------------------------------------------+--------------+
|   IR ANGIOGRAM VISCERAL SELECTIVE dated 2018 5:35 PM     CLINICAL |              |
|  DATA: GI bleeding with EGD push enteroscopy done immediately prior to |              |
|  this procedure demonstrating a jejunal diverticular hemorrhage with   |              |
| GI clip placement proximal to the bleed.     COMPARISON STUDIES:       |              |
| Endoscopic images same date and CTA abdomen and pelvis 2018       |              |
| PRIMARY ANGIOGRAPHER: Tolu Hernandez MD, PhD, [facility]     OPERATIONS:  1.   |              |
|   Ultrasound-guided right common femoral access  2.   Celiac           |              |
| arteriogram  3.   Gastroduodenal subselective arteriogram  4.          |              |
|   Superior mesenteric arteriogram  5.    Jejunal subselective          |              |
| arteriograms x5 branches.     PROCEDURE:     Informed consent was      |              |
| obtained from the patient's wife as the patient was still sedated from |              |
|  EGD performed immediately prior to this procedure.   Continuous       |              |
| cardiac monitoring was performed throughout the procedure. General     |              |
| anesthesia was provided by   anesthesiology.        Contrast: 120 cc   |              |
| Isovue 250  Fluoroscopy time: 8.6 minutes  Radiation dose: 1960 mGy    |              |
| air kerma     Patient was sterilely prepped and draped in the usual    |              |
| fashion. The right femoral head was fluoroscopically localized and the |              |
|  skin was locally anesthetized with 1% lidocaine. A skin nick was made |              |
|  with 11 blade. Real-time ultrasound guided micropuncture   access     |              |
| obtained, images in PACS. The microsheath was exchanged to a 5 French  |              |
| sheath. A Sos 2 catheter was advanced and formed in the thoracic aorta |              |
|  and brought down to select the celiac artery for arteriogram. A       |              |
| Progreat microcatheter was advanced into   the gastroduodenal artery   |              |
| and subselective arteriogram was performed. The microcatheter was      |              |
| removed. The Sos 2 catheter was then used to select the superior       |              |
| mesenteric artery and arteriogram was performed. The microcatheter was |              |
|  reintroduced and   subselective arteriography of the jejunal branches |              |
|  with a total of 5 branches selected with the catheter in areas        |              |
| positions within the vessels and imaging over the area of the          |              |
| gastrointestinal clip in the left upper quadrant were obtained. No     |              |
| apparent   hemorrhage was noted. Therefore, the Progreat microcatheter |              |
|  and Sos 2 catheter were removed. Right iliofemoral arteriogram        |              |
| demonstrated common femoral arterial puncture. Mynx closure was        |              |
| performed with immediate hemostasis.     FINDINGS: Celiac arteriogram  |              |
| demonstrates typical branching anatomy. The gastroduodenal artery      |              |
| subselective arteriogram demonstrates no contrast extravasation.       |              |
| Superior mesenteric arteriogram demonstrates expected clustered        |              |
| jejunal branches in the left upper quadrant and the presence of a GI   |              |
| clip in the left upper abdominal quadrant. Subselective jejunal branch |              |
|  angiography is obtained with the microcatheter in   multiple proximal |              |
|  to distal branch positions with no apparent contrast extravasation.   |              |
|                                                                        |              |
+------------------------------------------------------------------------+--------------+
 
 
 
+-------------------------------------------------------------------------------------------
--------------------------------------------------------------------------------------------
-------------------------------------+
| Procedure Note                                                                            
                                                                                            
                                     |
+-------------------------------------------------------------------------------------------
--------------------------------------------------------------------------------------------
-------------------------------------+
|   Hermes, Rad Conversion - 2019 11:04 AM PDT  IR ANGIOGRAM VISCERAL SELECTIVE dated    
 
                                                                                            
                                     |
| 2018 5:35 PM CLINICAL DATA: GI bleeding with EGD push enteroscopy done immediately   
                                                                                            
                                     |
| prior to this procedure demonstrating a jejunal diverticular hemorrhage with GI clip      
                                                                                            
                                     |
| placement proximal to the bleed. COMPARISON STUDIES: Endoscopic images same date and CTA  
                                                                                            
                                     |
|  abdomen and pelvis 2018 PRIMARY ANGIOGRAPHER: Tolu Hernandez MD, PhD, [facility]            
                                                                                            
                                     |
| OPERATIONS:1.  Ultrasound-guided right common femoral access2.  Celiac arteriogram3.      
                                                                                            
                                     |
| Gastroduodenal subselective arteriogram4.  Superior mesenteric arteriogram5.   Jejunal    
                                                                                            
                                     |
| subselective arteriograms x5 branches. PROCEDURE: Informed consent was obtained from the  
                                                                                            
                                     |
|  patient's wife as the patient was still sedated from EGD performed immediately prior to  
                                                                                            
                                     |
|  this procedure.  Continuous cardiac monitoring was performed throughout the procedure.   
                                                                                            
                                     |
| General anesthesia was provided by anesthesiology.  Contrast: 120 cc Isovue               
                                                                                            
                                     |
| 250Fluoroscopy time: 8.6 minutesRadiation dose: 1960 mGy air kerma Patient was sterilely  
                                                                                            
                                     |
|  prepped and draped in the usual fashion. The right femoral head was fluoroscopically     
                                                                                            
                                     |
| localized and the skin was locally anesthetized with 1% lidocaine. A skin nick was made   
                                                                                            
                                     |
| with 11 blade. Real-time ultrasound guided micropuncture access obtained, images in       
                                                                                            
                                     |
| PACS. The microsheath was exchanged to a 5 French sheath. A Sos 2 catheter was advanced   
                                                                                            
                                     |
| and formed in the thoracic aorta and brought down to select the celiac artery for         
                                                                                            
                                     |
| arteriogram. A Progreat microcatheter was advanced into the gastroduodenal artery and     
                                                                                            
                                     |
| subselective arteriogram was performed. The microcatheter was removed. The Sos 2          
                                                                                            
                                     |
| catheter was then used to select the superior mesenteric artery and arteriogram was       
                                                                                            
                                     |
| performed. The microcatheter was reintroduced and subselective arteriography of the       
 
                                                                                            
                                     |
| jejunal branches with a total of 5 branches selected with the catheter in areas           
                                                                                            
                                     |
| positions within the vessels and imaging over the area of the gastrointestinal clip in    
                                                                                            
                                     |
| the left upper quadrant were obtained. No apparent hemorrhage was noted. Therefore, the   
                                                                                            
                                     |
| Progreat microcatheter and Sos 2 catheter were removed. Right iliofemoral arteriogram     
                                                                                            
                                     |
| demonstrated common femoral arterial puncture. Mynx closure was performed with immediate  
                                                                                            
                                     |
|  hemostasis. FINDINGS: Celiac arteriogram demonstrates typical branching anatomy. The     
                                                                                            
                                     |
| gastroduodenal artery subselective arteriogram demonstrates no contrast extravasation.    
                                                                                            
                                     |
| Superior mesenteric arteriogram demonstrates expected clustered jejunal branches in the   
                                                                                            
                                     |
| left upper quadrant and the presence of a GI clip in the left upper abdominal quadrant.   
                                                                                            
                                     |
| Subselective jejunal branch angiography is obtained with the microcatheter in multiple    
                                                                                            
                                     |
| proximal to distal branch positions with no apparent contrast extravasation.              
                                                                                            
                                     |
| IMPRESSION:  No apparent source of GI bleeding on extensive mesenteric angiography.       
                                                                                            
                                     |
| Electronically signed by Tolu Hernandez MD on 2018 8:08 PM                              
                                                                                            
                                     |
|Superior mesenteric arteriogram demonstrates expected clustered jejunal branches in the lef
t upper quadrant and the presence of a GI clip in the left upper abdominal quadrant. Subsele
ctive jejunal branch angiography is  |
|obtained with the microcatheter in                                                         
                                                                                            
                                     |
|multiple proximal to distal branch positions with no apparent contrast extravasation.      
                                                                                            
                                     |
|                                                                                           
                                                                                            
                                     |
|                                                                                           
                                                                                            
                                     |
|IMPRESSION:                                                                                
                                                                                            
                                     |
|                                                                                           
 
                                                                                            
                                     |
|No apparent source of GI bleeding on extensive mesenteric angiography.                     
                                                                                            
                                     |
|                                                                                           
                                                                                            
                                     |
|Electronically signed by oTlu Hernandez MD on 2018 8:08 PM                               
                                                                                            
                                     |
+-------------------------------------------------------------------------------------------
--------------------------------------------------------------------------------------------
-------------------------------------+
 US Guided Vascular Access (2018  7:40 PM PDT)
 
+----------+
| Specimen |
+----------+
|          |
+----------+
 
 
 
+------------------------------------------------------------------------+--------------+
| Narrative                                                              | Performed At |
+------------------------------------------------------------------------+--------------+
|   This Point of Care (POC) ultrasound image has been reviewed and      |              |
| interpreted by the physician identified as the performing physician in |              |
|  the  associated interpretation and report.                            |              |
+------------------------------------------------------------------------+--------------+
 
 
 
+---------------------------------------------------------------------------------------+
| Procedure Note                                                                        |
+---------------------------------------------------------------------------------------+
|   Wang Mirza Conversion - 2019 11:04 AM PDT  This Point of Care (POC) ultrasound  |
| image has been reviewed andinterpreted by the physician identified as the performing  |
| physician in theassociated interpretation and report.                                 |
|                                                                                       |
+---------------------------------------------------------------------------------------+
 Hemoglobin and Hematocrit (2018 12:53 PM PDT)
 
+-------------+--------------------------+---------------+------------+--------------+
| Component   | Value                    | Ref Range     | Performed  | Pathologist  |
|             |                          |               | At         | Signature    |
+-------------+--------------------------+---------------+------------+--------------+
| Hemoglobin  | 7.8 (L)                  | 13.2 - 17.0   | EXTERNAL   |              |
|             |                          | g/dL          | LAB        |              |
+-------------+--------------------------+---------------+------------+--------------+
| Hematocrit, | 23.4 (L)Comment: Testing | 39.0 - 50.0 % | EXTERNAL   |              |
|  POC        |  performed at [facility];[address]    |               | LAB        |              |
|             | [address];[place], [place]   |               |            |              |
|             | [id number]                    |               |            |              |
+-------------+--------------------------+---------------+------------+--------------+
 
 
 
+----------+
 
| Specimen |
+----------+
|          |
+----------+
 
 
 
+----------------+---------+--------------------+--------------+
| Performing     | Address | City/State/Zipcode | Phone Number |
| Organization   |         |                    |              |
+----------------+---------+--------------------+--------------+
|   EXTERNAL LAB |         |                    |              |
+----------------+---------+--------------------+--------------+
 External Lab: CBC (2018  9:10 AM PDT)
 
+-------------+-------------------------+-----------------+------------+--------------+
| Component   | Value                   | Ref Range       | Performed  | Pathologist  |
|             |                         |                 | At         | Signature    |
+-------------+-------------------------+-----------------+------------+--------------+
| WBC         | 13.95 (H)               | 3.80 - 11.00    | EXTERNAL   |              |
|             |                         | K/uL            | LAB        |              |
+-------------+-------------------------+-----------------+------------+--------------+
| Red Blood   | 2.71 (L)                | 4.20 - 5.70     | EXTERNAL   |              |
| Cells       |                         | M/uL            | LAB        |              |
| Counted     |                         |                 |            |              |
+-------------+-------------------------+-----------------+------------+--------------+
| Hemoglobin  | 8.3 (L)                 | 13.2 - 17.0     | EXTERNAL   |              |
|             |                         | g/dL            | LAB        |              |
+-------------+-------------------------+-----------------+------------+--------------+
| Hematocrit, | 24.0 (L)                | 39.0 - 50.0 %   | EXTERNAL   |              |
|  POC        |                         |                 | LAB        |              |
+-------------+-------------------------+-----------------+------------+--------------+
| MCV         | 88.6                    | 80.0 - 100.0 fl | EXTERNAL   |              |
|             |                         |                 | LAB        |              |
+-------------+-------------------------+-----------------+------------+--------------+
| MCH         | 30.5                    | 27.0 - 34.0 pg  | EXTERNAL   |              |
|             |                         |                 | LAB        |              |
+-------------+-------------------------+-----------------+------------+--------------+
| MCHC        | 34.5                    | 32.0 - 35.5     | EXTERNAL   |              |
|             |                         | g/dL            | LAB        |              |
+-------------+-------------------------+-----------------+------------+--------------+
| RDW-CV      | 45.5                    | 37 - 53 fl      | EXTERNAL   |              |
|             |                         |                 | LAB        |              |
+-------------+-------------------------+-----------------+------------+--------------+
| Platelet    | 157                     | 150 - 400 K/uL  | EXTERNAL   |              |
| Count       |                         |                 | LAB        |              |
| Plasma      |                         |                 |            |              |
+-------------+-------------------------+-----------------+------------+--------------+
| MPV         | 7.0                     | fl              | EXTERNAL   |              |
|             |                         |                 | LAB        |              |
+-------------+-------------------------+-----------------+------------+--------------+
| Differentia | AUTOMATED               |                 | EXTERNAL   |              |
| l Type      |                         |                 | LAB        |              |
+-------------+-------------------------+-----------------+------------+--------------+
| % Segmented | 82.83                   | %               | EXTERNAL   |              |
|             |                         |                 | LAB        |              |
| Neutrophils |                         |                 |            |              |
+-------------+-------------------------+-----------------+------------+--------------+
| %           | 10.30                   | %               | EXTERNAL   |              |
| Lymphocytes |                         |                 | LAB        |              |
 
+-------------+-------------------------+-----------------+------------+--------------+
| % Monocytes | 5.53                    | %               | EXTERNAL   |              |
|             |                         |                 | LAB        |              |
+-------------+-------------------------+-----------------+------------+--------------+
| %           | 1.12                    | %               | EXTERNAL   |              |
| Eosinophils |                         |                 | LAB        |              |
+-------------+-------------------------+-----------------+------------+--------------+
| % Basophils | 0.22                    | %               | EXTERNAL   |              |
|             |                         |                 | LAB        |              |
+-------------+-------------------------+-----------------+------------+--------------+
| Absolute    | 11.56 (H)               | 1.90 - 7.40     | EXTERNAL   |              |
| Segmented   |                         | K/uL            | LAB        |              |
| Neutrophils |                         |                 |            |              |
+-------------+-------------------------+-----------------+------------+--------------+
| Absolute    | 1.44                    | 1.00 - 3.90     | EXTERNAL   |              |
| Lymphocytes |                         | K/uL            | LAB        |              |
+-------------+-------------------------+-----------------+------------+--------------+
| Absolute    | 0.77                    | 0.00 - 0.80     | EXTERNAL   |              |
| Monocytes   |                         | K/uL            | LAB        |              |
+-------------+-------------------------+-----------------+------------+--------------+
| Absolute    | 0.16                    | 0.00 - 0.50     | EXTERNAL   |              |
| Eosinophils |                         | K/uL            | LAB        |              |
+-------------+-------------------------+-----------------+------------+--------------+
| Absolute    | 0.03Comment: Testing    | 0.00 - 0.10     | EXTERNAL   |              |
| Basophils   | performed at [facility];[address]    | K/uL            | LAB        |              |
|             | [address];[place], [place]  |                 |            |              |
|             | [id number]                   |                 |            |              |
+-------------+-------------------------+-----------------+------------+--------------+
 
 
 
+-----------------+
| Specimen        |
+-----------------+
| Blood specimen  |
| (specimen)      |
+-----------------+
 
 
 
+----------------+---------+--------------------+--------------+
| Performing     | Address | City/State/Zipcode | Phone Number |
| Organization   |         |                    |              |
+----------------+---------+--------------------+--------------+
|   EXTERNAL LAB |         |                    |              |
+----------------+---------+--------------------+--------------+
 Phosphorus (2018  9:10 AM PDT)
 
+------------+--------------------------+-----------------+------------+--------------+
| Component  | Value                    | Ref Range       | Performed  | Pathologist  |
|            |                          |                 | At         | Signature    |
+------------+--------------------------+-----------------+------------+--------------+
| PHOSPHORUS | 1.7 (L)Comment: Testing  | 2.3 - 4.8 mg/dL | EXTERNAL   |              |
|            | performed at [facility];[address]     |                 | LAB        |              |
|            | [address];[place], [place]   |                 |            |              |
|            | [id number]                    |                 |            |              |
+------------+--------------------------+-----------------+------------+--------------+
 
 
 
 
+-----------------+
| Specimen        |
+-----------------+
| Blood specimen  |
| (specimen)      |
+-----------------+
 
 
 
+----------------+---------+--------------------+--------------+
| Performing     | Address | City/State/Zipcode | Phone Number |
| Organization   |         |                    |              |
+----------------+---------+--------------------+--------------+
|   EXTERNAL LAB |         |                    |              |
+----------------+---------+--------------------+--------------+
 Magnesium (2018  9:10 AM PDT)
 
+-----------+-------------------------+-----------------+------------+--------------+
| Component | Value                   | Ref Range       | Performed  | Pathologist  |
|           |                         |                 | At         | Signature    |
+-----------+-------------------------+-----------------+------------+--------------+
| Magnesium | 1.9Comment: Testing     | 1.7 - 2.4 mg/dL | EXTERNAL   |              |
|           | performed at [facility];[address]    |                 | LAB        |              |
|           | [address];[place], [place]  |                 |            |              |
|           | [address]                   |                 |            |              |
+-----------+-------------------------+-----------------+------------+--------------+
 
 
 
+-----------------+
| Specimen        |
+-----------------+
| Blood specimen  |
| (specimen)      |
+-----------------+
 
 
 
+----------------+---------+--------------------+--------------+
| Performing     | Address | City/State/Zipcode | Phone Number |
| Organization   |         |                    |              |
+----------------+---------+--------------------+--------------+
|   EXTERNAL LAB |         |                    |              |
+----------------+---------+--------------------+--------------+
 Basic Metabolic Panel (2018  9:10 AM PDT)
 
+-------------+--------------------------+-----------------+------------+--------------+
| Component   | Value                    | Ref Range       | Performed  | Pathologist  |
|             |                          |                 | At         | Signature    |
+-------------+--------------------------+-----------------+------------+--------------+
| Na          | 146 (H)                  | 135 - 145       | EXTERNAL   |              |
|             |                          | mmol/L          | LAB        |              |
+-------------+--------------------------+-----------------+------------+--------------+
| K           | 3.6                      | 3.5 - 4.9       | EXTERNAL   |              |
|             |                          | mmol/L          | LAB        |              |
+-------------+--------------------------+-----------------+------------+--------------+
| Cl          | 118 (H)                  | 99 - 109 mmol/L | EXTERNAL   |              |
|             |                          |                 | LAB        |              |
+-------------+--------------------------+-----------------+------------+--------------+
| CO2         | 22 (L)                   | 23 - 32 mmol/L  | EXTERNAL   |              |
 
|             |                          |                 | LAB        |              |
+-------------+--------------------------+-----------------+------------+--------------+
| Anion Gap   | 9                        | 5 - 20 mmol/L   | EXTERNAL   |              |
|             |                          |                 | LAB        |              |
+-------------+--------------------------+-----------------+------------+--------------+
| Glucose,    | 96                       | 65 - 99 mg/dL   | EXTERNAL   |              |
| Fasting     |                          |                 | LAB        |              |
+-------------+--------------------------+-----------------+------------+--------------+
| BUN         | 16                       | 8 - 25 mg/dL    | EXTERNAL   |              |
|             |                          |                 | LAB        |              |
+-------------+--------------------------+-----------------+------------+--------------+
| Creatinine  | 0.71                     | 0.70 - 1.30     | EXTERNAL   |              |
|             |                          | mg/dL           | LAB        |              |
+-------------+--------------------------+-----------------+------------+--------------+
| BUN/Creatin | 22                       |                 | EXTERNAL   |              |
| ine Ratio   |                          |                 | LAB        |              |
+-------------+--------------------------+-----------------+------------+--------------+
| Calcium     | 7.2 (L)                  | 8.5 - 10.5      | EXTERNAL   |              |
|             |                          | mg/dL           | LAB        |              |
+-------------+--------------------------+-----------------+------------+--------------+
| Estimated   | >60Comment: GFR <60:     | mL/min/1.73m2   | EXTERNAL   |              |
| GFR         | CHRONIC KIDNEY DISEASE,  |                 | LAB        |              |
|             | IF FOUND OVER A 3 MONTH  |                 |            |              |
|             | PERIOD.GFR <15: KIDNEY   |                 |            |              |
|             | FAILURE.FOR       |                 |            |              |
|             | [employer], MULTIPLY THE  |                 |            |              |
|             | CALCULATED GFR BY        |                 |            |              |
|             | 1.210.This eGFR is       |                 |            |              |
|             | calculated using the     |                 |            |              |
|             | MDRD [facility] traceable      |                 |            |              |
|             | equation.Testing         |                 |            |              |
|             | performed at [facility]     |                 |            |              |
|             | [facility];[place], [place]   |                 |            |              |
|             | [address]                    |                 |            |              |
+-------------+--------------------------+-----------------+------------+--------------+
 
 
 
+-----------------+
| Specimen        |
+-----------------+
| Blood specimen  |
| (specimen)      |
+-----------------+
 
 
 
+----------------+---------+--------------------+--------------+
| Performing     | Address | City/State/Zipcode | Phone Number |
| Organization   |         |                    |              |
+----------------+---------+--------------------+--------------+
|   EXTERNAL LAB |         |                    |              |
+----------------+---------+--------------------+--------------+
 Tissue Request For Pathology (2018  8:00 AM PDT)
 
+---------------------+
| Specimen            |
+---------------------+
| Soft tissue sample  |
| (specimen)          |
 
+---------------------+
 
 
 
+------------------------------------------------------------------------+----------------+
| Narrative                                                              | Performed At   |
+------------------------------------------------------------------------+----------------+
|   SPECIMEN(S): A DUODENAL BX  SPECIMEN(S): B GASTRIC BX  SPECIMEN      |   EXTERNAL LAB |
| SOURCE:  A. DUODENAL BX  B. GASTRIC BX  CLINICAL HISTORY:  2018   |                |
| at [address]. No clinical history given.     MICROSCOPIC DESCRIPTION:     |                |
| A-B. Histologic sections of all submitted blocks are examined by light |                |
|  microscopy. These findings, together with the gross examination,      |                |
| support the pathologic diagnosis.  FINAL PATHOLOGIC DIAGNOSIS:  A.     |                |
|   Duodenum, biopsy:     -   No pathologic abnormality  COMMENT:        |                |
| Sections of the duodenal mucosa show normal architecture.   There are  |                |
| no findings suggestive of celiac disease.   Features of peptic         |                |
| duodenitis are not present. In addition, no acute inflammation,        |                |
| granulomas or pathologic microorganisms are seen.     B.   Gastric     |                |
| biopsies:         -   No pathologic abnormality  The gastric mucosa is |                |
|  negative for significant inflammation and gastropathy.   There is no  |                |
| metaplasia, dysplasia or neoplasia.  GROSS DESCRIPTION:  Two specimens |                |
|  are received in two containers labeled with the patient's name:       |                |
| A. The specimen is received in formalin designated "duodenal biopsy"   |                |
| and consists of three, 0.1 to 0.3 cm tan fragments. Entirely submitted |                |
|  in A1.     B. The specimen is received in formalin designated         |                |
| "gastric biopsy" and consists of three, 0.3 to 0.4 cm tan fragments.   |                |
| Entirely submitted in B1. AM:rds  PERFORMING LABORATORY:  The          |                |
| technical component was performed by [facility]  |                |
| [place] [address] (Medical Director:   Adela Correia MD; CLIA#       |                |
| [id number]).  Professional interpretation was performed by [facility]      |                |
| [facility]  |                |
| [place] [address] (Medical Director:   Andres Kowalski M.D.; CLIA#:          |                |
|   [id number]).  Diagnostician:   Andres Kowalski MD  Pathologist        |                |
| Electronically Signed 2018                                       |                |
+------------------------------------------------------------------------+----------------+
 
 
 
+----------------+---------+--------------------+--------------+
| Performing     | Address | City/State/Zipcode | Phone Number |
| Organization   |         |                    |              |
+----------------+---------+--------------------+--------------+
|   EXTERNAL LAB |         |                    |              |
+----------------+---------+--------------------+--------------+
 Urinalysis with Microscopic if Indicated (2018 11:28 PM PDT)
 
+-------------+--------------------------+---------------+------------+--------------+
| Component   | Value                    | Ref Range     | Performed  | Pathologist  |
|             |                          |               | At         | Signature    |
+-------------+--------------------------+---------------+------------+--------------+
| Color       | YELLOW                   |               | EXTERNAL   |              |
|             |                          |               | LAB        |              |
+-------------+--------------------------+---------------+------------+--------------+
| Clarity     | CLEAR                    |               | EXTERNAL   |              |
|             |                          |               | LAB        |              |
+-------------+--------------------------+---------------+------------+--------------+
| Specific    | 1.028                    | 1.002 - 1.030 | EXTERNAL   |              |
| Gravity,    |                          |               | LAB        |              |
| Urine       |                          |               |            |              |
+-------------+--------------------------+---------------+------------+--------------+
 
| Leukocyte   | NEGATIVE                 |               | EXTERNAL   |              |
| Esterase,   |                          |               | LAB        |              |
| Urine       |                          |               |            |              |
+-------------+--------------------------+---------------+------------+--------------+
| Nitrite,    | NEGATIVE                 |               | EXTERNAL   |              |
| Urine       |                          |               | LAB        |              |
+-------------+--------------------------+---------------+------------+--------------+
| Urobilinoge | NORMAL                   | mg/dL         | EXTERNAL   |              |
| n, Urine    |                          |               | LAB        |              |
+-------------+--------------------------+---------------+------------+--------------+
| Protein,    | NEGATIVE                 | mg/dL         | EXTERNAL   |              |
| Urine       |                          |               | LAB        |              |
+-------------+--------------------------+---------------+------------+--------------+
| pH, Urine   | 5.0                      | 5.0 - 8.0     | EXTERNAL   |              |
|             |                          |               | LAB        |              |
+-------------+--------------------------+---------------+------------+--------------+
| Blood,      | NEGATIVE                 |               | EXTERNAL   |              |
| Urine       |                          |               | LAB        |              |
+-------------+--------------------------+---------------+------------+--------------+
| Ketones     | NEGATIVE                 | mg/dL         | EXTERNAL   |              |
|             |                          |               | LAB        |              |
+-------------+--------------------------+---------------+------------+--------------+
| Bilirubin,  | NEGATIVE                 |               | EXTERNAL   |              |
| Urine       |                          |               | LAB        |              |
+-------------+--------------------------+---------------+------------+--------------+
| Glucose,    | NEGATIVEComment: Testing | mg/dL         | EXTERNAL   |              |
| Urine       |  performed at [facility];[address]    |               | LAB        |              |
|             | [address];[place], [place]   |               |            |              |
|             | [id number]                    |               |            |              |
+-------------+--------------------------+---------------+------------+--------------+
 
 
 
+-----------------+
| Specimen        |
+-----------------+
| Urine specimen  |
| (specimen)      |
+-----------------+
 
 
 
+----------------+---------+--------------------+--------------+
| Performing     | Address | City/State/Zipcode | Phone Number |
| Organization   |         |                    |              |
+----------------+---------+--------------------+--------------+
|   EXTERNAL LAB |         |                    |              |
+----------------+---------+--------------------+--------------+
 Lactic Acid (2018 10:38 PM PDT)
 
+-----------+-------------------------+------------+------------+--------------+
| Component | Value                   | Ref Range  | Performed  | Pathologist  |
|           |                         |            | At         | Signature    |
+-----------+-------------------------+------------+------------+--------------+
| Lactate   | 1.5Comment: Testing     | 0.4 - 2.0  | EXTERNAL   |              |
|           | performed at [facility];[address]    | mmol/L     | LAB        |              |
|           | [address];[place], [place]  |            |            |              |
|           | [id number]                   |            |            |              |
+-----------+-------------------------+------------+------------+--------------+
 
 
 
 
+----------+
| Specimen |
+----------+
|          |
+----------+
 
 
 
+----------------+---------+--------------------+--------------+
| Performing     | Address | City/State/Zipcode | Phone Number |
| Organization   |         |                    |              |
+----------------+---------+--------------------+--------------+
|   EXTERNAL LAB |         |                    |              |
+----------------+---------+--------------------+--------------+
 Procalcitonin (2018 10:38 PM PDT)
 
+-------------+--------------------------+-----------+------------+--------------+
| Component   | Value                    | Ref Range | Performed  | Pathologist  |
|             |                          |           | At         | Signature    |
+-------------+--------------------------+-----------+------------+--------------+
| PROCALCITON | 0.22Comment:             | ng/mL     | EXTERNAL   |              |
| IN          | INTERPRETIVE             |           | LAB        |              |
|             | INFORMATION:             |           |            |              |
|             |   PROCALCITONIN PCT <=   |           |            |              |
|             | 0.5 ng/mL:      Low risk |           |            |              |
|             |  for progression to      |           |            |              |
|             | severe systemic          |           |            |              |
|             |   bacterial infection    |           |            |              |
|             | (severe sepsis/septic    |           |            |              |
|             | shock).      Does not    |           |            |              |
|             | exclude an infection,    |           |            |              |
|             | because localized        |           |            |              |
|             |   infections may be      |           |            |              |
|             | associated with such low |           |            |              |
|             |  levels.      If PCT is  |           |            |              |
|             | measured very early      |           |            |              |
|             | after bacterial          |           |            |              |
|             |   challenge (usually <6  |           |            |              |
|             | hours), results may      |           |            |              |
|             | still be      low and    |           |            |              |
|             | should re-assess PCT     |           |            |              |
|             | 6-24 hours later. PCT    |           |            |              |
|             | >0.5 and <= 2 ng/mL:     |           |            |              |
|             |   Moderate risk for      |           |            |              |
|             | progression to severe    |           |            |              |
|             | systemic      infection  |           |            |              |
|             | (severe sepsis/septic    |           |            |              |
|             | shock).   Other          |           |            |              |
|             |   conditions are known   |           |            |              |
|             | to elevate PCT, patient  |           |            |              |
|             |       should be closely  |           |            |              |
|             | monitored both           |           |            |              |
|             | clinically and by        |           |            |              |
|             |   re-assessing PCT       |           |            |              |
|             | within 6-24 hours. PCT > |           |            |              |
|             |  2 ng/mL:      High      |           |            |              |
|             | likelihood for           |           |            |              |
|             | progression to severe    |           |            |              |
 
|             | systemic      bacterial  |           |            |              |
|             | infection (severe        |           |            |              |
|             | sepsis/septic shock).    |           |            |              |
|             | PCT >= 10 ng/mL:         |           |            |              |
|             |   High likelihood of     |           |            |              |
|             | severe sepsis or septic  |           |            |              |
|             | shock.Testing performed  |           |            |              |
|             | at [facility];[address] [place] [address]         |           |            |              |
|             | [facility];[place] [address]   |           |            |              |
+-------------+--------------------------+-----------+------------+--------------+
 
 
 
+----------+
| Specimen |
+----------+
|          |
+----------+
 
 
 
+----------------+---------+--------------------+--------------+
| Performing     | Address | City/State/Zipcode | Phone Number |
| Organization   |         |                    |              |
+----------------+---------+--------------------+--------------+
|   EXTERNAL LAB |         |                    |              |
+----------------+---------+--------------------+--------------+
 Hemoglobin and Hematocrit (2018 10:38 PM PDT)
 
+-------------+--------------------------+---------------+------------+--------------+
| Component   | Value                    | Ref Range     | Performed  | Pathologist  |
|             |                          |               | At         | Signature    |
+-------------+--------------------------+---------------+------------+--------------+
| Hemoglobin  | 6.4 (LL)Comment: CALLED  | 13.2 - 17.0   | EXTERNAL   |              |
|             | TO JOSE MANUEL GARLAND RN/7RP AT    | g/dL          | LAB        |              |
|             | 2256 BY MWREAD BACK      |               |            |              |
|             | RESULTS VERIFIED         |               |            |              |
|             |                          |               |            |              |
+-------------+--------------------------+---------------+------------+--------------+
| Hematocrit, | 18.8 (LL)Comment: CALLED | 39.0 - 50.0 % | EXTERNAL   |              |
|  POC        |  TO JOSE MANUEL GARLAND RN/DianaRP AT   |               | LAB        |              |
|             | 1836 BY MWREAD BACK      |               |            |              |
|             | RESULTS VERIFIEDTesting  |               |            |              |
|             | performed at [facility];[address]     |               |            |              |
|             | [facility];[place], [place]   |               |            |              |
|             | [id number]                    |               |            |              |
+-------------+--------------------------+---------------+------------+--------------+
 
 
 
+----------+
| Specimen |
+----------+
|          |
+----------+
 
 
 
+----------------+---------+--------------------+--------------+
| Performing     | Address | City/State/Zipcode | Phone Number |
 
| Organization   |         |                    |              |
+----------------+---------+--------------------+--------------+
|   EXTERNAL LAB |         |                    |              |
+----------------+---------+--------------------+--------------+
 Culture, Blood (2018 10:34 PM PDT)
 
+----------+
| Specimen |
+----------+
|          |
+----------+
 
 
 
+------------------------------------------------------------------------+----------------+
| Narrative                                                              | Performed At   |
+------------------------------------------------------------------------+----------------+
|   Specimen Description                      BLOOD         SPECIAL      |   EXTERNAL LAB |
| REQUESTS                            [facility] [id number]  CULTURE        |                |
|                                    NO GROWTH 6 DAYS                    |                |
+------------------------------------------------------------------------+----------------+
 
 
 
+----------------+---------+--------------------+--------------+
| Performing     | Address | City/State/Zipcode | Phone Number |
| Organization   |         |                    |              |
+----------------+---------+--------------------+--------------+
|   EXTERNAL LAB |         |                    |              |
+----------------+---------+--------------------+--------------+
 XR Chest 1 Ramiro (2018  9:59 PM PDT)
 
+----------+
| Specimen |
+----------+
|          |
+----------+
 
 
 
+------------------------------------------------------------------------+--------------+
| Impressions                                                            | Performed At |
+------------------------------------------------------------------------+--------------+
|      No acute cardiopulmonary abnormality     Electronically signed by |              |
|  Bubba Cordoba MD on 2018 6:45 AM                                   |              |
+------------------------------------------------------------------------+--------------+
 
 
 
+------------------------------------------------------------------------+--------------+
| Narrative                                                              | Performed At |
+------------------------------------------------------------------------+--------------+
|   BERNA PARKINSON  1950  68 years  XR CHEST 1 VIEW  2018    |              |
| 9:59 PM     INDICATION: Fever. Blood transfusions.     COMPARISON:     |              |
| None     TECHNIQUE: Chest 1 view, AP view of the chest     FINDINGS:   |              |
| Lungs are clear.     No pleural effusion, no pneumothorax.     Heart   |              |
| size is normal. Mediastinal contours are normal.     No acute osseous  |              |
| abnormality.                                                           |              |
+------------------------------------------------------------------------+--------------+
 
 
 
 
+--------------------------------------------------------------------+
| Procedure Note                                                     |
+--------------------------------------------------------------------+
|   Wang Mirza Conversion - 2019 11:04 AM PDT  BERNA PARKINSON |
| 1950                                                           |
| 68 years                                                           |
| XR CHEST 1 VIEW                                                    |
| 2018 9:59 PM                                                  |
|                                                                    |
| INDICATION: Fever. Blood transfusions.                             |
|                                                                    |
| COMPARISON: None                                                   |
|                                                                    |
| TECHNIQUE: Chest 1 view, AP view of the chest                      |
|                                                                    |
| FINDINGS:                                                          |
| Lungs are clear.                                                   |
|                                                                    |
| No pleural effusion, no pneumothorax.                              |
|                                                                    |
| Heart size is normal. Mediastinal contours are normal.             |
|                                                                    |
| No acute osseous abnormality.                                      |
|                                                                    |
| IMPRESSION:                                                        |
|                                                                    |
| No acute cardiopulmonary abnormality                               |
|                                                                    |
| Electronically signed by Bubba Cordoba MD on 2018 6:45 AM       |
+--------------------------------------------------------------------+
 Culture, Blood, 2nd Specimen (2018  8:24 PM PDT)
 
+-----------------+
| Specimen        |
+-----------------+
| Blood specimen  |
| (specimen)      |
+-----------------+
 
 
 
+------------------------------------------------------------------------+----------------+
| Narrative                                                              | Performed At   |
+------------------------------------------------------------------------+----------------+
|   Specimen Description                      BLOOD         SPECIAL      |   EXTERNAL LAB |
| REQUESTS                            [facility]   CULTURE                |                |
|                            NO GROWTH 6 DAYS                            |                |
+------------------------------------------------------------------------+----------------+
 
 
 
+----------------+---------+--------------------+--------------+
| Performing     | Address | City/State/Zipcode | Phone Number |
| Organization   |         |                    |              |
+----------------+---------+--------------------+--------------+
|   EXTERNAL LAB |         |                    |              |
+----------------+---------+--------------------+--------------+
 Culture, Blood (2018  8:24 PM PDT)
 
 
+-----------------+
| Specimen        |
+-----------------+
| Blood specimen  |
| (specimen)      |
+-----------------+
 
 
 
+------------------------------------------------------------------------+----------------+
| Narrative                                                              | Performed At   |
+------------------------------------------------------------------------+----------------+
|   Specimen Description                      BLOOD         SPECIAL      |   EXTERNAL LAB |
| REQUESTS                            [facility]            CULTURE             |                |
|                               NO GROWTH 6 DAYS                         |                |
+------------------------------------------------------------------------+----------------+
 
 
 
+----------------+---------+--------------------+--------------+
| Performing     | Address | City/State/Zipcode | Phone Number |
| Organization   |         |                    |              |
+----------------+---------+--------------------+--------------+
|   EXTERNAL LAB |         |                    |              |
+----------------+---------+--------------------+--------------+
 NM GI Blood Loss (2018  5:38 PM PDT)
 
+----------+
| Specimen |
+----------+
|          |
+----------+
 
 
 
+------------------------------------------------------------------------------------------+
| Addenda                                                                                  |
+------------------------------------------------------------------------------------------+
|   Addendum by Jerry Osullivan MD on 2018  8:13 AM  TECHNIQUE:  An additional hour  |
| of scintigraphic imaging in the anterior projection   over the abdomen and pelvis was    |
| performed portably.     FINDINGS:  There is activity seen extending through multiple     |
| small bowel loops   predominantly in the left upper quadrant, corresponding with the     |
| earlier   suspicion of upper GI bleed, probably in the duodenal sweep.     IMPRESSION:   |
| 1.   I suspect an upper GI bleed, probably in the duodenal sweep.     Electronically     |
| signed by Jerry Osullivan MD on 2018 8:13 AM                                         |
+------------------------------------------------------------------------------------------+
 
 
 
+------------------------------------------------------------------------+--------------+
| Impressions                                                            | Performed At |
+------------------------------------------------------------------------+--------------+
|   1.   Indeterminate hemorrhage, probably involving the duodenal       |              |
| sweep. Further imaging is necessary. Addendum to follow if the patient |              |
|  allows further imaging.     Electronically signed by Adan Aguila MD on 2018 3:32 PM                                                |              |
+------------------------------------------------------------------------+--------------+
 
 
 
 
+------------------------------------------------------------------------+--------------+
| Narrative                                                              | Performed At |
+------------------------------------------------------------------------+--------------+
|   HISTORY:  Hemorrhagic anemia. GI hemorrhage.     COMPARISON:  CTA    |              |
| abdomen and pelvis 18.     TECHNIQUE:  The patient's blood cells |              |
|  were labeled with 22.4 mCi technetium 99m UltraTag, and reinjected    |              |
| into the patient. Dynamic flow and delayed scintigraphic imaging was   |              |
| performed in the anterior ejection over the abdomen and pelvis for 60  |              |
| minutes.     FINDINGS:  There is accumulation of activity in the right |              |
|  upper quadrant just inferior to the hepatic hilum, corresponding to   |              |
| the proximal duodenum. Activity seems to dissipate from this region,   |              |
| and does not form a tubular collection of blood. Focal uptake near the |              |
|    end of the study in the right upper quadrant also dissipates        |              |
| rapidly. This would favor an upper GI hemorrhage, probably in the      |              |
| duodenal sweep. Unfortunately, this is not specific. Additional        |              |
| imaging for another hour was requested. The patient refused.   We will |              |
|  attempt to perform this secondary imaging on the floor using the      |              |
| portable camera.                                                       |              |
+------------------------------------------------------------------------+--------------+
 
 
 
+-------------------------------------------------------------------------------------------
------------------------------------------------------------------+
| Procedure Note                                                                            
                                                                  |
+-------------------------------------------------------------------------------------------
------------------------------------------------------------------+
|   Hermes, Wang Conversion - 2019 11:04 AM PDT  HISTORY:Hemorrhagic anemia. GI           
                                                                  |
| hemorrhage. COMPARISON:CTA abdomen and pelvis 18. TECHNIQUE:The patient's blood     
                                                                  |
| cells were labeled with 22.4 mCi technetium 99m UltraTag, and reinjected into the         
                                                                  |
| patient. Dynamic flow and delayed scintigraphic imaging was performed in the anterior     
                                                                  |
| ejection over the abdomen and pelvis for 60 minutes. FINDINGS:There is accumulation of    
                                                                  |
| activity in the right upper quadrant just inferior to the hepatic hilum, corresponding    
                                                                  |
| to the proximal duodenum. Activity seems to dissipate from this region, and does not      
                                                                  |
| form a tubular collection of blood. Focal uptake near the end of the study in the right   
                                                                  |
| upper quadrant also dissipates rapidly. This would favor an upper GI hemorrhage,          
                                                                  |
| probably in the duodenal sweep. Unfortunately, this is not specific. Additional imaging   
                                                                  |
| for another hour was requested. The patient refused. We will attempt to perform this      
                                                                  |
| secondary imaging on the floor using the portable camera. IMPRESSION: 1.  Indeterminate   
                                                                  |
| hemorrhage, probably involving the duodenal sweep. Further imaging is necessary.          
                                                                  |
| Addendum to follow if the patient allows further imaging. Electronically signed by Jerry  
                                                                  |
|  NENA Osullivan MD on 2018 3:32 PM                                                         
                                                                  |
|                                                                                           
 
                                                                  |
|IMPRESSION:                                                                                
                                                                  |
|1.  Indeterminate hemorrhage, probably involving the duodenal sweep. Further imaging is nec
essary. Addendum to follow if the patient allows further imaging. |
|                                                                                           
                                                                  |
|Electronically signed by Jerry Osullivan MD on 2018 3:32 PM                            
                                                                  |
+-------------------------------------------------------------------------------------------
------------------------------------------------------------------+
 Hemoglobin and Hematocrit (2018  3:32 PM PDT)
 
+-------------+--------------------------+---------------+------------+--------------+
| Component   | Value                    | Ref Range     | Performed  | Pathologist  |
|             |                          |               | At         | Signature    |
+-------------+--------------------------+---------------+------------+--------------+
| Hemoglobin  | 7.4 (L)                  | 13.2 - 17.0   | EXTERNAL   |              |
|             |                          | g/dL          | LAB        |              |
+-------------+--------------------------+---------------+------------+--------------+
| Hematocrit, | 22.2 (L)Comment: Testing | 39.0 - 50.0 % | EXTERNAL   |              |
|  POC        |  performed at [facility];[address]    |               | LAB        |              |
|             | [facility];[place], [place]   |               |            |              |
|             | [id number]                    |               |            |              |
+-------------+--------------------------+---------------+------------+--------------+
 
 
 
+----------+
| Specimen |
+----------+
|          |
+----------+
 
 
 
+----------------+---------+--------------------+--------------+
| Performing     | Address | City/State/Zipcode | Phone Number |
| Organization   |         |                    |              |
+----------------+---------+--------------------+--------------+
|   EXTERNAL LAB |         |                    |              |
+----------------+---------+--------------------+--------------+
 Type and Screen (2018  6:59 AM PDT)
 
+-------------+-------------------------+-----------+------------+--------------+
| Component   | Value                   | Ref Range | Performed  | Pathologist  |
|             |                         |           | At         | Signature    |
+-------------+-------------------------+-----------+------------+--------------+
| ABO Rh      | O POSITIVE              |           | EXTERNAL   |              |
|             |                         |           | LAB        |              |
+-------------+-------------------------+-----------+------------+--------------+
| Antibody    | NEGATIVE                |           | EXTERNAL   |              |
| Screen      |                         |           | LAB        |              |
+-------------+-------------------------+-----------+------------+--------------+
| BB BAND     | [id number]                |           | EXTERNAL   |              |
|             |                         |           | LAB        |              |
+-------------+-------------------------+-----------+------------+--------------+
| UNIT NUMBER | [id number]           |           | EXTERNAL   |              |
|             |                         |           | LAB        |              |
+-------------+-------------------------+-----------+------------+--------------+
 
| Product     | LEUKODEPLETED PC        |           | EXTERNAL   |              |
| Code        |                         |           | LAB        |              |
+-------------+-------------------------+-----------+------------+--------------+
| Unit        | 00                      |           | EXTERNAL   |              |
| Division    |                         |           | LAB        |              |
+-------------+-------------------------+-----------+------------+--------------+
| Unit Status | ISSUED,FINAL            |           | EXTERNAL   |              |
|             |                         |           | LAB        |              |
+-------------+-------------------------+-----------+------------+--------------+
| Transfusion | OK TO TRANSFUSE         |           | EXTERNAL   |              |
|  Status     |                         |           | LAB        |              |
+-------------+-------------------------+-----------+------------+--------------+
| CROSSMATCH  | COMPATIBLE              |           | EXTERNAL   |              |
| RESULT      |                         |           | LAB        |              |
+-------------+-------------------------+-----------+------------+--------------+
| UNIT NUMBER | [id number]           |           | EXTERNAL   |              |
|             |                         |           | LAB        |              |
+-------------+-------------------------+-----------+------------+--------------+
| Product     | LEUKODEPLETED PC        |           | EXTERNAL   |              |
| Code        |                         |           | LAB        |              |
+-------------+-------------------------+-----------+------------+--------------+
| Unit        | 00                      |           | EXTERNAL   |              |
| Division    |                         |           | LAB        |              |
+-------------+-------------------------+-----------+------------+--------------+
| Unit Status | ISSUED,FINAL            |           | EXTERNAL   |              |
|             |                         |           | LAB        |              |
+-------------+-------------------------+-----------+------------+--------------+
| Transfusion | OK TO TRANSFUSE         |           | EXTERNAL   |              |
|  Status     |                         |           | LAB        |              |
+-------------+-------------------------+-----------+------------+--------------+
| CROSSMATCH  | COMPATIBLE              |           | EXTERNAL   |              |
| RESULT      |                         |           | LAB        |              |
+-------------+-------------------------+-----------+------------+--------------+
| UNIT NUMBER | [id number]           |           | EXTERNAL   |              |
|             |                         |           | LAB        |              |
+-------------+-------------------------+-----------+------------+--------------+
| Product     | LEUKODEPLETED PC        |           | EXTERNAL   |              |
| Code        |                         |           | LAB        |              |
+-------------+-------------------------+-----------+------------+--------------+
| Unit        | 00                      |           | EXTERNAL   |              |
| Division    |                         |           | LAB        |              |
+-------------+-------------------------+-----------+------------+--------------+
| Unit Status | DISCARDED               |           | EXTERNAL   |              |
|             |                         |           | LAB        |              |
+-------------+-------------------------+-----------+------------+--------------+
| Transfusion | OK TO TRANSFUSE         |           | EXTERNAL   |              |
|  Status     |                         |           | LAB        |              |
+-------------+-------------------------+-----------+------------+--------------+
| CROSSMATCH  | COMPATIBLETesting       |           | EXTERNAL   |              |
| RESULT      | performed at [facility];[address]    |           | LAB        |              |
|             | [address];[place]  |           |            |              |
|             | [id number]                   |           |            |              |
+-------------+-------------------------+-----------+------------+--------------+
| UNIT NUMBER | [id number]           |           | EXTERNAL   |              |
|             |                         |           | LAB        |              |
+-------------+-------------------------+-----------+------------+--------------+
| Product     | LEUKODEPLETED PC        |           | EXTERNAL   |              |
| Code        |                         |           | LAB        |              |
+-------------+-------------------------+-----------+------------+--------------+
| Unit        | 00                      |           | EXTERNAL   |              |
 
| Division    |                         |           | LAB        |              |
+-------------+-------------------------+-----------+------------+--------------+
| Unit Status | ISSUED,FINAL            |           | EXTERNAL   |              |
|             |                         |           | LAB        |              |
+-------------+-------------------------+-----------+------------+--------------+
| Transfusion | OK TO TRANSFUSE         |           | EXTERNAL   |              |
|  Status     |                         |           | LAB        |              |
+-------------+-------------------------+-----------+------------+--------------+
| CROSSMATCH  | COMPATIBLE              |           | EXTERNAL   |              |
| RESULT      |                         |           | LAB        |              |
+-------------+-------------------------+-----------+------------+--------------+
| UNIT NUMBER | [id number]           |           | EXTERNAL   |              |
|             |                         |           | LAB        |              |
+-------------+-------------------------+-----------+------------+--------------+
| Product     | LEUKODEPLETED PC        |           | EXTERNAL   |              |
| Code        |                         |           | LAB        |              |
+-------------+-------------------------+-----------+------------+--------------+
| Unit        | 00                      |           | EXTERNAL   |              |
| Division    |                         |           | LAB        |              |
+-------------+-------------------------+-----------+------------+--------------+
| Unit Status | ISSUED,FINAL            |           | EXTERNAL   |              |
|             |                         |           | LAB        |              |
+-------------+-------------------------+-----------+------------+--------------+
| Transfusion | OK TO TRANSFUSE         |           | EXTERNAL   |              |
|  Status     |                         |           | LAB        |              |
+-------------+-------------------------+-----------+------------+--------------+
| CROSSMATCH  | COMPATIBLE              |           | EXTERNAL   |              |
| RESULT      |                         |           | LAB        |              |
+-------------+-------------------------+-----------+------------+--------------+
| UNIT NUMBER | [id number]           |           | EXTERNAL   |              |
|             |                         |           | LAB        |              |
+-------------+-------------------------+-----------+------------+--------------+
| Product     | LEUKODEPLETED PC        |           | EXTERNAL   |              |
| Code        |                         |           | LAB        |              |
+-------------+-------------------------+-----------+------------+--------------+
| Unit        | 00                      |           | EXTERNAL   |              |
| Division    |                         |           | LAB        |              |
+-------------+-------------------------+-----------+------------+--------------+
| Unit Status | ISSUED,FINAL            |           | EXTERNAL   |              |
|             |                         |           | LAB        |              |
+-------------+-------------------------+-----------+------------+--------------+
| Transfusion | OK TO TRANSFUSE         |           | EXTERNAL   |              |
|  Status     |                         |           | LAB        |              |
+-------------+-------------------------+-----------+------------+--------------+
| CROSSMATCH  | COMPATIBLE              |           | EXTERNAL   |              |
| RESULT      |                         |           | LAB        |              |
+-------------+-------------------------+-----------+------------+--------------+
| UNIT NUMBER | [id number]           |           | EXTERNAL   |              |
|             |                         |           | LAB        |              |
+-------------+-------------------------+-----------+------------+--------------+
| Product     | LEUKODEPLETED PC        |           | EXTERNAL   |              |
| Code        |                         |           | LAB        |              |
+-------------+-------------------------+-----------+------------+--------------+
| Unit        | 00                      |           | EXTERNAL   |              |
| Division    |                         |           | LAB        |              |
+-------------+-------------------------+-----------+------------+--------------+
| Unit Status | ISS'D ANOTH PT          |           | EXTERNAL   |              |
|             |                         |           | LAB        |              |
+-------------+-------------------------+-----------+------------+--------------+
| Transfusion | OK TO TRANSFUSE         |           | EXTERNAL   |              |
 
|  Status     |                         |           | LAB        |              |
+-------------+-------------------------+-----------+------------+--------------+
| CROSSMATCH  | COMPATIBLE              |           | EXTERNAL   |              |
| RESULT      |                         |           | LAB        |              |
+-------------+-------------------------+-----------+------------+--------------+
| UNIT NUMBER | [id number]           |           | EXTERNAL   |              |
|             |                         |           | LAB        |              |
+-------------+-------------------------+-----------+------------+--------------+
| Product     | LEUKODEPLETED PC        |           | EXTERNAL   |              |
| Code        |                         |           | LAB        |              |
+-------------+-------------------------+-----------+------------+--------------+
| Unit        | 00                      |           | EXTERNAL   |              |
| Division    |                         |           | LAB        |              |
+-------------+-------------------------+-----------+------------+--------------+
| Unit Status | ISS'D ANOTH PT          |           | EXTERNAL   |              |
|             |                         |           | LAB        |              |
+-------------+-------------------------+-----------+------------+--------------+
| Transfusion | OK TO TRANSFUSE         |           | EXTERNAL   |              |
|  Status     |                         |           | LAB        |              |
+-------------+-------------------------+-----------+------------+--------------+
| CROSSMATCH  | COMPATIBLE              |           | EXTERNAL   |              |
| RESULT      |                         |           | LAB        |              |
+-------------+-------------------------+-----------+------------+--------------+
| UNIT NUMBER | [id number]           |           | EXTERNAL   |              |
|             |                         |           | LAB        |              |
+-------------+-------------------------+-----------+------------+--------------+
| Product     | LEUKODEPLETED PC        |           | EXTERNAL   |              |
| Code        |                         |           | LAB        |              |
+-------------+-------------------------+-----------+------------+--------------+
| Unit        | 00                      |           | EXTERNAL   |              |
| Division    |                         |           | LAB        |              |
+-------------+-------------------------+-----------+------------+--------------+
| Unit Status | ISSUED,FINAL            |           | EXTERNAL   |              |
|             |                         |           | LAB        |              |
+-------------+-------------------------+-----------+------------+--------------+
| Transfusion | OK TO TRANSFUSE         |           | EXTERNAL   |              |
|  Status     |                         |           | LAB        |              |
+-------------+-------------------------+-----------+------------+--------------+
| CROSSMATCH  | COMPATIBLE              |           | EXTERNAL   |              |
| RESULT      |                         |           | LAB        |              |
+-------------+-------------------------+-----------+------------+--------------+
 
 
 
+-----------------+
| Specimen        |
+-----------------+
| Blood specimen  |
| (specimen)      |
+-----------------+
 
 
 
+----------------+---------+--------------------+--------------+
| Performing     | Address | City/State/Zipcode | Phone Number |
| Organization   |         |                    |              |
+----------------+---------+--------------------+--------------+
|   EXTERNAL LAB |         |                    |              |
+----------------+---------+--------------------+--------------+
 Hemoglobin and Hematocrit (2018  6:09 AM PDT)
 
 
+-------------+--------------------------+---------------+------------+--------------+
| Component   | Value                    | Ref Range     | Performed  | Pathologist  |
|             |                          |               | At         | Signature    |
+-------------+--------------------------+---------------+------------+--------------+
| Hemoglobin  | 6.9 (LL)Comment: CALLED  | 13.2 - 17.0   | EXTERNAL   |              |
|             | TO SRI R ON 7RP AT    | g/dL          | LAB        |              |
|             | 0637 BY TRREAD BACK      |               |            |              |
|             | RESULTS VERIFIED         |               |            |              |
|             |                          |               |            |              |
+-------------+--------------------------+---------------+------------+--------------+
| Hematocrit, | 19.5 (LL)Comment: CALLED | 39.0 - 50.0 % | EXTERNAL   |              |
|  POC        |  TO SRI R ON 7RP AT   |               | LAB        |              |
|             | 0637 BY TRREAD BACK      |               |            |              |
|             | RESULTS VERIFIEDTesting  |               |            |              |
|             | performed at [facility];[address]     |               |            |              |
|             | [address];[place]   |               |            |              |
|             | [id number]                    |               |            |              |
+-------------+--------------------------+---------------+------------+--------------+
 
 
 
+----------+
| Specimen |
+----------+
|          |
+----------+
 
 
 
+----------------+---------+--------------------+--------------+
| Performing     | Address | City/State/Zipcode | Phone Number |
| Organization   |         |                    |              |
+----------------+---------+--------------------+--------------+
|   EXTERNAL LAB |         |                    |              |
+----------------+---------+--------------------+--------------+
 External Lab: CBC (2018  3:47 AM PDT)
 
+-------------+--------------------------+-----------------+------------+--------------+
| Component   | Value                    | Ref Range       | Performed  | Pathologist  |
|             |                          |                 | At         | Signature    |
+-------------+--------------------------+-----------------+------------+--------------+
| WBC         | 17.20 (H)                | 3.80 - 11.00    | EXTERNAL   |              |
|             |                          | K/uL            | LAB        |              |
+-------------+--------------------------+-----------------+------------+--------------+
| Red Blood   | 2.67 (L)                 | 4.20 - 5.70     | EXTERNAL   |              |
| Cells       |                          | M/uL            | LAB        |              |
| Counted     |                          |                 |            |              |
+-------------+--------------------------+-----------------+------------+--------------+
| Hemoglobin  | 8.3 (L)                  | 13.2 - 17.0     | EXTERNAL   |              |
|             |                          | g/dL            | LAB        |              |
+-------------+--------------------------+-----------------+------------+--------------+
| Hematocrit, | 23.7 (L)                 | 39.0 - 50.0 %   | EXTERNAL   |              |
|  POC        |                          |                 | LAB        |              |
+-------------+--------------------------+-----------------+------------+--------------+
| MCV         | 88.9                     | 80.0 - 100.0 fl | EXTERNAL   |              |
|             |                          |                 | LAB        |              |
+-------------+--------------------------+-----------------+------------+--------------+
| MCH         | 31.1                     | 27.0 - 34.0 pg  | EXTERNAL   |              |
|             |                          |                 | LAB        |              |
 
+-------------+--------------------------+-----------------+------------+--------------+
| MCHC        | 34.9                     | 32.0 - 35.5     | EXTERNAL   |              |
|             |                          | g/dL            | LAB        |              |
+-------------+--------------------------+-----------------+------------+--------------+
| RDW-CV      | 45.1                     | 37 - 53 fl      | EXTERNAL   |              |
|             |                          |                 | LAB        |              |
+-------------+--------------------------+-----------------+------------+--------------+
| Platelet    | 235                      | 150 - 400 K/uL  | EXTERNAL   |              |
| Count       |                          |                 | LAB        |              |
| Plasma      |                          |                 |            |              |
+-------------+--------------------------+-----------------+------------+--------------+
| MPV         | 7.7                      | fl              | EXTERNAL   |              |
|             |                          |                 | LAB        |              |
+-------------+--------------------------+-----------------+------------+--------------+
| Differentia | AUTOMATED                |                 | EXTERNAL   |              |
| l Type      |                          |                 | LAB        |              |
+-------------+--------------------------+-----------------+------------+--------------+
| % Segmented | 76.31                    | %               | EXTERNAL   |              |
|             |                          |                 | LAB        |              |
| Neutrophils |                          |                 |            |              |
+-------------+--------------------------+-----------------+------------+--------------+
| %           | 15.24                    | %               | EXTERNAL   |              |
| Lymphocytes |                          |                 | LAB        |              |
+-------------+--------------------------+-----------------+------------+--------------+
| % Monocytes | 7.60                     | %               | EXTERNAL   |              |
|             |                          |                 | LAB        |              |
+-------------+--------------------------+-----------------+------------+--------------+
| %           | 0.47                     | %               | EXTERNAL   |              |
| Eosinophils |                          |                 | LAB        |              |
+-------------+--------------------------+-----------------+------------+--------------+
| % Basophils | 0.38                     | %               | EXTERNAL   |              |
|             |                          |                 | LAB        |              |
+-------------+--------------------------+-----------------+------------+--------------+
| Absolute    | 13.13 (H)                | 1.90 - 7.40     | EXTERNAL   |              |
| Segmented   |                          | K/uL            | LAB        |              |
| Neutrophils |                          |                 |            |              |
+-------------+--------------------------+-----------------+------------+--------------+
| Absolute    | 2.62                     | 1.00 - 3.90     | EXTERNAL   |              |
| Lymphocytes |                          | K/uL            | LAB        |              |
+-------------+--------------------------+-----------------+------------+--------------+
| Absolute    | 1.31 (H)                 | 0.00 - 0.80     | EXTERNAL   |              |
| Monocytes   |                          | K/uL            | LAB        |              |
+-------------+--------------------------+-----------------+------------+--------------+
| Absolute    | 0.08                     | 0.00 - 0.50     | EXTERNAL   |              |
| Eosinophils |                          | K/uL            | LAB        |              |
+-------------+--------------------------+-----------------+------------+--------------+
| Absolute    | 0.07Comment: Testing     | 0.00 - 0.10     | EXTERNAL   |              |
| Basophils   | performed at [facility], [address] | K/uL            | LAB        |              |
|             |  [address],        |                 |            |              |
|             | [place]  [address]     |                 |            |              |
+-------------+--------------------------+-----------------+------------+--------------+
 
 
 
+-----------------+
| Specimen        |
+-----------------+
| Blood specimen  |
| (specimen)      |
+-----------------+
 
 
 
 
+----------------+---------+--------------------+--------------+
| Performing     | Address | City/State/Zipcode | Phone Number |
| Organization   |         |                    |              |
+----------------+---------+--------------------+--------------+
|   EXTERNAL LAB |         |                    |              |
+----------------+---------+--------------------+--------------+
 Magnesium (2018  3:47 AM PDT)
 
+-----------+--------------------------+-----------------+------------+--------------+
| Component | Value                    | Ref Range       | Performed  | Pathologist  |
|           |                          |                 | At         | Signature    |
+-----------+--------------------------+-----------------+------------+--------------+
| Magnesium | 1.8Comment: Testing      | 1.7 - 2.4 mg/dL | EXTERNAL   |              |
|           | performed at [facility], [address] |                 | LAB        |              |
|           |  [address],        |                 |            |              |
|           | [place]  [address]     |                 |            |              |
+-----------+--------------------------+-----------------+------------+--------------+
 
 
 
+-----------------+
| Specimen        |
+-----------------+
| Blood specimen  |
| (specimen)      |
+-----------------+
 
 
 
+----------------+---------+--------------------+--------------+
| Performing     | Address | City/State/Zipcode | Phone Number |
| Organization   |         |                    |              |
+----------------+---------+--------------------+--------------+
|   EXTERNAL LAB |         |                    |              |
+----------------+---------+--------------------+--------------+
 Comprehensive Metabolic Panel (2018  3:47 AM PDT)
 
+-------------+--------------------------+-----------------+------------+--------------+
| Component   | Value                    | Ref Range       | Performed  | Pathologist  |
|             |                          |                 | At         | Signature    |
+-------------+--------------------------+-----------------+------------+--------------+
| Na          | 140                      | 135 - 145       | EXTERNAL   |              |
|             |                          | mmol/L          | LAB        |              |
+-------------+--------------------------+-----------------+------------+--------------+
| K           | 4.1                      | 3.5 - 4.9       | EXTERNAL   |              |
|             |                          | mmol/L          | LAB        |              |
+-------------+--------------------------+-----------------+------------+--------------+
| Cl          | 108                      | 99 - 109 mmol/L | EXTERNAL   |              |
|             |                          |                 | LAB        |              |
+-------------+--------------------------+-----------------+------------+--------------+
| CO2         | 22 (L)                   | 23 - 32 mmol/L  | EXTERNAL   |              |
|             |                          |                 | LAB        |              |
+-------------+--------------------------+-----------------+------------+--------------+
| Anion Gap   | 14                       | 5 - 20 mmol/L   | EXTERNAL   |              |
|             |                          |                 | LAB        |              |
+-------------+--------------------------+-----------------+------------+--------------+
| Glucose,    | 124 (H)                  | 65 - 99 mg/dL   | EXTERNAL   |              |
 
| Fasting     |                          |                 | LAB        |              |
+-------------+--------------------------+-----------------+------------+--------------+
| BUN         | 21                       | 8 - 25 mg/dL    | EXTERNAL   |              |
|             |                          |                 | LAB        |              |
+-------------+--------------------------+-----------------+------------+--------------+
| Creatinine  | 0.9                      | 0.70 - 1.30     | EXTERNAL   |              |
|             |                          | mg/dL           | LAB        |              |
+-------------+--------------------------+-----------------+------------+--------------+
| BUN/Creatin | 23                       |                 | EXTERNAL   |              |
| ine Ratio   |                          |                 | LAB        |              |
+-------------+--------------------------+-----------------+------------+--------------+
| Calcium     | 7.7 (L)                  | 8.5 - 10.5      | EXTERNAL   |              |
|             |                          | mg/dL           | LAB        |              |
+-------------+--------------------------+-----------------+------------+--------------+
| Protein,    | 5.4 (L)                  | 6.3 - 8.2 g/dL  | EXTERNAL   |              |
| Total       |                          |                 | LAB        |              |
+-------------+--------------------------+-----------------+------------+--------------+
| Albumin     | 2.8 (L)                  | 3.3 - 4.8 g/dL  | EXTERNAL   |              |
|             |                          |                 | LAB        |              |
+-------------+--------------------------+-----------------+------------+--------------+
| Globulin    | 2.6                      | 1.3 - 4.9 g/dL  | EXTERNAL   |              |
|             |                          |                 | LAB        |              |
+-------------+--------------------------+-----------------+------------+--------------+
| A/G Ratio   | 1.1                      | 1.0 - 2.4       | EXTERNAL   |              |
|             |                          |                 | LAB        |              |
+-------------+--------------------------+-----------------+------------+--------------+
| Bilirubin   | 0.3                      | 0.1 - 1.5 mg/dL | EXTERNAL   |              |
| Total       |                          |                 | LAB        |              |
+-------------+--------------------------+-----------------+------------+--------------+
| ALP,        | 44                       | 35 - 115 U/L    | EXTERNAL   |              |
| External    |                          |                 | LAB        |              |
+-------------+--------------------------+-----------------+------------+--------------+
| AST         | 21                       | 10 - 45 U/L     | EXTERNAL   |              |
|             |                          |                 | LAB        |              |
+-------------+--------------------------+-----------------+------------+--------------+
| ALT         | 28                       | 10 - 65 U/L     | EXTERNAL   |              |
|             |                          |                 | LAB        |              |
+-------------+--------------------------+-----------------+------------+--------------+
| Estimated   | >60Comment: GFR <60:     | mL/min/1.73m2   | EXTERNAL   |              |
| GFR         | CHRONIC KIDNEY DISEASE,  |                 | LAB        |              |
|             | IF FOUND OVER A 3 MONTH  |                 |            |              |
|             | PERIOD.GFR <15: KIDNEY   |                 |            |              |
|             | FAILURE.FOR       |                 |            |              |
|             | [employer], MULTIPLY THE  |                 |            |              |
|             | CALCULATED GFR BY        |                 |            |              |
|             | 1.210.This eGFR is       |                 |            |              |
|             | calculated using the     |                 |            |              |
|             | MDRD IDMS traceable      |                 |            |              |
|             | equation.Testing         |                 |            |              |
|             | performed at [facility], [address] |                 |            |              |
|             |  [facility],        |                 |            |              |
|             | [place]   [address]    |                 |            |              |
+-------------+--------------------------+-----------------+------------+--------------+
 
 
 
+-----------------+
| Specimen        |
+-----------------+
| Blood specimen  |
 
| (specimen)      |
+-----------------+
 
 
 
+----------------+---------+--------------------+--------------+
| Performing     | Address | City/State/Zipcode | Phone Number |
| Organization   |         |                    |              |
+----------------+---------+--------------------+--------------+
|   EXTERNAL LAB |         |                    |              |
+----------------+---------+--------------------+--------------+
 CT Angiogram Abdomen Pelvis w Contrast (2018  1:23 AM PDT)
 
+----------+
| Specimen |
+----------+
|          |
+----------+
 
 
 
+------------------------------------------------------------------------+--------------+
| Impressions                                                            | Performed At |
+------------------------------------------------------------------------+--------------+
|      1. No aortic aneurysm or dissection seen.  2. No active GI        |              |
| bleeding identified.  3. No acute inflammatory or obstructive process  |              |
| seen.     [facility]      Electronically signed by Mike Wilson MD on Aug   |              |
| 2018   2:13AM Referring Provider Line: [phone] ID: [id number]     |              |
+------------------------------------------------------------------------+--------------+
 
 
 
+------------------------------------------------------------------------+--------------+
| Narrative                                                              | Performed At |
+------------------------------------------------------------------------+--------------+
|   EXAM:  CT ANGIOGRAM ABDOMEN AND PELVIS WITH CONTRAST     EXAM DATE:  |              |
| 2018 01:23 AM.     CLINICAL HISTORY: Bloody diarrhea. Syncope.    |              |
|   COMPARISONS: None.     TECHNIQUE: axial noncontrast images were      |              |
| obtained. Routine helical CT angiogram imaging was performed through   |              |
| the abdomen and pelvis in the arterial phase. Axial venous phase       |              |
| images were obtained. IV contrast: Nonionic. Enteric contrast: No.     |              |
| Reconstructions: Coronal, sagittal, and 3D MIP reconstructions.     In |              |
|  accordance with CT protocol optimization, one or more of the          |              |
| following dose reduction techniques were utilized for this exam:       |              |
| automated exposure control, adjustment of mA and/or KV based on        |              |
| patient size, or use of iterative reconstructive technique.            |              |
| FINDINGS:  Vasculature: Mild atherosclerosis. No aortic aneurysm or    |              |
| dissection. No signal stenosis in the celiac axis, superior mesenteric |              |
|  artery, bilateral renal arteries, inferior mesenteric artery, or      |              |
| bilateral iliac arteries.     Lung Bases: Emphysema. Mild right        |              |
| basilar atelectasis.     Abdominal Solid Organs: Left lobe liver cyst  |              |
| measuring 1.6 cm. Spleen, pancreas, adrenals, and kidneys show no      |              |
| focal abnormalities.     Peritoneal Cavity: No bowel obstruction seen. |              |
|  No diverticulitis. No free air or free fluid. No active GI bleeding   |              |
| identified. No lymphadenopathy. Appendix appears normal.     Pelvic    |              |
| Organs: Normal. The bladder and visualized pelvic organs are within    |              |
| normal limits.     Bones: Degenerative changes in the spine.           |              |
| Postoperative changes at L4-L5.     Other: Bilateral inguinal hernias  |              |
| containing fat.                                                        |              |
+------------------------------------------------------------------------+--------------+
 
 
 
 
+-------------------------------------------------------------------------------------------
------------------------+
| Procedure Note                                                                            
                        |
+-------------------------------------------------------------------------------------------
------------------------+
|   Hermes, Rad Conversion - 2019 11:04 AM PDT  EXAM:CT ANGIOGRAM ABDOMEN AND PELVIS     
                        |
| WITH CONTRAST EXAM DATE: 2018 01:23 AM. CLINICAL HISTORY: Bloody diarrhea. Syncope.  
                        |
|  COMPARISONS: None. TECHNIQUE: axial noncontrast images were obtained. Routine helical    
                        |
| CT angiogram imaging was performed through the abdomen and pelvis in the arterial phase.  
                        |
|  Axial venous phase images were obtained. IV contrast: Nonionic. Enteric contrast: No.    
                        |
| Reconstructions: Coronal, sagittal, and 3D MIP reconstructions. In accordance with CT     
                        |
| protocol optimization, one or more of the following dose reduction techniques were        
                        |
| utilized for this exam: automated exposure control, adjustment of mA and/or KV based on   
                        |
| patient size, or use of iterative reconstructive technique. FINDINGS:Vasculature: Mild    
                        |
| atherosclerosis. No aortic aneurysm or dissection. No signal stenosis in the celiac       
                        |
| axis, superior mesenteric artery, bilateral renal arteries, inferior mesenteric artery,   
                        |
| or bilateral iliac arteries. Lung Bases: Emphysema. Mild right basilar atelectasis.       
                        |
| Abdominal Solid Organs: Left lobe liver cyst measuring 1.6 cm. Spleen, pancreas,          
                        |
| adrenals, and kidneys show no focal abnormalities. Peritoneal Cavity: No bowel            
                        |
| obstruction seen. No diverticulitis. No free air or free fluid. No active GI bleeding     
                        |
| identified. No lymphadenopathy. Appendix appears normal. Pelvic Organs: Normal. The       
                        |
| bladder and visualized pelvic organs are within normal limits. Bones: Degenerative        
                        |
| changes in the spine. Postoperative changes at L4-L5. Other: Bilateral inguinal hernias   
                        |
| containing fat. IMPRESSION:  1. No aortic aneurysm or dissection seen.2. No active GI     
                        |
| bleeding identified.3. No acute inflammatory or obstructive process seen. [facility]           
                        |
| Electronically signed by Mike Wilson MD on Aug 12 2018  2:13AM Referring Provider Line:  
                        |
|  [phone] ID: [id number]                                                                 
                        |
|Pelvic Organs: Normal. The bladder and visualized pelvic organs are within normal limits.  
                        |
|Bones: Degenerative changes in the spine. Postoperative changes at L4-L5.                  
 
                        |
|Other: Bilateral inguinal hernias containing fat.                                          
                        |
|IMPRESSION:                                                                                
                        |
|1. No aortic aneurysm or dissection seen.                                                  
                        |
|2. No active GI bleeding identified.                                                       
                        |
|3. No acute inflammatory or obstructive process seen.                                      
                        |
|[facility]                                                                                      
                       |
| Electronically signed by Mike Wilson MD on Aug 12 2018  2:13AM Referring Provider Line: [phone]
[id number] ID: [id number] |
+-------------------------------------------------------------------------------------------
------------------------+
 Hemoglobin and Hematocrit (2018 12:01 AM PDT)
 
+-------------+--------------------------+---------------+------------+--------------+
| Component   | Value                    | Ref Range     | Performed  | Pathologist  |
|             |                          |               | At         | Signature    |
+-------------+--------------------------+---------------+------------+--------------+
| Hemoglobin  | 7.3 (L)                  | 13.2 - 17.0   | EXTERNAL   |              |
|             |                          | g/dL          | LAB        |              |
+-------------+--------------------------+---------------+------------+--------------+
| Hematocrit, | 22.0 (L)Comment: Testing | 39.0 - 50.0 % | EXTERNAL   |              |
|  POC        |  performed at [facility];[address]    |               | LAB        |              |
|             | [address];[place], [place]   |               |            |              |
|             | [address]                    |               |            |              |
+-------------+--------------------------+---------------+------------+--------------+
 
 
 
+----------+
| Specimen |
+----------+
|          |
+----------+
 
 
 
+----------------+---------+--------------------+--------------+
| Performing     | Address | City/State/Zipcode | Phone Number |
| Organization   |         |                    |              |
+----------------+---------+--------------------+--------------+
|   EXTERNAL LAB |         |                    |              |
+----------------+---------+--------------------+--------------+
 documented in this encounter
 
 
 Visit Diagnoses
 
 
+-----------------------------------------------------------------------------+
| Diagnosis                                                                   |
+-----------------------------------------------------------------------------+
|   Acute posthemorrhagic anemia                                              |
+-----------------------------------------------------------------------------+
|   Gastrointestinal hemorrhage, unspecified gastrointestinal hemorrhage type |
+-----------------------------------------------------------------------------+
|   Acute blood loss anemia  Acute posthemorrhagic anemia                     |
+-----------------------------------------------------------------------------+
|   Dyspnea on exertion  Other dyspnea and respiratory abnormality            |
+-----------------------------------------------------------------------------+
|   Palpitations                                                              |
+-----------------------------------------------------------------------------+
|   Rectal bleeding  Hemorrhage of rectum and anus                            |
+-----------------------------------------------------------------------------+
|   Mixed hyperlipidemia                                                      |
+-----------------------------------------------------------------------------+
 documented in this encounter

## 2020-06-08 NOTE — XMS
Encounter Summary
  Created on: 2020
 
 Memo Deni Siu
 External Reference #: [id number]
 : 52
 Sex: Male
 
 Demographics
 
 
+-----------------------+---------------------------+
| Address               | [address]           |
|                       | [place]  [address] |
+-----------------------+---------------------------+
| Home Phone            | [phone]           |
+-----------------------+---------------------------+
| Preferred Language    | Unknown                   |
+-----------------------+---------------------------+
| Marital Status        |                    |
+-----------------------+---------------------------+
| Confucianist Affiliation | Unknown                   |
+-----------------------+---------------------------+
| Race                  | Unknown                   |
+-----------------------+---------------------------+
| Ethnic Group          | Unknown                   |
+-----------------------+---------------------------+
 
 
 Author
 
 
+--------------+--------------------------------------------+
| Author       | [facility]  |
|              | [facility] [place]                                |
+--------------+--------------------------------------------+
| Organization | [facility]  |
|              | [facility] [place]                                |
+--------------+--------------------------------------------+
| Address      | Unknown                                    |
+--------------+--------------------------------------------+
| Phone        | Unavailable                                |
+--------------+--------------------------------------------+
 
 
 
 Support
 
 
+---------------+--------------+---------+-----------------+
| Name          | Relationship | Address | Phone           |
+---------------+--------------+---------+-----------------+
| Sofiya A Brown | ECON         | Unknown | [phone] |
+---------------+--------------+---------+-----------------+
 
 
 
 Care Team Providers
 
 
 
+-------------------------+------+-----------------+
| Care Team Member Name   | Role | Phone           |
+-------------------------+------+-----------------+
| Ck Michelle MD | PCP  | [phone] |
+-------------------------+------+-----------------+
 
 
 
 Encounter Details
 
 
+--------+-------------+----------------------+----------------------+----------------------
+
| Date   | Type        | Department           | Care Team            | Description          
|
+--------+-------------+----------------------+----------------------+----------------------
+
| / | Orders Only |   [facility]          |   Dreyer, Jason A,   | Osteoarthritis of    
|
| 2017   |             | NEUROSURGERY  [address]  | DO  [address]    | spine with           
|
|        |             | [address]     | [address]  [place], [place] | radiculopathy,       
|
|        |             | [place], [place]      |  [address]  [phone] | lumbar region        
|
|        |             | [address]           |   [phone] (Fax) | (Primary Dx); Spinal 
|
|        |             | [phone]         |                      |  stenosis, lumbar    
|
|        |             |                      |                      | region, without      
|
|        |             |                      |                      | neurogenic           
|
|        |             |                      |                      | claudication; Lumbar 
|
|        |             |                      |                      |  radiculopathy;      
|
|        |             |                      |                      | Chronic left-sided   
|
|        |             |                      |                      | low back pain with   
|
|        |             |                      |                      | left-sided sciatica; 
|
|        |             |                      |                      |  Status post         
|
|        |             |                      |                      | laminectomy          
|
+--------+-------------+----------------------+----------------------+----------------------
+
 
 
 
 Social History
 
 
+--------------+-------+-----------+--------+------+
| Tobacco Use  | Types | Packs/Day | Years  | Date |
|              |       |           | Used   |      |
 
+--------------+-------+-----------+--------+------+
| Never Smoker |       |           |        |      |
+--------------+-------+-----------+--------+------+
 
 
 
+---------------------+---+---+---+
| Smokeless Tobacco:  |   |   |   |
| Never Used          |   |   |   |
+---------------------+---+---+---+
 
 
 
+-------------+----------------------+---------+----------------------+
| Alcohol Use | Drinks/Week          | oz/Week | Comments             |
+-------------+----------------------+---------+----------------------+
| Yes         |   0 Standard drinks  | 0.0     | 1-2 drinks 2-4 times |
|             | or equivalent        |         |  per month           |
+-------------+----------------------+---------+----------------------+
 
 
 
+------------------+---------------+
| Sex Assigned at  | Date Recorded |
| Birth            |               |
+------------------+---------------+
| Not on file      |               |
+------------------+---------------+
 
 
 
+----------------+-------------+-------------+
| Job Start Date | Occupation  | Industry    |
+----------------+-------------+-------------+
| Not on file    | Not on file | Not on file |
+----------------+-------------+-------------+
 
 
 
+----------------+--------------+------------+
| Travel History | Travel Start | Travel End |
+----------------+--------------+------------+
 
 
 
+-------------------------------------+
| No recent travel history available. |
+-------------------------------------+
 documented as of this encounter
 
 Plan of Treatment
 
 
+--------+---------+-----------+----------------------+-------------+
| Date   | Type    | Specialty | Care Team            | Description |
+--------+---------+-----------+----------------------+-------------+
| / | Office  | Neurology |   Ravindra Munoz MD  [address] |             |
| 2020   | Visit   |           |  [address]      |             |
|        |         |           | [place]  |             |
|        |         |           | [place] [address]             |             |
 
|        |         |           | [phone]         |             |
|        |         |           | [phone] (Fax)   |             |
+--------+---------+-----------+----------------------+-------------+
 documented as of this encounter
 
 Visit Diagnoses
 
 
+--------------------------------------------------------------------------------------+
| Diagnosis                                                                            |
+--------------------------------------------------------------------------------------+
|   Osteoarthritis of spine with radiculopathy, lumbar region - Primary                |
+--------------------------------------------------------------------------------------+
|   Spinal stenosis, lumbar region, without neurogenic claudication                    |
+--------------------------------------------------------------------------------------+
|   Lumbar radiculopathy  Thoracic or lumbosacral neuritis or radiculitis, unspecified |
+--------------------------------------------------------------------------------------+
|   Chronic left-sided low back pain with left-sided sciatica                          |
+--------------------------------------------------------------------------------------+
|   Status post laminectomy  Other postprocedural status                               |
+--------------------------------------------------------------------------------------+
 documented in this encounter

## 2020-06-08 NOTE — XMS
Encounter Summary
  Created on: 2020
 
 Memo Deni Siu
 External Reference #: [id number]
 : 52
 Sex: Male
 
 Demographics
 
 
+-----------------------+---------------------------+
| Address               | [address]           |
|                       | [place]  [address] |
+-----------------------+---------------------------+
| Home Phone            | [phone]           |
+-----------------------+---------------------------+
| Preferred Language    | Unknown                   |
+-----------------------+---------------------------+
| Marital Status        |                    |
+-----------------------+---------------------------+
| Taoist Affiliation | Unknown                   |
+-----------------------+---------------------------+
| Race                  | Unknown                   |
+-----------------------+---------------------------+
| Ethnic Group          | Unknown                   |
+-----------------------+---------------------------+
 
 
 Author
 
 
+--------------+--------------------------------------------+
| Author       | [facility]  |
|              | and [place]                                |
+--------------+--------------------------------------------+
| Organization | [facility]  |
|              | and [place]                                |
+--------------+--------------------------------------------+
| Address      | Unknown                                    |
+--------------+--------------------------------------------+
| Phone        | Unavailable                                |
+--------------+--------------------------------------------+
 
 
 
 Support
 
 
+---------------+--------------+---------+-----------------+
| Name          | Relationship | Address | Phone           |
+---------------+--------------+---------+-----------------+
| Sofiya A Brown | ECON         | Unknown | [phone] |
+---------------+--------------+---------+-----------------+
 
 
 
 Care Team Providers
 
 
 
+-------------------------+------+-----------------+
| Care Team Member Name   | Role | Phone           |
+-------------------------+------+-----------------+
| Ck Michelle MD | PCP  | [phone] |
+-------------------------+------+-----------------+
 
 
 
 Reason for Visit
 Auth/Cert
 
+--------+--------+-----------+--------------+--------------+--------------+
| Status | Reason | Specialty | Diagnoses /  | Referred By  | Referred To  |
|        |        |           | Procedures   | Contact      | Contact      |
+--------+--------+-----------+--------------+--------------+--------------+
|        |        |           |   Diagnoses  |              |              |
|        |        |           |              |              |              |
|        |        |           | Osteoarthrit |              |              |
|        |        |           | is of spine  |              |              |
|        |        |           | with         |              |              |
|        |        |           | radiculopath |              |              |
|        |        |           | y, lumbar    |              |              |
|        |        |           | region       |              |              |
|        |        |           | (M47.26),    |              |              |
|        |        |           | Spinal       |              |              |
|        |        |           | stenosis,    |              |              |
|        |        |           | lumbar       |              |              |
|        |        |           | region,      |              |              |
|        |        |           | without      |              |              |
|        |        |           | neurogenic   |              |              |
|        |        |           | claudication |              |              |
|        |        |           |  (M48.06),   |              |              |
|        |        |           | Lumbar       |              |              |
|        |        |           | radiculopath |              |              |
|        |        |           | y (M54.16),  |              |              |
|        |        |           | Chronic      |              |              |
|        |        |           | left-sided   |              |              |
|        |        |           | low back     |              |              |
|        |        |           | pain with    |              |              |
|        |        |           | left-sided   |              |              |
|        |        |           | sciatica     |              |              |
|        |        |           | (M54.42,     |              |              |
|        |        |           | G89.29),     |              |              |
|        |        |           | Status post  |              |              |
|        |        |           | laminectomy  |              |              |
|        |        |           | (Z98.890)    |              |              |
|        |        |           | Procedures   |              |              |
|        |        |           | MI ARTHDSIS  |              |              |
|        |        |           | POST/POSTERO |              |              |
|        |        |           | LATRL/POSTIN |              |              |
|        |        |           | TERBODY      |              |              |
|        |        |           | LUMBAR       |              |              |
|        |        |           | POSTERIOR    |              |              |
|        |        |           | NON-SEGMENTA |              |              |
|        |        |           | L            |              |              |
|        |        |           | INSTRUMENTAT |              |              |
|        |        |           | ION  MI INSJ |              |              |
|        |        |           |  BIOMCHN DEV |              |              |
 
|        |        |           |              |              |              |
|        |        |           | INTERVERTEBR |              |              |
|        |        |           | AL DSC SPC   |              |              |
|        |        |           | W/ARTHRD     |              |              |
|        |        |           | LAMINEC/FACE |              |              |
|        |        |           | TECT/FORAMIN |              |              |
|        |        |           | ,LUMBAR 1    |              |              |
|        |        |           | SEG  MI      |              |              |
|        |        |           | LAMINEC/FACE |              |              |
|        |        |           | TECT/FORAMIN |              |              |
|        |        |           | ,EACH ADDNL  |              |              |
|        |        |           |  L4-5        |              |              |
|        |        |           | Transforamin |              |              |
|        |        |           | al Lumbar    |              |              |
|        |        |           | Interbody    |              |              |
|        |        |           | Fusion       |              |              |
+--------+--------+-----------+--------------+--------------+--------------+
 
 
 
 
 Encounter Details
 
 
+--------+-------------+----------------------+----------------------+-------------+
| Date   | Type        | Department           | Care Team            | Description |
+--------+-------------+----------------------+----------------------+-------------+
| / | Anesthesia  |   [facility] |   Shaista Hyman  |             |
|    | Event       |  [facility] OP | MD MIGUEL ÁNGEL  [address]  |             |
|        |             |   [address]       | ST  [place]  |             |
|        |             | [place]      | [address]           |             |
|        |             | [address]           | [phone]         |             |
|        |             | [phone]         | [phone] (Fax)   |             |
|        |             |                      | Mike Vela MD    |             |
|        |             |                      | [address]      |             |
|        |             |                      | [place]      |             |
|        |             |                      | [id number]    |             |
|        |             |                      |  [phone] (Fax)  |             |
+--------+-------------+----------------------+----------------------+-------------+
 
 
 
 Anesthesia Record
 
 
+----------------------+----------------------+-------------------+----------------------+
| Procedure Name       | Responsible          | Anesthesia Start  | Anesthesia Stop Time |
|                      | Anesthesiologist     | Time              |                      |
+----------------------+----------------------+-------------------+----------------------+
| L4-5 Transforaminal  | Shaista Hyman,  | 17 1151     | 17 1405        |
| Lumbar Interbody     | MD                   |                   |                      |
| Fusion (N/A Spine    |                      |                   |                      |
| Lumbar)              |                      |                   |                      |
+----------------------+----------------------+-------------------+----------------------+
 
 
 
+----+---+-------------+-------------------------------------------------------------------+
| Da | T | Event       | Comment                                                           |
| te | i |             |                                                                   |
 
|    | m |             |                                                                   |
|    | e |             |                                                                   |
+----+---+-------------+-------------------------------------------------------------------+
| 07 | 1 | An Checkout | Pre-use anesthesia machine/equipment checkout.                    |
| /3 | 1 |             |                                                                   |
| 1/ | 4 |             |                                                                   |
| 20 | 0 |             |                                                                   |
| 17 |   |             |                                                                   |
+----+---+-------------+-------------------------------------------------------------------+
|    | 1 |             |                                                                   |
|    | 1 |             |                                                                   |
|    | 4 |             |                                                                   |
|    | 6 |             |                                                                   |
+----+---+-------------+-------------------------------------------------------------------+
|    | 1 | An Start    |                                                                   |
|    | 1 | Data        |                                                                   |
|    | 4 |             |                                                                   |
|    | 7 |             |                                                                   |
+----+---+-------------+-------------------------------------------------------------------+
|    | 1 | An Start    | Room ready, anesthesia equipment checked, essential drugs &       |
|    | 1 |             | equipment available. Patient Identity checked, anesthesia plan    |
|    | 5 |             | explained and consent obtained. Patient transported to OR,        |
|    | 1 |             | Monitors applied. Reassessment prior to anesthesia                |
|    |   |             | induction/procedure.                                              |
+----+---+-------------+-------------------------------------------------------------------+
|    | 1 | an mike now |                                                                   |
|    | 1 |             |                                                                   |
|    | 5 |             |                                                                   |
|    | 4 |             |                                                                   |
+----+---+-------------+-------------------------------------------------------------------+
|    | 1 | Antibiotic  |                                                                   |
|    | 1 | Given       |                                                                   |
|    | 5 |             |                                                                   |
|    | 5 |             |                                                                   |
+----+---+-------------+-------------------------------------------------------------------+
|    | 1 | Preoxygenat | Oxygen administered, patient sedated, ventilating spontaneously.  |
|    | 1 | ed          |                                                                   |
|    | 5 |             |                                                                   |
|    | 9 |             |                                                                   |
+----+---+-------------+-------------------------------------------------------------------+
|    | 1 | An          |                                                                   |
|    | 2 | Induction   |                                                                   |
|    | 0 |             |                                                                   |
|    | 1 |             |                                                                   |
+----+---+-------------+-------------------------------------------------------------------+
|    | 1 | An          | Smooth IV induction, mask airway established. Direct Laryngoscopy |
|    | 2 | Intubation  |  , ETT placed. BSEB/ETCO2 (auscultation and capnography) to       |
|    | 0 |             | confirm placement.  Depth noted.  Ventilator on.                  |
|    | 2 |             |                                                                   |
+----+---+-------------+-------------------------------------------------------------------+
|    | 1 | an mike now | Prone - eyes checked.                                             |
|    | 2 |             |                                                                   |
|    | 0 |             |                                                                   |
|    | 6 |             |                                                                   |
+----+---+-------------+-------------------------------------------------------------------+
|    | 1 | Pre-Procedu |                                                                   |
|    | 2 | ral Timeout |                                                                   |
|    | 1 |  Completed  |                                                                   |
|    | 3 |             |                                                                   |
+----+---+-------------+-------------------------------------------------------------------+
 
|    | 1 | First       |                                                                   |
|    | 2 | Inc/Proc St |                                                                   |
|    | 1 |             |                                                                   |
|    | 6 |             |                                                                   |
+----+---+-------------+-------------------------------------------------------------------+
|    | 1 | an mike now |                                                                   |
|    | 2 |             |                                                                   |
|    | 1 |             |                                                                   |
|    | 7 |             |                                                                   |
+----+---+-------------+-------------------------------------------------------------------+
|    | 1 | Oropharynx  |                                                                   |
|    | 3 | Suctioned   |                                                                   |
|    | 5 |             |                                                                   |
|    | 6 |             |                                                                   |
+----+---+-------------+-------------------------------------------------------------------+
|    | 1 | Extubated   |                                                                   |
|    | 3 | Deep        |                                                                   |
|    | 5 |             |                                                                   |
|    | 7 |             |                                                                   |
+----+---+-------------+-------------------------------------------------------------------+
|    | 1 | an mike now |                                                                   |
|    | 3 |             |                                                                   |
|    | 5 |             |                                                                   |
|    | 9 |             |                                                                   |
+----+---+-------------+-------------------------------------------------------------------+
|    | 1 | An Stop     | Patient handed off to recovery nurse.  Electronically signed by:  |
|    | 4 |             | Shaista Hyman MD at 2017 14:19                        |
|    | 0 |             |                                                                   |
|    | 5 |             |                                                                   |
+----+---+-------------+-------------------------------------------------------------------+
 
 
 
+------+
| Meds |
+------+
 
 
 
+-----------------------------------+----------+
| Name                              | Total    |
+-----------------------------------+----------+
| midazolam                         | 2 mg     |
+-----------------------------------+----------+
| propofol                          | 180 mg   |
+-----------------------------------+----------+
| ketamine                          | 50 mg    |
+-----------------------------------+----------+
| fentaNYL injection (2 mL)         | 100 mcg  |
+-----------------------------------+----------+
| HYDROmorphone                     | 2 mg     |
+-----------------------------------+----------+
| cisatracurium                     | 8 mg     |
+-----------------------------------+----------+
| ondansetron                       | 4 mg     |
+-----------------------------------+----------+
| dexamethasone                     | 4 mg     |
+-----------------------------------+----------+
| magnesium sulfate injection 500   | 1 g      |
| mg/mL (vial)                      |          |
 
+-----------------------------------+----------+
| tranexamic acid (CYKLOKAPRON) 1 g | 1 g      |
|  in 50 mL NS IVPB (simple)        |          |
+-----------------------------------+----------+
| ceFAZolin in saline (ANCEF) IVPB  | 2 g      |
| 2 g                               |          |
+-----------------------------------+----------+
| sodium chloride 0.9% (NS)         | 1,000 mL |
| infusion                          |          |
+-----------------------------------+----------+
| LR (Infusion)                     | 600 mL   |
+-----------------------------------+----------+
 
 
 
+-----------------------+
| Name                  |
+-----------------------+
| N2O Flow Rate (L/Min) |
+-----------------------+
| O2 Flow Rate (L/Min)  |
+-----------------------+
| Insp O2               |
+-----------------------+
| Exp SEV               |
+-----------------------+
| Air Flow Rate (L/Min) |
+-----------------------+
 
 
 
+-----------------------------------+
| No blood administrations on file. |
+-----------------------------------+
 
 
 
+--------+-----------------------------------+----------------------+----------------------+
| Type   | Details                           | Placement            | Removal              |
+--------+-----------------------------------+----------------------+----------------------+
| Lumbar | 17;  (incorrect charted     | 17 0000 by     | 17 1423 by     |
|  Drain | drain); other (see comments)      | Pau Saleem RN   | Pau Saleem RN   |
+--------+-----------------------------------+----------------------+----------------------+
| Drain/ | 17;  (present upon arrival  | 17 0000 by     | 17 0930 by     |
| Device | to PAC); Left; lower; back;      | Pau Saleem RN   | Sara Hwang RN   |
|  Site  | removed by Emiliano SEVERINO; tip     |                      |                      |
|        | intact; healing within            |                      |                      |
|        | expectations; 17; 0930      |                      |                      |
+--------+-----------------------------------+----------------------+----------------------+
| Periph | 17; 1034; Left; Wrist;      | 17 1034 by     | 17 1010 by     |
| eral   | over-the-needle catheter system;  | Soraya Gamez RN    | Sara Hwang RN   |
| IV     | 18 gauge, 1 1/2 in length; Blood  |                      |                      |
|        | Bank; 3; Right forearm x 3;       |                      |                      |
|        | distraction, topical anesthetic   |                      |                      |
|        | spray applied, tolerated well,    |                      |                      |
|        | appears comfortable; no longer    |                      |                      |
|        | indicated, catheter/device        |                      |                      |
|        | intact; short term use; 17; |                      |                      |
|        |  1010                             |                      |                      |
+--------+-----------------------------------+----------------------+----------------------+
 
| Airway | Placement Date: 17;         | 17 1202 by     | 17 1359 by     |
|        | Placement Time: 1202 (created via | Shaistajensen Hyman,  | Shaistajensen Hyman,  |
|        |  procedure documentation); Mask   | MD                   | MD                   |
|        | Ventilation: EZ; Airway Grade:    |                      |                      |
|        | 2a; Laryngoscope Blade Size: 3;   |                      |                      |
|        | Attempts: 1; Airway Type:         |                      |                      |
|        | endotracheal; Size: 6.5; Airway   |                      |                      |
|        | Tube Secured At: 22; Trauma:      |                      |                      |
|        | none; Other Equipment: stylette;  |                      |                      |
|        | Placement Check: exhaled CO2      |                      |                      |
|        | detection device, video           |                      |                      |
|        | laryngoscope, bilateral chest     |                      |                      |
|        | rise, breath sounds equal         |                      |                      |
|        | bilaterally; Removal Date:        |                      |                      |
|        | 17; Removal Time: 1359;     |                      |                      |
|        | Additional Comments: Neutral Head |                      |                      |
|        |  Position, no neck flexion or     |                      |                      |
|        | extension attempted.   Smooth IV  |                      |                      |
|        | induction, mask airway            |                      |                      |
|        | established. Direct Laryngoscopy  |                      |                      |
|        | with Velez Laryngoscope, ETT    |                      |                      |
|        | placed under video guidance.      |                      |                      |
|        | BSEB/ETCO2 (auscultation and      |                      |                      |
|        | capnography) to confirm           |                      |                      |
|        | placement.  Depth noted.          |                      |                      |
|        | Ventilator on.                    |                      |                      |
+--------+-----------------------------------+----------------------+----------------------+
| Read   | 17; 1223; back; 19    | 17 1223 by     | 19 1342 by     |
| only - | (Completed/Removed by Utility);   | Rashid Calderon RN      | User Epic            |
|        | 1342 (Completed/Removed by        |                      |                      |
| [employer] | Utility)                          |                      |                      |
| on     |                                   |                      |                      |
+--------+-----------------------------------+----------------------+----------------------+
 documented in this encounter
 
 Social History
 
 
+--------------+-------+-----------+--------+------+
| Tobacco Use  | Types | Packs/Day | Years  | Date |
|              |       |           | Used   |      |
+--------------+-------+-----------+--------+------+
| Never Smoker |       |           |        |      |
+--------------+-------+-----------+--------+------+
 
 
 
+---------------------+---+---+---+
| Smokeless Tobacco:  |   |   |   |
| Never Used          |   |   |   |
+---------------------+---+---+---+
 
 
 
+-------------+----------------------+---------+----------------------+
| Alcohol Use | Drinks/Week          | oz/Week | Comments             |
+-------------+----------------------+---------+----------------------+
| Yes         |   0 Standard drinks  | 0.0     | 1-2 drinks 2-4 times |
|             | or equivalent        |         |  per month           |
+-------------+----------------------+---------+----------------------+
 
 
 
 
+------------------+---------------+
| Sex Assigned at  | Date Recorded |
| Birth            |               |
+------------------+---------------+
| Not on file      |               |
+------------------+---------------+
 
 
 
+----------------+-------------+-------------+
| Job Start Date | Occupation  | Industry    |
+----------------+-------------+-------------+
| Not on file    | Not on file | Not on file |
+----------------+-------------+-------------+
 
 
 
+----------------+--------------+------------+
| Travel History | Travel Start | Travel End |
+----------------+--------------+------------+
 
 
 
+-------------------------------------+
| No recent travel history available. |
+-------------------------------------+
 documented as of this encounter
 
 Plan of Treatment
 
 
+--------+---------+-----------+----------------------+-------------+
| Date   | Type    | Specialty | Care Team            | Description |
+--------+---------+-----------+----------------------+-------------+
| / | Office  | Neurology |   Ravindra Munoz MD  [address] |             |
| 2020   | Visit   |           |  [address]      |             |
|        |         |           | [address]  [place],  |             |
|        |         |           | [place] [address]             |             |
|        |         |           | [phone]         |             |
|        |         |           | [phone] (Fax)   |             |
+--------+---------+-----------+----------------------+-------------+
 documented as of this encounter
 
 Procedures
 
 
+-----------------+--------+-------------+----------------------+----------------------+
| Procedure Name  | Priori | Date/Time   | Associated Diagnosis | Comments             |
|                 | ty     |             |                      |                      |
+-----------------+--------+-------------+----------------------+----------------------+
| ANE AIRWAY NOTE | Routin | 2017  |                      |   Results for this   |
|                 | e      | 12:14 PM    |                      | procedure are in the |
|                 |        | PDT         |                      |  results section.    |
+-----------------+--------+-------------+----------------------+----------------------+
 documented in this encounter
 
 Results
 
 Anesthesia Airway Note (2017 12:14 PM PDT)
 
+------------------------------------------------------------------------+--------------+
| Narrative                                                              | Performed At |
+------------------------------------------------------------------------+--------------+
|   Shaista Hyman MD       2017 12:14  Anesthesia Airway      |              |
| Placement     2017 12:02  Preprocedure check: patient identified, |              |
|  oxygen, airway assessed, patient   reassessment prior to induction,   |              |
| airway equipment checked and suction  Mask ventilation:   easy         |              |
| Successful technique: Velez  Laryngoscope blade size:   3   Airway   |              |
| grade: 2a (Partial view of glottis)  Other equipment: stylette         |              |
| Attempts: 1  Airway type: endotracheal  Size: 6.5  Cuffed: cuffed      |              |
| Route, reference point: right side of mouth  Tube depth: 22 cm  Tube   |              |
| secured with: adhesive tape  Trauma: none  Tube placement              |              |
| verification: carbon dioxide detection, equal bilateral   breath       |              |
| sounds, bilateral chest rise and video laryngoscope  Performing        |              |
| provider: SHAISTA HYMAN     Comments: Neutral Head Position, no  |              |
| neck flexion or extension attempted.     Smooth IV induction, mask     |              |
| airway established.  Direct Laryngoscopy with Velez Laryngoscope,    |              |
| ETT placed under video   guidance.   BSEB/ETCO2 (auscultation and      |              |
| capnography) to confirm placement.    Depth noted.   Ventilator on.    |              |
|      Electronically Signed by: Shaista Hyman MD                 |              |
|                             ESig date/time: 2017 12:14            |              |
|                                                                        |              |
+------------------------------------------------------------------------+--------------+
 
 
 
+-------------------------------------------------------------------------------------------
------------------------------------------------------------------------------------+
| Procedure Note                                                                            
                                                                                    |
+-------------------------------------------------------------------------------------------
------------------------------------------------------------------------------------+
|   Shaista Hyman MD - 2017 12:14 PM PDT  Anesthesia Airway                    
                                                                                    |
| Placement2017 12:02Preprocedure check: patient identified, oxygen, airway assessed,  
                                                                                    |
|  patient reassessment prior to induction, airway equipment checked and suctionMask        
                                                                                    |
| ventilation:  easySuccessful technique: McGrathLaryngoscope blade size:  3 Airway grade:  
                                                                                    |
|  2a (Partial view of glottis)Other equipment: styletteAttempts: 1Airway type:             
                                                                                    |
| endotrachealSize: 6.5Cuffed: cuffedRoute, reference point: right side of mouthTube        
                                                                                    |
| depth: 22 cmTube secured with: adhesive tapeTrauma: noneTube placement verification:      
                                                                                    |
| carbon dioxide detection, equal bilateral breath sounds, bilateral chest rise and video   
                                                                                    |
| laryngoscopePerforming provider: SHAISTA HYMAN PComments: Neutral Head Position, no   
                                                                                    |
| neck flexion or extension attempted.  Smooth IV induction, mask airway                    
                                                                                    |
| established.Direct Laryngoscopy with Velez Laryngoscope, ETT placed under video         
                                                                                    |
| guidance.  BSEB/ETCO2 (auscultation and capnography) to confirm placement.  Depth noted.  
                                                                                    |
|   Ventilator on.Electronically Signed by: Shaista Hyman MD                         
                                                                                    |
 
|      ESig date/time: 2017 12:14                                                      
                                                                                    |
|Tube depth: 22 cm                                                                          
                                                                                    |
|Tube secured with: adhesive tape                                                           
                                                                                    |
|Trauma: none                                                                               
                                                                                    |
|Tube placement verification: carbon dioxide detection, equal bilateral breath sounds, bilat
eral chest rise and video laryngoscope                                              |
|Performing provider: SHAISTA HYMAN                                                   
                                                                                    |
|Comments: Neutral Head Position, no neck flexion or extension attempted.                   
                                                                                    |
|Smooth IV induction, mask airway established.                                              
                                                                                    |
|Direct Laryngoscopy with Velez Laryngoscope, ETT placed under video guidance.  BSEB/ETCO2
 (auscultation and capnography) to confirm placement.  Depth noted.  Ventilator on. |
|                                                                                           
|Electronically Signed by: Shaista P. Skaarup, MD Davisg date/time
: 2017 12:14                                                                   |
|                                                                                           
                                                                                    |
+-------------------------------------------------------------------------------------------
------------------------------------------------------------------------------------+
 documented in this encounter
 
 Visit Diagnoses
 Not on filedocumented in this encounter
 
 Administered Medications
 
 
+-----------------------------------+--------+----------+------+------+------+
| Medication Order                  | MAR    | Action   | Dose | Rate | Site |
|                                   | Action | Date     |      |      |      |
+-----------------------------------+--------+----------+------+------+------+
|   ceFAZolin in saline (ANCEF)     | Given  | 20 | 2 g  |      |      |
| IVPB 2 g  2 g, Intravenous,       |        | 17 11:55 |      |      |      |
| Administer over 30 Minutes, Prior |        |  AM PDT  |      |      |      |
|  to Incision, Starting Mon        |        |          |      |      |      |
| 17 at 0935, For 1 dose,      |        |          |      |      |      |
| Administer within 1 hour of       |        |          |      |      |      |
| surgical incision. Keep in        |        |          |      |      |      |
| refrigerator., Pre-op,            |        |          |      |      |      |
| Indications: Surgical Prophylaxis |        |          |      |      |      |
+-----------------------------------+--------+----------+------+------+------+
 
 
 
+---+---+
|   |   |
+---+---+
 
 
 
 
+-----------------------------------+-------+----------+------+---+---+
|   cisatracurium (NIMBEX)          | Given | 20 | 8 mg |   |   |
| injection  Intravenous, PRN,      |       | 17 12:00 |      |   |   |
| Ventilator Dyssynchrony, Starting |       |  PM PDT  |      |   |   |
|  17 at 1200, Anesthesia  |       |          |      |   |   |
| Intra-op                          |       |          |      |   |   |
+-----------------------------------+-------+----------+------+---+---+
 
 
 
+---+---+
|   |   |
+---+---+
 
 
 
+--------------------------------+-------+----------+------+---+---+
|   dexamethasone (DECADRON) 10  | Given | 20 | 4 mg |   |   |
| mg/mL injection  Intravenous,  |       | 17 11:55 |      |   |   |
| PRN, Starting 17 at   |       |  AM PDT  |      |   |   |
| 1155, Anesthesia Intra-op      |       |          |      |   |   |
+--------------------------------+-------+----------+------+---+---+
 
 
 
+---+---+
|   |   |
+---+---+
 
 
 
+-----------------------------------+-------+----------+--------+---+---+
|   fentaNYL (PF) injection         | Given | 20 | 50 mcg |   |   |
| Intravenous, PRN, Pain, Starting  |       | 17 12:01 |        |   |   |
| Mon 17 at 1155, Anesthesia   |       |  PM PDT  |        |   |   |
| Intra-op                          |       |          |        |   |   |
+-----------------------------------+-------+----------+--------+---+---+
 
 
 
+-------+----------+--------+---+---+
| Given | 20 | 50 mcg |   |   |
|       | 17 11:55 |        |   |   |
|       |  AM PDT  |        |   |   |
+-------+----------+--------+---+---+
 
 
 
+---+---+
|   |   |
+---+---+
 
 
 
+----------------------------------+-------+----------+------+---+---+
|   HYDROmorphone (DILAUDID) 2     | Given | 20 | 1 mg |   |   |
| mg/mL injection  Intravenous,    |       | 17 12:20 |      |   |   |
| PRN, Pain, Starting Mon 17  |       |  PM PDT  |      |   |   |
| at 1202, Anesthesia Intra-op     |       |          |      |   |   |
 
+----------------------------------+-------+----------+------+---+---+
 
 
 
+-------+----------+------+---+---+
| Given | 20 | 1 mg |   |   |
|       | 17 12:02 |      |   |   |
|       |  PM PDT  |      |   |   |
+-------+----------+------+---+---+
 
 
 
+---+---+
|   |   |
+---+---+
 
 
 
+---------------------------------+-------+----------+-------+---+---+
|   ketamine 50 mg/mL injection   | Given | 20 | 25 mg |   |   |
| Intravenous, PRN, Starting Mon  |       | 17 12:20 |       |   |   |
| 17 at 1202, Anesthesia     |       |  PM PDT  |       |   |   |
| Intra-op                        |       |          |       |   |   |
+---------------------------------+-------+----------+-------+---+---+
 
 
 
+-------+----------+-------+---+---+
| Given | 20 | 25 mg |   |   |
|       | 17 12:02 |       |   |   |
|       |  PM PDT  |       |   |   |
+-------+----------+-------+---+---+
 
 
 
+---+---+
|   |   |
+---+---+
 
 
 
+-----------------------------------+---------+----------+---+---+---+
|   lactated ringers (LR) infusion  | New Bag | 20 |   |   |   |
|  Intravenous, CONTINUOUS PRN,     |         | 17 12:45 |   |   |   |
| Starting Mon 17 at 1245,     |         |  PM PDT  |   |   |   |
| Anesthesia Intra-op               |         |          |   |   |   |
+-----------------------------------+---------+----------+---+---+---+
 
 
 
+---+---+
|   |   |
+---+---+
 
 
 
+--------------------------------+-------+----------+-----+---+---+
|   magnesium sulfate 500 mg/mL  | Given | 20 | 1 g |   |   |
| injection  PRN, Starting Mon   |       | 17 12:20 |     |   |   |
| 17 at 1220, Anesthesia    |       |  PM PDT  |     |   |   |
 
| Intra-op                       |       |          |     |   |   |
+--------------------------------+-------+----------+-----+---+---+
 
 
 
+---+---+
|   |   |
+---+---+
 
 
 
+-----------------------------------+-------+----------+------+---+---+
|   midazolam (VERSED) 1 mg/mL      | Given | 20 | 2 mg |   |   |
| injection  Intravenous, PRN,      |       | 17 11:55 |      |   |   |
| Anxiety, Starting 17 at  |       |  AM PDT  |      |   |   |
| 1155, Anesthesia Intra-op         |       |          |      |   |   |
+-----------------------------------+-------+----------+------+---+---+
 
 
 
+---+---+
|   |   |
+---+---+
 
 
 
+-----------------------------------+-------+----------+------+---+---+
|   ondansetron (ZOFRAN) injection  | Given | 20 | 4 mg |   |   |
|  Intravenous, PRN, Nausea,        |       | 17 11:55 |      |   |   |
| Vomiting, Starting 17 at |       |  AM PDT  |      |   |   |
|  1155, Anesthesia Intra-op        |       |          |      |   |   |
+-----------------------------------+-------+----------+------+---+---+
 
 
 
+---+---+
|   |   |
+---+---+
 
 
 
+-----------------------------------+-------+----------+--------+---+---+
|   propofol (DIPRIVAN) injection   | Given | 20 | 180 mg |   |   |
| Intravenous, PRN, Starting Mon    |       | 17 12:01 |        |   |   |
| 17 at 1201, Anesthesia       |       |  PM PDT  |        |   |   |
| Intra-op                          |       |          |        |   |   |
+-----------------------------------+-------+----------+--------+---+---+
 
 
 
+---+---+
|   |   |
+---+---+
 
 
 
+----------------------------------+-------+----------+-----+---+---+
|   tranexamic acid in 50 mL NS    | Given | 20 | 1 g |   |   |
| (CYKLOKAPRON) IVPB (simple)      |       | 17 12:20 |     |   |   |
| Intravenous, Administer over 15  |       |  PM PDT  |     |   |   |
 
| Minutes, PRN, Starting Mon       |       |          |     |   |   |
| 17 at 1220, Anesthesia      |       |          |     |   |   |
| Intra-op                         |       |          |     |   |   |
+----------------------------------+-------+----------+-----+---+---+
 
 
 
+---+---+
|   |   |
+---+---+
 documented in this encounter

## 2020-06-08 NOTE — XMS
Encounter Summary
  Created on: 2020
 
 Memo Deni Siu
 External Reference #: [id number]
 : 52
 Sex: Male
 
 Demographics
 
 
+-----------------------+---------------------------+
| Address               | [address]           |
|                       | [place]  [address] |
+-----------------------+---------------------------+
| Home Phone            | [phone]           |
+-----------------------+---------------------------+
| Preferred Language    | Unknown                   |
+-----------------------+---------------------------+
| Marital Status        |                    |
+-----------------------+---------------------------+
| Sabianist Affiliation | Unknown                   |
+-----------------------+---------------------------+
| Race                  | Unknown                   |
+-----------------------+---------------------------+
| Ethnic Group          | Unknown                   |
+-----------------------+---------------------------+
 
 
 Author
 
 
+--------------+--------------------------------------------+
| Author       | [facility]  |
|              | [facility] [place]                                |
+--------------+--------------------------------------------+
| Organization | [facility]  |
|              | [facility] [place]                                |
+--------------+--------------------------------------------+
| Address      | Unknown                                    |
+--------------+--------------------------------------------+
| Phone        | Unavailable                                |
+--------------+--------------------------------------------+
 
 
 
 Support
 
 
+---------------+--------------+---------+-----------------+
| Name          | Relationship | Address | Phone           |
+---------------+--------------+---------+-----------------+
| Sofiya A Brown | ECON         | Unknown | [phone] |
+---------------+--------------+---------+-----------------+
 
 
 
 Care Team Providers
 
 
 
+-------------------------+------+-----------------+
| Care Team Member Name   | Role | Phone           |
+-------------------------+------+-----------------+
| Ck Michelle MD | PCP  | [phone] |
+-------------------------+------+-----------------+
 
 
 
 Encounter Details
 
 
+--------+----------+----------------------+----------------------+-------------+
| Date   | Type     | Department           | Care Team            | Description |
+--------+----------+----------------------+----------------------+-------------+
| / | Imaging  |   [facility] |   Provider,          |             |
| 2017   | Exam     |  [facility]    | [id number] |             |
|        |          | IMAGING  [address]       |  [address]        |             |
|        |          | [address]  [place]     | [place] [address]     |             |
|        |          | [place] [address] |                      |             |
|        |          |   [phone]       |                      |             |
+--------+----------+----------------------+----------------------+-------------+
 
 
 
 Social History
 
 
+--------------+-------+-----------+--------+------+
| Tobacco Use  | Types | Packs/Day | Years  | Date |
|              |       |           | Used   |      |
+--------------+-------+-----------+--------+------+
| Never Smoker |       |           |        |      |
+--------------+-------+-----------+--------+------+
 
 
 
+---------------------+---+---+---+
| Smokeless Tobacco:  |   |   |   |
| Never Used          |   |   |   |
+---------------------+---+---+---+
 
 
 
+-------------+----------------------+---------+----------------------+
| Alcohol Use | Drinks/Week          | oz/Week | Comments             |
+-------------+----------------------+---------+----------------------+
| Yes         |   0 Standard drinks  | 0.0     | 1-2 drinks 2-4 times |
|             | or equivalent        |         |  per month           |
+-------------+----------------------+---------+----------------------+
 
 
 
+------------------+---------------+
| Sex Assigned at  | Date Recorded |
| Birth            |               |
+------------------+---------------+
| Not on file      |               |
+------------------+---------------+
 
 
 
 
+----------------+-------------+-------------+
| Job Start Date | Occupation  | Industry    |
+----------------+-------------+-------------+
| Not on file    | Not on file | Not on file |
+----------------+-------------+-------------+
 
 
 
+----------------+--------------+------------+
| Travel History | Travel Start | Travel End |
+----------------+--------------+------------+
 
 
 
+-------------------------------------+
| No recent travel history available. |
+-------------------------------------+
 documented as of this encounter
 
 Plan of Treatment
 
 
+--------+---------+-----------+----------------------+-------------+
| Date   | Type    | Specialty | Care Team            | Description |
+--------+---------+-----------+----------------------+-------------+
| / | Office  | Neurology |   Ravindra Munoz MD  [address] |             |
| 2020   | Visit   |           |  [address]      |             |
|        |         |           | [address]  [place]  |             |
|        |         |           | [place] [address]             |             |
|        |         |           | [phone]         |             |
|        |         |           | [phone] (Fax)   |             |
+--------+---------+-----------+----------------------+-------------+
 documented as of this encounter
 
 Procedures
 
 
+----------------------+--------+-------------+----------------------+----------------------
+
| Procedure Name       | Priori | Date/Time   | Associated Diagnosis | Comments             
|
|                      | ty     |             |                      |                      
|
+----------------------+--------+-------------+----------------------+----------------------
+
| XR LUMBAR SPINE 2 OR | Routin | 2017  |                      |   Results for this   
|
|  3 VW                | e      | 10:20 AM    |                      | procedure are in the 
|
|                      |        | PDT         |                      |  results section.    
|
+----------------------+--------+-------------+----------------------+----------------------
+
 documented in this encounter
 
 Results
 XR Lumbar Spine 2 or 3 Vw (2017 10:20 AM PDT)
 
 
+----------+
| Specimen |
+----------+
|          |
+----------+
 
 
 
+--------------------------------------------------------------------+---------------+
| Narrative                                                          | Performed At  |
+--------------------------------------------------------------------+---------------+
|   External films for comparison only - no result from [facility].  |   [facility] |
+--------------------------------------------------------------------+---------------+
 
 
 
+---------------+---------+--------------------+--------------+
| Performing    | Address | City/State/Zipcode | Phone Number |
| Organization  |         |                    |              |
+---------------+---------+--------------------+--------------+
|   [facility] |         |                    |              |
+---------------+---------+--------------------+--------------+
 documented in this encounter
 
 Visit Diagnoses
 Not on filedocumented in this encounter

## 2020-06-08 NOTE — XMS
Encounter Summary
  Created on: 2020
 
 Memo Deni Siu
 External Reference #: [id number]
 : 52
 Sex: Male
 
 Demographics
 
 
+-----------------------+---------------------------+
| Address               | [address]           |
|                       | [place]  [address] |
+-----------------------+---------------------------+
| Home Phone            | [phone]           |
+-----------------------+---------------------------+
| Preferred Language    | Unknown                   |
+-----------------------+---------------------------+
| Marital Status        |                    |
+-----------------------+---------------------------+
| Orthodoxy Affiliation | Unknown                   |
+-----------------------+---------------------------+
| Race                  | Unknown                   |
+-----------------------+---------------------------+
| Ethnic Group          | Unknown                   |
+-----------------------+---------------------------+
 
 
 Author
 
 
+--------------+--------------------------------------------+
| Author       | [facility]  |
|              | and [place]                                |
+--------------+--------------------------------------------+
| Organization | [facility]  |
|              | and [place]                                |
+--------------+--------------------------------------------+
| Address      | Unknown                                    |
+--------------+--------------------------------------------+
| Phone        | Unavailable                                |
+--------------+--------------------------------------------+
 
 
 
 Support
 
 
+---------------+--------------+---------+-----------------+
| Name          | Relationship | Address | Phone           |
+---------------+--------------+---------+-----------------+
| Sofiya A Brown | ECON         | Unknown | [phone] |
+---------------+--------------+---------+-----------------+
 
 
 
 Care Team Providers
 
 
 
+-------------------------+------+-----------------+
| Care Team Member Name   | Role | Phone           |
+-------------------------+------+-----------------+
| Ck Michelle MD | PCP  | [phone] |
+-------------------------+------+-----------------+
 
 
 
 Reason for Visit
 Auth/Cert
 
+--------+--------+-----------+--------------+--------------+--------------+
| Status | Reason | Specialty | Diagnoses /  | Referred By  | Referred To  |
|        |        |           | Procedures   | Contact      | Contact      |
+--------+--------+-----------+--------------+--------------+--------------+
|        |        |           |   Diagnoses  |              |              |
|        |        |           |              |              |              |
|        |        |           | Osteoarthrit |              |              |
|        |        |           | is of spine  |              |              |
|        |        |           | with         |              |              |
|        |        |           | radiculopath |              |              |
|        |        |           | y, lumbar    |              |              |
|        |        |           | region       |              |              |
|        |        |           | (M47.26),    |              |              |
|        |        |           | Spinal       |              |              |
|        |        |           | stenosis,    |              |              |
|        |        |           | lumbar       |              |              |
|        |        |           | region,      |              |              |
|        |        |           | without      |              |              |
|        |        |           | neurogenic   |              |              |
|        |        |           | claudication |              |              |
|        |        |           |  (M48.06),   |              |              |
|        |        |           | Lumbar       |              |              |
|        |        |           | radiculopath |              |              |
|        |        |           | y (M54.16),  |              |              |
|        |        |           | Chronic      |              |              |
|        |        |           | left-sided   |              |              |
|        |        |           | low back     |              |              |
|        |        |           | pain with    |              |              |
|        |        |           | left-sided   |              |              |
|        |        |           | sciatica     |              |              |
|        |        |           | (M54.42,     |              |              |
|        |        |           | G89.29),     |              |              |
|        |        |           | Status post  |              |              |
|        |        |           | laminectomy  |              |              |
|        |        |           | (Z98.890)    |              |              |
|        |        |           | Procedures   |              |              |
|        |        |           | WI ARTHDSIS  |              |              |
|        |        |           | POST/POSTERO |              |              |
|        |        |           | LATRL/POSTIN |              |              |
|        |        |           | TERBODY      |              |              |
|        |        |           | LUMBAR       |              |              |
|        |        |           | POSTERIOR    |              |              |
|        |        |           | NON-SEGMENTA |              |              |
|        |        |           | L            |              |              |
|        |        |           | INSTRUMENTAT |              |              |
|        |        |           | ION  WI INSJ |              |              |
|        |        |           |  BIOMCHN DEV |              |              |
 
|        |        |           |              |              |              |
|        |        |           | INTERVERTEBR |              |              |
|        |        |           | AL DSC SPC   |              |              |
|        |        |           | W/ARTHRD     |              |              |
|        |        |           | LAMINEC/FACE |              |              |
|        |        |           | TECT/FORAMIN |              |              |
|        |        |           | ,LUMBAR 1    |              |              |
|        |        |           | SEG  WI      |              |              |
|        |        |           | LAMINEC/FACE |              |              |
|        |        |           | TECT/FORAMIN |              |              |
|        |        |           | ,EACH ADDNL  |              |              |
|        |        |           |  L4-5        |              |              |
|        |        |           | Transforamin |              |              |
|        |        |           | al Lumbar    |              |              |
|        |        |           | Interbody    |              |              |
|        |        |           | Fusion       |              |              |
+--------+--------+-----------+--------------+--------------+--------------+
 
 
 
 
 Encounter Details
 
 
+--------+---------+----------------------+----------------------+----------------------+
| Date   | Type    | Department           | Care Team            | Description          |
+--------+---------+----------------------+----------------------+----------------------+
| / | Surgery |   [facility] |   Dreyer, Jason A,   | L4-5 Transforaminal  |
| 2017   |         |  [facility] | DO  [address]    | Lumbar Interbody     |
|        |         |   [address]       | [address]  [place] | Fusion               |
|        |         | [place]      |  [id number]  [phone] |                      |
|        |         | [address]           |   [phone] (Fax) |                      |
|        |         | [phone]         |                      |                      |
+--------+---------+----------------------+----------------------+----------------------+
 
 
 
 Social History
 
 
+--------------+-------+-----------+--------+------+
| Tobacco Use  | Types | Packs/Day | Years  | Date |
|              |       |           | Used   |      |
+--------------+-------+-----------+--------+------+
| Never Smoker |       |           |        |      |
+--------------+-------+-----------+--------+------+
 
 
 
+---------------------+---+---+---+
| Smokeless Tobacco:  |   |   |   |
| Never Used          |   |   |   |
+---------------------+---+---+---+
 
 
 
+-------------+----------------------+---------+----------------------+
| Alcohol Use | Drinks/Week          | oz/Week | Comments             |
+-------------+----------------------+---------+----------------------+
| Yes         |   0 Standard drinks  | 0.0     | 1-2 drinks 2-4 times |
 
|             | or equivalent        |         |  per month           |
+-------------+----------------------+---------+----------------------+
 
 
 
+------------------+---------------+
| Sex Assigned at  | Date Recorded |
| Birth            |               |
+------------------+---------------+
| Not on file      |               |
+------------------+---------------+
 
 
 
+----------------+-------------+-------------+
| Job Start Date | Occupation  | Industry    |
+----------------+-------------+-------------+
| Not on file    | Not on file | Not on file |
+----------------+-------------+-------------+
 
 
 
+----------------+--------------+------------+
| Travel History | Travel Start | Travel End |
+----------------+--------------+------------+
 
 
 
+-------------------------------------+
| No recent travel history available. |
+-------------------------------------+
 documented as of this encounter
 
 Last Filed Vital Signs
 
 
+-------------------+---------------------+----------------------+----------+
| Vital Sign        | Reading             | Time Taken           | Comments |
+-------------------+---------------------+----------------------+----------+
| Blood Pressure    | 125/68              | 2017  7:40 AM  |          |
|                   |                     | PDT                  |          |
+-------------------+---------------------+----------------------+----------+
| Pulse             | 65                  | 2017  7:45 AM  |          |
|                   |                     | PDT                  |          |
+-------------------+---------------------+----------------------+----------+
| Temperature       | 37.5   C (99.5   F) | 2017  7:40 AM  |          |
|                   |                     | PDT                  |          |
+-------------------+---------------------+----------------------+----------+
| Respiratory Rate  | 16                  | 2017  7:40 AM  |          |
|                   |                     | PDT                  |          |
+-------------------+---------------------+----------------------+----------+
| Oxygen Saturation | 93%                 | 2017  7:45 AM  |          |
|                   |                     | PDT                  |          |
+-------------------+---------------------+----------------------+----------+
| Inhaled Oxygen    | -                   | -                    |          |
| Concentration     |                     |                      |          |
+-------------------+---------------------+----------------------+----------+
| Weight            | 102.5 kg (226 lb)   | 2017  9:36 AM  |          |
|                   |                     | PDT                  |          |
+-------------------+---------------------+----------------------+----------+
 
| Height            | 190.5 cm (6' 3")    | 2017  9:36 AM  |          |
|                   |                     | PDT                  |          |
+-------------------+---------------------+----------------------+----------+
| Body Mass Index   | 28.25               | 2017  9:36 AM  |          |
|                   |                     | PDT                  |          |
+-------------------+---------------------+----------------------+----------+
 documented in this encounter
 
 Discharge Summaries
 Myke Jean PA-C - 2017  7:59 AM PDTFormatting of this note might be diffe
rent from the original.
 DISCHARGE SUMMARY
 
 Pt. Name/Age/:  Deni Hampton    67 y.o.   1950       
 Medical Record Number:  [id number]
 Date of Admission:  2017       
 Date of Discharge:  8/3/2017
 
 Admitting Physician:  Jason A Dreyer, DO         PCP:  Ck Michelle
 Discharging Physician: Myke Jean PA-C 
      
 
 Primary Discharge Dx: 
 1. Spondylosis L4-5. 
 2. Stenosis L4-5.
 3. Lumbar radiculopathy. 
 4. Lumbago. 
 5. History of laminectomy L4-5.
 6. Left foot drop.
 Secondary Discharge Dx:  
 Patient Active Problem List 
 Diagnosis 
   Lumbago with sciatica, right side 
   Low back pain 
   Atherosclerosis 
   Osteoarthritis of spine with radiculopathy, lumbar region - 2017 
   Beta Blockers - Daily Use 
   BPH (benign prostatic hyperplasia) 
   LAURA (obstructive sleep apnea) - H/O CPAP Use 
   H/O TKA Total knee arthroplasty, BILATERAL 
   H/O LEFT Ankle fusion 
   H/O Lumbar discectomy L1-2, L2-3 -  
   Anticipated difficulty with intubation 
  
 
 Reason for Admission (Brief):  Mr. Hampton is a 67-year-old man, who presents with signs and 
symptoms and radiographic evidence of back pain, leg pain, and leg weakness. He previously u
nderwent laminectomy by Dr. Paredes in . MRI and x-rays of the lumbar spine revealed spo
ndylosis and stenosis at L4-5. Given the progression of his symptoms, the patient decided to
 proceed with transforaminal lumbar interbody fusion, L4-5.  
  
 Hospital Course, including Complications: 
 On the day of admission the patient was admitted to [facility] and underwent a  L4-5 f
usion .  Patient was transferred to PACU and then to the neurosurgical floor.  In brief, the
 hospital stay was uncomplicated, the patient mobilized well with physical therapy and occup
ational therapy.  There were no cardiac issues, pulmonary issues, evidence of DVT or infecti
on. Appropriate discharge plans were made in line with his progress and mobility and he was 
ultimately discharged to home.
 
 Medications Reconciled upon Discharge are: 
 
  
 Discharge Medications 
  
 New Medications  
   Details 
 diazePAM 5 mg tablet
  Take 1 tablet by mouth every 6 hours as needed for Muscle spasms.
 aka:  VALIUM
  
 HYDROcodone-acetaminophen  mg per tablet
 Replaces:  HYDROcodone-acetaminophen 5-325 mg per tablet
  Take 1-2 tablets by mouth every 4 hours as needed for Pain.
 aka:  NORCO
  
 lactulose 10 g/15 mL solution
  Take 30 mLs by mouth Daily as needed for Constipation.
  
  
 Unchanged Medications  
   Details 
 ALLEGRA-D ALLERGY & CONGESTION  MG per tablet
 Generic drug:  fexofenadine-pseudoePHEDrine
  Take 1 tablet by mouth Twice  daily as needed.
  
 ASCORBIC ACID PO
  Take  by mouth.
  
 atorvaSTATin 40 mg tablet
  Take 40 mg by mouth Daily.
 aka:  LIPITOR
  
 cholecalciferol 1,000 units tablet
  Take 1,000 Units by mouth Daily.
 aka:  VITAMIN D-3
  
 FLUoxetine 20 mg capsule
  Take 20 mg by mouth Daily.
 aka:  PROzac
  
 fluticasone 50 mcg/nasal spray
  1 spray by Nasal route Daily.
 aka:  FLONASE
  
 metoprolol succinate 25 mg 24 hr tablet
  Take 25 mg by mouth Daily.
 aka:  TOPROL-XL
  
 vitamin B complex tablet
  Take 1 tablet by mouth Daily.
  
  
 Discontinued Medications  
 aspirin 81 mg EC tablet
  
 HYDROcodone-acetaminophen 5-325 mg per tablet
 aka:  NORCO
 Replaced by:  HYDROcodone-acetaminophen  mg per tablet
  
 naproxen 250 mg tablet
 aka:  NAPROSYN
 
  
  
 
  
 
 Condition on Discharge: Stable
 
 Disposition:  Patient was discharged to home
 
 Follow-Up Plans:   
     Follow-up with:  Dr. Dreyer's office in 4 weeks
     Follow-up with primary care physician as needed.
     Diet: Resume regular diet
     Activity:  Continue to follow guidelines and precautions as previously discussed.
     Brace: B brace
   
 
 Electronically signed by:    Myke Jean, 8/3/2017 7:59 
 [facility]
 Electronically signed by Myke Jean PA-C at 2017  8:55 AM PDTdocumented in
 this encounter
 
 Discharge Instructions
 Instructions Myke Jean PA-C - 2017Discharge Instructions for Lumbar Fusi
on
 You had a lumbar fusion. During this procedure, your doctor locked together (fused) some of
 the bones in your spine. This limits the movement of these bones to help relieve your pain.
Here  s what you need to know about home care following a spinal fusion.
 Activity
  Arrange your household to keep the items you need within reach.
  Remove electrical cords, throw rugs, and anything else that may cause you to fall.
  Use a walkeror handrails until your balance, flexibility, and strength improve. And re
member to ask for help from others when you need it.
  Free up your hands so that you can use them to keep balance. Use a woody pack, apron, or
 pockets to carry things. Be sure not to carry too much at once.
  Don  t bend or twist at the waist, or raise your hands over your head for the first two
 weeks after your surgery.
  Don  t lift anything heavier than 5 pounds for the first four weeks after surgery.
  Don  t sit for more than30 to 45 minutes at a time. Take frequent short walks. They a
re the key to your recovery. As your back feels better please gradually increase the distanc
e you walk as discussed with your provider. 
  Don  t drive until your doctor says it  s OK. And never drive while you are taking opi
oid pain medication.
  Nap if you are tired, but don  t stay in bed all day.
  Use chairs with arms. The arms make it easier for you to stand up and sit down.
  If you have not yet received instructions about physical therapy, ask your doctor about 
them.
 Incision care
  Check your incision daily for redness, tenderness, or drainage.
  Don  t soak your wound in water (no hot tubs, bathtubs, swimming pools) until your doct
or says it  s OK.
  As long as you keep your incision dry you can shower as desired. After 5 days you may le
t shower water run over the incision but do not submerse the incision under water until afte
r you see your provider. Gently pat the incision dry. Don  t rub it, or apply creams or lot
ions. And if you feel unsteady while standing to shower, use a shower stool or chair.
 Other home care
  Use nonslip bath mats, grab bars, an elevated toilet seat, and a shower chair in your ba
throom.
  Take your medication exactly as directed.
  Don  t take nonsteroidal anti-inflammatory medications (NSAIDs), such as ibuprofen. The
 
y may delay or prevent proper fusion of the spine.
  If you smoke, stop! This will be one of the most important things you can do to help you
 recover from surgery.
  Wear your back brace, if one was prescribed, as directed by your doctor.
 Follow-up
  Most patients will be seen approximately 4 weeks after surgery. Be sure to get your 1 mo
nth post op x-rays prior to your 1 month post op appointment before your appointment. 
 
  [id number] [employer]. [address] [place] [address]. All righ
ts reserved. This information is not intended as a substitute for professional medical care.
 Always follow your healthcare professional's instructions.
 
 
 documented in this encounter
 
 Medications at Time of Discharge
 
 
+----------------------+----------------------+-----------+---------+----------+-----------+
| Medication           | Sig                  | Dispensed | Refills | Start    | End Date  |
|                      |                      |           |         | Date     |           |
+----------------------+----------------------+-----------+---------+----------+-----------+
|   atorvaSTATin       | Take 40 mg by mouth  |           | 0       | 20 |           |
| (LIPITOR) 40 mg      | Daily.               |           |         | 15       |           |
| tablet               |                      |           |         |          |           |
+----------------------+----------------------+-----------+---------+----------+-----------+
|   B Complex Vitamins | Take 1 tablet by     |           | 0       |          |           |
|  (VITAMIN B COMPLEX) | mouth Daily.         |           |         |          |           |
|  tablet              |                      |           |         |          |           |
+----------------------+----------------------+-----------+---------+----------+-----------+
|   fluticasone        | 1 spray by Nasal     |           | 0       |          |           |
| (FLONASE) 50         | route Daily.         |           |         |          |           |
| mcg/nasal spray      |                      |           |         |          |           |
+----------------------+----------------------+-----------+---------+----------+-----------+
|   metoprolol         | Take 25 mg by mouth  |           | 0       | 20 |           |
| succinate            | Daily.               |           |         | 17       |           |
| (TOPROL-XL) 25 mg 24 |                      |           |         |          |           |
|  hr tablet           |                      |           |         |          |           |
+----------------------+----------------------+-----------+---------+----------+-----------+
|   ASCORBIC ACID PO   | Take  by mouth.      |           | 0       |          |  |
|                      |                      |           |         |          | 0         |
+----------------------+----------------------+-----------+---------+----------+-----------+
|   cholecalciferol    | Take 1,000 Units by  |           | 0       |          |  |
| (VITAMIN D-3) 1,000  | mouth Daily.         |           |         |          | 0         |
| units tablet         |                      |           |         |          |           |
+----------------------+----------------------+-----------+---------+----------+-----------+
|   diazePAM (VALIUM)  | Take 1 tablet by     |   90      | 0       | 20 | 11/15/201 |
| 5 mg tablet          | mouth every 6 hours  | tablet    |         | 17       | 7         |
|                      | as needed for Muscle |           |         |          |           |
|                      |  spasms.             |           |         |          |           |
+----------------------+----------------------+-----------+---------+----------+-----------+
|                      | Take 1 tablet by     |           | 0       |          |  |
| fexofenadine-pseudoe | mouth Twice  daily   |           |         |          | 0         |
| PHEDrine (ALLEGRA-D  | as needed.           |           |         |          |           |
| ALLERGY &            |                      |           |         |          |           |
| CONGESTION)    |                      |           |         |          |           |
| MG per tablet        |                      |           |         |          |           |
+----------------------+----------------------+-----------+---------+----------+-----------+
|   FLUoxetine         | Take 20 mg by mouth  |           | 0       |          |  |
| (PROZAC) 20 mg       | Daily.               |           |         |          | 0         |
 
| capsule              |                      |           |         |          |           |
+----------------------+----------------------+-----------+---------+----------+-----------+
|                      | Take 1-2 tablets by  |   120     | 0       | 20 | 11/15/201 |
| HYDROcodone-acetamin | mouth every 4 hours  | tablet    |         | 17       | 7         |
| ophen (NORCO)  | as needed for Pain.  |           |         |          |           |
|  mg per tablet       |                      |           |         |          |           |
+----------------------+----------------------+-----------+---------+----------+-----------+
|   lactulose 10 g/15  | Take 30 mLs by mouth |   240 mL  | 2       | 20 | 11/15/201 |
| mL solution          |  Daily as needed for |           |         | 17       | 7         |
|                      |  Constipation.       |           |         |          |           |
+----------------------+----------------------+-----------+---------+----------+-----------+
 documented as of this encounter
 
 Progress Notes
 Sara Hwang RN - 2017 11:52 AM PDTDischarge home with walker accompanied by kaz ruiz with discharge instructions and RX's denies any questions at this time.Electronically sig
ryan by: Sara Hwang RN 8/3/2017 11:53
 Electronically signed by Sara Hwnag RN at 2017 11:53 AM Sara Oropeza RN -
 2017 10:50 AM PDTAVS and RX's given and explained to Ruben. Acknowledged understanding 
all questions answered. PIV is dc'd and MAXIME is out.Electronically signed by: Sara Hwang RN 8/3/2017 10:51
 Electronically signed by Sara Hwang RN at 2017 10:51 AM Myke Chow PA-C - 2017  7:33 AM PDTFormatting of this note might be different from the original
.
 [facility]
 
 NEUROSURGERY PROGRESS NOTE 
 
 PATIENT NAME: Deni Hampton
 AGE: 67 y.o.
 DATE OF SERVICE: 8/3/2017 7:33
 
 S: The patient is improving this AM. He has no new issues since yesterday. Complains of steffanie
n in the low back under control with medicine. Walking well. Voiding, passing flatus. Feels 
comfortable going home today. 
 
 O:
 
 CURRENT MEDICATIONS: 
 Current Facility-Administered Medications 
 Medication Dose Route Frequency Provider Last Rate Last Dose 
   acetaminophen (TYLENOL) tablet 650 mg  650 mg Oral Q4H PRN Myke Jean PA-C  
   
   atorvaSTATin (LIPITOR) tablet 40 mg  40 mg Oral Daily Myke Jean PA-C   40 m
g at 17 
   bisacodyl (DULCOLAX) suppository 10 mg  10 mg Rectal Daily PRN MAYCOL Hernandez     
   calcium carbonate (TUMS) chewable tablet 1,000 mg  1,000 mg Oral Q2H PRN Myke Jean PA-C     
   diazePAM (VALIUM) injection 2.5-5 mg  2.5-5 mg Intravenous Q6H PRN Myke Jean PA-C     
   diazePAM (VALIUM) tablet 5 mg  5 mg Oral Q6H PRN Myke Jean PA-C     
   diphenhydrAMINE (BENADRYL) injection 12.5 mg  12.5 mg Intravenous Q4H PRN Myke Jean PA-C     
  Or 
   diphenhydrAMINE (BENADRYL) tablet 25 mg  25 mg Oral Q4H PRN Myke Jean PA-C 
    
  Or 
   diphenhydrAMINE (BENADRYL) 12.5 mg/5 mL liquid 25 mg  25 mg Oral Q4H PRN Myke Jean PA-C     
 
   docusate sodium (COLACE) capsule 100 mg  100 mg Oral BID Myke Jean PA-C   1
00 mg at 17 
   enalaprilat (VASOTEC) injection 1.25 mg  1.25 mg Intravenous Q6H PRN Myke andrew PA-C     
   FLUoxetine (PROzac) capsule 20 mg  20 mg Oral Daily Myke Jean PA-C   20 mg 
at 17 0911 
   fluticasone (FLONASE) 50 mcg/nasal spray 1 spray  1 spray Nasal Daily Myke cunha PA-C   1 spray at 17 0911 
   HYDROcodone-acetaminophen (NORCO)  mg per tablet 1-2 tablet  1-2 tablet Oral Q4H 
PRN Myke Jean PA-C   1 tablet at 17 2132 
   labetalol (TRANDATE) 5 mg/mL injection 10 mg  10 mg Intravenous Q10 Min PRN Myke Molina PA-C     
   lactulose liquid 30 mL  30 mL Oral Daily PRN Myke Jean PA-C     
   magnesium hydroxide (MILK OF MAGNESIA) 400 mg/5 mL suspension 30 mL  30 mL Oral BID PRN
 Myke Jean PA-C   30 mL at 17 0700 
   menthol (HALLS COUGH DROP) lozenge 1 lozenge  1 lozenge Buccal Q2H PRN Myke kingsley PA-C     
   methocarbamol (ROBAXIN) tablet 750 mg  750 mg Oral Q8H PRN Myke Jean PA-C  
   
   metoclopramide (REGLAN) tablet 10 mg  10 mg Oral Q4H PRN Myke Jean, JUNI    
 
   metoprolol succinate (TOPROL-XL) ER tablet 25 mg  25 mg Oral Daily Myke Jean PA-C   25 mg at 17 0911 
   morphine injection 1-2 mg  1-2 mg Intravenous Q1H PRN Myke Jean PA-C     
   ondansetron (ZOFRAN ODT) disintegrating tablet 4 mg  4 mg Oral Q6H PRN Myke kingsley PA-C     
   ondansetron (ZOFRAN) injection 4 mg  4 mg Intravenous Q6H PRN RAJ Hernandez     
   phenol (CHLORASEPTIC) spray 1-2 spray  1-2 spray Mouth/Throat Q3H PRN Myke cunha PA-C     
   polyethylene glycol (MIRALAX) powder 17 g  17 g Oral Daily PRN MAYCOL Hernandez     
   prochlorperazine (COMPAZINE) tablet 5 mg  5 mg Oral Q6H PRN Myke Jean PA-C 
    
   senna (SENOKOT) tablet 8.6 mg  8.6 mg Oral BID Myke Jean PA-C   8.6 mg at 0
17 2132 
   sodium chloride 0.9% (NS) infusion   Intravenous Continuous Myke Jean PA-C 
50 mL/hr at 17 0522   
 
 ALLERGIES: 
 Allergies 
 Allergen Reactions 
   Meperidine Nausea And Vomiting 
   Oxycodone Other (See Comments) 
   Hallucinations & sleepiness 
 
 PHYSICAL EXAMINATION:
 Temp:  [37.2 C (99 F)-37.3 C (99.1 F)] 37.2 C (99 F)
 Pulse:  [66-73] 66
 Resp:  [16] 16
 BP: (115-130)/(64-68) 130/68
 
 Intake/Output Summary (Last 24 hours) at 17 0733
 Last data filed at 17 0335
  Gross per 24 hour 
 Intake             1810 ml 
 Output             1400 ml 
 Net              410 ml 
 
 GENERAL: Deni Hampton is in no acute distress with unlabored respirations.   
 
 HEENT:
 HEAD/FACE:
 EYES:
  
 Normocephalic and atraumatic.  There are no areas of recent trauma.  
 Normal sclerae without icterus.   
 CHEST: Clear.  
 HEART: Regular. 
 EXTREMITIES: No edema or swelling.  SCD's 
 BACK: The back incisions are dress and a drain is in place with expected output. 
 NEUROLOGICAL EXAM:
 
 MENTAL STATUS: 
 The patient is awake, alert, and oriented.  
 He follows simple and complex commands.
 He speech is fluent, his comprehends speech well, and his repeats well.  
 He has no apparent deficits with short or long term memory. 
 MOTOR EXAM: Motor strength is improved 
 SENSORY EXAM: Sensory exam is improved 
 
 24 HOUR LABS:
 All Component Based Labs  
    17
 1005   
  ABO O  
  Antibody Screen Negative  
  Rh Type Positive  
  
 
 ASSESSMENT:
 NEUROSURGICAL DIAGNOSES:
 S/p lumbar fusion
 
 HOSPITAL/GENERAL DIAGNOSES:
 Past Medical History: 
 Diagnosis Date 
   Allergic rhinitis  
   Anxiety disorder  
   Atherosclerosis  
   BPH (benign prostatic hyperplasia)  
   HLD (hyperlipidemia)  
   Internal derangement of knee  
   Low back pain  
   Lumbago with sciatica, right side  
   Osteoarthritis, hand  
   Osteoarthrosis, unspecified whether generalized or localized, unspecified site  
   Sleep apnea  
  uses CPAP 
   Unspecified disorder of nose and nasal sinuses  
 
 PLAN:
 
 S/p lumbar fusion, Hospital day 3
 - Neurologically stable and pain control is appropriate. 
 - I removed MAXIME drain today and tied stitch in place. 
 - Medically stable
 - Mobilize, PT/OT
 - SCD's
 - Working on BM/bowel function.  Encouraged activity and medications to assist
 - Disp: Home today
 
 
 ELECTRONICALLY SIGNED BY:  Myke Jena PA-C,  8/3/2017  7:33Electronically signed by
 Myke Jean PA-C at 2017  7:54 AM Myke Chow PA-C - 20  7:41 AM PDTFormatting of this note might be different from the original.
 [facility]
 
 NEUROSURGERY PROGRESS NOTE 
 
 PATIENT NAME: Deni Hampton
 AGE: 67 y.o.
 DATE OF SERVICE: 2017 7:45
 
 S: The patient is doing well this AM. He walked with therapy yesterday without issue. Pain 
under control with medicine. Voiding without issue. No flatus, No BM. 
 
 O:
 
 CURRENT MEDICATIONS: 
 Current Facility-Administered Medications 
 Medication Dose Route Frequency Provider Last Rate Last Dose 
   acetaminophen (TYLENOL) tablet 650 mg  650 mg Oral Q4H PRN Myke Jean PA-C  
   
   atorvaSTATin (LIPITOR) tablet 40 mg  40 mg Oral Daily Myke Jean PA-C   40 m
g at 17 
   bisacodyl (DULCOLAX) suppository 10 mg  10 mg Rectal Daily PRN MAYCOL Hernandez     
   calcium carbonate (TUMS) chewable tablet 1,000 mg  1,000 mg Oral Q2H PRN Myke Jean PA-C     
   diazePAM (VALIUM) injection 2.5-5 mg  2.5-5 mg Intravenous Q6H PRN Myke Jean PA-C     
   diazePAM (VALIUM) tablet 5 mg  5 mg Oral Q6H PRN Myke Jean PA-C     
   diphenhydrAMINE (BENADRYL) injection 12.5 mg  12.5 mg Intravenous Q4H PRN Myke Jean PA-C     
  Or 
   diphenhydrAMINE (BENADRYL) tablet 25 mg  25 mg Oral Q4H PRN Myke Jean PA-C 
    
  Or 
   diphenhydrAMINE (BENADRYL) 12.5 mg/5 mL liquid 25 mg  25 mg Oral Q4H PRN Myke Jean PA-C     
   docusate sodium (COLACE) capsule 100 mg  100 mg Oral BID Myke Jean PA-C   1
00 mg at 17 
   enalaprilat (VASOTEC) injection 1.25 mg  1.25 mg Intravenous Q6H PRN Myke andrew PA-C     
   FLUoxetine (PROzac) capsule 20 mg  20 mg Oral Daily Myke Jean PA-C   20 mg 
at 17 0858 
   fluticasone (FLONASE) 50 mcg/nasal spray 1 spray  1 spray Nasal Daily Myke cunha PA-C   1 spray at 17 0859 
   HYDROcodone-acetaminophen (NORCO)  mg per tablet 1-2 tablet  1-2 tablet Oral Q4H 
PRN Myke Jean PA-C   1 tablet at 08/01/17 2032 
   labetalol (TRANDATE) 5 mg/mL injection 10 mg  10 mg Intravenous Q10 Min PRN Myke Molina PA-C     
   lactulose liquid 30 mL  30 mL Oral Daily PRN Myke Jean PA-C     
   magnesium hydroxide (MILK OF MAGNESIA) 400 mg/5 mL suspension 30 mL  30 mL Oral BID PRN
 Myke Jean PA-C     
   menthol (HALLS COUGH DROP) lozenge 1 lozenge  1 lozenge Buccal Q2H PRN Myke kingsley PA-C     
   methocarbamol (ROBAXIN) tablet 750 mg  750 mg Oral Q8H PRN Myke Jean PA-C  
   
   metoclopramide (REGLAN) tablet 10 mg  10 mg Oral Q4H PRN Myke Jean PA-C    
 
 
   metoprolol succinate (TOPROL-XL) ER tablet 25 mg  25 mg Oral Daily Myke Jean PA-C   25 mg at 17 0858 
   morphine injection 1-2 mg  1-2 mg Intravenous Q1H PRN Myke Jean PA-C     
   ondansetron (ZOFRAN ODT) disintegrating tablet 4 mg  4 mg Oral Q6H PRN Myke kingsley PA-C     
   ondansetron (ZOFRAN) injection 4 mg  4 mg Intravenous Q6H PRN RAJ Hernandez     
   phenol (CHLORASEPTIC) spray 1-2 spray  1-2 spray Mouth/Throat Q3H PRN Myke cunha PA-C     
   polyethylene glycol (MIRALAX) powder 17 g  17 g Oral Daily PRN MAYCOL Hernandez     
   prochlorperazine (COMPAZINE) tablet 5 mg  5 mg Oral Q6H PRN Myke Jean PA-C 
    
   senna (SENOKOT) tablet 8.6 mg  8.6 mg Oral BID Myke Jean PA-C   8.6 mg at 0
17 
   sodium chloride 0.9% (NS) infusion   Intravenous Continuous Myke Jean PA-C 
50 mL/hr at 17 0522   
 
 ALLERGIES: 
 Allergies 
 Allergen Reactions 
   Meperidine Nausea And Vomiting 
   Oxycodone Other (See Comments) 
   Hallucinations & sleepiness 
 
 PHYSICAL EXAMINATION:
 Temp:  [36.4 C (97.5 F)-37.8 C (100 F)] 37.7 C (99.8 F)
 Pulse:  [62-75] 69
 Resp:  [16-18] 16
 BP: ()/(53-59) 103/55
 
 Intake/Output Summary (Last 24 hours) at 17 0745
 Last data filed at 17 0700
  Gross per 24 hour 
 Intake             2442 ml 
 Output             3640 ml 
 Net            -1198 ml 
 
 GENERAL: Deni Hampton is in no acute distress with unlabored respirations.   
 HEENT:
 HEAD/FACE:
 EYES:
  
 Normocephalic and atraumatic.  There are no areas of recent trauma.  
 Normal sclerae without icterus.   
 CHEST: Clear.  
 HEART: Regular. 
 EXTREMITIES: No edema or swelling.  SCD's 
 BACK: The back incisions are dress and a drain is in place with expected output. 
 NEUROLOGICAL EXAM:
 
 MENTAL STATUS: 
 The patient is awake, alert, and oriented.  
 He follows simple and complex commands.
 He speech is fluent, his comprehends speech well, and his repeats well.  
 He has no apparent deficits with short or long term memory. 
 MOTOR EXAM: Motor strength is improved 
 SENSORY EXAM: Sensory exam is improved 
 
 24 HOUR LABS:
 
 All Component Based Labs  
    17
 1005   
  ABO O  
  Antibody Screen Negative  
  Rh Type Positive  
  
 
 ASSESSMENT:
 NEUROSURGICAL DIAGNOSES:
 S/p lumbar fusion
 
 HOSPITAL/GENERAL DIAGNOSES:
 Past Medical History: 
 Diagnosis Date 
   Allergic rhinitis  
   Anxiety disorder  
   Atherosclerosis  
   BPH (benign prostatic hyperplasia)  
   HLD (hyperlipidemia)  
   Internal derangement of knee  
   Low back pain  
   Lumbago with sciatica, right side  
   Osteoarthritis, hand  
   Osteoarthrosis, unspecified whether generalized or localized, unspecified site  
   Sleep apnea  
  uses CPAP 
   Unspecified disorder of nose and nasal sinuses  
 
 PLAN:
 
 S/p lumbar fusion, Hospital day 2
 - Neurologically stable and pain control is appropriate. 
 -Take suction off MAXIME drain this AM. Plan to remove tomorrow
 - Medically stable
 - Mobilize, PT/OT
 - SCD's
 - Working on BM/bowel function.  Encouraged activity and medications to assist
 - Disp: Likely home tomorrow. 
 
 ELECTRONICALLY SIGNED BY:  Myke Jean PA-C,  2017  7:45Electronically signed by
 Myke Jean PA-C at 2017  7:45 AM Myke Chow PA-C - 20  7:28 AM PDTFormatting of this note might be different from the original.
 [facility]
 
 NEUROSURGERY PROGRESS NOTE 
 
 PATIENT NAME: Deni Hampton
 AGE: 67 y.o.
 DATE OF SERVICE: 2017 7:28
 
 S: The patient is doing well this AM. He complains of manageable pain in his low back. His 
preoperative burning pain seems to be improved. He has not walked much yet.  Voiding without
 issue. No flatus, no BM. 
 
 O:
 
 CURRENT MEDICATIONS: 
 Current Facility-Administered Medications 
 Medication Dose Route Frequency Provider Last Rate Last Dose 
 
   acetaminophen (TYLENOL) tablet 650 mg  650 mg Oral Q4H PRN Myke Jean PA-C  
   
   atorvaSTATin (LIPITOR) tablet 40 mg  40 mg Oral Daily Myke Jean PA-C     
   bisacodyl (DULCOLAX) suppository 10 mg  10 mg Rectal Daily PRN MAYCOL Hernandez     
   calcium carbonate (TUMS) chewable tablet 1,000 mg  1,000 mg Oral Q2H PRN Myke Jean PA-C     
   diazePAM (VALIUM) injection 2.5-5 mg  2.5-5 mg Intravenous Q6H PRN Myke Jean PA-C     
   diazePAM (VALIUM) tablet 5 mg  5 mg Oral Q6H PRREEN Jean PA-C     
   diphenhydrAMINE (BENADRYL) injection 12.5 mg  12.5 mg Intravenous Q4H PRN Myke Jean PA-C     
  Or 
   diphenhydrAMINE (BENADRYL) tablet 25 mg  25 mg Oral Q4H PRN Myke Jean PA-C 
    
  Or 
   diphenhydrAMINE (BENADRYL) 12.5 mg/5 mL liquid 25 mg  25 mg Oral Q4H PRN Myke Jean PA-C     
   docusate sodium (COLACE) capsule 100 mg  100 mg Oral BID Myke Jean PA-C   1
00 mg at 17 
   enalaprilat (VASOTEC) injection 1.25 mg  1.25 mg Intravenous Q6H PRN RAJ SadlerC     
   FLUoxetine (PROzac) capsule 20 mg  20 mg Oral Daily Myke Jean PA-C     
   fluticasone (FLONASE) 50 mcg/nasal spray 1 spray  1 spray Nasal Daily Myke cunha PA-C     
   HYDROcodone-acetaminophen (NORCO)  mg per tablet 1-2 tablet  1-2 tablet Oral Q4H 
PRN Myke Jean PA-C   1 tablet at 17 0519 
   labetalol (TRANDATE) 5 mg/mL injection 10 mg  10 mg Intravenous Q10 Min PRN Myke Molina PA-C     
   lactulose liquid 30 mL  30 mL Oral Daily PRN Myke Jena PA-C     
   magnesium hydroxide (MILK OF MAGNESIA) 400 mg/5 mL suspension 30 mL  30 mL Oral BID PRN
 Myke Jean PA-C     
   menthol (HALLS COUGH DROP) lozenge 1 lozenge  1 lozenge Buccal Q2H PRN Myke kingsley PA-C     
   methocarbamol (ROBAXIN) tablet 750 mg  750 mg Oral Q8H PRN Myke Jean PA-C  
   
   metoclopramide (REGLAN) tablet 10 mg  10 mg Oral Q4H PRN Myke Jean PA-C    
 
   metoprolol succinate (TOPROL-XL) ER tablet 25 mg  25 mg Oral Daily Myke Jean PA-C     
   morphine injection 1-2 mg  1-2 mg Intravenous Q1H PRN Myke Jean PA-C     
   ondansetron (ZOFRAN ODT) disintegrating tablet 4 mg  4 mg Oral Q6H PRN Myke kingsley PA-C     
   ondansetron (ZOFRAN) injection 4 mg  4 mg Intravenous Q6H PRN RAJ Hernandez     
   phenol (CHLORASEPTIC) spray 1-2 spray  1-2 spray Mouth/Throat Q3H PRN Myke cunha PA-C     
   polyethylene glycol (MIRALAX) powder 17 g  17 g Oral Daily PRN MAYCOL Hernandez     
   prochlorperazine (COMPAZINE) tablet 5 mg  5 mg Oral Q6H PRN Myke Jean PA-C 
    
   senna (SENOKOT) tablet 8.6 mg  8.6 mg Oral BID Myke Jean PA-C   8.6 mg at 0
17 
   sodium chloride 0.9% (NS) infusion   Intravenous Continuous Myke Jean PA-C 
50 mL/hr at 17 0522   
 
 ALLERGIES: 
 Allergies 
 Allergen Reactions 
   Meperidine Nausea And Vomiting 
 
   Oxycodone Other (See Comments) 
   Hallucinations & sleepiness 
 
 PHYSICAL EXAMINATION:
 Temp:  [36 C (96.8 F)-36.5 C (97.7 F)] 36.1 C (96.9 F)
 Pulse:  [60-83] 68
 Resp:  [10-18] 16
 BP: ()/(53-84) 111/54
 
 Intake/Output Summary (Last 24 hours) at 17 0728
 Last data filed at 17 0600
  Gross per 24 hour 
 Intake             3553 ml 
 Output             1508 ml 
 Net             2045 ml 
 
 GENERAL: Deni Hampton is in no acute distress with unlabored respirations.   
 HEENT:
 HEAD/FACE:
 EYES:
  
 Normocephalic and atraumatic.  There are no areas of recent trauma.  
 Normal sclerae without icterus.   
 CHEST: Clear.  
 HEART: Regular. 
 EXTREMITIES: No edema or swelling.  SCD's 
 BACK: The back incisions are dress and a drain is in place with expected output. 
 NEUROLOGICAL EXAM:
 
 MENTAL STATUS: 
 The patient is awake, alert, and oriented.  
 He follows simple and complex commands.
 He speech is fluent, his comprehends speech well, and his repeats well.  
 He has no apparent deficits with short or long term memory. 
 MOTOR EXAM: Motor strength is improved 
 SENSORY EXAM: Sensory exam is improved 
 
 24 HOUR LABS:
 All Component Based Labs  
    17
 1005   
  ABO O  
  Antibody Screen Negative  
  Rh Type Positive  
  
 
 ASSESSMENT:
 NEUROSURGICAL DIAGNOSES:
 S/p lumbar fusion
 
 HOSPITAL/GENERAL DIAGNOSES:
 Past Medical History: 
 Diagnosis Date 
   Allergic rhinitis  
   Anxiety disorder  
   Atherosclerosis  
   BPH (benign prostatic hyperplasia)  
   HLD (hyperlipidemia)  
   Internal derangement of knee  
   Low back pain  
 
   Lumbago with sciatica, right side  
   Osteoarthritis, hand  
   Osteoarthrosis, unspecified whether generalized or localized, unspecified site  
   Sleep apnea  
  uses CPAP 
   Unspecified disorder of nose and nasal sinuses  
 
 PLAN:
 
 S/p lumbar fusion, Hospital day 1
 - Neurologically stable and pain control is appropriate.
 - Medically stable
 - Mobilize, PT/OT
 - SCD's
 - Working on BM/bowel function.  Encouraged activity and medications to assist
 - Drain output is as expected.  Continue drain
 - Disp: Likely home in 1-2 days
 
 ELECTRONICALLY SIGNED BY:  Myke Jean PA-C,  2017  7:28
 Electronically signed by Myke Jean PA-C at 2017  7:33 AM PDTdocumented in
 this encounter
 
 Plan of Treatment
 
 
+--------+---------+-----------+----------------------+-------------+
| Date   | Type    | Specialty | Care Team            | Description |
+--------+---------+-----------+----------------------+-------------+
| / | Office  | Neurology |   Ravindra Munoz MD  [address]             |
|    | Visit   |           |  [address] [address]      |             |
|        |         |           | [place],  |             |
|        |         |           | [place] [address]             |             |
|        |         |           | [phone]         |             |
|        |         |           | [phone] (Fax)   |             |
+--------+---------+-----------+----------------------+-------------+
 
 
 
+-------------+------+--------+----------------------+----------------------+
| Name        | Type | Priori | Associated Diagnoses | Order Schedule       |
|             |      | ty     |                      |                      |
+-------------+------+--------+----------------------+----------------------+
| DME: Walker | DME  | Routin |   Gait abnormality   | DME 1 Time for 1     |
|             |      | e      |                      | Occurrences starting |
|             |      |        |                      |  2017 until    |
|             |      |        |                      | 2017           |
+-------------+------+--------+----------------------+----------------------+
 documented as of this encounter
 
 Procedures
 
 
+----------------------+--------+-------------+----------------------+----------------------
+
| Procedure Name       | Priori | Date/Time   | Associated Diagnosis | Comments             
|
|                      | ty     |             |                      |                      
|
+----------------------+--------+-------------+----------------------+----------------------
+
 
| XR LUMBAR SPINE 2 OR | STAT   | 2017  |                      |   Results for this   
|
|  3 VW                |        |  3:45 PM    |                      | procedure are in the 
|
|                      |        | PDT         |                      |  results section.    
|
+----------------------+--------+-------------+----------------------+----------------------
+
| FL TORI STATS NO     | Routin | 2017  |                      |   Results for this   
|
| CHARGE               | e      |  1:46 PM    |                      | procedure are in the 
|
|                      |        | PDT         |                      |  results section.    
|
+----------------------+--------+-------------+----------------------+----------------------
+
| LAMINECTOMY          |        | 2017  |   Osteoarthritis of  |                      
|
| PLIF/TLIF            |        | 11:54 AM    | spine with           |                      
|
| INSTRUMENTATION      |        | PDT         | radiculopathy,       |                      
|
|                      |        |             | lumbar region        |                      
|
|                      |        |             | (M47.26), Spinal     |                      
|
|                      |        |             | stenosis, lumbar     |                      
|
|                      |        |             | region, without      |                      
|
|                      |        |             | neurogenic           |                      
|
|                      |        |             | claudication         |                      
|
|                      |        |             | (M48.06), Lumbar     |                      
|
|                      |        |             | radiculopathy        |                      
|
|                      |        |             | (M54.16), Chronic    |                      
|
|                      |        |             | left-sided low back  |                      
|
|                      |        |             | pain with left-sided |                      
|
|                      |        |             |  sciatica (M54.42,   |                      
|
|                      |        |             | G89.29),   Status    |                      
|
|                      |        |             | post laminectomy     |                      
|
|                      |        |             | (Z98.890)            |                      
|
+----------------------+--------+-------------+----------------------+----------------------
+
 
 
 
+---+--------+
|   |   Case |
|   |  Notes |
 
|   |        |
|   | Origin |
|   | al     |
|   | Reques |
|   | t      |
|   | Inform |
|   | ation  |
|   | Sent   |
|   | Over   |
|   | / |
|   | :I |
|   | nstrum |
|   | ents/S |
|   | pecial |
|   |        |
|   | Equipm |
|   | ent:   |
|   | Drill, |
|   |        |
|   | Micros |
|   | cope,  |
|   | Metrx, |
|   |        |
|   | O-Arm, |
|   |        |
|   | Stealt |
|   | h      |
|   | Biolog |
|   | ics:   |
|   | Infuse |
|   | , Bone |
|   |  Chips |
|   |        |
|   | Table: |
|   |        |
|   | Jackso |
|   | n      |
|   | Frame  |
|   | REP:   |
|   | Marlo  |
|   | Juan |
|   |        |
|   | Implan |
|   | ts:    |
|   | TLIF   |
|   | Cage,  |
|   | Screws |
|   |  Est   |
|   | time:  |
|   | 120min |
+---+--------+
|   |        |
|   | Specia |
|   | l      |
|   | Needs  |
|   |  Marlo |
|   |        |
|   | Juan |
|   |  -     |
|   | TLIF   |
 
|   | Cage,  |
|   | Screws |
|   | Table: |
|   |        |
|   | Jackso |
|   | n      |
|   | Frame  |
+---+--------+
 
 
 
+-----------------+--------+-------------+---+----------------------+
| TYPE AND SCREEN | Routin | 2017  |   |   Results for this   |
|                 | e      | 10:05 AM    |   | procedure are in the |
|                 |        | PDT         |   |  results section.    |
+-----------------+--------+-------------+---+----------------------+
 documented in this encounter
 
 Results
 XR Lumbar Spine 2 or 3 Vw (2017  3:45 PM PDT)
 
+----------+
| Specimen |
+----------+
|          |
+----------+
 
 
 
+------------------------------------------------------------------------+---------------+
| Narrative                                                              | Performed At  |
+------------------------------------------------------------------------+---------------+
|   XR LUMBAR SPINE 2 OR 3 VW 2017 3:45 PM     HISTORY: post op     |   [facility] |
| lumbar surgery.     COMPARISON: 4/3/2017     FINDINGS:  Interval PLIF  |               |
| and interbody fusion of L4-L5, without evidence of immediate           |               |
| postoperative complication.  Expected soft tissue postsurgical changes |               |
|  including a left paraspinal surgical  drainage catheter.  Vertebral   |               |
| body heights and alignment is maintained. Mild early spondylosis at    |               |
| L3-L4.      IMPRESSION -  Interval PLIF and interbody fusion of L4-L5, |               |
|  without evidence of immediate  postoperative complication.            |               |
| Dictated and Signed by: Joseph Liao MD    Electronically signed:   |               |
| 2017 5:37 PM                                                      |               |
+------------------------------------------------------------------------+---------------+
 
 
 
+------------------------------------------------------------------------------------------+
| Procedure Note                                                                           |
+------------------------------------------------------------------------------------------+
|   [facility], Rad Results In - 2017  5:40 PM PDT  XR LUMBAR SPINE 2 OR 3 VW 2017     |
| 3:45 PMHISTORY: post op lumbar surgery.COMPARISON: 4/3/2017FINDINGS:Interval PLIF and    |
| interbody fusion of L4-L5, without evidence of immediatepostoperative                    |
| complication.Expected soft tissue postsurgical changes including a left paraspinal       |
| surgicaldrainage catheter.Vertebral body heights and alignment is maintained. Mild early |
|  spondylosis atL3-L4. IMPRESSION -Interval PLIF and interbody fusion of L4-L5, without   |
| evidence of immediatepostoperative complication.Dictated and Signed by: Joseph Liao,   |
| MD  Electronically signed: 2017 5:37 PM                                             |
|postoperative complication.                                                               |
|Expected soft tissue postsurgical changes including a left paraspinal surgical            |
|drainage catheter.                                                                        |
 
|Vertebral body heights and alignment is maintained. Mild early spondylosis at             |
|L3-L4.                                                                                    |
|                                                                                          |
|IMPRESSION -                                                                              |
|Interval PLIF and interbody fusion of L4-L5, without evidence of immediate                |
|postoperative complication.                                                               |
|                                                                                          |
|Dictated and Signed by: Joseph Liao MD                                                 |
| Electronically signed: 2017 5:37 PM                                                 |
+------------------------------------------------------------------------------------------+
 
 
 
+---------------+---------+--------------------+--------------+
| Performing    | Address | City/State/Zipcode | Phone Number |
| Organization  |         |                    |              |
+---------------+---------+--------------------+--------------+
|   [facility] |         |                    |              |
+---------------+---------+--------------------+--------------+
 FL C-Arm Stats No Charge (2017  1:46 PM PDT)
 
+----------+
| Specimen |
+----------+
|          |
+----------+
 
 
 
+-----------------------------------------------------------+---------------+
| Narrative                                                 | Performed At  |
+-----------------------------------------------------------+---------------+
|   No Radiologist interpretation, please see Chart Review. |   [facility] |
+-----------------------------------------------------------+---------------+
 
 
 
+---------------+---------+--------------------+--------------+
| Performing    | Address | City/State/Zipcode | Phone Number |
| Organization  |         |                    |              |
+---------------+---------+--------------------+--------------+
|   [facility] |         |                    |              |
+---------------+---------+--------------------+--------------+
 Type and Screen (2017 10:05 AM PDT)
 
+-----------+----------+-----------+-------------+--------------+
| Component | Value    | Ref Range | Performed   | Pathologist  |
|           |          |           | At          | Signature    |
+-----------+----------+-----------+-------------+--------------+
| ABO       | O        |           | [facility]  |              |
|           |          |           | [facility]    |              |
|           |          |           | [facility]     |              |
|           |          |           | [facility] -    |              |
|           |          |           | BLOOD BANK  |              |
+-----------+----------+-----------+-------------+--------------+
| Rh Type   | Positive |           | [facility]  |              |
|           |          |           | [facility]    |              |
|           |          |           | [facility]     |              |
|           |          |           | [facility] -    |              |
|           |          |           | BLOOD BANK  |              |
 
+-----------+----------+-----------+-------------+--------------+
| Antibody  | Negative |           | [facility]  |              |
| Screen    |          |           | [facility]    |              |
|           |          |           | [facility]     |              |
|           |          |           | CENTER -    |              |
|           |          |           | BLOOD BANK  |              |
+-----------+----------+-----------+-------------+--------------+
 
 
 
+----------+
| Specimen |
+----------+
| Blood    |
+----------+
 
 
 
+----------------------+--------------------+--------------------+--------------+
| Performing           | Address            | City/State/Zipcode | Phone Number |
| Organization         |                    |                    |              |
+----------------------+--------------------+--------------------+--------------+
|   [facility]     |   [address] |   [place], [place]  |              |
| [facility]  |                    | [address]              |              |
| - BLOOD BANK         |                    |                    |              |
+----------------------+--------------------+--------------------+--------------+
 documented in this encounter
 
 Visit Diagnoses
 Not on filedocumented in this encounter
 
 Administered Medications
 
 
+-----------------------------------+--------+----------+-------+------+------+
| Medication Order                  | MAR    | Action   | Dose  | Rate | Site |
|                                   | Action | Date     |       |      |      |
+-----------------------------------+--------+----------+-------+------+------+
|   atorvaSTATin (LIPITOR) tablet   | Given  | 20 | 40 mg |      |      |
| 40 mg  40 mg, Oral, DAILY, First  |        | 17  9:32 |       |      |      |
| dose on 17 at 1630,      |        |  PM PDT  |       |      |      |
| Post-op/Phase II                  |        |          |       |      |      |
+-----------------------------------+--------+----------+-------+------+------+
 
 
 
+-------+----------+-------+---+---+
| Given | 20 | 40 mg |   |   |
|       | 17  8:32 |       |   |   |
|       |  PM PDT  |       |   |   |
+-------+----------+-------+---+---+
 
 
 
+---+---+
|   |   |
+---+---+
 
 
 
 
+---------------------------------+-------+----------+--------+---+----------+
|   bupivacaine 0.5%-EPINEPHrine  | Given | 20 | 20 mLs |   | Surgical |
| 1:200,000 injection  PRN,       |       | 17 12:27 |        |   |  Site    |
| Starting Mon 17 at 1227,   |       |  PM PDT  |        |   |          |
| Intra-op                        |       |          |        |   |          |
+---------------------------------+-------+----------+--------+---+----------+
 
 
 
+-----------------------------------+---+
|                                   |   |
+-----------------------------------+---+
|   diphenhydrAMINE (BENADRYL) 12.5 |   |
|  mg/5 mL liquid 25 mg  25 mg,     |   |
| Oral, EVERY 4 HOURS PRN, Itching, |   |
|  Starting 17 at 1603,    |   |
| Oral route is preferred.,         |   |
| Post-op/Phase II                  |   |
+-----------------------------------+---+
|                                   |   |
+-----------------------------------+---+
|   diphenhydrAMINE (BENADRYL)      |   |
| injection 12.5 mg  12.5 mg,       |   |
| Intravenous, EVERY 4 HOURS PRN,   |   |
| Itching, Starting 17 at  |   |
| 1603, Oral route is preferred.,   |   |
| Post-op/Phase II                  |   |
+-----------------------------------+---+
|                                   |   |
+-----------------------------------+---+
|   diphenhydrAMINE (BENADRYL)      |   |
| tablet 25 mg  25 mg, Oral, EVERY  |   |
| 4 HOURS PRN, Itching, Starting    |   |
| 17 at 1603, Oral route   |   |
| is preferred., Post-op/Phase II   |   |
+-----------------------------------+---+
|                                   |   |
+-----------------------------------+---+
 
 
 
+-----------------------------------+-------+----------+--------+---+---+
|   docusate sodium (COLACE)        | Given | 20 | 100 mg |   |   |
| capsule 100 mg  100 mg, Oral, 2   |       | 17  8:41 |        |   |   |
| TIMES DAILY, First dose on Mon    |       |  AM PDT  |        |   |   |
| 17 at 2100, First line agent |       |          |        |   |   |
|  for constipation, Post-op/Phase  |       |          |        |   |   |
| II                                |       |          |        |   |   |
+-----------------------------------+-------+----------+--------+---+---+
 
 
 
+-------+----------+--------+---+---+
| Given | 20 | 100 mg |   |   |
|       | 17  9:32 |        |   |   |
|       |  PM PDT  |        |   |   |
+-------+----------+--------+---+---+
| Given | 20 | 100 mg |   |   |
|       | 17  9:11 |        |   |   |
|       |  AM PDT  |        |   |   |
 
+-------+----------+--------+---+---+
 
 
 
+---+---+
|   |   |
+---+---+
 
 
 
+-----------------------------------+-------+----------+-------+---+---+
|   FLUoxetine (PROzac) capsule 20  | Given | 20 | 20 mg |   |   |
| mg  20 mg, Oral, DAILY, First     |       | 17  8:41 |       |   |   |
| dose on Tue 8/1/17 at 0900,       |       |  AM PDT  |       |   |   |
| Post-op/Phase II                  |       |          |       |   |   |
+-----------------------------------+-------+----------+-------+---+---+
 
 
 
+-------+----------+-------+---+---+
| Given | 20 | 20 mg |   |   |
|       | 17  9:11 |       |   |   |
|       |  AM PDT  |       |   |   |
+-------+----------+-------+---+---+
| Given | 20 | 20 mg |   |   |
|       | 17  8:58 |       |   |   |
|       |  AM PDT  |       |   |   |
+-------+----------+-------+---+---+
 
 
 
+---+---+
|   |   |
+---+---+
 
 
 
+-----------------------------------+-------+----------+---------+---+----------+
|   fluticasone (FLONASE) 50        | Given | 20 | 1 spray |   | [facility] |
| mcg/nasal spray 1 spray  1 spray, |       | 17  8:42 |         |   | h        |
|  Nasal, DAILY, First dose on Mon  |       |  AM PDT  |         |   |          |
| 17 at 1630, Shake gently.,   |       |          |         |   |          |
| Post-op/Phase II                  |       |          |         |   |          |
+-----------------------------------+-------+----------+---------+---+----------+
 
 
 
+-------+----------+---------+---+----------+
| Given | 20 | 1 spray |   | [facility] |
|       | 17  9:11 |         |   | h        |
|       |  AM PDT  |         |   |          |
+-------+----------+---------+---+----------+
| Given | 20 | 1 spray |   | [facility] |
|       | 17  8:59 |         |   | h        |
|       |  AM PDT  |         |   |          |
+-------+----------+---------+---+----------+
 
 
 
+---+---+
 
|   |   |
+---+---+
 
 
 
+-----------------------------------+-------+----------+----------+---+---+
|   HYDROcodone-acetaminophen       | Given | 20 | 1 tablet |   |   |
| (NORCO)  mg per tablet 1-2  |       | 17 10:37 |          |   |   |
| tablet  1-2 tablet, Oral, EVERY 4 |       |  AM PDT  |          |   |   |
|  HOURS PRN, Pain, Starting Mon    |       |          |          |   |   |
| 17 at 1603, Post-op/Phase II |       |          |          |   |   |
+-----------------------------------+-------+----------+----------+---+---+
 
 
 
+-------+----------+----------+---+---+
| Given | 20 | 1 tablet |   |   |
|       | 17  9:32 |          |   |   |
|       |  PM PDT  |          |   |   |
+-------+----------+----------+---+---+
| Given | 20 | 1 tablet |   |   |
|       | 17  9:11 |          |   |   |
|       |  AM PDT  |          |   |   |
+-------+----------+----------+---+---+
 
 
 
+---+---+
|   |   |
+---+---+
 
 
 
+-----------------------------------+-------+----------+--------+---+---+
|   magnesium hydroxide (MILK OF    | Given | 20 | 30 mLs |   |   |
| MAGNESIA) 400 mg/5 mL suspension  |       | 17  7:00 |        |   |   |
| 30 mL  30 mL, Oral, 2 TIMES DAILY |       |  AM PDT  |        |   |   |
|  PRN, Constipation, Starting Mon  |       |          |        |   |   |
| 17 at 1603, If docusate,     |       |          |        |   |   |
| senna, and polyethylene glycol    |       |          |        |   |   |
| ineffective x 24 hours or not     |       |          |        |   |   |
| ordered, Post-op/Phase II         |       |          |        |   |   |
+-----------------------------------+-------+----------+--------+---+---+
 
 
 
+-------+----------+--------+---+---+
| Given | 20 | 30 mLs |   |   |
|       | 17  9:39 |        |   |   |
|       |  PM PDT  |        |   |   |
+-------+----------+--------+---+---+
 
 
 
+---+---+
|   |   |
+---+---+
 
 
 
 
+-----------------------------------+-------+----------+-------+---+---+
|   metoprolol succinate            | Given | 20 | 25 mg |   |   |
| (TOPROL-XL) ER tablet 25 mg  25   |       | 17  8:41 |       |   |   |
| mg, Oral, DAILY, First dose on    |       |  AM PDT  |       |   |   |
| Tue 17 at 0900, Tablet may be |       |          |       |   |   |
|  cut where scored but do not      |       |          |       |   |   |
| crush., Post-op/Phase II          |       |          |       |   |   |
+-----------------------------------+-------+----------+-------+---+---+
 
 
 
+-------+----------+-------+---+---+
| Given | 20 | 25 mg |   |   |
|       | 17  9:11 |       |   |   |
|       |  AM PDT  |       |   |   |
+-------+----------+-------+---+---+
| Given | 20 | 25 mg |   |   |
|       | 17  8:58 |       |   |   |
|       |  AM PDT  |       |   |   |
+-------+----------+-------+---+---+
 
 
 
+---+---+
|   |   |
+---+---+
 
 
 
+----------------------------------+-------+----------+--------+---+----------+
|   ropivacaine (NAROPIN) 2 mg/mL  | Given | 20 | 60 mLs |   | Surgical |
| (0.2%) injection  PRN, Starting  |       | 17  1:27 |        |   |  Site    |
| 17 at 1327, Intra-op    |       |  PM PDT  |        |   |          |
+----------------------------------+-------+----------+--------+---+----------+
 
 
 
+---+---+
|   |   |
+---+---+
 
 
 
+-----------------------------------+-------+----------+--------+---+---+
|   senna (SENOKOT) tablet 8.6 mg   | Given | 20 | 8.6 mg |   |   |
| 8.6 mg, Oral, 2 TIMES DAILY,      |       | 17  8:41 |        |   |   |
| First dose on 17 at      |       |  AM PDT  |        |   |   |
| 2100, If docusate ineffective or  |       |          |        |   |   |
| not ordered, Post-op/Phase II     |       |          |        |   |   |
+-----------------------------------+-------+----------+--------+---+---+
 
 
 
+-------+----------+--------+---+---+
| Given | 20 | 8.6 mg |   |   |
|       | 17  9:32 |        |   |   |
|       |  PM PDT  |        |   |   |
+-------+----------+--------+---+---+
| Given | 20 | 8.6 mg |   |   |
|       | 17  9:11 |        |   |   |
 
|       |  AM PDT  |        |   |   |
+-------+----------+--------+---+---+
 
 
 
+---+---+
|   |   |
+---+---+
 
 
 
+-----------------------------------+---------+----------+---+----------+---+
|   sodium chloride 0.9% (NS)       | New Bag | 20 |   | 50 mL/hr |   |
| infusion  at 100 mL/hr,           |         | 17  5:22 |   |          |   |
| Intravenous, CONTINUOUS, Starting |         |  AM PDT  |   |          |   |
|  Mon 17 at 1630,             |         |          |   |          |   |
| Post-op/Phase II                  |         |          |   |          |   |
+-----------------------------------+---------+----------+---+----------+---+
 
 
 
+---------+----------+---+-------+---+
| New Bag | 20 |   | 100   |   |
|         | 17  5:04 |   | mL/hr |   |
|         |  PM PDT  |   |       |   |
+---------+----------+---+-------+---+
 
 
 
+---+---+
|   |   |
+---+---+
 documented in this encounter

## 2020-06-08 NOTE — XMS
Encounter Summary
  Created on: 2020
 
 Memo Deni Siu
 External Reference #: [id number]
 : 52
 Sex: Male
 
 Demographics
 
 
+-----------------------+---------------------------+
| Address               | [address]           |
|                       | [place]  [address] |
+-----------------------+---------------------------+
| Home Phone            | [phone]           |
+-----------------------+---------------------------+
| Preferred Language    | Unknown                   |
+-----------------------+---------------------------+
| Marital Status        |                    |
+-----------------------+---------------------------+
| Uatsdin Affiliation | Unknown                   |
+-----------------------+---------------------------+
| Race                  | Unknown                   |
+-----------------------+---------------------------+
| Ethnic Group          | Unknown                   |
+-----------------------+---------------------------+
 
 
 Author
 
 
+--------------+--------------------------------------------+
| Author       | [facility]  |
|              | and [place]                                |
+--------------+--------------------------------------------+
| Organization | [facility]  |
|              | [facility] [place]                                |
+--------------+--------------------------------------------+
| Address      | Unknown                                    |
+--------------+--------------------------------------------+
| Phone        | Unavailable                                |
+--------------+--------------------------------------------+
 
 
 
 Support
 
 
+---------------+--------------+---------+-----------------+
| Name          | Relationship | Address | Phone           |
+---------------+--------------+---------+-----------------+
| Sofiya A Brown | ECON         | Unknown | [phone] |
+---------------+--------------+---------+-----------------+
 
 
 
 Care Team Providers
 
 
 
+-----------------------+------+-----------------+
| Care Team Member Name | Role | Phone           |
+-----------------------+------+-----------------+
| Emely Hinton    | PCP  | [phone] |
+-----------------------+------+-----------------+
 
 
 
 Reason for Visit
 
 
+-----------+-------------+
| Reason    | Comments    |
+-----------+-------------+
| Follow-up | Memory loss |
+-----------+-------------+
 Evaluate & Treat (Routine)
 
+--------+--------+-----------+--------------+--------------+---------------+
| Status | Reason | Specialty | Diagnoses /  | Referred By  | Referred To   |
|        |        |           | Procedures   | Contact      | Contact       |
+--------+--------+-----------+--------------+--------------+---------------+
| Closed |        | Neurology |   Diagnoses  |   [place],    |   Ravindra Munoz,   |
|        |        |           |  [employer]estic    | MAYCOL Gates  | MD  [address]      |
|        |        |           | disorder due |  [address]     | [address]      |
|        |        |           |  to known    | [address]  | [address]  [address]  |
|        |        |           | physiologica |  [place],  | [place]  [place], |
|        |        |           | l condition  | [place]           |  [place] [address]     |
|        |        |           | ([facility])        | [address]   | Phone:        |
|        |        |           |              | Phone:       | [phone]  |
|        |        |           |              | [phone] |  Fax:         |
|        |        |           |              |   Fax:       | [phone]  |
|        |        |           |              | [phone] |               |
+--------+--------+-----------+--------------+--------------+---------------+
 
 
 
 
 Encounter Details
 
 
+--------+---------+---------------------+----------------------+----------------------+
| Date   | Type    | Department          | Care Team            | Description          |
+--------+---------+---------------------+----------------------+----------------------+
| / | Office  |   [facility]     |   Ravindra Munoz MD  [address] | Memory loss (Primary |
|    | Visit   | NEUROLOGY  [address]     |  [address]      |  Dx); Dementia       |
|        |         | [address]   | [address]  [place],  | without behavioral   |
|        |         | [place], [place]        | [place] [address]             | disturbance,         |
|        |         | [address]          | [phone]         | unspecified dementia |
|        |         | [phone]        | [phone] (Fax)   |  type (HCC)          |
+--------+---------+---------------------+----------------------+----------------------+
 
 
 
 Social History
 
 
+--------------+-------+-----------+--------+------+
 
| Tobacco Use  | Types | Packs/Day | Years  | Date |
|              |       |           | Used   |      |
+--------------+-------+-----------+--------+------+
| Never Smoker |       |           |        |      |
+--------------+-------+-----------+--------+------+
 
 
 
+---------------------+---+---+---+
| Smokeless Tobacco:  |   |   |   |
| Never Used          |   |   |   |
+---------------------+---+---+---+
 
 
 
+-------------+----------------------+---------+--------------+
| Alcohol Use | Drinks/Week          | oz/Week | Comments     |
+-------------+----------------------+---------+--------------+
| Yes         |   0 Standard drinks  | 0.0     | Alcoholic    |
|             | or equivalent        |         | Drinks/day:  |
|             |                      |         | Occasionally |
+-------------+----------------------+---------+--------------+
 
 
 
+------------------+---------------+
| Sex Assigned at  | Date Recorded |
| Birth            |               |
+------------------+---------------+
| Not on file      |               |
+------------------+---------------+
 
 
 
+----------------+-------------+-------------+
| Job Start Date | Occupation  | Industry    |
+----------------+-------------+-------------+
| Not on file    | Not on file | Not on file |
+----------------+-------------+-------------+
 
 
 
+----------------+--------------+------------+
| Travel History | Travel Start | Travel End |
+----------------+--------------+------------+
 
 
 
+-------------------------------------+
| No recent travel history available. |
+-------------------------------------+
 documented as of this encounter
 
 Last Filed Vital Signs
 
 
+-------------------+---------------------+----------------------+----------+
| Vital Sign        | Reading             | Time Taken           | Comments |
+-------------------+---------------------+----------------------+----------+
| Blood Pressure    | 153/74              | 2020  9:48 AM  |          |
 
|                   |                     | PDT                  |          |
+-------------------+---------------------+----------------------+----------+
| Pulse             | 63                  | 2020  9:48 AM  |          |
|                   |                     | PDT                  |          |
+-------------------+---------------------+----------------------+----------+
| Temperature       | 36.3   C (97.3   F) | 2020  9:48 AM  |          |
|                   |                     | PDT                  |          |
+-------------------+---------------------+----------------------+----------+
| Respiratory Rate  | -                   | -                    |          |
+-------------------+---------------------+----------------------+----------+
| Oxygen Saturation | 96%                 | 2020  9:48 AM  |          |
|                   |                     | PDT                  |          |
+-------------------+---------------------+----------------------+----------+
| Inhaled Oxygen    | -                   | -                    |          |
| Concentration     |                     |                      |          |
+-------------------+---------------------+----------------------+----------+
| Weight            | 101.2 kg (223 lb)   | 2020  9:48 AM  |          |
|                   |                     | PDT                  |          |
+-------------------+---------------------+----------------------+----------+
| Height            | 190.5 cm (6' 3")    | 2020  9:48 AM  |          |
|                   |                     | PDT                  |          |
+-------------------+---------------------+----------------------+----------+
| Body Mass Index   | 27.87               | 2020  9:48 AM  |          |
|                   |                     | PDT                  |          |
+-------------------+---------------------+----------------------+----------+
 documented in this encounter
 
 Patient Instructions
 Patient Instructions Ravindra Munoz MD - 2020 11:45 AM PDTStart donepezil 5 mg daily
 
 Follow up in 4 months. Electronically signed by Ravindra Munoz MD at 2020 10:37 AM PDT
 documented in this encounter
 
 Progress Notes
 Ravindra Munoz MD - 2020 11:45 AM PDTFormatting of this note might be different from the o
riginal.
 Referring Physician: MAYCOL Harmon 
 PCP: MAYCOL Harmon 
 Date of Encounter: 2020 
 
 SUBJECTIVE
 Deni Hampton is a pleasant 70 y.o. male with history of hypertension, hyperlipidemia, 
anxiety and depression, LAURA on CPAP, history of GI bleed due to diverticulitis who presents 
for follow up of memory loss.  
 Interval History
 Today patient returns to review results. 
 Today 2020 WACE 73/100, MMSE 25/30, VLOM ratio 3.3.
 Lab results, inter[facility], 20: B12 629. TSH 1.81 normal. CMP fine, except glucose 104, CB
C lymp little low
 Brain MRI [facility]: normal appearnce of the brain. Sinusites despite a Fess proced
ure. 
 Today we also reviewed his history of severe GI bleeds to the point that he passed out with
 a hemoglobin of only 5.  He wonders if that could be affecting his brain function.
 He is interested in medication that may help to improve memory.
 Recap History
 The patient reports short-term memory loss for at least 2 years and has been progressive.  
Symptoms were first noted by his children.  He has difficulty remembering names.  He forget 
things he plans to do.  He forgets the conversation that occurred earlier the same day.  He 
had difficulty finding driving directions in [place] which he has lived most of his life. 
 Last year during a camping trip, he forgot to turn off there burner on the trailer.  Wife s
 
loc feels that he is safe to drive.  Wife feels that he is okay taking his own medications.
  He denies difficulty with basic ADLs.
 
 He denies new medication change over the last 2 years.
 
 Associated symptoms:
 Hallucinations: no
 Bladder incontinence:  no
 Balance: mild, history of left ankle and bilateral knee surgery
 Abnormal movement:  no
 Mood and personality changes:  Occasional agitation
 
 Previous work up
 Has recent labs but we do not have results
 
 Highest education AA + one year
 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  
 Family history negative for dementia or Parkinson disease 
 
 
 ALLERGIES
 Allergies 
 Allergen Reactions 
   Meperidine Nausea And Vomiting 
   Morphine Hallucination 
   Oxycodone Other (See Comments) 
   Hallucinations & sleepiness 
  
 
 MEDICATIONS
 
 Current Outpatient Medications: 
    aspirin 81 mg chewable tablet, Take 81 mg by mouth daily., Disp: , Rfl: 
    atorvaSTATin (LIPITOR) 40 mg tablet, Take 40 mg by mouth Daily., Disp: , Rfl: 
    B Complex Vitamins (VITAMIN B COMPLEX) tablet, Take 1 tablet by mouth Daily., Disp: , 
Rfl: 
    FLUoxetine (PROZAC) 10 mg capsule, Take 10 mg by mouth daily., Disp: , Rfl: 
    fluticasone (FLONASE) 50 mcg/nasal spray, 1 spray by Nasal route Daily., Disp: , Rfl: 
    metoprolol succinate (TOPROL-XL) 25 mg 24 hr tablet, Take 25 mg by mouth Daily., Disp:
 , Rfl: 
    Probiotic Product (PROBIOTIC DAILY PO), Take  by mouth., Disp: , Rfl:  
 
 Family history, social history and past medical history were reviewed and updated as emily stephens.
 
 REVIEW OF SYSTEMS
 In addition to HPI, a comprehensive ROS also revealed:
 All other systems were reviewed and negative 
 
 OBJECTIVE
 General Exam
 Vitals: 
  20 0948 
 BP: 153/74 
 Pulse: 63 
 Temp: 36.3 C (97.3 F) 
 PainSc:   0 - No pain 
  
 WDWN, NAD
 Heart is regular
 
 Neuro Exam
 MS
 Awake, alert. Cooperative and appropriate during the encounter. Speech is clear, fluent and
 coherent. 
 CN
 EOMI. 
 
 DATA
 Results for orders placed or performed in visit on 19 
 Vitamin B-12 
 Result Value Ref Range 
  VITAMIN B-12 909 254 - 1,320 pg/mL 
 Folate 
 Result Value Ref Range 
  Folate >24.0 ng/mL 
  
 
 ASSESSMENT & PLAN
 
 70 y.o. male who presents with progressive worsening cognitive impairment for 2 years at [facility].  Today we we discussed that he has a history of severe GI bleed may contribute to cogni
tive impairment.  However progressive worsening over time would not be explained by the sing
 event. Review of the history did not show significant medical conditions, use of medicati
ons, depression or sleep issues to explain the memory loss.   Would consider dementia due to
 neurodegeneration.   Patient is interested in trying donepezil for symptom measures.
 
 Plan:
 -Start donepezil 5 mg daily.  Potential side effects were reviewed with the patient in [facility].
 - Discussed coping strategy and safety issues. Family to supervise medication and driving c
losely.  He should stop driving if there is any concern of driving safety.
 - Follow up in 3-4 months or sooner as needed
 
 Thank you for allowing me to take care of this patient. Please do not hesitate to contact m
donal if you have any questions.
 Cc:
 MAYCOL Harmon 
 
 This note was prepared using Dragon dictation voice recognition technology. There may be so
und-alike errors even though every effort has been made to ensure accuracy. 
 Electronically signed by Ravindra Munoz MD at 2020  3:00 PM PDTdocumented in this encounter
 
 Plan of Treatment
 
 
+--------+---------+-----------+----------------------+-------------+
| Date   | Type    | Specialty | Care Team            | Description |
+--------+---------+-----------+----------------------+-------------+
| / | Office  | Neurology |   Ravindra Munoz MD  [address] |             |
| 2020   | Visit   |           |  [address]      |             |
|        |         |           | [address]  [address],  |             |
|        |         |           | [place] [address]             |             |
|        |         |           | [phone]         |             |
|        |         |           | [phone] (Fax)   |             |
+--------+---------+-----------+----------------------+-------------+
 documented as of this encounter
 
 Visit Diagnoses
 
 
 
+----------------------------------------------------------------------------+
| Diagnosis                                                                  |
+----------------------------------------------------------------------------+
|   Memory loss - Primary                                                    |
+----------------------------------------------------------------------------+
|   Dementia without behavioral disturbance, unspecified dementia type (HCC) |
+----------------------------------------------------------------------------+
 documented in this encounter

## 2020-06-08 NOTE — XMS
Clinical Summary
  Created on: 2020
 
 Memo Deni Siu
 External Reference #: [id number]
 : 52
 Sex: Male
 
 Demographics
 
 
+-----------------------+---------------------------+
| Address               | [address]           |
|                       | [place]  [address] |
+-----------------------+---------------------------+
| Home Phone            | [phone]           |
+-----------------------+---------------------------+
| Preferred Language    | Unknown                   |
+-----------------------+---------------------------+
| Marital Status        |                    |
+-----------------------+---------------------------+
| Pentec[employer]alism Affiliation | Unknown                   |
+-----------------------+---------------------------+
| Race                  | Unknown                   |
+-----------------------+---------------------------+
| Ethnic Group          | Unknown                   |
+-----------------------+---------------------------+
 
 
 Author
 
 
+--------------+--------------------------------------------+
| Author       | [facility]  |
|              | and [place]                                |
+--------------+--------------------------------------------+
| Organization | [facility]  |
|              | and [place]                                |
+--------------+--------------------------------------------+
| Address      | Unknown                                    |
+--------------+--------------------------------------------+
| Phone        | Unavailable                                |
+--------------+--------------------------------------------+
 
 
 
 Support
 
 
+---------------+--------------+---------+-----------------+
| Name          | Relationship | Address | Phone           |
+---------------+--------------+---------+-----------------+
| Sofiya A Brown | ECON         | Unknown | [phone] |
+---------------+--------------+---------+-----------------+
 
 
 
 Care Team Providers
 
 
 
+-----------------------+------+-----------------+
| Care Team Member Name | Role | Phone           |
+-----------------------+------+-----------------+
| Emely Hinton    | PCP  | [phone] |
+-----------------------+------+-----------------+
 
 
 
 Allergies
 
 
+----------------+----------------------+----------+----------+---------------------+
| Active Allergy | Reactions            | Severity | Noted    | Comments            |
|                |                      |          | Date     |                     |
+----------------+----------------------+----------+----------+---------------------+
| Meperidine     | Nausea And Vomiting  | Medium   | 05/15/20 |                     |
|                |                      |          | 17       |                     |
+----------------+----------------------+----------+----------+---------------------+
| Morphine       | Hallucination        | Medium   | 10/10/20 |                     |
|                |                      |          | 18       |                     |
+----------------+----------------------+----------+----------+---------------------+
| Oxycodone      | Other (See Comments) | Medium   | 20 |   Hallucinations &  |
|                |                      |          | 17       | sleepiness          |
+----------------+----------------------+----------+----------+---------------------+
 
 
 
 Medications
 
 
+----------------------+----------------------+-----------+---------+------+------+-------+
| Medication           | Sig                  | Dispensed | Refills | Star | End  | Statu |
|                      |                      |           |         | t    | Date | s     |
|                      |                      |           |         | Date |      |       |
+----------------------+----------------------+-----------+---------+------+------+-------+
|   atorvaSTATin       | Take 40 mg by mouth  |           | 0       | 02/0 |      | Activ |
| (LIPITOR) 40 mg      | Daily.               |           |         | 4/20 |      | e     |
| tablet               |                      |           |         | 15   |      |       |
+----------------------+----------------------+-----------+---------+------+------+-------+
|   fluticasone        | 1 spray by Nasal     |           | 0       |      |      | Activ |
| (FLONASE) 50         | route Daily.         |           |         |      |      | e     |
| mcg/nasal spray      |                      |           |         |      |      |       |
+----------------------+----------------------+-----------+---------+------+------+-------+
|   metoprolol         | Take 25 mg by mouth  |           | 0       | 02/0 |      | Activ |
| succinate            | Daily.               |           |         | 6/20 |      | e     |
| (TOPROL-XL) 25 mg 24 |                      |           |         | 17   |      |       |
|  hr tablet           |                      |           |         |      |      |       |
+----------------------+----------------------+-----------+---------+------+------+-------+
|   B Complex Vitamins | Take 1 tablet by     |           | 0       |      |      | Activ |
|  (VITAMIN B COMPLEX) | mouth Daily.         |           |         |      |      | e     |
|  tablet              |                      |           |         |      |      |       |
+----------------------+----------------------+-----------+---------+------+------+-------+
|   aspirin 81 mg      | Take 81 mg by mouth  |           | 0       |      |      | Activ |
| chewable tablet      | daily.               |           |         |      |      | e     |
+----------------------+----------------------+-----------+---------+------+------+-------+
|   FLUoxetine         | Take 10 mg by mouth  |           | 0       |      |      | Activ |
| (PROZAC) 10 mg       | daily.               |           |         |      |      | e     |
| capsule              |                      |           |         |      |      |       |
 
+----------------------+----------------------+-----------+---------+------+------+-------+
|   Probiotic Product  | Take  by mouth.      |           | 0       |      |      | Activ |
| (PROBIOTIC DAILY PO) |                      |           |         |      |      | e     |
+----------------------+----------------------+-----------+---------+------+------+-------+
|   donepezil          | Take 1 tablet by     |   30      | 5       | 05/1 |      | Activ |
| (ARICEPT) 5 mg       | mouth nightly.       | tablet    |         | 1/20 |      | e     |
| tablet               |                      |           |         | 20   |      |       |
+----------------------+----------------------+-----------+---------+------+------+-------+
|   Simethicone (GAS-X | Take  by mouth.      |           | 0       |      | 05/ | Disco |
|  PO)                 |                      |           |         |      | 1/20 | ntinu |
|                      |                      |           |         |      | 20   | ed    |
|                      |                      |           |         |      |      | (Ther |
|                      |                      |           |         |      |      | apy   |
|                      |                      |           |         |      |      | compl |
|                      |                      |           |         |      |      | eted) |
+----------------------+----------------------+-----------+---------+------+------+-------+
 
 
 
 Active Problems
 
 
+------------------------------------------------------------------+------------+
| Problem                                                          | Noted Date |
+------------------------------------------------------------------+------------+
| Acute posthemorrhagic anemia                                     | 2018 |
+------------------------------------------------------------------+------------+
| SÁNCHEZ (dyspnea on exertion)                                        | 2018 |
+------------------------------------------------------------------+------------+
| Leukocytosis                                                     | 2018 |
+------------------------------------------------------------------+------------+
| Palpitations                                                     | 2018 |
+------------------------------------------------------------------+------------+
| Rectal bleeding                                                  | 2018 |
+------------------------------------------------------------------+------------+
| Syncope and collapse                                             | 2018 |
+------------------------------------------------------------------+------------+
| Anticipated difficulty with intubation                           | 2017 |
+------------------------------------------------------------------+------------+
| Osteoarthritis of spine with radiculopathy, lumbar region - Jul  | 2017 |
|                                                              |            |
+------------------------------------------------------------------+------------+
| Beta Blockers - Daily Use                                        | 2017 |
+------------------------------------------------------------------+------------+
| BPH (benign prostatic hyperplasia)                               | 2017 |
+------------------------------------------------------------------+------------+
| LAURA (obstructive sleep apnea) - H/O CPAP Use                     | 2017 |
+------------------------------------------------------------------+------------+
 
 
 
+-------------------------+
|   Overview:   uses CPAP |
+-------------------------+
 
 
 
+--------------------------------------------+------------+
| H/O TKA Total knee arthroplasty, BILATERAL | 2017 |
+--------------------------------------------+------------+
 
| H/O LEFT Ankle fusion                      | 2017 |
+--------------------------------------------+------------+
| H/O Lumbar discectomy L1-2, L2-3 - 2000    | 2017 |
+--------------------------------------------+------------+
| Coronary atherosclerosis                   | 2015 |
+--------------------------------------------+------------+
 
 
 
+-------------------------------------------------------------------+
|   Overview:   Last Assessment & Plan: He has evidence of coronary |
|  artery disease given his markedly elevated CT coronary calcium   |
| score of 989 (75th-90th percentile for age). His nuclear stress   |
| test today is reassuring in that there is no evidence of          |
| reversible ischemia or infarct. This suggests that although he    |
| does have evidence of coronary atherosclerosis, it is not         |
| obstructive to the degree to cause ischemia or the coronary       |
| atherosclerosis is extraluminal or within the vessel wall. Hence, |
|  unless he develops symptoms concerning for angina, there is no   |
| current indication for a coronary angiogram. He should still be   |
| aggressively medically treated for his CAD to decrease his risk   |
| of cardiac events in the future. He may proceed with his knee     |
| surgery without further cardiac testing. He is a low to moderate  |
| risk surgical candidate for a moderate risk procedure.-Continue   |
| ASA 81 mg PO daily which I would prefer that he take through      |
| surgery if Dr. Preciado is agreeable. If not, he may still come   |
| off ASA for the procedure.-Start Metoprolol XL 25 mg PO daily for |
|  cardioprotection through surgery in the setting of CAD.-Change   |
| lovastatin to a moderate intensity statin, atorvastatin 40 mg PO  |
| daily to try to reach a goal LDL<70-We will send this note to  |
|  Ilana's office for surgical clearance                         |
+-------------------------------------------------------------------+
 
 
 
+----------------+------------+
| Hyperlipidemia | 2015 |
+----------------+------------+
 
 
 
+---------------------------------------------------------------+
|   Overview:   Last Assessment & Plan: Last LDL 90.-Change     |
| lovastatin to atorvastatin 40 mg PO daily as above to try to  |
| reach LDL goal <70 given CAD                                  |
+---------------------------------------------------------------+
 
 
 
+------------+------------+
| Overweight | 2015 |
+------------+------------+
 
 
 
+------------------------------------------------------------------+
|   Overview:   Last Assessment & Plan: BMI 29.-Extensive dietary  |
| counseling provided regarding adopting a low carbohydrate,       |
| Mediterranean type diet for heart health and weight loss.        |
+------------------------------------------------------------------+
 
 
 
 
+-----------------------------------+---+
| Lumbago with sciatica, right side |   |
+-----------------------------------+---+
| Low back pain                     |   |
+-----------------------------------+---+
| Atherosclerosis                   |   |
+-----------------------------------+---+
 
 
 
 Encounters
 
 
+--------+-----------+-----------+----------------+----------------------+
| Date   | Type      | Specialty | Care Team      | Description          |
+--------+-----------+-----------+----------------+----------------------+
| / | Office    | Neurology |   Ravindra Munoz MD | Memory loss (Primary |
|    | Visit     |           |                |  Dx); Dementia       |
|        |           |           |                | without behavioral   |
|        |           |           |                | disturbance,         |
|        |           |           |                | unspecified dementia |
|        |           |           |                |  type (HCC)          |
+--------+-----------+-----------+----------------+----------------------+
| / | Clinical  | Neurology |   Ravindra Munoz MD | Memory loss (Primary |
|    | Support   |           |                |  Dx)                 |
+--------+-----------+-----------+----------------+----------------------+
 from Last 3 Months
 
 Immunizations
 
 
+----------------------+----------------------+----------+
| Name                 | Administration Dates | Next Due |
+----------------------+----------------------+----------+
| INFLUENZA PF 66 Y OR | 10/05/2017           |          |
|  >,TRIVALENT (FLUAD) |                      |          |
+----------------------+----------------------+----------+
| INFLUENZA TRIV       | 2016           |          |
| W/PRES(PED/ADOL/ADUL |                      |          |
| T),MULTIDOSE         |                      |          |
+----------------------+----------------------+----------+
| PNEUMOCOCCAL         | 2016           |          |
| CONJUGATE 13-VALENT  |                      |          |
| (PCV13)              |                      |          |
+----------------------+----------------------+----------+
| PNEUMOCOCCAL         | 2017           |          |
| POLYSACCHARIDE       |                      |          |
| 23-VALENT (PPSV23)   |                      |          |
+----------------------+----------------------+----------+
| TDAP, (ADOL/ADULT)   | 2014           |          |
+----------------------+----------------------+----------+
| ZOSTER, 1 DOSE       | 2013           |          |
| (ZOSTAVAX)           |                      |          |
+----------------------+----------------------+----------+
 
 
 
 
 Family History
 
 
+---------------------+-----------+------+-------------------+
| Medical History     | Relation  | Name | Comments          |
+---------------------+-----------+------+-------------------+
| No known problems   | Child     |      |                   |
+---------------------+-----------+------+-------------------+
| No known problems   | Child     |      |                   |
+---------------------+-----------+------+-------------------+
| Cancer              | Father    |      | Multiple Myeloma  |
+---------------------+-----------+------+-------------------+
| Coronary artery     | Father    |      |                   |
| disease             |           |      |                   |
+---------------------+-----------+------+-------------------+
| Multiple sclerosis  | Father    |      |                   |
+---------------------+-----------+------+-------------------+
| Heart disease       | Father    |      |                   |
+---------------------+-----------+------+-------------------+
| Multiple sclerosis  | Father    |      |                   |
+---------------------+-----------+------+-------------------+
| Other (see comment) | Father    |      | Multiple myeloma  |
+---------------------+-----------+------+-------------------+
| Cancer              | Maternal  |      |                   |
|                     | Grandfath |      |                   |
|                     | er        |      |                   |
+---------------------+-----------+------+-------------------+
| Cancer              | Maternal  |      |                   |
|                     | Grandmoth |      |                   |
|                     | er        |      |                   |
+---------------------+-----------+------+-------------------+
| Cancer              | Mother    |      | Pancreatic        |
+---------------------+-----------+------+-------------------+
| Lymphoma            | Mother    |      |                   |
+---------------------+-----------+------+-------------------+
| Rheum arthritis     | Mother    |      |                   |
+---------------------+-----------+------+-------------------+
| Cancer              | Mother    |      | pancreatic cancer |
+---------------------+-----------+------+-------------------+
| Heart disease       | Paternal  |      |                   |
|                     | Grandfath |      |                   |
|                     | er        |      |                   |
+---------------------+-----------+------+-------------------+
| No known problems   | Paternal  |      |                   |
|                     | Grandmoth |      |                   |
|                     | er        |      |                   |
+---------------------+-----------+------+-------------------+
| Lupus               | Sister    |      |                   |
+---------------------+-----------+------+-------------------+
| Other (see comment) | Sister    |      | Meniere's disease |
+---------------------+-----------+------+-------------------+
| Colon cancer        | Neg Hx    |      |                   |
+---------------------+-----------+------+-------------------+
| Colon polyps        | Neg Hx    |      |                   |
+---------------------+-----------+------+-------------------+
 
 
 
+----------------------+------+----------+-------------------+
| Relation             | Name | Status   | Comments          |
 
+----------------------+------+----------+-------------------+
| Child                |      | Other    | STATUS NOT LISTED |
+----------------------+------+----------+-------------------+
| Child                |      | Other    | STATUS NOT LISTED |
+----------------------+------+----------+-------------------+
| Child                |      |          |                   |
+----------------------+------+----------+-------------------+
| Child                |      |          |                   |
+----------------------+------+----------+-------------------+
| Father               |      |          |                   |
+----------------------+------+----------+-------------------+
| Father               |      |          |                   |
+----------------------+------+----------+-------------------+
| Father               |      |          |                   |
+----------------------+------+----------+-------------------+
| Maternal Grandfather |      |  |                   |
|                      |      |   (Age   |                   |
|                      |      | 68)      |                   |
+----------------------+------+----------+-------------------+
| Maternal Grandmother |      |  |                   |
|                      |      |   (Age   |                   |
|                      |      | 73)      |                   |
+----------------------+------+----------+-------------------+
| Mother               |      |          |                   |
+----------------------+------+----------+-------------------+
| Mother               |      |          |                   |
+----------------------+------+----------+-------------------+
| Mother               |      |          |                   |
+----------------------+------+----------+-------------------+
| Paternal Grandfather |      |  | HEART             |
+----------------------+------+----------+-------------------+
| Paternal Grandmother |      |  |                   |
|                      |      |   (Age   |                   |
|                      |      | 76)      |                   |
+----------------------+------+----------+-------------------+
| Sister               |      | Alive    |                   |
+----------------------+------+----------+-------------------+
| Sister               |      |          |                   |
+----------------------+------+----------+-------------------+
| Sister               |      |          |                   |
+----------------------+------+----------+-------------------+
 
 
 
 Social History
 
 
+--------------+-------+-----------+--------+------+
| Tobacco Use  | Types | Packs/Day | Years  | Date |
|              |       |           | Used   |      |
+--------------+-------+-----------+--------+------+
| Never Smoker |       |           |        |      |
+--------------+-------+-----------+--------+------+
 
 
 
+---------------------+---+---+---+
| Smokeless Tobacco:  |   |   |   |
| Never Used          |   |   |   |
+---------------------+---+---+---+
 
 
 
 
+-------------+----------------------+---------+--------------+
| Alcohol Use | Drinks/Week          | oz/Week | Comments     |
+-------------+----------------------+---------+--------------+
| Yes         |   0 Standard drinks  | 0.0     | Alcoholic    |
|             | or equivalent        |         | Drinks/day:  |
|             |                      |         | Occasionally |
+-------------+----------------------+---------+--------------+
 
 
 
+------------------+---------------+
| Sex Assigned at  | Date Recorded |
| Birth            |               |
+------------------+---------------+
| Not on file      |               |
+------------------+---------------+
 
 
 
+----------------+-------------+-------------+
| Job Start Date | Occupation  | Industry    |
+----------------+-------------+-------------+
| Not on file    | Not on file | Not on file |
+----------------+-------------+-------------+
 
 
 
+----------------+--------------+------------+
| Travel History | Travel Start | Travel End |
+----------------+--------------+------------+
 
 
 
+-------------------------------------+
| No recent travel history available. |
+-------------------------------------+
 
 
 
 Last Filed Vital Signs
 
 
+-------------------+---------------------+----------------------+----------+
| Vital Sign        | Reading             | Time Taken           | Comments |
+-------------------+---------------------+----------------------+----------+
| Blood Pressure    | 153/74              | 2020  9:48 AM  |          |
|                   |                     | PDT                  |          |
+-------------------+---------------------+----------------------+----------+
| Pulse             | 63                  | 2020  9:48 AM  |          |
|                   |                     | PDT                  |          |
+-------------------+---------------------+----------------------+----------+
| Temperature       | 36.3   C (97.3   F) | 2020  9:48 AM  |          |
|                   |                     | PDT                  |          |
+-------------------+---------------------+----------------------+----------+
| Respiratory Rate  | 16                  | 2018 12:14 PM  |          |
|                   |                     | PDT                  |          |
+-------------------+---------------------+----------------------+----------+
 
| Oxygen Saturation | 96%                 | 2020  9:48 AM  |          |
|                   |                     | PDT                  |          |
+-------------------+---------------------+----------------------+----------+
| Inhaled Oxygen    | -                   | -                    |          |
| Concentration     |                     |                      |          |
+-------------------+---------------------+----------------------+----------+
| Weight            | 101.2 kg (223 lb)   | 2020  9:48 AM  |          |
|                   |                     | PDT                  |          |
+-------------------+---------------------+----------------------+----------+
| Height            | 190.5 cm (6' 3")    | 2020  9:48 AM  |          |
|                   |                     | PDT                  |          |
+-------------------+---------------------+----------------------+----------+
| Body Mass Index   | 27.87               | 2020  9:48 AM  |          |
|                   |                     | PDT                  |          |
+-------------------+---------------------+----------------------+----------+
 
 
 
 Plan of Treatment
 
 
+--------+---------+-----------+----------------------+-------------+
| Date   | Type    | Specialty | Care Team            | Description |
+--------+---------+-----------+----------------------+-------------+
| / | Office  | Neurology |   Ravindra Munoz MD  [address] |             |
|    | Visit   |           |  [address]      |             |
|        |         |           | [place]  |             |
|        |         |           | [place] [address]             |             |
|        |         |           | [phone]         |             |
|        |         |           | [phone] (Fax)   |             |
+--------+---------+-----------+----------------------+-------------+
 
 
 
+---------------------+-----------+------------------------+----------+
| Health Maintenance  | Due Date  | Last Done              | Comments |
+---------------------+-----------+------------------------+----------+
| Hepatitis C         |  |                        |          |
| Screening           | 1         |                        |          |
+---------------------+-----------+------------------------+----------+
| Vaccine: Zoster (2  |  | 2013             |          |
| of 3)               | 3         |                        |          |
+---------------------+-----------+------------------------+----------+
| Adult Annual        |  |                        |          |
| Wellness Visit      | 5         |                        |          |
+---------------------+-----------+------------------------+----------+
| Vaccine: Influenza  |  | 10/05/2017, 2016 |          |
| (Season Ended)      | 0         |                        |          |
+---------------------+-----------+------------------------+----------+
| Colorectal Cancer   |  | 2013             |          |
| Screening           | 3         |                        |          |
| (Colonoscopy)       |           |                        |          |
+---------------------+-----------+------------------------+----------+
| Vaccine:            |  | 2014             |          |
| Dtap/Tdap/Td (2 -   | 4         |                        |          |
| Td)                 |           |                        |          |
+---------------------+-----------+------------------------+----------+
| Vaccine:            | Completed | 2017, 2016 |          |
| Pneumococcal 65+    |           |                        |          |
+---------------------+-----------+------------------------+----------+
 
 
 
 
 Implants
 
 
+-------------------------------+--------+--------+-------------+--------+--------+--------+
| Implanted                     | Type   | Area   | Manufacture | Device | Shelf  | Model  |
|                               |        |        | r           |        | Expira | /      |
|                               |        |        |             | Identi | tion   | Serial |
|                               |        |        |             | fier   | Date   |  / Lot |
+-------------------------------+--------+--------+-------------+--------+--------+--------+
| Bone Chips 15cc -             | Bone   | Poster | RTI         |        | / | [id number] |
| [id number]: Qty: 1  |        | ior:   | BIOLOGICS   |        |    |        |
| on 2017 by Dreyer,      |        | Spine  | INC - [employer]  |        |        | /[id number] |
| Chacho AGUIRRE DO at [facility] |        | Lumbar |             |        |        | 6-016  |
|  [facility]    |        |        |             |        |        | /60-31 |
|                               |        |        |             |        |        | 28     |
+-------------------------------+--------+--------+-------------+--------+--------+--------+
| Imp Spn Spcr [id number] -        | Generi | Poster | [employer] - |        | / | [id number] |
| [id number]: Qty: 1 on | c      | ior:   |  MEDT       |        |    | 1 /    |
|  2017 by Dreyer, Jason  |        | Spine  |             |        |        | / |
| DO TIMOTHY at [facility] |        | Lumbar |             |        |        | 673    |
|  [facility]          |        |        |             |        |        |        |
+-------------------------------+--------+--------+-------------+--------+--------+--------+
| Imp Spn Claudio Ti Sext Ti 5.5x45 | Generi | Poster | SOFAMOR     |        |        | [id number] |
|  - [id number]: Qty: 2 | c      | ior:   | DANEK - DIV |        |        | 5045 / |
|  on 2017 by Dreyer,     |        | Spine  |  [employer]  |        |        |  /     |
| Chacho AGUIRRE DO at [facility] |        | Lumbar | - SFDK      |        |        |        |
|  [facility]    |        |        |             |        |        |        |
+-------------------------------+--------+--------+-------------+--------+--------+--------+
| Graft Infuse Bone Kit Xxs -   | Graft  | Poster | SOFAMOR     |        | / | [id number] |
| [id number]: Qty: 1 on |        | ior:   | DANEK - DIV |        |    | 0 /    |
|  2017 by Dreyer, Jason  |        | Spine  |  [employer]  |        |        | / |
| DO TIMOTHY at [facility] |        | Lumbar | - SFDK      |        |        | [id number]  |
|  [facility]          |        |        |             |        |        |        |
+-------------------------------+--------+--------+-------------+--------+--------+--------+
| Set Scrw Ns G5 Brk Off Ti     | Screw  | Poster | SOFAMOR     |        |        | [id number] |
| 4.75 - [id number]:    |        | ior:   | DANEK - DIV |        |        | 0 / /  |
| Qty: 4 on 2017 by       |        | Spine  |  [employer]  |        |        |        |
| Dreyer, Jason A, DO at [facility]    |        | Lumbar | - SFDK      |        |        |        |
| [facility] |        |        |             |        |        |        |
|  [facility]                       |        |        |             |        |        |        |
+-------------------------------+--------+--------+-------------+--------+--------+--------+
| Screw Miriam Solera 6.5x55mm -  | Screw  | Poster | SOFAMOR     |        |        | [id number] |
| [id number]: Qty: 2 on |        | ior:   | DANEK - DIV |        |        | 27580  |
|  2017 by Dreyer, Jason  |        | Spine  |  [employer]  |        |        | / /    |
| A DO at [facility] |        | Lumbar | - SFDK      |        |        |        |
|  [facility]          |        |        |             |        |        |        |
+-------------------------------+--------+--------+-------------+--------+--------+--------+
| Screw Miriam Solera 6.5x60mm -  | Screw  | Poster | SOFAMOR     |        |        | [id number] |
| [id number]: Qty: 2 on |        | ior:   | DANEK - DIV |        |        | [address]65  |
|  2017 by Dreyer, Jason  |        | Spine  |  [employer]  |        |        | / /    |
| A DO at [facility] |        | Lumbar | - SFDK      |        |        |        |
|  [facility]          |        |        |             |        |        |        |
+-------------------------------+--------+--------+-------------+--------+--------+--------+
 
 
 
 Results
 
 Not on filefrom Last 3 Months
 
 Insurance
 
 
+-----------------+--------+-------------+--------+-------------+------------+--------+
| Payer           | Benefi | Subscriber  | Effect | Phone       | Address    | Type   |
|                 | t Plan | ID          | sindy    |             |            |        |
|                 |  /     |             | Dates  |             |            |        |
|                 | Group  |             |        |             |            |        |
+-----------------+--------+-------------+--------+-------------+------------+--------+
| MEDICARE        | MEDICA | [id number]  | 20 | [phone] |            | [employer] |
|                 | RE     |             | 16-Pre | 5           |            | re     |
|                 | PART A |             | sent   |             |            |        |
+-----------------+--------+-------------+--------+-------------+------------+--------+
| [employer] [place] | MUTUAL | [id number]    | 20 | [phone] |            | Indemn |
|                 |  AND   |             | 19-Pre | 0           |            | ity    |
|                 | [employer] |             | sent   |             |            |        |
|                 |  [place] |             |        |             |            |        |
|                 |  MDCR  |             |        |             |            |        |
|                 | SUPPL  |             |        |             |            |        |
+-----------------+--------+-------------+--------+-------------+------------+--------+
| MEDICARE        | MEDICA | [id number] |  | [phone] |            | [employer] |
|                 | RE     |             | 019-Pr | 5           |            | re     |
|                 | PART A |             | esent  |             |            |        |
|                 |  AND B |             |        |             |            |        |
+-----------------+--------+-------------+--------+-------------+------------+--------+
| MODA            | MODA   | [id number]   | 10/1/2 | [phone] |   [address]   | PPO    |
|                 | OEBB   |             | 016-Pr | 9           | [address]      |        |
|                 | CONNEX |             | esent  |             | [place],  |        |
|                 | [place]     |             |        |             | [place] [id number]   |        |
+-----------------+--------+-------------+--------+-------------+------------+--------+
 
 
 
+-------------------+--------+-------------+--------+-------------+---------------------+
| Guarantor Name    | Accoun | Relation to | Date   | Phone       | Billing Address     |
|                   | t Type |  Patient    | of     |             |                     |
|                   |        |             | Birth  |             |                     |
+-------------------+--------+-------------+--------+-------------+---------------------+
| Deni Hampton | Person | Self        | / |             |   [address]   |
|                   | al/Fam |             | 1950   | [phone] | [place]       |
|                   | heath    |             |        | 0 (Home)    | [address]          |
|                   |        |             |        | [phone] |                     |
|                   |        |             |        | 2 (Work)    |                     |
+-------------------+--------+-------------+--------+-------------+---------------------+
| Deni Hampton | Person | Self        | / |             |   [address]   |
|                   | al/Fam |             | 1950   | [phone] | [place]       |
|                   | heath    |             |        | 0 (Home)    | [address]          |
+-------------------+--------+-------------+--------+-------------+---------------------+
 
 
 
 Advance Directives
 
 
+-----------+-----------------+----------------+-------------+
| Type      | Date Recorded   | Patient        | Explanation |
|           |                 | Representative |             |
+-----------+-----------------+----------------+-------------+
 
| Power of  |                 |                |             |
|   |                 |                |             |
+-----------+-----------------+----------------+-------------+
| Advance   | 2017  1:06 |                | HAS INFO    |
| Directive |  PM             |                |             |
+-----------+-----------------+----------------+-------------+
 
 
 
+-------------+-------------+-------------+----------+
| Code Status | Date        | Date        | Comments |
|             | Activated   | Inactivated |          |
+-------------+-------------+-------------+----------+
| Full Code   | 2017   | 8/3/2017    |          |
|             | 4:03 PM     | 2:04 PM     |          |
+-------------+-------------+-------------+----------+

## 2020-06-08 NOTE — XMS
Encounter Summary
  Created on: 2020
 
 Memo Deni Siu
 External Reference #: [id number]
 : 52
 Sex: Male
 
 Demographics
 
 
+-----------------------+---------------------------+
| Address               | [address]           |
|                       | [place]  [address] |
+-----------------------+---------------------------+
| Home Phone            | [phone]           |
+-----------------------+---------------------------+
| Preferred Language    | Unknown                   |
+-----------------------+---------------------------+
| Marital Status        |                    |
+-----------------------+---------------------------+
| Oriental orthodox Affiliation | Unknown                   |
+-----------------------+---------------------------+
| Race                  | Unknown                   |
+-----------------------+---------------------------+
| Ethnic Group          | Unknown                   |
+-----------------------+---------------------------+
 
 
 Author
 
 
+--------------+--------------------------------------------+
| Author       | [facility]  |
|              | [facility] [place]                                |
+--------------+--------------------------------------------+
| Organization | [facility]  |
|              | [facility] [place]                                |
+--------------+--------------------------------------------+
| Address      | Unknown                                    |
+--------------+--------------------------------------------+
| Phone        | Unavailable                                |
+--------------+--------------------------------------------+
 
 
 
 Support
 
 
+---------------+--------------+---------+-----------------+
| Name          | Relationship | Address | Phone           |
+---------------+--------------+---------+-----------------+
| Sofiya A Brown | ECON         | Unknown | [phone] |
+---------------+--------------+---------+-----------------+
 
 
 
 Care Team Providers
 
 
 
+-------------------------+------+-----------------+
| Care Team Member Name   | Role | Phone           |
+-------------------------+------+-----------------+
| Ck Michelle MD | PCP  | [phone] |
+-------------------------+------+-----------------+
 
 
 
 Reason for Visit
 Auth/Cert
 
+--------+--------+-----------+--------------+--------------+--------------+
| Status | Reason | Specialty | Diagnoses /  | Referred By  | Referred To  |
|        |        |           | Procedures   | Contact      | Contact      |
+--------+--------+-----------+--------------+--------------+--------------+
|        |        |           |   Diagnoses  |              |              |
|        |        |           |              |              |              |
|        |        |           | Osteoarthrit |              |              |
|        |        |           | is of spine  |              |              |
|        |        |           | with         |              |              |
|        |        |           | radiculopath |              |              |
|        |        |           | y, lumbar    |              |              |
|        |        |           | region       |              |              |
|        |        |           | (M47.26),    |              |              |
|        |        |           | Spinal       |              |              |
|        |        |           | stenosis,    |              |              |
|        |        |           | lumbar       |              |              |
|        |        |           | region,      |              |              |
|        |        |           | without      |              |              |
|        |        |           | neurogenic   |              |              |
|        |        |           | claudication |              |              |
|        |        |           |  (M48.06),   |              |              |
|        |        |           | Lumbar       |              |              |
|        |        |           | radiculopath |              |              |
|        |        |           | y (M54.16),  |              |              |
|        |        |           | Chronic      |              |              |
|        |        |           | left-sided   |              |              |
|        |        |           | low back     |              |              |
|        |        |           | pain with    |              |              |
|        |        |           | left-sided   |              |              |
|        |        |           | sciatica     |              |              |
|        |        |           | (M54.42,     |              |              |
|        |        |           | G89.29),     |              |              |
|        |        |           | Status post  |              |              |
|        |        |           | laminectomy  |              |              |
|        |        |           | (Z98.890)    |              |              |
|        |        |           | Procedures   |              |              |
|        |        |           | ND ARTHDSIS  |              |              |
|        |        |           | POST/POSTERO |              |              |
|        |        |           | LATRL/POSTIN |              |              |
|        |        |           | TERBODY      |              |              |
|        |        |           | LUMBAR       |              |              |
|        |        |           | POSTERIOR    |              |              |
|        |        |           | NON-SEGMENTA |              |              |
|        |        |           | L            |              |              |
|        |        |           | INSTRUMENTAT |              |              |
|        |        |           | ION  ND INSJ |              |              |
|        |        |           |  BIOMCHN DEV |              |              |
 
|        |        |           |              |              |              |
|        |        |           | INTERVERTEBR |              |              |
|        |        |           | AL DSC SPC   |              |              |
|        |        |           | W/ARTHRD     |              |              |
|        |        |           | LAMINEC/FACE |              |              |
|        |        |           | TECT/FORAMIN |              |              |
|        |        |           | ,LUMBAR 1    |              |              |
|        |        |           | SEG  ND      |              |              |
|        |        |           | LAMINEC/FACE |              |              |
|        |        |           | TECT/FORAMIN |              |              |
|        |        |           | ,EACH ADDNL  |              |              |
|        |        |           |  L4-5        |              |              |
|        |        |           | Transforamin |              |              |
|        |        |           | al Lumbar    |              |              |
|        |        |           | Interbody    |              |              |
|        |        |           | Fusion       |              |              |
+--------+--------+-----------+--------------+--------------+--------------+
 
 
 
 
 Encounter Details
 
 
+--------+-----------+----------------------+----------------------+-------------------+
| Date   | Type      | Department           | Care Team            | Description       |
+--------+-----------+----------------------+----------------------+-------------------+
| / | Hospital  |   [facility] |   Dreyer, Jason A,   | Gait abnormality  |
| 2017 - | Encounter |  [facility]    | DO  [address]    | (Primary Dx)      |
|        |           | [address]  | [place] [address]  [place] |                   |
| / |           | [place] [address] |  [id number] [phone] |                   |
|    |           |   [phone]       |   [phone] (Fax) |                   |
+--------+-----------+----------------------+----------------------+-------------------+
 
 
 
 Social History
 
 
+--------------+-------+-----------+--------+------+
| Tobacco Use  | Types | Packs/Day | Years  | Date |
|              |       |           | Used   |      |
+--------------+-------+-----------+--------+------+
| Never Smoker |       |           |        |      |
+--------------+-------+-----------+--------+------+
 
 
 
+---------------------+---+---+---+
| Smokeless Tobacco:  |   |   |   |
| Never Used          |   |   |   |
+---------------------+---+---+---+
 
 
 
+-------------+----------------------+---------+----------------------+
| Alcohol Use | Drinks/Week          | oz/Week | Comments             |
+-------------+----------------------+---------+----------------------+
| Yes         |   0 Standard drinks  | 0.0     | 1-2 drinks 2-4 times |
|             | or equivalent        |         |  per month           |
 
+-------------+----------------------+---------+----------------------+
 
 
 
+------------------+---------------+
| Sex Assigned at  | Date Recorded |
| Birth            |               |
+------------------+---------------+
| Not on file      |               |
+------------------+---------------+
 
 
 
+----------------+-------------+-------------+
| Job Start Date | Occupation  | Industry    |
+----------------+-------------+-------------+
| Not on file    | Not on file | Not on file |
+----------------+-------------+-------------+
 
 
 
+----------------+--------------+------------+
| Travel History | Travel Start | Travel End |
+----------------+--------------+------------+
 
 
 
+-------------------------------------+
| No recent travel history available. |
+-------------------------------------+
 documented as of this encounter
 
 Last Filed Vital Signs
 
 
+-------------------+---------------------+----------------------+----------+
| Vital Sign        | Reading             | Time Taken           | Comments |
+-------------------+---------------------+----------------------+----------+
| Blood Pressure    | 125/68              | 2017  7:40 AM  |          |
|                   |                     | PDT                  |          |
+-------------------+---------------------+----------------------+----------+
| Pulse             | 65                  | 2017  7:45 AM  |          |
|                   |                     | PDT                  |          |
+-------------------+---------------------+----------------------+----------+
| Temperature       | 37.5   C (99.5   F) | 2017  7:40 AM  |          |
|                   |                     | PDT                  |          |
+-------------------+---------------------+----------------------+----------+
| Respiratory Rate  | 16                  | 2017  7:40 AM  |          |
|                   |                     | PDT                  |          |
+-------------------+---------------------+----------------------+----------+
| Oxygen Saturation | 93%                 | 2017  7:45 AM  |          |
|                   |                     | PDT                  |          |
+-------------------+---------------------+----------------------+----------+
| Inhaled Oxygen    | -                   | -                    |          |
| Concentration     |                     |                      |          |
+-------------------+---------------------+----------------------+----------+
| Weight            | 102.5 kg (226 lb)   | 2017  9:36 AM  |          |
|                   |                     | PDT                  |          |
+-------------------+---------------------+----------------------+----------+
| Height            | 190.5 cm (6' 3")    | 2017  9:36 AM  |          |
 
|                   |                     | PDT                  |          |
+-------------------+---------------------+----------------------+----------+
| Body Mass Index   | 28.25               | 2017  9:36 AM  |          |
|                   |                     | PDT                  |          |
+-------------------+---------------------+----------------------+----------+
 documented in this encounter
 
 Discharge Summaries
 Myke Jean PA-C - 2017  7:59 AM PDTFormatting of this note might be diffe
rent from the original.
 DISCHARGE SUMMARY
 
 Pt. Name/Age/:  Deni Hampton    67 y.o.   1950       
 Medical Record Number:  [id number]
 Date of Admission:  2017       
 Date of Discharge:  8/3/2017
 
 Admitting Physician:  Jason A Dreyer, DO         PCP:  Ck Michelle
 Discharging Physician: Myke Jean PA-C 
      
 
 Primary Discharge Dx: 
 1. Spondylosis L4-5. 
 2. Stenosis L4-5.
 3. Lumbar radiculopathy. 
 4. Lumbago. 
 5. History of laminectomy L4-5.
 6. Left foot drop.
 Secondary Discharge Dx:  
 Patient Active Problem List 
 Diagnosis 
   Lumbago with sciatica, right side 
   Low back pain 
   Atherosclerosis 
   Osteoarthritis of spine with radiculopathy, lumbar region - 2017 
   Beta Blockers - Daily Use 
   BPH (benign prostatic hyperplasia) 
   LAURA (obstructive sleep apnea) - H/O CPAP Use 
   H/O TKA Total knee arthroplasty, BILATERAL 
   H/O LEFT Ankle fusion 
   H/O Lumbar discectomy L1-2, L2-3 -  
   Anticipated difficulty with intubation 
  
 
 Reason for Admission (Brief):  Mr. Hampton is a 67-year-old man, who presents with signs and 
symptoms and radiographic evidence of back pain, leg pain, and leg weakness. He previously u
nderwent laminectomy by Dr. Paredes in . MRI and x-rays of the lumbar spine revealed spo
ndylosis and stenosis at L4-5. Given the progression of his symptoms, the patient decided to
 proceed with transforaminal lumbar interbody fusion, L4-5.  
  
 Hospital Course, including Complications: 
 On the day of admission the patient was admitted to [facility] and underwent a  L4-5 f
usion .  Patient was transferred to PACU and then to the neurosurgical floor.  In brief, the
 hospital stay was uncomplicated, the patient mobilized well with physical therapy and occup
ational therapy.  There were no cardiac issues, pulmonary issues, evidence of DVT or infecti
on. Appropriate discharge plans were made in line with his progress and mobility and he was 
ultimately discharged to home.
 
 Medications Reconciled upon Discharge are: 
  
 
 Discharge Medications 
  
 New Medications  
   Details 
 diazePAM 5 mg tablet
  Take 1 tablet by mouth every 6 hours as needed for Muscle spasms.
 aka:  VALIUM
  
 HYDROcodone-acetaminophen  mg per tablet
 Replaces:  HYDROcodone-acetaminophen 5-325 mg per tablet
  Take 1-2 tablets by mouth every 4 hours as needed for Pain.
 aka:  NORCO
  
 lactulose 10 g/15 mL solution
  Take 30 mLs by mouth Daily as needed for Constipation.
  
  
 Unchanged Medications  
   Details 
 ALLEGRA-D ALLERGY & CONGESTION  MG per tablet
 Generic drug:  fexofenadine-pseudoePHEDrine
  Take 1 tablet by mouth Twice  daily as needed.
  
 ASCORBIC ACID PO
  Take  by mouth.
  
 atorvaSTATin 40 mg tablet
  Take 40 mg by mouth Daily.
 aka:  LIPITOR
  
 cholecalciferol 1,000 units tablet
  Take 1,000 Units by mouth Daily.
 aka:  VITAMIN D-3
  
 FLUoxetine 20 mg capsule
  Take 20 mg by mouth Daily.
 aka:  PROzac
  
 fluticasone 50 mcg/nasal spray
  1 spray by Nasal route Daily.
 aka:  FLONASE
  
 metoprolol succinate 25 mg 24 hr tablet
  Take 25 mg by mouth Daily.
 aka:  TOPROL-XL
  
 vitamin B complex tablet
  Take 1 tablet by mouth Daily.
  
  
 Discontinued Medications  
 aspirin 81 mg EC tablet
  
 HYDROcodone-acetaminophen 5-325 mg per tablet
 aka:  NORCO
 Replaced by:  HYDROcodone-acetaminophen  mg per tablet
  
 naproxen 250 mg tablet
 aka:  NAPROSYN
  
 
  
 
  
 
 Condition on Discharge: Stable
 
 Disposition:  Patient was discharged to home
 
 Follow-Up Plans:   
     Follow-up with:  Dr. Dreyer's office in 4 weeks
     Follow-up with primary care physician as needed.
     Diet: Resume regular diet
     Activity:  Continue to follow guidelines and precautions as previously discussed.
     Brace: B brace
   
 
 Electronically signed by:    Myke Jean, 8/3/2017 7:59 
 [facility]
 Electronically signed by Myke Jean PA-C at 2017  8:55 AM PDTdocumented in
 this encounter
 
 Discharge Instructions
 Instructions Myke Jean PA-C - 2017Discharge Instructions for Lumbar Fusi
on
 You had a lumbar fusion. During this procedure, your doctor locked together (fused) some of
 the bones in your spine. This limits the movement of these bones to help relieve your pain.
Here  s what you need to know about home care following a spinal fusion.
 Activity
  Arrange your household to keep the items you need within reach.
  Remove electrical cords, throw rugs, and anything else that may cause you to fall.
  Use a walkeror handrails until your balance, flexibility, and strength improve. And re
member to ask for help from others when you need it.
  Free up your hands so that you can use them to keep balance. Use a woody pack, apron, or
 pockets to carry things. Be sure not to carry too much at once.
  Don  t bend or twist at the waist, or raise your hands over your head for the first two
 weeks after your surgery.
  Don  t lift anything heavier than 5 pounds for the first four weeks after surgery.
  Don  t sit for more than30 to 45 minutes at a time. Take frequent short walks. They a
re the key to your recovery. As your back feels better please gradually increase the distanc
e you walk as discussed with your provider. 
  Don  t drive until your doctor says it  s OK. And never drive while you are taking opi
oid pain medication.
  Nap if you are tired, but don  t stay in bed all day.
  Use chairs with arms. The arms make it easier for you to stand up and sit down.
  If you have not yet received instructions about physical therapy, ask your doctor about 
them.
 Incision care
  Check your incision daily for redness, tenderness, or drainage.
  Don  t soak your wound in water (no hot tubs, bathtubs, swimming pools) until your doct
or says it  s OK.
  As long as you keep your incision dry you can shower as desired. After 5 days you may le
t shower water run over the incision but do not submerse the incision under water until afte
r you see your provider. Gently pat the incision dry. Don  t rub it, or apply creams or lot
ions. And if you feel unsteady while standing to shower, use a shower stool or chair.
 Other home care
  Use nonslip bath mats, grab bars, an elevated toilet seat, and a shower chair in your ba
throom.
  Take your medication exactly as directed.
  Don  t take nonsteroidal anti-inflammatory medications (NSAIDs), such as ibuprofen. The
y may delay or prevent proper fusion of the spine.
 
  If you smoke, stop! This will be one of the most important things you can do to help you
 recover from surgery.
  Wear your back brace, if one was prescribed, as directed by your doctor.
 Follow-up
  Most patients will be seen approximately 4 weeks after surgery. Be sure to get your 1 mo
nth post op x-rays prior to your 1 month post op appointment before your appointment. 
 
  [id number] [employer]. [address] [place] [address]. All righ
ts reserved. This information is not intended as a substitute for professional medical care.
 Always follow your healthcare professional's instructions.
 
 
 documented in this encounter
 
 Medications at Time of Discharge
 
 
+----------------------+----------------------+-----------+---------+----------+-----------+
| Medication           | Sig                  | Dispensed | Refills | Start    | End Date  |
|                      |                      |           |         | Date     |           |
+----------------------+----------------------+-----------+---------+----------+-----------+
|   atorvaSTATin       | Take 40 mg by mouth  |           | 0       | 20 |           |
| (LIPITOR) 40 mg      | Daily.               |           |         | 15       |           |
| tablet               |                      |           |         |          |           |
+----------------------+----------------------+-----------+---------+----------+-----------+
|   B Complex Vitamins | Take 1 tablet by     |           | 0       |          |           |
|  (VITAMIN B COMPLEX) | mouth Daily.         |           |         |          |           |
|  tablet              |                      |           |         |          |           |
+----------------------+----------------------+-----------+---------+----------+-----------+
|   fluticasone        | 1 spray by Nasal     |           | 0       |          |           |
| (FLONASE) 50         | route Daily.         |           |         |          |           |
| mcg/nasal spray      |                      |           |         |          |           |
+----------------------+----------------------+-----------+---------+----------+-----------+
|   metoprolol         | Take 25 mg by mouth  |           | 0       | 20 |           |
| succinate            | Daily.               |           |         | 17       |           |
| (TOPROL-XL) 25 mg 24 |                      |           |         |          |           |
|  hr tablet           |                      |           |         |          |           |
+----------------------+----------------------+-----------+---------+----------+-----------+
|   ASCORBIC ACID PO   | Take  by mouth.      |           | 0       |          |  |
|                      |                      |           |         |          | 0         |
+----------------------+----------------------+-----------+---------+----------+-----------+
|   cholecalciferol    | Take 1,000 Units by  |           | 0       |          |  |
| (VITAMIN D-3) 1,000  | mouth Daily.         |           |         |          | 0         |
| units tablet         |                      |           |         |          |           |
+----------------------+----------------------+-----------+---------+----------+-----------+
|   diazePAM (VALIUM)  | Take 1 tablet by     |   90      | 0       | 20 | 11/15/201 |
| 5 mg tablet          | mouth every 6 hours  | tablet    |         | 17       | 7         |
|                      | as needed for Muscle |           |         |          |           |
|                      |  spasms.             |           |         |          |           |
+----------------------+----------------------+-----------+---------+----------+-----------+
|                      | Take 1 tablet by     |           | 0       |          |  |
| fexofenadine-pseudoe | mouth Twice  daily   |           |         |          | 0         |
| PHEDrine (ALLEGRA-D  | as needed.           |           |         |          |           |
| ALLERGY &            |                      |           |         |          |           |
| CONGESTION)    |                      |           |         |          |           |
| MG per tablet        |                      |           |         |          |           |
+----------------------+----------------------+-----------+---------+----------+-----------+
|   FLUoxetine         | Take 20 mg by mouth  |           | 0       |          |  |
| (PROZAC) 20 mg       | Daily.               |           |         |          | 0         |
| capsule              |                      |           |         |          |           |
 
+----------------------+----------------------+-----------+---------+----------+-----------+
|                      | Take 1-2 tablets by  |   120     | 0       | 20 | 11/15/201 |
| HYDROcodone-acetamin | mouth every 4 hours  | tablet    |         | 17       | 7         |
| ophen (NORCO)  | as needed for Pain.  |           |         |          |           |
|  mg per tablet       |                      |           |         |          |           |
+----------------------+----------------------+-----------+---------+----------+-----------+
|   lactulose 10 g/15  | Take 30 mLs by mouth |   240 mL  | 2       | 20 | 11/15/201 |
| mL solution          |  Daily as needed for |           |         | 17       | 7         |
|                      |  Constipation.       |           |         |          |           |
+----------------------+----------------------+-----------+---------+----------+-----------+
 documented as of this encounter
 
 Progress Notes
 Sara Hwang RN - 2017 11:52 AM PDTDischarge home with walker accompanied by kaz ruiz with discharge instructions and RX's denies any questions at this time.Electronically sig
ryan by: Sara Hwang RN 8/3/2017 11:53
 Electronically signed by Sara Hwang RN at 2017 11:53 AM Sara Oropeza RN -
 2017 10:50 AM PDTAVS and RX's given and explained to Ruben. Acknowledged understanding 
all questions answered. PIV is dc'd and MAXIME is out.Electronically signed by: Sara Hwang RN 8/3/2017 10:51
 Electronically signed by Sara Hwang RN at 2017 10:51 AM Myke Chow PA-C - 2017  7:33 AM PDTFormatting of this note might be different from the original
.
 [facility]
 
 NEUROSURGERY PROGRESS NOTE 
 
 PATIENT NAME: Deni Hampton
 AGE: 67 y.o.
 DATE OF SERVICE: 8/3/2017 7:33
 
 S: The patient is improving this AM. He has no new issues since yesterday. Complains of steffaine
n in the low back under control with medicine. Walking well. Voiding, passing flatus. Feels 
comfortable going home today. 
 
 O:
 
 CURRENT MEDICATIONS: 
 Current Facility-Administered Medications 
 Medication Dose Route Frequency Provider Last Rate Last Dose 
   acetaminophen (TYLENOL) tablet 650 mg  650 mg Oral Q4H PRN Myke Jean PA-C  
   
   atorvaSTATin (LIPITOR) tablet 40 mg  40 mg Oral Daily Myke Jean PA-C   40 m
g at 17 
   bisacodyl (DULCOLAX) suppository 10 mg  10 mg Rectal Daily PRN MAYCOL Hernandez     
   calcium carbonate (TUMS) chewable tablet 1,000 mg  1,000 mg Oral Q2H PRN Myke Jean PA-C     
   diazePAM (VALIUM) injection 2.5-5 mg  2.5-5 mg Intravenous Q6H PRN Myke Jean PA-C     
   diazePAM (VALIUM) tablet 5 mg  5 mg Oral Q6H PRN Myke Jean PA-C     
   diphenhydrAMINE (BENADRYL) injection 12.5 mg  12.5 mg Intravenous Q4H PRN Myke Jean PA-C     
  Or 
   diphenhydrAMINE (BENADRYL) tablet 25 mg  25 mg Oral Q4H PRN Myke Jean PA-C 
    
  Or 
   diphenhydrAMINE (BENADRYL) 12.5 mg/5 mL liquid 25 mg  25 mg Oral Q4H PRN Myke Jean PA-C     
   docusate sodium (COLACE) capsule 100 mg  100 mg Oral BID Myke Jean PA-C   1
 
00 mg at 172 
   enalaprilat (VASOTEC) injection 1.25 mg  1.25 mg Intravenous Q6H PRN Myke andrew PA-C     
   FLUoxetine (PROzac) capsule 20 mg  20 mg Oral Daily Myke Jean PA-C   20 mg 
at 17 0911 
   fluticasone (FLONASE) 50 mcg/nasal spray 1 spray  1 spray Nasal Daily Myke cunha PA-C   1 spray at 17 0911 
   HYDROcodone-acetaminophen (NORCO)  mg per tablet 1-2 tablet  1-2 tablet Oral Q4H 
PRN Myke Jean PA-C   1 tablet at 17 2132 
   labetalol (TRANDATE) 5 mg/mL injection 10 mg  10 mg Intravenous Q10 Min PRN Myke Molina PA-C     
   lactulose liquid 30 mL  30 mL Oral Daily PRN Myke Jean PA-C     
   magnesium hydroxide (MILK OF MAGNESIA) 400 mg/5 mL suspension 30 mL  30 mL Oral BID PRN
 Myke Jean PA-C   30 mL at 17 0700 
   menthol (HALLS COUGH DROP) lozenge 1 lozenge  1 lozenge Buccal Q2H PRN Myke kingsley PA-C     
   methocarbamol (ROBAXIN) tablet 750 mg  750 mg Oral Q8H PRN MAYCOL Hernandez-NAHED  
   
   metoclopramide (REGLAN) tablet 10 mg  10 mg Oral Q4H PRN Myke Jean PA-C    
 
   metoprolol succinate (TOPROL-XL) ER tablet 25 mg  25 mg Oral Daily Myke Jean PA-C   25 mg at 17 0911 
   morphine injection 1-2 mg  1-2 mg Intravenous Q1H PRN Myke Jean PA-C     
   ondansetron (ZOFRAN ODT) disintegrating tablet 4 mg  4 mg Oral Q6H PRN Myke Valdez
pferJUNI     
   ondansetron (ZOFRAN) injection 4 mg  4 mg Intravenous Q6H PRN RAJ Hernandez     
   phenol (CHLORASEPTIC) spray 1-2 spray  1-2 spray Mouth/Throat Q3H PRN Myke cunha PA-C     
   polyethylene glycol (MIRALAX) powder 17 g  17 g Oral Daily PRN MAYCOL Hernandez     
   prochlorperazine (COMPAZINE) tablet 5 mg  5 mg Oral Q6H PRN Myke Jean PA-C 
    
   senna (SENOKOT) tablet 8.6 mg  8.6 mg Oral BID Myke Jean PA-C   8.6 mg at 0
17 2132 
   sodium chloride 0.9% (NS) infusion   Intravenous Continuous Myke Jean PA-C 
50 mL/hr at 17 0522   
 
 ALLERGIES: 
 Allergies 
 Allergen Reactions 
   Meperidine Nausea And Vomiting 
   Oxycodone Other (See Comments) 
   Hallucinations & sleepiness 
 
 PHYSICAL EXAMINATION:
 Temp:  [37.2 C (99 F)-37.3 C (99.1 F)] 37.2 C (99 F)
 Pulse:  [66-73] 66
 Resp:  [16] 16
 BP: (115-130)/(64-68) 130/68
 
 Intake/Output Summary (Last 24 hours) at 17 0733
 Last data filed at 17 0335
  Gross per 24 hour 
 Intake             1810 ml 
 Output             1400 ml 
 Net              410 ml 
 
 GENERAL: Deni Hampton is in no acute distress with unlabored respirations.   
 HEENT:
 
 HEAD/FACE:
 EYES:
  
 Normocephalic and atraumatic.  There are no areas of recent trauma.  
 Normal sclerae without icterus.   
 CHEST: Clear.  
 HEART: Regular. 
 EXTREMITIES: No edema or swelling.  SCD's 
 BACK: The back incisions are dress and a drain is in place with expected output. 
 NEUROLOGICAL EXAM:
 
 MENTAL STATUS: 
 The patient is awake, alert, and oriented.  
 He follows simple and complex commands.
 He speech is fluent, his comprehends speech well, and his repeats well.  
 He has no apparent deficits with short or long term memory. 
 MOTOR EXAM: Motor strength is improved 
 SENSORY EXAM: Sensory exam is improved 
 
 24 HOUR LABS:
 All Component Based Labs  
    17
 1005   
  ABO O  
  Antibody Screen Negative  
  Rh Type Positive  
  
 
 ASSESSMENT:
 NEUROSURGICAL DIAGNOSES:
 S/p lumbar fusion
 
 HOSPITAL/GENERAL DIAGNOSES:
 Past Medical History: 
 Diagnosis Date 
   Allergic rhinitis  
   Anxiety disorder  
   Atherosclerosis  
   BPH (benign prostatic hyperplasia)  
   HLD (hyperlipidemia)  
   Internal derangement of knee  
   Low back pain  
   Lumbago with sciatica, right side  
   Osteoarthritis, hand  
   Osteoarthrosis, unspecified whether generalized or localized, unspecified site  
   Sleep apnea  
  uses CPAP 
   Unspecified disorder of nose and nasal sinuses  
 
 PLAN:
 
 S/p lumbar fusion, Hospital day 3
 - Neurologically stable and pain control is appropriate. 
 - I removed MAXIME drain today and tied stitch in place. 
 - Medically stable
 - Mobilize, PT/OT
 - SCD's
 - Working on BM/bowel function.  Encouraged activity and medications to assist
 - Disp: Home today
 
 
 ELECTRONICALLY SIGNED BY:  Myke Jean PA-C,  8/3/2017  7:33Electronically signed by
 Myke Jean PA-C at 2017  7:54 AM Myke Chow PA-C - 20  7:41 AM PDTFormatting of this note might be different from the original.
 [facility]
 
 NEUROSURGERY PROGRESS NOTE 
 
 PATIENT NAME: Deni Hampton
 AGE: 67 y.o.
 DATE OF SERVICE: 2017 7:45
 
 S: The patient is doing well this AM. He walked with therapy yesterday without issue. Pain 
under control with medicine. Voiding without issue. No flatus, No BM. 
 
 O:
 
 CURRENT MEDICATIONS: 
 Current Facility-Administered Medications 
 Medication Dose Route Frequency Provider Last Rate Last Dose 
   acetaminophen (TYLENOL) tablet 650 mg  650 mg Oral Q4H PRN Myke Jean PA-C  
   
   atorvaSTATin (LIPITOR) tablet 40 mg  40 mg Oral Daily Myke Jean PA-C   40 m
g at 17 
   bisacodyl (DULCOLAX) suppository 10 mg  10 mg Rectal Daily PRN MAYCOL Hernandez     
   calcium carbonate (TUMS) chewable tablet 1,000 mg  1,000 mg Oral Q2H PRN Myke Jean PA-C     
   diazePAM (VALIUM) injection 2.5-5 mg  2.5-5 mg Intravenous Q6H PRN Myke Jean PA-C     
   diazePAM (VALIUM) tablet 5 mg  5 mg Oral Q6H PRN Myke Jean PA-C     
   diphenhydrAMINE (BENADRYL) injection 12.5 mg  12.5 mg Intravenous Q4H PRN Myke Jean PA-C     
  Or 
   diphenhydrAMINE (BENADRYL) tablet 25 mg  25 mg Oral Q4H PRN Myke Jean PA-C 
    
  Or 
   diphenhydrAMINE (BENADRYL) 12.5 mg/5 mL liquid 25 mg  25 mg Oral Q4H PRN Myke Jean PA-C     
   docusate sodium (COLACE) capsule 100 mg  100 mg Oral BID Myke Jean PA-C   1
00 mg at 17 
   enalaprilat (VASOTEC) injection 1.25 mg  1.25 mg Intravenous Q6H PRN Myke andrew PA-C     
   FLUoxetine (PROzac) capsule 20 mg  20 mg Oral Daily Myke Jean PA-C   20 mg 
at 17 0858 
   fluticasone (FLONASE) 50 mcg/nasal spray 1 spray  1 spray Nasal Daily Myke cunha PA-C   1 spray at 17 0859 
   HYDROcodone-acetaminophen (NORCO)  mg per tablet 1-2 tablet  1-2 tablet Oral Q4H 
PRN Myke Jean PA-C   1 tablet at 17 
   labetalol (TRANDATE) 5 mg/mL injection 10 mg  10 mg Intravenous Q10 Min PRN Myke Molina PA-C     
   lactulose liquid 30 mL  30 mL Oral Daily PRN Myke Jean PA-C     
   magnesium hydroxide (MILK OF MAGNESIA) 400 mg/5 mL suspension 30 mL  30 mL Oral BID PRN
 Myke Jean PA-C     
   menthol (HALLS COUGH DROP) lozenge 1 lozenge  1 lozenge Buccal Q2H PRN Myke kingsley PA-C     
   methocarbamol (ROBAXIN) tablet 750 mg  750 mg Oral Q8H PRN Myke Jean PA-C  
   
   metoclopramide (REGLAN) tablet 10 mg  10 mg Oral Q4H PRN Myke Jean PA-C    
 
   metoprolol succinate (TOPROL-XL) ER tablet 25 mg  25 mg Oral Daily Myke Jean PA-C   25 mg at 17 0858 
   morphine injection 1-2 mg  1-2 mg Intravenous Q1H PRN Myke Jean PA-C     
   ondansetron (ZOFRAN ODT) disintegrating tablet 4 mg  4 mg Oral Q6H PRN Myke kingsley PA-C     
   ondansetron (ZOFRAN) injection 4 mg  4 mg Intravenous Q6H PRN RAJ Hernandez     
   phenol (CHLORASEPTIC) spray 1-2 spray  1-2 spray Mouth/Throat Q3H PRN Myke cunha PA-C     
   polyethylene glycol (MIRALAX) powder 17 g  17 g Oral Daily PRN MAYCOL Hernandez     
   prochlorperazine (COMPAZINE) tablet 5 mg  5 mg Oral Q6H PRN Myke Jean PA-C 
    
   senna (SENOKOT) tablet 8.6 mg  8.6 mg Oral BID Myke Jean PA-C   8.6 mg at 0
17 
   sodium chloride 0.9% (NS) infusion   Intravenous Continuous Myke Jean PA-C 
50 mL/hr at 17 0522   
 
 ALLERGIES: 
 Allergies 
 Allergen Reactions 
   Meperidine Nausea And Vomiting 
   Oxycodone Other (See Comments) 
   Hallucinations & sleepiness 
 
 PHYSICAL EXAMINATION:
 Temp:  [36.4 C (97.5 F)-37.8 C (100 F)] 37.7 C (99.8 F)
 Pulse:  [62-75] 69
 Resp:  [16-18] 16
 BP: ()/(53-59) 103/55
 
 Intake/Output Summary (Last 24 hours) at 17 0745
 Last data filed at 17 0700
  Gross per 24 hour 
 Intake             2442 ml 
 Output             3640 ml 
 Net            -1198 ml 
 
 GENERAL: Deni Hampton is in no acute distress with unlabored respirations.   
 HEENT:
 HEAD/FACE:
 EYES:
  
 Normocephalic and atraumatic.  There are no areas of recent trauma.  
 Normal sclerae without icterus.   
 CHEST: Clear.  
 HEART: Regular. 
 EXTREMITIES: No edema or swelling.  SCD's 
 BACK: The back incisions are dress and a drain is in place with expected output. 
 NEUROLOGICAL EXAM:
 
 MENTAL STATUS: 
 The patient is awake, alert, and oriented.  
 He follows simple and complex commands.
 He speech is fluent, his comprehends speech well, and his repeats well.  
 He has no apparent deficits with short or long term memory. 
 MOTOR EXAM: Motor strength is improved 
 SENSORY EXAM: Sensory exam is improved 
 
 24 HOUR LABS:
 All Component Based Labs  
 
    17
 1005   
  ABO O  
  Antibody Screen Negative  
  Rh Type Positive  
  
 
 ASSESSMENT:
 NEUROSURGICAL DIAGNOSES:
 S/p lumbar fusion
 
 HOSPITAL/GENERAL DIAGNOSES:
 Past Medical History: 
 Diagnosis Date 
   Allergic rhinitis  
   Anxiety disorder  
   Atherosclerosis  
   BPH (benign prostatic hyperplasia)  
   HLD (hyperlipidemia)  
   Internal derangement of knee  
   Low back pain  
   Lumbago with sciatica, right side  
   Osteoarthritis, hand  
   Osteoarthrosis, unspecified whether generalized or localized, unspecified site  
   Sleep apnea  
  uses CPAP 
   Unspecified disorder of nose and nasal sinuses  
 
 PLAN:
 
 S/p lumbar fusion, Hospital day 2
 - Neurologically stable and pain control is appropriate. 
 -Take suction off MAXIME drain this AM. Plan to remove tomorrow
 - Medically stable
 - Mobilize, PT/OT
 - SCD's
 - Working on BM/bowel function.  Encouraged activity and medications to assist
 - Disp: Likely home tomorrow. 
 
 ELECTRONICALLY SIGNED BY:  Myke Jean PA-C,  2017  7:45Electronically signed by
 Myke Jean PA-C at 2017  7:45 AM Myke Chow PA-C - 20  7:28 AM PDTFormatting of this note might be different from the original.
 [facility]
 
 NEUROSURGERY PROGRESS NOTE 
 
 PATIENT NAME: Deni Hampton
 AGE: 67 y.o.
 DATE OF SERVICE: 2017 7:28
 
 S: The patient is doing well this AM. He complains of manageable pain in his low back. His 
preoperative burning pain seems to be improved. He has not walked much yet.  Voiding without
 issue. No flatus, no BM. 
 
 O:
 
 CURRENT MEDICATIONS: 
 Current Facility-Administered Medications 
 Medication Dose Route Frequency Provider Last Rate Last Dose 
   acetaminophen (TYLENOL) tablet 650 mg  650 mg Oral Q4H PRN Myke Jean PA-C  
 
   
   atorvaSTATin (LIPITOR) tablet 40 mg  40 mg Oral Daily Myke Jean PA-C     
   bisacodyl (DULCOLAX) suppository 10 mg  10 mg Rectal Daily PRN MAYCOL Hernandez     
   calcium carbonate (TUMS) chewable tablet 1,000 mg  1,000 mg Oral Q2H PRN Myke Jean PA-C     
   diazePAM (VALIUM) injection 2.5-5 mg  2.5-5 mg Intravenous Q6H PRN Myke Jean PA-C     
   diazePAM (VALIUM) tablet 5 mg  5 mg Oral Q6H PREREN Jean PA-C     
   diphenhydrAMINE (BENADRYL) injection 12.5 mg  12.5 mg Intravenous Q4H PRN Myke Jean PA-C     
  Or 
   diphenhydrAMINE (BENADRYL) tablet 25 mg  25 mg Oral Q4H PRN Myke Jean PA-C 
    
  Or 
   diphenhydrAMINE (BENADRYL) 12.5 mg/5 mL liquid 25 mg  25 mg Oral Q4H PRN Myke Jean PA-C     
   docusate sodium (COLACE) capsule 100 mg  100 mg Oral BID Myke Jean PA-C   1
00 mg at 17 
   enalaprilat (VASOTEC) injection 1.25 mg  1.25 mg Intravenous Q6H PRN Myke Vinny Kupf
er, PA-C     
   FLUoxetine (PROzac) capsule 20 mg  20 mg Oral Daily Myke Jean, PA-NAHED     
   fluticasone (FLONASE) 50 mcg/nasal spray 1 spray  1 spray Nasal Daily Myke cunha, PA-NAHED     
   HYDROcodone-acetaminophen (NORCO)  mg per tablet 1-2 tablet  1-2 tablet Oral Q4H 
PRN Myke Jean, PA-C   1 tablet at 17 0519 
   labetalol (TRANDATE) 5 mg/mL injection 10 mg  10 mg Intravenous Q10 Min PRN Myke Molina, JUNI     
   lactulose liquid 30 mL  30 mL Oral Daily PRN Myke Jean, JUNI     
   magnesium hydroxide (MILK OF MAGNESIA) 400 mg/5 mL suspension 30 mL  30 mL Oral BID PRN
 Myke Jean, JUNI     
   menthol (HALLS COUGH DROP) lozenge 1 lozenge  1 lozenge Buccal Q2H PRN Myke kingsley, JUNI     
   methocarbamol (ROBAXIN) tablet 750 mg  750 mg Oral Q8H PRN Myke Jean, PA-NAHED  
   
   metoclopramide (REGLAN) tablet 10 mg  10 mg Oral Q4H PRN Myke Jean, JUNI    
 
   metoprolol succinate (TOPROL-XL) ER tablet 25 mg  25 mg Oral Daily Myke Jean
, JUNI     
   morphine injection 1-2 mg  1-2 mg Intravenous Q1H PRN Myke Jean, JUNI     
   ondansetron (ZOFRAN ODT) disintegrating tablet 4 mg  4 mg Oral Q6H PRN Myke kingsley, PA-NAHED     
   ondansetron (ZOFRAN) injection 4 mg  4 mg Intravenous Q6H PRN Myke Jean, RAJ DOCKERY     
   phenol (CHLORASEPTIC) spray 1-2 spray  1-2 spray Mouth/Throat Q3H PRN Myke cunha, JUNI     
   polyethylene glycol (MIRALAX) powder 17 g  17 g Oral Daily PRN Myke Jean, PA
-NAHED     
   prochlorperazine (COMPAZINE) tablet 5 mg  5 mg Oral Q6H PRN Myke Jean, PA-C 
    
   senna (SENOKOT) tablet 8.6 mg  8.6 mg Oral BID Myke Jean PA-C   8.6 mg at 0
17 
   sodium chloride 0.9% (NS) infusion   Intravenous Continuous Myke Jean PA-C 
50 mL/hr at 17 0522   
 
 ALLERGIES: 
 Allergies 
 Allergen Reactions 
   Meperidine Nausea And Vomiting 
   Oxycodone Other (See Comments) 
 
   Hallucinations & sleepiness 
 
 PHYSICAL EXAMINATION:
 Temp:  [36 C (96.8 F)-36.5 C (97.7 F)] 36.1 C (96.9 F)
 Pulse:  [60-83] 68
 Resp:  [10-18] 16
 BP: ()/(53-84) 111/54
 
 Intake/Output Summary (Last 24 hours) at 17 0728
 Last data filed at 17 0600
  Gross per 24 hour 
 Intake             3553 ml 
 Output             1508 ml 
 Net             2045 ml 
 
 GENERAL: Deni Hampton is in no acute distress with unlabored respirations.   
 HEENT:
 HEAD/FACE:
 EYES:
  
 Normocephalic and atraumatic.  There are no areas of recent trauma.  
 Normal sclerae without icterus.   
 CHEST: Clear.  
 HEART: Regular. 
 EXTREMITIES: No edema or swelling.  SCD's 
 BACK: The back incisions are dress and a drain is in place with expected output. 
 NEUROLOGICAL EXAM:
 
 MENTAL STATUS: 
 The patient is awake, alert, and oriented.  
 He follows simple and complex commands.
 He speech is fluent, his comprehends speech well, and his repeats well.  
 He has no apparent deficits with short or long term memory. 
 MOTOR EXAM: Motor strength is improved 
 SENSORY EXAM: Sensory exam is improved 
 
 24 HOUR LABS:
 All Component Based Labs  
    17
 1005   
  ABO O  
  Antibody Screen Negative  
  Rh Type Positive  
  
 
 ASSESSMENT:
 NEUROSURGICAL DIAGNOSES:
 S/p lumbar fusion
 
 HOSPITAL/GENERAL DIAGNOSES:
 Past Medical History: 
 Diagnosis Date 
   Allergic rhinitis  
   Anxiety disorder  
   Atherosclerosis  
   BPH (benign prostatic hyperplasia)  
   HLD (hyperlipidemia)  
   Internal derangement of knee  
   Low back pain  
   Lumbago with sciatica, right side  
 
   Osteoarthritis, hand  
   Osteoarthrosis, unspecified whether generalized or localized, unspecified site  
   Sleep apnea  
  uses CPAP 
   Unspecified disorder of nose and nasal sinuses  
 
 PLAN:
 
 S/p lumbar fusion, Hospital day 1
 - Neurologically stable and pain control is appropriate.
 - Medically stable
 - Mobilize, PT/OT
 - SCD's
 - Working on BM/bowel function.  Encouraged activity and medications to assist
 - Drain output is as expected.  Continue drain
 - Disp: Likely home in 1-2 days
 
 ELECTRONICALLY SIGNED BY:  Myke Jean PA-C,  2017  7:28
 Electronically signed by Myke Jean PA-C at 2017  7:33 AM PDTdocumented in
 this encounter
 
 Plan of Treatment
 
 
+--------+---------+-----------+----------------------+-------------+
| Date   | Type    | Specialty | Care Team            | Description |
+--------+---------+-----------+----------------------+-------------+
| / | Office  | Neurology |   Ravindra Munoz MD  [address]             |
|    | Visit   |           |  [address]      |             |
|        |         |           | [place]  |             |
|        |         |           | [place] [address]             |             |
|        |         |           | [phone]         |             |
|        |         |           | [phone] (Fax)   |             |
+--------+---------+-----------+----------------------+-------------+
 
 
 
+-------------+------+--------+----------------------+----------------------+
| Name        | Type | Priori | Associated Diagnoses | Order Schedule       |
|             |      | ty     |                      |                      |
+-------------+------+--------+----------------------+----------------------+
| DME: Walker | DME  | Routin |   Gait abnormality   | DME 1 Time for 1     |
|             |      | e      |                      | Occurrences starting |
|             |      |        |                      |  2017 until    |
|             |      |        |                      | 2017           |
+-------------+------+--------+----------------------+----------------------+
 documented as of this encounter
 
 Procedures
 
 
+----------------------+--------+-------------+----------------------+----------------------
+
| Procedure Name       | Priori | Date/Time   | Associated Diagnosis | Comments             
|
|                      | ty     |             |                      |                      
|
+----------------------+--------+-------------+----------------------+----------------------
+
| XR LUMBAR SPINE 2 OR | STAT   | 2017  |                      |   Results for this   
 
|
|  3 VW                |        |  3:45 PM    |                      | procedure are in the 
|
|                      |        | PDT         |                      |  results section.    
|
+----------------------+--------+-------------+----------------------+----------------------
+
| FL TORI STATS NO     | Routin | 2017  |                      |   Results for this   
|
| CHARGE               | e      |  1:46 PM    |                      | procedure are in the 
|
|                      |        | PDT         |                      |  results section.    
|
+----------------------+--------+-------------+----------------------+----------------------
+
| LAMINECTOMY          |        | 2017  |   Osteoarthritis of  |                      
|
| PLIF/TLIF            |        | 11:54 AM    | spine with           |                      
|
| INSTRUMENTATION      |        | PDT         | radiculopathy,       |                      
|
|                      |        |             | lumbar region        |                      
|
|                      |        |             | (M47.26), Spinal     |                      
|
|                      |        |             | stenosis, lumbar     |                      
|
|                      |        |             | region, without      |                      
|
|                      |        |             | neurogenic           |                      
|
|                      |        |             | claudication         |                      
|
|                      |        |             | (M48.06), Lumbar     |                      
|
|                      |        |             | radiculopathy        |                      
|
|                      |        |             | (M54.16), Chronic    |                      
|
|                      |        |             | left-sided low back  |                      
|
|                      |        |             | pain with left-sided |                      
|
|                      |        |             |  sciatica (M54.42,   |                      
|
|                      |        |             | G89.29),   Status    |                      
|
|                      |        |             | post laminectomy     |                      
|
|                      |        |             | (Z98.890)            |                      
|
+----------------------+--------+-------------+----------------------+----------------------
+
 
 
 
+---+--------+
|   |   Case |
|   |  Notes |
|   |        |
 
|   | Origin |
|   | al     |
|   | Reques |
|   | t      |
|   | Inform |
|   | ation  |
|   | Sent   |
|   | Over   |
|   | / |
|   | :I |
|   | nstrum |
|   | ents/S |
|   | pecial |
|   |        |
|   | Equipm |
|   | ent:   |
|   | Drill, |
|   |        |
|   | Micros |
|   | cope,  |
|   | Metrx, |
|   |        |
|   | O-Arm, |
|   |        |
|   | Stealt |
|   | h      |
|   | Biolog |
|   | ics:   |
|   | Infuse |
|   | , Bone |
|   |  Chips |
|   |        |
|   | Table: |
|   |        |
|   | Jackso |
|   | n      |
|   | Frame  |
|   | REP:   |
|   | Marlo  |
|   | Juan |
|   |        |
|   | Implan |
|   | ts:    |
|   | TLIF   |
|   | Cage,  |
|   | Screws |
|   |  Est   |
|   | time:  |
|   | 120min |
+---+--------+
|   |        |
|   | Specia |
|   | l      |
|   | Needs  |
|   |  Marlo |
|   |        |
|   | Juan |
|   |  -     |
|   | TLIF   |
|   | Cage,  |
 
|   | Screws |
|   | Table: |
|   |        |
|   | Jackso |
|   | n      |
|   | Frame  |
+---+--------+
 
 
 
+-----------------+--------+-------------+---+----------------------+
| TYPE AND SCREEN | Routin | 2017  |   |   Results for this   |
|                 | e      | 10:05 AM    |   | procedure are in the |
|                 |        | PDT         |   |  results section.    |
+-----------------+--------+-------------+---+----------------------+
 documented in this encounter
 
 Results
 XR Lumbar Spine 2 or 3 Vw (2017  3:45 PM PDT)
 
+----------+
| Specimen |
+----------+
|          |
+----------+
 
 
 
+------------------------------------------------------------------------+---------------+
| Narrative                                                              | Performed At  |
+------------------------------------------------------------------------+---------------+
|   XR LUMBAR SPINE 2 OR 3 VW 2017 3:45 PM     HISTORY: post op     |   [facility] |
| lumbar surgery.     COMPARISON: 4/3/2017     FINDINGS:  Interval PLIF  |               |
| and interbody fusion of L4-L5, without evidence of immediate           |               |
| postoperative complication.  Expected soft tissue postsurgical changes |               |
|  including a left paraspinal surgical  drainage catheter.  Vertebral   |               |
| body heights and alignment is maintained. Mild early spondylosis at    |               |
| L3-L4.      IMPRESSION -  Interval PLIF and interbody fusion of L4-L5, |               |
|  without evidence of immediate  postoperative complication.            |               |
| Dictated and Signed by: Joseph Liao MD    Electronically signed:   |               |
| 2017 5:37 PM                                                      |               |
+------------------------------------------------------------------------+---------------+
 
 
 
+------------------------------------------------------------------------------------------+
| Procedure Note                                                                           |
+------------------------------------------------------------------------------------------+
|   [facility], Rad Results In - 2017  5:40 PM PDT  XR LUMBAR SPINE 2 OR 3 VW 2017     |
| 3:45 PMHISTORY: post op lumbar surgery.COMPARISON: 4/3/2017FINDINGS:Interval PLIF and    |
| interbody fusion of L4-L5, without evidence of immediatepostoperative                    |
| complication.Expected soft tissue postsurgical changes including a left paraspinal       |
| surgicaldrainage catheter.Vertebral body heights and alignment is maintained. Mild early |
|  spondylosis atL3-L4. IMPRESSION -Interval PLIF and interbody fusion of L4-L5, without   |
| evidence of immediatepostoperative complication.Dictated and Signed by: Joseph Liao,   |
| MD  Electronically signed: 2017 5:37 PM                                             |
|postoperative complication.                                                               |
|Expected soft tissue postsurgical changes including a left paraspinal surgical            |
|drainage catheter.                                                                        |
|Vertebral body heights and alignment is maintained. Mild early spondylosis at             |
 
|L3-L4.                                                                                    |
|                                                                                          |
|IMPRESSION -                                                                              |
|Interval PLIF and interbody fusion of L4-L5, without evidence of immediate                |
|postoperative complication.                                                               |
|                                                                                          |
|Dictated and Signed by: Joseph Liao MD                                                 |
| Electronically signed: 2017 5:37 PM                                                 |
+------------------------------------------------------------------------------------------+
 
 
 
+---------------+---------+--------------------+--------------+
| Performing    | Address | City/State/Zipcode | Phone Number |
| Organization  |         |                    |              |
+---------------+---------+--------------------+--------------+
|   [facility] |         |                    |              |
+---------------+---------+--------------------+--------------+
 FL C-Arm Stats No Charge (2017  1:46 PM PDT)
 
+----------+
| Specimen |
+----------+
|          |
+----------+
 
 
 
+-----------------------------------------------------------+---------------+
| Narrative                                                 | Performed At  |
+-----------------------------------------------------------+---------------+
|   No Radiologist interpretation, please see Chart Review. |   [facility] |
+-----------------------------------------------------------+---------------+
 
 
 
+---------------+---------+--------------------+--------------+
| Performing    | Address | City/State/Zipcode | Phone Number |
| Organization  |         |                    |              |
+---------------+---------+--------------------+--------------+
|   [facility] |         |                    |              |
+---------------+---------+--------------------+--------------+
 Type and Screen (2017 10:05 AM PDT)
 
+-----------+----------+-----------+-------------+--------------+
| Component | Value    | Ref Range | Performed   | Pathologist  |
|           |          |           | At          | Signature    |
+-----------+----------+-----------+-------------+--------------+
| ABO       | O        |           | [facility]  |              |
|           |          |           | [facility]    |              |
|           |          |           | [facility]     |              |
|           |          |           | [facility] -    |              |
|           |          |           | BLOOD BANK  |              |
+-----------+----------+-----------+-------------+--------------+
| Rh Type   | Positive |           | [facility]  |              |
|           |          |           | [facility]    |              |
|           |          |           | [facility]     |              |
|           |          |           | [facility] -    |              |
|           |          |           | BLOOD BANK  |              |
+-----------+----------+-----------+-------------+--------------+
 
| Antibody  | Negative |           | [facility]  |              |
| Screen    |          |           | [facility]    |              |
|           |          |           | [facility]     |              |
|           |          |           | CENTER -    |              |
|           |          |           | BLOOD BANK  |              |
+-----------+----------+-----------+-------------+--------------+
 
 
 
+----------+
| Specimen |
+----------+
| Blood    |
+----------+
 
 
 
+----------------------+--------------------+--------------------+--------------+
| Performing           | Address            | City/State/Zipcode | Phone Number |
| Organization         |                    |                    |              |
+----------------------+--------------------+--------------------+--------------+
|   [facility]     |   [address] |   [place], [place]  |              |
| [facility]  |                    | [id number]              |              |
| - BLOOD BANK         |                    |                    |              |
+----------------------+--------------------+--------------------+--------------+
 documented in this encounter
 
 Visit Diagnoses
 
 
+----------------------------------------------------------------------------------+
| Diagnosis                                                                        |
+----------------------------------------------------------------------------------+
|   Osteoarthritis of spine with radiculopathy, lumbar region - 2017 - Primary |
+----------------------------------------------------------------------------------+
|   Gait abnormality  Abnormality of gait                                          |
+----------------------------------------------------------------------------------+
 documented in this encounter
 
 Administered Medications
 
 
+-----------------------------------+--------+----------+--------+------+------+
| Medication Order                  | MAR    | Action   | Dose   | Rate | Site |
|                                   | Action | Date     |        |      |      |
+-----------------------------------+--------+----------+--------+------+------+
|   acetaminophen (TYLENOL) tablet  | Given  | 20 | 975 mg |      |      |
| 975 mg  975 mg, Oral, ONCE, Mon   |        | 17 10:16 |        |      |      |
| 17 at 1000, For 1 dose,      |        |  AM PDT  |        |      |      |
| Pre-op                            |        |          |        |      |      |
+-----------------------------------+--------+----------+--------+------+------+
 
 
 
+---+---+
|   |   |
+---+---+
 
 
 
 
+-----------------------------------+-------+----------+-------+---+---+
|   atorvaSTATin (LIPITOR) tablet   | Given | 20 | 40 mg |   |   |
| 40 mg  40 mg, Oral, DAILY, First  |       | 17  9:32 |       |   |   |
| dose on 17 at 1630,      |       |  PM PDT  |       |   |   |
| Post-op/Phase II                  |       |          |       |   |   |
+-----------------------------------+-------+----------+-------+---+---+
 
 
 
+-------+----------+-------+---+---+
| Given | 20 | 40 mg |   |   |
|       | 17  8:32 |       |   |   |
|       |  PM PDT  |       |   |   |
+-------+----------+-------+---+---+
 
 
 
+---+---+
|   |   |
+---+---+
 
 
 
+----------------------------------+-------+----------+-------+---+---+
|   atorvaSTATin (LIPITOR) tablet  | Given | 20 | 40 mg |   |   |
| 40 mg  40 mg, Oral, ONCE, Mon    |       | 17  8:08 |       |   |   |
| 17 at 2100, For 1 dose      |       |  PM PDT  |       |   |   |
+----------------------------------+-------+----------+-------+---+---+
 
 
 
+---+---+
|   |   |
+---+---+
 
 
 
+-----------------------------------+---------+----------+-----+-------+---+
|   ceFAZolin in saline (ANCEF)     | New Bag | 20 | 2 g | 100   |   |
| IVPB 2 g  2 g, Intravenous,       |         | 17  1:29 |     | mL/hr |   |
| Administer over 30 Minutes, EVERY |         |  AM PDT  |     |       |   |
|  8 HOURS INTERVAL, First dose on  |         |          |     |       |   |
| Mon 17 at 1800, For 2 doses, |         |          |     |       |   |
|  Start 8 hours after previous     |         |          |     |       |   |
| dose.  Last dose to be given      |         |          |     |       |   |
| within 24 hours of surgery end    |         |          |     |       |   |
| time. Keep in refrigerator.,      |         |          |     |       |   |
| Post-op/Phase II, Indications:    |         |          |     |       |   |
| Surgical Prophylaxis              |         |          |     |       |   |
+-----------------------------------+---------+----------+-----+-------+---+
 
 
 
+---------+----------+-----+-------+---+
| New Bag | 20 | 2 g | 100   |   |
|         | 17  6:29 |     | mL/hr |   |
|         |  PM PDT  |     |       |   |
+---------+----------+-----+-------+---+
 
 
 
 
+-----------------------------------+---+
|                                   |   |
+-----------------------------------+---+
|   diphenhydrAMINE (BENADRYL) 12.5 |   |
|  mg/5 mL liquid 25 mg  25 mg,     |   |
| Oral, EVERY 4 HOURS PRN, Itching, |   |
|  Starting 17 at 1603,    |   |
| Oral route is preferred.,         |   |
| Post-op/Phase II                  |   |
+-----------------------------------+---+
|                                   |   |
+-----------------------------------+---+
|   diphenhydrAMINE (BENADRYL)      |   |
| injection 12.5 mg  12.5 mg,       |   |
| Intravenous, EVERY 4 HOURS PRN,   |   |
| Itching, Starting 17 at  |   |
| 1603, Oral route is preferred.,   |   |
| Post-op/Phase II                  |   |
+-----------------------------------+---+
|                                   |   |
+-----------------------------------+---+
|   diphenhydrAMINE (BENADRYL)      |   |
| tablet 25 mg  25 mg, Oral, EVERY  |   |
| 4 HOURS PRN, Itching, Starting    |   |
| 17 at 1603, Oral route   |   |
| is preferred., Post-op/Phase II   |   |
+-----------------------------------+---+
|                                   |   |
+-----------------------------------+---+
 
 
 
+-----------------------------------+-------+----------+--------+---+---+
|   docusate sodium (COLACE)        | Given | 20 | 100 mg |   |   |
| capsule 100 mg  100 mg, Oral, 2   |       | 17  8:41 |        |   |   |
| TIMES DAILY, First dose on Mon    |       |  AM PDT  |        |   |   |
| 17 at 2100, First line agent |       |          |        |   |   |
|  for constipation, Post-op/Phase  |       |          |        |   |   |
| II                                |       |          |        |   |   |
+-----------------------------------+-------+----------+--------+---+---+
 
 
 
+-------+----------+--------+---+---+
| Given | 20 | 100 mg |   |   |
|       | 17  9:32 |        |   |   |
|       |  PM PDT  |        |   |   |
+-------+----------+--------+---+---+
| Given | 20 | 100 mg |   |   |
|       | 17  9:11 |        |   |   |
|       |  AM PDT  |        |   |   |
+-------+----------+--------+---+---+
 
 
 
+---+---+
|   |   |
+---+---+
 
 
 
 
+-----------------------------------+-------+----------+--------+---+---+
|   fentaNYL (PF) injection 25-50   | Given | 20 | 50 mcg |   |   |
| mcg  25-50 mcg, Intravenous,      |       | 17  2:29 |        |   |   |
| EVERY 5 MIN PRN, Pain, Starting   |       |  PM PDT  |        |   |   |
| Mon 17 at 1405, Maximum      |       |          |        |   |   |
| total dose 250 mcg. PACU IV       |       |          |        |   |   |
| Narcotic Priority: Only use       |       |          |        |   |   |
| fentanyl for immediate post-op    |       |          |        |   |   |
| pain (one dose) or breakthrough   |       |          |        |   |   |
| pain when any other IV narcotics  |       |          |        |   |   |
| ordered have been ineffective (if |       |          |        |   |   |
|  ordered).   If both morphine and |       |          |        |   |   |
|  hydromorphone are ordered, use   |       |          |        |   |   |
| morphine first, and use           |       |          |        |   |   |
| hydromorphone if morphine         |       |          |        |   |   |
| ineffective., Recovery/Phase I    |       |          |        |   |   |
+-----------------------------------+-------+----------+--------+---+---+
 
 
 
+---+---+
|   |   |
+---+---+
 
 
 
+-----------------------------------+-------+----------+-------+---+---+
|   FLUoxetine (PROzac) capsule 20  | Given | 20 | 20 mg |   |   |
| mg  20 mg, Oral, DAILY, First     |       | 17  8:41 |       |   |   |
| dose on 17 at 0900,       |       |  AM PDT  |       |   |   |
| Post-op/Phase II                  |       |          |       |   |   |
+-----------------------------------+-------+----------+-------+---+---+
 
 
 
+-------+----------+-------+---+---+
| Given | 20 | 20 mg |   |   |
|       | 17  9:11 |       |   |   |
|       |  AM PDT  |       |   |   |
+-------+----------+-------+---+---+
| Given | 20 | 20 mg |   |   |
|       | 17  8:58 |       |   |   |
|       |  AM PDT  |       |   |   |
+-------+----------+-------+---+---+
 
 
 
+---+---+
|   |   |
+---+---+
 
 
 
+-----------------------------------+-------+----------+---------+---+----------+
|   fluticasone (FLONASE) 50        | Given | 20 | 1 spray |   | [facility] |
| mcg/nasal spray 1 spray  1 spray, |       | 17  8:42 |         |   | h        |
|  Nasal, DAILY, First dose on Mon  |       |  AM PDT  |         |   |          |
| 17 at 1630, Kirby mena.,   |       |          |         |   |          |
 
| Post-op/Phase II                  |       |          |         |   |          |
+-----------------------------------+-------+----------+---------+---+----------+
 
 
 
+-------+----------+---------+---+----------+
| Given | 20 | 1 spray |   | [facility] |
|       | 17  9:11 |         |   | h        |
|       |  AM PDT  |         |   |          |
+-------+----------+---------+---+----------+
| Given | 20 | 1 spray |   | [facility] |
|       | 17  8:59 |         |   | h        |
|       |  AM PDT  |         |   |          |
+-------+----------+---------+---+----------+
 
 
 
+---+---+
|   |   |
+---+---+
 
 
 
+-----------------------------------+-------+----------+--------+---+---+
|   gabapentin (NEURONTIN) capsule  | Given | 20 | 300 mg |   |   |
| 300 mg  300 mg, Oral, ONCE, Mon   |       | 17 10:16 |        |   |   |
| 17 at 1000, For 1 dose,      |       |  AM PDT  |        |   |   |
| Pre-op                            |       |          |        |   |   |
+-----------------------------------+-------+----------+--------+---+---+
 
 
 
+---+---+
|   |   |
+---+---+
 
 
 
+-----------------------------------+-------+----------+----------+---+---+
|   HYDROcodone-acetaminophen       | Given | 20 | 1 tablet |   |   |
| (NORCO)  mg per tablet 1-2  |       | 17 10:37 |          |   |   |
| tablet  1-2 tablet, Oral, EVERY 4 |       |  AM PDT  |          |   |   |
|  HOURS PRN, Pain, Starting Mon    |       |          |          |   |   |
| 17 at 1603, Post-op/Phase II |       |          |          |   |   |
+-----------------------------------+-------+----------+----------+---+---+
 
 
 
+-------+----------+----------+---+---+
| Given | 20 | 1 tablet |   |   |
|       | 17  9:32 |          |   |   |
|       |  PM PDT  |          |   |   |
+-------+----------+----------+---+---+
| Given | 20 | 1 tablet |   |   |
|       | 17  9:11 |          |   |   |
|       |  AM PDT  |          |   |   |
+-------+----------+----------+---+---+
 
 
 
 
+---+---+
|   |   |
+---+---+
 
 
 
+-----------------------------------+-------+----------+---------+---+---+
|   HYDROmorphone (DILAUDID)        | Given | 07/31/20 | 0.25 mg |   |   |
| injection 0.2-0.5 mg  0.2-0.5 mg, |       | 17  3:11 |         |   |   |
|  Intravenous, EVERY 5 MIN PRN,    |       |  PM PDT  |         |   |   |
| Pain, Starting Mon 17 at     |       |          |         |   |   |
| 1405, Maximum total dose 4 mg.    |       |          |         |   |   |
| PACU IV Narcotic Priority: Only   |       |          |         |   |   |
| use fentanyl for immediate        |       |          |         |   |   |
| post-op pain (one dose) or        |       |          |         |   |   |
| breakthrough pain when any other  |       |          |         |   |   |
| IV narcotics ordered have been    |       |          |         |   |   |
| ineffective (if ordered).   If    |       |          |         |   |   |
| both morphine and hydromorphone   |       |          |         |   |   |
| are ordered, use morphine first,  |       |          |         |   |   |
| and use hydromorphone if morphine |       |          |         |   |   |
|  ineffective., Recovery/Phase I   |       |          |         |   |   |
+-----------------------------------+-------+----------+---------+---+---+
 
 
 
+-------+----------+---------+---+---+
| Given | 20 | 0.25 mg |   |   |
|       | 17  2:59 |         |   |   |
|       |  PM PDT  |         |   |   |
+-------+----------+---------+---+---+
| Given | 20 | 0.25 mg |   |   |
|       | 17  2:36 |         |   |   |
|       |  PM PDT  |         |   |   |
+-------+----------+---------+---+---+
 
 
 
+---+---+
|   |   |
+---+---+
 
 
 
+-----------------------------------+-------+----------+--------+---+---+
|   magnesium hydroxide (MILK OF    | Given | 20 | 30 mLs |   |   |
| MAGNESIA) 400 mg/5 mL suspension  |       | 17  7:00 |        |   |   |
| 30 mL  30 mL, Oral, 2 TIMES DAILY |       |  AM PDT  |        |   |   |
|  PRN, Constipation, Starting Mon  |       |          |        |   |   |
| 17 at 1603, If docusate,     |       |          |        |   |   |
| senna, and polyethylene glycol    |       |          |        |   |   |
| ineffective x 24 hours or not     |       |          |        |   |   |
| ordered, Post-op/Phase II         |       |          |        |   |   |
+-----------------------------------+-------+----------+--------+---+---+
 
 
 
+-------+----------+--------+---+---+
| Given | 20 | 30 mLs |   |   |
|       | 17  9:39 |        |   |   |
 
|       |  PM PDT  |        |   |   |
+-------+----------+--------+---+---+
 
 
 
+---+---+
|   |   |
+---+---+
 
 
 
+-----------------------------------+---------+----------+--------+--------+---+
|   methocarbamol (ROBAXIN) 750 mg  | New Bag | 20 | 750 mg | 143.3  |   |
| in sodium chloride 0.9% 100 mL    |         | 17  5:19 |        | mL/hr  |   |
| IVPB  750 mg, Intravenous,        |         |  AM PDT  |        |        |   |
| Administer over 45 Minutes, EVERY |         |          |        |        |   |
|  8 HOURS (3 times per day), First |         |          |        |        |   |
|  dose on 17 at 1430, For |         |          |        |        |   |
|  3 doses, Post-op/Phase II        |         |          |        |        |   |
+-----------------------------------+---------+----------+--------+--------+---+
 
 
 
+---------+----------+--------+--------+---+
| New Bag | 20 | 750 mg | 143.3  |   |
|         | 17  9:34 |        | mL/hr  |   |
|         |  PM PDT  |        |        |   |
+---------+----------+--------+--------+---+
| New Bag | 20 | 750 mg | 143.3  |   |
|         | 17  2:53 |        | mL/hr  |   |
|         |  PM PDT  |        |        |   |
+---------+----------+--------+--------+---+
 
 
 
+---+---+
|   |   |
+---+---+
 
 
 
+-----------------------------------+-------+----------+-------+---+---+
|   metoprolol succinate            | Given | 20 | 25 mg |   |   |
| (TOPROL-XL) ER tablet 25 mg  25   |       | 17  8:41 |       |   |   |
| mg, Oral, DAILY, First dose on    |       |  AM PDT  |       |   |   |
| Tue 17 at 0900, Tablet may be |       |          |       |   |   |
|  cut where scored but do not      |       |          |       |   |   |
| crush., Post-op/Phase II          |       |          |       |   |   |
+-----------------------------------+-------+----------+-------+---+---+
 
 
 
+-------+----------+-------+---+---+
| Given | 20 | 25 mg |   |   |
|       | 17  9:11 |       |   |   |
|       |  AM PDT  |       |   |   |
+-------+----------+-------+---+---+
| Given | 20 | 25 mg |   |   |
|       | 17  8:58 |       |   |   |
|       |  AM PDT  |       |   |   |
 
+-------+----------+-------+---+---+
 
 
 
+---+---+
|   |   |
+---+---+
 
 
 
+-----------------------------------+---------+----------+---------+---+----------+
|   scopolamine (TRANSDERM-SCOP) 1  | Patch   | 07/31/20 | 1 patch |   | [facility] |
| mg/3 days 1 patch  1 patch,       | Applied | 17 10:20 |         |   | nd Left  |
| Transdermal, ONCE PRN, PRN for    |         |  AM PDT  |         |   |          |
| adult patients with history of    |         |          |         |   |          |
| PONV.  Hold for patients with     |         |          |         |   |          |
| glaucoma, dementia, altered       |         |          |         |   |          |
| mental status, or history of      |         |          |         |   |          |
| allergy to Scopolamine.  Apply to |         |          |         |   |          |
|  mastoid process behind ear.,     |         |          |         |   |          |
| Starting 17 at 0935, For |         |          |         |   |          |
|  1 dose, PRN for adult patients   |         |          |         |   |          |
| with history of PONV.  Hold for   |         |          |         |   |          |
| patients with glaucoma, dementia, |         |          |         |   |          |
|  altered mental status, or        |         |          |         |   |          |
| history of allergy to             |         |          |         |   |          |
| Scopolamine.  Apply to mastoid    |         |          |         |   |          |
| process behind ear., Pre-op       |         |          |         |   |          |
+-----------------------------------+---------+----------+---------+---+----------+
 
 
 
+---+---+
|   |   |
+---+---+
 
 
 
+-----------------------------------+-------+----------+--------+---+---+
|   senna (SENOKOT) tablet 8.6 mg   | Given | 20 | 8.6 mg |   |   |
| 8.6 mg, Oral, 2 TIMES DAILY,      |       | 17  8:41 |        |   |   |
| First dose on 17 at      |       |  AM PDT  |        |   |   |
| 2100, If docusate ineffective or  |       |          |        |   |   |
| not ordered, Post-op/Phase II     |       |          |        |   |   |
+-----------------------------------+-------+----------+--------+---+---+
 
 
 
+-------+----------+--------+---+---+
| Given | 20 | 8.6 mg |   |   |
|       | 17  9:32 |        |   |   |
|       |  PM PDT  |        |   |   |
+-------+----------+--------+---+---+
| Given | 20 | 8.6 mg |   |   |
|       | 17  9:11 |        |   |   |
|       |  AM PDT  |        |   |   |
+-------+----------+--------+---+---+
 
 
 
 
+---+---+
|   |   |
+---+---+
 
 
 
+-----------------------------------+---------+----------+--------+-------+----------+
|   sodium chloride 0.9% (NS)       | New Bag | 20 | 1,000  | 100   | Left Arm |
| infusion  at 100 mL/hr,           |         | 17 10:20 | mLs    | mL/hr |          |
| Intravenous, CONTINUOUS, Starting |         |  AM PDT  |        |       |          |
|  Mon 17 at 1000, Pre-op      |         |          |        |       |          |
+-----------------------------------+---------+----------+--------+-------+----------+
 
 
 
+---+---+
|   |   |
+---+---+
 
 
 
+-----------------------------------+---------+----------+---+----------+---+
|   sodium chloride 0.9% (NS)       | New Bag | 20 |   | 50 mL/hr |   |
| infusion  at 100 mL/hr,           |         | 17  5:22 |   |          |   |
| Intravenous, CONTINUOUS, Starting |         |  AM PDT  |   |          |   |
|  Mon 17 at 1630,             |         |          |   |          |   |
| Post-op/Phase II                  |         |          |   |          |   |
+-----------------------------------+---------+----------+---+----------+---+
 
 
 
+---------+----------+---+-------+---+
| New Bag | 20 |   | 100   |   |
|         | 17  5:04 |   | mL/hr |   |
|         |  PM PDT  |   |       |   |
+---------+----------+---+-------+---+
 
 
 
+---+---+
|   |   |
+---+---+
 documented in this encounter

## 2020-06-08 NOTE — XMS
Encounter Summary
  Created on: 2020
 
 Memo Deni Siu
 External Reference #: [id number]
 : 52
 Sex: Male
 
 Demographics
 
 
+-----------------------+---------------------------+
| Address               | [address]           |
|                       | [place]  [address] |
+-----------------------+---------------------------+
| Home Phone            | [phone]           |
+-----------------------+---------------------------+
| Preferred Language    | Unknown                   |
+-----------------------+---------------------------+
| Marital Status        |                    |
+-----------------------+---------------------------+
| Uatsdin Affiliation | Unknown                   |
+-----------------------+---------------------------+
| Race                  | Unknown                   |
+-----------------------+---------------------------+
| Ethnic Group          | Unknown                   |
+-----------------------+---------------------------+
 
 
 Author
 
 
+--------------+--------------------------------------------+
| Author       | [facility]  |
|              | and [place]                                |
+--------------+--------------------------------------------+
| Organization | [facility]  |
|              | and [place]                                |
+--------------+--------------------------------------------+
| Address      | Unknown                                    |
+--------------+--------------------------------------------+
| Phone        | Unavailable                                |
+--------------+--------------------------------------------+
 
 
 
 Support
 
 
+---------------+--------------+---------+-----------------+
| Name          | Relationship | Address | Phone           |
+---------------+--------------+---------+-----------------+
| Sofiya A Brown | ECON         | Unknown | [phone] |
+---------------+--------------+---------+-----------------+
 
 
 
 Care Team Providers
 
 
 
+-------------------------+------+-----------------+
| Care Team Member Name   | Role | Phone           |
+-------------------------+------+-----------------+
| Ck Michelle MD | PCP  | [phone] |
+-------------------------+------+-----------------+
 
 
 
 Reason for Visit
 Auth/Cert
 
+--------+--------+-----------+--------------+--------------+--------------+
| Status | Reason | Specialty | Diagnoses /  | Referred By  | Referred To  |
|        |        |           | Procedures   | Contact      | Contact      |
+--------+--------+-----------+--------------+--------------+--------------+
|        |        |           |   Diagnoses  |              |              |
|        |        |           |              |              |              |
|        |        |           | Osteoarthrit |              |              |
|        |        |           | is of spine  |              |              |
|        |        |           | with         |              |              |
|        |        |           | radiculopath |              |              |
|        |        |           | y, lumbar    |              |              |
|        |        |           | region       |              |              |
|        |        |           | (M47.26),    |              |              |
|        |        |           | Spinal       |              |              |
|        |        |           | stenosis,    |              |              |
|        |        |           | lumbar       |              |              |
|        |        |           | region,      |              |              |
|        |        |           | without      |              |              |
|        |        |           | neurogenic   |              |              |
|        |        |           | claudication |              |              |
|        |        |           |  (M48.06),   |              |              |
|        |        |           | Lumbar       |              |              |
|        |        |           | radiculopath |              |              |
|        |        |           | y (M54.16),  |              |              |
|        |        |           | Chronic      |              |              |
|        |        |           | left-sided   |              |              |
|        |        |           | low back     |              |              |
|        |        |           | pain with    |              |              |
|        |        |           | left-sided   |              |              |
|        |        |           | sciatica     |              |              |
|        |        |           | (M54.42,     |              |              |
|        |        |           | G89.29),     |              |              |
|        |        |           | Status post  |              |              |
|        |        |           | laminectomy  |              |              |
|        |        |           | (Z98.890)    |              |              |
|        |        |           | Procedures   |              |              |
|        |        |           | PA ARTHDSIS  |              |              |
|        |        |           | POST/POSTERO |              |              |
|        |        |           | LATRL/POSTIN |              |              |
|        |        |           | TERBODY      |              |              |
|        |        |           | LUMBAR       |              |              |
|        |        |           | POSTERIOR    |              |              |
|        |        |           | NON-SEGMENTA |              |              |
|        |        |           | L            |              |              |
|        |        |           | INSTRUMENTAT |              |              |
|        |        |           | ION  PA INSJ |              |              |
|        |        |           |  BIOMCHN DEV |              |              |
 
|        |        |           |              |              |              |
|        |        |           | INTERVERTEBR |              |              |
|        |        |           | AL DSC SPC   |              |              |
|        |        |           | W/ARTHRD     |              |              |
|        |        |           | LAMINEC/FACE |              |              |
|        |        |           | TECT/FORAMIN |              |              |
|        |        |           | ,LUMBAR 1    |              |              |
|        |        |           | SEG  PA      |              |              |
|        |        |           | LAMINEC/FACE |              |              |
|        |        |           | TECT/FORAMIN |              |              |
|        |        |           | ,EACH ADDNL  |              |              |
|        |        |           |  L4-5        |              |              |
|        |        |           | Transforamin |              |              |
|        |        |           | al Lumbar    |              |              |
|        |        |           | Interbody    |              |              |
|        |        |           | Fusion       |              |              |
+--------+--------+-----------+--------------+--------------+--------------+
 
 
 
 
 Encounter Details
 
 
+--------+-----------+----------------------+----------------------+-------------+
| Date   | Type      | Department           | Care Team            | Description |
+--------+-----------+----------------------+----------------------+-------------+
| / | Hospital  |   [facility] |   Dreyer, Jason A,   |             |
|    | Encounter |  [facility] X[facility]  [address] | DO  [address]    |             |
|        |           |  [place]       | [address] |             |
|        |           | [place] [address] |  [address]  [phone] |             |
|        |           |   [phone]       |   [phone] (Fax) |             |
+--------+-----------+----------------------+----------------------+-------------+
 
 
 
 Social History
 
 
+--------------+-------+-----------+--------+------+
| Tobacco Use  | Types | Packs/Day | Years  | Date |
|              |       |           | Used   |      |
+--------------+-------+-----------+--------+------+
| Never Smoker |       |           |        |      |
+--------------+-------+-----------+--------+------+
 
 
 
+---------------------+---+---+---+
| Smokeless Tobacco:  |   |   |   |
| Never Used          |   |   |   |
+---------------------+---+---+---+
 
 
 
+-------------+----------------------+---------+----------------------+
| Alcohol Use | Drinks/Week          | oz/Week | Comments             |
+-------------+----------------------+---------+----------------------+
| Yes         |   0 Standard drinks  | 0.0     | 1-2 drinks 2-4 times |
|             | or equivalent        |         |  per month           |
 
+-------------+----------------------+---------+----------------------+
 
 
 
+------------------+---------------+
| Sex Assigned at  | Date Recorded |
| Birth            |               |
+------------------+---------------+
| Not on file      |               |
+------------------+---------------+
 
 
 
+----------------+-------------+-------------+
| Job Start Date | Occupation  | Industry    |
+----------------+-------------+-------------+
| Not on file    | Not on file | Not on file |
+----------------+-------------+-------------+
 
 
 
+----------------+--------------+------------+
| Travel History | Travel Start | Travel End |
+----------------+--------------+------------+
 
 
 
+-------------------------------------+
| No recent travel history available. |
+-------------------------------------+
 documented as of this encounter
 
 Medications at Time of Discharge
 
 
+----------------------+----------------------+-----------+---------+----------+-----------+
| Medication           | Sig                  | Dispensed | Refills | Start    | End Date  |
|                      |                      |           |         | Date     |           |
+----------------------+----------------------+-----------+---------+----------+-----------+
|   atorvaSTATin       | Take 40 mg by mouth  |           | 0       | 20 |           |
| (LIPITOR) 40 mg      | Daily.               |           |         | 15       |           |
| tablet               |                      |           |         |          |           |
+----------------------+----------------------+-----------+---------+----------+-----------+
|   B Complex Vitamins | Take 1 tablet by     |           | 0       |          |           |
|  (VITAMIN B COMPLEX) | mouth Daily.         |           |         |          |           |
|  tablet              |                      |           |         |          |           |
+----------------------+----------------------+-----------+---------+----------+-----------+
|   fluticasone        | 1 spray by Nasal     |           | 0       |          |           |
| (FLONASE) 50         | route Daily.         |           |         |          |           |
| mcg/nasal spray      |                      |           |         |          |           |
+----------------------+----------------------+-----------+---------+----------+-----------+
|   metoprolol         | Take 25 mg by mouth  |           | 0       | 20 |           |
| succinate            | Daily.               |           |         | 17       |           |
| (TOPROL-XL) 25 mg 24 |                      |           |         |          |           |
|  hr tablet           |                      |           |         |          |           |
+----------------------+----------------------+-----------+---------+----------+-----------+
|   ASCORBIC ACID PO   | Take  by mouth.      |           | 0       |          |  |
|                      |                      |           |         |          | 0         |
+----------------------+----------------------+-----------+---------+----------+-----------+
|   aspirin 81 mg EC   | Take 81 mg by mouth  |           | 0       |          |  |
 
| tablet               | Daily.               |           |         |          | 7         |
+----------------------+----------------------+-----------+---------+----------+-----------+
|   cholecalciferol    | Take 1,000 Units by  |           | 0       |          |  |
| (VITAMIN D-3) 1,000  | mouth Daily.         |           |         |          | 0         |
| units tablet         |                      |           |         |          |           |
+----------------------+----------------------+-----------+---------+----------+-----------+
|   diazePAM (VALIUM)  | Take 1 tablet by     |   90      | 0       | 20 | 11/15/201 |
| 5 mg tablet          | mouth every 6 hours  | tablet    |         | 17       | 7         |
|                      | as needed for Muscle |           |         |          |           |
|                      |  spasms.             |           |         |          |           |
+----------------------+----------------------+-----------+---------+----------+-----------+
|                      | Take 1 tablet by     |           | 0       |          |  |
| fexofenadine-pseudoe | mouth Twice  daily   |           |         |          | 0         |
| PHEDrine (ALLEGRA-D  | as needed.           |           |         |          |           |
| ALLERGY &            |                      |           |         |          |           |
| CONGESTION)    |                      |           |         |          |           |
| MG per tablet        |                      |           |         |          |           |
+----------------------+----------------------+-----------+---------+----------+-----------+
|   FLUoxetine         | Take 20 mg by mouth  |           | 0       |          |  |
| (PROZAC) 20 mg       | Daily.               |           |         |          | 0         |
| capsule              |                      |           |         |          |           |
+----------------------+----------------------+-----------+---------+----------+-----------+
|                      | Take 1-2 tablets by  |   120     | 0       | 20 | 11/15/201 |
| HYDROcodone-acetamin | mouth every 4 hours  | tablet    |         | 17       | 7         |
| ophen (NORCO)  | as needed for Pain.  |           |         |          |           |
|  mg per tablet       |                      |           |         |          |           |
+----------------------+----------------------+-----------+---------+----------+-----------+
|                      | Take 1 tablet by     |           | 0       |          |  |
| HYDROcodone-acetamin | mouth every 6 hours  |           |         |          | 7         |
| ophen (NORCO) 5-325  | as needed.           |           |         |          |           |
| mg per tablet        |                      |           |         |          |           |
+----------------------+----------------------+-----------+---------+----------+-----------+
|   lactulose 10 g/15  | Take 30 mLs by mouth |   240 mL  | 2       | 20 | 11/15/201 |
| mL solution          |  Daily as needed for |           |         | 17       | 7         |
|                      |  Constipation.       |           |         |          |           |
+----------------------+----------------------+-----------+---------+----------+-----------+
|   naproxen           | Take 250 mg by mouth |           | 0       |          |  |
| (NAPROSYN) 250 mg    |  2 times daily (with |           |         |          | 7         |
| tablet               |  breakfast &         |           |         |          |           |
|                      | dinner).             |           |         |          |           |
+----------------------+----------------------+-----------+---------+----------+-----------+
 documented as of this encounter
 
 Plan of Treatment
 
 
+--------+---------+-----------+----------------------+-------------+
| Date   | Type    | Specialty | Care Team            | Description |
+--------+---------+-----------+----------------------+-------------+
| / | Office  | Neurology |   Ravindra Munoz MD  [address] |             |
| 2020   | Visit   |           |  [address]      |             |
|        |         |           | [place]  |             |
|        |         |           | [place] [address]             |             |
|        |         |           | [phone]         |             |
|        |         |           | [phone] (Fax)   |             |
+--------+---------+-----------+----------------------+-------------+
 documented as of this encounter
 
 Procedures
 
 
 
+-------------------+--------+-------------+----------------------+----------------------+
| Procedure Name    | Priori | Date/Time   | Associated Diagnosis | Comments             |
|                   | ty     |             |                      |                      |
+-------------------+--------+-------------+----------------------+----------------------+
| VIKKI VALLE STATS NO  | Routin | 2017  |                      |   Results for this   |
| CHARGE            | e      |  1:46 PM    |                      | procedure are in the |
|                   |        | PDT         |                      |  results section.    |
+-------------------+--------+-------------+----------------------+----------------------+
 documented in this encounter
 
 Results
 FL C-Arm Stats No Charge (2017  1:46 PM PDT)
 
+----------+
| Specimen |
+----------+
|          |
+----------+
 
 
 
+-----------------------------------------------------------+---------------+
| Narrative                                                 | Performed At  |
+-----------------------------------------------------------+---------------+
|   No Radiologist interpretation, please see Chart Review. |   [facility] |
+-----------------------------------------------------------+---------------+
 
 
 
+---------------+---------+--------------------+--------------+
| Performing    | Address | City/State/Zipcode | Phone Number |
| Organization  |         |                    |              |
+---------------+---------+--------------------+--------------+
|   [facility] |         |                    |              |
+---------------+---------+--------------------+--------------+
 documented in this encounter
 
 Visit Diagnoses
 Not on filedocumented in this encounter

## 2020-06-08 NOTE — XMS
Encounter Summary
  Created on: 2020
 
 Memo Deni Siu
 External Reference #: [id number]
 : 52
 Sex: Male
 
 Demographics
 
 
+-----------------------+---------------------------+
| Address               | [address]           |
|                       | [place]  [address] |
+-----------------------+---------------------------+
| Home Phone            | [phone]           |
+-----------------------+---------------------------+
| Preferred Language    | Unknown                   |
+-----------------------+---------------------------+
| Marital Status        |                    |
+-----------------------+---------------------------+
| Advent Affiliation | Unknown                   |
+-----------------------+---------------------------+
| Race                  | Unknown                   |
+-----------------------+---------------------------+
| Ethnic Group          | Unknown                   |
+-----------------------+---------------------------+
 
 
 Author
 
 
+--------------+--------------------------------------------+
| Author       | [facility]  |
|              | [facility] [place]                                |
+--------------+--------------------------------------------+
| Organization | [facility]  |
|              | [facility] [place]                                |
+--------------+--------------------------------------------+
| Address      | Unknown                                    |
+--------------+--------------------------------------------+
| Phone        | Unavailable                                |
+--------------+--------------------------------------------+
 
 
 
 Support
 
 
+---------------+--------------+---------+-----------------+
| Name          | Relationship | Address | Phone           |
+---------------+--------------+---------+-----------------+
| Sofiya A Brown | ECON         | Unknown | [phone] |
+---------------+--------------+---------+-----------------+
 
 
 
 Care Team Providers
 
 
 
+-------------------------+------+-----------------+
| Care Team Member Name   | Role | Phone           |
+-------------------------+------+-----------------+
| Ck Michelle MD | PCP  | [phone] |
+-------------------------+------+-----------------+
 
 
 
 Reason for Visit
 
 
+-----------+----------+
| Reason    | Comments |
+-----------+----------+
| Back Pain |          |
+-----------+----------+
 Evaluate & Treat (Routine)
 
+--------+--------+--------------+--------------+--------------+---------------+
| Status | Reason | Specialty    | Diagnoses /  | Referred By  | Referred To   |
|        |        |              | Procedures   | Contact      | Contact       |
+--------+--------+--------------+--------------+--------------+---------------+
| Closed |        | Neurosurgery |   Diagnoses  |   Sitz,      |   Dreyer,     |
|        |        |              |  Lumbago     | Ck      | Chacho AGUIRRE DO   |
|        |        |              | with         | MD Mike   | [address] |
|        |        |              | sciatica,    | [address]         |  [address]      |
|        |        |              | right side   | [place]    | [place], [place]   |
|        |        |              | Procedures   | [address]        | [address]  Phone: |
|        |        |              | [place] OFFICE    | Valerie,   |  [phone] |
|        |        |              | CONSULTATION | OR           |   Fax:        |
|        |        |              |  NEW/ESTAB   | [address]   | [phone]  |
|        |        |              | PATIENT 61   | Phone:       |               |
|        |        |              | MIN          | [phone] |               |
|        |        |              |              |   Fax:       |               |
|        |        |              |              | [phone] |               |
+--------+--------+--------------+--------------+--------------+---------------+
 
 
 
 
 Encounter Details
 
 
+--------+---------+----------------------+----------------------+----------------------+
| Date   | Type    | Department           | Care Team            | Description          |
+--------+---------+----------------------+----------------------+----------------------+
| / | Office  |   [facility]          |   Madhav Mccloud, | Osteoarthritis of    |
| 2017   | Visit   | NEUROSURGERY  [address]  |  PA-C  [address]  | spine with           |
|        |         | [address]     | [address]  [place]     | radiculopathy,       |
|        |         | [place], [place]      | [place] [address]      | lumbar region        |
|        |         | [id number]           | [phone]         | (Primary Dx); Lumbar |
|        |         | [phone]         | [phone] (Fax)   |  stenosis; Lumbar    |
|        |         |                      |                      | radicular pain;      |
|        |         |                      |                      | Chronic midline low  |
|        |         |                      |                      | back pain with       |
|        |         |                      |                      | left-sided sciatica; |
|        |         |                      |                      |  History of lumbar   |
|        |         |                      |                      | laminectomy          |
 
+--------+---------+----------------------+----------------------+----------------------+
 
 
 
 Social History
 
 
+--------------+-------+-----------+--------+------+
| Tobacco Use  | Types | Packs/Day | Years  | Date |
|              |       |           | Used   |      |
+--------------+-------+-----------+--------+------+
| Never Smoker |       |           |        |      |
+--------------+-------+-----------+--------+------+
 
 
 
+---------------------+---+---+---+
| Smokeless Tobacco:  |   |   |   |
| Never Used          |   |   |   |
+---------------------+---+---+---+
 
 
 
+-------------+----------------------+---------+----------------------+
| Alcohol Use | Drinks/Week          | oz/Week | Comments             |
+-------------+----------------------+---------+----------------------+
| Yes         |   0 Standard drinks  | 0.0     | 1-2 drinks 2-4 times |
|             | or equivalent        |         |  per month           |
+-------------+----------------------+---------+----------------------+
 
 
 
+------------------+---------------+
| Sex Assigned at  | Date Recorded |
| Birth            |               |
+------------------+---------------+
| Not on file      |               |
+------------------+---------------+
 
 
 
+----------------+-------------+-------------+
| Job Start Date | Occupation  | Industry    |
+----------------+-------------+-------------+
| Not on file    | Not on file | Not on file |
+----------------+-------------+-------------+
 
 
 
+----------------+--------------+------------+
| Travel History | Travel Start | Travel End |
+----------------+--------------+------------+
 
 
 
+-------------------------------------+
| No recent travel history available. |
+-------------------------------------+
 documented as of this encounter
 
 
 Last Filed Vital Signs
 
 
+-------------------+-------------------+----------------------+----------+
| Vital Sign        | Reading           | Time Taken           | Comments |
+-------------------+-------------------+----------------------+----------+
| Blood Pressure    | 131/73            | 2017 10:15 AM  |          |
|                   |                   | PDT                  |          |
+-------------------+-------------------+----------------------+----------+
| Pulse             | 60                | 2017 10:15 AM  |          |
|                   |                   | PDT                  |          |
+-------------------+-------------------+----------------------+----------+
| Temperature       | -                 | -                    |          |
+-------------------+-------------------+----------------------+----------+
| Respiratory Rate  | -                 | -                    |          |
+-------------------+-------------------+----------------------+----------+
| Oxygen Saturation | -                 | -                    |          |
+-------------------+-------------------+----------------------+----------+
| Inhaled Oxygen    | -                 | -                    |          |
| Concentration     |                   |                      |          |
+-------------------+-------------------+----------------------+----------+
| Weight            | 106.1 kg (234 lb) | 2017 10:15 AM  |          |
|                   |                   | PDT                  |          |
+-------------------+-------------------+----------------------+----------+
| Height            | 190.5 cm (6' 3")  | 2017 10:15 AM  |          |
|                   |                   | PDT                  |          |
+-------------------+-------------------+----------------------+----------+
| Body Mass Index   | 29.25             | 2017 10:15 AM  |          |
|                   |                   | PDT                  |          |
+-------------------+-------------------+----------------------+----------+
 documented in this encounter
 
 Patient Instructions
 Patient Instructions Dreyer, Jason A, DO - 2017 11:16 AM PDTPlease think about surger
y and call if/when you would like to proceed.Electronically signed by Jason A Dreyer, DO at 
2017 11:16 AM PDT
 documented in this encounter
 
 Progress Notes
 Madhav Mccloud PA - 2017 10:19 AM PDTFormatting of this note might be different f
rom the original.
 Jason A. Dreyer, DO
 [address], [address]
 [place] [address]
 [phone]
 FAX: [phone]  
 
 NEUROSURGERY HISTORY AND PHYSICAL EXAMINATION
 
 CHIEF COMPLAINT: 
 Chief Complaint 
 Patient presents with 
   Back Pain 
 
 HISTORY OF PRESENT ILLNESS:  The patient is a 67 y.o. male with the complaint of back pain 
symptoms that began 2016. Patient states he had lower back surgery in  and did v
huey well after surgery with no symptoms until a back and rib injury in . The patient april
cribes an [place]. Patient broke 11th rib when he fell and had
 a lot of local pain as a result of this.  After a lot of therapy, this improved but he has 
been left with unresolved low back pain and left leg pain as result of this.
 
 
 The symptoms have been unchanged for the last several months.  He rates the pain as moderat
e.  The symptoms are daily.  He describes the pain as tingling, shooting and tight band.  Mor sweet states his back pain is daily but intermittent. Patients pain level is 3-4/10 with mod
erate exertion. 
 
 The patient describes leg symptoms that occur on primarily on the left.  The leg symptoms a
ccount for 50% of his symptoms.  The leg symptoms are intermittent, and the symptoms travel 
from the back to the posterior buttock region and occasionally into the left leg.  The patie
nt also describes burning of the left buttock, weakness of the leg and foot drop.  The foot 
drop that he has is on his left foot and has been present since his surgery with Dr. Paredes
 in . Patient states he is able to walk 25-30 minutes with no discomfort, but he develop
s severe left buttock pain when driving even short distances.  Patient does state that doing
 2-3 hours of yardwork or result in pain level of 4-5/10 the following day and he will need 
to take a day off and rest.
 
 The patient does not report any change in bowel or bladder function recently.  
 
 His symptoms improve with changing position.
 
 His symptoms worsen with sitting, kneeling, bending and twisting.
 
 He has tried PT, TENS, Opioids, NSAIDS and Accupuncture.  The patient is currently taking o
pioids.  These measures are still helping. Patient states he started physical therapy 2017 after a long period of physical therapy his symptoms were improved. Patient does stil
l take half a Vicodin at nighttime on an as-needed basis but is usually able to control his 
pain with anti-inflammatories or couple of beers.  He did take his Vicodin and alcohol about
 2-3 weeks ago and stated he had significant black out-like symptoms and learned his less no
t to mix alcohol and pain medication.
 
 PAST MEDICAL HISTORY:
 Past Medical History 
 Diagnosis Date 
   Lumbago with sciatica, right side  
   Allergic rhinitis  
   Anxiety disorder  
   Atherosclerosis  
   BPH (benign prostatic hyperplasia)  
   Unspecified disorder of nose and nasal sinuses  
   HLD (hyperlipidemia)  
   Internal derangement of knee  
   Low back pain  
   Sleep apnea  
   nonorganic 
   Osteoarthritis, hand  
   Osteoarthrosis, unspecified whether generalized or localized, unspecified site  
 
 PAST SURGICAL HISTORY:
 Past Surgical History 
 Procedure Laterality Date 
   Lumbar discectomy   
   L1-3 
   Tonsillectomy and adenoidectomy   
   Knee surgery Right  
   [place] 
   Total knee arthroplasty Bilateral 2015 
   [place]  
   Ankle surgery Left 2007 
   SPUR 
   Sinus surgery  2010 
 
   Knee arthroscopy Left 2010 
   Ankle fusion Left  
   Sinus surgery  2015 
   Colonoscopy  2013 
 
 CURRENT MEDICATIONS:
 Current Outpatient Prescriptions 
 Medication Sig Dispense Refill 
   ASCORBIC ACID PO Take  by mouth.   
   aspirin 81 mg EC tablet Take 81 mg by mouth Daily.   
   atorvaSTATin (LIPITOR) 40 mg tablet Take 40 mg by mouth Daily.   
   B Complex Vitamins (VITAMIN B COMPLEX) tablet Take 1 tablet by mouth Daily.   
   cholecalciferol (VITAMIN D-3) 1,000 units tablet Take 1,000 Units by mouth Daily.   
   fexofenadine-pseudoePHEDrine (ALLEGRA-D ALLERGY & CONGESTION)  MG per tablet Take
 1 tablet by mouth Twice  daily as needed.   
   FLUoxetine (PROZAC) 20 mg capsule Take 20 mg by mouth Daily.   
   fluticasone (FLONASE) 50 mcg/nasal spray 1 spray by Nasal route Daily.   
   HYDROcodone-acetaminophen (NORCO) 5-325 mg per tablet Take 1 tablet by mouth every 6 ho
urs as needed.   
   metoprolol succinate (TOPROL-XL) 25 mg 24 hr tablet Take 25 mg by mouth Daily.   
   naproxen (NAPROSYN) 250 mg tablet Take 250 mg by mouth 2 times daily (with breakfast & 
dinner).   
 
 No current facility-administered medications for this visit. 
 
 ALLERGIES:
 Allergies 
 Allergen Reactions 
   Meperidine Other (See Comments) 
   Reaction not specified in outside medical records
  
 
 SOCIAL HISTORY:
 The patient  reports that he has never smoked. He has never used smokeless tobacco. He repo
rts that he drinks alcohol. He reports that he does not use illicit drugs.
 
 FAMILY HISTORY:
 Family History 
 Problem Relation Age of Onset 
   Multiple sclerosis Father  
   Coronary artery disease Father  
   Cancer Father  
   Multiple Myeloma 
   Lymphoma Mother  
   Rheum arthritis Mother  
   Cancer Mother  
   Pancreatic 
   Lupus Sister  
   Other (see comment) Sister  
   Meniere's disease 
   Cancer Maternal Grandfather  
   Cancer Maternal Grandmother  
   Heart disease Paternal Grandfather  
   No Known Problems Paternal Grandmother  
   No Known Problems Child  
   No Known Problems Child  
 
 REVIEW OF SYSTEMS:
 GENERALLY:   No fever, no night sweats, no anemia, no fatigue, no recent profound weight ch
anges.  
 
 EYES:  No eye problems, + use of corrective lenses, no eye injury, no double vision, no bli
ndness.
 EARS, NOSE, AND THROAT:  No changes in taste or smell, no hearing difficulty, no ringing in
 the ears, no ear drainage, no dizziness, no voice changes, no difficulty swallowing, no sig
nificant snoring, + sleep apnea, + sinus problems, no major dental work.
 NEUROLOGICALLY:  Please see the review of systems discussed above in the history of present
 illness.  In addition, the patient has coordination difficulty, back injury, pain in back.
 PSYCHIATRIC:  No depression, no sleep disorders, no anxiety, no bipolar disorder, no psycho
tic episodes.
 CARDIOVASCULAR:  No heart attacks, no heart murmur, no heart fluttering, no chest pain, no 
ankle swelling.  
 LUNG DISEASE:  No shortness of breath, no cough, no tuberculosis, no bloody cough,  no asth
ma, no emphysema/COPD.
 GASTROINTESTINAL:  No bowel disease, no nausea or vomiting, no rectal bleeding, no constipa
tion, no stool incontinence, no liver disease, no gallbladder disease, no abdominal pain, no
 ulcers.
 KIDNEY DISEASE:  No urinary frequency, no painful or difficult urination, no incontinence.
 ENDOCRINE:  No diabetes, no thyroid disease, no osteopenia or osteoporosis, no breast drain
age.  
 SKIN:  No breast lumps, no skin changes, no rashes, no itches.
 HEMATOLOGIC/LYMPHATIC:  No enlarged lymph nodes, no easy or unusual bleeding, no personal h
istory of cancer.
 RHEUMATOLOGIC:  No joint arthritis, no rheumatoid arthritis.
 
 PHYSICAL EXAMINATION:
 Blood pressure 131/73, pulse 60, height 1.905 m (6' 3"), weight 106.142 kg (234 lb). Body m
ass index is 29.25 kg/(m^2).
 
 GENERAL: Deni Hampton is in no acute distress with unlabored respirations.  The patien
t does not appear uncomfortable throughout the exam today.   
 HEENT:   Head: Normocephalic/atraumatic with no areas of recent trauma.  
 Eyes: Normal sclerae without icterus.  
 Ears: No drainage or tenderness.  
 Nasopharnyx: Clear without drainage.
 Oropharnyx: Clear without erythema.   
 NECK (ANTERIOR): Supple and without palpable masses. 
 CHEST: Clear to ausculation without crackles or wheeze. 
 HEART: Regular rate and rhythm without murmurs.   
 ABDOMEN: Soft, non-tender, non-distended, and without palpable masses. The patient is not o
bese.   
 SPINE: There is no tenderness of there cervical or thoracic spine.
 The lumbar spine shows there is tenderness in the midline of the L4, L5, S1 levels.  To pal
pation, there is no significant myofascial tenderness.  
 There is no significant pain to provacative testing of the SI joint.  
 There is no major deformity noted. 
 EXTREMITIES: No cyanosis, clubbing, or edema.  Distal pulses are palpable.   
 NEUROLOGICAL EXAM:
 
 MENTAL STATUS: 
 The patient is awake, alert, and oriented.  
 He follows simple and complex commands.
 His speech is fluent, he comprehends speech well, and he repeats well.  
 He has no apparent deficits with short or long term memory. 
 CRANIAL NERVES: 
 II: Acuity is intact.  Fields are full to confrontation. 
 III, IV, VI: The pupils are reactive.  Extraocular movements are intact.  No ptosis is note
d. 
 V: Facial sensation is intact and symmetric. 
 VII: Facial movements are symmetric. 
 VIII: Hearing is intact bilaterally. 
 
 IX, X: The uvula and palate move appropriately.   
 XI: Shrug is equal bilaterally. 
 XII: Tongue protrusion is midline. 
  
 MOTOR EXAM:
 
 (5 IS NORMAL)
 
 * Indicates pain limited 
 MUSCLE/   MOVEMENT: 
 RIGHT  
 LEFT 
 Deltoids 5 5 
 Biceps 5 5 
 Triceps 5 5 
 Wrist Flexion 5 5 
 Wrist Extension 5 5 
 Median Intrinsics 5 5 
 Ulnar Intrinsics 5 5 
  Strength 5 5 
 Hip Flexion 5 5 
 Hip Extension 5 5 
 Knee Flexion 5 5 
 Knee Extension 5 5 
 Dorsiflexion 5 3 
 Extensor Hallicus Longus 5 5 
 Plantarflexion 5 5 
  
 SENSORY EXAM: 
 Sensory exam shows left L5-type dysesthesia. 
 REFLEXES:
 
 (2 OR 2+ IS NORMAL) 
 REFLEX: 
 RIGHT 
 LEFT 
 BICEPS 2+ 2+ 
 BRACHIORADIALIS 2+ 2+ 
 TRICEPS 2+ 2+ 
 PATELLAR 2+ 2+ 
 ACHILLES 2+ 2+ 
 ZEPEDA'S ABSENT ABSENT 
 PLANTAR DOWNGOING DOWNGOING 
  
 GAIT: 
 Gait is steady. 
 PERIPHERAL NERVE/MISC: 
 Tinel is negative at the wrists and elbows bilaterally.
 Phalen is negative.
 Straight leg raise is negative bilaterally.
 Sammy's test of the hips is negative bilaterally. 
 
 TEST AND RADIOGRAPHIC REVIEW:
 The patient's imaging was reviewed in detail with the patient today during the visit.  The 
MRI of the lumbar spine from 4/3/17 demonstrates diffuse spondylosis. There are postoperativ
e changes L4-S1. There is severe lateral recess stenosis at L4-5 on the left.
 
 Lumbar x-rays show no major instability.  
 
 ASSESSMENT:
 
 NEUROSURGICAL DIAGNOSES:
 Encounter Diagnoses 
 Name Primary? 
   Osteoarthritis of spine with radiculopathy, lumbar region Yes 
   Lumbar stenosis  
   Lumbar radicular pain  
   Chronic midline low back pain with left-sided sciatica  
   History of lumbar laminectomy  
 
 GENERAL DIAGNOSES:
 Past Medical History 
 Diagnosis Date 
   Lumbago with sciatica, right side  
   Allergic rhinitis  
   Anxiety disorder  
   Atherosclerosis  
   BPH (benign prostatic hyperplasia)  
   Unspecified disorder of nose and nasal sinuses  
   HLD (hyperlipidemia)  
   Internal derangement of knee  
   Low back pain  
   Sleep apnea  
   nonorganic 
   Osteoarthritis, hand  
   Osteoarthrosis, unspecified whether generalized or localized, unspecified site  
 
 PLAN:
 
 Deni Hampton presented today, and it was a pleasure seeing this patient and assessing 
his neurologic problems.  
 
 The patient has spondylosis and severe lateral recess stenosis L4-5. This is likely contrib
uting to his back pain, leg pain, and leg weakness.
 
 I had a lengthy discussion with the patient about his options for care including surgical a
nd non-surgical options.  
 
 In discussing the surgical options, we discussed in detail the patient's options for TLIF L
4-5. This would be necessary as proper L5 nerve decompression would require complete removal
 of the facet joint.
 
 We discussed the risks, alternatives, and benefits to surgical intervention with Mr. Hampton 
in clinic.  These risks included but were not limited to death, stroke, heart attack, numbne
ss, weakness, paralysis, failure of fusion, failure of hardware, subsidence, adjacent segmen
t degeneration, cerebrospinal fluid leak, bleeding, infection, injury to surrounding tissues
 and organs, injury from positioning, injury to the nerves, difficulty with breathing, diffi
culty with swallowing, difficulty with voice change, and need for additional surgery.
 
 Surgical options were discussed and the technique to be employed was described in detail to
 him.  All his questions were answered.
 
 We discussed that the goal of the surgery is to prevent progression of his disease, but it 
is not considered a cure.  We also discussed that although some patients may obtain 100% sym
ptom relief, it is realistic to anticipate that some symptoms will continue postoperatively 
despite a successful surgery.  
 
 We also discussed that there is no guarantee that surgery will provide improvement in his c
ondition, and indeed may even worsen the symptoms.  We also discussed that in the course of 
the procedure the operative plan may be altered to include more, less, or different levels d
epending upon findings in order to provide him with the best possible outcome.
 
 
 I am prescribing a brace before surgery to improve his stability now to support his weak mu
scles and to reduce pain by restricting mobility.  
 
 He will think about the procedure and call if/when he would like to proceed.
 
 I, Jason Dreyer, saw this patient today, obtained pertinent history, performed a physical e
xam, provided the radiographic interpretation, and arrived at a plan that was agreeable to t
he patient. 
 
 ELECTRONICALLY SIGNED BY:  Jason A. Dreyer, DO, 2017 11:17
 
 Bea Tong assisted me in the transcription of this note in my presence today.  
 Electronically signed by MOR Orta at 2017 12:45 PM PDTdocumented in this 
encounter
 
 Plan of Treatment
 
 
+--------+---------+-----------+----------------------+-------------+
| Date   | Type    | Specialty | Care Team            | Description |
+--------+---------+-----------+----------------------+-------------+
| / | Office  | Neurology |   Ravindra Munoz MD  [address] |             |
|    | Visit   |           |  [facility]      |             |
|        |         |           | [place],  |             |
|        |         |           | [place] [address]             |             |
|        |         |           | [phone]         |             |
|        |         |           | [phone] (Fax)   |             |
+--------+---------+-----------+----------------------+-------------+
 documented as of this encounter
 
 Visit Diagnoses
 
 
+---------------------------------------------------------------------------------------+
| Diagnosis                                                                             |
+---------------------------------------------------------------------------------------+
|   Osteoarthritis of spine with radiculopathy, lumbar region - Primary                 |
+---------------------------------------------------------------------------------------+
|   Lumbar stenosis  Spinal stenosis, lumbar region, without neurogenic claudication    |
+---------------------------------------------------------------------------------------+
|   Lumbar radicular pain  Thoracic or lumbosacral neuritis or radiculitis, unspecified |
+---------------------------------------------------------------------------------------+
|   Chronic midline low back pain with left-sided sciatica                              |
+---------------------------------------------------------------------------------------+
|   History of lumbar laminectomy                                                       |
+---------------------------------------------------------------------------------------+
 documented in this encounter

## 2020-06-08 NOTE — XMS
Encounter Summary
  Created on: 2020
 
 Memo Deni Siu
 External Reference #: [id number]
 : 52
 Sex: Male
 
 Demographics
 
 
+-----------------------+---------------------------+
| Address               | [address]           |
|                       | [place]  [address] |
+-----------------------+---------------------------+
| Home Phone            | [phone]           |
+-----------------------+---------------------------+
| Preferred Language    | Unknown                   |
+-----------------------+---------------------------+
| Marital Status        |                    |
+-----------------------+---------------------------+
| Hinduism Affiliation | Unknown                   |
+-----------------------+---------------------------+
| Race                  | Unknown                   |
+-----------------------+---------------------------+
| Ethnic Group          | Unknown                   |
+-----------------------+---------------------------+
 
 
 Author
 
 
+--------------+--------------------------------------------+
| Author       | [facility]  |
|              | and [place]                                |
+--------------+--------------------------------------------+
| Organization | [facility]  |
|              | and [place]                                |
+--------------+--------------------------------------------+
| Address      | Unknown                                    |
+--------------+--------------------------------------------+
| Phone        | Unavailable                                |
+--------------+--------------------------------------------+
 
 
 
 Support
 
 
+---------------+--------------+---------+-----------------+
| Name          | Relationship | Address | Phone           |
+---------------+--------------+---------+-----------------+
| Sofiya A Brown | ECON         | Unknown | [phone] |
+---------------+--------------+---------+-----------------+
 
 
 
 Care Team Providers
 
 
 
+-----------------------+------+-----------------+
| Care Team Member Name | Role | Phone           |
+-----------------------+------+-----------------+
| Emely Hinton    | PCP  | [phone] |
+-----------------------+------+-----------------+
 
 
 
 Reason for Visit
 
 
+----------+----------+
| Reason   | Comments |
+----------+----------+
| Referral |          |
+----------+----------+
 
 
 
 Encounter Details
 
 
+--------+-----------+---------------------+----------------------+-------------+
| Date   | Type      | Department          | Care Team            | Description |
+--------+-----------+---------------------+----------------------+-------------+
| / | Telephone |   [facility]     |   Ravindra Munoz MD  [address] | Referral    |
| 2019   |           | NEUROLOGY  [address]     |  [facility]      |             |
|        |           | [address]   | [address]  [facility]  |             |
|        |           | [place], [place]        | [place] [address]             |             |
|        |           | [address]          | [phone]         |             |
|        |           | [phone]        | [phone] (Fax)   |             |
+--------+-----------+---------------------+----------------------+-------------+
 
 
 
 Social History
 
 
+--------------+-------+-----------+--------+------+
| Tobacco Use  | Types | Packs/Day | Years  | Date |
|              |       |           | Used   |      |
+--------------+-------+-----------+--------+------+
| Never Smoker |       |           |        |      |
+--------------+-------+-----------+--------+------+
 
 
 
+---------------------+---+---+---+
| Smokeless Tobacco:  |   |   |   |
| Never Used          |   |   |   |
+---------------------+---+---+---+
 
 
 
+-------------+----------------------+---------+--------------+
| Alcohol Use | Drinks/Week          | oz/Week | Comments     |
+-------------+----------------------+---------+--------------+
| No          |   0 Standard drinks  | 0.0     | Alcoholic    |
 
|             | or equivalent        |         | Drinks/day:  |
|             |                      |         | Occasionally |
+-------------+----------------------+---------+--------------+
 
 
 
+------------------+---------------+
| Sex Assigned at  | Date Recorded |
| Birth            |               |
+------------------+---------------+
| Not on file      |               |
+------------------+---------------+
 
 
 
+----------------+-------------+-------------+
| Job Start Date | Occupation  | Industry    |
+----------------+-------------+-------------+
| Not on file    | Not on file | Not on file |
+----------------+-------------+-------------+
 
 
 
+----------------+--------------+------------+
| Travel History | Travel Start | Travel End |
+----------------+--------------+------------+
 
 
 
+-------------------------------------+
| No recent travel history available. |
+-------------------------------------+
 documented as of this encounter
 
 Plan of Treatment
 
 
+--------+---------+-----------+----------------------+-------------+
| Date   | Type    | Specialty | Care Team            | Description |
+--------+---------+-----------+----------------------+-------------+
| / | Office  | Neurology |   Ravindra Munoz MD  [address] |             |
| 2020   | Visit   |           |  [address]      |             |
|        |         |           | [address]  [address]  |             |
|        |         |           | [place] [address]             |             |
|        |         |           | [phone]         |             |
|        |         |           | [phone] (Fax)   |             |
+--------+---------+-----------+----------------------+-------------+
 documented as of this encounter
 
 Visit Diagnoses
 Not on filedocumented in this encounter

## 2020-06-08 NOTE — XMS
Encounter Summary
  Created on: 2020
 
 Memo Deni Siu
 External Reference #: [id number]
 : 52
 Sex: Male
 
 Demographics
 
 
+-----------------------+---------------------------+
| Address               | [address]           |
|                       | [place]  [address] |
+-----------------------+---------------------------+
| Home Phone            | [phone]           |
+-----------------------+---------------------------+
| Preferred Language    | Unknown                   |
+-----------------------+---------------------------+
| Marital Status        |                    |
+-----------------------+---------------------------+
| Orthodox Affiliation | Unknown                   |
+-----------------------+---------------------------+
| Race                  | Unknown                   |
+-----------------------+---------------------------+
| Ethnic Group          | Unknown                   |
+-----------------------+---------------------------+
 
 
 Author
 
 
+--------------+--------------------------------------------+
| Author       | [facility]  |
|              | and [place]                                |
+--------------+--------------------------------------------+
| Organization | [facility]  |
|              | and [place]                                |
+--------------+--------------------------------------------+
| Address      | Unknown                                    |
+--------------+--------------------------------------------+
| Phone        | Unavailable                                |
+--------------+--------------------------------------------+
 
 
 
 Support
 
 
+---------------+--------------+---------+-----------------+
| Name          | Relationship | Address | Phone           |
+---------------+--------------+---------+-----------------+
| Sofiya A Brown | ECON         | Unknown | [phone] |
+---------------+--------------+---------+-----------------+
 
 
 
 Care Team Providers
 
 
 
+-------------------------+------+-----------------+
| Care Team Member Name   | Role | Phone           |
+-------------------------+------+-----------------+
| Ck Michelle MD | PCP  | [phone] |
+-------------------------+------+-----------------+
 
 
 
 Encounter Details
 
 
+--------+----------+----------------------+----------------------+-------------+
| Date   | Type     | Department           | Care Team            | Description |
+--------+----------+----------------------+----------------------+-------------+
| 05/15/ | Abstract |   [facility] [place]          |   Madhav Mccloud, |             |
| 2017   |          | NEUROSURGERY  [address]  |  PA-C  [address]  |             |
|        |          | [address]     | [address]  [place]     |             |
|        |          | [place], [place]      | [place] [address]      |             |
|        |          | [address]           | [phone]         |             |
|        |          | [phone]         | [phone] (Fax)   |             |
+--------+----------+----------------------+----------------------+-------------+
 
 
 
 Social History
 
 
+--------------+-------+-----------+--------+------+
| Tobacco Use  | Types | Packs/Day | Years  | Date |
|              |       |           | Used   |      |
+--------------+-------+-----------+--------+------+
| Never Smoker |       |           |        |      |
+--------------+-------+-----------+--------+------+
 
 
 
+---------------------+---+---+---+
| Smokeless Tobacco:  |   |   |   |
| Never Used          |   |   |   |
+---------------------+---+---+---+
 
 
 
+-------------+----------------------+---------+----------------------+
| Alcohol Use | Drinks/Week          | oz/Week | Comments             |
+-------------+----------------------+---------+----------------------+
| Yes         |   0 Standard drinks  | 0.0     | 1-2 drinks 2-4 times |
|             | or equivalent        |         |  per month           |
+-------------+----------------------+---------+----------------------+
 
 
 
+------------------+---------------+
| Sex Assigned at  | Date Recorded |
| Birth            |               |
+------------------+---------------+
| Not on file      |               |
+------------------+---------------+
 
 
 
 
+----------------+-------------+-------------+
| Job Start Date | Occupation  | Industry    |
+----------------+-------------+-------------+
| Not on file    | Not on file | Not on file |
+----------------+-------------+-------------+
 
 
 
+----------------+--------------+------------+
| Travel History | Travel Start | Travel End |
+----------------+--------------+------------+
 
 
 
+-------------------------------------+
| No recent travel history available. |
+-------------------------------------+
 documented as of this encounter
 
 Plan of Treatment
 
 
+--------+---------+-----------+----------------------+-------------+
| Date   | Type    | Specialty | Care Team            | Description |
+--------+---------+-----------+----------------------+-------------+
| / | Office  | Neurology |   Ravindra Munoz MD  [address] |             |
| 2020   | Visit   |           |  [facility]      |             |
|        |         |           | [place]  |             |
|        |         |           | [place] [address]             |             |
|        |         |           | [phone]         |             |
|        |         |           | [phone] (Fax)   |             |
+--------+---------+-----------+----------------------+-------------+
 documented as of this encounter
 
 Visit Diagnoses
 Not on filedocumented in this encounter

## 2020-06-08 NOTE — XMS
Encounter Summary
  Created on: 2020
 
 Memo Deni Siu
 External Reference #: [id number]
 : 52
 Sex: Male
 
 Demographics
 
 
+-----------------------+---------------------------+
| Address               | [address]           |
|                       | [place]  [address] |
+-----------------------+---------------------------+
| Home Phone            | [phone]           |
+-----------------------+---------------------------+
| Preferred Language    | Unknown                   |
+-----------------------+---------------------------+
| Marital Status        |                    |
+-----------------------+---------------------------+
| Church Affiliation | Unknown                   |
+-----------------------+---------------------------+
| Race                  | Unknown                   |
+-----------------------+---------------------------+
| Ethnic Group          | Unknown                   |
+-----------------------+---------------------------+
 
 
 Author
 
 
+--------------+--------------------------------------------+
| Author       | [facility]  |
|              | and [place]                                |
+--------------+--------------------------------------------+
| Organization | [facility]  |
|              | and [place]                                |
+--------------+--------------------------------------------+
| Address      | Unknown                                    |
+--------------+--------------------------------------------+
| Phone        | Unavailable                                |
+--------------+--------------------------------------------+
 
 
 
 Support
 
 
+---------------+--------------+---------+-----------------+
| Name          | Relationship | Address | Phone           |
+---------------+--------------+---------+-----------------+
| Sofiya A Brown | ECON         | Unknown | [phone] |
+---------------+--------------+---------+-----------------+
 
 
 
 Care Team Providers
 
 
 
+-------------------------+------+-----------------+
| Care Team Member Name   | Role | Phone           |
+-------------------------+------+-----------------+
| Ck Michelle MD | PCP  | [phone] |
+-------------------------+------+-----------------+
 
 
 
 Reason for Visit
 
 
+-----------+----------+
| Reason    | Comments |
+-----------+----------+
| Procedure | Reminder |
+-----------+----------+
 
 
 
 Encounter Details
 
 
+--------+-----------+----------------------+----------------------+----------------------+
| Date   | Type      | Department           | Care Team            | Description          |
+--------+-----------+----------------------+----------------------+----------------------+
| / | Telephone |   [facility]          |   Dreyer, Jason A,   | Procedure (Reminder) |
| 2017   |           | NEUROSURGERY  [address]  | DO  [address]    |                      |
|        |           | [address]     | [address]  [place], [place] |                      |
|        |           | [place], [place]      |  [address]  [phone] |                      |
|        |           | [address]           |   [phone] (Fax) |                      |
|        |           | [phone]         |                      |                      |
+--------+-----------+----------------------+----------------------+----------------------+
 
 
 
 Social History
 
 
+--------------+-------+-----------+--------+------+
| Tobacco Use  | Types | Packs/Day | Years  | Date |
|              |       |           | Used   |      |
+--------------+-------+-----------+--------+------+
| Never Smoker |       |           |        |      |
+--------------+-------+-----------+--------+------+
 
 
 
+---------------------+---+---+---+
| Smokeless Tobacco:  |   |   |   |
| Never Used          |   |   |   |
+---------------------+---+---+---+
 
 
 
+-------------+----------------------+---------+----------------------+
| Alcohol Use | Drinks/Week          | oz/Week | Comments             |
+-------------+----------------------+---------+----------------------+
| Yes         |   0 Standard drinks  | 0.0     | 1-2 drinks 2-4 times |
 
|             | or equivalent        |         |  per month           |
+-------------+----------------------+---------+----------------------+
 
 
 
+------------------+---------------+
| Sex Assigned at  | Date Recorded |
| Birth            |               |
+------------------+---------------+
| Not on file      |               |
+------------------+---------------+
 
 
 
+----------------+-------------+-------------+
| Job Start Date | Occupation  | Industry    |
+----------------+-------------+-------------+
| Not on file    | Not on file | Not on file |
+----------------+-------------+-------------+
 
 
 
+----------------+--------------+------------+
| Travel History | Travel Start | Travel End |
+----------------+--------------+------------+
 
 
 
+-------------------------------------+
| No recent travel history available. |
+-------------------------------------+
 documented as of this encounter
 
 Plan of Treatment
 
 
+--------+---------+-----------+----------------------+-------------+
| Date   | Type    | Specialty | Care Team            | Description |
+--------+---------+-----------+----------------------+-------------+
| / | Office  | Neurology |   Ravindra Munoz MD  [address] |             |
| 2020   | Visit   |           |  [address]      |             |
|        |         |           | [place]  |             |
|        |         |           | [place] [address]             |             |
|        |         |           | [phone]         |             |
|        |         |           | [phone] (Fax)   |             |
+--------+---------+-----------+----------------------+-------------+
 documented as of this encounter
 
 Visit Diagnoses
 Not on filedocumented in this encounter

## 2020-06-08 NOTE — XMS
Encounter Summary
  Created on: 2020
 
 Memo Deni Siu
 External Reference #: [id number]
 : 52
 Sex: Male
 
 Demographics
 
 
+-----------------------+---------------------------+
| Address               | [address]           |
|                       | [place]  [address] |
+-----------------------+---------------------------+
| Home Phone            | [phone]           |
+-----------------------+---------------------------+
| Preferred Language    | Unknown                   |
+-----------------------+---------------------------+
| Marital Status        |                    |
+-----------------------+---------------------------+
| Advent Affiliation | Unknown                   |
+-----------------------+---------------------------+
| Race                  | Unknown                   |
+-----------------------+---------------------------+
| Ethnic Group          | Unknown                   |
+-----------------------+---------------------------+
 
 
 Author
 
 
+--------------+--------------------------------------------+
| Author       | [facility]  |
|              | and [place]                                |
+--------------+--------------------------------------------+
| Organization | [facility]  |
|              | and [place]                                |
+--------------+--------------------------------------------+
| Address      | Unknown                                    |
+--------------+--------------------------------------------+
| Phone        | Unavailable                                |
+--------------+--------------------------------------------+
 
 
 
 Support
 
 
+---------------+--------------+---------+-----------------+
| Name          | Relationship | Address | Phone           |
+---------------+--------------+---------+-----------------+
| Sofiya A Brown | ECON         | Unknown | [phone] |
+---------------+--------------+---------+-----------------+
 
 
 
 Care Team Providers
 
 
 
+-------------------------+------+-----------------+
| Care Team Member Name   | Role | Phone           |
+-------------------------+------+-----------------+
| Ck Michelle MD | PCP  | [phone] |
+-------------------------+------+-----------------+
 
 
 
 Encounter Details
 
 
+--------+-------------+----------------------+----------------------+----------------------
+
| Date   | Type        | Department           | Care Team            | Description          
|
+--------+-------------+----------------------+----------------------+----------------------
+
| / | Orders Only |   [facility]          |   Dreyer, Jason A,   | Osteoarthritis of    
|
| 2017   |             | NEUROSURGERY  [address]  | DO  [address]    | spine with           
|
|        |             | [address]     | [address]  [place], [place] | radiculopathy,       
|
|        |             | [place], [place]      |  [address]  [phone] | lumbar region        
|
|        |             | [address]           |   [phone] (Fax) | (Primary Dx); Spinal 
|
|        |             | [phone]         |                      |  stenosis, lumbar    
|
|        |             |                      |                      | region, without      
|
|        |             |                      |                      | neurogenic           
|
|        |             |                      |                      | claudication; Lumbar 
|
|        |             |                      |                      |  radiculopathy;      
|
|        |             |                      |                      | Chronic left-sided   
|
|        |             |                      |                      | low back pain with   
|
|        |             |                      |                      | left-sided sciatica; 
|
|        |             |                      |                      |  Status post         
|
|        |             |                      |                      | laminectomy          
|
+--------+-------------+----------------------+----------------------+----------------------
+
 
 
 
 Social History
 
 
+--------------+-------+-----------+--------+------+
| Tobacco Use  | Types | Packs/Day | Years  | Date |
|              |       |           | Used   |      |
 
+--------------+-------+-----------+--------+------+
| Never Smoker |       |           |        |      |
+--------------+-------+-----------+--------+------+
 
 
 
+---------------------+---+---+---+
| Smokeless Tobacco:  |   |   |   |
| Never Used          |   |   |   |
+---------------------+---+---+---+
 
 
 
+-------------+----------------------+---------+----------------------+
| Alcohol Use | Drinks/Week          | oz/Week | Comments             |
+-------------+----------------------+---------+----------------------+
| Yes         |   0 Standard drinks  | 0.0     | 1-2 drinks 2-4 times |
|             | or equivalent        |         |  per month           |
+-------------+----------------------+---------+----------------------+
 
 
 
+------------------+---------------+
| Sex Assigned at  | Date Recorded |
| Birth            |               |
+------------------+---------------+
| Not on file      |               |
+------------------+---------------+
 
 
 
+----------------+-------------+-------------+
| Job Start Date | Occupation  | Industry    |
+----------------+-------------+-------------+
| Not on file    | Not on file | Not on file |
+----------------+-------------+-------------+
 
 
 
+----------------+--------------+------------+
| Travel History | Travel Start | Travel End |
+----------------+--------------+------------+
 
 
 
+-------------------------------------+
| No recent travel history available. |
+-------------------------------------+
 documented as of this encounter
 
 Plan of Treatment
 
 
+--------+---------+-----------+----------------------+-------------+
| Date   | Type    | Specialty | Care Team            | Description |
+--------+---------+-----------+----------------------+-------------+
| / | Office  | Neurology |   Ravindra Munoz MD  [address] |             |
| 2020   | Visit   |           |  [facility] [address]      |             |
|        |         |           | [place]  |             |
|        |         |           | [place] [address]             |             |
 
|        |         |           | [phone]         |             |
|        |         |           | [phone] (Fax)   |             |
+--------+---------+-----------+----------------------+-------------+
 documented as of this encounter
 
 Visit Diagnoses
 
 
+--------------------------------------------------------------------------------------+
| Diagnosis                                                                            |
+--------------------------------------------------------------------------------------+
|   Osteoarthritis of spine with radiculopathy, lumbar region - Primary                |
+--------------------------------------------------------------------------------------+
|   Spinal stenosis, lumbar region, without neurogenic claudication                    |
+--------------------------------------------------------------------------------------+
|   Lumbar radiculopathy  Thoracic or lumbosacral neuritis or radiculitis, unspecified |
+--------------------------------------------------------------------------------------+
|   Chronic left-sided low back pain with left-sided sciatica                          |
+--------------------------------------------------------------------------------------+
|   Status post laminectomy  Other postprocedural status                               |
+--------------------------------------------------------------------------------------+
 documented in this encounter

## 2020-06-08 NOTE — XMS
Encounter Summary
  Created on: 2020
 
 Memo Deni Siu
 External Reference #: [id number]
 : 52
 Sex: Male
 
 Demographics
 
 
+-----------------------+---------------------------+
| Address               | [address]           |
|                       | [place]  [address] |
+-----------------------+---------------------------+
| Home Phone            | [phone]           |
+-----------------------+---------------------------+
| Preferred Language    | Unknown                   |
+-----------------------+---------------------------+
| Marital Status        |                    |
+-----------------------+---------------------------+
| Hinduism Affiliation | Unknown                   |
+-----------------------+---------------------------+
| Race                  | Unknown                   |
+-----------------------+---------------------------+
| Ethnic Group          | Unknown                   |
+-----------------------+---------------------------+
 
 
 Author
 
 
+--------------+--------------------------------------------+
| Author       | [facility]  |
|              | and [place]                                |
+--------------+--------------------------------------------+
| Organization | [facility]  |
|              | and [place]                                |
+--------------+--------------------------------------------+
| Address      | Unknown                                    |
+--------------+--------------------------------------------+
| Phone        | Unavailable                                |
+--------------+--------------------------------------------+
 
 
 
 Support
 
 
+---------------+--------------+---------+-----------------+
| Name          | Relationship | Address | Phone           |
+---------------+--------------+---------+-----------------+
| Sofiya A Brown | ECON         | Unknown | [phone] |
+---------------+--------------+---------+-----------------+
 
 
 
 Care Team Providers
 
 
 
+-------------------------+------+-----------------+
| Care Team Member Name   | Role | Phone           |
+-------------------------+------+-----------------+
| Ck Michelle MD | PCP  | [phone] |
+-------------------------+------+-----------------+
 
 
 
 Reason for Visit
 
 
+--------------+-----------+
| Reason       | Comments  |
+--------------+-----------+
| Imaging Only | 6M PO XR  |
+--------------+-----------+
 
 
 
 Encounter Details
 
 
+--------+-----------+----------------------+----------------------+----------------------+
| Date   | Type      | Department           | Care Team            | Description          |
+--------+-----------+----------------------+----------------------+----------------------+
| / | Telephone |   [facility] [place]          |   Dreyer, Jason A,   | Imaging Only (6M PO  |
| 2018   |           | NEUROSURGERY  [address]  | DO  [address]    | XR )                 |
|        |           | [address]     | [address]  [place], [place] |                      |
|        |           | [place], [place]      |  [id number] [phone] |                      |
|        |           | [address]           |   [phone] (Fax) |                      |
|        |           | [phone]         |                      |                      |
+--------+-----------+----------------------+----------------------+----------------------+
 
 
 
 Social History
 
 
+--------------+-------+-----------+--------+------+
| Tobacco Use  | Types | Packs/Day | Years  | Date |
|              |       |           | Used   |      |
+--------------+-------+-----------+--------+------+
| Never Smoker |       |           |        |      |
+--------------+-------+-----------+--------+------+
 
 
 
+---------------------+---+---+---+
| Smokeless Tobacco:  |   |   |   |
| Never Used          |   |   |   |
+---------------------+---+---+---+
 
 
 
+-------------+----------------------+---------+----------------------+
| Alcohol Use | Drinks/Week          | oz/Week | Comments             |
+-------------+----------------------+---------+----------------------+
| No          |   0 Standard drinks  | 0.0     | No longer drinking   |
 
|             | or equivalent        |         | 1-2 drinks 2-4 times |
|             |                      |         |  a month             |
+-------------+----------------------+---------+----------------------+
 
 
 
+------------------+---------------+
| Sex Assigned at  | Date Recorded |
| Birth            |               |
+------------------+---------------+
| Not on file      |               |
+------------------+---------------+
 
 
 
+----------------+-------------+-------------+
| Job Start Date | Occupation  | Industry    |
+----------------+-------------+-------------+
| Not on file    | Not on file | Not on file |
+----------------+-------------+-------------+
 
 
 
+----------------+--------------+------------+
| Travel History | Travel Start | Travel End |
+----------------+--------------+------------+
 
 
 
+-------------------------------------+
| No recent travel history available. |
+-------------------------------------+
 documented as of this encounter
 
 Plan of Treatment
 
 
+--------+---------+-----------+----------------------+-------------+
| Date   | Type    | Specialty | Care Team            | Description |
+--------+---------+-----------+----------------------+-------------+
| / | Office  | Neurology |   Ravindra Munoz MD  [address] |             |
| 2020   | Visit   |           |  [address]      |             |
|        |         |           | [place]  |             |
|        |         |           | [place] [address]             |             |
|        |         |           | [phone]         |             |
|        |         |           | [phone] (Fax)   |             |
+--------+---------+-----------+----------------------+-------------+
 documented as of this encounter
 
 Visit Diagnoses
 Not on filedocumented in this encounter

## 2020-06-08 NOTE — XMS
Encounter Summary
  Created on: 2020
 
 Memo Deni Siu
 External Reference #: [id number]
 : 52
 Sex: Male
 
 Demographics
 
 
+-----------------------+---------------------------+
| Address               | [address]           |
|                       | [place]  [address] |
+-----------------------+---------------------------+
| Home Phone            | [phone]           |
+-----------------------+---------------------------+
| Preferred Language    | Unknown                   |
+-----------------------+---------------------------+
| Marital Status        |                    |
+-----------------------+---------------------------+
| Bahai Affiliation | Unknown                   |
+-----------------------+---------------------------+
| Race                  | Unknown                   |
+-----------------------+---------------------------+
| Ethnic Group          | Unknown                   |
+-----------------------+---------------------------+
 
 
 Author
 
 
+--------------+--------------------------------------------+
| Author       | [facility]  |
|              | and [place]                                |
+--------------+--------------------------------------------+
| Organization | [facility]  |
|              | and [place]                                |
+--------------+--------------------------------------------+
| Address      | Unknown                                    |
+--------------+--------------------------------------------+
| Phone        | Unavailable                                |
+--------------+--------------------------------------------+
 
 
 
 Support
 
 
+---------------+--------------+---------+-----------------+
| Name          | Relationship | Address | Phone           |
+---------------+--------------+---------+-----------------+
| Sofiya A Brown | ECON         | Unknown | [phone] |
+---------------+--------------+---------+-----------------+
 
 
 
 Care Team Providers
 
 
 
+-------------------------+------+-----------------+
| Care Team Member Name   | Role | Phone           |
+-------------------------+------+-----------------+
| Ck Michelle MD | PCP  | [phone] |
+-------------------------+------+-----------------+
 
 
 
 Encounter Details
 
 
+--------+-------------+---------------------+----------------------+-------------+
| Date   | Type        | Department          | Care Team            | Description |
+--------+-------------+---------------------+----------------------+-------------+
| / | Orders Only |   [facility]    |   Provider,          |             |
| 2019   |             | SYSTEM GENERIC OP   | MD [address]abella  [address] |             |
|        |             | CONVERSION  [address]  |  [address]        |             |
|        |             | [address]  [place], [place]  | [place] [address]     |             |
|        |             | [address]          |                      |             |
|        |             | [phone]        |                      |             |
+--------+-------------+---------------------+----------------------+-------------+
 
 
 
 Social History
 
 
+--------------+-------+-----------+--------+------+
| Tobacco Use  | Types | Packs/Day | Years  | Date |
|              |       |           | Used   |      |
+--------------+-------+-----------+--------+------+
| Never Smoker |       |           |        |      |
+--------------+-------+-----------+--------+------+
 
 
 
+---------------------+---+---+---+
| Smokeless Tobacco:  |   |   |   |
| Never Used          |   |   |   |
+---------------------+---+---+---+
 
 
 
+-------------+----------------------+---------+--------------+
| Alcohol Use | Drinks/Week          | oz/Week | Comments     |
+-------------+----------------------+---------+--------------+
| No          |   0 Standard drinks  | 0.0     | Alcoholic    |
|             | or equivalent        |         | Drinks/day:  |
|             |                      |         | Occasionally |
+-------------+----------------------+---------+--------------+
 
 
 
+------------------+---------------+
| Sex Assigned at  | Date Recorded |
| Birth            |               |
+------------------+---------------+
| Not on file      |               |
 
+------------------+---------------+
 
 
 
+----------------+-------------+-------------+
| Job Start Date | Occupation  | Industry    |
+----------------+-------------+-------------+
| Not on file    | Not on file | Not on file |
+----------------+-------------+-------------+
 
 
 
+----------------+--------------+------------+
| Travel History | Travel Start | Travel End |
+----------------+--------------+------------+
 
 
 
+-------------------------------------+
| No recent travel history available. |
+-------------------------------------+
 documented as of this encounter
 
 Plan of Treatment
 
 
+--------+---------+-----------+----------------------+-------------+
| Date   | Type    | Specialty | Care Team            | Description |
+--------+---------+-----------+----------------------+-------------+
| / | Office  | Neurology |   Ravindra Munoz MD  [address] |             |
|    | Visit   |           |  [address]      |             |
|        |         |           | [place]  |             |
|        |         |           | [place] [address]             |             |
|        |         |           | [phone]         |             |
|        |         |           | [phone] (Fax)   |             |
+--------+---------+-----------+----------------------+-------------+
 documented as of this encounter
 
 Visit Diagnoses
 Not on filedocumented in this encounter

## 2020-06-08 NOTE — XMS
Encounter Summary
  Created on: 2020
 
 Memo Deni Siu
 External Reference #: [id number]
 : 52
 Sex: Male
 
 Demographics
 
 
+-----------------------+---------------------------+
| Address               | [address]           |
|                       | [place]  [address] |
+-----------------------+---------------------------+
| Home Phone            | [phone]           |
+-----------------------+---------------------------+
| Preferred Language    | Unknown                   |
+-----------------------+---------------------------+
| Marital Status        |                    |
+-----------------------+---------------------------+
| Pentec[employer]al Affiliation | Unknown                   |
+-----------------------+---------------------------+
| Race                  | Unknown                   |
+-----------------------+---------------------------+
| Ethnic Group          | Unknown                   |
+-----------------------+---------------------------+
 
 
 Author
 
 
+--------------+--------------------------------------------+
| Author       | [facility]  |
|              | [facility] [place]                                |
+--------------+--------------------------------------------+
| Organization | [facility]  |
|              | and [place]                                |
+--------------+--------------------------------------------+
| Address      | Unknown                                    |
+--------------+--------------------------------------------+
| Phone        | Unavailable                                |
+--------------+--------------------------------------------+
 
 
 
 Support
 
 
+---------------+--------------+---------+-----------------+
| Name          | Relationship | Address | Phone           |
+---------------+--------------+---------+-----------------+
| Sofiya A Brown | ECON         | Unknown | [phone] |
+---------------+--------------+---------+-----------------+
 
 
 
 Care Team Providers
 
 
 
+-------------------------+------+-----------------+
| Care Team Member Name   | Role | Phone           |
+-------------------------+------+-----------------+
| Ck Michelle MD | PCP  | [phone] |
+-------------------------+------+-----------------+
 
 
 
 Reason for Visit
 
 
+--------------+------------------------+
| Reason       | Comments               |
+--------------+------------------------+
| Imaging Only | 1 year PO lumbar xrays |
+--------------+------------------------+
 
 
 
 Encounter Details
 
 
+--------+-----------+----------------------+----------------------+----------------------+
| Date   | Type      | Department           | Care Team            | Description          |
+--------+-----------+----------------------+----------------------+----------------------+
| / | Telephone |   [facility]          |   Dreyer, Jason A,   | Imaging Only (1 year |
| 2018   |           | NEUROSURGERY  [address]  | DO  [address]    |  PO lumbar xrays)    |
|        |           | [address]     | [address]  [place], [place] |                      |
|        |           | [place], [place]      |  [address]  [phone] |                      |
|        |           | [address]           |   [phone] (Fax) |                      |
|        |           | [phone]         |                      |                      |
+--------+-----------+----------------------+----------------------+----------------------+
 
 
 
 Social History
 
 
+--------------+-------+-----------+--------+------+
| Tobacco Use  | Types | Packs/Day | Years  | Date |
|              |       |           | Used   |      |
+--------------+-------+-----------+--------+------+
| Never Smoker |       |           |        |      |
+--------------+-------+-----------+--------+------+
 
 
 
+---------------------+---+---+---+
| Smokeless Tobacco:  |   |   |   |
| Never Used          |   |   |   |
+---------------------+---+---+---+
 
 
 
+-------------+----------------------+---------+----------------------+
| Alcohol Use | Drinks/Week          | oz/Week | Comments             |
+-------------+----------------------+---------+----------------------+
| No          |   0 Standard drinks  | 0.0     | No longer drinking   |
 
|             | or equivalent        |         | 1-2 drinks 2-4 times |
|             |                      |         |  a month             |
+-------------+----------------------+---------+----------------------+
 
 
 
+------------------+---------------+
| Sex Assigned at  | Date Recorded |
| Birth            |               |
+------------------+---------------+
| Not on file      |               |
+------------------+---------------+
 
 
 
+----------------+-------------+-------------+
| Job Start Date | Occupation  | Industry    |
+----------------+-------------+-------------+
| Not on file    | Not on file | Not on file |
+----------------+-------------+-------------+
 
 
 
+----------------+--------------+------------+
| Travel History | Travel Start | Travel End |
+----------------+--------------+------------+
 
 
 
+-------------------------------------+
| No recent travel history available. |
+-------------------------------------+
 documented as of this encounter
 
 Plan of Treatment
 
 
+--------+---------+-----------+----------------------+-------------+
| Date   | Type    | Specialty | Care Team            | Description |
+--------+---------+-----------+----------------------+-------------+
| / | Office  | Neurology |   Ravindra Munoz MD  [address] |             |
| 2020   | [facility]   |           |  [facility]      |             |
|        |         |           | [place]  |             |
|        |         |           | [place] [address]             |             |
|        |         |           | [phone]         |             |
|        |         |           | [phone] (Fax)   |             |
+--------+---------+-----------+----------------------+-------------+
 documented as of this encounter
 
 Visit Diagnoses
 Not on filedocumented in this encounter

## 2020-06-08 NOTE — XMS
Encounter Summary
  Created on: 2020
 
 Memo Deni Siu
 External Reference #: [id number]
 : 52
 Sex: Male
 
 Demographics
 
 
+-----------------------+---------------------------+
| Address               | [address]           |
|                       | [place]  [address] |
+-----------------------+---------------------------+
| Home Phone            | [phone]           |
+-----------------------+---------------------------+
| Preferred Language    | Unknown                   |
+-----------------------+---------------------------+
| Marital Status        |                    |
+-----------------------+---------------------------+
| Confucianism Affiliation | Unknown                   |
+-----------------------+---------------------------+
| Race                  | Unknown                   |
+-----------------------+---------------------------+
| Ethnic Group          | Unknown                   |
+-----------------------+---------------------------+
 
 
 Author
 
 
+--------------+--------------------------------------------+
| Author       | [facility]  |
|              | [facility] [place]                                |
+--------------+--------------------------------------------+
| Organization | [facility]  |
|              | [facility] [place]                                |
+--------------+--------------------------------------------+
| Address      | Unknown                                    |
+--------------+--------------------------------------------+
| Phone        | Unavailable                                |
+--------------+--------------------------------------------+
 
 
 
 Support
 
 
+---------------+--------------+---------+-----------------+
| Name          | Relationship | Address | Phone           |
+---------------+--------------+---------+-----------------+
| Sofiya A Brown | ECON         | Unknown | [phone] |
+---------------+--------------+---------+-----------------+
 
 
 
 Care Team Providers
 
 
 
+-------------------------+------+-----------------+
| Care Team Member Name   | Role | Phone           |
+-------------------------+------+-----------------+
| Ck Michelle MD | PCP  | [phone] |
+-------------------------+------+-----------------+
 
 
 
 Encounter Details
 
 
+--------+-----------+----------------------+----------------------+----------------------+
| Date   | Type      | Department           | Care Team            | Description          |
+--------+-----------+----------------------+----------------------+----------------------+
| / | Preadmit  |   [facility] |   Dreyer, Jason A,   | Preoperative         |
| 2017   | Visit     |  [facility]    | DO  [address]    | clearance (Primary   |
|        |           | CLINIC  [address] | [address]  [place], [place] | Dx); Lumbago with    |
|        |           |   [place], [place]    |  [address]  [phone] | sciatica, right      |
|        |           | [address]           |   [phone] (Fax) | side; Low back pain, |
|        |           | [phone]         |                      |  unspecified back    |
|        |           |                      |                      | pain laterality,     |
|        |           |                      |                      | unspecified          |
|        |           |                      |                      | chronicity, with     |
|        |           |                      |                      | sciatica presence    |
|        |           |                      |                      | unspecified;         |
|        |           |                      |                      | Atherosclerosis      |
+--------+-----------+----------------------+----------------------+----------------------+
 
 
 
 Social History
 
 
+--------------+-------+-----------+--------+------+
| Tobacco Use  | Types | Packs/Day | Years  | Date |
|              |       |           | Used   |      |
+--------------+-------+-----------+--------+------+
| Never Smoker |       |           |        |      |
+--------------+-------+-----------+--------+------+
 
 
 
+---------------------+---+---+---+
| Smokeless Tobacco:  |   |   |   |
| Never Used          |   |   |   |
+---------------------+---+---+---+
 
 
 
+-------------+----------------------+---------+----------------------+
| Alcohol Use | Drinks/Week          | oz/Week | Comments             |
+-------------+----------------------+---------+----------------------+
| Yes         |   0 Standard drinks  | 0.0     | 1-2 drinks 2-4 times |
|             | or equivalent        |         |  per month           |
+-------------+----------------------+---------+----------------------+
 
 
 
 
+------------------+---------------+
| Sex Assigned at  | Date Recorded |
| Birth            |               |
+------------------+---------------+
| Not on file      |               |
+------------------+---------------+
 
 
 
+----------------+-------------+-------------+
| Job Start Date | Occupation  | Industry    |
+----------------+-------------+-------------+
| Not on file    | Not on file | Not on file |
+----------------+-------------+-------------+
 
 
 
+----------------+--------------+------------+
| Travel History | Travel Start | Travel End |
+----------------+--------------+------------+
 
 
 
+-------------------------------------+
| No recent travel history available. |
+-------------------------------------+
 documented as of this encounter
 
 Plan of Treatment
 
 
+--------+---------+-----------+----------------------+-------------+
| Date   | Type    | Specialty | Care Team            | Description |
+--------+---------+-----------+----------------------+-------------+
| / | Office  | Neurology |   Ravindra Munoz MD  [address] |             |
| 2020   | Visit   |           |  [address]      |             |
|        |         |           | [place]  |             |
|        |         |           | [place] [address]             |             |
|        |         |           | [phone]         |             |
|        |         |           | [phone] (Fax)   |             |
+--------+---------+-----------+----------------------+-------------+
 documented as of this encounter
 
 Procedures
 
 
+----------------+--------+-------------+----------------------+----------------------+
| Procedure Name | Priori | Date/Time   | Associated Diagnosis | Comments             |
|                | ty     |             |                      |                      |
+----------------+--------+-------------+----------------------+----------------------+
| CULTURE, MRSA  | Routin | 2017  |   Preoperative       |   Results for this   |
|                | e      |  1:40 PM    | clearance            | procedure are in the |
|                |        | PDT         |                      |  results section.    |
+----------------+--------+-------------+----------------------+----------------------+
| CBC WITH       | Routin | 2017  |   Lumbago with       |   Results for this   |
| DIFFERENTIAL   | e      |  1:34 PM    | sciatica, right side | procedure are in the |
|                |        | PDT         |   Low back pain,     |  results section.    |
|                |        |             | unspecified back     |                      |
|                |        |             | pain laterality,     |                      |
|                |        |             | unspecified          |                      |
 
|                |        |             | chronicity, with     |                      |
|                |        |             | sciatica presence    |                      |
|                |        |             | unspecified          |                      |
|                |        |             | Atherosclerosis      |                      |
+----------------+--------+-------------+----------------------+----------------------+
| ECG 12 LEAD    | Routin | 2017  |   Lumbago with       |   Results for this   |
|                | e      |  1:32 PM    | sciatica, right side | procedure are in the |
|                |        | PDT         |   Low back pain,     |  results section.    |
|                |        |             | unspecified back     |                      |
|                |        |             | pain laterality,     |                      |
|                |        |             | unspecified          |                      |
|                |        |             | chronicity, with     |                      |
|                |        |             | sciatica presence    |                      |
|                |        |             | unspecified          |                      |
|                |        |             | Atherosclerosis      |                      |
+----------------+--------+-------------+----------------------+----------------------+
 documented in this encounter
 
 Results
 Culture, MRSA (2017  1:40 PM PDT)
 
+-----------+-------------------------+-----------+-------------+--------------+
| Component | Value                   | Ref Range | Performed   | Pathologist  |
|           |                         |           | At          | Signature    |
+-----------+-------------------------+-----------+-------------+--------------+
| Culture   | Negative for MRSA by    |           | [facility]  |              |
|           | chromogenic agar method |           | [facility]    |              |
|           |                         |           | MEDICAL     |              |
|           |                         |           | CENTER -    |              |
|           |                         |           | LABORATORY  |              |
+-----------+-------------------------+-----------+-------------+--------------+
 
 
 
+----------------------+
| Specimen             |
+----------------------+
| Respiratory - Both   |
| anterior nares (body |
|  structure)          |
+----------------------+
 
 
 
+----------------------+--------------------+--------------------+----------------+
| Performing           | Address            | City/State/Zipcode | Phone Number   |
| Organization         |                    |                    |                |
+----------------------+--------------------+--------------------+----------------+
|   [facility]     |   [address] |   [place]  |   [phone] |
| [facility]  |                    | [address]              |                |
| - LABORATORY         |                    |                    |                |
+----------------------+--------------------+--------------------+----------------+
 CBC with Differential (2017  1:34 PM PDT)
 
+-------------+----------+-----------------+-------------+--------------+
| Component   | Value    | Ref Range       | Performed   | Pathologist  |
|             |          |                 | At          | Signature    |
+-------------+----------+-----------------+-------------+--------------+
| WBC         | 6.5      | 4.0 - 11.0 K/uL | [facility]  |              |
|             |          |                 | [facility]    |              |
 
|             |          |                 | MEDICAL     |              |
|             |          |                 | CENTER -    |              |
|             |          |                 | LABORATORY  |              |
+-------------+----------+-----------------+-------------+--------------+
| RBC         | 4.75     | 4.30 - 5.70     | [facility]  |              |
|             |          | M/uL            | [facility]    |              |
|             |          |                 | MEDICAL     |              |
|             |          |                 | CENTER -    |              |
|             |          |                 | LABORATORY  |              |
+-------------+----------+-----------------+-------------+--------------+
| Hemoglobin  | 14.3     | 13.5 - 18.0     | [facility]  |              |
|             |          | g/dL            | [facility]    |              |
|             |          |                 | MEDICAL     |              |
|             |          |                 | CENTER -    |              |
|             |          |                 | LABORATORY  |              |
+-------------+----------+-----------------+-------------+--------------+
| Hematocrit  | 42.6     | 40.0 - 51.0 %   | [facility]  |              |
|             |          |                 | [facility]    |              |
|             |          |                 | MEDICAL     |              |
|             |          |                 | CENTER -    |              |
|             |          |                 | LABORATORY  |              |
+-------------+----------+-----------------+-------------+--------------+
| MCV         | 89.7     | 83.0 - 101.0 fL | [facility]  |              |
|             |          |                 | [facility]    |              |
|             |          |                 | MEDICAL     |              |
|             |          |                 | CENTER -    |              |
|             |          |                 | LABORATORY  |              |
+-------------+----------+-----------------+-------------+--------------+
| MCH         | 30.1     | 28.0 - 35.0 pg  | [facility]  |              |
|             |          |                 | [facility]    |              |
|             |          |                 | MEDICAL     |              |
|             |          |                 | CENTER -    |              |
|             |          |                 | LABORATORY  |              |
+-------------+----------+-----------------+-------------+--------------+
| MCHC        | 33.5     | 32.0 - 36.0     | [facility]  |              |
|             |          | g/dL            | [facility]    |              |
|             |          |                 | MEDICAL     |              |
|             |          |                 | CENTER -    |              |
|             |          |                 | LABORATORY  |              |
+-------------+----------+-----------------+-------------+--------------+
| RDW-CV      | 13.5     | <15.0 %         | [facility]  |              |
|             |          |                 | [facility]    |              |
|             |          |                 | MEDICAL     |              |
|             |          |                 | CENTER -    |              |
|             |          |                 | LABORATORY  |              |
+-------------+----------+-----------------+-------------+--------------+
| Platelet    | 244      | 140 - 440 K/uL  | [facility]  |              |
| Count       |          |                 | [facility]    |              |
|             |          |                 | MEDICAL     |              |
|             |          |                 | CENTER -    |              |
|             |          |                 | LABORATORY  |              |
+-------------+----------+-----------------+-------------+--------------+
| MPV         | 7.2      | fL              | [facility]  |              |
|             |          |                 | [facility]    |              |
|             |          |                 | MEDICAL     |              |
|             |          |                 | CENTER -    |              |
|             |          |                 | LABORATORY  |              |
+-------------+----------+-----------------+-------------+--------------+
| %           | 68.3     | 45.0 - 82.0 %   | [facility]  |              |
| Neutrophils |          |                 | [facility]    |              |
 
|             |          |                 | MEDICAL     |              |
|             |          |                 | CENTER -    |              |
|             |          |                 | LABORATORY  |              |
+-------------+----------+-----------------+-------------+--------------+
| %           | 19.3 (L) | 20.0 - 45.0 %   | [facility]  |              |
| Lymphocytes |          |                 | [facility]    |              |
|             |          |                 | MEDICAL     |              |
|             |          |                 | CENTER -    |              |
|             |          |                 | LABORATORY  |              |
+-------------+----------+-----------------+-------------+--------------+
| % Monocytes | 7.5      | 4.0 - 12.0 %    | [facility]  |              |
|             |          |                 | [facility]    |              |
|             |          |                 | MEDICAL     |              |
|             |          |                 | CENTER -    |              |
|             |          |                 | LABORATORY  |              |
+-------------+----------+-----------------+-------------+--------------+
| %           | 4.1      | 0.0 - 5.0 %     | [facility]  |              |
| Eosinophils |          |                 | [facility]    |              |
|             |          |                 | MEDICAL     |              |
|             |          |                 | CENTER -    |              |
|             |          |                 | LABORATORY  |              |
+-------------+----------+-----------------+-------------+--------------+
| % Basophils | 0.8      | 0.0 - 1.0 %     | [facility]  |              |
|             |          |                 | [facility]    |              |
|             |          |                 | MEDICAL     |              |
|             |          |                 | CENTER -    |              |
|             |          |                 | LABORATORY  |              |
+-------------+----------+-----------------+-------------+--------------+
| Absolute    | 4.40     | 1.80 - 8.50     | [facility]  |              |
| Neutrophils |          | K/uL            | [facility]    |              |
|             |          |                 | MEDICAL     |              |
|             |          |                 | CENTER -    |              |
|             |          |                 | LABORATORY  |              |
+-------------+----------+-----------------+-------------+--------------+
| Absolute    | 1.20     | 0.60 - 3.20     | [facility]  |              |
| Lymphocytes |          | K/uL            | [facility]    |              |
|             |          |                 | MEDICAL     |              |
|             |          |                 | CENTER -    |              |
|             |          |                 | LABORATORY  |              |
+-------------+----------+-----------------+-------------+--------------+
| Absolute    | 0.50     | 0.00 - 1.00     | [facility]  |              |
| Monocytes   |          | K/uL            | [facility]    |              |
|             |          |                 | MEDICAL     |              |
|             |          |                 | CENTER -    |              |
|             |          |                 | LABORATORY  |              |
+-------------+----------+-----------------+-------------+--------------+
| Absolute    | 0.30     | 0.00 - 0.40     | [facility]  |              |
| Eosinophils |          | K/uL            | [facility]    |              |
|             |          |                 | MEDICAL     |              |
|             |          |                 | CENTER -    |              |
|             |          |                 | LABORATORY  |              |
+-------------+----------+-----------------+-------------+--------------+
| Absolute    | 0.00     | 0.00 - 0.10     | [facility]  |              |
| Basophils   |          | K/uL            | [facility]    |              |
|             |          |                 | MEDICAL     |              |
|             |          |                 | CENTER -    |              |
|             |          |                 | LABORATORY  |              |
+-------------+----------+-----------------+-------------+--------------+
 
 
 
 
+----------+
| Specimen |
+----------+
| Blood    |
+----------+
 
 
 
+----------------------+--------------------+--------------------+----------------+
| Performing           | Address            | City/State/Zipcode | Phone Number   |
| Organization         |                    |                    |                |
+----------------------+--------------------+--------------------+----------------+
|   [facility]     |   [address] |   [place]  |   [phone] |
| [facility]  |                    | [address]              |                |
| - LABORATORY         |                    |                    |                |
+----------------------+--------------------+--------------------+----------------+
 ECG 12 lead (2017  1:32 PM PDT)
 
+-------------+--------------------------+-----------+------------+--------------+
| Component   | Value                    | Ref Range | Performed  | Pathologist  |
|             |                          |           | At         | Signature    |
+-------------+--------------------------+-----------+------------+--------------+
| VENTRICULAR | 55                       | BPM       | [facility]  |              |
|  RATE EKG   |                          |           |            |              |
+-------------+--------------------------+-----------+------------+--------------+
| ATRIAL RATE | 55                       | BPM       | [facility]  |              |
+-------------+--------------------------+-----------+------------+--------------+
| P-R         | 188                      | ms        | [facility]  |              |
| INTERVAL    |                          |           |            |              |
+-------------+--------------------------+-----------+------------+--------------+
| QRS         | 118                      | ms        | [facility]  |              |
| DURATION    |                          |           |            |              |
+-------------+--------------------------+-----------+------------+--------------+
| Q-T         | 422                      | ms        | [facility]  |              |
| INTERVAL    |                          |           |            |              |
+-------------+--------------------------+-----------+------------+--------------+
| Q-T         | 403                      | ms        | [facility]  |              |
| INTERVAL    |                          |           |            |              |
| (CORRECTED) |                          |           |            |              |
+-------------+--------------------------+-----------+------------+--------------+
| P WAVE AXIS | 43                       | degrees   | [facility]  |              |
+-------------+--------------------------+-----------+------------+--------------+
| QRS AXIS    | 33                       | degrees   | [facility]  |              |
+-------------+--------------------------+-----------+------------+--------------+
| T AXIS      | 23                       | degrees   | [facility]  |              |
+-------------+--------------------------+-----------+------------+--------------+
| INTERPRETAT | Sinus                    |           | [facility]  |              |
| ION TEXT    | bradycardiaIncomplete    |           |            |              |
|             | right bundle branch      |           |            |              |
|             | blockBorderline ECGNo    |           |            |              |
|             | previous ECGs            |           |            |              |
|             | availableConfirmed by    |           |            |              |
|             | YAHIR MUR[place] MD ([id number])  |           |            |              |
|             | on 2017 6:03:41 AM  |           |            |              |
+-------------+--------------------------+-----------+------------+--------------+
 
 
 
+----------+
 
| Specimen |
+----------+
|          |
+----------+
 
 
 
+----------------------------------------------------------+--------------+
| Narrative                                                | Performed At |
+----------------------------------------------------------+--------------+
|   This result has an attachment that is not available.   |              |
+----------------------------------------------------------+--------------+
 
 
 
+--------------+---------+--------------------+--------------+
| Performing   | Address | City/State/Zipcode | Phone Number |
| Organization |         |                    |              |
+--------------+---------+--------------------+--------------+
|   [facility]  |         |                    |              |
+--------------+---------+--------------------+--------------+
 documented in this encounter
 
 Visit Diagnoses
 
 
+------------------------------------------------------------------------------------------+
| Diagnosis                                                                                |
+------------------------------------------------------------------------------------------+
|   Preoperative clearance - Primary  Preoperative examination, unspecified                |
+------------------------------------------------------------------------------------------+
|   Lumbago with sciatica, right side                                                      |
+------------------------------------------------------------------------------------------+
|   Low back pain, unspecified back pain laterality, unspecified chronicity, with sciatica |
|  presence unspecified                                                                    |
+------------------------------------------------------------------------------------------+
|   Atherosclerosis  Generalized and unspecified atherosclerosis                           |
+------------------------------------------------------------------------------------------+
 documented in this encounter

## 2020-06-08 NOTE — XMS
Encounter Summary
  Created on: 2020
 
 Memo Deni Siu
 External Reference #: [id number]
 : 52
 Sex: Male
 
 Demographics
 
 
+-----------------------+---------------------------+
| Address               | [address]           |
|                       | [place]  [address] |
+-----------------------+---------------------------+
| Home Phone            | [phone]           |
+-----------------------+---------------------------+
| Preferred Language    | Unknown                   |
+-----------------------+---------------------------+
| Marital Status        |                    |
+-----------------------+---------------------------+
| Rastafarian Affiliation | Unknown                   |
+-----------------------+---------------------------+
| Race                  | Unknown                   |
+-----------------------+---------------------------+
| Ethnic Group          | Unknown                   |
+-----------------------+---------------------------+
 
 
 Author
 
 
+--------------+--------------------------------------------+
| Author       | [facility]  |
|              | and [place]                                |
+--------------+--------------------------------------------+
| Organization | [facility]  |
|              | and [place]                                |
+--------------+--------------------------------------------+
| Address      | Unknown                                    |
+--------------+--------------------------------------------+
| Phone        | Unavailable                                |
+--------------+--------------------------------------------+
 
 
 
 Support
 
 
+---------------+--------------+---------+-----------------+
| Name          | Relationship | Address | Phone           |
+---------------+--------------+---------+-----------------+
| Sofiya A Brown | ECON         | Unknown | [phone] |
+---------------+--------------+---------+-----------------+
 
 
 
 Care Team Providers
 
 
 
+-------------------------+------+-----------------+
| Care Team Member Name   | Role | Phone           |
+-------------------------+------+-----------------+
| Ck Michelle MD | PCP  | [phone] |
+-------------------------+------+-----------------+
 
 
 
 Reason for Visit
 
 
+----------------+----------+
| Reason         | Comments |
+----------------+----------+
| Suture Removal | 2W       |
+----------------+----------+
 
 
 
 Encounter Details
 
 
+--------+---------+----------------------+----------------------+----------------------+
| Date   | Type    | Department           | Care Team            | Description          |
+--------+---------+----------------------+----------------------+----------------------+
| / | Office  |   [facility]          |   Dreyer, Jason A,   | Visit for suture     |
| 2017   | Visit   | NEUROSURGERY  [address]  | DO  [address]    | removal (Primary Dx) |
|        |         | [address]     | [address]  [place], [place] |                      |
|        |         | [place], [place]      |  [address]  [phone] |                      |
|        |         | [address]           |   [phone] (Fax) |                      |
|        |         | [phone]         |                      |                      |
+--------+---------+----------------------+----------------------+----------------------+
 
 
 
 Social History
 
 
+--------------+-------+-----------+--------+------+
| Tobacco Use  | Types | Packs/Day | Years  | Date |
|              |       |           | Used   |      |
+--------------+-------+-----------+--------+------+
| Never Smoker |       |           |        |      |
+--------------+-------+-----------+--------+------+
 
 
 
+---------------------+---+---+---+
| Smokeless Tobacco:  |   |   |   |
| Never Used          |   |   |   |
+---------------------+---+---+---+
 
 
 
+-------------+----------------------+---------+----------------------+
| Alcohol Use | Drinks/Week          | oz/Week | Comments             |
+-------------+----------------------+---------+----------------------+
| Yes         |   0 Standard drinks  | 0.0     | 1-2 drinks 2-4 times |
 
|             | or equivalent        |         |  per month           |
+-------------+----------------------+---------+----------------------+
 
 
 
+------------------+---------------+
| Sex Assigned at  | Date Recorded |
| Birth            |               |
+------------------+---------------+
| Not on file      |               |
+------------------+---------------+
 
 
 
+----------------+-------------+-------------+
| Job Start Date | Occupation  | Industry    |
+----------------+-------------+-------------+
| Not on file    | Not on file | Not on file |
+----------------+-------------+-------------+
 
 
 
+----------------+--------------+------------+
| Travel History | Travel Start | Travel End |
+----------------+--------------+------------+
 
 
 
+-------------------------------------+
| No recent travel history available. |
+-------------------------------------+
 documented as of this encounter
 
 Last Filed Vital Signs
 
 
+-------------------+-------------------+----------------------+----------+
| Vital Sign        | Reading           | Time Taken           | Comments |
+-------------------+-------------------+----------------------+----------+
| Blood Pressure    | 111/69            | 2017  2:22 PM  |          |
|                   |                   | PDT                  |          |
+-------------------+-------------------+----------------------+----------+
| Pulse             | 55                | 2017  2:22 PM  |          |
|                   |                   | PDT                  |          |
+-------------------+-------------------+----------------------+----------+
| Temperature       | -                 | -                    |          |
+-------------------+-------------------+----------------------+----------+
| Respiratory Rate  | -                 | -                    |          |
+-------------------+-------------------+----------------------+----------+
| Oxygen Saturation | -                 | -                    |          |
+-------------------+-------------------+----------------------+----------+
| Inhaled Oxygen    | -                 | -                    |          |
| Concentration     |                   |                      |          |
+-------------------+-------------------+----------------------+----------+
| Weight            | 102.1 kg (225 lb) | 2017  2:22 PM  |          |
|                   |                   | PDT                  |          |
+-------------------+-------------------+----------------------+----------+
| Height            | 190.5 cm (6' 3")  | 2017  2:22 PM  |          |
|                   |                   | PDT                  |          |
+-------------------+-------------------+----------------------+----------+
 
| Body Mass Index   | 28.12             | 2017  2:22 PM  |          |
|                   |                   | PDT                  |          |
+-------------------+-------------------+----------------------+----------+
 documented in this encounter
 
 Progress Notes
 Cynthia Velasquez Cert MA - 2017  2:15 PM PDTSutures removed per protocol.  Incision is 
clean, dry and intact.  
 Electronically signed by Alan Reese MA at 2017  3:18 PM PDTdocumented in this
 encounter
 
 Plan of Treatment
 
 
+--------+---------+-----------+----------------------+-------------+
| Date   | Type    | Specialty | Care Team            | Description |
+--------+---------+-----------+----------------------+-------------+
| / | Office  | Neurology |   Ravindra Munoz MD  [address] |             |
| 2020   | Visit   |           |  [address]      |             |
|        |         |           | [place],  |             |
|        |         |           | [place] [address]             |             |
|        |         |           | [phone]         |             |
|        |         |           | [phone] (Fax)   |             |
+--------+---------+-----------+----------------------+-------------+
 documented as of this encounter
 
 Procedures
 
 
+-------------------+--------+-------------+----------------------+----------------------+
| Procedure Name    | Priori | Date/Time   | Associated Diagnosis | Comments             |
|                   | ty     |             |                      |                      |
+-------------------+--------+-------------+----------------------+----------------------+
| IMAGING REPORT -  |        | 2017  |                      |   Results for this   |
| EXTERNAL SCAN     |        | 12:00 AM    |                      | procedure are in the |
|                   |        | PDT         |                      |  results section.    |
+-------------------+--------+-------------+----------------------+----------------------+
 documented in this encounter
 
 Results
 IMAGING REPORT - EXTERNAL SCAN (2017 12:00 AM PDT)
 
+------------------------------------------------------------------------+--------------+
| Narrative                                                              | Performed At |
+------------------------------------------------------------------------+--------------+
|   This result has an attachment that is not available.  Ordered by an  |              |
| unspecified provider.                                                  |              |
+------------------------------------------------------------------------+--------------+
 documented in this encounter
 
 Visit Diagnoses
 
 
+------------------------------------------------------------------------+
| Diagnosis                                                              |
+------------------------------------------------------------------------+
|   Visit for suture removal - Primary  Encounter for removal of sutures |
+------------------------------------------------------------------------+
 documented in this encounter

## 2020-06-08 NOTE — XMS
Encounter Summary
  Created on: 2020
 
 Memo Deni iSu
 External Reference #: [id number]
 : 52
 Sex: Male
 
 Demographics
 
 
+-----------------------+---------------------------+
| Address               | [address]           |
|                       | [place]  [address] |
+-----------------------+---------------------------+
| Home Phone            | [phone]           |
+-----------------------+---------------------------+
| Preferred Language    | Unknown                   |
+-----------------------+---------------------------+
| Marital Status        |                    |
+-----------------------+---------------------------+
| Mormonism Affiliation | Unknown                   |
+-----------------------+---------------------------+
| Race                  | Unknown                   |
+-----------------------+---------------------------+
| Ethnic Group          | Unknown                   |
+-----------------------+---------------------------+
 
 
 Author
 
 
+--------------+--------------------------------------------+
| Author       | [facility]  |
|              | and [place]                                |
+--------------+--------------------------------------------+
| Organization | [facility]  |
|              | and [place]                                |
+--------------+--------------------------------------------+
| Address      | Unknown                                    |
+--------------+--------------------------------------------+
| Phone        | Unavailable                                |
+--------------+--------------------------------------------+
 
 
 
 Support
 
 
+---------------+--------------+---------+-----------------+
| Name          | Relationship | Address | Phone           |
+---------------+--------------+---------+-----------------+
| Sofiya A Brown | ECON         | Unknown | [phone] |
+---------------+--------------+---------+-----------------+
 
 
 
 Care Team Providers
 
 
 
+-------------------------+------+-----------------+
| Care Team Member Name   | Role | Phone           |
+-------------------------+------+-----------------+
| Ck Michelle MD | PCP  | [phone] |
+-------------------------+------+-----------------+
 
 
 
 Encounter Details
 
 
+--------+-------------+----------------------+----------------------+----------------------
+
| Date   | Type        | Department           | Care Team            | Description          
|
+--------+-------------+----------------------+----------------------+----------------------
+
| / | Orders Only |   [facility]          |   Dreyer, Jason A,   | Osteoarthritis of    
|
| 2017   |             | NEUROSURGERY  [address]  | DO  [address]    | spine with           
|
|        |             | [address]     | [address]  [place], [place] | radiculopathy,       
|
|        |             | [place], [place]      |  [address]  [phone] | lumbar region        
|
|        |             | [address]           |   [phone] (Fax) | (Primary Dx); Status 
|
|        |             | [phone]         |                      |  post lumbar spinal  
|
|        |             |                      |                      | fusion               
|
+--------+-------------+----------------------+----------------------+----------------------
+
 
 
 
 Social History
 
 
+--------------+-------+-----------+--------+------+
| Tobacco Use  | Types | Packs/Day | Years  | Date |
|              |       |           | Used   |      |
+--------------+-------+-----------+--------+------+
| Never Smoker |       |           |        |      |
+--------------+-------+-----------+--------+------+
 
 
 
+---------------------+---+---+---+
| Smokeless Tobacco:  |   |   |   |
| Never Used          |   |   |   |
+---------------------+---+---+---+
 
 
 
+-------------+----------------------+---------+----------------------+
| Alcohol Use | Drinks/Week          | oz/Week | Comments             |
+-------------+----------------------+---------+----------------------+
 
| Yes         |   0 Standard drinks  | 0.0     | 1-2 drinks 2-4 times |
|             | or equivalent        |         |  per month           |
+-------------+----------------------+---------+----------------------+
 
 
 
+------------------+---------------+
| Sex Assigned at  | Date Recorded |
| Birth            |               |
+------------------+---------------+
| Not on file      |               |
+------------------+---------------+
 
 
 
+----------------+-------------+-------------+
| Job Start Date | Occupation  | Industry    |
+----------------+-------------+-------------+
| Not on file    | Not on file | Not on file |
+----------------+-------------+-------------+
 
 
 
+----------------+--------------+------------+
| Travel History | Travel Start | Travel End |
+----------------+--------------+------------+
 
 
 
+-------------------------------------+
| No recent travel history available. |
+-------------------------------------+
 documented as of this encounter
 
 Plan of Treatment
 
 
+--------+---------+-----------+----------------------+-------------+
| Date   | Type    | Specialty | Care Team            | Description |
+--------+---------+-----------+----------------------+-------------+
| / | Office  | Neurology |   Ravindra Munoz MD  [address] |             |
|    | Visit   |           |  [address]      |             |
|        |         |           | [place]  |             |
|        |         |           | [place] [address]             |             |
|        |         |           | [phone]         |             |
|        |         |           | [phone] (Fax)   |             |
+--------+---------+-----------+----------------------+-------------+
 
 
 
+----------------------+---------+--------+----------------------+----------------------+
| Name                 | Type    | Priori | Associated Diagnoses | Order Schedule       |
|                      |         | ty     |                      |                      |
+----------------------+---------+--------+----------------------+----------------------+
| XR Lumbar Spine 2 or | Imaging | Routin |   Osteoarthritis of  | Expected: 2017 |
|  3 Vw                |         | e      | spine with           |  (Approximate),      |
|                      |         |        | radiculopathy,       | Expires: 2018  |
|                      |         |        | lumbar region        |                      |
|                      |         |        | Status post lumbar   |                      |
|                      |         |        | spinal fusion        |                      |
 
+----------------------+---------+--------+----------------------+----------------------+
 documented as of this encounter
 
 Visit Diagnoses
 
 
+-----------------------------------------------------------------------+
| Diagnosis                                                             |
+-----------------------------------------------------------------------+
|   Osteoarthritis of spine with radiculopathy, lumbar region - Primary |
+-----------------------------------------------------------------------+
|   Status post lumbar spinal fusion  Arthrodesis status                |
+-----------------------------------------------------------------------+
 documented in this encounter

## 2020-06-08 NOTE — XMS
Encounter Summary
  Created on: 2020
 
 Memo Deni Siu
 External Reference #: [id number]
 : 52
 Sex: Male
 
 Demographics
 
 
+-----------------------+---------------------------+
| Address               | [address]           |
|                       | [place]  [address] |
+-----------------------+---------------------------+
| Home Phone            | [phone]           |
+-----------------------+---------------------------+
| Preferred Language    | Unknown                   |
+-----------------------+---------------------------+
| Marital Status        |                    |
+-----------------------+---------------------------+
| Jehovah's witness Affiliation | Unknown                   |
+-----------------------+---------------------------+
| Race                  | Unknown                   |
+-----------------------+---------------------------+
| Ethnic Group          | Unknown                   |
+-----------------------+---------------------------+
 
 
 Author
 
 
+--------------+--------------------------------------------+
| Author       | [facility]  |
|              | and [place]                                |
+--------------+--------------------------------------------+
| Organization | [facility]  |
|              | and [place]                                |
+--------------+--------------------------------------------+
| Address      | Unknown                                    |
+--------------+--------------------------------------------+
| Phone        | Unavailable                                |
+--------------+--------------------------------------------+
 
 
 
 Support
 
 
+---------------+--------------+---------+-----------------+
| Name          | Relationship | Address | Phone           |
+---------------+--------------+---------+-----------------+
| Sofiya A Brown | ECON         | Unknown | [phone] |
+---------------+--------------+---------+-----------------+
 
 
 
 Care Team Providers
 
 
 
+-------------------------+------+-----------------+
| Care Team Member Name   | Role | Phone           |
+-------------------------+------+-----------------+
| Ck Michelle MD | PCP  | [phone] |
+-------------------------+------+-----------------+
 
 
 
 Encounter Details
 
 
+--------+-------------+---------------------+----------------------+-------------+
| Date   | Type        | Department          | Care Team            | Description |
+--------+-------------+---------------------+----------------------+-------------+
| / | Orders Only |   [facility]    |   Provider,          |             |
| 2019   |             | SYSTEM GENERIC OP   | MD [address] |             |
|        |             | CONVERSION  [address]  |  [address]        |             |
|        |             | [address]  [place], [place]  | [place] [address]     |             |
|        |             | [address]          |                      |             |
|        |             | [phone]        |                      |             |
+--------+-------------+---------------------+----------------------+-------------+
 
 
 
 Social History
 
 
+--------------+-------+-----------+--------+------+
| Tobacco Use  | Types | Packs/Day | Years  | Date |
|              |       |           | Used   |      |
+--------------+-------+-----------+--------+------+
| Never Smoker |       |           |        |      |
+--------------+-------+-----------+--------+------+
 
 
 
+---------------------+---+---+---+
| Smokeless Tobacco:  |   |   |   |
| Never Used          |   |   |   |
+---------------------+---+---+---+
 
 
 
+-------------+----------------------+---------+--------------+
| Alcohol Use | Drinks/Week          | oz/Week | Comments     |
+-------------+----------------------+---------+--------------+
| No          |   0 Standard drinks  | 0.0     | Alcoholic    |
|             | or equivalent        |         | Drinks/day:  |
|             |                      |         | Occasionally |
+-------------+----------------------+---------+--------------+
 
 
 
+------------------+---------------+
| Sex Assigned at  | Date Recorded |
| Birth            |               |
+------------------+---------------+
| Not on file      |               |
 
+------------------+---------------+
 
 
 
+----------------+-------------+-------------+
| Job Start Date | Occupation  | Industry    |
+----------------+-------------+-------------+
| Not on file    | Not on file | Not on file |
+----------------+-------------+-------------+
 
 
 
+----------------+--------------+------------+
| Travel History | Travel Start | Travel End |
+----------------+--------------+------------+
 
 
 
+-------------------------------------+
| No recent travel history available. |
+-------------------------------------+
 documented as of this encounter
 
 Plan of Treatment
 
 
+--------+---------+-----------+----------------------+-------------+
| Date   | Type    | Specialty | Care Team            | Description |
+--------+---------+-----------+----------------------+-------------+
| / | Office  | Neurology |   Ravindra Munoz MD  [address] |             |
|    | Visit   |           |  [address]      |             |
|        |         |           | [place]  |             |
|        |         |           | [place] [address]             |             |
|        |         |           | [phone]         |             |
|        |         |           | [phone] (Fax)   |             |
+--------+---------+-----------+----------------------+-------------+
 documented as of this encounter
 
 Visit Diagnoses
 Not on filedocumented in this encounter

## 2020-06-08 NOTE — XMS
Encounter Summary
  Created on: 2020
 
 Memo Deni Siu
 External Reference #: [id number]
 : 52
 Sex: Male
 
 Demographics
 
 
+-----------------------+---------------------------+
| Address               | [address]           |
|                       | [place]  [address] |
+-----------------------+---------------------------+
| Home Phone            | [phone]           |
+-----------------------+---------------------------+
| Preferred Language    | Unknown                   |
+-----------------------+---------------------------+
| Marital Status        |                    |
+-----------------------+---------------------------+
| Mormon Affiliation | Unknown                   |
+-----------------------+---------------------------+
| Race                  | Unknown                   |
+-----------------------+---------------------------+
| Ethnic Group          | Unknown                   |
+-----------------------+---------------------------+
 
 
 Author
 
 
+--------------+--------------------------------------------+
| Author       | [facility]  |
|              | and [place]                                |
+--------------+--------------------------------------------+
| Organization | [facility]  |
|              | and [place]                                |
+--------------+--------------------------------------------+
| Address      | Unknown                                    |
+--------------+--------------------------------------------+
| Phone        | Unavailable                                |
+--------------+--------------------------------------------+
 
 
 
 Support
 
 
+---------------+--------------+---------+-----------------+
| Name          | Relationship | Address | Phone           |
+---------------+--------------+---------+-----------------+
| Sofiya A Brown | ECON         | Unknown | [phone] |
+---------------+--------------+---------+-----------------+
 
 
 
 Care Team Providers
 
 
 
+-----------------------+------+-------------+
| Care Team Member Name | Role | Phone       |
+-----------------------+------+-------------+
 PCP  | Unavailable |
+-----------------------+------+-------------+
 
 
 
 Encounter Details
 
 
+--------+-----------+----------------------+-----------+-------------+
| Date   | Type      | Department           | Care Team | Description |
+--------+-----------+----------------------+-----------+-------------+
| / | Hospital  |   [facility] |           |             |
|    | Encounter |  MED CTR GENERIC IP  |           |             |
|        |           | CONV DEPT  [address]     |           |             |
|        |           | [place]  [place], |           |             |
|        |           |  [place] [address]       |           |             |
|        |           | [phone]         |           |             |
+--------+-----------+----------------------+-----------+-------------+
 
 
 
 Social History
 
 
+----------------+-------+-----------+--------+------+
| Tobacco Use    | Types | Packs/Day | Years  | Date |
|                |       |           | Used   |      |
+----------------+-------+-----------+--------+------+
| Never Assessed |       |           |        |      |
+----------------+-------+-----------+--------+------+
 
 
 
+------------------+---------------+
| Sex Assigned at  | Date Recorded |
| Birth            |               |
+------------------+---------------+
| Not on file      |               |
+------------------+---------------+
 
 
 
+----------------+-------------+-------------+
| Job Start Date | Occupation  | Industry    |
+----------------+-------------+-------------+
| Not on file    | Not on file | Not on file |
+----------------+-------------+-------------+
 
 
 
+----------------+--------------+------------+
| Travel History | Travel Start | Travel End |
+----------------+--------------+------------+
 
 
 
 
+-------------------------------------+
| No recent travel history available. |
+-------------------------------------+
 documented as of this encounter
 
 Plan of Treatment
 
 
+--------+---------+-----------+----------------------+-------------+
| Date   | Type    | Specialty | Care Team            | Description |
+--------+---------+-----------+----------------------+-------------+
| / | Office  | Neurology |   Ravindra Munoz MD  [address] |             |
|    | Visit   |           |  [address]      |             |
|        |         |           | [place]  |             |
|        |         |           | [place] [address]             |             |
|        |         |           | [phone]         |             |
|        |         |           | [phone] (Fax)   |             |
+--------+---------+-----------+----------------------+-------------+
 documented as of this encounter
 
 Visit Diagnoses
 Not on filedocumented in this encounter

## 2020-06-08 NOTE — XMS
Encounter Summary
  Created on: 2020
 
 Memo Deni Siu
 External Reference #: [id number]
 : 52
 Sex: Male
 
 Demographics
 
 
+-----------------------+---------------------------+
| Address               | [address]           |
|                       | [place]  [address] |
+-----------------------+---------------------------+
| Home Phone            | [phone]           |
+-----------------------+---------------------------+
| Preferred Language    | Unknown                   |
+-----------------------+---------------------------+
| Marital Status        |                    |
+-----------------------+---------------------------+
| Oriental orthodox Affiliation | Unknown                   |
+-----------------------+---------------------------+
| Race                  | Unknown                   |
+-----------------------+---------------------------+
| Ethnic Group          | Unknown                   |
+-----------------------+---------------------------+
 
 
 Author
 
 
+--------------+--------------------------------------------+
| Author       | [facility]  |
|              | and [place]                                |
+--------------+--------------------------------------------+
| Organization | [facility]  |
|              | and [place]                                |
+--------------+--------------------------------------------+
| Address      | Unknown                                    |
+--------------+--------------------------------------------+
| Phone        | Unavailable                                |
+--------------+--------------------------------------------+
 
 
 
 Support
 
 
+---------------+--------------+---------+-----------------+
| Name          | Relationship | Address | Phone           |
+---------------+--------------+---------+-----------------+
| Sofiya A Brown | ECON         | Unknown | [phone] |
+---------------+--------------+---------+-----------------+
 
 
 
 Care Team Providers
 
 
 
+-------------------------+------+-----------------+
| Care Team Member Name   | Role | Phone           |
+-------------------------+------+-----------------+
| Ck Michelle MD | PCP  | [phone] |
+-------------------------+------+-----------------+
 
 
 
 Reason for Visit
 Auth/Cert
 
+--------+--------+-----------+--------------+--------------+--------------+
| Status | Reason | Specialty | Diagnoses /  | Referred By  | Referred To  |
|        |        |           | Procedures   | Contact      | Contact      |
+--------+--------+-----------+--------------+--------------+--------------+
|        |        |           |   Diagnoses  |              |              |
|        |        |           |              |              |              |
|        |        |           | Osteoarthrit |              |              |
|        |        |           | is of spine  |              |              |
|        |        |           | with         |              |              |
|        |        |           | radiculopath |              |              |
|        |        |           | y, lumbar    |              |              |
|        |        |           | region       |              |              |
|        |        |           | (M47.26),    |              |              |
|        |        |           | Spinal       |              |              |
|        |        |           | stenosis,    |              |              |
|        |        |           | lumbar       |              |              |
|        |        |           | region,      |              |              |
|        |        |           | without      |              |              |
|        |        |           | neurogenic   |              |              |
|        |        |           | claudication |              |              |
|        |        |           |  (M48.06),   |              |              |
|        |        |           | Lumbar       |              |              |
|        |        |           | radiculopath |              |              |
|        |        |           | y (M54.16),  |              |              |
|        |        |           | Chronic      |              |              |
|        |        |           | left-sided   |              |              |
|        |        |           | low back     |              |              |
|        |        |           | pain with    |              |              |
|        |        |           | left-sided   |              |              |
|        |        |           | sciatica     |              |              |
|        |        |           | (M54.42,     |              |              |
|        |        |           | G89.29),     |              |              |
|        |        |           | Status post  |              |              |
|        |        |           | laminectomy  |              |              |
|        |        |           | (Z98.890)    |              |              |
|        |        |           | Procedures   |              |              |
|        |        |           | LA ARTHDSIS  |              |              |
|        |        |           | POST/POSTERO |              |              |
|        |        |           | LATRL/POSTIN |              |              |
|        |        |           | TERBODY      |              |              |
|        |        |           | LUMBAR       |              |              |
|        |        |           | POSTERIOR    |              |              |
|        |        |           | NON-SEGMENTA |              |              |
|        |        |           | L            |              |              |
|        |        |           | INSTRUMENTAT |              |              |
|        |        |           | ION  LA INSJ |              |              |
|        |        |           |  BIOMCHN DEV |              |              |
 
|        |        |           |              |              |              |
|        |        |           | INTERVERTEBR |              |              |
|        |        |           | AL DSC SPC   |              |              |
|        |        |           | W/ARTHRD     |              |              |
|        |        |           | LAMINEC/FACE |              |              |
|        |        |           | TECT/FORAMIN |              |              |
|        |        |           | ,LUMBAR 1    |              |              |
|        |        |           | SEG  LA      |              |              |
|        |        |           | LAMINEC/FACE |              |              |
|        |        |           | TECT/FORAMIN |              |              |
|        |        |           | ,EACH ADDNL  |              |              |
|        |        |           |  L4-5        |              |              |
|        |        |           | Transforamin |              |              |
|        |        |           | al Lumbar    |              |              |
|        |        |           | Interbody    |              |              |
|        |        |           | Fusion       |              |              |
+--------+--------+-----------+--------------+--------------+--------------+
 
 
 
 
 Encounter Details
 
 
+--------+---------+----------------------+----------------------+----------------------+
| Date   | Type    | Department           | Care Team            | Description          |
+--------+---------+----------------------+----------------------+----------------------+
| / | Surgery |   [facility] |   Dreyer, Jason A,   | L4-5 Transforaminal  |
| 2017   |         |  [facility] | DO  [address]    | Lumbar Interbody     |
|        |         |   [address]       | [address]  [place] | Fusion               |
|        |         | [place]      |  [id number]  [phone] |                      |
|        |         | [address]           |   [phone] (Fax) |                      |
|        |         | [phone]         |                      |                      |
+--------+---------+----------------------+----------------------+----------------------+
 
 
 
 Social History
 
 
+--------------+-------+-----------+--------+------+
| Tobacco Use  | Types | Packs/Day | Years  | Date |
|              |       |           | Used   |      |
+--------------+-------+-----------+--------+------+
| Never Smoker |       |           |        |      |
+--------------+-------+-----------+--------+------+
 
 
 
+---------------------+---+---+---+
| Smokeless Tobacco:  |   |   |   |
| Never Used          |   |   |   |
+---------------------+---+---+---+
 
 
 
+-------------+----------------------+---------+----------------------+
| Alcohol Use | Drinks/Week          | oz/Week | Comments             |
+-------------+----------------------+---------+----------------------+
| Yes         |   0 Standard drinks  | 0.0     | 1-2 drinks 2-4 times |
 
|             | or equivalent        |         |  per month           |
+-------------+----------------------+---------+----------------------+
 
 
 
+------------------+---------------+
| Sex Assigned at  | Date Recorded |
| Birth            |               |
+------------------+---------------+
| Not on file      |               |
+------------------+---------------+
 
 
 
+----------------+-------------+-------------+
| Job Start Date | Occupation  | Industry    |
+----------------+-------------+-------------+
| Not on file    | Not on file | Not on file |
+----------------+-------------+-------------+
 
 
 
+----------------+--------------+------------+
| Travel History | Travel Start | Travel End |
+----------------+--------------+------------+
 
 
 
+-------------------------------------+
| No recent travel history available. |
+-------------------------------------+
 documented as of this encounter
 
 Last Filed Vital Signs
 
 
+-------------------+---------------------+----------------------+----------+
| Vital Sign        | Reading             | Time Taken           | Comments |
+-------------------+---------------------+----------------------+----------+
| Blood Pressure    | 125/68              | 2017  7:40 AM  |          |
|                   |                     | PDT                  |          |
+-------------------+---------------------+----------------------+----------+
| Pulse             | 65                  | 2017  7:45 AM  |          |
|                   |                     | PDT                  |          |
+-------------------+---------------------+----------------------+----------+
| Temperature       | 37.5   C (99.5   F) | 2017  7:40 AM  |          |
|                   |                     | PDT                  |          |
+-------------------+---------------------+----------------------+----------+
| Respiratory Rate  | 16                  | 2017  7:40 AM  |          |
|                   |                     | PDT                  |          |
+-------------------+---------------------+----------------------+----------+
| Oxygen Saturation | 93%                 | 2017  7:45 AM  |          |
|                   |                     | PDT                  |          |
+-------------------+---------------------+----------------------+----------+
| Inhaled Oxygen    | -                   | -                    |          |
| Concentration     |                     |                      |          |
+-------------------+---------------------+----------------------+----------+
| Weight            | 102.5 kg (226 lb)   | 2017  9:36 AM  |          |
|                   |                     | PDT                  |          |
+-------------------+---------------------+----------------------+----------+
 
| Height            | 190.5 cm (6' 3")    | 2017  9:36 AM  |          |
|                   |                     | PDT                  |          |
+-------------------+---------------------+----------------------+----------+
| Body Mass Index   | 28.25               | 2017  9:36 AM  |          |
|                   |                     | PDT                  |          |
+-------------------+---------------------+----------------------+----------+
 documented in this encounter
 
 Discharge Summaries
 Myke Jean PA-C - 2017  7:59 AM PDTFormatting of this note might be diffe
rent from the original.
 DISCHARGE SUMMARY
 
 Pt. Name/Age/:  Deni Hampton    67 y.o.   1950       
 Medical Record Number:  [id number]
 Date of Admission:  2017       
 Date of Discharge:  8/3/2017
 
 Admitting Physician:  Jason A Dreyer, DO         PCP:  Ck Michelle
 Discharging Physician: Myke Jean PA-C 
      
 
 Primary Discharge Dx: 
 1. Spondylosis L4-5. 
 2. Stenosis L4-5.
 3. Lumbar radiculopathy. 
 4. Lumbago. 
 5. History of laminectomy L4-5.
 6. Left foot drop.
 Secondary Discharge Dx:  
 Patient Active Problem List 
 Diagnosis 
   Lumbago with sciatica, right side 
   Low back pain 
   Atherosclerosis 
   Osteoarthritis of spine with radiculopathy, lumbar region - 2017 
   Beta Blockers - Daily Use 
   BPH (benign prostatic hyperplasia) 
   LAURA (obstructive sleep apnea) - H/O CPAP Use 
   H/O TKA Total knee arthroplasty, BILATERAL 
   H/O LEFT Ankle fusion 
   H/O Lumbar discectomy L1-2, L2-3 -  
   Anticipated difficulty with intubation 
  
 
 Reason for Admission (Brief):  Mr. Hampton is a 67-year-old man, who presents with signs and 
symptoms and radiographic evidence of back pain, leg pain, and leg weakness. He previously u
nderwent laminectomy by Dr. Paredes in . MRI and x-rays of the lumbar spine revealed spo
ndylosis and stenosis at L4-5. Given the progression of his symptoms, the patient decided to
 proceed with transforaminal lumbar interbody fusion, L4-5.  
  
 Hospital Course, including Complications: 
 On the day of admission the patient was admitted to [facility] and underwent a  L4-5 f
usion .  Patient was transferred to PACU and then to the neurosurgical floor.  In brief, the
 hospital stay was uncomplicated, the patient mobilized well with physical therapy and occup
ational therapy.  There were no cardiac issues, pulmonary issues, evidence of DVT or infecti
on. Appropriate discharge plans were made in line with his progress and mobility and he was 
ultimately discharged to home.
 
 Medications Reconciled upon Discharge are: 
 
  
 Discharge Medications 
  
 New Medications  
   Details 
 diazePAM 5 mg tablet
  Take 1 tablet by mouth every 6 hours as needed for Muscle spasms.
 aka:  VALIUM
  
 HYDROcodone-acetaminophen  mg per tablet
 Replaces:  HYDROcodone-acetaminophen 5-325 mg per tablet
  Take 1-2 tablets by mouth every 4 hours as needed for Pain.
 aka:  NORCO
  
 lactulose 10 g/15 mL solution
  Take 30 mLs by mouth Daily as needed for Constipation.
  
  
 Unchanged Medications  
   Details 
 ALLEGRA-D ALLERGY & CONGESTION  MG per tablet
 Generic drug:  fexofenadine-pseudoePHEDrine
  Take 1 tablet by mouth Twice  daily as needed.
  
 ASCORBIC ACID PO
  Take  by mouth.
  
 atorvaSTATin 40 mg tablet
  Take 40 mg by mouth Daily.
 aka:  LIPITOR
  
 cholecalciferol 1,000 units tablet
  Take 1,000 Units by mouth Daily.
 aka:  VITAMIN D-3
  
 FLUoxetine 20 mg capsule
  Take 20 mg by mouth Daily.
 aka:  PROzac
  
 fluticasone 50 mcg/nasal spray
  1 spray by Nasal route Daily.
 aka:  FLONASE
  
 metoprolol succinate 25 mg 24 hr tablet
  Take 25 mg by mouth Daily.
 aka:  TOPROL-XL
  
 vitamin B complex tablet
  Take 1 tablet by mouth Daily.
  
  
 Discontinued Medications  
 aspirin 81 mg EC tablet
  
 HYDROcodone-acetaminophen 5-325 mg per tablet
 aka:  NORCO
 Replaced by:  HYDROcodone-acetaminophen  mg per tablet
  
 naproxen 250 mg tablet
 aka:  NAPROSYN
 
  
  
 
  
 
 Condition on Discharge: Stable
 
 Disposition:  Patient was discharged to home
 
 Follow-Up Plans:   
     Follow-up with:  Dr. Dreyer's office in 4 weeks
     Follow-up with primary care physician as needed.
     Diet: Resume regular diet
     Activity:  Continue to follow guidelines and precautions as previously discussed.
     Brace: B brace
   
 
 Electronically signed by:    Myke Jean, 8/3/2017 7:59 
 [facility]
 Electronically signed by Myke Jean PA-C at 2017  8:55 AM PDTdocumented in
 this encounter
 
 Discharge Instructions
 Instructions Myke Jean PA-C - 2017Discharge Instructions for Lumbar Fusi
on
 You had a lumbar fusion. During this procedure, your doctor locked together (fused) some of
 the bones in your spine. This limits the movement of these bones to help relieve your pain.
Here  s what you need to know about home care following a spinal fusion.
 Activity
  Arrange your household to keep the items you need within reach.
  Remove electrical cords, throw rugs, and anything else that may cause you to fall.
  Use a walkeror handrails until your balance, flexibility, and strength improve. And re
member to ask for help from others when you need it.
  Free up your hands so that you can use them to keep balance. Use a woody pack, apron, or
 pockets to carry things. Be sure not to carry too much at once.
  Don  t bend or twist at the waist, or raise your hands over your head for the first two
 weeks after your surgery.
  Don  t lift anything heavier than 5 pounds for the first four weeks after surgery.
  Don  t sit for more than30 to 45 minutes at a time. Take frequent short walks. They a
re the key to your recovery. As your back feels better please gradually increase the distanc
e you walk as discussed with your provider. 
  Don  t drive until your doctor says it  s OK. And never drive while you are taking opi
oid pain medication.
  Nap if you are tired, but don  t stay in bed all day.
  Use chairs with arms. The arms make it easier for you to stand up and sit down.
  If you have not yet received instructions about physical therapy, ask your doctor about 
them.
 Incision care
  Check your incision daily for redness, tenderness, or drainage.
  Don  t soak your wound in water (no hot tubs, bathtubs, swimming pools) until your doct
or says it  s OK.
  As long as you keep your incision dry you can shower as desired. After 5 days you may le
t shower water run over the incision but do not submerse the incision under water until afte
r you see your provider. Gently pat the incision dry. Don  t rub it, or apply creams or lot
ions. And if you feel unsteady while standing to shower, use a shower stool or chair.
 Other home care
  Use nonslip bath mats, grab bars, an elevated toilet seat, and a shower chair in your ba
throom.
  Take your medication exactly as directed.
  Don  t take nonsteroidal anti-inflammatory medications (NSAIDs), such as ibuprofen. The
 
y may delay or prevent proper fusion of the spine.
  If you smoke, stop! This will be one of the most important things you can do to help you
 recover from surgery.
  Wear your back brace, if one was prescribed, as directed by your doctor.
 Follow-up
  Most patients will be seen approximately 4 weeks after surgery. Be sure to get your 1 mo
nth post op x-rays prior to your 1 month post op appointment before your appointment. 
 
  [id number] [employer]. [address] [place] [address]. All righ
ts reserved. This information is not intended as a substitute for professional medical care.
 Always follow your healthcare professional's instructions.
 
 
 documented in this encounter
 
 Medications at Time of Discharge
 
 
+----------------------+----------------------+-----------+---------+----------+-----------+
| Medication           | Sig                  | Dispensed | Refills | Start    | End Date  |
|                      |                      |           |         | Date     |           |
+----------------------+----------------------+-----------+---------+----------+-----------+
|   atorvaSTATin       | Take 40 mg by mouth  |           | 0       | 20 |           |
| (LIPITOR) 40 mg      | Daily.               |           |         | 15       |           |
| tablet               |                      |           |         |          |           |
+----------------------+----------------------+-----------+---------+----------+-----------+
|   B Complex Vitamins | Take 1 tablet by     |           | 0       |          |           |
|  (VITAMIN B COMPLEX) | mouth Daily.         |           |         |          |           |
|  tablet              |                      |           |         |          |           |
+----------------------+----------------------+-----------+---------+----------+-----------+
|   fluticasone        | 1 spray by Nasal     |           | 0       |          |           |
| (FLONASE) 50         | route Daily.         |           |         |          |           |
| mcg/nasal spray      |                      |           |         |          |           |
+----------------------+----------------------+-----------+---------+----------+-----------+
|   metoprolol         | Take 25 mg by mouth  |           | 0       | 20 |           |
| succinate            | Daily.               |           |         | 17       |           |
| (TOPROL-XL) 25 mg 24 |                      |           |         |          |           |
|  hr tablet           |                      |           |         |          |           |
+----------------------+----------------------+-----------+---------+----------+-----------+
|   ASCORBIC ACID PO   | Take  by mouth.      |           | 0       |          |  |
|                      |                      |           |         |          | 0         |
+----------------------+----------------------+-----------+---------+----------+-----------+
|   cholecalciferol    | Take 1,000 Units by  |           | 0       |          |  |
| (VITAMIN D-3) 1,000  | mouth Daily.         |           |         |          | 0         |
| units tablet         |                      |           |         |          |           |
+----------------------+----------------------+-----------+---------+----------+-----------+
|   diazePAM (VALIUM)  | Take 1 tablet by     |   90      | 0       | 20 | 11/15/201 |
| 5 mg tablet          | mouth every 6 hours  | tablet    |         | 17       | 7         |
|                      | as needed for Muscle |           |         |          |           |
|                      |  spasms.             |           |         |          |           |
+----------------------+----------------------+-----------+---------+----------+-----------+
|                      | Take 1 tablet by     |           | 0       |          |  |
| fexofenadine-pseudoe | mouth Twice  daily   |           |         |          | 0         |
| PHEDrine (ALLEGRA-D  | as needed.           |           |         |          |           |
| ALLERGY &            |                      |           |         |          |           |
| CONGESTION)    |                      |           |         |          |           |
| MG per tablet        |                      |           |         |          |           |
+----------------------+----------------------+-----------+---------+----------+-----------+
|   FLUoxetine         | Take 20 mg by mouth  |           | 0       |          |  |
| (PROZAC) 20 mg       | Daily.               |           |         |          | 0         |
 
| capsule              |                      |           |         |          |           |
+----------------------+----------------------+-----------+---------+----------+-----------+
|                      | Take 1-2 tablets by  |   120     | 0       | 20 | 11/15/201 |
| HYDROcodone-acetamin | mouth every 4 hours  | tablet    |         | 17       | 7         |
| ophen (NORCO)  | as needed for Pain.  |           |         |          |           |
|  mg per tablet       |                      |           |         |          |           |
+----------------------+----------------------+-----------+---------+----------+-----------+
|   lactulose 10 g/15  | Take 30 mLs by mouth |   240 mL  | 2       | 20 | 11/15/201 |
| mL solution          |  Daily as needed for |           |         | 17       | 7         |
|                      |  Constipation.       |           |         |          |           |
+----------------------+----------------------+-----------+---------+----------+-----------+
 documented as of this encounter
 
 Progress Notes
 Sara Hwang RN - 2017 11:52 AM PDTDischarge home with walker accompanied by kaz ruiz with discharge instructions and RX's denies any questions at this time.Electronically sig
ryan by: Sara Hwang RN 8/3/2017 11:53
 Electronically signed by Sara Hwang RN at 2017 11:53 AM Sara Oropeza RN -
 2017 10:50 AM PDTAVS and RX's given and explained to Ruben. Acknowledged understanding 
all questions answered. PIV is dc'd and MAXIME is out.Electronically signed by: Sara Hwang RN 8/3/2017 10:51
 Electronically signed by Sara Hwang RN at 2017 10:51 AM Myke Chow PA-C - 2017  7:33 AM PDTFormatting of this note might be different from the original
.
 [facility]
 
 NEUROSURGERY PROGRESS NOTE 
 
 PATIENT NAME: Deni Hampton
 AGE: 67 y.o.
 DATE OF SERVICE: 8/3/2017 7:33
 
 S: The patient is improving this AM. He has no new issues since yesterday. Complains of steffanie
n in the low back under control with medicine. Walking well. Voiding, passing flatus. Feels 
comfortable going home today. 
 
 O:
 
 CURRENT MEDICATIONS: 
 Current Facility-Administered Medications 
 Medication Dose Route Frequency Provider Last Rate Last Dose 
   acetaminophen (TYLENOL) tablet 650 mg  650 mg Oral Q4H PRN Myke Jean PA-C  
   
   atorvaSTATin (LIPITOR) tablet 40 mg  40 mg Oral Daily Myke Jean PA-C   40 m
g at 17 
   bisacodyl (DULCOLAX) suppository 10 mg  10 mg Rectal Daily PRN MAYCOL Hernandez     
   calcium carbonate (TUMS) chewable tablet 1,000 mg  1,000 mg Oral Q2H PRN Myke Jean PA-C     
   diazePAM (VALIUM) injection 2.5-5 mg  2.5-5 mg Intravenous Q6H PRN Myke Jean PA-C     
   diazePAM (VALIUM) tablet 5 mg  5 mg Oral Q6H PRN Myke Jean PA-C     
   diphenhydrAMINE (BENADRYL) injection 12.5 mg  12.5 mg Intravenous Q4H PRN Myke Jean PA-C     
  Or 
   diphenhydrAMINE (BENADRYL) tablet 25 mg  25 mg Oral Q4H PRN Myke Jean PA-C 
    
  Or 
   diphenhydrAMINE (BENADRYL) 12.5 mg/5 mL liquid 25 mg  25 mg Oral Q4H PRN Myke Jean PA-C     
 
   docusate sodium (COLACE) capsule 100 mg  100 mg Oral BID Myke Jean PA-C   1
00 mg at 17 
   enalaprilat (VASOTEC) injection 1.25 mg  1.25 mg Intravenous Q6H PRN Myke andrew PA-C     
   FLUoxetine (PROzac) capsule 20 mg  20 mg Oral Daily Myke Jean PA-C   20 mg 
at 17 0911 
   fluticasone (FLONASE) 50 mcg/nasal spray 1 spray  1 spray Nasal Daily Myke cunha PA-C   1 spray at 17 0911 
   HYDROcodone-acetaminophen (NORCO)  mg per tablet 1-2 tablet  1-2 tablet Oral Q4H 
PRN Myke Jean PA-C   1 tablet at 17 2132 
   labetalol (TRANDATE) 5 mg/mL injection 10 mg  10 mg Intravenous Q10 Min PRN Myke Molina PA-C     
   lactulose liquid 30 mL  30 mL Oral Daily PRN Myke Jean PA-C     
   magnesium hydroxide (MILK OF MAGNESIA) 400 mg/5 mL suspension 30 mL  30 mL Oral BID PRN
 Myke Jean PA-C   30 mL at 17 0700 
   menthol (HALLS COUGH DROP) lozenge 1 lozenge  1 lozenge Buccal Q2H PRN Myke kingsley PA-C     
   methocarbamol (ROBAXIN) tablet 750 mg  750 mg Oral Q8H PRN Myke Jean PA-C  
   
   metoclopramide (REGLAN) tablet 10 mg  10 mg Oral Q4H PRN Myke Jean, JUNI    
 
   metoprolol succinate (TOPROL-XL) ER tablet 25 mg  25 mg Oral Daily Myke Jean PA-C   25 mg at 17 0911 
   morphine injection 1-2 mg  1-2 mg Intravenous Q1H PRN Myke Jean PA-C     
   ondansetron (ZOFRAN ODT) disintegrating tablet 4 mg  4 mg Oral Q6H PRN Myke kingsley PA-C     
   ondansetron (ZOFRAN) injection 4 mg  4 mg Intravenous Q6H PRN RAJ Hernandez     
   phenol (CHLORASEPTIC) spray 1-2 spray  1-2 spray Mouth/Throat Q3H PRN Myke cunha PA-C     
   polyethylene glycol (MIRALAX) powder 17 g  17 g Oral Daily PRN MAYCOL Hernandez     
   prochlorperazine (COMPAZINE) tablet 5 mg  5 mg Oral Q6H PRN Myke Jean PA-C 
    
   senna (SENOKOT) tablet 8.6 mg  8.6 mg Oral BID Myke Jean PA-C   8.6 mg at 0
17 2132 
   sodium chloride 0.9% (NS) infusion   Intravenous Continuous Myke Jean PA-C 
50 mL/hr at 17 0522   
 
 ALLERGIES: 
 Allergies 
 Allergen Reactions 
   Meperidine Nausea And Vomiting 
   Oxycodone Other (See Comments) 
   Hallucinations & sleepiness 
 
 PHYSICAL EXAMINATION:
 Temp:  [37.2 C (99 F)-37.3 C (99.1 F)] 37.2 C (99 F)
 Pulse:  [66-73] 66
 Resp:  [16] 16
 BP: (115-130)/(64-68) 130/68
 
 Intake/Output Summary (Last 24 hours) at 17 0733
 Last data filed at 17 0335
  Gross per 24 hour 
 Intake             1810 ml 
 Output             1400 ml 
 Net              410 ml 
 
 GENERAL: Deni Hampton is in no acute distress with unlabored respirations.   
 
 HEENT:
 HEAD/FACE:
 EYES:
  
 Normocephalic and atraumatic.  There are no areas of recent trauma.  
 Normal sclerae without icterus.   
 CHEST: Clear.  
 HEART: Regular. 
 EXTREMITIES: No edema or swelling.  SCD's 
 BACK: The back incisions are dress and a drain is in place with expected output. 
 NEUROLOGICAL EXAM:
 
 MENTAL STATUS: 
 The patient is awake, alert, and oriented.  
 He follows simple and complex commands.
 He speech is fluent, his comprehends speech well, and his repeats well.  
 He has no apparent deficits with short or long term memory. 
 MOTOR EXAM: Motor strength is improved 
 SENSORY EXAM: Sensory exam is improved 
 
 24 HOUR LABS:
 All Component Based Labs  
    17
 1005   
  ABO O  
  Antibody Screen Negative  
  Rh Type Positive  
  
 
 ASSESSMENT:
 NEUROSURGICAL DIAGNOSES:
 S/p lumbar fusion
 
 HOSPITAL/GENERAL DIAGNOSES:
 Past Medical History: 
 Diagnosis Date 
   Allergic rhinitis  
   Anxiety disorder  
   Atherosclerosis  
   BPH (benign prostatic hyperplasia)  
   HLD (hyperlipidemia)  
   Internal derangement of knee  
   Low back pain  
   Lumbago with sciatica, right side  
   Osteoarthritis, hand  
   Osteoarthrosis, unspecified whether generalized or localized, unspecified site  
   Sleep apnea  
  uses CPAP 
   Unspecified disorder of nose and nasal sinuses  
 
 PLAN:
 
 S/p lumbar fusion, Hospital day 3
 - Neurologically stable and pain control is appropriate. 
 - I removed MAXIME drain today and tied stitch in place. 
 - Medically stable
 - Mobilize, PT/OT
 - SCD's
 - Working on BM/bowel function.  Encouraged activity and medications to assist
 - Disp: Home today
 
 
 ELECTRONICALLY SIGNED BY:  Myke Jean PA-C,  8/3/2017  7:33Electronically signed by
 Myke Jean PA-C at 2017  7:54 AM Myke Chow PA-C - 20  7:41 AM PDTFormatting of this note might be different from the original.
 [facility]
 
 NEUROSURGERY PROGRESS NOTE 
 
 PATIENT NAME: Deni Hampton
 AGE: 67 y.o.
 DATE OF SERVICE: 2017 7:45
 
 S: The patient is doing well this AM. He walked with therapy yesterday without issue. Pain 
under control with medicine. Voiding without issue. No flatus, No BM. 
 
 O:
 
 CURRENT MEDICATIONS: 
 Current Facility-Administered Medications 
 Medication Dose Route Frequency Provider Last Rate Last Dose 
   acetaminophen (TYLENOL) tablet 650 mg  650 mg Oral Q4H PRN Myke Jean PA-C  
   
   atorvaSTATin (LIPITOR) tablet 40 mg  40 mg Oral Daily Myke Jean PA-C   40 m
g at 17 
   bisacodyl (DULCOLAX) suppository 10 mg  10 mg Rectal Daily PRN MAYCOL Hernandez     
   calcium carbonate (TUMS) chewable tablet 1,000 mg  1,000 mg Oral Q2H PRN Myke Jean PA-C     
   diazePAM (VALIUM) injection 2.5-5 mg  2.5-5 mg Intravenous Q6H PRN Myke Jean PA-C     
   diazePAM (VALIUM) tablet 5 mg  5 mg Oral Q6H PRN Myke Jean PA-C     
   diphenhydrAMINE (BENADRYL) injection 12.5 mg  12.5 mg Intravenous Q4H PRN Myke Jean PA-C     
  Or 
   diphenhydrAMINE (BENADRYL) tablet 25 mg  25 mg Oral Q4H PRN Myke Jean PA-C 
    
  Or 
   diphenhydrAMINE (BENADRYL) 12.5 mg/5 mL liquid 25 mg  25 mg Oral Q4H PRN Myke Jean PA-C     
   docusate sodium (COLACE) capsule 100 mg  100 mg Oral BID Myke Jean PA-C   1
00 mg at 17 
   enalaprilat (VASOTEC) injection 1.25 mg  1.25 mg Intravenous Q6H PRN Myke andrew PA-C     
   FLUoxetine (PROzac) capsule 20 mg  20 mg Oral Daily Myke Jean PA-C   20 mg 
at 17 0858 
   fluticasone (FLONASE) 50 mcg/nasal spray 1 spray  1 spray Nasal Daily Myke cunha PA-C   1 spray at 17 0859 
   HYDROcodone-acetaminophen (NORCO)  mg per tablet 1-2 tablet  1-2 tablet Oral Q4H 
PRN Myke Jean PA-C   1 tablet at 08/01/17 2032 
   labetalol (TRANDATE) 5 mg/mL injection 10 mg  10 mg Intravenous Q10 Min PRN Myke Molina PA-C     
   lactulose liquid 30 mL  30 mL Oral Daily PRN Myke Jean PA-C     
   magnesium hydroxide (MILK OF MAGNESIA) 400 mg/5 mL suspension 30 mL  30 mL Oral BID PRN
 Myke Jean PA-C     
   menthol (HALLS COUGH DROP) lozenge 1 lozenge  1 lozenge Buccal Q2H PRN Myke kingsley PA-C     
   methocarbamol (ROBAXIN) tablet 750 mg  750 mg Oral Q8H PRN Myke Jean PA-C  
   
   metoclopramide (REGLAN) tablet 10 mg  10 mg Oral Q4H PRN Myke Jean PA-C    
 
 
   metoprolol succinate (TOPROL-XL) ER tablet 25 mg  25 mg Oral Daily Myke Jean PA-C   25 mg at 17 0858 
   morphine injection 1-2 mg  1-2 mg Intravenous Q1H PRN Myke Jean PA-C     
   ondansetron (ZOFRAN ODT) disintegrating tablet 4 mg  4 mg Oral Q6H PRN Myke kingsley PA-C     
   ondansetron (ZOFRAN) injection 4 mg  4 mg Intravenous Q6H PRN RAJ Hernandez     
   phenol (CHLORASEPTIC) spray 1-2 spray  1-2 spray Mouth/Throat Q3H PRN Myke cunha PA-C     
   polyethylene glycol (MIRALAX) powder 17 g  17 g Oral Daily PRN MAYCOL Hernandez     
   prochlorperazine (COMPAZINE) tablet 5 mg  5 mg Oral Q6H PRN Myke Jean PA-C 
    
   senna (SENOKOT) tablet 8.6 mg  8.6 mg Oral BID Myke Jean PA-C   8.6 mg at 0
17 
   sodium chloride 0.9% (NS) infusion   Intravenous Continuous Myke Jean PA-C 
50 mL/hr at 17 0522   
 
 ALLERGIES: 
 Allergies 
 Allergen Reactions 
   Meperidine Nausea And Vomiting 
   Oxycodone Other (See Comments) 
   Hallucinations & sleepiness 
 
 PHYSICAL EXAMINATION:
 Temp:  [36.4 C (97.5 F)-37.8 C (100 F)] 37.7 C (99.8 F)
 Pulse:  [62-75] 69
 Resp:  [16-18] 16
 BP: ()/(53-59) 103/55
 
 Intake/Output Summary (Last 24 hours) at 17 0745
 Last data filed at 17 0700
  Gross per 24 hour 
 Intake             2442 ml 
 Output             3640 ml 
 Net            -1198 ml 
 
 GENERAL: Deni Hampton is in no acute distress with unlabored respirations.   
 HEENT:
 HEAD/FACE:
 EYES:
  
 Normocephalic and atraumatic.  There are no areas of recent trauma.  
 Normal sclerae without icterus.   
 CHEST: Clear.  
 HEART: Regular. 
 EXTREMITIES: No edema or swelling.  SCD's 
 BACK: The back incisions are dress and a drain is in place with expected output. 
 NEUROLOGICAL EXAM:
 
 MENTAL STATUS: 
 The patient is awake, alert, and oriented.  
 He follows simple and complex commands.
 He speech is fluent, his comprehends speech well, and his repeats well.  
 He has no apparent deficits with short or long term memory. 
 MOTOR EXAM: Motor strength is improved 
 SENSORY EXAM: Sensory exam is improved 
 
 24 HOUR LABS:
 
 All Component Based Labs  
    17
 1005   
  ABO O  
  Antibody Screen Negative  
  Rh Type Positive  
  
 
 ASSESSMENT:
 NEUROSURGICAL DIAGNOSES:
 S/p lumbar fusion
 
 HOSPITAL/GENERAL DIAGNOSES:
 Past Medical History: 
 Diagnosis Date 
   Allergic rhinitis  
   Anxiety disorder  
   Atherosclerosis  
   BPH (benign prostatic hyperplasia)  
   HLD (hyperlipidemia)  
   Internal derangement of knee  
   Low back pain  
   Lumbago with sciatica, right side  
   Osteoarthritis, hand  
   Osteoarthrosis, unspecified whether generalized or localized, unspecified site  
   Sleep apnea  
  uses CPAP 
   Unspecified disorder of nose and nasal sinuses  
 
 PLAN:
 
 S/p lumbar fusion, Hospital day 2
 - Neurologically stable and pain control is appropriate. 
 -Take suction off MAXIME drain this AM. Plan to remove tomorrow
 - Medically stable
 - Mobilize, PT/OT
 - SCD's
 - Working on BM/bowel function.  Encouraged activity and medications to assist
 - Disp: Likely home tomorrow. 
 
 ELECTRONICALLY SIGNED BY:  Myke Jean PA-C,  2017  7:45Electronically signed by
 Myke Jean PA-C at 2017  7:45 AM Myke Chow PA-C - 20  7:28 AM PDTFormatting of this note might be different from the original.
 [facility]
 
 NEUROSURGERY PROGRESS NOTE 
 
 PATIENT NAME: Deni Hampton
 AGE: 67 y.o.
 DATE OF SERVICE: 2017 7:28
 
 S: The patient is doing well this AM. He complains of manageable pain in his low back. His 
preoperative burning pain seems to be improved. He has not walked much yet.  Voiding without
 issue. No flatus, no BM. 
 
 O:
 
 CURRENT MEDICATIONS: 
 Current Facility-Administered Medications 
 Medication Dose Route Frequency Provider Last Rate Last Dose 
 
   acetaminophen (TYLENOL) tablet 650 mg  650 mg Oral Q4H PRN Myke Jean PA-C  
   
   atorvaSTATin (LIPITOR) tablet 40 mg  40 mg Oral Daily Myke Jean PA-C     
   bisacodyl (DULCOLAX) suppository 10 mg  10 mg Rectal Daily PRN MAYCOL Hernandez     
   calcium carbonate (TUMS) chewable tablet 1,000 mg  1,000 mg Oral Q2H PRN Myke Jean PA-C     
   diazePAM (VALIUM) injection 2.5-5 mg  2.5-5 mg Intravenous Q6H PRN Myke Jean PA-C     
   diazePAM (VALIUM) tablet 5 mg  5 mg Oral Q6H PREREN Jean PA-C     
   diphenhydrAMINE (BENADRYL) injection 12.5 mg  12.5 mg Intravenous Q4H PRN Myke Jean PA-C     
  Or 
   diphenhydrAMINE (BENADRYL) tablet 25 mg  25 mg Oral Q4H PRN Myke Jean PA-C 
    
  Or 
   diphenhydrAMINE (BENADRYL) 12.5 mg/5 mL liquid 25 mg  25 mg Oral Q4H PRN Myke Jean PA-C     
   docusate sodium (COLACE) capsule 100 mg  100 mg Oral BID Myke Jean PA-C   1
00 mg at 17 
   enalaprilat (VASOTEC) injection 1.25 mg  1.25 mg Intravenous Q6H PRN RAJ SadlerC     
   FLUoxetine (PROzac) capsule 20 mg  20 mg Oral Daily Myke Jean PA-C     
   fluticasone (FLONASE) 50 mcg/nasal spray 1 spray  1 spray Nasal Daily Myke cunha PA-C     
   HYDROcodone-acetaminophen (NORCO)  mg per tablet 1-2 tablet  1-2 tablet Oral Q4H 
PRN Myke Jean PA-C   1 tablet at 17 0519 
   labetalol (TRANDATE) 5 mg/mL injection 10 mg  10 mg Intravenous Q10 Min PRN Myke Molina PA-C     
   lactulose liquid 30 mL  30 mL Oral Daily PRN Myke Jean PA-C     
   magnesium hydroxide (MILK OF MAGNESIA) 400 mg/5 mL suspension 30 mL  30 mL Oral BID PRN
 Myke Jean PA-C     
   menthol (HALLS COUGH DROP) lozenge 1 lozenge  1 lozenge Buccal Q2H PRN Myke kingsley PA-C     
   methocarbamol (ROBAXIN) tablet 750 mg  750 mg Oral Q8H PRN Myke Jean PA-C  
   
   metoclopramide (REGLAN) tablet 10 mg  10 mg Oral Q4H PRN Myke Jean PA-C    
 
   metoprolol succinate (TOPROL-XL) ER tablet 25 mg  25 mg Oral Daily Myke Jean PA-C     
   morphine injection 1-2 mg  1-2 mg Intravenous Q1H PRN Myke Jean PA-C     
   ondansetron (ZOFRAN ODT) disintegrating tablet 4 mg  4 mg Oral Q6H PRN Myke kingsley PA-C     
   ondansetron (ZOFRAN) injection 4 mg  4 mg Intravenous Q6H PRN RAJ Hernandez     
   phenol (CHLORASEPTIC) spray 1-2 spray  1-2 spray Mouth/Throat Q3H PRN Myke cunha PA-C     
   polyethylene glycol (MIRALAX) powder 17 g  17 g Oral Daily PRN MAYCOL Hernandez     
   prochlorperazine (COMPAZINE) tablet 5 mg  5 mg Oral Q6H PRN Myke Jean PA-C 
    
   senna (SENOKOT) tablet 8.6 mg  8.6 mg Oral BID Myke Jean PA-C   8.6 mg at 0
17 
   sodium chloride 0.9% (NS) infusion   Intravenous Continuous Myke Jean PA-C 
50 mL/hr at 17 0522   
 
 ALLERGIES: 
 Allergies 
 Allergen Reactions 
   Meperidine Nausea And Vomiting 
 
   Oxycodone Other (See Comments) 
   Hallucinations & sleepiness 
 
 PHYSICAL EXAMINATION:
 Temp:  [36 C (96.8 F)-36.5 C (97.7 F)] 36.1 C (96.9 F)
 Pulse:  [60-83] 68
 Resp:  [10-18] 16
 BP: ()/(53-84) 111/54
 
 Intake/Output Summary (Last 24 hours) at 17 0728
 Last data filed at 17 0600
  Gross per 24 hour 
 Intake             3553 ml 
 Output             1508 ml 
 Net             2045 ml 
 
 GENERAL: Deni Hampton is in no acute distress with unlabored respirations.   
 HEENT:
 HEAD/FACE:
 EYES:
  
 Normocephalic and atraumatic.  There are no areas of recent trauma.  
 Normal sclerae without icterus.   
 CHEST: Clear.  
 HEART: Regular. 
 EXTREMITIES: No edema or swelling.  SCD's 
 BACK: The back incisions are dress and a drain is in place with expected output. 
 NEUROLOGICAL EXAM:
 
 MENTAL STATUS: 
 The patient is awake, alert, and oriented.  
 He follows simple and complex commands.
 He speech is fluent, his comprehends speech well, and his repeats well.  
 He has no apparent deficits with short or long term memory. 
 MOTOR EXAM: Motor strength is improved 
 SENSORY EXAM: Sensory exam is improved 
 
 24 HOUR LABS:
 All Component Based Labs  
    17
 1005   
  ABO O  
  Antibody Screen Negative  
  Rh Type Positive  
  
 
 ASSESSMENT:
 NEUROSURGICAL DIAGNOSES:
 S/p lumbar fusion
 
 HOSPITAL/GENERAL DIAGNOSES:
 Past Medical History: 
 Diagnosis Date 
   Allergic rhinitis  
   Anxiety disorder  
   Atherosclerosis  
   BPH (benign prostatic hyperplasia)  
   HLD (hyperlipidemia)  
   Internal derangement of knee  
   Low back pain  
 
   Lumbago with sciatica, right side  
   Osteoarthritis, hand  
   Osteoarthrosis, unspecified whether generalized or localized, unspecified site  
   Sleep apnea  
  uses CPAP 
   Unspecified disorder of nose and nasal sinuses  
 
 PLAN:
 
 S/p lumbar fusion, Hospital day 1
 - Neurologically stable and pain control is appropriate.
 - Medically stable
 - Mobilize, PT/OT
 - SCD's
 - Working on BM/bowel function.  Encouraged activity and medications to assist
 - Drain output is as expected.  Continue drain
 - Disp: Likely home in 1-2 days
 
 ELECTRONICALLY SIGNED BY:  Myke Jean PA-C,  2017  7:28
 Electronically signed by Myke Jean PA-C at 2017  7:33 AM PDTdocumented in
 this encounter
 
 Plan of Treatment
 
 
+--------+---------+-----------+----------------------+-------------+
| Date   | Type    | Specialty | Care Team            | Description |
+--------+---------+-----------+----------------------+-------------+
| / | Office  | Neurology |   Ravindra Munoz MD  [address]             |
|    | Visit   |           |  [address]      |             |
|        |         |           | [place],  |             |
|        |         |           | [place] [address]             |             |
|        |         |           | [phone]         |             |
|        |         |           | [phone] (Fax)   |             |
+--------+---------+-----------+----------------------+-------------+
 
 
 
+-------------+------+--------+----------------------+----------------------+
| Name        | Type | Priori | Associated Diagnoses | Order Schedule       |
|             |      | ty     |                      |                      |
+-------------+------+--------+----------------------+----------------------+
| DME: Walker | DME  | Routin |   Gait abnormality   | DME 1 Time for 1     |
|             |      | e      |                      | Occurrences starting |
|             |      |        |                      |  2017 until    |
|             |      |        |                      | 2017           |
+-------------+------+--------+----------------------+----------------------+
 documented as of this encounter
 
 Procedures
 
 
+----------------------+--------+-------------+----------------------+----------------------
+
| Procedure Name       | Priori | Date/Time   | Associated Diagnosis | Comments             
|
|                      | ty     |             |                      |                      
|
+----------------------+--------+-------------+----------------------+----------------------
+
 
| XR LUMBAR SPINE 2 OR | STAT   | 2017  |                      |   Results for this   
|
|  3 VW                |        |  3:45 PM    |                      | procedure are in the 
|
|                      |        | PDT         |                      |  results section.    
|
+----------------------+--------+-------------+----------------------+----------------------
+
| FL TORI STATS NO     | Routin | 2017  |                      |   Results for this   
|
| CHARGE               | e      |  1:46 PM    |                      | procedure are in the 
|
|                      |        | PDT         |                      |  results section.    
|
+----------------------+--------+-------------+----------------------+----------------------
+
| LAMINECTOMY          |        | 2017  |   Osteoarthritis of  |                      
|
| PLIF/TLIF            |        | 11:54 AM    | spine with           |                      
|
| INSTRUMENTATION      |        | PDT         | radiculopathy,       |                      
|
|                      |        |             | lumbar region        |                      
|
|                      |        |             | (M47.26), Spinal     |                      
|
|                      |        |             | stenosis, lumbar     |                      
|
|                      |        |             | region, without      |                      
|
|                      |        |             | neurogenic           |                      
|
|                      |        |             | claudication         |                      
|
|                      |        |             | (M48.06), Lumbar     |                      
|
|                      |        |             | radiculopathy        |                      
|
|                      |        |             | (M54.16), Chronic    |                      
|
|                      |        |             | left-sided low back  |                      
|
|                      |        |             | pain with left-sided |                      
|
|                      |        |             |  sciatica (M54.42,   |                      
|
|                      |        |             | G89.29),   Status    |                      
|
|                      |        |             | post laminectomy     |                      
|
|                      |        |             | (Z98.890)            |                      
|
+----------------------+--------+-------------+----------------------+----------------------
+
 
 
 
+---+--------+
|   |   Case |
|   |  Notes |
 
|   |        |
|   | Origin |
|   | al     |
|   | Reques |
|   | t      |
|   | Inform |
|   | ation  |
|   | Sent   |
|   | Over   |
|   | / |
|   | :I |
|   | nstrum |
|   | ents/S |
|   | pecial |
|   |        |
|   | Equipm |
|   | ent:   |
|   | Drill, |
|   |        |
|   | Micros |
|   | cope,  |
|   | Metrx, |
|   |        |
|   | O-Arm, |
|   |        |
|   | Stealt |
|   | h      |
|   | Biolog |
|   | ics:   |
|   | Infuse |
|   | , Bone |
|   |  Chips |
|   |        |
|   | Table: |
|   |        |
|   | Jackso |
|   | n      |
|   | Frame  |
|   | REP:   |
|   | Marlo  |
|   | Juan |
|   |        |
|   | Implan |
|   | ts:    |
|   | TLIF   |
|   | Cage,  |
|   | Screws |
|   |  Est   |
|   | time:  |
|   | 120min |
+---+--------+
|   |        |
|   | Specia |
|   | l      |
|   | Needs  |
|   |  Marlo |
|   |        |
|   | Juan |
|   |  -     |
|   | TLIF   |
 
|   | Cage,  |
|   | Screws |
|   | Table: |
|   |        |
|   | Jackso |
|   | n      |
|   | Frame  |
+---+--------+
 
 
 
+-----------------+--------+-------------+---+----------------------+
| TYPE AND SCREEN | Routin | 2017  |   |   Results for this   |
|                 | e      | 10:05 AM    |   | procedure are in the |
|                 |        | PDT         |   |  results section.    |
+-----------------+--------+-------------+---+----------------------+
 documented in this encounter
 
 Results
 XR Lumbar Spine 2 or 3 Vw (2017  3:45 PM PDT)
 
+----------+
| Specimen |
+----------+
|          |
+----------+
 
 
 
+------------------------------------------------------------------------+---------------+
| Narrative                                                              | Performed At  |
+------------------------------------------------------------------------+---------------+
|   XR LUMBAR SPINE 2 OR 3 VW 2017 3:45 PM     HISTORY: post op     |   [facility] |
| lumbar surgery.     COMPARISON: 4/3/2017     FINDINGS:  Interval PLIF  |               |
| and interbody fusion of L4-L5, without evidence of immediate           |               |
| postoperative complication.  Expected soft tissue postsurgical changes |               |
|  including a left paraspinal surgical  drainage catheter.  Vertebral   |               |
| body heights and alignment is maintained. Mild early spondylosis at    |               |
| L3-L4.      IMPRESSION -  Interval PLIF and interbody fusion of L4-L5, |               |
|  without evidence of immediate  postoperative complication.            |               |
| Dictated and Signed by: Joseph Liao MD    Electronically signed:   |               |
| 2017 5:37 PM                                                      |               |
+------------------------------------------------------------------------+---------------+
 
 
 
+------------------------------------------------------------------------------------------+
| Procedure Note                                                                           |
+------------------------------------------------------------------------------------------+
|   [facility], Rad Results In - 2017  5:40 PM PDT  XR LUMBAR SPINE 2 OR 3 VW 2017     |
| 3:45 PMHISTORY: post op lumbar surgery.COMPARISON: 4/3/2017FINDINGS:Interval PLIF and    |
| interbody fusion of L4-L5, without evidence of immediatepostoperative                    |
| complication.Expected soft tissue postsurgical changes including a left paraspinal       |
| surgicaldrainage catheter.Vertebral body heights and alignment is maintained. Mild early |
|  spondylosis atL3-L4. IMPRESSION -Interval PLIF and interbody fusion of L4-L5, without   |
| evidence of immediatepostoperative complication.Dictated and Signed by: Joseph Liao,   |
| MD  Electronically signed: 2017 5:37 PM                                             |
|postoperative complication.                                                               |
|Expected soft tissue postsurgical changes including a left paraspinal surgical            |
|drainage catheter.                                                                        |
 
|Vertebral body heights and alignment is maintained. Mild early spondylosis at             |
|L3-L4.                                                                                    |
|                                                                                          |
|IMPRESSION -                                                                              |
|Interval PLIF and interbody fusion of L4-L5, without evidence of immediate                |
|postoperative complication.                                                               |
|                                                                                          |
|Dictated and Signed by: Joseph Liao MD                                                 |
| Electronically signed: 2017 5:37 PM                                                 |
+------------------------------------------------------------------------------------------+
 
 
 
+---------------+---------+--------------------+--------------+
| Performing    | Address | City/State/Zipcode | Phone Number |
| Organization  |         |                    |              |
+---------------+---------+--------------------+--------------+
|   [facility] |         |                    |              |
+---------------+---------+--------------------+--------------+
 FL C-Arm Stats No Charge (2017  1:46 PM PDT)
 
+----------+
| Specimen |
+----------+
|          |
+----------+
 
 
 
+-----------------------------------------------------------+---------------+
| Narrative                                                 | Performed At  |
+-----------------------------------------------------------+---------------+
|   No Radiologist interpretation, please see Chart Review. |   [facility] |
+-----------------------------------------------------------+---------------+
 
 
 
+---------------+---------+--------------------+--------------+
| Performing    | Address | City/State/Zipcode | Phone Number |
| Organization  |         |                    |              |
+---------------+---------+--------------------+--------------+
|   [facility] |         |                    |              |
+---------------+---------+--------------------+--------------+
 Type and Screen (2017 10:05 AM PDT)
 
+-----------+----------+-----------+-------------+--------------+
| Component | Value    | Ref Range | Performed   | Pathologist  |
|           |          |           | At          | Signature    |
+-----------+----------+-----------+-------------+--------------+
| ABO       | O        |           | [facility]  |              |
|           |          |           | [facility]    |              |
|           |          |           | [facility]     |              |
|           |          |           | [facility] -    |              |
|           |          |           | BLOOD BANK  |              |
+-----------+----------+-----------+-------------+--------------+
| Rh Type   | Positive |           | [facility]  |              |
|           |          |           | [facility]    |              |
|           |          |           | [facility]     |              |
|           |          |           | [facility] -    |              |
|           |          |           | BLOOD BANK  |              |
 
+-----------+----------+-----------+-------------+--------------+
| Antibody  | Negative |           | [facility]  |              |
| Screen    |          |           | [facility]    |              |
|           |          |           | [facility]     |              |
|           |          |           | CENTER -    |              |
|           |          |           | BLOOD BANK  |              |
+-----------+----------+-----------+-------------+--------------+
 
 
 
+----------+
| Specimen |
+----------+
| Blood    |
+----------+
 
 
 
+----------------------+--------------------+--------------------+--------------+
| Performing           | Address            | City/State/Zipcode | Phone Number |
| Organization         |                    |                    |              |
+----------------------+--------------------+--------------------+--------------+
|   [facility]     |   [address] |   [place], [place]  |              |
| [facility]  |                    | [id number]              |              |
| - BLOOD BANK         |                    |                    |              |
+----------------------+--------------------+--------------------+--------------+
 documented in this encounter
 
 Visit Diagnoses
 Not on filedocumented in this encounter
 
 Administered Medications
 
 
+-----------------------------------+--------+----------+-------+------+------+
| Medication Order                  | MAR    | Action   | Dose  | Rate | Site |
|                                   | Action | Date     |       |      |      |
+-----------------------------------+--------+----------+-------+------+------+
|   atorvaSTATin (LIPITOR) tablet   | Given  | 20 | 40 mg |      |      |
| 40 mg  40 mg, Oral, DAILY, First  |        | 17  9:32 |       |      |      |
| dose on 17 at 1630,      |        |  PM PDT  |       |      |      |
| Post-op/Phase II                  |        |          |       |      |      |
+-----------------------------------+--------+----------+-------+------+------+
 
 
 
+-------+----------+-------+---+---+
| Given | 20 | 40 mg |   |   |
|       | 17  8:32 |       |   |   |
|       |  PM PDT  |       |   |   |
+-------+----------+-------+---+---+
 
 
 
+---+---+
|   |   |
+---+---+
 
 
 
 
+---------------------------------+-------+----------+--------+---+----------+
|   bupivacaine 0.5%-EPINEPHrine  | Given | 20 | 20 mLs |   | Surgical |
| 1:200,000 injection  PRN,       |       | 17 12:27 |        |   |  Site    |
| Starting Mon 17 at 1227,   |       |  PM PDT  |        |   |          |
| Intra-op                        |       |          |        |   |          |
+---------------------------------+-------+----------+--------+---+----------+
 
 
 
+-----------------------------------+---+
|                                   |   |
+-----------------------------------+---+
|   diphenhydrAMINE (BENADRYL) 12.5 |   |
|  mg/5 mL liquid 25 mg  25 mg,     |   |
| Oral, EVERY 4 HOURS PRN, Itching, |   |
|  Starting 17 at 1603,    |   |
| Oral route is preferred.,         |   |
| Post-op/Phase II                  |   |
+-----------------------------------+---+
|                                   |   |
+-----------------------------------+---+
|   diphenhydrAMINE (BENADRYL)      |   |
| injection 12.5 mg  12.5 mg,       |   |
| Intravenous, EVERY 4 HOURS PRN,   |   |
| Itching, Starting 17 at  |   |
| 1603, Oral route is preferred.,   |   |
| Post-op/Phase II                  |   |
+-----------------------------------+---+
|                                   |   |
+-----------------------------------+---+
|   diphenhydrAMINE (BENADRYL)      |   |
| tablet 25 mg  25 mg, Oral, EVERY  |   |
| 4 HOURS PRN, Itching, Starting    |   |
| 17 at 1603, Oral route   |   |
| is preferred., Post-op/Phase II   |   |
+-----------------------------------+---+
|                                   |   |
+-----------------------------------+---+
 
 
 
+-----------------------------------+-------+----------+--------+---+---+
|   docusate sodium (COLACE)        | Given | 20 | 100 mg |   |   |
| capsule 100 mg  100 mg, Oral, 2   |       | 17  8:41 |        |   |   |
| TIMES DAILY, First dose on Mon    |       |  AM PDT  |        |   |   |
| 17 at 2100, First line agent |       |          |        |   |   |
|  for constipation, Post-op/Phase  |       |          |        |   |   |
| II                                |       |          |        |   |   |
+-----------------------------------+-------+----------+--------+---+---+
 
 
 
+-------+----------+--------+---+---+
| Given | 20 | 100 mg |   |   |
|       | 17  9:32 |        |   |   |
|       |  PM PDT  |        |   |   |
+-------+----------+--------+---+---+
| Given | 20 | 100 mg |   |   |
|       | 17  9:11 |        |   |   |
|       |  AM PDT  |        |   |   |
 
+-------+----------+--------+---+---+
 
 
 
+---+---+
|   |   |
+---+---+
 
 
 
+-----------------------------------+-------+----------+-------+---+---+
|   FLUoxetine (PROzac) capsule 20  | Given | 20 | 20 mg |   |   |
| mg  20 mg, Oral, DAILY, First     |       | 17  8:41 |       |   |   |
| dose on Tue 8/1/17 at 0900,       |       |  AM PDT  |       |   |   |
| Post-op/Phase II                  |       |          |       |   |   |
+-----------------------------------+-------+----------+-------+---+---+
 
 
 
+-------+----------+-------+---+---+
| Given | 20 | 20 mg |   |   |
|       | 17  9:11 |       |   |   |
|       |  AM PDT  |       |   |   |
+-------+----------+-------+---+---+
| Given | 20 | 20 mg |   |   |
|       | 17  8:58 |       |   |   |
|       |  AM PDT  |       |   |   |
+-------+----------+-------+---+---+
 
 
 
+---+---+
|   |   |
+---+---+
 
 
 
+-----------------------------------+-------+----------+---------+---+----------+
|   fluticasone (FLONASE) 50        | Given | 20 | 1 spray |   | [facility] |
| mcg/nasal spray 1 spray  1 spray, |       | 17  8:42 |         |   | h        |
|  Nasal, DAILY, First dose on Mon  |       |  AM PDT  |         |   |          |
| 17 at 1630, Shake gently.,   |       |          |         |   |          |
| Post-op/Phase II                  |       |          |         |   |          |
+-----------------------------------+-------+----------+---------+---+----------+
 
 
 
+-------+----------+---------+---+----------+
| Given | 20 | 1 spray |   | [facility] |
|       | 17  9:11 |         |   | h        |
|       |  AM PDT  |         |   |          |
+-------+----------+---------+---+----------+
| Given | 20 | 1 spray |   | [facility] |
|       | 17  8:59 |         |   | h        |
|       |  AM PDT  |         |   |          |
+-------+----------+---------+---+----------+
 
 
 
+---+---+
 
|   |   |
+---+---+
 
 
 
+-----------------------------------+-------+----------+----------+---+---+
|   HYDROcodone-acetaminophen       | Given | 20 | 1 tablet |   |   |
| (NORCO)  mg per tablet 1-2  |       | 17 10:37 |          |   |   |
| tablet  1-2 tablet, Oral, EVERY 4 |       |  AM PDT  |          |   |   |
|  HOURS PRN, Pain, Starting Mon    |       |          |          |   |   |
| 17 at 1603, Post-op/Phase II |       |          |          |   |   |
+-----------------------------------+-------+----------+----------+---+---+
 
 
 
+-------+----------+----------+---+---+
| Given | 20 | 1 tablet |   |   |
|       | 17  9:32 |          |   |   |
|       |  PM PDT  |          |   |   |
+-------+----------+----------+---+---+
| Given | 20 | 1 tablet |   |   |
|       | 17  9:11 |          |   |   |
|       |  AM PDT  |          |   |   |
+-------+----------+----------+---+---+
 
 
 
+---+---+
|   |   |
+---+---+
 
 
 
+-----------------------------------+-------+----------+--------+---+---+
|   magnesium hydroxide (MILK OF    | Given | 20 | 30 mLs |   |   |
| MAGNESIA) 400 mg/5 mL suspension  |       | 17  7:00 |        |   |   |
| 30 mL  30 mL, Oral, 2 TIMES DAILY |       |  AM PDT  |        |   |   |
|  PRN, Constipation, Starting Mon  |       |          |        |   |   |
| 17 at 1603, If docusate,     |       |          |        |   |   |
| senna, and polyethylene glycol    |       |          |        |   |   |
| ineffective x 24 hours or not     |       |          |        |   |   |
| ordered, Post-op/Phase II         |       |          |        |   |   |
+-----------------------------------+-------+----------+--------+---+---+
 
 
 
+-------+----------+--------+---+---+
| Given | 20 | 30 mLs |   |   |
|       | 17  9:39 |        |   |   |
|       |  PM PDT  |        |   |   |
+-------+----------+--------+---+---+
 
 
 
+---+---+
|   |   |
+---+---+
 
 
 
 
+-----------------------------------+-------+----------+-------+---+---+
|   metoprolol succinate            | Given | 20 | 25 mg |   |   |
| (TOPROL-XL) ER tablet 25 mg  25   |       | 17  8:41 |       |   |   |
| mg, Oral, DAILY, First dose on    |       |  AM PDT  |       |   |   |
| Tue 17 at 0900, Tablet may be |       |          |       |   |   |
|  cut where scored but do not      |       |          |       |   |   |
| crush., Post-op/Phase II          |       |          |       |   |   |
+-----------------------------------+-------+----------+-------+---+---+
 
 
 
+-------+----------+-------+---+---+
| Given | 20 | 25 mg |   |   |
|       | 17  9:11 |       |   |   |
|       |  AM PDT  |       |   |   |
+-------+----------+-------+---+---+
| Given | 20 | 25 mg |   |   |
|       | 17  8:58 |       |   |   |
|       |  AM PDT  |       |   |   |
+-------+----------+-------+---+---+
 
 
 
+---+---+
|   |   |
+---+---+
 
 
 
+----------------------------------+-------+----------+--------+---+----------+
|   ropivacaine (NAROPIN) 2 mg/mL  | Given | 20 | 60 mLs |   | Surgical |
| (0.2%) injection  PRN, Starting  |       | 17  1:27 |        |   |  Site    |
| 17 at 1327, Intra-op    |       |  PM PDT  |        |   |          |
+----------------------------------+-------+----------+--------+---+----------+
 
 
 
+---+---+
|   |   |
+---+---+
 
 
 
+-----------------------------------+-------+----------+--------+---+---+
|   senna (SENOKOT) tablet 8.6 mg   | Given | 20 | 8.6 mg |   |   |
| 8.6 mg, Oral, 2 TIMES DAILY,      |       | 17  8:41 |        |   |   |
| First dose on 17 at      |       |  AM PDT  |        |   |   |
| 2100, If docusate ineffective or  |       |          |        |   |   |
| not ordered, Post-op/Phase II     |       |          |        |   |   |
+-----------------------------------+-------+----------+--------+---+---+
 
 
 
+-------+----------+--------+---+---+
| Given | 20 | 8.6 mg |   |   |
|       | 17  9:32 |        |   |   |
|       |  PM PDT  |        |   |   |
+-------+----------+--------+---+---+
| Given | 20 | 8.6 mg |   |   |
|       | 17  9:11 |        |   |   |
 
|       |  AM PDT  |        |   |   |
+-------+----------+--------+---+---+
 
 
 
+---+---+
|   |   |
+---+---+
 
 
 
+-----------------------------------+---------+----------+---+----------+---+
|   sodium chloride 0.9% (NS)       | New Bag | 20 |   | 50 mL/hr |   |
| infusion  at 100 mL/hr,           |         | 17  5:22 |   |          |   |
| Intravenous, CONTINUOUS, Starting |         |  AM PDT  |   |          |   |
|  Mon 17 at 1630,             |         |          |   |          |   |
| Post-op/Phase II                  |         |          |   |          |   |
+-----------------------------------+---------+----------+---+----------+---+
 
 
 
+---------+----------+---+-------+---+
| New Bag | 20 |   | 100   |   |
|         | 17  5:04 |   | mL/hr |   |
|         |  PM PDT  |   |       |   |
+---------+----------+---+-------+---+
 
 
 
+---+---+
|   |   |
+---+---+
 documented in this encounter

## 2020-06-08 NOTE — XMS
Encounter Summary
  Created on: 2020
 
 Memo Deni Siu
 External Reference #: [id number]
 : 52
 Sex: Male
 
 Demographics
 
 
+-----------------------+---------------------------+
| Address               | [address]           |
|                       | [place]  [address] |
+-----------------------+---------------------------+
| Home Phone            | [phone]           |
+-----------------------+---------------------------+
| Preferred Language    | Unknown                   |
+-----------------------+---------------------------+
| Marital Status        |                    |
+-----------------------+---------------------------+
| Sikh Affiliation | Unknown                   |
+-----------------------+---------------------------+
| Race                  | Unknown                   |
+-----------------------+---------------------------+
| Ethnic Group          | Unknown                   |
+-----------------------+---------------------------+
 
 
 Author
 
 
+--------------+--------------------------------------------+
| Author       | [facility]  |
|              | and [place]                                |
+--------------+--------------------------------------------+
| Organization | [facility]  |
|              | and [place]                                |
+--------------+--------------------------------------------+
| Address      | Unknown                                    |
+--------------+--------------------------------------------+
| Phone        | Unavailable                                |
+--------------+--------------------------------------------+
 
 
 
 Support
 
 
+---------------+--------------+---------+-----------------+
| Name          | Relationship | Address | Phone           |
+---------------+--------------+---------+-----------------+
| Sofiya A Brown | ECON         | Unknown | [phone] |
+---------------+--------------+---------+-----------------+
 
 
 
 Care Team Providers
 
 
 
+-------------------------+------+-----------------+
| Care Team Member Name   | Role | Phone           |
+-------------------------+------+-----------------+
| Ck Michelle MD | PCP  | [phone] |
+-------------------------+------+-----------------+
 
 
 
 Encounter Details
 
 
+--------+-------------+----------------------+----------------------+----------------------
+
| Date   | Type        | Department           | Care Team            | Description          
|
+--------+-------------+----------------------+----------------------+----------------------
+
| / | Orders Only |   [facility]          |   Dreyer, Jason A,   | Back pain,           
|
| 2017   |             | NEUROSURGERY  [address]  | DO  [address]    | unspecified back     
|
|        |             | [address]     | [address]  [place], [place] | location,            
|
|        |             | [place]      |  [address]  [phone] | unspecified back     
|
|        |             | [address]           |   [phone] (Fax) | pain laterality,     
|
|        |             | [phone]         |                      | unspecified          
|
|        |             |                      |                      | chronicity (Primary  
|
|        |             |                      |                      | Dx)                  
|
+--------+-------------+----------------------+----------------------+----------------------
+
 
 
 
 Social History
 
 
+----------------+-------+-----------+--------+------+
| Tobacco Use    | Types | Packs/Day | Years  | Date |
|                |       |           | Used   |      |
+----------------+-------+-----------+--------+------+
| Never Assessed |       |           |        |      |
+----------------+-------+-----------+--------+------+
 
 
 
+------------------+---------------+
| Sex Assigned at  | Date Recorded |
| Birth            |               |
+------------------+---------------+
| Not on file      |               |
+------------------+---------------+
 
 
 
 
+----------------+-------------+-------------+
| Job Start Date | Occupation  | Industry    |
+----------------+-------------+-------------+
| Not on file    | Not on file | Not on file |
+----------------+-------------+-------------+
 
 
 
+----------------+--------------+------------+
| Travel History | Travel Start | Travel End |
+----------------+--------------+------------+
 
 
 
+-------------------------------------+
| No recent travel history available. |
+-------------------------------------+
 documented as of this encounter
 
 Plan of Treatment
 
 
+--------+---------+-----------+----------------------+-------------+
| Date   | Type    | Specialty | Care Team            | Description |
+--------+---------+-----------+----------------------+-------------+
| / | Office  | Neurology |   Ravindra Munoz MD  [address] |             |
| 2020   | Visit   |           |  [address]      |             |
|        |         |           | [place] [address]  [place],  |             |
|        |         |           | [place] [address]             |             |
|        |         |           | [phone]         |             |
|        |         |           | [phone] (Fax)   |             |
+--------+---------+-----------+----------------------+-------------+
 
 
 
+----------------------+---------+--------+----------------------+----------------------+
| Name                 | Type    | Priori | Associated Diagnoses | Order Schedule       |
|                      |         | ty     |                      |                      |
+----------------------+---------+--------+----------------------+----------------------+
| XR Lumbar Spine 4 +  | Imaging | Routin |   Back pain,         | Expected:            |
| Vw                   |         | e      | unspecified back     | 2017, Expires: |
|                      |         |        | location,            |  2018          |
|                      |         |        | unspecified back     |                      |
|                      |         |        | pain laterality,     |                      |
|                      |         |        | unspecified          |                      |
|                      |         |        | chronicity           |                      |
+----------------------+---------+--------+----------------------+----------------------+
 documented as of this encounter
 
 Visit Diagnoses
 
 
+----------------------------------------------------------------------------------------+
| Diagnosis                                                                              |
+----------------------------------------------------------------------------------------+
|   Back pain, unspecified back location, unspecified back pain laterality, unspecified  |
| chronicity - Primary                                                                   |
+----------------------------------------------------------------------------------------+
 documented in this encounter

## 2020-06-08 NOTE — XMS
Encounter Summary
  Created on: 2020
 
 Memo Deni Siu
 External Reference #: [id number]
 : 52
 Sex: Male
 
 Demographics
 
 
+-----------------------+---------------------------+
| Address               | [address]           |
|                       | [place]  [address] |
+-----------------------+---------------------------+
| Home Phone            | [phone]           |
+-----------------------+---------------------------+
| Preferred Language    | Unknown                   |
+-----------------------+---------------------------+
| Marital Status        |                    |
+-----------------------+---------------------------+
| Latter-day Affiliation | Unknown                   |
+-----------------------+---------------------------+
| Race                  | Unknown                   |
+-----------------------+---------------------------+
| Ethnic Group          | Unknown                   |
+-----------------------+---------------------------+
 
 
 Author
 
 
+--------------+--------------------------------------------+
| Author       | [facility]  |
|              | and [place]                                |
+--------------+--------------------------------------------+
| Organization | [facility]  |
|              | and [place]                                |
+--------------+--------------------------------------------+
| Address      | Unknown                                    |
+--------------+--------------------------------------------+
| Phone        | Unavailable                                |
+--------------+--------------------------------------------+
 
 
 
 Support
 
 
+---------------+--------------+---------+-----------------+
| Name          | Relationship | Address | Phone           |
+---------------+--------------+---------+-----------------+
| Sofiya A Brown | ECON         | Unknown | [phone] |
+---------------+--------------+---------+-----------------+
 
 
 
 Care Team Providers
 
 
 
+-------------------------+------+-----------------+
| Care Team Member Name   | Role | Phone           |
+-------------------------+------+-----------------+
| Ck Michelle MD | PCP  | [phone] |
+-------------------------+------+-----------------+
 
 
 
 Encounter Details
 
 
+--------+----------+----------------------+-------------------+-------------+
| Date   | Type     | Department           | Care Team         | Description |
+--------+----------+----------------------+-------------------+-------------+
| / | Episode  |   [facility] [place]          |   Cynthia Velasquez,  |             |
| 2017   | Changes  | NEUROSURGERY  [address]  | Cert [place]           |             |
|        |          | [address]     |                   |             |
|        |          | [place]      |                   |             |
|        |          | [address]           |                   |             |
|        |          | [phone]         |                   |             |
+--------+----------+----------------------+-------------------+-------------+
 
 
 
 Social History
 
 
+--------------+-------+-----------+--------+------+
| Tobacco Use  | Types | Packs/Day | Years  | Date |
|              |       |           | Used   |      |
+--------------+-------+-----------+--------+------+
| Never Smoker |       |           |        |      |
+--------------+-------+-----------+--------+------+
 
 
 
+---------------------+---+---+---+
| Smokeless Tobacco:  |   |   |   |
| Never Used          |   |   |   |
+---------------------+---+---+---+
 
 
 
+-------------+----------------------+---------+----------------------+
| Alcohol Use | Drinks/Week          | oz/Week | Comments             |
+-------------+----------------------+---------+----------------------+
| Yes         |   0 Standard drinks  | 0.0     | 1-2 drinks 2-4 times |
|             | or equivalent        |         |  per month           |
+-------------+----------------------+---------+----------------------+
 
 
 
+------------------+---------------+
| Sex Assigned at  | Date Recorded |
| Birth            |               |
+------------------+---------------+
| Not on file      |               |
+------------------+---------------+
 
 
 
 
+----------------+-------------+-------------+
| Job Start Date | Occupation  | Industry    |
+----------------+-------------+-------------+
| Not on file    | Not on file | Not on file |
+----------------+-------------+-------------+
 
 
 
+----------------+--------------+------------+
| Travel History | Travel Start | Travel End |
+----------------+--------------+------------+
 
 
 
+-------------------------------------+
| No recent travel history available. |
+-------------------------------------+
 documented as of this encounter
 
 Plan of Treatment
 
 
+--------+---------+-----------+----------------------+-------------+
| Date   | Type    | Specialty | Care Team            | Description |
+--------+---------+-----------+----------------------+-------------+
| / | Office  | Neurology |   Ravindra Munoz MD  [address] |             |
| 2020   | Visit   |           |  [address]      |             |
|        |         |           | [place]  |             |
|        |         |           | [place] [address]             |             |
|        |         |           | [phone]         |             |
|        |         |           | [phone] (Fax)   |             |
+--------+---------+-----------+----------------------+-------------+
 documented as of this encounter
 
 Visit Diagnoses
 Not on filedocumented in this encounter

## 2020-06-08 NOTE — XMS
Encounter Summary
  Created on: 2020
 
 Memo Deni Siu
 External Reference #: [id number]
 : 52
 Sex: Male
 
 Demographics
 
 
+-----------------------+---------------------------+
| Address               | [address]           |
|                       | [place]  [address] |
+-----------------------+---------------------------+
| Home Phone            | [phone]           |
+-----------------------+---------------------------+
| Preferred Language    | Unknown                   |
+-----------------------+---------------------------+
| Marital Status        |                    |
+-----------------------+---------------------------+
| Holiness Affiliation | Unknown                   |
+-----------------------+---------------------------+
| Race                  | Unknown                   |
+-----------------------+---------------------------+
| Ethnic Group          | Unknown                   |
+-----------------------+---------------------------+
 
 
 Author
 
 
+--------------+--------------------------------------------+
| Author       | [facility]  |
|              | and [place]                                |
+--------------+--------------------------------------------+
| Organization | [facility]  |
|              | and [place]                                |
+--------------+--------------------------------------------+
| Address      | Unknown                                    |
+--------------+--------------------------------------------+
| Phone        | Unavailable                                |
+--------------+--------------------------------------------+
 
 
 
 Support
 
 
+---------------+--------------+---------+-----------------+
| Name          | Relationship | Address | Phone           |
+---------------+--------------+---------+-----------------+
| Sofiya A Brown | ECON         | Unknown | [phone] |
+---------------+--------------+---------+-----------------+
 
 
 
 Care Team Providers
 
 
 
+-------------------------+------+-----------------+
| Care Team Member Name   | Role | Phone           |
+-------------------------+------+-----------------+
| Ck Michelle MD | PCP  | [phone] |
+-------------------------+------+-----------------+
 
 
 
 Encounter Details
 
 
+--------+-------------+----------------------+----------------------+----------------------
+
| Date   | Type        | Department           | Care Team            | Description          
|
+--------+-------------+----------------------+----------------------+----------------------
+
| / | Orders Only |   [facility]          |   Dreyer, Jason A,   | Back pain,           
|
| 2017   |             | NEUROSURGERY  [address]  | DO  [address]    | unspecified back     
|
|        |             | [address]     | [address]  [place], [place] | location,            
|
|        |             | [place]      |  [address]  [phone] | unspecified back     
|
|        |             | [address]           |   [phone] (Fax) | pain laterality,     
|
|        |             | [phone]         |                      | unspecified          
|
|        |             |                      |                      | chronicity (Primary  
|
|        |             |                      |                      | Dx)                  
|
+--------+-------------+----------------------+----------------------+----------------------
+
 
 
 
 Social History
 
 
+----------------+-------+-----------+--------+------+
| Tobacco Use    | Types | Packs/Day | Years  | Date |
|                |       |           | Used   |      |
+----------------+-------+-----------+--------+------+
| Never Assessed |       |           |        |      |
+----------------+-------+-----------+--------+------+
 
 
 
+------------------+---------------+
| Sex Assigned at  | Date Recorded |
| Birth            |               |
+------------------+---------------+
| Not on file      |               |
+------------------+---------------+
 
 
 
 
+----------------+-------------+-------------+
| Job Start Date | Occupation  | Industry    |
+----------------+-------------+-------------+
| Not on file    | Not on file | Not on file |
+----------------+-------------+-------------+
 
 
 
+----------------+--------------+------------+
| Travel History | Travel Start | Travel End |
+----------------+--------------+------------+
 
 
 
+-------------------------------------+
| No recent travel history available. |
+-------------------------------------+
 documented as of this encounter
 
 Plan of Treatment
 
 
+--------+---------+-----------+----------------------+-------------+
| Date   | Type    | Specialty | Care Team            | Description |
+--------+---------+-----------+----------------------+-------------+
| / | Office  | Neurology |   Ravindra Munoz MD  [address] |             |
| 2020   | Visit   |           |  [address]      |             |
|        |         |           | [address]  [place],  |             |
|        |         |           | [place] [address]             |             |
|        |         |           | [phone]         |             |
|        |         |           | [phone] (Fax)   |             |
+--------+---------+-----------+----------------------+-------------+
 
 
 
+----------------------+---------+--------+----------------------+----------------------+
| Name                 | Type    | Priori | Associated Diagnoses | Order Schedule       |
|                      |         | ty     |                      |                      |
+----------------------+---------+--------+----------------------+----------------------+
| XR Lumbar Spine 4 +  | Imaging | Routin |   Back pain,         | Expected:            |
| Vw                   |         | e      | unspecified back     | 2017, Expires: |
|                      |         |        | location,            |  2018          |
|                      |         |        | unspecified back     |                      |
|                      |         |        | pain laterality,     |                      |
|                      |         |        | unspecified          |                      |
|                      |         |        | chronicity           |                      |
+----------------------+---------+--------+----------------------+----------------------+
 documented as of this encounter
 
 Visit Diagnoses
 
 
+----------------------------------------------------------------------------------------+
| Diagnosis                                                                              |
+----------------------------------------------------------------------------------------+
|   Back pain, unspecified back location, unspecified back pain laterality, unspecified  |
| chronicity - Primary                                                                   |
+----------------------------------------------------------------------------------------+
 documented in this encounter

## 2020-06-08 NOTE — XMS
Encounter Summary
  Created on: 2020
 
 Memo Deni Siu
 External Reference #: [id number]
 : 52
 Sex: Male
 
 Demographics
 
 
+-----------------------+---------------------------+
| Address               | [address]           |
|                       | [place]  [address] |
+-----------------------+---------------------------+
| Home Phone            | [phone]           |
+-----------------------+---------------------------+
| Preferred Language    | Unknown                   |
+-----------------------+---------------------------+
| Marital Status        |                    |
+-----------------------+---------------------------+
| Church Affiliation | Unknown                   |
+-----------------------+---------------------------+
| Race                  | Unknown                   |
+-----------------------+---------------------------+
| Ethnic Group          | Unknown                   |
+-----------------------+---------------------------+
 
 
 Author
 
 
+--------------+--------------------------------------------+
| Author       | [facility]  |
|              | and [place]                                |
+--------------+--------------------------------------------+
| Organization | [facility]  |
|              | and [place]                                |
+--------------+--------------------------------------------+
| Address      | Unknown                                    |
+--------------+--------------------------------------------+
| Phone        | Unavailable                                |
+--------------+--------------------------------------------+
 
 
 
 Support
 
 
+---------------+--------------+---------+-----------------+
| Name          | Relationship | Address | Phone           |
+---------------+--------------+---------+-----------------+
| Sofiya A Brown | ECON         | Unknown | [phone] |
+---------------+--------------+---------+-----------------+
 
 
 
 Care Team Providers
 
 
 
+-----------------------+------+-------------+
| Care Team Member Name | Role | Phone       |
+-----------------------+------+-------------+
 PCP  | Unavailable |
+-----------------------+------+-------------+
 
 
 
 Encounter Details
 
 
+--------+-----------+----------------------+-----------+-------------+
| Date   | Type      | Department           | Care Team | Description |
+--------+-----------+----------------------+-----------+-------------+
| / | Hospital  |   [facility] |           |             |
|    | Encounter |  MED CTR GENERIC IP  |           |             |
|        |           | CONV DEPT  [address]     |           |             |
|        |           | [address]  [place], |           |             |
|        |           |  [place] [address]       |           |             |
|        |           | [phone]         |           |             |
+--------+-----------+----------------------+-----------+-------------+
 
 
 
 Social History
 
 
+----------------+-------+-----------+--------+------+
| Tobacco Use    | Types | Packs/Day | Years  | Date |
|                |       |           | Used   |      |
+----------------+-------+-----------+--------+------+
| Never Assessed |       |           |        |      |
+----------------+-------+-----------+--------+------+
 
 
 
+------------------+---------------+
| Sex Assigned at  | Date Recorded |
| Birth            |               |
+------------------+---------------+
| Not on file      |               |
+------------------+---------------+
 
 
 
+----------------+-------------+-------------+
| Job Start Date | Occupation  | Industry    |
+----------------+-------------+-------------+
| Not on file    | Not on file | Not on file |
+----------------+-------------+-------------+
 
 
 
+----------------+--------------+------------+
| Travel History | Travel Start | Travel End |
+----------------+--------------+------------+
 
 
 
 
+-------------------------------------+
| No recent travel history available. |
+-------------------------------------+
 documented as of this encounter
 
 Plan of Treatment
 
 
+--------+---------+-----------+----------------------+-------------+
| Date   | Type    | Specialty | Care Team            | Description |
+--------+---------+-----------+----------------------+-------------+
| / | Office  | Neurology |   Ravindra Munoz MD  [address] |             |
|    | Visit   |           |  [address]      |             |
|        |         |           | [place]  |             |
|        |         |           | [place] [address]             |             |
|        |         |           | [phone]         |             |
|        |         |           | [phone] (Fax)   |             |
+--------+---------+-----------+----------------------+-------------+
 documented as of this encounter
 
 Visit Diagnoses
 Not on filedocumented in this encounter

## 2020-06-08 NOTE — XMS
Encounter Summary
  Created on: 2020
 
 Memo Deni Siu
 External Reference #: [id number]
 : 52
 Sex: Male
 
 Demographics
 
 
+-----------------------+---------------------------+
| Address               | [address]           |
|                       | [place]  [address] |
+-----------------------+---------------------------+
| Home Phone            | [phone]           |
+-----------------------+---------------------------+
| Preferred Language    | Unknown                   |
+-----------------------+---------------------------+
| Marital Status        |                    |
+-----------------------+---------------------------+
| Confucianism Affiliation | Unknown                   |
+-----------------------+---------------------------+
| Race                  | Unknown                   |
+-----------------------+---------------------------+
| Ethnic Group          | Unknown                   |
+-----------------------+---------------------------+
 
 
 Author
 
 
+--------------+--------------------------------------------+
| Author       | [facility] [place]  |
|              | and [place]                                |
+--------------+--------------------------------------------+
| Organization | [facility] [place]  |
|              | and [place]                                |
+--------------+--------------------------------------------+
| Address      | Unknown                                    |
+--------------+--------------------------------------------+
| Phone        | Unavailable                                |
+--------------+--------------------------------------------+
 
 
 
 Support
 
 
+---------------+--------------+---------+-----------------+
| Name          | Relationship | Address | Phone           |
+---------------+--------------+---------+-----------------+
| Sofiya A Brown | ECON         | Unknown | [phone] |
+---------------+--------------+---------+-----------------+
 
 
 
 Care Team Providers
 
 
 
+-------------------------+------+-----------------+
| Care Team Member Name   | Role | Phone           |
+-------------------------+------+-----------------+
| Ck Michelle MD | PCP  | [phone] |
+-------------------------+------+-----------------+
 
 
 
 Encounter Details
 
 
+--------+----------+----------------------+-------------------+-------------+
| Date   | Type     | Department           | Care Team         | Description |
+--------+----------+----------------------+-------------------+-------------+
| / | Episode  |   [facility] [place]          |   Cynthia Velasquez,  |             |
| 2017   | Changes  | NEUROSURGERY  [address]  | [address] [place]           |             |
|        |          | [address]     |                   |             |
|        |          | [place]      |                   |             |
|        |          | [address]           |                   |             |
|        |          | [phone]         |                   |             |
+--------+----------+----------------------+-------------------+-------------+
 
 
 
 Social History
 
 
+--------------+-------+-----------+--------+------+
| Tobacco Use  | Types | Packs/Day | Years  | Date |
|              |       |           | Used   |      |
+--------------+-------+-----------+--------+------+
| Never Smoker |       |           |        |      |
+--------------+-------+-----------+--------+------+
 
 
 
+---------------------+---+---+---+
| Smokeless Tobacco:  |   |   |   |
| Never Used          |   |   |   |
+---------------------+---+---+---+
 
 
 
+-------------+----------------------+---------+----------------------+
| Alcohol Use | Drinks/Week          | oz/Week | Comments             |
+-------------+----------------------+---------+----------------------+
| Yes         |   0 Standard drinks  | 0.0     | 1-2 drinks 2-4 times |
|             | or equivalent        |         |  per month           |
+-------------+----------------------+---------+----------------------+
 
 
 
+------------------+---------------+
| Sex Assigned at  | Date Recorded |
| Birth            |               |
+------------------+---------------+
| Not on file      |               |
+------------------+---------------+
 
 
 
 
+----------------+-------------+-------------+
| Job Start Date | Occupation  | Industry    |
+----------------+-------------+-------------+
| Not on file    | Not on file | Not on file |
+----------------+-------------+-------------+
 
 
 
+----------------+--------------+------------+
| Travel History | Travel Start | Travel End |
+----------------+--------------+------------+
 
 
 
+-------------------------------------+
| No recent travel history available. |
+-------------------------------------+
 documented as of this encounter
 
 Plan of Treatment
 
 
+--------+---------+-----------+----------------------+-------------+
| Date   | Type    | Specialty | Care Team            | Description |
+--------+---------+-----------+----------------------+-------------+
| / | Office  | Neurology |   Ravindra Munoz MD  [address] |             |
| 2020   | Visit   |           |  [address]      |             |
|        |         |           | [place]  |             |
|        |         |           | [place] [address]             |             |
|        |         |           | [phone]         |             |
|        |         |           | [phone] (Fax)   |             |
+--------+---------+-----------+----------------------+-------------+
 documented as of this encounter
 
 Visit Diagnoses
 Not on filedocumented in this encounter

## 2020-06-08 NOTE — XMS
Encounter Summary
  Created on: 2020
 
 Memo Deni Siu
 External Reference #: [id number]
 : 52
 Sex: Male
 
 Demographics
 
 
+-----------------------+---------------------------+
| Address               | [address]           |
|                       | [place]  [address] |
+-----------------------+---------------------------+
| Home Phone            | [phone]           |
+-----------------------+---------------------------+
| Preferred Language    | Unknown                   |
+-----------------------+---------------------------+
| Marital Status        |                    |
+-----------------------+---------------------------+
| Shinto Affiliation | Unknown                   |
+-----------------------+---------------------------+
| Race                  | Unknown                   |
+-----------------------+---------------------------+
| Ethnic Group          | Unknown                   |
+-----------------------+---------------------------+
 
 
 Author
 
 
+--------------+--------------------------------------------+
| Author       |  [facility] [place]  |
|              | [facility] [place]                                |
+--------------+--------------------------------------------+
| Organization |  [facility] [place]  |
|              | and [place]                                |
+--------------+--------------------------------------------+
| Address      | Unknown                                    |
+--------------+--------------------------------------------+
| Phone        | Unavailable                                |
+--------------+--------------------------------------------+
 
 
 
 Support
 
 
+---------------+--------------+---------+-----------------+
| Name          | Relationship | Address | Phone           |
+---------------+--------------+---------+-----------------+
| Sofiya A Brown | ECON         | Unknown | [phone] |
+---------------+--------------+---------+-----------------+
 
 
 
 Care Team Providers
 
 
 
+-------------------------+------+-----------------+
| Care Team Member Name   | Role | Phone           |
+-------------------------+------+-----------------+
| Ck Michelle MD | PCP  | [phone] |
+-------------------------+------+-----------------+
 
 
 
 Reason for Visit
 
 
+---------------------+----------+
| Reason              | Comments |
+---------------------+----------+
| Surgery Appointment |          |
+---------------------+----------+
 
 
 
 Encounter Details
 
 
+--------+-----------+----------------------+----------------------+---------------------+
| Date   | Type      | Department           | Care Team            | Description         |
+--------+-----------+----------------------+----------------------+---------------------+
| / | Telephone |   [facility] [place]          |   Dreyer, Jason A,   | Surgery Appointment |
| 2017   |           | NEUROSURGERY  [address]  | DO  [address]    |                     |
|        |           | [address]     | [address]  [place], [place] |                     |
|        |           | [place], [place]      |  [id number] [phone] |                     |
|        |           | [address]           |   [phone] (Fax) |                     |
|        |           | [phone]         |                      |                     |
+--------+-----------+----------------------+----------------------+---------------------+
 
 
 
 Social History
 
 
+--------------+-------+-----------+--------+------+
| Tobacco Use  | Types | Packs/Day | Years  | Date |
|              |       |           | Used   |      |
+--------------+-------+-----------+--------+------+
| Never Smoker |       |           |        |      |
+--------------+-------+-----------+--------+------+
 
 
 
+---------------------+---+---+---+
| Smokeless Tobacco:  |   |   |   |
| Never Used          |   |   |   |
+---------------------+---+---+---+
 
 
 
+-------------+----------------------+---------+----------------------+
| Alcohol Use | Drinks/Week          | oz/Week | Comments             |
+-------------+----------------------+---------+----------------------+
| Yes         |   0 Standard drinks  | 0.0     | 1-2 drinks 2-4 times |
 
|             | or equivalent        |         |  per month           |
+-------------+----------------------+---------+----------------------+
 
 
 
+------------------+---------------+
| Sex Assigned at  | Date Recorded |
| Birth            |               |
+------------------+---------------+
| Not on file      |               |
+------------------+---------------+
 
 
 
+----------------+-------------+-------------+
| Job Start Date | Occupation  | Industry    |
+----------------+-------------+-------------+
| Not on file    | Not on file | Not on file |
+----------------+-------------+-------------+
 
 
 
+----------------+--------------+------------+
| Travel History | Travel Start | Travel End |
+----------------+--------------+------------+
 
 
 
+-------------------------------------+
| No recent travel history available. |
+-------------------------------------+
 documented as of this encounter
 
 Plan of Treatment
 
 
+--------+---------+-----------+----------------------+-------------+
| Date   | Type    | Specialty | Care Team            | Description |
+--------+---------+-----------+----------------------+-------------+
| / | Office  | Neurology |   Ravindra Munoz MD  [address] |             |
| 2020   | Visit   |           |  [address]      |             |
|        |         |           | [place]  |             |
|        |         |           | [place] [address]             |             |
|        |         |           | [phone]         |             |
|        |         |           | [phone] (Fax)   |             |
+--------+---------+-----------+----------------------+-------------+
 documented as of this encounter
 
 Visit Diagnoses
 Not on filedocumented in this encounter

## 2020-06-08 NOTE — XMS
Encounter Summary
  Created on: 2020
 
 Memo Deni Siu
 External Reference #: [id number]
 : 52
 Sex: Male
 
 Demographics
 
 
+-----------------------+---------------------------+
| Address               | [address]           |
|                       | [place]  [address] |
+-----------------------+---------------------------+
| Home Phone            | [phone]           |
+-----------------------+---------------------------+
| Preferred Language    | Unknown                   |
+-----------------------+---------------------------+
| Marital Status        |                    |
+-----------------------+---------------------------+
| Alevism Affiliation | Unknown                   |
+-----------------------+---------------------------+
| Race                  | Unknown                   |
+-----------------------+---------------------------+
| Ethnic Group          | Unknown                   |
+-----------------------+---------------------------+
 
 
 Author
 
 
+--------------+--------------------------------------------+
| Author       | [facility]  |
|              | and [place]                                |
+--------------+--------------------------------------------+
| Organization | [facility]  |
|              | and [place]                                |
+--------------+--------------------------------------------+
| Address      | Unknown                                    |
+--------------+--------------------------------------------+
| Phone        | Unavailable                                |
+--------------+--------------------------------------------+
 
 
 
 Support
 
 
+---------------+--------------+---------+-----------------+
| Name          | Relationship | Address | Phone           |
+---------------+--------------+---------+-----------------+
| Sofiya A Brown | ECON         | Unknown | [phone] |
+---------------+--------------+---------+-----------------+
 
 
 
 Care Team Providers
 
 
 
+-------------------------+------+-----------------+
| Care Team Member Name   | Role | Phone           |
+-------------------------+------+-----------------+
| Ck Michelle MD | PCP  | [phone] |
+-------------------------+------+-----------------+
 
 
 
 Reason for Visit
 
 
+---------+----------+
| Reason  | Comments |
+---------+----------+
| Post Op | 3M PO    |
+---------+----------+
 
 
 
 Encounter Details
 
 
+--------+---------+----------------------+----------------------+---------------------+
| Date   | Type    | Department           | Care Team            | Description         |
+--------+---------+----------------------+----------------------+---------------------+
| 11/15/ | Office  |   [facility]          |   Dreyer, Jason A,   | Lumbar spondylosis  |
| 2017   | Visit   | NEUROSURGERY  [address]  | DO  [address]    | (Primary Dx); S/P   |
|        |         | [address]     | [address]  [place], [place] | lumbar fusion       |
|        |         | [place], [place]      |  [address]  [phone] |                     |
|        |         | [address]           |   [phone] (Fax) |                     |
|        |         | [phone]         |                      |                     |
+--------+---------+----------------------+----------------------+---------------------+
 
 
 
 Social History
 
 
+--------------+-------+-----------+--------+------+
| Tobacco Use  | Types | Packs/Day | Years  | Date |
|              |       |           | Used   |      |
+--------------+-------+-----------+--------+------+
| Never Smoker |       |           |        |      |
+--------------+-------+-----------+--------+------+
 
 
 
+---------------------+---+---+---+
| Smokeless Tobacco:  |   |   |   |
| Never Used          |   |   |   |
+---------------------+---+---+---+
 
 
 
+-------------+----------------------+---------+----------------------+
| Alcohol Use | Drinks/Week          | oz/Week | Comments             |
+-------------+----------------------+---------+----------------------+
| No          |   0 Standard drinks  | 0.0     | No longer drinking   |
 
|             | or equivalent        |         | 1-2 drinks 2-4 times |
|             |                      |         |  a month             |
+-------------+----------------------+---------+----------------------+
 
 
 
+------------------+---------------+
| Sex Assigned at  | Date Recorded |
| Birth            |               |
+------------------+---------------+
| Not on file      |               |
+------------------+---------------+
 
 
 
+----------------+-------------+-------------+
| Job Start Date | Occupation  | Industry    |
+----------------+-------------+-------------+
| Not on file    | Not on file | Not on file |
+----------------+-------------+-------------+
 
 
 
+----------------+--------------+------------+
| Travel History | Travel Start | Travel End |
+----------------+--------------+------------+
 
 
 
+-------------------------------------+
| No recent travel history available. |
+-------------------------------------+
 documented as of this encounter
 
 Last Filed Vital Signs
 
 
+-------------------+-------------------+----------------------+----------+
| Vital Sign        | Reading           | Time Taken           | Comments |
+-------------------+-------------------+----------------------+----------+
| Blood Pressure    | 126/71            | 11/15/2017  8:34 AM  |          |
|                   |                   | PST                  |          |
+-------------------+-------------------+----------------------+----------+
| Pulse             | 73                | 11/15/2017  8:34 AM  |          |
|                   |                   | PST                  |          |
+-------------------+-------------------+----------------------+----------+
| Temperature       | -                 | -                    |          |
+-------------------+-------------------+----------------------+----------+
| Respiratory Rate  | -                 | -                    |          |
+-------------------+-------------------+----------------------+----------+
| Oxygen Saturation | -                 | -                    |          |
+-------------------+-------------------+----------------------+----------+
| Inhaled Oxygen    | -                 | -                    |          |
| Concentration     |                   |                      |          |
+-------------------+-------------------+----------------------+----------+
| Weight            | 101.2 kg (223 lb) | 11/15/2017  8:34 AM  |          |
|                   |                   | PST                  |          |
+-------------------+-------------------+----------------------+----------+
| Height            | 190.5 cm (6' 3")  | 11/15/2017  8:34 AM  |          |
|                   |                   | PST                  |          |
 
+-------------------+-------------------+----------------------+----------+
| Body Mass Index   | 27.87             | 11/15/2017  8:34 AM  |          |
|                   |                   | PST                  |          |
+-------------------+-------------------+----------------------+----------+
 documented in this encounter
 
 Patient Instructions
 Patient Instructions Dreyer, Jason A, DO - 11/15/2017  8:30 AM PSTPlease undergo new x-rays
 of the lumbar spine in 3 and 9 months.
 
 Please follow-up with me as needed.
 Electronically signed by Jason A Dreyer, DO at 11/15/2017  9:22 AM PST
 documented in this encounter
 
 Progress Notes
 Dreyer, Jason A, DO - 11/15/2017  8:30 AM PSTFormatting of this note might be different fro
m the original.
 Jason A. Dreyer, DO
 [address] [facility] [address]
 [place] [address]
 [phone]
 FAX: [phone]
 
 NEUROSURGERY FOLLOW-UP
 
 CHIEF COMPLAINT: 
 Chief Complaint 
 Patient presents with 
   Post Op 
   3M PO 
 
 HISTORY OF PRESENT ILLNESS:  The patient is a 67 y.o. male that had a TLIF L4-5 by me for b
ack and leg pain around 3 months ago .  He returns and overall is doing well.  The patient c
omplains of nothing related to the surgery. He does have aright 11th rib injury from 2015
at continues to trouble him.  The patient is still taking narcotics for pain management, but
 this is for the rib injury.  The patient has been walking as directed and has tried to miguel
in active.  Overall, the patient is pleased with his improvement.  
 
 PAST MEDICAL HISTORY:
 Past Medical History: 
 Diagnosis Date 
   Allergic rhinitis  
   Anxiety disorder  
   Atherosclerosis  
   BPH (benign prostatic hyperplasia)  
   HLD (hyperlipidemia)  
   Internal derangement of knee  
   Low back pain  
   Lumbago with sciatica, right side  
   Osteoarthritis, hand  
   Osteoarthrosis, unspecified whether generalized or localized, unspecified site  
   Sleep apnea  
  uses CPAP 
   Unspecified disorder of nose and nasal sinuses  
 
 PAST SURGICAL HISTORY:
 Past Surgical History: 
 Procedure Laterality Date 
   ANKLE FUSION Left 2011 
   ANKLE SURGERY Left  
 
  SPUR 
   COLONOSCOPY  2013 
   KNEE ARTHROSCOPY Left 2010 
   KNEE SURGERY Right  
  Ray 
   LUMBAR DISCECTOMY   
  L1-3 
   LUMBAR LAMINECTOMY N/A 2017 
  Procedure: L4-5 Transforaminal Lumbar Interbody Fusion;  Surgeon: Jason A Dreyer, DO;  Loc
ation: [facility] MAIN OR 
   SINUS SURGERY  2010 
   SINUS SURGERY  2015 
   TONSILLECTOMY AND ADENOIDECTOMY   
   TOTAL KNEE ARTHROPLASTY Bilateral  
  [place]  
 
 CURRENT MEDICATIONS: 
 Current Outpatient Prescriptions 
 Medication Sig Dispense Refill 
   ASCORBIC ACID PO Take  by mouth.   
   atorvaSTATin (LIPITOR) 40 mg tablet Take 40 mg by mouth Daily.   
   B Complex Vitamins (VITAMIN B COMPLEX) tablet Take 1 tablet by mouth Daily.   
   cholecalciferol (VITAMIN D-3) 1,000 units tablet Take 1,000 Units by mouth Daily.   
   diazePAM (VALIUM) 5 mg tablet Take 1 tablet by mouth every 6 hours as needed for Muscle
 spasms. 90 tablet 1 
   fexofenadine-pseudoePHEDrine (ALLEGRA-D ALLERGY & CONGESTION)  MG per tablet Take
 1 tablet by mouth Twice  daily as needed.   
   FLUoxetine (PROZAC) 20 mg capsule Take 20 mg by mouth Daily.   
   fluticasone (FLONASE) 50 mcg/nasal spray 1 spray by Nasal route Daily.   
   HYDROcodone-acetaminophen (NORCO)  mg per tablet Take 0.5-1 tablets by mouth ever
y 6 hours as needed for Pain. 120 tablet 0 
   metoprolol succinate (TOPROL-XL) 25 mg 24 hr tablet Take 25 mg by mouth Daily.   
 
 No current facility-administered medications for this visit.  
 
 ALLERGIES: 
 Allergies 
 Allergen Reactions 
   Meperidine Nausea And Vomiting 
   Oxycodone Other (See Comments) 
   Hallucinations & sleepiness 
 
 SOCIAL HISTORY:
 The patient  reports that he has never smoked. He has never used smokeless tobacco. He repo
rts that he does not drink alcohol or use drugs.
 
 FAMILY HISTORY:
 Family History 
 Problem Relation Age of Onset 
   Multiple sclerosis Father  
   Coronary artery disease Father  
   Cancer Father  
   Multiple Myeloma 
   Lymphoma Mother  
   Rheum arthritis Mother  
   Cancer Mother  
   Pancreatic 
   Heart disease Paternal Grandfather  
   No Known Problems Paternal Grandmother  
   Cancer Maternal Grandfather  
 
   Cancer Maternal Grandmother  
   Lupus Sister  
   Other (see comment) Sister  
   Meniere's disease 
   No Known Problems Child  
   No Known Problems Child  
 
 REVIEW OF SYSTEMS
 GENERALLY:   No fever, no night sweats, no anemia, + fatigue, no recent profound weight giovanny
nges.  
 EYES:  No eye problems, no use of corrective lenses, no eye injury, no double vision, no bl
indness.
 EARS, NOSE, AND THROAT:  No changes in taste or smell, no hearing difficulty, no ringing in
 the ears, no ear drainage, no dizziness, no voice changes, no difficulty swallowing, no sig
nificant snoring, + sleep apnea, no sinus problems, no major dental work.
 NEUROLOGICALLY:  Please see the review of systems discussed above in the history of present
 illness.  In addition, the patient has back injury.
 PSYCHIATRIC:  No depression, no sleep disorders, no anxiety, no bipolar disorder, no psycho
tic episodes.
 CARDIOVASCULAR:  No heart attacks, no heart murmur, no heart fluttering, no chest pain, no 
ankle swelling.  
 LUNG DISEASE:  No shortness of breath, no cough, no tuberculosis, no bloody cough,  no asth
ma, no emphysema/COPD.
 GASTROINTESTINAL:  No bowel disease, no nausea or vomiting, no rectal bleeding, no constipa
tion, no stool incontinence, no liver disease, no gallbladder disease, no abdominal pain, no
 ulcers.
 KIDNEY DISEASE:  No urinary frequency, no painful or difficult urination, no incontinence.
 ENDOCRINE:  No diabetes, no thyroid disease, no osteopenia or osteoporosis, no breast drain
age.  
 SKIN:  No breast lumps, no skin changes, no rashes, no itches.
 HEMATOLOGIC/LYMPHATIC:  No enlarged lymph nodes, no easy or unusual bleeding, no personal h
istory of cancer. 
 RHEUMATOLOGIC:  No joint arthritis, no rheumatoid arthritis.
 
 INTERIM PHYSICAL EXAMINATION:
 Blood pressure 126/71, pulse 73, height 1.905 m (6' 3"), weight 101.2 kg (223 lb). Body mas
s index is 27.87 kg/m.
 
 GENERAL: Deni Hampton is in no acute distress with unlabored respirations.   
 SPINE: The patient  s incisions are healing well without drainage, significant erythema, o
r discharge 
 EXTREMITIES: No lower extremity edema.   
 NEUROLOGICAL EXAMINATION: 
 MENTAL STATUS: The patient is awake, alert, and oriented.  
 He follows simple and complex commands 
 MOTOR EXAM: Motor strength is 5/5.  This is improved from the preoperative exam. 
 SENSORY EXAM: The sensory examination improved from the preoperative exam.   
 REFLEXES: Reflexes are unchanged from his preoperative history and physical. 
 
 RADIOGRAPHIC REVIEW:
 The patient  s postoperative x-rays show stable instrumentation and alignment and were rev
iewed with the patient today.  There have been no interval changes since the immediate posto
perative films.  Complete fusion has yet occurred, but this is normal and would not be expec
michelle at this time.  
 
 ASSESSMENT:
 S/P TLIF L4-5:
 Encounter Diagnoses 
 Name Primary? 
   Lumbar spondylosis Yes 
 
   S/P lumbar fusion  
 
 Past Medical History: 
 Diagnosis Date 
   Allergic rhinitis  
   Anxiety disorder  
   Atherosclerosis  
   BPH (benign prostatic hyperplasia)  
   HLD (hyperlipidemia)  
   Internal derangement of knee  
   Low back pain  
   Lumbago with sciatica, right side  
   Osteoarthritis, hand  
   Osteoarthrosis, unspecified whether generalized or localized, unspecified site  
   Sleep apnea  
  uses CPAP 
   Unspecified disorder of nose and nasal sinuses  
 
 PLAN:
 Overall, the patient is doing well.  I was pleased to see at least some further improvement
 and expect more improvement with time.  This was discussed with the patient today.  I have 
increased the patient  s activities further, and I would like the patient to continue to ad
foley with activities as tolerated and as directed.  
 
 He will undergo repeat x-ray of the back in 3 and 9 months and follow-up with me as needed.
 
 ELECTRONICALLY SIGNED BY:  Jason A. Dreyer, DO,  11/15/2017  9:23Electronically signed by J
ason A Dreyer, DO at 11/15/2017  9:25 AM PSTdocumented in this encounter
 
 Plan of Treatment
 
 
+--------+---------+-----------+----------------------+-------------+
| Date   | Type    | Specialty | Care Team            | Description |
+--------+---------+-----------+----------------------+-------------+
| / | Office  | Neurology |   Ravindra Munoz MD  [address] |             |
| 2020   | Visit   |           |  [address]      |             |
|        |         |           | [address]  [place],  |             |
|        |         |           | [place] [address]             |             |
|        |         |           | [phone]         |             |
|        |         |           | [phone] (Fax)   |             |
+--------+---------+-----------+----------------------+-------------+
 
 
 
+----------------------+---------+--------+----------------------+----------------------+
| Name                 | Type    | Priori | Associated Diagnoses | Order Schedule       |
|                      |         | ty     |                      |                      |
+----------------------+---------+--------+----------------------+----------------------+
| XR Lumbar Spine 2 or | Imaging | Routin |   Lumbar spondylosis | Expected: 08/15/2018 |
|  3 Vw                |         | e      |   S/P lumbar fusion  |  (Approximate),      |
|                      |         |        |                      | Expires: 11/15/2018  |
+----------------------+---------+--------+----------------------+----------------------+
| XR Lumbar Spine 2 or | Imaging | Routin |   Lumbar spondylosis | Expected: 02/15/2018 |
|  3 Vw                |         | e      |   S/P lumbar fusion  |  (Approximate),      |
|                      |         |        |                      | Expires: 11/15/2018  |
+----------------------+---------+--------+----------------------+----------------------+
 documented as of this encounter
 
 Procedures
 
 
 
+-------------------+--------+-------------+----------------------+----------------------+
| Procedure Name    | Priori | Date/Time   | Associated Diagnosis | Comments             |
|                   | ty     |             |                      |                      |
+-------------------+--------+-------------+----------------------+----------------------+
| IMAGING REPORT -  |        | 2018  |                      |   Results for this   |
| EXTERNAL SCAN     |        | 12:00 AM    |                      | procedure are in the |
|                   |        | PDT         |                      |  results section.    |
+-------------------+--------+-------------+----------------------+----------------------+
 documented in this encounter
 
 Results
 IMAGING REPORT - EXTERNAL SCAN (2018 12:00 AM PDT)
 
+------------------------------------------------------------------------+--------------+
| Narrative                                                              | Performed At |
+------------------------------------------------------------------------+--------------+
|   This result has an attachment that is not available.  Ordered by an  |              |
| unspecified provider.                                                  |              |
+------------------------------------------------------------------------+--------------+
 documented in this encounter
 
 Visit Diagnoses
 
 
+----------------------------------------------------------------------------+
| Diagnosis                                                                  |
+----------------------------------------------------------------------------+
|   Lumbar spondylosis - Primary  Lumbosacral spondylosis without myelopathy |
+----------------------------------------------------------------------------+
|   S/P lumbar fusion  Arthrodesis status                                    |
+----------------------------------------------------------------------------+
 documented in this encounter

## 2020-06-08 NOTE — XMS
Encounter Summary
  Created on: 2020
 
 Memo Deni Siu
 External Reference #: [id number]
 : 52
 Sex: Male
 
 Demographics
 
 
+-----------------------+---------------------------+
| Address               | [address]           |
|                       | [place]  [address] |
+-----------------------+---------------------------+
| Home Phone            | [phone]           |
+-----------------------+---------------------------+
| Preferred Language    | Unknown                   |
+-----------------------+---------------------------+
| Marital Status        |                    |
+-----------------------+---------------------------+
| Mu-ism Affiliation | Unknown                   |
+-----------------------+---------------------------+
| Race                  | Unknown                   |
+-----------------------+---------------------------+
| Ethnic Group          | Unknown                   |
+-----------------------+---------------------------+
 
 
 Author
 
 
+--------------+--------------------------------------------+
| Author       | [facility]  |
|              | [facility] [place]                                |
+--------------+--------------------------------------------+
| Organization | [facility]  |
|              | [facility] [place]                                |
+--------------+--------------------------------------------+
| Address      | Unknown                                    |
+--------------+--------------------------------------------+
| Phone        | Unavailable                                |
+--------------+--------------------------------------------+
 
 
 
 Support
 
 
+---------------+--------------+---------+-----------------+
| Name          | Relationship | Address | Phone           |
+---------------+--------------+---------+-----------------+
| Sofiya A Brown | ECON         | Unknown | [phone] |
+---------------+--------------+---------+-----------------+
 
 
 
 Care Team Providers
 
 
 
+-----------------------+------+-----------------+
| Care Team Member Name | Role | Phone           |
+-----------------------+------+-----------------+
| Emely Hinton    | PCP  | [phone] |
+-----------------------+------+-----------------+
 
 
 
 Reason for Visit
 
 
+--------+----------+
| Reason | Comments |
+--------+----------+
| Other  | [facility]     |
+--------+----------+
 Evaluate & Treat (Routine)
 
+--------+--------+-----------+--------------+--------------+---------------+
| Status | Reason | Specialty | Diagnoses /  | Referred By  | Referred To   |
|        |        |           | Procedures   | Contact      | Contact       |
+--------+--------+-----------+--------------+--------------+---------------+
| Closed |        | Neurology |   Diagnoses  |   [place],    |   Ravindra Munoz,   |
|        |        |           |  [employer]    | MAY[place] Gates  | MD  [address]      |
|        |        |           | disorder due |  [address]     | [address]      |
|        |        |           |  to known    | [address]  | [address]  [address]  |
|        |        |           | physiologica |  [place],  | D  [place], |
|        |        |           | l condition  | [place]           |  [place] [address]     |
|        |        |           | ([facility])        | [address]   | Phone:        |
|        |        |           |              | Phone:       | [phone]  |
|        |        |           |              | [phone] |  Fax:         |
|        |        |           |              |   Fax:       | [phone]  |
|        |        |           |              | [phone] |               |
+--------+--------+-----------+--------------+--------------+---------------+
 
 
 
 
 Encounter Details
 
 
+--------+-----------+---------------------+----------------------+----------------------+
| Date   | Type      | Department          | Care Team            | Description          |
+--------+-----------+---------------------+----------------------+----------------------+
| / | Clinical  |   [facility]     |   Ravindra Munoz MD  [address] | Memory loss (Primary |
| 2020   | Support   | NEUROLOGY  [address]     |  [address]      |  Dx)                 |
|        |           | [place]   | [address]  [address],  |                      |
|        |           | [place], [place]        | [place] [address]             |                      |
|        |           | [address]          | [phone]         |                      |
|        |           | [phone]        | [phone] (Fax)   |                      |
+--------+-----------+---------------------+----------------------+----------------------+
 
 
 
 Social History
 
 
+--------------+-------+-----------+--------+------+
 
| Tobacco Use  | Types | Packs/Day | Years  | Date |
|              |       |           | Used   |      |
+--------------+-------+-----------+--------+------+
| Never Smoker |       |           |        |      |
+--------------+-------+-----------+--------+------+
 
 
 
+---------------------+---+---+---+
| Smokeless Tobacco:  |   |   |   |
| Never Used          |   |   |   |
+---------------------+---+---+---+
 
 
 
+-------------+----------------------+---------+--------------+
| Alcohol Use | Drinks/Week          | oz/Week | Comments     |
+-------------+----------------------+---------+--------------+
| Yes         |   0 Standard drinks  | 0.0     | Alcoholic    |
|             | or equivalent        |         | Drinks/day:  |
|             |                      |         | Occasionally |
+-------------+----------------------+---------+--------------+
 
 
 
+------------------+---------------+
| Sex Assigned at  | Date Recorded |
| Birth            |               |
+------------------+---------------+
| Not on file      |               |
+------------------+---------------+
 
 
 
+----------------+-------------+-------------+
| Job Start Date | Occupation  | Industry    |
+----------------+-------------+-------------+
| Not on file    | Not on file | Not on file |
+----------------+-------------+-------------+
 
 
 
+----------------+--------------+------------+
| Travel History | Travel Start | Travel End |
+----------------+--------------+------------+
 
 
 
+-------------------------------------+
| No recent travel history available. |
+-------------------------------------+
 documented as of this encounter
 
 Progress Notes
 Jabier Major, Medical Assistant - 2020  9:30 AM PDT
 [facility] 
 The patient returns today for detailed cognitive evaluation for memory difficulty. 
 Behavior Observations: 
 The patient seemed alert (choose from the following: alert, engaged and attentive, drowsy. 
Add any other observations) during the examination. 
 
 The examination processes was explained to the patient in detail. 
 Test Procedure: 
 The patient underwent detailed cognitive function evaluation which composed of visuospatial
 testing including overlapping pentagons, wire cube and draw a clock; language test includin
g naming,comprehension (one-stage, three-stage, complex grammar), repetition (single works a
nd phrases), reading (regular and irregular) and writing; orientation; attention and concent
ration including immediate recall, calculation or spelling and memory testing including reca
ll, anterograde memory, retrograde memory and verbal fluency. 
 Test results: 
 1. Mini Mental Status Examination 
 The examination included orientation, attention/concentration, recall, language and visuosp
atial abilities. 
 The patient received a score of 27 /30. 
 2. Total. [facility] score: 73/100
 3. Orientation/Attention/Concentration 15 /18 
 4. Memory  
 5. Verbal fluency  
 6. Language  
 7. Visuospatial abilities  
 Total : 73 /100 
 VLOM-Ratio: 
 V + SL 
 ----------------------------------------------- = 3.3
 O + M 
 If VLOM-ratio < 2.2: Frontotemporal dementia. 
 VLOM-ratio> 3.2 AD 
  
 [facility] test with a total score of 73/100 MMSE total score of 25/30 with VLOM ratio of 3.3, th
at is suggestive of mild dementia, Alzheimer type.  Clinical correlation is recommended.
 Normal cognition: 91+
 Mild cognitive impairment: 84-90 
 Mild Alzheimer's disease: 70-83 
 Moderate Alzheimer's disease: 50-69 
 Severe Alzheimer's disease: 30-49 
 End stage Alzheimer's disease: <30 
 
 Electronically signed by Ravindra Munoz MD at 2020  1:54 PM PDTdocumented in this encounter
 
 Plan of Treatment
 
 
+--------+---------+-----------+----------------------+-------------+
| Date   | Type    | Specialty | Care Team            | Description |
+--------+---------+-----------+----------------------+-------------+
| / | Office  | Neurology |   Ravindra Munoz MD  [address] |             |
2020   | Visit   |           |  [facility]      |             |
|        |         |           | [place],  |             |
|        |         |           | [place] [address]             |             |
|        |         |           | [phone]         |             |
|        |         |           | [phone] (Fax)   |             |
+--------+---------+-----------+----------------------+-------------+
 documented as of this encounter
 
 Visit Diagnoses
 
 
+-------------------------+
| Diagnosis               |
+-------------------------+
|   Memory loss - Primary |
 
+-------------------------+
 documented in this encounter

## 2020-06-08 NOTE — XMS
Encounter Summary
  Created on: 2020
 
 Memo Deni Siu
 External Reference #: [id number]
 : 52
 Sex: Male
 
 Demographics
 
 
+-----------------------+---------------------------+
| Address               | [address]           |
|                       | [place]  [address] |
+-----------------------+---------------------------+
| Home Phone            | [phone]           |
+-----------------------+---------------------------+
| Preferred Language    | Unknown                   |
+-----------------------+---------------------------+
| Marital Status        |                    |
+-----------------------+---------------------------+
| Mosque Affiliation | Unknown                   |
+-----------------------+---------------------------+
| Race                  | Unknown                   |
+-----------------------+---------------------------+
| Ethnic Group          | Unknown                   |
+-----------------------+---------------------------+
 
 
 Author
 
 
+--------------+--------------------------------------------+
| Author       | [facility]  |
|              | [facility] [place]                                |
+--------------+--------------------------------------------+
| Organization | [facility]  |
|              | [facility] [place]                                |
+--------------+--------------------------------------------+
| Address      | Unknown                                    |
+--------------+--------------------------------------------+
| Phone        | Unavailable                                |
+--------------+--------------------------------------------+
 
 
 
 Support
 
 
+---------------+--------------+---------+-----------------+
| Name          | Relationship | Address | Phone           |
+---------------+--------------+---------+-----------------+
| Sofiya A Brown | ECON         | Unknown | [phone] |
+---------------+--------------+---------+-----------------+
 
 
 
 Care Team Providers
 
 
 
+-------------------------+------+-----------------+
| Care Team Member Name   | Role | Phone           |
+-------------------------+------+-----------------+
| Ck Michelle MD | PCP  | [phone] |
+-------------------------+------+-----------------+
 
 
 
 Reason for Visit
 
 
+-----------+------------------------------+
| Reason    | Comments                     |
+-----------+------------------------------+
| Follow-up | 4 week Post Op S/P L4-5 TLIF |
+-----------+------------------------------+
 
 
 
 Encounter Details
 
 
+--------+---------+----------------------+----------------------+--------------------+
| Date   | Type    | Department           | Care Team            | Description        |
+--------+---------+----------------------+----------------------+--------------------+
| / | Office  |   [facility]          |   Madhav Mccloud, | S/P lumbar fusion  |
| 2017   | Visit   | NEUROSURGERY  [address]  |  PA-C  [address]  | (Primary Dx)       |
|        |         | [address]     | [address]  [place]     |                    |
|        |         | [place], [place]      | [place] [address]      |                    |
|        |         | [address]           | [phone]         |                    |
|        |         | [phone]         | [phone] (Fax)   |                    |
+--------+---------+----------------------+----------------------+--------------------+
 
 
 
 Social History
 
 
+--------------+-------+-----------+--------+------+
| Tobacco Use  | Types | Packs/Day | Years  | Date |
|              |       |           | Used   |      |
+--------------+-------+-----------+--------+------+
| Never Smoker |       |           |        |      |
+--------------+-------+-----------+--------+------+
 
 
 
+---------------------+---+---+---+
| Smokeless Tobacco:  |   |   |   |
| Never Used          |   |   |   |
+---------------------+---+---+---+
 
 
 
+-------------+----------------------+---------+----------------------+
| Alcohol Use | Drinks/Week          | oz/Week | Comments             |
+-------------+----------------------+---------+----------------------+
| No          |   0 Standard drinks  | 0.0     | No longer drinking   |
 
|             | or equivalent        |         | 1-2 drinks 2-4 times |
|             |                      |         |  a month             |
+-------------+----------------------+---------+----------------------+
 
 
 
+------------------+---------------+
| Sex Assigned at  | Date Recorded |
| Birth            |               |
+------------------+---------------+
| Not on file      |               |
+------------------+---------------+
 
 
 
+----------------+-------------+-------------+
| Job Start Date | Occupation  | Industry    |
+----------------+-------------+-------------+
| Not on file    | Not on file | Not on file |
+----------------+-------------+-------------+
 
 
 
+----------------+--------------+------------+
| Travel History | Travel Start | Travel End |
+----------------+--------------+------------+
 
 
 
+-------------------------------------+
| No recent travel history available. |
+-------------------------------------+
 documented as of this encounter
 
 Last Filed Vital Signs
 
 
+-------------------+---------------------+----------------------+----------+
| Vital Sign        | Reading             | Time Taken           | Comments |
+-------------------+---------------------+----------------------+----------+
| Blood Pressure    | 123/76              | 2017 10:30 AM  |          |
|                   |                     | PDT                  |          |
+-------------------+---------------------+----------------------+----------+
| Pulse             | 65                  | 2017 10:30 AM  |          |
|                   |                     | PDT                  |          |
+-------------------+---------------------+----------------------+----------+
| Temperature       | -                   | -                    |          |
+-------------------+---------------------+----------------------+----------+
| Respiratory Rate  | -                   | -                    |          |
+-------------------+---------------------+----------------------+----------+
| Oxygen Saturation | -                   | -                    |          |
+-------------------+---------------------+----------------------+----------+
| Inhaled Oxygen    | -                   | -                    |          |
| Concentration     |                     |                      |          |
+-------------------+---------------------+----------------------+----------+
| Weight            | 103.6 kg (228 lb 8  | 2017 10:30 AM  |          |
|                   | oz)                 | PDT                  |          |
+-------------------+---------------------+----------------------+----------+
| Height            | 190.5 cm (6' 3")    | 2017 10:30 AM  |          |
|                   |                     | PDT                  |          |
 
+-------------------+---------------------+----------------------+----------+
| Body Mass Index   | 28.56               | 2017 10:30 AM  |          |
|                   |                     | PDT                  |          |
+-------------------+---------------------+----------------------+----------+
 documented in this encounter
 
 Patient Instructions
 Patient Instructions Shravan Lopez, Medical Assistant - 2017 10:00 AM PDTYou may now 
slowly increase your lifting up to 15 pounds as tolerated. You may now reach overhead but it
 should only be 1-2 pounds. Please refrain from twisting for the next 8 weeks. Lastly, you w
ill see Dr. Dreyer in approximately 2 months where a new x-ray will be taken at that time. I
n the meantime, you can also begin to wean out of your brace as instructed below.
 
 SPINE BRACE WEANING PROTOCOL (5 WEEKS)
 
 Below are instructions for weaning your brace.  You can move through the weeks slower if yo
u feel the need to do so, but the overall goal is to get you out of the brace slowly over 
e next several weeks.  
 
 WEEK 1
 If you have been using your brace for activities like sleeping, showering, do not use the b
race for these activities any longer but continue using it for everything else.
 
 WEEK 2
 Stop wearing your brace for sitting and short distance walking.  You should use the brace f
or anything more involved.
 
 WEEK 3
 Stop using the brace for medium distance walking.  You can bend and twist your back but sti
ll proceed slowly with these activities.
 
 WEEK 4
 Stop using the brace for everything but the most difficult tasks. You should now be able to
 go on long walks and lift more weight as directed.  Add more bending and twisting as tolera
michelle.  
 
 WEEK 5
 Stop using the brace for daily use.  I would encourage you to use the brace in the future f
or activities that you know might aggravate your back or cause pain.  You should still work 
to strengthen your back and use good technique when  things and bending.  
 
 I recommend the patient to stop taking Norco/hydrocodone in the morning for 5 days. If this
 does not increase your pain in any significant way you may discontinue taking it in the idalia
oksana as well. 
 
 Also completely stop taking Valium, may take it at night if needed. 
 
 See an Orthopedic doctor if you would like to get a Bursitis injection. 
 I recommended the patient to sleep with a hard pillow between his legs Electronically huyen
d by MAYCOL Orta at 2017 11:15 AM PDT
 documented in this encounter
 
 Progress Notes
 Madhav Mccloud PA - 2017 10:00 AM PDTFormatting of this note might be different f
rom the original.
 MAYCOL Torres
 [address], [address]
 [place] [address]
 [phone]
 FAX: [phone]  
 
 
 NEUROSURGERY FOLLOW-UP
 
 CHIEF COMPLAINT: 
 Chief Complaint 
 Patient presents with 
   Follow-up 
   4 week Post Op S/P L4-5 TLIF 
 
 HISTORY OF PRESENT ILLNESS:  The patient is a 67 y.o. male that had a L4-5 TLIF around 4 we
eks ago.  He returns and overall is doing great.  The patient complains of bursitis pain. Th
e patient states he has not had any pain since surgery. The patient has  been walking as muc
h as directed. He has been walking around a mile a day.  He is still taking pain medications
 at this point. The patient was unsure if he had to continue taking his pain medications idalia
n without having any pain. He would like some instructions on how to DC these. The patient h
as had no issues with his surgical site. Prior to surgery he had buttock pain, he is feeling
 much better.CURRENT MEDICATIONS: 
 Current Outpatient Prescriptions 
 Medication Sig Dispense Refill 
   ASCORBIC ACID PO Take  by mouth.   
   atorvaSTATin (LIPITOR) 40 mg tablet Take 40 mg by mouth Daily.   
   B Complex Vitamins (VITAMIN B COMPLEX) tablet Take 1 tablet by mouth Daily.   
   cholecalciferol (VITAMIN D-3) 1,000 units tablet Take 1,000 Units by mouth Daily.   
   diazePAM (VALIUM) 5 mg tablet Take 1 tablet by mouth every 6 hours as needed for Muscle
 spasms. 90 tablet 0 
   fexofenadine-pseudoePHEDrine (ALLEGRA-D ALLERGY & CONGESTION)  MG per tablet Take
 1 tablet by mouth Twice  daily as needed.   
   FLUoxetine (PROZAC) 20 mg capsule Take 20 mg by mouth Daily.   
   fluticasone (FLONASE) 50 mcg/nasal spray 1 spray by Nasal route Daily.   
   HYDROcodone-acetaminophen (NORCO)  mg per tablet Take 1-2 tablets by mouth every 
4 hours as needed for Pain. 120 tablet 0 
   lactulose 10 g/15 mL solution Take 30 mLs by mouth Daily as needed for Constipation. 24
0 mL 2 
   metoprolol succinate (TOPROL-XL) 25 mg 24 hr tablet Take 25 mg by mouth Daily.   
 
 No current facility-administered medications for this visit.  
 
 ALLERGIES: 
 Allergies 
 Allergen Reactions 
   Meperidine Nausea And Vomiting 
   Oxycodone Other (See Comments) 
   Hallucinations & sleepiness 
 
 SOCIAL HISTORY:
 The patient  reports that he has never smoked. He has never used smokeless tobacco. He repo
rts that he does not drink alcohol or use drugs.
 
 INTERIM PHYSICAL EXAMINATION:
 Blood pressure 123/76, pulse 65, height 1.905 m (6' 3"), weight 103.6 kg (228 lb 8 oz). Bod
y mass index is 28.56 kg/m. 
 
 REVIEW OF SYSTEMS
 GENERALLY:   No fever, no night sweats, no anemia, + fatigue, + recent profound weight hernandez
ges.  
 EYES:  No eye problems, no use of corrective lenses, no eye injury, no double vision, no bl
indness.
 EARS, NOSE, AND THROAT:  No changes in taste or smell, no hearing difficulty, no ringing in
 the ears, no ear drainage, no dizziness, no voice changes, no difficulty swallowing, no sig
nificant snoring, no sleep apnea, no sinus problems, no major dental work.
 
 NEUROLOGICALLY:  Please see the review of systems discussed above in the history of present
 illness.  In addition, the patient has pain of legs.
 PSYCHIATRIC:  No depression, no sleep disorders, no anxiety, no bipolar disorder, no psycho
tic episodes.
 CARDIOVASCULAR:  No heart attacks, no heart murmur, no heart fluttering, no chest pain, no 
ankle swelling.  
 LUNG DISEASE:  No shortness of breath, no cough, no tuberculosis, no bloody cough,  no asth
ma, no emphysema/COPD.
 GASTROINTESTINAL:  No bowel disease, no nausea or vomiting, no rectal bleeding, no constipa
tion, no stool incontinence, no liver disease, no gallbladder disease, no abdominal pain, no
 ulcers.
 KIDNEY DISEASE:  No urinary frequency, no painful or difficult urination, no incontinence.
 ENDOCRINE:  No diabetes, no thyroid disease, no osteopenia or osteoporosis, no breast drain
age.  
 SKIN:  No breast lumps, no skin changes, no rashes, no itches.
 HEMATOLOGIC/LYMPHATIC:  No enlarged lymph nodes, no easy or unusual bleeding, no personal h
istory of cancer. 
 RHEUMATOLOGIC:  No joint arthritis, no rheumatoid arthritis.
 
 GENERAL: Deni Hampton is in no acute distress with unlabored respirations.   
 SPINE: The patient  s incisions are healing well without drainage, significant erythema, o
r discharge. 
 EXTREMITIES: No lower extremity edema.   
 NEUROLOGICAL EXAMINATION: 
 MENTAL STATUS: The patient is awake, alert, and oriented.  
 He follows simple and complex commands 
 MOTOR EXAM: Motor strength is 4/5.  This is improved when compared to the preoperative exam
. Left dorsiflexion 4 or almost 4-.   
 SENSORY EXAM: The sensory examination is improved when compared to the preoperative exam.  
 
 
 RADIOGRAPHIC REVIEW:
 The patient  s x-rays show stable instrumentation and alignment and were reviewed with the
 patient today.  There have been no interval changes since the immediate postoperative films
.  Complete fusion has not yet occurred, but this is normal and would not be expected at thi
s time. 
 
 ASSESSMENT:
 Encounter Diagnosis 
 Name Primary? 
   S/P lumbar fusion Yes 
 
 Past Medical History: 
 Diagnosis Date 
   Allergic rhinitis  
   Anxiety disorder  
   Atherosclerosis  
   BPH (benign prostatic hyperplasia)  
   HLD (hyperlipidemia)  
   Internal derangement of knee  
   Low back pain  
   Lumbago with sciatica, right side  
   Osteoarthritis, hand  
   Osteoarthrosis, unspecified whether generalized or localized, unspecified site  
   Sleep apnea  
  uses CPAP 
   Unspecified disorder of nose and nasal sinuses  
 
 PLAN:
 Overall, the patient is doing well.  The patient can see some improvements but continues to
 
 recover from recent surgery.
 
 I increased the patient  s activities now allowing 15 pound lifting.  The patient and also
 will begin the process of brace weaning.  They should continue regular exercise and strengt
hening with the hope that they can avoid additional surgery.
 
 We discussed that we can provide pain medications for up to 90 days after their surgical da
te.  We discussed the need to continue tapering pain medication.  If they need longer term p
ain medication, they should begin working on either pain management or with the primary care
 provider.
 
 I am hoping to see improvement over the coming weeks to months and plan to continue to foll
ow this patient.  The patient will follow-up in clinic in around 8 weeks for re-evaluation.
 
 I, MAYCOL Torres, personally performed the services described in this documentation
, as scribed by Shravan Lopez CMA in my presence, and it is both accurate and complete.
 MAYCOL Torres 2017
 
 ELECTRONICALLY SIGNED BY:  MAYCOL Torres,  2017  11:16
 
 Electronically signed by MAYCOL Orta at 2017 11:16 AM PDTdocumented in this 
encounter
 
 Plan of Treatment
 
 
+--------+---------+-----------+----------------------+-------------+
| Date   | Type    | Specialty | Care Team            | Description |
+--------+---------+-----------+----------------------+-------------+
| /  Office  | Neurology |   Ravindra Munoz MD  [address] |             |
| 2020   | Visit   |           |  [address]      |             |
|        |         |           | [address]  [place],  |             |
|        |         |           | [place] [address]             |             |
|        |         |           | [phone]         |             |
|        |         |           | [phone] (Fax)   |             |
+--------+---------+-----------+----------------------+-------------+
 documented as of this encounter
 
 Results
 XR Lumbar Spine 2 or 3 Vw (11/15/2017  8:02 AM PST)
 
+----------+
| Specimen |
+----------+
|          |
+----------+
 
 
 
+------------------------------------------------------------------------+---------------+
| Narrative                                                              | Performed At  |
+------------------------------------------------------------------------+---------------+
|   EXAM:XR LUMBAR SPINE 2 OR 3 VW     CLINICAL HISTORY: Postop          |   [facility] |
| COMPARISON: Lumbar spine radiograph dating to 2017            |               |
| FINDINGS: Frontal and lateral views.     Posterior and interbody       |               |
| fusion changes at L4-L5.   Intact hardware.   No change in  position   |               |
| of the interbody graft markers.   No change in spinal alignment.   No  |               |
|  interval compression deformities.     IMPRESSION -     No             |               |
| radiographic evidence for an interval complication.     Dictated and   |               |
| Signed by: Ranjan Mcdonald MD    Electronically signed: 11/15/2017    |               |
 
| 9:14 AM                                                                |               |
+------------------------------------------------------------------------+---------------+
 
 
 
+----------------------------------------------------------------------------------+
| Procedure Note                                                                   |
+----------------------------------------------------------------------------------+
|   Hermes, Rad Results In - 11/15/2017  9:17 AM PST  EXAM:XR LUMBAR SPINE 2 OR 3 VW  |
|                                                                                  |
| CLINICAL HISTORY: Postop                                                         |
|                                                                                  |
| COMPARISON: Lumbar spine radiograph dating to 2017                      |
|                                                                                  |
| FINDINGS: Frontal and lateral views.                                             |
|                                                                                  |
| Posterior and interbody fusion changes at L4-L5.  Intact hardware.  No change in |
| position of the interbody graft markers.  No change in spinal alignment.  No     |
| interval compression deformities.                                                |
|                                                                                  |
| IMPRESSION -                                                                     |
|                                                                                  |
| No radiographic evidence for an interval complication.                           |
|                                                                                  |
| Dictated and Signed by: Ranjan Mcdonald MD                                      |
|  Electronically signed: 11/15/2017 9:14 AM                                       |
+----------------------------------------------------------------------------------+
 
 
 
+---------------+---------+--------------------+--------------+
| Performing    | Address | City/State/Zipcode | Phone Number |
| Organization  |         |                    |              |
+---------------+---------+--------------------+--------------+
|   [facility] |         |                    |              |
+---------------+---------+--------------------+--------------+
 documented in this encounter
 
 Visit Diagnoses
 
 
+---------------------------------------------------+
| Diagnosis                                         |
+---------------------------------------------------+
|   S/P lumbar fusion - Primary  Arthrodesis status |
+---------------------------------------------------+
 documented in this encounter

## 2020-06-08 NOTE — XMS
Encounter Summary
  Created on: 2020
 
 Memo Deni Siu
 External Reference #: [id number]
 : 52
 Sex: Male
 
 Demographics
 
 
+-----------------------+---------------------------+
| Address               | [address]           |
|                       | [place]  [address] |
+-----------------------+---------------------------+
| Home Phone            | [phone]           |
+-----------------------+---------------------------+
| Preferred Language    | Unknown                   |
+-----------------------+---------------------------+
| Marital Status        |                    |
+-----------------------+---------------------------+
| Temple Affiliation | Unknown                   |
+-----------------------+---------------------------+
| Race                  | Unknown                   |
+-----------------------+---------------------------+
| Ethnic Group          | Unknown                   |
+-----------------------+---------------------------+
 
 
 Author
 
 
+--------------+--------------------------------------------+
| Author       | [facility]  |
|              | and [place]                                |
+--------------+--------------------------------------------+
| Organization | [facility]  |
|              | and [place]                                |
+--------------+--------------------------------------------+
| Address      | Unknown                                    |
+--------------+--------------------------------------------+
| Phone        | Unavailable                                |
+--------------+--------------------------------------------+
 
 
 
 Support
 
 
+---------------+--------------+---------+-----------------+
| Name          | Relationship | Address | Phone           |
+---------------+--------------+---------+-----------------+
| Sofiya A Brown | ECON         | Unknown | [phone] |
+---------------+--------------+---------+-----------------+
 
 
 
 Care Team Providers
 
 
 
+-------------------------+------+-----------------+
| Care Team Member Name   | Role | Phone           |
+-------------------------+------+-----------------+
| Ck Michelle MD | PCP  | [phone] |
+-------------------------+------+-----------------+
 
 
 
 Encounter Details
 
 
+--------+-----------+----------------------+----------------------+-------------------+
| Date   | Type      | Department           | Care Team            | Description       |
+--------+-----------+----------------------+----------------------+-------------------+
| 11/15/ | Hospital  |   [facility] |   Madhav Mccloud, | S/P lumbar fusion |
| 2017   | Encounter |  [facility]  [address] |  PA-C  [address]  |                   |
|        |           |  [place]       | [address]  [place]     |                   |
|        |           | [place] [address] | [place] [address]      |                   |
|        |           |   [phone]       | [phone]         |                   |
|        |           |                      | [phone] (Fax)   |                   |
+--------+-----------+----------------------+----------------------+-------------------+
 
 
 
 Social History
 
 
+--------------+-------+-----------+--------+------+
| Tobacco Use  | Types | Packs/Day | Years  | Date |
|              |       |           | Used   |      |
+--------------+-------+-----------+--------+------+
| Never Smoker |       |           |        |      |
+--------------+-------+-----------+--------+------+
 
 
 
+---------------------+---+---+---+
| Smokeless Tobacco:  |   |   |   |
| Never Used          |   |   |   |
+---------------------+---+---+---+
 
 
 
+-------------+----------------------+---------+----------------------+
| Alcohol Use | Drinks/Week          | oz/Week | Comments             |
+-------------+----------------------+---------+----------------------+
| No          |   0 Standard drinks  | 0.0     | No longer drinking   |
|             | or equivalent        |         | 1-2 drinks 2-4 times |
|             |                      |         |  a month             |
+-------------+----------------------+---------+----------------------+
 
 
 
+------------------+---------------+
| Sex Assigned at  | Date Recorded |
| Birth            |               |
+------------------+---------------+
| Not on file      |               |
 
+------------------+---------------+
 
 
 
+----------------+-------------+-------------+
| Job Start Date | Occupation  | Industry    |
+----------------+-------------+-------------+
| Not on file    | Not on file | Not on file |
+----------------+-------------+-------------+
 
 
 
+----------------+--------------+------------+
| Travel History | Travel Start | Travel End |
+----------------+--------------+------------+
 
 
 
+-------------------------------------+
| No recent travel history available. |
+-------------------------------------+
 documented as of this encounter
 
 Medications at Time of Discharge
 
 
+----------------------+----------------------+-----------+---------+----------+-----------+
| Medication           | Sig                  | Dispensed | Refills | Start    | End Date  |
|                      |                      |           |         | Date     |           |
+----------------------+----------------------+-----------+---------+----------+-----------+
|   atorvaSTATin       | Take 40 mg by mouth  |           | 0       | 20 |           |
| (LIPITOR) 40 mg      | Daily.               |           |         | 15       |           |
| tablet               |                      |           |         |          |           |
+----------------------+----------------------+-----------+---------+----------+-----------+
|   B Complex Vitamins | Take 1 tablet by     |           | 0       |          |           |
|  (VITAMIN B COMPLEX) | mouth Daily.         |           |         |          |           |
|  tablet              |                      |           |         |          |           |
+----------------------+----------------------+-----------+---------+----------+-----------+
|   fluticasone        | 1 spray by Nasal     |           | 0       |          |           |
| (FLONASE) 50         | route Daily.         |           |         |          |           |
| mcg/nasal spray      |                      |           |         |          |           |
+----------------------+----------------------+-----------+---------+----------+-----------+
|   metoprolol         | Take 25 mg by mouth  |           | 0       | 20 |           |
| succinate            | Daily.               |           |         | 17       |           |
| (TOPROL-XL) 25 mg 24 |                      |           |         |          |           |
|  hr tablet           |                      |           |         |          |           |
+----------------------+----------------------+-----------+---------+----------+-----------+
|   ASCORBIC ACID PO   | Take  by mouth.      |           | 0       |          |  |
|                      |                      |           |         |          | 0         |
+----------------------+----------------------+-----------+---------+----------+-----------+
|   cholecalciferol    | Take 1,000 Units by  |           | 0       |          |  |
| (VITAMIN D-3) 1,000  | mouth Daily.         |           |         |          | 0         |
| units tablet         |                      |           |         |          |           |
+----------------------+----------------------+-----------+---------+----------+-----------+
|   diazePAM (VALIUM)  | Take 1 tablet by     |   90      | 1       | 11/15/20 |  |
| 5 mg tablet          | mouth every 6 hours  | tablet    |         | 17       | 0         |
|                      | as needed for Muscle |           |         |          |           |
|                      |  spasms.             |           |         |          |           |
+----------------------+----------------------+-----------+---------+----------+-----------+
|                      | Take 1 tablet by     |           | 0       |          |  |
 
| fexofenadine-pseudoe | mouth Twice  daily   |           |         |          | 0         |
| PHEDrine (ALLEGRA-D  | as needed.           |           |         |          |           |
| ALLERGY &            |                      |           |         |          |           |
| CONGESTION)    |                      |           |         |          |           |
| MG per tablet        |                      |           |         |          |           |
+----------------------+----------------------+-----------+---------+----------+-----------+
|   FLUoxetine         | Take 20 mg by mouth  |           | 0       |          |  |
| (PROZAC) 20 mg       | Daily.               |           |         |          | 0         |
| capsule              |                      |           |         |          |           |
+----------------------+----------------------+-----------+---------+----------+-----------+
|                      | Take 0.5-1 tablets   |   120     | 0       | 11/15/20 |  |
| HYDROcodone-acetamin | by mouth every 6     | tablet    |         | 17       | 0         |
| ophen (NORCO)  | hours as needed for  |           |         |          |           |
|  mg per tablet       | Pain.                |           |         |          |           |
+----------------------+----------------------+-----------+---------+----------+-----------+
 documented as of this encounter
 
 Plan of Treatment
 
 
+--------+---------+-----------+----------------------+-------------+
| Date   | Type    | Specialty | Care Team            | Description |
+--------+---------+-----------+----------------------+-------------+
| / | Office  | Neurology |   Ravindra Munoz MD  [address] |             |
| 2020   | Visit   |           |  [address]      |             |
|        |         |           | [address]  [address],  |             |
|        |         |           | [place] [address]             |             |
|        |         |           | [phone]         |             |
|        |         |           | [phone] (Fax)   |             |
+--------+---------+-----------+----------------------+-------------+
 documented as of this encounter
 
 Procedures
 
 
+----------------------+--------+-------------+----------------------+----------------------
+
| Procedure Name       | Priori | Date/Time   | Associated Diagnosis | Comments             
|
|                      | ty     |             |                      |                      
|
+----------------------+--------+-------------+----------------------+----------------------
+
| XR LUMBAR SPINE 2 OR | Routin | 11/15/2017  |   S/P lumbar fusion  |   Results for this   
|
|  3 VW                | e      |  8:02 AM    |                      | procedure are in the 
|
|                      |        | PST         |                      |  results section.    
|
+----------------------+--------+-------------+----------------------+----------------------
+
 documented in this encounter
 
 Results
 XR Lumbar Spine 2 or 3 Vw (11/15/2017  8:02 AM PST)
 
+----------+
| Specimen |
+----------+
|          |
 
+----------+
 
 
 
+------------------------------------------------------------------------+---------------+
| Narrative                                                              | Performed At  |
+------------------------------------------------------------------------+---------------+
|   EXAM:XR LUMBAR SPINE 2 OR 3 VW     CLINICAL HISTORY: Postop          |   [facility] |
| COMPARISON: Lumbar spine radiograph dating to 2017            |               |
| FINDINGS: Frontal and lateral views.     Posterior and interbody       |               |
| fusion changes at L4-L5.   Intact hardware.   No change in  position   |               |
| of the interbody graft markers.   No change in spinal alignment.   No  |               |
|  interval compression deformities.     IMPRESSION -     No             |               |
| radiographic evidence for an interval complication.     Dictated and   |               |
| Signed by: Ranjan Mcdonald MD    Electronically signed: 11/15/2017    |               |
| 9:14 AM                                                                |               |
+------------------------------------------------------------------------+---------------+
 
 
 
+----------------------------------------------------------------------------------+
| Procedure Note                                                                   |
+----------------------------------------------------------------------------------+
|   [facility] Results In - 11/15/2017  9:17 AM PST  EXAM:XR LUMBAR SPINE 2 OR 3 VW  |
|                                                                                  |
| CLINICAL HISTORY: Postop                                                         |
|                                                                                  |
| COMPARISON: Lumbar spine radiograph dating to 2017                      |
|                                                                                  |
| FINDINGS: Frontal and lateral views.                                             |
|                                                                                  |
| Posterior and interbody fusion changes at L4-L5.  Intact hardware.  No change in |
| position of the interbody graft markers.  No change in spinal alignment.  No     |
| interval compression deformities.                                                |
|                                                                                  |
| IMPRESSION -                                                                     |
|                                                                                  |
| No radiographic evidence for an interval complication.                           |
|                                                                                  |
| Dictated and Signed by: Ranjan Mcdonald MD                                      |
|  Electronically signed: 11/15/2017 9:14 AM                                       |
+----------------------------------------------------------------------------------+
 
 
 
+---------------+---------+--------------------+--------------+
| Performing    | Address | City/State/Zipcode | Phone Number |
| Organization  |         |                    |              |
+---------------+---------+--------------------+--------------+
|   [facility] |         |                    |              |
+---------------+---------+--------------------+--------------+
 documented in this encounter
 
 Visit Diagnoses
 
 
+-----------------------------------------+
| Diagnosis                               |
+-----------------------------------------+
|   S/P lumbar fusion  Arthrodesis status |
 
+-----------------------------------------+
 documented in this encounter

## 2020-06-08 NOTE — XMS
Encounter Summary
  Created on: 2020
 
 Memo Deni Siu
 External Reference #: [id number]
 : 52
 Sex: Male
 
 Demographics
 
 
+-----------------------+---------------------------+
| Address               | [address]           |
|                       | [place]  [address] |
+-----------------------+---------------------------+
| Home Phone            | [phone]           |
+-----------------------+---------------------------+
| Preferred Language    | Unknown                   |
+-----------------------+---------------------------+
| Marital Status        |                    |
+-----------------------+---------------------------+
| Islam Affiliation | Unknown                   |
+-----------------------+---------------------------+
| Race                  | Unknown                   |
+-----------------------+---------------------------+
| Ethnic Group          | Unknown                   |
+-----------------------+---------------------------+
 
 
 Author
 
 
+--------------+--------------------------------------------+
| Author       | [facility]  |
|              | and [place]                                |
+--------------+--------------------------------------------+
| Organization | [facility]  |
|              | and [place]                                |
+--------------+--------------------------------------------+
| Address      | Unknown                                    |
+--------------+--------------------------------------------+
| Phone        | Unavailable                                |
+--------------+--------------------------------------------+
 
 
 
 Support
 
 
+---------------+--------------+---------+-----------------+
| Name          | Relationship | Address | Phone           |
+---------------+--------------+---------+-----------------+
| Sofiya A Brown | ECON         | Unknown | [phone] |
+---------------+--------------+---------+-----------------+
 
 
 
 Care Team Providers
 
 
 
+-------------------------+------+-----------------+
| Care Team Member Name   | Role | Phone           |
+-------------------------+------+-----------------+
| Ck Michelle MD | PCP  | [phone] |
+-------------------------+------+-----------------+
 
 
 
 Encounter Details
 
 
+--------+-------------+---------------------+----------------------+-------------+
| Date   | Type        | Department          | Care Team            | Description |
+--------+-------------+---------------------+----------------------+-------------+
| / | Orders Only |   [facility]   |   Shehzadi, Romana,  |             |
| 2019   |             | [address]      | MD  [address]    |             |
|        |             | [address]        | [place] [address]   |             |
|        |             | [address]          | [phone]         |             |
|        |             | [phone]        | [phone] (Fax)   |             |
+--------+-------------+---------------------+----------------------+-------------+
 
 
 
 Social History
 
 
+--------------+-------+-----------+--------+------+
| Tobacco Use  | Types | Packs/Day | Years  | Date |
|              |       |           | Used   |      |
+--------------+-------+-----------+--------+------+
| Never Smoker |       |           |        |      |
+--------------+-------+-----------+--------+------+
 
 
 
+---------------------+---+---+---+
| Smokeless Tobacco:  |   |   |   |
| Never Used          |   |   |   |
+---------------------+---+---+---+
 
 
 
+-------------+----------------------+---------+--------------+
| Alcohol Use | Drinks/Week          | oz/Week | Comments     |
+-------------+----------------------+---------+--------------+
| No          |   0 Standard drinks  | 0.0     | Alcoholic    |
|             | or equivalent        |         | Drinks/day:  |
|             |                      |         | Occasionally |
+-------------+----------------------+---------+--------------+
 
 
 
+------------------+---------------+
| Sex Assigned at  | Date Recorded |
| Birth            |               |
+------------------+---------------+
| Not on file      |               |
+------------------+---------------+
 
 
 
 
+----------------+-------------+-------------+
| Job Start Date | Occupation  | Industry    |
+----------------+-------------+-------------+
| Not on file    | Not on file | Not on file |
+----------------+-------------+-------------+
 
 
 
+----------------+--------------+------------+
| Travel History | Travel Start | Travel End |
+----------------+--------------+------------+
 
 
 
+-------------------------------------+
| No recent travel history available. |
+-------------------------------------+
 documented as of this encounter
 
 Plan of Treatment
 
 
+--------+---------+-----------+----------------------+-------------+
| Date   | Type    | Specialty | Care Team            | Description |
+--------+---------+-----------+----------------------+-------------+
| / | Office  | Neurology |   Ravindra Munoz MD  [address] |             |
| 2020   | Visit   |           |  [address]      |             |
|        |         |           | [address]  [address]  |             |
|        |         |           | [place] [address]             |             |
|        |         |           | [phone]         |             |
|        |         |           | [phone] (Fax)   |             |
+--------+---------+-----------+----------------------+-------------+
 documented as of this encounter
 
 Procedures
 
 
+----------------+--------+-------------+----------------------+----------------------+
| Procedure Name | Priori | Date/Time   | Associated Diagnosis | Comments             |
|                | ty     |             |                      |                      |
+----------------+--------+-------------+----------------------+----------------------+
| VITAMIN B-12   | Routin | 2019  |                      |   Results for this   |
|                | e      | 10:04 AM    |                      | procedure are in the |
|                |        | PDT         |                      |  results section.    |
+----------------+--------+-------------+----------------------+----------------------+
| FOLATE         | Routin | 2019  |                      |   Results for this   |
|                | e      | 10:04 AM    |                      | procedure are in the |
|                |        | PDT         |                      |  results section.    |
+----------------+--------+-------------+----------------------+----------------------+
 documented in this encounter
 
 Results
 Folate (2019 10:04 AM PDT)
 
+-----------+-------+-----------+------------+--------------+
| Component | Value | Ref Range | Performed  | Pathologist  |
|           |       |           | At         | Signature    |
 
+-----------+-------+-----------+------------+--------------+
| Folate    | >24.0 | ng/mL     | EXTERNAL   |              |
|           |       |           | LAB        |              |
+-----------+-------+-----------+------------+--------------+
 
 
 
+-----------------+
| Specimen        |
+-----------------+
| Blood specimen  |
| (specimen)      |
+-----------------+
 
 
 
+----------------+---------+--------------------+--------------+
| Performing     | Address | City/State/Zipcode | Phone Number |
| Organization   |         |                    |              |
+----------------+---------+--------------------+--------------+
|   EXTERNAL LAB |         |                    |              |
+----------------+---------+--------------------+--------------+
 Vitamin B-12  10:04 AM PDT)
 
+-----------+-------+--------------+------------+--------------+
| Component | Value | Ref Range    | Performed  | Pathologist  |
|           |       |              | At         | Signature    |
+-----------+-------+--------------+------------+--------------+
| VITAMIN   | 909   | 254 - 1,320  | EXTERNAL   |              |
| B-12      |       | pg/mL        | LAB        |              |
+-----------+-------+--------------+------------+--------------+
 
 
 
+-----------------+
| Specimen        |
+-----------------+
| Blood specimen  |
| (specimen)      |
+-----------------+
 
 
 
+----------------+---------+--------------------+--------------+
| Performing     | Address | City/State/Zipcode | Phone Number |
| Organization   |         |                    |              |
+----------------+---------+--------------------+--------------+
|   EXTERNAL LAB |         |                    |              |
+----------------+---------+--------------------+--------------+
 documented in this encounter
 
 Visit Diagnoses
 Not on filedocumented in this encounter

## 2020-06-08 NOTE — XMS
Encounter Summary
  Created on: 2020
 
 Memo Deni Siu
 External Reference #: [id number]
 : 52
 Sex: Male
 
 Demographics
 
 
+-----------------------+---------------------------+
| Address               | [address]           |
|                       | [place]  [address] |
+-----------------------+---------------------------+
| Home Phone            | [phone]           |
+-----------------------+---------------------------+
| Preferred Language    | Unknown                   |
+-----------------------+---------------------------+
| Marital Status        |                    |
+-----------------------+---------------------------+
| Mormonism Affiliation | Unknown                   |
+-----------------------+---------------------------+
| Race                  | Unknown                   |
+-----------------------+---------------------------+
| Ethnic Group          | Unknown                   |
+-----------------------+---------------------------+
 
 
 Author
 
 
+--------------+--------------------------------------------+
| Author       | [facility]  |
|              | and [place]                                |
+--------------+--------------------------------------------+
| Organization | [facility]  |
|              | and [place]                                |
+--------------+--------------------------------------------+
| Address      | Unknown                                    |
+--------------+--------------------------------------------+
| Phone        | Unavailable                                |
+--------------+--------------------------------------------+
 
 
 
 Support
 
 
+---------------+--------------+---------+-----------------+
| Name          | Relationship | Address | Phone           |
+---------------+--------------+---------+-----------------+
| Sofiya A Brown | ECON         | Unknown | [phone] |
+---------------+--------------+---------+-----------------+
 
 
 
 Care Team Providers
 
 
 
+-------------------------+------+-----------------+
| Care Team Member Name   | Role | Phone           |
+-------------------------+------+-----------------+
| Ck Michelle MD | PCP  | [phone] |
+-------------------------+------+-----------------+
 
 
 
 Encounter Details
 
 
+--------+----------+----------------------+-------------------+-------------+
| Date   | Type     | Department           | Care Team         | Description |
+--------+----------+----------------------+-------------------+-------------+
| / | Episode  |   [facility] [place]          |   Cynthia Velasquez,  |             |
| 2017   | Changes  | NEUROSURGERY  [address]  | [place]           |             |
|        |          | [address]     |                   |             |
|        |          | [place]      |                   |             |
|        |          | [address]           |                   |             |
|        |          | [phone]         |                   |             |
+--------+----------+----------------------+-------------------+-------------+
 
 
 
 Social History
 
 
+--------------+-------+-----------+--------+------+
| Tobacco Use  | Types | Packs/Day | Years  | Date |
|              |       |           | Used   |      |
+--------------+-------+-----------+--------+------+
| Never Smoker |       |           |        |      |
+--------------+-------+-----------+--------+------+
 
 
 
+---------------------+---+---+---+
| Smokeless Tobacco:  |   |   |   |
| Never Used          |   |   |   |
+---------------------+---+---+---+
 
 
 
+-------------+----------------------+---------+----------------------+
| Alcohol Use | Drinks/Week          | oz/Week | Comments             |
+-------------+----------------------+---------+----------------------+
| Yes         |   0 Standard drinks  | 0.0     | 1-2 drinks 2-4 times |
|             | or equivalent        |         |  per month           |
+-------------+----------------------+---------+----------------------+
 
 
 
+------------------+---------------+
| Sex Assigned at  | Date Recorded |
| Birth            |               |
+------------------+---------------+
| Not on file      |               |
+------------------+---------------+
 
 
 
 
+----------------+-------------+-------------+
| Job Start Date | Occupation  | Industry    |
+----------------+-------------+-------------+
| Not on file    | Not on file | Not on file |
+----------------+-------------+-------------+
 
 
 
+----------------+--------------+------------+
| Travel History | Travel Start | Travel End |
+----------------+--------------+------------+
 
 
 
+-------------------------------------+
| No recent travel history available. |
+-------------------------------------+
 documented as of this encounter
 
 Plan of Treatment
 
 
+--------+---------+-----------+----------------------+-------------+
| Date   | Type    | Specialty | Care Team            | Description |
+--------+---------+-----------+----------------------+-------------+
| / | Office  | Neurology |   Ravindra Munoz MD  [address] |             |
| 2020   | Visit   |           |  [address]      |             |
|        |         |           | [place]  |             |
|        |         |           | [place] [address]             |             |
|        |         |           | [phone]         |             |
|        |         |           | [phone] (Fax)   |             |
+--------+---------+-----------+----------------------+-------------+
 documented as of this encounter
 
 Visit Diagnoses
 Not on filedocumented in this encounter

## 2020-06-08 NOTE — XMS
Encounter Summary
  Created on: 2020
 
 Memo Deni Siu
 External Reference #: [id number]
 : 52
 Sex: Male
 
 Demographics
 
 
+-----------------------+---------------------------+
| Address               | [address]           |
|                       | [place]  [address] |
+-----------------------+---------------------------+
| Home Phone            | [phone]           |
+-----------------------+---------------------------+
| Preferred Language    | Unknown                   |
+-----------------------+---------------------------+
| Marital Status        |                    |
+-----------------------+---------------------------+
| Mu-ism Affiliation | Unknown                   |
+-----------------------+---------------------------+
| Race                  | Unknown                   |
+-----------------------+---------------------------+
| Ethnic Group          | Unknown                   |
+-----------------------+---------------------------+
 
 
 Author
 
 
+--------------+--------------------------------------------+
| Author       | [facility]  |
|              | and [place]                                |
+--------------+--------------------------------------------+
| Organization | [facility]  |
|              | and [place]                                |
+--------------+--------------------------------------------+
| Address      | Unknown                                    |
+--------------+--------------------------------------------+
| Phone        | Unavailable                                |
+--------------+--------------------------------------------+
 
 
 
 Support
 
 
+---------------+--------------+---------+-----------------+
| Name          | Relationship | Address | Phone           |
+---------------+--------------+---------+-----------------+
| Sofiya A Brown | ECON         | Unknown | [phone] |
+---------------+--------------+---------+-----------------+
 
 
 
 Care Team Providers
 
 
 
+-----------------------+------+-------------+
| Care Team Member Name | Role | Phone       |
+-----------------------+------+-------------+
 PCP  | Unavailable |
+-----------------------+------+-------------+
 
 
 
 Encounter Details
 
 
+--------+-----------+----------------------+-----------+-------------+
| Date   | Type      | Department           | Care Team | Description |
+--------+-----------+----------------------+-----------+-------------+
| / | Hospital  |   [facility] |           |             |
|    | Encounter |  [facility]  |           |             |
|        |           |  [address] |           |             |
|        |           |  [place]           |           |             |
|        |           | [address]           |           |             |
|        |           | [phone]         |           |             |
+--------+-----------+----------------------+-----------+-------------+
 
 
 
 Social History
 
 
+----------------+-------+-----------+--------+------+
| Tobacco Use    | Types | Packs/Day | Years  | Date |
|                |       |           | Used   |      |
+----------------+-------+-----------+--------+------+
| Never Assessed |       |           |        |      |
+----------------+-------+-----------+--------+------+
 
 
 
+------------------+---------------+
| Sex Assigned at  | Date Recorded |
| Birth            |               |
+------------------+---------------+
| Not on file      |               |
+------------------+---------------+
 
 
 
+----------------+-------------+-------------+
| Job Start Date | Occupation  | Industry    |
+----------------+-------------+-------------+
| Not on file    | Not on file | Not on file |
+----------------+-------------+-------------+
 
 
 
+----------------+--------------+------------+
| Travel History | Travel Start | Travel End |
+----------------+--------------+------------+
 
 
 
 
+-------------------------------------+
| No recent travel history available. |
+-------------------------------------+
 documented as of this encounter
 
 Plan of Treatment
 
 
+--------+---------+-----------+----------------------+-------------+
| Date   | Type    | Specialty | Care Team            | Description |
+--------+---------+-----------+----------------------+-------------+
| / | Office  | Neurology |   Ravindra Munoz MD  [address] |             |
|    | Visit   |           |  [address]      |             |
|        |         |           | [address]  [address]  |             |
|        |         |           | [place] [address]             |             |
|        |         |           | [phone]         |             |
|        |         |           | [phone] (Fax)   |             |
+--------+---------+-----------+----------------------+-------------+
 documented as of this encounter
 
 Visit Diagnoses
 Not on filedocumented in this encounter

## 2020-06-08 NOTE — XMS
Encounter Summary
  Created on: 2020
 
 Memo Deni Siu
 External Reference #: [id number]
 : 52
 Sex: Male
 
 Demographics
 
 
+-----------------------+---------------------------+
| Address               | [address]           |
|                       | [place]  [address] |
+-----------------------+---------------------------+
| Home Phone            | [phone]           |
+-----------------------+---------------------------+
| Preferred Language    | Unknown                   |
+-----------------------+---------------------------+
| Marital Status        |                    |
+-----------------------+---------------------------+
| Catholic Affiliation | Unknown                   |
+-----------------------+---------------------------+
| Race                  | Unknown                   |
+-----------------------+---------------------------+
| Ethnic Group          | Unknown                   |
+-----------------------+---------------------------+
 
 
 Author
 
 
+--------------+--------------------------------------------+
| Author       | [facility] [place]  |
|              | and [place]                                |
+--------------+--------------------------------------------+
| Organization | [facility]  |
|              | and [place]                                |
+--------------+--------------------------------------------+
| Address      | Unknown                                    |
+--------------+--------------------------------------------+
| Phone        | Unavailable                                |
+--------------+--------------------------------------------+
 
 
 
 Support
 
 
+---------------+--------------+---------+-----------------+
| Name          | Relationship | Address | Phone           |
+---------------+--------------+---------+-----------------+
| Sofiya A Brown | ECON         | Unknown | [phone] |
+---------------+--------------+---------+-----------------+
 
 
 
 Care Team Providers
 
 
 
+-----------------------+------+-----------------+
| Care Team Member Name | Role | Phone           |
+-----------------------+------+-----------------+
| Emely Hinton    | PCP  | [phone] |
+-----------------------+------+-----------------+
 
 
 
 Reason for Visit
 
 
+--------+----------+
| Reason | Comments |
+--------+----------+
| Other  | [facility]     |
+--------+----------+
 Evaluate & Treat (Routine)
 
+--------+--------+-----------+--------------+--------------+---------------+
| Status | Reason | Specialty | Diagnoses /  | Referred By  | Referred To   |
|        |        |           | Procedures   | Contact      | Contact       |
+--------+--------+-----------+--------------+--------------+---------------+
| Closed |        | Neurology |   Diagnoses  |   [place],    |   Ravindra Munoz,   |
|        |        |           |  [employer]    | [place]  | MD  [address]      |
|        |        |           | disorder due |  [address]     | [address]      |
|        |        |           |  to known    | [address]  | [address]  [address]  |
|        |        |           | physiologica |  [place],  | [address]  [address], |
|        |        |           | l condition  | [place]           |  [place] [address]     |
|        |        |           | ([facility])        | [address]   | Phone:        |
|        |        |           |              | Phone:       | [phone]  |
|        |        |           |              | [phone] |  Fax:         |
|        |        |           |              |   Fax:       | [phone]  |
|        |        |           |              | [phone] |               |
+--------+--------+-----------+--------------+--------------+---------------+
 
 
 
 
 Encounter Details
 
 
+--------+-----------+---------------------+----------------------+----------------------+
| Date   | Type      | Department          | Care Team            | Description          |
+--------+-----------+---------------------+----------------------+----------------------+
| / | Clinical  |   [facility]     |   Ravindra Munoz MD  [address] | Memory loss (Primary |
| 2020   | Support   | NEUROLOGY  [address]     |  [address]      |  Dx)                 |
|        |           | [address]   | [address]  [address],  |                      |
|        |           | [place], [place]        | [place] [address]             |                      |
|        |           | [address]          | [phone]         |                      |
|        |           | [phone]        | [phone] (Fax)   |                      |
+--------+-----------+---------------------+----------------------+----------------------+
 
 
 
 Social History
 
 
+--------------+-------+-----------+--------+------+
 
| Tobacco Use  | Types | Packs/Day | Years  | Date |
|              |       |           | Used   |      |
+--------------+-------+-----------+--------+------+
| Never Smoker |       |           |        |      |
+--------------+-------+-----------+--------+------+
 
 
 
+---------------------+---+---+---+
| Smokeless Tobacco:  |   |   |   |
| Never Used          |   |   |   |
+---------------------+---+---+---+
 
 
 
+-------------+----------------------+---------+--------------+
| Alcohol Use | Drinks/Week          | oz/Week | Comments     |
+-------------+----------------------+---------+--------------+
| Yes         |   0 Standard drinks  | 0.0     | Alcoholic    |
|             | or equivalent        |         | Drinks/day:  |
|             |                      |         | Occasionally |
+-------------+----------------------+---------+--------------+
 
 
 
+------------------+---------------+
| Sex Assigned at  | Date Recorded |
| Birth            |               |
+------------------+---------------+
| Not on file      |               |
+------------------+---------------+
 
 
 
+----------------+-------------+-------------+
| Job Start Date | Occupation  | Industry    |
+----------------+-------------+-------------+
| Not on file    | Not on file | Not on file |
+----------------+-------------+-------------+
 
 
 
+----------------+--------------+------------+
| Travel History | Travel Start | Travel End |
+----------------+--------------+------------+
 
 
 
+-------------------------------------+
| No recent travel history available. |
+-------------------------------------+
 documented as of this encounter
 
 Progress Notes
 Jabier Major, Medical Assistant - 2020  9:30 AM PDT
 [facility] 
 The patient returns today for detailed cognitive evaluation for memory difficulty. 
 Behavior Observations: 
 The patient seemed alert (choose from the following: alert, engaged and attentive, drowsy. 
Add any other observations) during the examination. 
 
 The examination processes was explained to the patient in detail. 
 Test Procedure: 
 The patient underwent detailed cognitive function evaluation which composed of visuospatial
 testing including overlapping pentagons, wire cube and draw a clock; language test includin
g naming,comprehension (one-stage, three-stage, complex grammar), repetition (single works a
nd phrases), reading (regular and irregular) and writing; orientation; attention and concent
ration including immediate recall, calculation or spelling and memory testing including reca
ll, anterograde memory, retrograde memory and verbal fluency. 
 Test results: 
 1. Mini Mental Status Examination 
 The examination included orientation, attention/concentration, recall, language and visuosp
atial abilities. 
 The patient received a score of 27 /30. 
 2. Total. [facility] score: 73/100
 3. Orientation/Attention/Concentration 15 /18 
 4. Memory  
 5. Verbal fluency  
 6. Language  
 7. Visuospatial abilities  
 Total : 73 /100 
 VLOM-Ratio: 
 V + SL 
 ----------------------------------------------- = 3.3
 O + M 
 If VLOM-ratio < 2.2: Frontotemporal dementia. 
 VLOM-ratio> 3.2 AD 
  
 [facility] test with a total score of 73/100 MMSE total score of 25/30 with VLOM ratio of 3.3, th
at is suggestive of mild dementia, Alzheimer type.  Clinical correlation is recommended.
 Normal cognition: 91+
 Mild cognitive impairment: 84-90 
 Mild Alzheimer's disease: 70-83 
 Moderate Alzheimer's disease: 50-69 
 Severe Alzheimer's disease: 30-49 
 End stage Alzheimer's disease: <30 
 
 Electronically signed by Ravindra Munoz MD at 2020  1:54 PM PDTdocumented in this encounter
 
 Plan of Treatment
 
 
+--------+---------+-----------+----------------------+-------------+
| Date   | Type    | Specialty | Care Team            | Description |
+--------+---------+-----------+----------------------+-------------+
| / | Office  | Neurology |   Ravindra Munoz MD  [address] |             |
2020   | Visit   |           |  [facility]      |             |
|        |         |           | [place],  |             |
|        |         |           | [place] [address]             |             |
|        |         |           | [phone]         |             |
|        |         |           | [phone] (Fax)   |             |
+--------+---------+-----------+----------------------+-------------+
 documented as of this encounter
 
 Visit Diagnoses
 
 
+-------------------------+
| Diagnosis               |
+-------------------------+
|   Memory loss - Primary |
 
+-------------------------+
 documented in this encounter

## 2020-06-08 NOTE — XMS
Encounter Summary
  Created on: 2020
 
 Memo Deni Siu
 External Reference #: [id number]
 : 52
 Sex: Male
 
 Demographics
 
 
+-----------------------+---------------------------+
| Address               | [address]           |
|                       | [place]  [address] |
+-----------------------+---------------------------+
| Home Phone            | [phone]           |
+-----------------------+---------------------------+
| Preferred Language    | Unknown                   |
+-----------------------+---------------------------+
| Marital Status        |                    |
+-----------------------+---------------------------+
| [employer] Affiliation | Unknown                   |
+-----------------------+---------------------------+
| Race                  | Unknown                   |
+-----------------------+---------------------------+
| Ethnic Group          | Unknown                   |
+-----------------------+---------------------------+
 
 
 Author
 
 
+--------------+--------------------------------------------+
| Author       | [facility]  |
|              | [facility] [place]                                |
+--------------+--------------------------------------------+
| Organization | [facility]  |
|              | [facility] [place]                                |
+--------------+--------------------------------------------+
| Address      | Unknown                                    |
+--------------+--------------------------------------------+
| Phone        | Unavailable                                |
+--------------+--------------------------------------------+
 
 
 
 Support
 
 
+---------------+--------------+---------+-----------------+
| Name          | Relationship | Address | Phone           |
+---------------+--------------+---------+-----------------+
| Sofiya A Brown | ECON         | Unknown | [phone] |
+---------------+--------------+---------+-----------------+
 
 
 
 Care Team Providers
 
 
 
+-------------------------+------+-----------------+
| Care Team Member Name   | Role | Phone           |
+-------------------------+------+-----------------+
| Ck Michelle MD | PCP  | [phone] |
+-------------------------+------+-----------------+
 
 
 
 Reason for Visit
 
 
+--------------+-----------+
| Reason       | Comments  |
+--------------+-----------+
| Imaging Only | 6M PO XR  |
+--------------+-----------+
 
 
 
 Encounter Details
 
 
+--------+-----------+----------------------+----------------------+----------------------+
| Date   | Type      | Department           | Care Team            | Description          |
+--------+-----------+----------------------+----------------------+----------------------+
| / | Telephone |   [facility]          |   Dreyer, Jason A,   | Imaging Only (6M PO  |
| 2018   |           | NEUROSURGERY  [address]  | DO  [address]    | XR )                 |
|        |           | [address]     | [address]  [place], [place] |                      |
|        |           | [place], [place]      |  [id number] [phone] |                      |
|        |           | [address]           |   [phone] (Fax) |                      |
|        |           | [phone]         |                      |                      |
+--------+-----------+----------------------+----------------------+----------------------+
 
 
 
 Social History
 
 
+--------------+-------+-----------+--------+------+
| Tobacco Use  | Types | Packs/Day | Years  | Date |
|              |       |           | Used   |      |
+--------------+-------+-----------+--------+------+
| Never Smoker |       |           |        |      |
+--------------+-------+-----------+--------+------+
 
 
 
+---------------------+---+---+---+
| Smokeless Tobacco:  |   |   |   |
| Never Used          |   |   |   |
+---------------------+---+---+---+
 
 
 
+-------------+----------------------+---------+----------------------+
| Alcohol Use | Drinks/Week          | oz/Week | Comments             |
+-------------+----------------------+---------+----------------------+
| No          |   0 Standard drinks  | 0.0     | No longer drinking   |
 
|             | or equivalent        |         | 1-2 drinks 2-4 times |
|             |                      |         |  a month             |
+-------------+----------------------+---------+----------------------+
 
 
 
+------------------+---------------+
| Sex Assigned at  | Date Recorded |
| Birth            |               |
+------------------+---------------+
| Not on file      |               |
+------------------+---------------+
 
 
 
+----------------+-------------+-------------+
| Job Start Date | Occupation  | Industry    |
+----------------+-------------+-------------+
| Not on file    | Not on file | Not on file |
+----------------+-------------+-------------+
 
 
 
+----------------+--------------+------------+
| Travel History | Travel Start | Travel End |
+----------------+--------------+------------+
 
 
 
+-------------------------------------+
| No recent travel history available. |
+-------------------------------------+
 documented as of this encounter
 
 Plan of Treatment
 
 
+--------+---------+-----------+----------------------+-------------+
| Date   | Type    | Specialty | Care Team            | Description |
+--------+---------+-----------+----------------------+-------------+
| / | Office  | Neurology |   Ravindra Munoz MD  [address] |             |
| 2020   | Visit   |           |  [address]      |             |
|        |         |           | [place]  |             |
|        |         |           | [place] [address]             |             |
|        |         |           | [phone]         |             |
|        |         |           | [phone] (Fax)   |             |
+--------+---------+-----------+----------------------+-------------+
 documented as of this encounter
 
 Visit Diagnoses
 Not on filedocumented in this encounter

## 2020-06-08 NOTE — XMS
Encounter Summary
  Created on: 2020
 
 Memo Deni Siu
 External Reference #: [id number]
 : 52
 Sex: Male
 
 Demographics
 
 
+-----------------------+---------------------------+
| Address               | [address]           |
|                       | [place]  [address] |
+-----------------------+---------------------------+
| Home Phone            | [phone]           |
+-----------------------+---------------------------+
| Preferred Language    | Unknown                   |
+-----------------------+---------------------------+
| Marital Status        |                    |
+-----------------------+---------------------------+
| Buddhist Affiliation | Unknown                   |
+-----------------------+---------------------------+
| Race                  | Unknown                   |
+-----------------------+---------------------------+
| Ethnic Group          | Unknown                   |
+-----------------------+---------------------------+
 
 
 Author
 
 
+--------------+--------------------------------------------+
| Author       | [facility]  |
|              | and [place]                                |
+--------------+--------------------------------------------+
| Organization | [facility]  |
|              | and [place]                                |
+--------------+--------------------------------------------+
| Address      | Unknown                                    |
+--------------+--------------------------------------------+
| Phone        | Unavailable                                |
+--------------+--------------------------------------------+
 
 
 
 Support
 
 
+---------------+--------------+---------+-----------------+
| Name          | Relationship | Address | Phone           |
+---------------+--------------+---------+-----------------+
| Sofiya A Brown | ECON         | Unknown | [phone] |
+---------------+--------------+---------+-----------------+
 
 
 
 Care Team Providers
 
 
 
+-----------------------+------+-----------------+
| Care Team Member Name | Role | Phone           |
+-----------------------+------+-----------------+
| Emely Hniton    | PCP  | [phone] |
+-----------------------+------+-----------------+
 
 
 
 Reason for Referral
 Diagnostic/Screening (Routine)
 
+--------------+--------+-----------+--------------+--------------+---------------+
| Status       | Reason | Specialty | Diagnoses /  | Referred By  | Referred To   |
|              |        |           | Procedures   | Contact      | Contact       |
+--------------+--------+-----------+--------------+--------------+---------------+
| Authorizatio |        |           |   Diagnoses  |   Ravindra Munoz,  |   [facility]  |
| n not        |        |           |  Memory loss | MD  [address]     | HOSPITAL      |
| Required     |        |           |   Procedures | [address]     | [address]       |
|              |        |           |   MRI Brain  | [address]  [address] | [address]   |
|              |        |           | [address]  |  [address]           | [place] |
|              |        |           |              | [place],   |  [address]   |
|              |        |           |              | [place] [address]     | Phone:        |
|              |        |           |              | Phone:       | [phone]  |
|              |        |           |              | [phone] |  Fax:         |
|              |        |           |              |   Fax:       | [phone]  |
|              |        |           |              | [phone] |               |
+--------------+--------+-----------+--------------+--------------+---------------+
 
 
 
 
 Reason for Visit
 
 
+--------------------+----------+
| Reason             | Comments |
+--------------------+----------+
| Neurologic Problem | Memory   |
+--------------------+----------+
 Evaluate & Treat (Routine)
 
+--------+--------+-----------+--------------+--------------+---------------+
| Status | Reason | Specialty | Diagnoses /  | Referred By  | Referred To   |
|        |        |           | Procedures   | Contact      | Contact       |
+--------+--------+-----------+--------------+--------------+---------------+
| Closed |        | Neurology |   Diagnoses  |   [place],    |   Ravindra Munoz,   |
|        |        |           |  [facility]    | MAYCOL Gates  | MD  [address]      |
|        |        |           | disorder due |  [address]     | [place]      |
|        |        |           |  to known    | [address]  | [address]IVE  [address]  |
|        |        |           | physiologica |  [place],  | [place]  [place], |
|        |        |           | l condition  | [place]           |  [place] [address]     |
|        |        |           | ([facility])        | [address]   | Phone:        |
|        |        |           |              | Phone:       | [phone]  |
|        |        |           |              | [phone] |  Fax:         |
|        |        |           |              |   Fax:       | [phone]  |
|        |        |           |              | [phone] |               |
+--------+--------+-----------+--------------+--------------+---------------+
 
 
 
 
 
 Encounter Details
 
 
+--------+---------+---------------------+----------------------+----------------------+
| Date   | Type    | Department          | Care Team            | Description          |
+--------+---------+---------------------+----------------------+----------------------+
| / | Office  |   [facility]     |   Ravindra Munoz MD  [address] | Memory loss (Primary |
| 2020   | Visit   | NEUROLOGY  [address]     |  [address]      |  Dx); Driving safety |
|        |         | [place]   | [address],  |  issue               |
|        |         | [place], [place]        | [place] [address]             |                      |
|        |         | [address]          | [phone]         |                      |
|        |         | [phone]        | [phone] (Fax)   |                      |
+--------+---------+---------------------+----------------------+----------------------+
 
 
 
 Social History
 
 
+--------------+-------+-----------+--------+------+
| Tobacco Use  | Types | Packs/Day | Years  | Date |
|              |       |           | Used   |      |
+--------------+-------+-----------+--------+------+
| Never Smoker |       |           |        |      |
+--------------+-------+-----------+--------+------+
 
 
 
+---------------------+---+---+---+
| Smokeless Tobacco:  |   |   |   |
| Never Used          |   |   |   |
+---------------------+---+---+---+
 
 
 
+-------------+----------------------+---------+--------------+
| Alcohol Use | Drinks/Week          | oz/Week | Comments     |
+-------------+----------------------+---------+--------------+
| Yes         |   0 Standard drinks  | 0.0     | Alcoholic    |
|             | or equivalent        |         | Drinks/day:  |
|             |                      |         | Occasionally |
+-------------+----------------------+---------+--------------+
 
 
 
+------------------+---------------+
| Sex Assigned at  | Date Recorded |
| Birth            |               |
+------------------+---------------+
| Not on file      |               |
+------------------+---------------+
 
 
 
+----------------+-------------+-------------+
| Job Start Date | Occupation  | Industry    |
+----------------+-------------+-------------+
 
| Not on file    | Not on file | Not on file |
+----------------+-------------+-------------+
 
 
 
+----------------+--------------+------------+
| Travel History | Travel Start | Travel End |
+----------------+--------------+------------+
 
 
 
+-------------------------------------+
| No recent travel history available. |
+-------------------------------------+
 documented as of this encounter
 
 Last Filed Vital Signs
 
 
+-------------------+-------------------+----------------------+----------+
| Vital Sign        | Reading           | Time Taken           | Comments |
+-------------------+-------------------+----------------------+----------+
| Blood Pressure    | 125/73            | 2020  8:01 AM  |          |
|                   |                   | PST                  |          |
+-------------------+-------------------+----------------------+----------+
| Pulse             | 71                | 2020  8:01 AM  |          |
|                   |                   | PST                  |          |
+-------------------+-------------------+----------------------+----------+
| Temperature       | -                 | -                    |          |
+-------------------+-------------------+----------------------+----------+
| Respiratory Rate  | -                 | -                    |          |
+-------------------+-------------------+----------------------+----------+
| Oxygen Saturation | 96%               | 2020  8:01 AM  |          |
|                   |                   | PST                  |          |
+-------------------+-------------------+----------------------+----------+
| Inhaled Oxygen    | -                 | -                    |          |
| Concentration     |                   |                      |          |
+-------------------+-------------------+----------------------+----------+
| Weight            | 101.2 kg (223 lb) | 2020  8:01 AM  |          |
|                   |                   | PST                  |          |
+-------------------+-------------------+----------------------+----------+
| Height            | 190.5 cm (6' 3")  | 2020  8:01 AM  |          |
|                   |                   | PST                  |          |
+-------------------+-------------------+----------------------+----------+
| Body Mass Index   | 27.87             | 2020  8:01 AM  |          |
|                   |                   | PST                  |          |
+-------------------+-------------------+----------------------+----------+
 documented in this encounter
 
 Patient Instructions
 Patient Instructions Ravindra Munoz MD - 2020  7:50 AM PSTWill plan for a brain MRI
 Plan for a memory test
 Please bring your blood work results with you at your next visit
 Family should closely monitor driving and medication safetyElectronically signed by Ravindra Munoz MD at 2020  8:43 AM PST
 documented in this encounter
 
 Progress Notes
 Jabier Major, Medical Assistant - 2020  7:50 AM PSTInformed patient of results n
o further questions Electronically signed by Jabier Major Medical Assistant at 20
 
20  3:57 PM Ravindra Leija MD - 2020  7:50 AM PSTReceived brain MRI wo contrast, [facility]: normal appearnce of the brain. Sinusites despite a Fess procedure. Electronicall
y signed by Ravindra Munoz MD at 2020  8:36 AM PSTRavindra Munoz MD - 2020  7:50 AM PSTForm
atting of this note might be different from the original.
 Referring Physician: MAYCOL Harmon  
 PCP: MAYCOL Harmon 
 Date of Encounter: 2020 
 
 CHIEF COMPLAINT: memory loss
 
 HISTORY OF PRESENT ILLNESS
 Deni Hampton is a pleasant 69 y.o. male with history of hypertension, hyperlipidemia, 
anxiety and depression, LAURA on CPAP, history of GI bleed due to diverticulitis who presents 
to the clinic today for evaluation of memory loss.  The patient was accompanied by his wife.
 The patient reports short-term memory loss for at least 2 years and has been progressive.  
Symptoms were first noted by his children.  He has difficulty remembering names.  He forget 
things he plans to do.  He forgets the conversation that occurred earlier the same day.  He 
had difficulty finding driving directions in [place] which he has lived most of his life. 
 Last year during a camping trip, he forgot to turn off there burner on the trailer.  Wife s
loc feels that he is safe to drive.  Wife feels that he is okay taking his own medications.
  He denies difficulty with basic ADLs.
 
 He denies new medication change over the last 2 years.
 
 Associated symptoms:
 Hallucinations: no
 Bladder incontinence:  no
 Balance: mild, history of left ankle and bilateral knee surgery
 Abnormal movement:  no
 Mood and personality changes:  Occasional agitation
 
 Previous work up
 Has recent labs but we do not have results
 
 Highest education AA + one year
 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  
 Family history negative for dementia or Parkinson disease 
 
 ALLERGIES
 Allergies 
 Allergen Reactions 
   Meperidine Nausea And Vomiting 
   Morphine Hallucination 
   Oxycodone Other (See Comments) 
   Hallucinations & sleepiness 
  
 MEDICATIONS
 
 Current Outpatient Medications: 
    aspirin 81 mg chewable tablet, Take 81 mg by mouth daily., Disp: , Rfl: 
    atorvaSTATin (LIPITOR) 40 mg tablet, Take 40 mg by mouth Daily., Disp: , Rfl: 
    B Complex Vitamins (VITAMIN B COMPLEX) tablet, Take 1 tablet by mouth Daily., Disp: , 
Rfl: 
    FLUoxetine (PROZAC) 10 mg capsule, Take 10 mg by mouth daily., Disp: , Rfl: 
    fluticasone (FLONASE) 50 mcg/nasal spray, 1 spray by Nasal route Daily., Disp: , Rfl: 
    metoprolol succinate (TOPROL-XL) 25 mg 24 hr tablet, Take 25 mg by mouth Daily., Disp:
 , Rfl: 
    Probiotic Product (PROBIOTIC DAILY PO), Take  by mouth., Disp: , Rfl: 
    Simethicone (GAS-X PO), Take  by mouth., Disp: , Rfl:  
 
 
 PAST MEDICAL HISTORY
 Past Medical History: 
 Diagnosis Date 
   Allergic rhinitis  
   Anemia  
   Anxiety disorder  
   Atherosclerosis  
   BPH (benign prostatic hyperplasia)  
   Depression  
   Diverticulosis of intestine  
   HLD (hyperlipidemia)  
   Hyperlipidemia  
   Internal derangement of knee  
   Low back pain  
   Lumbago with sciatica, right side  
   LAURA on CPAP  
   Osteoarthritis, hand  
   Osteoarthrosis, unspecified whether generalized or localized, unspecified site  
   Sleep apnea  
  uses CPAP 
   Sleep apnea  
   Unspecified disorder of nose and nasal sinuses  
  
 FAMILY HISTORY
 Family History 
 Problem Relation Age of Onset 
   Multiple sclerosis Father  
   Coronary artery disease Father  
   Cancer Father  
      Multiple Myeloma 
   Lymphoma Mother  
   Rheum arthritis Mother  
   Cancer Mother  
      Pancreatic 
   Heart disease Paternal Grandfather  
   No known problems Paternal Grandmother  
   Cancer Maternal Grandfather  
   Cancer Maternal Grandmother  
   Lupus Sister  
   Other (see comment) Sister  
      Meniere's disease 
   No known problems Child  
   No known problems Child  
   Cancer Mother  
      pancreatic cancer 
   Heart disease Father  
   Other (see comment) Father  
      Multiple myeloma 
   Multiple sclerosis Father  
   Colon polyps Neg Hx  
   Colon cancer Neg Hx  
  
 SOCIAL HISTORY
 Social History 
 
 Socioeconomic History 
   Marital status:  
   Spouse name: Not on file 
   Number of children: 2 
   Years of education: Not on file 
 
   Highest education level: Not on file 
 Occupational History 
   Comment: RETIRED 
 Social Needs 
   Financial resource strain: Not on file 
   Food insecurity: 
   Worry: Not on file 
   Inability: Not on file 
   Transportation needs: 
   Medical: Not on file 
   Non-medical: Not on file 
 Tobacco Use 
   Smoking status: Never Smoker 
   Smokeless tobacco: Never Used 
 Substance and Sexual Activity 
   Alcohol use: Yes 
   Alcohol/week: 0.0 standard drinks 
   Comment: Alcoholic Drinks/day: Occasionally 
   Drug use: Yes 
   Types: Marijuana 
   Comment: CBD cream for back pain  
   Sexual activity: Not Currently 
 Lifestyle 
   Physical activity: 
   Days per week: Not on file 
   Minutes per session: Not on file 
   Stress: Not on file 
 Relationships 
   Social connections: 
   Talks on phone: Not on file 
   Gets together: Not on file 
   Attends Sabianism service: Not on file 
   Active member of club or organization: Not on file 
   Attends meetings of clubs or organizations: Not on file 
   Relationship status: Not on file 
   Intimate partner violence: 
   Fear of current or ex partner: Not on file 
   Emotionally abused: Not on file 
   Physically abused: Not on file 
   Forced sexual activity: Not on file 
 Other Topics Concern 
   Not on file 
 Social History Narrative 
  Lives in [facility], [facility], full code 
  
 
 REVIEW OF SYSTEMS
 A comprehensive 12 system ROS was performed with intake form, in addition to HPI, ROS also 
revealed:
 HENT: Positive for hearing loss.  
 Cardiovascular: Positive for chest pain. 
 Psychiatric/Behavioral: Positive for depression. The patient is nervous/anxious.  
 All other systems reviewed and are negative.
 
 PHYSICAL EXAM
 General Exam
 Vitals: 
  20 0801 
 BP: 125/73 
 Pulse: 71 
 
 PainSc:   2 
 PainLoc: Flank 
  
 WDWN, NAD
 Lungs are clear. Heart is regular 
 Neuro Exam
 MS
 Awake, alert, oriented x3 to year, month, date and day of the week. Cooperative and appropr
iate during the encounter. Speech is clear, fluent and coherent. 
 Registers 3/3 and recalls 1/3 items.  Spell world backwards as DLORD
 CN
 Fundus difficult to view through natural nondilated pupils. VFF to confrontation. 
 EOMI. PERRLA. 
 Facial sensation is intact. 
 Face is symmetric. 
 Hearing is decreased. 
 Palate elevation is symmetric. 
 Shoulder shrug 5/5. 
 Tongue protrusion is midline. 
 Motor
 Bulk and Tone: normal
 Muscle strength: full in all extremities
 deep tendon reflexes trace in the upper and lower extremities, except trace bilateral Achil
les Reflexes. Mayfield sign absent.
 Sensory
 Intact to light touch. 
 Coordination
 FNF intact
 Gait
 Steady 
 
 DATA
 Results for orders placed or performed in visit on 19 
 Vitamin B-12 
 Result Value Ref Range 
  VITAMIN B-12 909 254 - 1,320 pg/mL 
 Folate 
 Result Value Ref Range 
  Folate >24.0 ng/mL 
  
 
 ASSESSMENT & PLAN
 
 69 y.o. male who presents with progressive worsening cognitive impairment for 2 years at [facility]. Review of the history did not show significant medical conditions, use of medications, 
depression or sleep issues to explain the memory loss. Some of the items in history are spec
ific or severe enough to be concerned for dementia due to neurodegeneration.  Other secondar
y cause of cognitive impairment not excluded.
 
 Plan:
 - Recommend [facility] for more detailed memory test and to evaluate for patterns of cognitive de
ficit to suggest certain type of dementia
 - Plan for brain MRI to evaluate intracranial structural lesions for memory and also evalua
te for dementia
 - Recommended to check B12 and TSH; the patient states that he just had blood work done.  DENA mansfield is instructed to bring the lab results to his next visit for me to review
 - Discussed coping strategy and safety issues. Family to supervise medication and driving c
losely.  He should stop driving if there is any concern of driving safety.
 - Follow up after the tests
 
 
 Thank you for allowing me to take care of this patient. Please do not hesitate to contact m
e if you have any questions.
 Cc:
 MAYCOL Harmon 
 
 This note was prepared using Dragon dictation voice recognition technology. There may be so
und-alike errors even though every effort has been made to ensure accuracy. 
 
 Electronically signed by Ravindra Munoz MD at 2020 11:56 AM Jabier Monson Medical A
ssistant - 2020  7:50 AM PSTReview of Systems 
 HENT: Positive for hearing loss.  
 Cardiovascular: Positive for chest pain. 
 Psychiatric/Behavioral: Positive for depression. The patient is nervous/anxious.  
 All other systems reviewed and are negative.
 
 Electronically signed by Jabier Major Medical Assistant at 2020 11:56 AM Dia
mented in this encounter
 
 Plan of Treatment
 
 
+--------+---------+-----------+----------------------+-------------+
| Date   | Type    | Specialty | Care Team            | Description |
+--------+---------+-----------+----------------------+-------------+
| / | Office  | Neurology |   Ravindra Munoz MD  [address] |             |
2020   | Visit   |           |  [address]      |             |
|        |         |           | [place],  |             |
|        |         |           | [place] [address]             |             |
|        |         |           | [phone]         |             |
|        |         |           | [phone] (Fax)   |             |
+--------+---------+-----------+----------------------+-------------+
 
 
 
+---------------+---------+--------+----------------------+----------------------+
| Name          | Type    | Priori | Associated Diagnoses | Order Schedule       |
|               |         | ty     |                      |                      |
+---------------+---------+--------+----------------------+----------------------+
| MRI Brain wo  | Imaging | Routin |   Memory loss        | Expected:            |
| Contrast      |         | e      |                      | 2020, Expires: |
|               |         |        |                      |  2021          |
+---------------+---------+--------+----------------------+----------------------+
 documented as of this encounter
 
 Procedures
 
 
+-------------------+--------+-------------+----------------------+----------------------+
| Procedure Name    | Priori | Date/Time   | Associated Diagnosis | Comments             |
|                   | ty     |             |                      |                      |
+-------------------+--------+-------------+----------------------+----------------------+
| IMAGING REPORT -  |        | 2020  |                      |   Results for this   |
| EXTERNAL SCAN     |        | 12:00 AM    |                      | procedure are in the |
|                   |        | PST         |                      |  results section.    |
+-------------------+--------+-------------+----------------------+----------------------+
 documented in this encounter
 
 Results
 IMAGING REPORT - EXTERNAL SCAN (2020 12:00 AM PST)
 
 
+------------------------------------------------------------------------+--------------+
| Narrative                                                              | Performed At |
+------------------------------------------------------------------------+--------------+
|   This result has an attachment that is not available.  Ordered by an  |              |
| unspecified provider.                                                  |              |
+------------------------------------------------------------------------+--------------+
 documented in this encounter
 
 Visit Diagnoses
 
 
+---------------------------------------------------------------------------------------+
| Diagnosis                                                                             |
+---------------------------------------------------------------------------------------+
|   Memory loss - Primary                                                               |
+---------------------------------------------------------------------------------------+
|   Driving safety issue  Other specified personal history presenting hazards to health |
+---------------------------------------------------------------------------------------+
 documented in this encounter